# Patient Record
Sex: MALE | Race: WHITE | Employment: OTHER | URBAN - METROPOLITAN AREA
[De-identification: names, ages, dates, MRNs, and addresses within clinical notes are randomized per-mention and may not be internally consistent; named-entity substitution may affect disease eponyms.]

---

## 2017-09-08 ENCOUNTER — OFFICE VISIT (OUTPATIENT)
Dept: INTERNAL MEDICINE CLINIC | Age: 82
End: 2017-09-08

## 2017-09-08 VITALS
WEIGHT: 194 LBS | TEMPERATURE: 97.5 F | HEART RATE: 53 BPM | BODY MASS INDEX: 26.28 KG/M2 | DIASTOLIC BLOOD PRESSURE: 61 MMHG | OXYGEN SATURATION: 95 % | SYSTOLIC BLOOD PRESSURE: 135 MMHG | HEIGHT: 72 IN | RESPIRATION RATE: 22 BRPM

## 2017-09-08 DIAGNOSIS — N40.0 BENIGN PROSTATIC HYPERPLASIA, PRESENCE OF LOWER URINARY TRACT SYMPTOMS UNSPECIFIED, UNSPECIFIED MORPHOLOGY: ICD-10-CM

## 2017-09-08 DIAGNOSIS — E11.8 CONTROLLED TYPE 2 DIABETES MELLITUS WITH COMPLICATION, WITHOUT LONG-TERM CURRENT USE OF INSULIN (HCC): Primary | ICD-10-CM

## 2017-09-08 DIAGNOSIS — L89.311: ICD-10-CM

## 2017-09-08 DIAGNOSIS — R26.89 BALANCE PROBLEM: ICD-10-CM

## 2017-09-08 DIAGNOSIS — G47.00 INSOMNIA, UNSPECIFIED TYPE: ICD-10-CM

## 2017-09-08 DIAGNOSIS — Z95.1 S/P CABG X 1: ICD-10-CM

## 2017-09-08 DIAGNOSIS — I25.10 CORONARY ARTERY DISEASE, ANGINA PRESENCE UNSPECIFIED, UNSPECIFIED VESSEL OR LESION TYPE, UNSPECIFIED WHETHER NATIVE OR TRANSPLANTED HEART: ICD-10-CM

## 2017-09-08 DIAGNOSIS — H61.21 RIGHT EAR IMPACTED CERUMEN: ICD-10-CM

## 2017-09-08 DIAGNOSIS — E78.5 HYPERLIPIDEMIA, UNSPECIFIED HYPERLIPIDEMIA TYPE: ICD-10-CM

## 2017-09-08 DIAGNOSIS — E55.9 VITAMIN D DEFICIENCY: ICD-10-CM

## 2017-09-08 RX ORDER — FINASTERIDE 5 MG/1
5 TABLET, FILM COATED ORAL DAILY
COMMUNITY
End: 2017-12-14 | Stop reason: SDUPTHER

## 2017-09-08 RX ORDER — AMLODIPINE BESYLATE 10 MG/1
TABLET ORAL DAILY
COMMUNITY
End: 2017-10-19 | Stop reason: SDUPTHER

## 2017-09-08 RX ORDER — ATORVASTATIN CALCIUM 20 MG/1
TABLET, FILM COATED ORAL DAILY
COMMUNITY
End: 2017-09-21 | Stop reason: SDUPTHER

## 2017-09-08 RX ORDER — GLUCOSAMINE SULFATE 1500 MG
POWDER IN PACKET (EA) ORAL DAILY
COMMUNITY
End: 2018-03-29 | Stop reason: SDUPTHER

## 2017-09-08 RX ORDER — LABETALOL 100 MG/1
100 TABLET, FILM COATED ORAL DAILY
COMMUNITY
End: 2017-12-14 | Stop reason: SDUPTHER

## 2017-09-08 RX ORDER — TRAZODONE HYDROCHLORIDE 150 MG/1
150 TABLET ORAL
COMMUNITY
End: 2017-09-08

## 2017-09-08 RX ORDER — METFORMIN HYDROCHLORIDE 500 MG/1
500 TABLET ORAL 2 TIMES DAILY WITH MEALS
COMMUNITY
End: 2017-10-19 | Stop reason: SDUPTHER

## 2017-09-08 RX ORDER — TRAZODONE HYDROCHLORIDE 50 MG/1
TABLET ORAL
COMMUNITY
End: 2017-10-04 | Stop reason: SDUPTHER

## 2017-09-08 RX ORDER — TEMAZEPAM 15 MG/1
30 CAPSULE ORAL
COMMUNITY
End: 2017-10-04 | Stop reason: SDUPTHER

## 2017-09-08 RX ORDER — ERGOCALCIFEROL 1.25 MG/1
50000 CAPSULE ORAL
COMMUNITY
End: 2017-10-19 | Stop reason: SDUPTHER

## 2017-09-08 NOTE — LETTER
9/15/2017 2:33 PM 
 
Mr. Zaida Christina [address] [facility] 7 [address] Dear Zaida Christina: 
 
Please find your most recent results below. Resulted Orders CBC WITH AUTOMATED DIFF Result Value Ref Range WBC 7.0 3.4 - 10.8 x10E3/uL  
 RBC 4.02 (L) 4.14 - 5.80 x10E6/uL HGB 11.8 (L) 12.6 - 17.7 g/dL HCT 34.9 (L) 37.5 - 51.0 % MCV 87 79 - 97 fL  
 MCH 29.4 26.6 - 33.0 pg  
 MCHC 33.8 31.5 - 35.7 g/dL  
 RDW 13.3 12.3 - 15.4 % PLATELET 627 (L) 764 - 379 x10E3/uL NEUTROPHILS 70 % Lymphocytes 20 % MONOCYTES 8 % EOSINOPHILS 2 % BASOPHILS 0 %  
 ABS. NEUTROPHILS 4.8 1.4 - 7.0 x10E3/uL Abs Lymphocytes 1.4 0.7 - 3.1 x10E3/uL  
 ABS. MONOCYTES 0.6 0.1 - 0.9 x10E3/uL  
 ABS. EOSINOPHILS 0.2 0.0 - 0.4 x10E3/uL  
 ABS. BASOPHILS 0.0 0.0 - 0.2 x10E3/uL IMMATURE GRANULOCYTES 0 %  
 ABS. IMM. GRANS. 0.0 0.0 - 0.1 x10E3/uL Narrative Performed at:  [address]  [id number] : Millie Yeung MD, Phone:  [phone] METABOLIC PANEL, COMPREHENSIVE Result Value Ref Range Glucose 181 (H) 65 - 99 mg/dL BUN 22 8 - 27 mg/dL Creatinine 1.02 0.76 - 1.27 mg/dL GFR est non-AA 69 >59 mL/min/1.73 GFR est AA 80 >59 mL/min/1.73  
 BUN/Creatinine ratio 22 10 - 24 Sodium 140 134 - 144 mmol/L Potassium 4.1 3.5 - 5.2 mmol/L Chloride 97 96 - 106 mmol/L  
 CO2 27 18 - 29 mmol/L Calcium 9.6 8.6 - 10.2 mg/dL Protein, total 6.9 6.0 - 8.5 g/dL Albumin 4.3 3.5 - 4.7 g/dL GLOBULIN, TOTAL 2.6 1.5 - 4.5 g/dL A-G Ratio 1.7 1.2 - 2.2 Bilirubin, total 0.9 0.0 - 1.2 mg/dL Alk. phosphatase 76 39 - 117 IU/L  
 AST (SGOT) 25 0 - 40 IU/L  
 ALT (SGPT) 33 0 - 44 IU/L Narrative Performed at:  [address]  [id number] : Millie Yeung MD, Phone:  [phone] LIPID PANEL Result Value Ref Range Cholesterol, total 90 (L) 100 - 199 mg/dL Triglyceride 57 0 - 149 mg/dL HDL Cholesterol 46 >39 mg/dL VLDL, calculated 11 5 - 40 mg/dL LDL, calculated 33 0 - 99 mg/dL Narrative Performed at:  [address]  [id number] : Jose Lainez MD, Phone:  [phone] MICROALBUMIN, UR, RAND W/ MICROALBUMIN/CREA RATIO Result Value Ref Range Creatinine, urine 103.0 Not Estab. mg/dL Microalbumin, urine 35.9 Not Estab. ug/mL Microalb/Creat ratio (ug/mg creat.) 34.9 (H) 0.0 - 30.0 mg/g creat Narrative Performed at:  [address]  [id number] : Jose Lainez MD, Phone:  [phone] HEMOGLOBIN A1C WITH EAG Result Value Ref Range Hemoglobin A1c 8.0 (H) 4.8 - 5.6 % Comment:  
            Pre-diabetes: 5.7 - 6.4 Diabetes: >6.4 Glycemic control for adults with diabetes: <7.0 Estimated average glucose 183 mg/dL Narrative Performed at:  [address]  [id number] : Jose Lainez MD, Phone:  [phone] VITAMIN D, 25 HYDROXY Result Value Ref Range VITAMIN D, 25-HYDROXY 57.9 30.0 - 100.0 ng/mL Comment:  
   Vitamin D deficiency has been defined by the [address] practice guideline as a 
level of serum 25-OH vitamin D less than 20 ng/mL (1,2). The Endocrine Society went on to further define vitamin D 
insufficiency as a level between 21 and 29 ng/mL (2). 1. IOM ([place] of Medicine). 2010. Dietary reference 
   intakes for calcium and D. [facility]: The 
   [place]. 2. Stoney MEDEIROS, Marva BAY, Thao GÓMEZ, et al. 
   Evaluation, treatment, and prevention of vitamin D 
   deficiency: an Endocrine Society clinical practice 
   guideline. JCEM. 2011 Jul; 96(7):1911-30. Narrative Performed at:  [address]  [id number] : Natasha Deleon MD, Phone:  [phone] CVD REPORT Result Value Ref Range INTERPRETATION Note Comment:  
   Medical Director's Note: This is an amended report on 
9/14/2017. The following panels were previously not 
reported: Albumin: Creatinine Ratio. Please review this 
report in its entirety, since changes may affect result 
interpretation(s) and/or treatment/follow-up suggestions. Supplement report is available. Narrative Performed at:  [address] [place] [address] [address] [place] [address]  [id number] : Keturah Lopez PhD, Phone:  [phone] DIABETES PATIENT EDUCATION Result Value Ref Range PDF Image Not applicable Narrative Performed at:  [address] [place] [address] [address] [place] [address]  [id number] : Keturah Lopez PhD, Phone:  [phone] DIABETES PATIENT EDUCATION Result Value Ref Range PDF Image Not applicable Narrative Performed at:  [address] [place] [address] [address] [place] [address]  [id number] : Keturah Lopez PhD, Phone:  [phone] IRON PROFILE Result Value Ref Range TIBC 286 250 - 450 ug/dL UIBC 156 111 - 343 ug/dL Iron 130 38 - 169 ug/dL Iron % saturation 45 15 - 55 % Narrative Performed at:  [address]  [id number] : Natasha Deleon MD, Phone:  [phone] FERRITIN Result Value Ref Range Ferritin 74 30 - 400 ng/mL Narrative Performed at:  [address]  [id number] : Natasha Deleon MD, Phone:  [phone] SPECIMEN STATUS REPORT Result Value Ref Range SPECIMEN STATUS REPORT COMMENT Comment:  
   Written Authorization Written Authorization Written Authorization Received. Authorization received from [address] [place] [address] 51- Logged by Issa Barry Narrative Performed at:  [address]  [id number] : Yi Laguna MD, Phone:  [phone] RECOMMENDATIONS: 
Labs are stable. Please call me if you have any questions: [phone] Sincerely, Nubia Bowens, NP

## 2017-09-08 NOTE — PROGRESS NOTES
HISTORY OF PRESENT ILLNESS  Silvana Torres is a [de-identified] y.o. male. This patient presents today to establish care. He moved to [facility] this week from [facility]. The patient's past medical history includes diabetes, HTN, HLD, CAD s/p CABG, and BPH. He was followed by PCP, cardiology, urology, and endocrinology in [facility]. The patient states hat he is generally feeling well at the time of today's visit. He denies chest pain, shortness of breath, and GI/ problems. The patient does have complaints of a sore spot to his buttock. He states he has had the area for some time, but it seemed to worsen after 20+ hour car ride to [place]. The patient has been applying Desitin cream to the area, which has helped some. The patient also mentions he has felt off balance at times. He was participating in exercise prior to move. Patient denies falls. No syncope. No dizziness or headache. Past Medical History:   Diagnosis Date    BPH (benign prostatic hyperplasia)     Diabetes ([address]     Hearing loss     Hypercholesterolemia     Hypertension     Murmur      Past Surgical History:   Procedure Laterality Date    CARDIAC SURG PROCEDURE UNLIST       Visit Vitals    /61    Pulse (!) 53    Temp 97.5 °F (36.4 °C) (Oral)    Resp 22    Ht 6' 0.25\" (1.835 m)    Wt 194 lb (88 kg)    SpO2 95%    BMI 26.13 kg/m2     HPI    Review of Systems   Constitutional: Negative for chills and fever. HENT: Positive for hearing loss. Negative for congestion, ear pain and tinnitus. Eyes: Negative for blurred vision. Respiratory: Negative for cough, shortness of breath and wheezing. Cardiovascular: Negative for chest pain, palpitations and leg swelling. Gastrointestinal: Negative for abdominal pain, constipation, diarrhea, nausea and vomiting. Genitourinary: Negative. Musculoskeletal: Negative. Skin: Negative.          Skin sore   Neurological: Negative for dizziness, weakness and headaches. Endo/Heme/Allergies: Negative. Psychiatric/Behavioral: Negative. Physical Exam   Constitutional: He is oriented to person, place, and time. He appears well-developed and well-nourished. No distress. HENT:   Head: Normocephalic and atraumatic. Left Ear: External ear normal.   Mouth/Throat: Oropharynx is clear and moist. No oropharyngeal exudate. Right ear canal with impacted cerumen   Eyes: Conjunctivae are normal. Pupils are equal, round, and reactive to light. Neck: Neck supple. Cardiovascular: Normal rate and regular rhythm. Murmur heard. Pulmonary/Chest: Effort normal and breath sounds normal. No respiratory distress. He has no wheezes. He has no rales. Abdominal: Soft. Bowel sounds are normal. He exhibits no distension. There is no tenderness. Musculoskeletal: He exhibits no edema. Lymphadenopathy:     He has no cervical adenopathy. Neurological: He is alert and oriented to person, place, and time. He exhibits normal muscle tone. Skin: Skin is warm and dry. Right ischium with area of non-blanchable erythema  Small scabbed area noted to right ischium, as well- no drainage noted   Psychiatric: He has a normal mood and affect. His behavior is normal.   Nursing note and vitals reviewed. ASSESSMENT and PLAN    ICD-10-CM ICD-9-CM    1. Controlled type 2 diabetes mellitus with complication, without long-term current use of insulin ([facility]) E11.8 250.90 Will order  MICROALBUMIN, UR, RAND W/ MICROALBUMIN/CREA RATIO      HEMOGLOBIN A1C WITH EAG   2. Coronary artery disease, angina presence unspecified, unspecified vessel or lesion type, unspecified whether native or transplanted heart I25.10 414.00 Will order  CBC WITH AUTOMATED DIFF      METABOLIC PANEL, COMPREHENSIVE      Will refer, REFERRAL TO CARDIOLOGY   3. S/P CABG x 1 Z95.1 V45.81 As above, REFERRAL TO CARDIOLOGY   4.  Benign prostatic hyperplasia, presence of lower urinary tract symptoms unspecified, unspecified morphology N40.0 600.00 Consider Urology referral at next visit. 5. Vitamin D deficiency E55.9 268.9 Will order  VITAMIN D, 25 HYDROXY   6. Hyperlipidemia, unspecified hyperlipidemia type E78.5 272.4 Will order  LIPID PANEL   7. Insomnia, unspecified type G47.00 780.52 Taking Restoril and Trazodone. Patient unsure of dosage at time of visit, will provide this information. 8. Balance problem R26.89 781.99 Will order  [address], PT   9. Right ischial pressure sore, stage 1 L89.311 707.05 Will order  [address], SN     707.21    10. Right ear impacted cerumen H61.21 380.4 Debrox 5 gtts to right ear bid x 3 days. Lab results and schedule of future lab studies reviewed with patient  Reviewed diet, exercise and weight control  Reviewed medications and side effects in detail  Patient encouraged to call or return to office if symptoms do not improve or worsen. Reviewed plan of care with patient who acknowledges understanding and agrees. Follow-up in 3 months, or sooner as needed.

## 2017-09-08 NOTE — PROGRESS NOTES
Chief Complaint   Patient presents with   [facility] Establish Care    Buttocks pain     C/O soreness buttocks--long drive with move to [facility]       Patient moved here from [place], he is here today, accompanied by his son, Baylee Tate. Family is not sure of new pharmacy yet.

## 2017-09-08 NOTE — MR AVS SNAPSHOT
Visit Information Date & Time Provider Department Dept. Phone Encounter #  
 9/8/2017 10:00 AM Zari Marcus, [facility] Internal Medicine [facility] [phone] [id number] Upcoming Health Maintenance Date Due DTaP/Tdap/Td series (1 - Tdap) 6/18/1956 ZOSTER VACCINE AGE 60> 4/18/1997 GLAUCOMA SCREENING Q2Y 6/18/2002 Pneumococcal 65+ Low/Medium Risk (1 of 2 - PCV13) 6/18/2002 MEDICARE YEARLY EXAM 6/18/2002 INFLUENZA AGE 9 TO ADULT 8/1/2017 Allergies as of 9/8/2017  Review Complete On: 9/8/2017 By: Zari Marcus NP No Known Allergies Current Immunizations  Never Reviewed No immunizations on file. Not reviewed this visit You Were Diagnosed With   
  
 Codes Comments Coronary artery disease, angina presence unspecified, unspecified vessel or lesion type, unspecified whether native or transplanted heart    -  Primary ICD-10-CM: I25.10 ICD-9-CM: 414.00 S/P CABG x 1     ICD-10-CM: Z95.1 ICD-9-CM: V45.81 Controlled type 2 diabetes mellitus with complication, without long-term current use of insulin (HCC)     ICD-10-CM: E11.8 ICD-9-CM: 250.90 Benign prostatic hyperplasia, presence of lower urinary tract symptoms unspecified, unspecified morphology     ICD-10-CM: N40.0 ICD-9-CM: 600.00 Vitamin D deficiency     ICD-10-CM: E55.9 ICD-9-CM: 268.9 Hyperlipidemia, unspecified hyperlipidemia type     ICD-10-CM: E78.5 ICD-9-CM: 272.4 Insomnia, unspecified type     ICD-10-CM: G47.00 ICD-9-CM: 780.52 Balance problem     ICD-10-CM: R26.89 
ICD-9-CM: 781.99 Right ischial pressure sore, stage 1     ICD-10-CM: L89.311 ICD-9-CM: 707.05, 707.21 Right ear impacted cerumen     ICD-10-CM: H61.21 ICD-9-CM: 380.4 Vitals BP Pulse Temp Resp Height(growth percentile) Weight(growth percentile) 135/61 (!) 53 97.5 °F (36.4 °C) (Oral) 22 6' 0.25\" (1.835 m) 194 lb (88 kg) SpO2 BMI Smoking Status 95% 26.13 kg/m2 Former Smoker BMI and BSA Data Body Mass Index Body Surface Area  
 26.13 kg/m 2 2.12 m 2 Preferred Pharmacy Pharmacy Name Phone [facility] [phone] Your Updated Medication List  
  
   
This list is accurate as of: 9/8/17 11:01 AM.  Always use your most recent med list. amLODIPine 10 mg tablet Commonly known as:  Nelly Chimes Take  by mouth daily. atorvastatin 20 mg tablet Commonly known as:  LIPITOR Take  by mouth daily. carbamide peroxide 6.5 % otic solution Commonly known as:  Luan Holiday Administer 5 Drops in right ear two (2) times a day for 3 days. DOXAZOSIN PO Take  by mouth. Take one tablet daily. finasteride 5 mg tablet Commonly known as:  PROSCAR Take 5 mg by mouth daily. labetalol 100 mg tablet Commonly known as:  Lashay Chariton Take 100 mg by mouth daily. metFORMIN 500 mg tablet Commonly known as:  GLUCOPHAGE Take 500 mg by mouth two (2) times daily (with meals). ONGLYZA 5 mg Tab tablet Generic drug:  sAXagliptin Take  by mouth daily. temazepam 15 mg capsule Commonly known as:  RESTORIL Take  by mouth nightly as needed for Sleep.  
  
 traZODone 50 mg tablet Commonly known as:  Rhonda Checo Take  by mouth nightly. VITAMIN D2 50,000 unit capsule Generic drug:  ergocalciferol Take 50,000 Units by mouth. Take one capsule PO every 2 weeks. VITAMIN D3 1,000 unit Cap Generic drug:  cholecalciferol Take  by mouth daily. Prescriptions Sent to Pharmacy Refills  
 carbamide peroxide (DEBROX) 6.5 % otic solution 0 Sig: Administer 5 Drops in right ear two (2) times a day for 3 days. Class: Normal  
 Pharmacy: [facility] Gustavo Abbasi Ph #: [phone] Route: Right Ear We Performed the Following CBC WITH AUTOMATED DIFF [31789 CPT(R)] HEMOGLOBIN A1C WITH EAG [82744 CPT(R)] LIPID PANEL [72339 CPT(R)] METABOLIC PANEL, COMPREHENSIVE [39399 CPT(R)] MICROALBUMIN, UR, RAND W/ MICROALBUMIN/CREA RATIO [id number] CPT(R)] REFERRAL TO CARDIOLOGY [LGC19 Custom] Comments:  
 Please evaluate patient for CAD. [address] Comments:  
 Please evaluate patient for PT to assess and treat balance, SN for medication management, right ischial stage 1 pressure ulcer VITAMIN D, 25 HYDROXY [id number] CPT(R)] Referral Information Referral ID Referred By Referred To  
  
 [id number] Anju Rudolph MD   
   [address] [facility] [address] [place], [facility] [address] Phone: [phone] Fax: [phone] Visits Status Start Date End Date 1 New Request 9/8/17 9/8/18 If your referral has a status of pending review or denied, additional information will be sent to support the outcome of this decision. Referral ID Referred By Referred To  
 [id number] Hiral VALENCIA Not Available Visits Status Start Date End Date 1 New Request 9/8/17 9/8/18 If your referral has a status of pending review or denied, additional information will be sent to support the outcome of this decision. Patient Instructions Trazodone, temazepam, Doxazosin- please provide dosing information Please provide this summary of care documentation to your next provider. If you have any questions after today's visit, please call [phone].

## 2017-09-08 NOTE — LETTER
9/14/2017 2:25 PM 
 
Mr. Wendel Lesches [address] [facility] [address] Dear Wendel Lesches: 
 
Please find your most recent results below. Resulted Orders CBC WITH AUTOMATED DIFF Result Value Ref Range WBC 7.0 3.4 - 10.8 x10E3/uL  
 RBC 4.02 (L) 4.14 - 5.80 x10E6/uL HGB 11.8 (L) 12.6 - 17.7 g/dL HCT 34.9 (L) 37.5 - 51.0 % MCV 87 79 - 97 fL  
 MCH 29.4 26.6 - 33.0 pg  
 MCHC 33.8 31.5 - 35.7 g/dL  
 RDW 13.3 12.3 - 15.4 % PLATELET 756 (L) 278 - 379 x10E3/uL NEUTROPHILS 70 % Lymphocytes 20 % MONOCYTES 8 % EOSINOPHILS 2 % BASOPHILS 0 %  
 ABS. NEUTROPHILS 4.8 1.4 - 7.0 x10E3/uL Abs Lymphocytes 1.4 0.7 - 3.1 x10E3/uL  
 ABS. MONOCYTES 0.6 0.1 - 0.9 x10E3/uL  
 ABS. EOSINOPHILS 0.2 0.0 - 0.4 x10E3/uL  
 ABS. BASOPHILS 0.0 0.0 - 0.2 x10E3/uL IMMATURE GRANULOCYTES 0 %  
 ABS. IMM. GRANS. 0.0 0.0 - 0.1 x10E3/uL Narrative Performed at:  [address]  [id number] : Kassandra Martins MD, Phone:  [phone] METABOLIC PANEL, COMPREHENSIVE Result Value Ref Range Glucose 181 (H) 65 - 99 mg/dL BUN 22 8 - 27 mg/dL Creatinine 1.02 0.76 - 1.27 mg/dL GFR est non-AA 69 >59 mL/min/1.73 GFR est AA 80 >59 mL/min/1.73  
 BUN/Creatinine ratio 22 10 - 24 Sodium 140 134 - 144 mmol/L Potassium 4.1 3.5 - 5.2 mmol/L Chloride 97 96 - 106 mmol/L  
 CO2 27 18 - 29 mmol/L Calcium 9.6 8.6 - 10.2 mg/dL Protein, total 6.9 6.0 - 8.5 g/dL Albumin 4.3 3.5 - 4.7 g/dL GLOBULIN, TOTAL 2.6 1.5 - 4.5 g/dL A-G Ratio 1.7 1.2 - 2.2 Bilirubin, total 0.9 0.0 - 1.2 mg/dL Alk. phosphatase 76 39 - 117 IU/L  
 AST (SGOT) 25 0 - 40 IU/L  
 ALT (SGPT) 33 0 - 44 IU/L Narrative Performed at:  [address]  [id number] : Kassandra Martins MD, Phone:  [phone] LIPID PANEL Result Value Ref Range Cholesterol, total 90 (L) 100 - 199 mg/dL Triglyceride 57 0 - 149 mg/dL HDL Cholesterol 46 >39 mg/dL VLDL, calculated 11 5 - 40 mg/dL LDL, calculated 33 0 - 99 mg/dL Narrative Performed at:  [address]  [id number] : Zenon Peters MD, Phone:  [phone] HEMOGLOBIN A1C WITH EAG Result Value Ref Range Hemoglobin A1c 8.0 (H) 4.8 - 5.6 % Comment:  
            Pre-diabetes: 5.7 - 6.4 Diabetes: >6.4 Glycemic control for adults with diabetes: <7.0 Estimated average glucose 183 mg/dL Narrative Performed at:  [address]  [id number] : Zenon Peters MD, Phone:  [phone] VITAMIN D, 25 HYDROXY Result Value Ref Range VITAMIN D, 25-HYDROXY 57.9 30.0 - 100.0 ng/mL Comment:  
   Vitamin D deficiency has been defined by the [address] [place] [address] practice guideline as a 
level of serum 25-OH vitamin D less than 20 ng/mL (1,2). The Endocrine Society went on to further define vitamin D 
insufficiency as a level between 21 and 29 ng/mL (2). 1. IOM ([place] of Medicine). 2010. Dietary reference 
   intakes for calcium and D. [facility]: [facility] 
   [employer]. 2. Stoney MF, Marva NC, Thao GÓMEZ, et al. 
   Evaluation, treatment, and prevention of vitamin D 
   deficiency: an Endocrine Society clinical practice 
   guideline. JCEM. 2011 Jul; 96(7):1911-30. Narrative Performed at:  [address]  [id number] : Zenon Peters MD, Phone:  [phone] CVD REPORT Result Value Ref Range INTERPRETATION Note Comment:  
   Supplement report is available. Narrative Performed at:  [address] [place] [address]  [id number] : Tory Parker PhD, Phone:  [phone] DIABETES PATIENT EDUCATION Result Value Ref Range PDF Image Not applicable Narrative Performed at:  [address] [place] [address]  [id number] : Bhargavi Cross PhD, Phone:  [phone] RECOMMENDATIONS: 
As my nurse mentioned over the phone: 
Cholesterol levels low. May reduce Lipitor to 10 mg po qhs (from 20 mg). HgbA1c, average of sugar over three months is 8.0.  At goal. Continue to watch diet for sugars and carbohydrates. I have added a few more test on, I hould have those results, Tues. Please call me if you have any questions: [phone] Sincerely, Subhash Medina NP

## 2017-09-11 ENCOUNTER — TELEPHONE (OUTPATIENT)
Dept: INTERNAL MEDICINE CLINIC | Age: 82
End: 2017-09-11

## 2017-09-11 NOTE — TELEPHONE ENCOUNTER
Received message from Tiffany Kohli RN with [facility]. Skilled nursing home health established for education on pressure ulcer prevnetion, wound care, and diabetes education.

## 2017-09-12 ENCOUNTER — HOSPITAL ENCOUNTER (OUTPATIENT)
Dept: LAB | Age: 82
Discharge: HOME | End: 2017-09-12
Payer: MEDICARE

## 2017-09-12 PROCEDURE — 80061 LIPID PANEL: CPT

## 2017-09-12 PROCEDURE — 82043 UR ALBUMIN QUANTITATIVE: CPT

## 2017-09-12 PROCEDURE — 83550 IRON BINDING TEST: CPT

## 2017-09-12 PROCEDURE — 83036 HEMOGLOBIN GLYCOSYLATED A1C: CPT

## 2017-09-12 PROCEDURE — 82306 VITAMIN D 25 HYDROXY: CPT

## 2017-09-12 PROCEDURE — 80053 COMPREHEN METABOLIC PANEL: CPT

## 2017-09-12 PROCEDURE — 85025 COMPLETE CBC W/AUTO DIFF WBC: CPT

## 2017-09-12 PROCEDURE — 82728 ASSAY OF FERRITIN: CPT

## 2017-09-13 ENCOUNTER — TELEPHONE (OUTPATIENT)
Dept: INTERNAL MEDICINE CLINIC | Age: 82
End: 2017-09-13

## 2017-09-13 NOTE — TELEPHONE ENCOUNTER
Received call from Vickie Alberto with [facility].   Patient will be started on 5 week course of PT for balance

## 2017-09-14 LAB
25(OH)D3+25(OH)D2 SERPL-MCNC: 57.9 NG/ML (ref 30–100)
ALBUMIN SERPL-MCNC: 4.3 G/DL (ref 3.5–4.7)
ALBUMIN/CREAT UR: 34.9 MG/G CREAT (ref 0–30)
ALBUMIN/GLOB SERPL: 1.7 {RATIO} (ref 1.2–2.2)
ALP SERPL-CCNC: 76 IU/L (ref 39–117)
ALT SERPL-CCNC: 33 IU/L (ref 0–44)
AST SERPL-CCNC: 25 IU/L (ref 0–40)
BASOPHILS # BLD AUTO: 0 X10E3/UL (ref 0–0.2)
BASOPHILS NFR BLD AUTO: 0 %
BILIRUB SERPL-MCNC: 0.9 MG/DL (ref 0–1.2)
BUN SERPL-MCNC: 22 MG/DL (ref 8–27)
BUN/CREAT SERPL: 22 (ref 10–24)
CALCIUM SERPL-MCNC: 9.6 MG/DL (ref 8.6–10.2)
CHLORIDE SERPL-SCNC: 97 MMOL/L (ref 96–106)
CHOLEST SERPL-MCNC: 90 MG/DL (ref 100–199)
CO2 SERPL-SCNC: 27 MMOL/L (ref 18–29)
CREAT SERPL-MCNC: 1.02 MG/DL (ref 0.76–1.27)
CREAT UR-MCNC: 103 MG/DL
EOSINOPHIL # BLD AUTO: 0.2 X10E3/UL (ref 0–0.4)
EOSINOPHIL NFR BLD AUTO: 2 %
ERYTHROCYTE [DISTWIDTH] IN BLOOD BY AUTOMATED COUNT: 13.3 % (ref 12.3–15.4)
EST. AVERAGE GLUCOSE BLD GHB EST-MCNC: 183 MG/DL
GLOBULIN SER CALC-MCNC: 2.6 G/DL (ref 1.5–4.5)
GLUCOSE SERPL-MCNC: 181 MG/DL (ref 65–99)
HBA1C MFR BLD: 8 % (ref 4.8–5.6)
HCT VFR BLD AUTO: 34.9 % (ref 37.5–51)
HDLC SERPL-MCNC: 46 MG/DL
HGB BLD-MCNC: 11.8 G/DL (ref 12.6–17.7)
IMM GRANULOCYTES # BLD: 0 X10E3/UL (ref 0–0.1)
IMM GRANULOCYTES NFR BLD: 0 %
INTERPRETATION, 910389: NORMAL
LDLC SERPL CALC-MCNC: 33 MG/DL (ref 0–99)
LYMPHOCYTES # BLD AUTO: 1.4 X10E3/UL (ref 0.7–3.1)
LYMPHOCYTES NFR BLD AUTO: 20 %
Lab: NORMAL
Lab: NORMAL
MCH RBC QN AUTO: 29.4 PG (ref 26.6–33)
MCHC RBC AUTO-ENTMCNC: 33.8 G/DL (ref 31.5–35.7)
MCV RBC AUTO: 87 FL (ref 79–97)
MICROALBUMIN UR-MCNC: 35.9 UG/ML
MONOCYTES # BLD AUTO: 0.6 X10E3/UL (ref 0.1–0.9)
MONOCYTES NFR BLD AUTO: 8 %
NEUTROPHILS # BLD AUTO: 4.8 X10E3/UL (ref 1.4–7)
NEUTROPHILS NFR BLD AUTO: 70 %
PLATELET # BLD AUTO: 131 X10E3/UL (ref 150–379)
POTASSIUM SERPL-SCNC: 4.1 MMOL/L (ref 3.5–5.2)
PROT SERPL-MCNC: 6.9 G/DL (ref 6–8.5)
RBC # BLD AUTO: 4.02 X10E6/UL (ref 4.14–5.8)
SODIUM SERPL-SCNC: 140 MMOL/L (ref 134–144)
TRIGL SERPL-MCNC: 57 MG/DL (ref 0–149)
VLDLC SERPL CALC-MCNC: 11 MG/DL (ref 5–40)
WBC # BLD AUTO: 7 X10E3/UL (ref 3.4–10.8)

## 2017-09-14 NOTE — PROGRESS NOTES
Spoke with patient after verifying name and . Notified patient of lab results and recommendation from provider. Patient verbalized understanding and given a chance to ask questions. Letter mailed to patient with malik provider. b results and recommendations from provider.     Labs added on per Ladarius Pagan NP.

## 2017-09-14 NOTE — PROGRESS NOTES
Please add iron panel, ferritin. Cholesterol levels low. May reduce Lipitor to 10 mg po qhs (from 20 mg). HgbA1c, average of sugar over three months is 8.0. At goal. Continue to watch diet for sugars and carbohydrates.

## 2017-09-15 LAB
FERRITIN SERPL-MCNC: 74 NG/ML (ref 30–400)
IRON SATN MFR SERPL: 45 % (ref 15–55)
IRON SERPL-MCNC: 130 UG/DL (ref 38–169)
SPECIMEN STATUS REPORT, ROLRST: NORMAL
TIBC SERPL-MCNC: 286 UG/DL (ref 250–450)
UIBC SERPL-MCNC: 156 UG/DL (ref 111–343)

## 2017-09-21 ENCOUNTER — OFFICE VISIT (OUTPATIENT)
Dept: INTERNAL MEDICINE CLINIC | Age: 82
End: 2017-09-21

## 2017-09-21 VITALS
TEMPERATURE: 97.5 F | HEIGHT: 72 IN | WEIGHT: 188 LBS | RESPIRATION RATE: 19 BRPM | OXYGEN SATURATION: 94 % | BODY MASS INDEX: 25.47 KG/M2 | SYSTOLIC BLOOD PRESSURE: 104 MMHG | HEART RATE: 59 BPM | DIASTOLIC BLOOD PRESSURE: 58 MMHG

## 2017-09-21 DIAGNOSIS — Z71.89 ACP (ADVANCE CARE PLANNING): ICD-10-CM

## 2017-09-21 DIAGNOSIS — E78.00 HYPERCHOLESTEROLEMIA: ICD-10-CM

## 2017-09-21 DIAGNOSIS — I35.8 AORTIC SYSTOLIC MURMUR ON EXAMINATION: ICD-10-CM

## 2017-09-21 DIAGNOSIS — I25.10 CORONARY ARTERY DISEASE INVOLVING NATIVE CORONARY ARTERY OF NATIVE HEART WITHOUT ANGINA PECTORIS: ICD-10-CM

## 2017-09-21 DIAGNOSIS — E11.9 TYPE 2 DIABETES MELLITUS WITHOUT COMPLICATION, WITHOUT LONG-TERM CURRENT USE OF INSULIN (HCC): Primary | ICD-10-CM

## 2017-09-21 DIAGNOSIS — I10 ESSENTIAL HYPERTENSION: ICD-10-CM

## 2017-09-21 DIAGNOSIS — N40.1 BENIGN PROSTATIC HYPERPLASIA WITH LOWER URINARY TRACT SYMPTOMS, UNSPECIFIED MORPHOLOGY: ICD-10-CM

## 2017-09-21 DIAGNOSIS — G47.00 INSOMNIA, UNSPECIFIED TYPE: ICD-10-CM

## 2017-09-21 DIAGNOSIS — Z87.891 FORMER SMOKER: ICD-10-CM

## 2017-09-21 DIAGNOSIS — I35.0 AORTIC VALVE STENOSIS, UNSPECIFIED ETIOLOGY: ICD-10-CM

## 2017-09-21 DIAGNOSIS — Z95.1 S/P CABG (CORONARY ARTERY BYPASS GRAFT): ICD-10-CM

## 2017-09-21 RX ORDER — ATORVASTATIN CALCIUM 10 MG/1
10 TABLET, FILM COATED ORAL DAILY
Qty: 30 TAB | Refills: 11 | Status: SHIPPED | OUTPATIENT
Start: 2017-09-21 | End: 2017-10-20 | Stop reason: SDUPTHER

## 2017-09-21 NOTE — MR AVS SNAPSHOT
Visit Information Date & Time Provider Department Dept. Phone Encounter #  
 9/21/2017 12:00 PM Tristan Barillas, [address] [phone] [id number] Follow-up Instructions Return in about 4 months (around 1/21/2018). Your Appointments 12/4/2017  1:00 PM  
ROUTINE CARE with Levander Canavan, NP [address] ([address]) Appt Note: follow up [address] [id number] [phone]  
  
   
 [address] [place] [address]. [address] [place] [phone] Upcoming Health Maintenance Date Due DTaP/Tdap/Td series (1 - Tdap) 6/18/1958 ZOSTER VACCINE AGE 60> 4/18/1997 GLAUCOMA SCREENING Q2Y 6/18/2002 Pneumococcal 65+ Low/Medium Risk (1 of 2 - PCV13) 6/18/2002 MEDICARE YEARLY EXAM 6/18/2002 INFLUENZA AGE 9 TO ADULT 8/1/2017 Allergies as of 9/21/2017  Review Complete On: 9/21/2017 By: Tristan Barillas DO No Known Allergies Current Immunizations  Reviewed on 9/21/2017 No immunizations on file. Reviewed by Tristan Barillas DO on 9/21/2017 at 12:19 PM  
You Were Diagnosed With   
  
 Codes Comments Type 2 diabetes mellitus without complication, without long-term current use of insulin (HCC)    -  Primary ICD-10-CM: E11.9 ICD-9-CM: 250.00 Essential hypertension     ICD-10-CM: I10 
ICD-9-CM: 401.9 Hypercholesterolemia     ICD-10-CM: E78.00 ICD-9-CM: 272.0 Coronary artery disease involving native coronary artery of native heart without angina pectoris     ICD-10-CM: I25.10 ICD-9-CM: 414.01 S/P CABG (coronary artery bypass graft)     ICD-10-CM: Z95.1 ICD-9-CM: V45.81 Aortic valve stenosis, unspecified etiology     ICD-10-CM: I35.0 ICD-9-CM: 424.1 Aortic systolic murmur on examination     ICD-10-CM: I35.8 ICD-9-CM: 424.1  Benign prostatic hyperplasia with lower urinary tract symptoms, unspecified morphology     ICD-10-CM: N40.1 ICD-9-CM: 600.01 Insomnia, unspecified type     ICD-10-CM: G47.00 ICD-9-CM: 780.52 Former smoker     ICD-10-CM: K88.914 ICD-9-CM: V15.82   
 ACP (advance care planning)     ICD-10-CM: Z71.89 ICD-9-CM: V65.49 Vitals BP Pulse Temp Resp Height(growth percentile) Weight(growth percentile) 104/58 (BP 1 Location: Left arm, BP Patient Position: Sitting) (!) 59 97.5 °F (36.4 °C) (Oral) 19 6' (1.829 m) 188 lb (85.3 kg) SpO2 BMI Smoking Status 94% 25.5 kg/m2 Former Smoker Vitals History BMI and BSA Data Body Mass Index Body Surface Area 25.5 kg/m 2 2.08 m 2 Preferred Pharmacy Pharmacy Name Phone [facility] [phone] Your Updated Medication List  
  
   
This list is accurate as of: 9/21/17 12:35 PM.  Always use your most recent med list. amLODIPine 10 mg tablet Commonly known as:  Ritu Ohio Take  by mouth daily. atorvastatin 10 mg tablet Commonly known as:  LIPITOR Take 1 Tab by mouth daily. DOXAZOSIN PO Take  by mouth. Take one tablet daily. finasteride 5 mg tablet Commonly known as:  PROSCAR Take 5 mg by mouth daily. labetalol 100 mg tablet Commonly known as:  Temi Gip Take 100 mg by mouth daily. metFORMIN 500 mg tablet Commonly known as:  GLUCOPHAGE Take 500 mg by mouth two (2) times daily (with meals). ONGLYZA 5 mg Tab tablet Generic drug:  sAXagliptin Take  by mouth daily. temazepam 15 mg capsule Commonly known as:  RESTORIL Take 30 mg by mouth nightly as needed for Sleep.  
  
 traZODone 50 mg tablet Commonly known as:  Jackie Hail Take  by mouth nightly. VITAMIN D2 50,000 unit capsule Generic drug:  ergocalciferol Take 50,000 Units by mouth. Take one capsule PO every 2 weeks. VITAMIN D3 1,000 unit Cap Generic drug:  cholecalciferol Take  by mouth daily. Prescriptions Sent to Pharmacy Refills  
 atorvastatin (LIPITOR) 10 mg tablet 11 Sig: Take 1 Tab by mouth daily. Class: Normal  
 Pharmacy: [address]  #: [phone] Route: Oral  
  
Follow-up Instructions Return in about 4 months (around 1/21/2018). Please provide this summary of care documentation to your next provider. Your primary care clinician is listed as Ana Kumar. If you have any questions after today's visit, please call [phone].

## 2017-09-21 NOTE — PROGRESS NOTES
Health Maintenance Due   Topic Date Due    DTaP/Tdap/Td series (1 - Tdap) 06/18/1958    ZOSTER VACCINE AGE 60>  04/18/1997    GLAUCOMA SCREENING Q2Y  06/18/2002    Pneumococcal 65+ Low/Medium Risk (1 of 2 - PCV13) 06/18/2002    MEDICARE YEARLY EXAM  06/18/2002    INFLUENZA AGE 9 TO ADULT  08/01/2017       Chief Complaint   Patient presents with    Hearing Problem    Benign Prostatic Hypertrophy    Heart Murmur       1. Have you been to the ER, urgent care clinic since your last visit? Hospitalized since your last visit? No    2. Have you seen or consulted any other health care providers outside of the [address] [place] [address] since your last visit? Include any pap smears or colon screening. No    3) Do you have an Advance Directive on file? no    4) Are you interested in receiving information on Advance Directives? YES      Patient is accompanied by daughter I have received verbal consent from Kymberly Lombardi to discuss any/all medical information while they are present in the room.

## 2017-09-29 NOTE — PROGRESS NOTES
HISTORY OF PRESENT ILLNESS  Alba Wood is a [de-identified] y.o. male. Pt. comes in for f/u. Has multiple medical problems. This is his first visit with me. Saw Rigoberto Pham NP before. Reports being hard of hearing. No earache or tinnitus. Has extensive cardiac history including CAD/CABG and aortic stenosis. Gets tired easily. Denies any falls. Some chronic arthritic pains. Reports issues with insomnia. His DM has been stable. Reports compliance with medications and diet. Med list and most recent labs/studies reviewed with pt and all look stable. A1c is 8.0. Lipids are normal. Trying to be active physically as tolerated. Former smoker. Denies alcohol use. Reports no other new c/o. Hearing Problem    Associated symptoms include hearing loss. Pertinent negatives include no headaches, no abdominal pain and no rash. Heart Murmur   Pertinent negatives include no chest pain, no abdominal pain, no headaches and no shortness of breath. Review of Systems   Constitutional: Negative for weight loss. HENT: Positive for hearing loss. Eyes: Negative for blurred vision. Respiratory: Negative for shortness of breath. Cardiovascular: Negative for chest pain and leg swelling. Gastrointestinal: Negative for abdominal pain and heartburn. Genitourinary: Negative for dysuria and frequency. Musculoskeletal: Positive for joint pain. Negative for back pain and falls. Skin: Negative for rash. Neurological: Negative for dizziness, sensory change, focal weakness and headaches. Psychiatric/Behavioral: Negative for depression. The patient has insomnia. The patient is not nervous/anxious. All other systems reviewed and are negative. Physical Exam   Constitutional: He is oriented to person, place, and time. He appears well-developed and well-nourished. No distress. HENT:   Head: Normocephalic and atraumatic.    Mouth/Throat: Oropharynx is clear and moist.   Eyes: Conjunctivae are normal.   Neck: Normal range of motion. Neck supple. No JVD present. No thyromegaly present. Cardiovascular: Normal rate, regular rhythm and intact distal pulses. Murmur heard. Pulmonary/Chest: Effort normal and breath sounds normal. No respiratory distress. He has no wheezes. He has no rales. Abdominal: Soft. Bowel sounds are normal. He exhibits no distension. Musculoskeletal: He exhibits no edema or tenderness. Neurological: He is alert and oriented to person, place, and time. Coordination normal.   Skin: Skin is warm and dry. No rash noted. Psychiatric: He has a normal mood and affect. His behavior is normal.   Nursing note and vitals reviewed. ASSESSMENT and PLAN  Diagnoses and all orders for this visit:    1. Type 2 diabetes mellitus without complication, without long-term current use of insulin ([facility] [address]    2. Essential hypertension    3. Hypercholesterolemia    4. Coronary artery disease involving native coronary artery of native heart without angina pectoris    5. S/P CABG (coronary artery bypass graft)    6. Aortic valve stenosis, unspecified etiology    7. Aortic systolic murmur on examination    8. Benign prostatic hyperplasia with lower urinary tract symptoms, unspecified morphology    9. Insomnia, unspecified type    10. Former smoker    6. ACP (advance care planning)    ACP discussed with pt in detail , including choosing a healthcare agent to communicate patient's healthcare decisions if patient lost the ability to make decisions, such as after a sudden illness or accident. Understanding of the healthcare agent role was assessed and information provided. Discussed values, goals, and preferences if recovery is not expected, even with continued medical treatment in the event of: Imminent death/serious illness. Recommended completion of Advance Directive form after review of ACP materials and conversation with prospective healthcare agent.   Recommended communicating the plan and making copies for the healthcare agent, personal physician, and others as appropriate (e.g., health system). Recommended review of completed ACP document annually or upon change in health status. Information book and form given. Face to face time spent was16 minutes. Other orders  -     Decrease atorvastatin (LIPITOR) from 20 mg to 10 mg tablet; Take 1 Tab by mouth daily. Follow-up Disposition:  Return in about 4 months (around 1/21/2018).    lab results and schedule of future lab studies reviewed with patient  reviewed diet, exercise and weight control  reviewed medications and side effects in detail  low cholesterol diet, weight control and daily exercise discussed, home glucose monitoring emphasized, all medications, side effects and compliance discussed carefully, foot care discussed and Podiatry visits discussed, annual eye examinations at Ophthalmology discussed, glycohemoglobin and other lab monitoring discussed and long term diabetic complications discussed  F/u with other MD's as scheduled

## 2017-10-04 RX ORDER — TEMAZEPAM 15 MG/1
15 CAPSULE ORAL
Qty: 30 CAP | Refills: 0 | Status: SHIPPED | OUTPATIENT
Start: 2017-10-04 | End: 2017-10-30 | Stop reason: SDUPTHER

## 2017-10-04 RX ORDER — TRAZODONE HYDROCHLORIDE 50 MG/1
50 TABLET ORAL
Qty: 30 TAB | Refills: 5 | Status: SHIPPED | OUTPATIENT
Start: 2017-10-04 | End: 2017-10-13 | Stop reason: SDUPTHER

## 2017-10-05 RX ORDER — TEMAZEPAM 15 MG/1
15 CAPSULE ORAL
Qty: 30 CAP | Refills: 0
Start: 2017-10-05

## 2017-10-05 RX ORDER — TRAZODONE HYDROCHLORIDE 50 MG/1
50 TABLET ORAL
Qty: 30 TAB | Refills: 5
Start: 2017-10-05

## 2017-10-09 ENCOUNTER — TELEPHONE (OUTPATIENT)
Dept: INTERNAL MEDICINE CLINIC | Age: 82
End: 2017-10-09

## 2017-10-09 NOTE — TELEPHONE ENCOUNTER
Patient stopped in today at [facility], he reports didn't sleep x 2 nights because of the Temazepam 15mg. Patient is requesting temazepam 30 mg. Per MARY Stanford, patient was on Temazepam 30 mg in the past and she dropped it down to the 15 mg.

## 2017-10-12 NOTE — TELEPHONE ENCOUNTER
Left message on patient's cell, see Dr. Richar Mix response -- patient wants increased dose Restoril. Patient may call me back.

## 2017-10-13 ENCOUNTER — OFFICE VISIT (OUTPATIENT)
Dept: INTERNAL MEDICINE CLINIC | Age: 82
End: 2017-10-13

## 2017-10-13 VITALS
HEIGHT: 72 IN | HEART RATE: 56 BPM | SYSTOLIC BLOOD PRESSURE: 103 MMHG | WEIGHT: 189 LBS | TEMPERATURE: 97.5 F | RESPIRATION RATE: 24 BRPM | OXYGEN SATURATION: 95 % | DIASTOLIC BLOOD PRESSURE: 49 MMHG | BODY MASS INDEX: 25.6 KG/M2

## 2017-10-13 DIAGNOSIS — G47.00 INSOMNIA, UNSPECIFIED TYPE: Primary | ICD-10-CM

## 2017-10-13 RX ORDER — TRAZODONE HYDROCHLORIDE 50 MG/1
100 TABLET ORAL
Qty: 60 TAB | Refills: 0 | Status: SHIPPED | OUTPATIENT
Start: 2017-10-13 | End: 2017-11-16 | Stop reason: SDUPTHER

## 2017-10-13 NOTE — MR AVS SNAPSHOT
Visit Information Date & Time Provider Department Dept. Phone Encounter #  
 10/13/2017 10:00 AM Ammon Echavarria, [address] [facility] Internal Medicine [facility] [phone] [id number] Your Appointments 11/7/2017 10:40 AM  
New Patient with Angie Begum MD  
[facility] ([address]) Appt Note: ref by Ammon Echavarria NP/Dx S/P CABG x 1, Coronary artery disease, angina presence unspecified, unspecified vessel or lesion type, unspecified whether native or transplanted heart/[facility] [address] [place] [address]  
[address] [address]  
  
    
 1/18/2018 11:30 AM  
Any with DO Rajan Calvo ([address]) Appt Note: follow up 3m; follow up 4m [address] [place] [address]. [facility] 7 [phone]  
[phone]  
  
   
 [address] [place] [address]. [address] [place] [phone] Upcoming Health Maintenance Date Due  
 FOOT EXAM Q1 6/18/1945 EYE EXAM RETINAL OR DILATED Q1 6/18/1945 DTaP/Tdap/Td series (1 - Tdap) 6/18/1958 ZOSTER VACCINE AGE 60> 4/18/1997 GLAUCOMA SCREENING Q2Y 6/18/2002 Pneumococcal 65+ Low/Medium Risk (1 of 2 - PCV13) 6/18/2002 MEDICARE YEARLY EXAM 6/18/2002 INFLUENZA AGE 9 TO ADULT 8/1/2017 HEMOGLOBIN A1C Q6M 3/12/2018 MICROALBUMIN Q1 9/12/2018 LIPID PANEL Q1 9/12/2018 Allergies as of 10/13/2017  Review Complete On: 10/13/2017 By: Ammon Echavarria NP No Known Allergies Current Immunizations  Reviewed on 9/21/2017 No immunizations on file. Not reviewed this visit You Were Diagnosed With   
  
 Codes Comments Insomnia, unspecified type    -  Primary ICD-10-CM: G47.00 ICD-9-CM: 780.52 Vitals BP Pulse Temp Resp Height(growth percentile) Weight(growth percentile)  103/49 (BP 1 Location: Left arm, BP Patient Position: Sitting) (!) 56 97.5 °F (36.4 °C) (Oral) 24 6' (1.829 m) 189 lb (85.7 kg) SpO2 BMI Smoking Status 95% 25.63 kg/m2 Former Smoker BMI and BSA Data Body Mass Index Body Surface Area  
 25.63 kg/m 2 2.09 m 2 Preferred Pharmacy Pharmacy Name Phone [facility] [phone] Your Updated Medication List  
  
   
This list is accurate as of: 10/13/17 10:13 AM.  Always use your most recent med list. amLODIPine 10 mg tablet Commonly known as:  McEwen Conine Take  by mouth daily. atorvastatin 10 mg tablet Commonly known as:  LIPITOR Take 1 Tab by mouth daily. DOXAZOSIN PO Take  by mouth. Take one tablet daily. finasteride 5 mg tablet Commonly known as:  PROSCAR Take 5 mg by mouth daily. labetalol 100 mg tablet Commonly known as:  Ilda President Take 100 mg by mouth daily. metFORMIN 500 mg tablet Commonly known as:  GLUCOPHAGE Take 500 mg by mouth two (2) times daily (with meals). temazepam 15 mg capsule Commonly known as:  RESTORIL Take 1 Cap by mouth nightly as needed for Sleep. Max Daily Amount: 15 mg.  
  
 traZODone 50 mg tablet Commonly known as:  Robinson Greenway Take 2 Tabs by mouth nightly. VITAMIN D2 50,000 unit capsule Generic drug:  ergocalciferol Take 50,000 Units by mouth. Take one capsule PO every 2 weeks. VITAMIN D3 1,000 unit Cap Generic drug:  cholecalciferol Take  by mouth daily. Prescriptions Sent to Pharmacy Refills  
 traZODone (DESYREL) 50 mg tablet 0 Sig: Take 2 Tabs by mouth nightly. Class: Normal  
 Pharmacy: [address]  #: [phone] Route: Oral  
  
 Please provide this summary of care documentation to your next provider. Your primary care clinician is listed as Adrian Leblanc.  If you have any questions after today's visit, please call [phone].

## 2017-10-13 NOTE — PROGRESS NOTES
HISTORY OF PRESENT ILLNESS  Brain Brennan is a [de-identified] y.o. male. This is a patient of Dr. Shakira Okeefe who presents today with complaints of insomnia. Patient states he previously took Temazepam 30 mg at bedtime. He was also on trazodone 50 mg; he states he took this when he woke up in the middle of the night to go back to sleep. The patient's dose of Temazepam was decreased to 15 mg nightly related to risk of side effects. However, patient states he has had very little sleep over the last four night related to this. He denies daytime drowsiness. Visit Vitals    /49 (BP 1 Location: Left arm, BP Patient Position: Sitting)    Pulse (!) 56    Temp 97.5 °F (36.4 °C) (Oral)    Resp 24    Ht 6' (1.829 m)    Wt 189 lb (85.7 kg)    SpO2 95%    BMI 25.63 kg/m2     HPI    Review of Systems   Constitutional: Negative for chills and fever. HENT: Negative for congestion. Eyes: Negative for blurred vision. Respiratory: Negative for cough and shortness of breath. Cardiovascular: Negative for chest pain and leg swelling. Gastrointestinal: Negative for abdominal pain. Genitourinary: Negative. Musculoskeletal: Negative. Skin: Negative. Neurological: Negative for headaches. Psychiatric/Behavioral: The patient has insomnia. Physical Exam   Constitutional: He is oriented to person, place, and time. He appears well-developed and well-nourished. No distress. HENT:   Head: Normocephalic and atraumatic. Eyes: Conjunctivae are normal.   Cardiovascular: Normal rate and regular rhythm. Pulmonary/Chest: Effort normal and breath sounds normal. No respiratory distress. He has no wheezes. He has no rales. Musculoskeletal: He exhibits no edema. Neurological: He is alert and oriented to person, place, and time. Skin: Skin is warm and dry. Psychiatric: He has a normal mood and affect. Nursing note and vitals reviewed. ASSESSMENT and PLAN    ICD-10-CM ICD-9-CM    1.  Insomnia, unspecified type G47.00 780.52 Continue temazepam 15 mg po nightly. Will increase, traZODone (DESYREL) 50 mg tablet 2 tabs po qhs  Patient encouraged to call or return to office if symptoms do not improve or worsen. Reviewed medications and side effects in detail  Patient encouraged to call or return to office if symptoms do not improve or worsen. Reviewed plan of care with patient who acknowledges understanding and agrees.

## 2017-10-13 NOTE — PROGRESS NOTES
Chief Complaint   Patient presents with    Sleep Problem     1. Have you been to the ER, urgent care clinic since your last visit? Hospitalized since your last visit? No    2. Have you seen or consulted any other health care providers outside of the [address] [place] [address] since your last visit? Include any pap smears or colon screening.  No

## 2017-10-16 ENCOUNTER — TELEPHONE (OUTPATIENT)
Dept: INTERNAL MEDICINE CLINIC | Age: 82
End: 2017-10-16

## 2017-10-16 NOTE — TELEPHONE ENCOUNTER
Received message from Eugene Ventura RN with [facility]. Patient is unable to tolerate duoderm on wound, as it is not staying in place. Request order for foam dressing. Patient has also been using silver sulfadiazine cream to affected area. Recommend patient use only x 1 week.

## 2017-10-19 RX ORDER — ERGOCALCIFEROL 1.25 MG/1
50000 CAPSULE ORAL
Qty: 4 CAP | Refills: 3 | Status: SHIPPED | OUTPATIENT
Start: 2017-10-19 | End: 2018-06-16 | Stop reason: SDUPTHER

## 2017-10-19 RX ORDER — GLUCOSAMINE SULFATE 1500 MG
POWDER IN PACKET (EA) ORAL DAILY
Status: CANCELLED | OUTPATIENT
Start: 2017-10-19

## 2017-10-19 RX ORDER — METFORMIN HYDROCHLORIDE 500 MG/1
500 TABLET ORAL 2 TIMES DAILY WITH MEALS
Qty: 60 TAB | Refills: 3 | Status: SHIPPED | OUTPATIENT
Start: 2017-10-19 | End: 2017-10-20

## 2017-10-19 RX ORDER — SERTRALINE HYDROCHLORIDE 100 MG/1
100 TABLET, FILM COATED ORAL DAILY
Qty: 30 TAB | Refills: 3 | Status: SHIPPED | OUTPATIENT
Start: 2017-10-19 | End: 2018-02-21 | Stop reason: SDUPTHER

## 2017-10-19 RX ORDER — AMLODIPINE BESYLATE 10 MG/1
10 TABLET ORAL DAILY
Qty: 30 TAB | Refills: 3 | Status: SHIPPED | OUTPATIENT
Start: 2017-10-19 | End: 2018-03-23 | Stop reason: SDUPTHER

## 2017-10-20 ENCOUNTER — OFFICE VISIT (OUTPATIENT)
Dept: INTERNAL MEDICINE CLINIC | Age: 82
End: 2017-10-20

## 2017-10-20 VITALS
BODY MASS INDEX: 25.6 KG/M2 | WEIGHT: 189 LBS | HEART RATE: 55 BPM | TEMPERATURE: 97.3 F | SYSTOLIC BLOOD PRESSURE: 117 MMHG | RESPIRATION RATE: 20 BRPM | DIASTOLIC BLOOD PRESSURE: 50 MMHG | HEIGHT: 72 IN | OXYGEN SATURATION: 96 %

## 2017-10-20 DIAGNOSIS — I25.10 CORONARY ARTERY DISEASE INVOLVING NATIVE CORONARY ARTERY OF NATIVE HEART WITHOUT ANGINA PECTORIS: ICD-10-CM

## 2017-10-20 DIAGNOSIS — N40.1 BENIGN PROSTATIC HYPERPLASIA WITH LOWER URINARY TRACT SYMPTOMS, SYMPTOM DETAILS UNSPECIFIED: ICD-10-CM

## 2017-10-20 DIAGNOSIS — G47.00 INSOMNIA, UNSPECIFIED TYPE: ICD-10-CM

## 2017-10-20 DIAGNOSIS — E78.00 HYPERCHOLESTEROLEMIA: ICD-10-CM

## 2017-10-20 DIAGNOSIS — E11.9 TYPE 2 DIABETES MELLITUS WITHOUT COMPLICATION, WITHOUT LONG-TERM CURRENT USE OF INSULIN (HCC): Primary | ICD-10-CM

## 2017-10-20 DIAGNOSIS — I10 ESSENTIAL HYPERTENSION: ICD-10-CM

## 2017-10-20 RX ORDER — METFORMIN HYDROCHLORIDE 500 MG/1
500 TABLET, EXTENDED RELEASE ORAL 2 TIMES DAILY
Qty: 60 TAB | Refills: 5 | Status: SHIPPED | OUTPATIENT
Start: 2017-10-20 | End: 2018-03-29 | Stop reason: SDUPTHER

## 2017-10-20 RX ORDER — ASPIRIN 325 MG
325 TABLET ORAL DAILY
COMMUNITY
End: 2018-03-29 | Stop reason: SDUPTHER

## 2017-10-20 RX ORDER — HYDROCHLOROTHIAZIDE 12.5 MG/1
12.5 TABLET ORAL DAILY
COMMUNITY
End: 2017-11-13 | Stop reason: SDUPTHER

## 2017-10-20 RX ORDER — ATORVASTATIN CALCIUM 10 MG/1
10 TABLET, FILM COATED ORAL DAILY
Qty: 30 TAB | Refills: 5 | Status: SHIPPED | OUTPATIENT
Start: 2017-10-20 | End: 2018-03-29 | Stop reason: SDUPTHER

## 2017-10-20 RX ORDER — METFORMIN HYDROCHLORIDE 500 MG/1
TABLET, FILM COATED, EXTENDED RELEASE ORAL 2 TIMES DAILY
COMMUNITY
End: 2017-10-20 | Stop reason: SDUPTHER

## 2017-10-20 RX ORDER — ZINC GLUCONATE 10 MG
LOZENGE ORAL DAILY
COMMUNITY
End: 2018-03-29 | Stop reason: SDUPTHER

## 2017-10-20 NOTE — MR AVS SNAPSHOT
Visit Information Date & Time Provider Department Dept. Phone Encounter #  
 10/20/2017 11:00 AM Ammon Echavarria, 329 [facility] Internal Medicine [place] [facility] [phone] [id number] Your Appointments 10/20/2017 11:00 AM  
ACUTE CARE with Ammon Echavarria NP [address] ([facility]) Appt Note: medication 47 [facility]. [facility] 7 [phone]  
[phone]  
  
   
 [address] [place] [address]. [address] [place] [phone]  
  
    
 11/7/2017 10:40 AM  
New Patient with Angie Begum MD  
[facility] ([facility]) Appt Note: ref by Ammon Echavarria NP/Dx S/P CABG x 1, Coronary artery disease, angina presence unspecified, unspecified vessel or lesion type, unspecified whether native or transplanted heart/Ref in [address] [place], [place] [address] [facility] [address]  
  
    
 1/18/2018 11:30 AM  
Any with Mahesh Faustin DO [address] ([facility]) Appt Note: follow up 3m; follow up 4m [address] [place] [address]. [facility] 7 [phone]  
[phone]  
  
   
 [address] [place] [address]. [address] [place] [phone] Upcoming Health Maintenance Date Due  
 FOOT EXAM Q1 6/18/1945 EYE EXAM RETINAL OR DILATED Q1 6/18/1945 DTaP/Tdap/Td series (1 - Tdap) 6/18/1958 ZOSTER VACCINE AGE 60> 4/18/1997 GLAUCOMA SCREENING Q2Y 6/18/2002 Pneumococcal 65+ Low/Medium Risk (1 of 2 - PCV13) 6/18/2002 MEDICARE YEARLY EXAM 6/18/2002 INFLUENZA AGE 9 TO ADULT 8/1/2017 HEMOGLOBIN A1C Q6M 3/12/2018 MICROALBUMIN Q1 9/12/2018 LIPID PANEL Q1 9/12/2018 Allergies as of 10/20/2017  Review Complete On: 10/20/2017 By: Shlomo Jaime LPN No Known Allergies Current Immunizations  Reviewed on 9/21/2017 No immunizations on file. Not reviewed this visit Vitals BP Pulse Temp Resp Height(growth percentile) Weight(growth percentile) 117/50 (BP 1 Location: Left leg, BP Patient Position: Sitting) (!) 55 97.3 °F (36.3 °C) (Oral) 20 6' (1.829 m) 189 lb (85.7 kg) SpO2 BMI Smoking Status 96% 25.63 kg/m2 Former Smoker Vitals History BMI and BSA Data Body Mass Index Body Surface Area  
 25.63 kg/m 2 2.09 m 2 Preferred Pharmacy Pharmacy Name Phone [facility] [phone] Your Updated Medication List  
  
   
This list is accurate as of: 10/20/17  9:58 AM.  Always use your most recent med list. amLODIPine 10 mg tablet Commonly known as:  Virgen Gables Take 1 Tab by mouth daily. aspirin 325 mg tablet Commonly known as:  ASPIRIN Take 325 mg by mouth daily. atorvastatin 10 mg tablet Commonly known as:  LIPITOR Take 1 Tab by mouth daily. DOXAZOSIN PO Take 4 mg by mouth daily. Take one tablet daily. ergocalciferol 50,000 unit capsule Commonly known as:  VITAMIN D2 Take 1 Cap by mouth every fourteen (14) days. Take one capsule PO every 2 weeks. finasteride 5 mg tablet Commonly known as:  PROSCAR Take 5 mg by mouth daily. hydroCHLOROthiazide 12.5 mg tablet Commonly known as:  HYDRODIURIL Take 12.5 mg by mouth daily. labetalol 100 mg tablet Commonly known as:  Donnia Pale Take 100 mg by mouth daily. magnesium 250 mg Tab Take  by mouth daily. metFORMIN 500 mg Tg24 24 hour tablet Commonly known asBanitae Spruce ER Take  by mouth two (2) times a day. sAXagliptin 5 mg Tab tablet Commonly known as:  ONGLYZA Take 1 Tab by mouth daily. sertraline 100 mg tablet Commonly known as:  ZOLOFT Take 1 Tab by mouth daily. SUPER B COMPLEX PO Take  by mouth. temazepam 15 mg capsule Commonly known as:  RESTORIL  
 Take 1 Cap by mouth nightly as needed for Sleep. Max Daily Amount: 15 mg.  
  
 traZODone 50 mg tablet Commonly known as:  Marcell Toscano Take 2 Tabs by mouth nightly. VITAMIN D3 1,000 unit Cap Generic drug:  cholecalciferol Take  by mouth daily. Please provide this summary of care documentation to your next provider. Your primary care clinician is listed as Tab Fox. If you have any questions after today's visit, please call [phone].

## 2017-10-20 NOTE — PROGRESS NOTES
Chief Complaint   Patient presents with    Medication Evaluation     discuss Trazodone & Temazepam    New Order     Metformen     1. Have you been to the ER, urgent care clinic since your last visit? Hospitalized since your last visit? No    2. Have you seen or consulted any other health care providers outside of the [address] [place] [address] since your last visit? Include any pap smears or colon screening.  No

## 2017-10-20 NOTE — PROGRESS NOTES
HISTORY OF PRESENT ILLNESS  Evens Campbell is a [de-identified] y.o. male. This is a patient of Dr. Brendon June who presents today for medication review. Patient has recently moved to [facility]. Pharmacist, Linsey Lynn, has reviewed patient's medication bottles from previous pharmacy and medication list has been updated as to known medications. At time of last visit, patient had concerns about insomnia. Trazodone was increased to 100 mg po nightly. He continues on PRN Temazepam 15 mg po qhs PRN. He states he is sleeping better with this change. He states that he is generally feeling well at the time of today's visit. He denies chest pain, shortness of breath, dizziness, and GI/Gu problems at this time. Visit Vitals    /50 (BP 1 Location: Left leg, BP Patient Position: Sitting)    Pulse (!) 55    Temp 97.3 °F (36.3 °C) (Oral)    Resp 20    Ht 6' (1.829 m)    Wt 189 lb (85.7 kg)    SpO2 96%    BMI 25.63 kg/m2     HPI    Review of Systems   Constitutional: Negative for chills and fever. HENT: Negative for congestion. Eyes: Negative for blurred vision. Respiratory: Negative for cough and shortness of breath. Cardiovascular: Negative for chest pain and leg swelling. Gastrointestinal: Negative for abdominal pain. Genitourinary: Negative. Musculoskeletal: Negative. Skin: Negative. Neurological: Negative for headaches. Psychiatric/Behavioral: Negative. Physical Exam   Constitutional: He is oriented to person, place, and time. He appears well-developed and well-nourished. No distress. HENT:   Head: Normocephalic and atraumatic. Cardiovascular: Normal rate and regular rhythm. Pulmonary/Chest: Effort normal and breath sounds normal. No respiratory distress. He has no wheezes. He has no rales. Abdominal: Soft. Bowel sounds are normal. He exhibits no distension. There is no tenderness. Musculoskeletal: He exhibits no edema.    Neurological: He is alert and oriented to person, place, and time. Skin: Skin is warm and dry. Psychiatric: He has a normal mood and affect. Nursing note and vitals reviewed. ASSESSMENT and PLAN  Encounter Diagnoses   Name Primary?  Type 2 diabetes mellitus without complication, without long-term current use of insulin (HCC) Yes    Essential hypertension     Hypercholesterolemia     Coronary artery disease involving native coronary artery of native heart without angina pectoris     Benign prostatic hyperplasia with lower urinary tract symptoms, symptom details unspecified     Insomnia, unspecified type      Pharmacy staff at [facility] is filling medication box for patient. Reviewed medication list from pharmacy and updated current medication list.  Needed refills sent, as requested. Orders Placed This Encounter    atorvastatin (LIPITOR) 10 mg tablet     Sig: Take 1 Tab by mouth daily. Dispense:  30 Tab     Refill:  5     Reduced from 20 mg    metFORMIN ER (GLUCOPHAGE XR) 500 mg tablet     Sig: Take 1 Tab by mouth two (2) times a day. Dispense:  60 Tab     Refill:  5     XR is correct. Disregard order for immediate release metformin     Lab results and schedule of future lab studies reviewed   Reviewed medications and side effects in detail    Patient encouraged to call or return to office if symptoms do not improve or worsen. Reviewed plan of care with patient who acknowledges understanding and agrees. Follow-up with Dr. Evelio Farah in 3 months, as scheduled, or sooner as needed.

## 2017-11-02 ENCOUNTER — OFFICE VISIT (OUTPATIENT)
Dept: INTERNAL MEDICINE CLINIC | Age: 82
End: 2017-11-02

## 2017-11-02 VITALS
WEIGHT: 183 LBS | BODY MASS INDEX: 24.79 KG/M2 | OXYGEN SATURATION: 95 % | SYSTOLIC BLOOD PRESSURE: 96 MMHG | HEART RATE: 63 BPM | DIASTOLIC BLOOD PRESSURE: 59 MMHG | RESPIRATION RATE: 18 BRPM | HEIGHT: 72 IN

## 2017-11-02 DIAGNOSIS — I25.10 CORONARY ARTERY DISEASE INVOLVING NATIVE CORONARY ARTERY OF NATIVE HEART WITHOUT ANGINA PECTORIS: ICD-10-CM

## 2017-11-02 DIAGNOSIS — E11.9 TYPE 2 DIABETES MELLITUS WITHOUT COMPLICATION, WITHOUT LONG-TERM CURRENT USE OF INSULIN (HCC): Primary | ICD-10-CM

## 2017-11-02 DIAGNOSIS — E78.00 HYPERCHOLESTEROLEMIA: ICD-10-CM

## 2017-11-02 DIAGNOSIS — I10 ESSENTIAL HYPERTENSION: ICD-10-CM

## 2017-11-02 LAB — HBA1C MFR BLD HPLC: 8.1 %

## 2017-11-02 RX ORDER — GLIPIZIDE 5 MG/1
5 TABLET, FILM COATED, EXTENDED RELEASE ORAL DAILY
Qty: 30 TAB | Refills: 5 | Status: SHIPPED | OUTPATIENT
Start: 2017-11-02 | End: 2018-03-29 | Stop reason: SDUPTHER

## 2017-11-02 NOTE — MR AVS SNAPSHOT
Visit Information Date & Time Provider Department Dept. Phone Encounter #  
 11/2/2017  3:00 PM Sisto Blizzard, [facility] Internal [facility] [phone] [id number] Follow-up Instructions Return in about 2 months (around 1/2/2018). Your Appointments 11/7/2017 10:40 AM  
New Patient with Jose Guadarrama MD  
[facility] ([address]) Appt Note: ref by Taylor Rae, NP/Dx S/P CABG x 1, Coronary artery disease, angina presence unspecified, unspecified vessel or lesion type, unspecified whether native or transplanted heart/Ref in [place] [address] [address] [facility] [address]  
  
    
 1/18/2018 11:30 AM  
[facility]izzard[facility] Appt Note: follow up 3m; follow up 4m [address] [facility]. [place] [phone]  
[phone]  
  
   
 [address] [place] [address]. [address] [place] [phone] Upcoming Health Maintenance Date Due  
 EYE EXAM RETINAL OR DILATED Q1 6/18/1945 DTaP/Tdap/Td series (1 - Tdap) 6/18/1958 ZOSTER VACCINE AGE 60> 4/18/1997 GLAUCOMA SCREENING Q2Y 6/18/2002 Pneumococcal 65+ Low/Medium Risk (1 of 2 - PCV13) 6/18/2002 MEDICARE YEARLY EXAM 6/18/2002 INFLUENZA AGE 9 TO ADULT 8/1/2017 HEMOGLOBIN A1C Q6M 3/12/2018 MICROALBUMIN Q1 9/12/2018 LIPID PANEL Q1 9/12/2018 FOOT EXAM Q1 11/2/2018 Allergies as of 11/2/2017  Review Complete On: 11/2/2017 By: Sisto Blizzard, DO No Known Allergies Current Immunizations  Reviewed on 9/21/2017 No immunizations on file. Not reviewed this visit You Were Diagnosed With   
  
 Codes Comments Type 2 diabetes mellitus without complication, without long-term current use of insulin (HCC)    -  Primary ICD-10-CM: E11.9 ICD-9-CM: 250.00 Essential hypertension     ICD-10-CM: I10 
ICD-9-CM: 401.9 Hypercholesterolemia     ICD-10-CM: E78.00 ICD-9-CM: 272.0 Coronary artery disease involving native coronary artery of native heart without angina pectoris     ICD-10-CM: I25.10 ICD-9-CM: 414.01 Vitals BP Pulse Resp Height(growth percentile) Weight(growth percentile) SpO2  
 96/59 (BP 1 Location: Right arm, BP Patient Position: Sitting) 63 18 6' (1.829 m) 183 lb (83 kg) 95% BMI Smoking Status 24.82 kg/m2 Former Smoker Vitals History BMI and BSA Data Body Mass Index Body Surface Area  
 24.82 kg/m 2 2.05 m 2 Preferred Pharmacy Pharmacy Name [facility] [phone] Your Updated Medication List  
  
   
This list is accurate as of: 11/2/17  3:35 PM.  Always use your most recent med list. amLODIPine 10 mg tablet Commonly known as:  Izetta Harder Take 1 Tab by mouth daily. aspirin 325 mg tablet Commonly known as:  ASPIRIN Take 325 mg by mouth daily. atorvastatin 10 mg tablet Commonly known as:  LIPITOR Take 1 Tab by mouth daily. DOXAZOSIN PO Take 4 mg by mouth daily. Take one tablet daily. ergocalciferol 50,000 unit capsule Commonly known as:  VITAMIN D2 Take 1 Cap by mouth every fourteen (14) days. Take one capsule PO every 2 weeks. finasteride 5 mg tablet Commonly known as:  PROSCAR Take 5 mg by mouth daily. glipiZIDE SR 5 mg CR tablet Commonly known as:  GLUCOTROL XL Take 1 Tab by mouth daily. hydroCHLOROthiazide 12.5 mg tablet Commonly known as:  HYDRODIURIL Take 12.5 mg by mouth daily. labetalol 100 mg tablet Commonly known as:  Marisol Bump Take 100 mg by mouth daily. magnesium 250 mg Tab Take  by mouth daily. metFORMIN  mg tablet Commonly known as:  GLUCOPHAGE XR Take 1 Tab by mouth two (2) times a day. sertraline 100 mg tablet Commonly known as:  ZOLOFT Take 1 Tab by mouth daily. SUPER B COMPLEX PO Take  by mouth. temazepam 15 mg capsule Commonly known as:  RESTORIL  
TAKE 1 CAPSULE BY MOUTH AT BEDTIME AS NEEDED FOR SLEEP. traZODone 50 mg tablet Commonly known as:  David Lipps Take 2 Tabs by mouth nightly. VITAMIN D3 1,000 unit Cap Generic drug:  cholecalciferol Take  by mouth daily. Prescriptions Sent to Pharmacy Refills  
 glipiZIDE SR (GLUCOTROL XL) 5 mg CR tablet 5 Sig: Take 1 Tab by mouth daily. Class: Normal  
 Pharmacy: [address] [place] [address]  Ph #: [phone] Route: Oral  
  
We Performed the Following AMB POC HEMOGLOBIN A1C [81997 CPT(R)] Follow-up Instructions Return in about 2 months (around 1/2/2018). Please provide this summary of care documentation to your next provider. Your primary care clinician is listed as Cee Robb. If you have any questions after today's visit, please call [phone].

## 2017-11-02 NOTE — PROGRESS NOTES
Health Maintenance Due   Topic Date Due    FOOT EXAM Q1  06/18/1945    EYE EXAM RETINAL OR DILATED Q1  06[birth year]18/1945    DTaP/Tdap/Td series (1 - Tdap) 06/18/1958    ZOSTER VACCINE AGE 60>  04/18/1997    GLAUCOMA SCREENING Q2Y  06/18/2002    Pneumococcal 65+ Low/Medium Risk (1 of 2 - PCV13) 06/18/2002    MEDICARE YEARLY EXAM  06/18/2002    INFLUENZA AGE 9 TO ADULT  08/01/2017       Chief Complaint   Patient presents with    Hypertension    Diabetes    Coronary Artery Disease       1. Have you been to the ER, urgent care clinic since your last visit? Hospitalized since your last visit? No    2. Have you seen or consulted any other health care providers outside of the [address] [place] [address] since your last visit? Include any pap smears or colon screening. No    3) Do you have an Advance Directive on file? no    4) Are you interested in receiving information on Advance Directives? NO      Patient is accompanied by self I have received verbal consent from Nahun Trevino to discuss any/all medical information while they are present in the room.

## 2017-11-07 ENCOUNTER — OFFICE VISIT (OUTPATIENT)
Dept: CARDIOLOGY CLINIC | Age: 82
End: 2017-11-07

## 2017-11-07 VITALS
HEART RATE: 62 BPM | RESPIRATION RATE: 16 BRPM | HEIGHT: 72 IN | SYSTOLIC BLOOD PRESSURE: 120 MMHG | WEIGHT: 183.8 LBS | DIASTOLIC BLOOD PRESSURE: 58 MMHG | BODY MASS INDEX: 24.89 KG/M2

## 2017-11-07 DIAGNOSIS — I10 ESSENTIAL HYPERTENSION: Primary | ICD-10-CM

## 2017-11-07 DIAGNOSIS — I35.0 AORTIC VALVE STENOSIS, ETIOLOGY OF CARDIAC VALVE DISEASE UNSPECIFIED: ICD-10-CM

## 2017-11-07 DIAGNOSIS — Z95.1 S/P CABG (CORONARY ARTERY BYPASS GRAFT): ICD-10-CM

## 2017-11-07 DIAGNOSIS — I25.10 CORONARY ARTERY DISEASE INVOLVING NATIVE CORONARY ARTERY OF NATIVE HEART WITHOUT ANGINA PECTORIS: ICD-10-CM

## 2017-11-07 NOTE — Clinical Note
Please let him know that we reviewed his records and I would like him to start losartan 12.5mg daily for his diastolic CHF thx

## 2017-11-07 NOTE — PROGRESS NOTES
[place]: Jaky Franz  [phone]  Requesting/referring provider: Dr. Shakira Okeefe  Reason for Consult: CAD, CABG    HPI: Brain Brennan, a [de-identified]y.o. year-old who presents for evaluation of CAD s/p CABG, DM, HTN, Dyslipidemia. Feels ok today, accompanied by his wife and son. Recently relocated here from [place]. No chest pain or dyspnea now. Energy level is good. Walks at [employer] indoors without limitations. He has lost some weight, maybe 5 pounds in the last month. No bleeding. Glucose running 120 efe. BP is good. EKG NSR NST  He denies significant ramsay or cad. **Records received from [facility] on 11/8/17  Hx of moderate AS, hx of rheumatic fever 1944 5/20/2008 3 vessel CABG (LIMA to LAD, SVG to OM and PDA)  S/p right SFA PTA and left subclavian stent  Echo 6/1/17 - LVEF 55-60%, no WMA, grade 1 dd, severe cLVH, MAC extending into the posterior leaflet and mild MR, mild TR, trace PI  Lexiscan MPI 5/16/17 - medium sized region of mild lateral ischemia, LVEF 54%, no WMA  GAMAL 9/30/08 - placement of 7 x 37 mm EB3 stent in 75% stenotic right common iliac artery  left common iliac artery stent is patent, right common femoral artery with 80% heavily calcified eccentric disease involving ostium of the SFA and the profunda, right SFA has 25% luminal disease in the mid areas, right anterior tibia has 99% focal proximal lesion, right peroneal artery is widely patent, right posterior tibial artery as 90% concentric lesion distally, left common femoral artery with 85% eccentric, heavily calcified lesion involving the ostium of the profunda, as well as the SFA, left SFA is 100% occluded in the mid area, left anterior tibial artery is 100% occluded in the proximal to distal area  Venous duplex 3/24/16 - no DVT, bilateral greater saphenous veins stripped     Assessment/Plan:  1. DM- manage on oral agents, A1C 8 in 9/17  2. HTN- at goal on current meds  3. Dyslipidemia- on statin, at goal  4.  CAD- CABG c. 2008(LIMA-LAD, SVG-OM, SVG-PDA), s/p PCI(?)  -cont aspirin,statin  -recent mildly abnormal [place] 5/[place] in [place] of s[place]  Will manage medically for now  5. PAD with right leg stent- cont aspirin, statin, check RHONDA  -s/p SFA PTA and LSC stent  6. HFpEF- stable compensated today, severe cLVH  -start low dose arb  7. Mod AS with rheumatic heart disease- follow-up echo 5/18  8. Venous insufficiency- s/p vein stripping bilat GSV    Lexiscan 5/17 med lteral ischemia, EF 54%  Echo 6/17 EF 60% gr1dd, severe cLVH, MAC mild MR, mild TR mild PI  GAMAL 9/30/08 - placement of 7 x 37 mm EB3 stent in 75% stenotic right common iliac artery, left common iliac artery stent is patent, right common femoral artery with 80% heavily calcified eccentric disease involving ostium of the SFA and the profunda, right SFA has 25% luminal disease in the mid areas, right anterior tibia has 99% focal proximal lesion, right peroneal artery is widely patent, right posterior tibial artery as 90% concentric lesion distally, left common femoral artery with 85% eccentric, heavily calcified lesion involving the ostium of the profunda, as well as the SFA, left SFA is 100% occluded in the mid area, left anterior tibial artery is 100% occluded in the proximal to distal area  Soc no tob, quit 40 years ago, no etoh  Fhx no early cad    He  has a past medical history of BPH (benign prostatic hyperplasia); Diabetes ([address] Hearing loss; Hypercholesterolemia; Hypertension; and Murmur. Cardiovascular ROS: no chest pain or dyspnea on exertion  Respiratory ROS: no cough, shortness of breath, or wheezing  Neurological ROS: no TIA or stroke symptoms  All other systems negative except as above. PE  Vitals:    11/07/17 1001   BP: 120/58   Pulse: 62   Resp: 16   Weight: 183 lb 12.8 oz (83.4 kg)   Height: 6' (1.829 m)    Body mass index is 24.93 kg/(m^2).    General appearance - alert, well appearing, and in no distress  Mental status - affect appropriate to mood  Eyes - sclera anicteric, moist mucous membranes  Neck - supple, no significant adenopathy  Lymphatics - no  lymphadenopathy  Chest - clear to auscultation, no wheezes, rales or rhonchi  Heart - normal rate, regular rhythm, normal S1, S2, no murmurs, rubs, clicks or gallops  Abdomen - soft, nontender, nondistended, no masses or organomegaly  Back exam - full range of motion, no tenderness  Neurological - cranial nerves II through XII grossly intact, no focal deficit  Musculoskeletal - no muscular tenderness noted, normal strength  Extremities - peripheral pulses normal, no pedal edema  Skin - normal coloration  no rashes    Recent Labs:  Lab Results   Component Value Date/Time    Cholesterol, total 90 09/12/2017 11:38 AM    HDL Cholesterol 46 09/12/2017 11:38 AM    LDL, calculated 33 09/12/2017 11:38 AM    Triglyceride 57 09/12/2017 11:38 AM     Lab Results   Component Value Date/Time    Creatinine 1.02 09/12/2017 11:38 AM     Lab Results   Component Value Date/Time    BUN 22 09/12/2017 11:38 AM     Lab Results   Component Value Date/Time    Potassium 4.1 09/12/2017 11:38 AM     Lab Results   Component Value Date/Time    Hemoglobin A1c 8.0 09/12/2017 11:38 AM     Lab Results   Component Value Date/Time    HGB 11.8 09/12/2017 11:38 AM     Lab Results   Component Value Date/Time    PLATELET 200 21/67/2245 11:38 AM       Reviewed:  Past Medical History:   Diagnosis Date    BPH (benign prostatic hyperplasia)     Diabetes ([facility] [address]     Hearing loss     Hypercholesterolemia     Hypertension     Murmur      History   Smoking Status    Former Smoker    Types: Cigarettes    Quit date: 9/8/1990   Smokeless Tobacco    Never Used     History   Alcohol Use No     No Known Allergies    Current Outpatient Prescriptions   Medication Sig    glipiZIDE SR (GLUCOTROL XL) 5 mg CR tablet Take 1 Tab by mouth daily.  temazepam (RESTORIL) 15 mg capsule TAKE 1 CAPSULE BY MOUTH AT BEDTIME AS NEEDED FOR SLEEP.     hydroCHLOROthiazide (HYDRODIURIL) 12.5 mg tablet Take 12.5 mg by mouth daily.  magnesium 250 mg tab Take  by mouth daily.  aspirin (ASPIRIN) 325 mg tablet Take 325 mg by mouth daily.  metFORMIN ER (GLUCOPHAGE XR) 500 mg tablet Take 1 Tab by mouth two (2) times a day.  amLODIPine (NORVASC) 10 mg tablet Take 1 Tab by mouth daily.  ergocalciferol (VITAMIN D2) 50,000 unit capsule Take 1 Cap by mouth every fourteen (14) days. Take one capsule PO every 2 weeks.  sertraline (ZOLOFT) 100 mg tablet Take 1 Tab by mouth daily.  traZODone (DESYREL) 50 mg tablet Take 2 Tabs by mouth nightly.  labetalol (NORMODYNE) 100 mg tablet Take 100 mg by mouth daily.  finasteride (PROSCAR) 5 mg tablet Take 5 mg by mouth daily.  DOXAZOSIN MESYLATE (DOXAZOSIN PO) Take 4 mg by mouth daily. Take one tablet daily.  cholecalciferol (VITAMIN D3) 1,000 unit cap Take  by mouth daily.  FERROUS FUMARATE/VIT BCOMP,C (SUPER B COMPLEX PO) Take  by mouth.  atorvastatin (LIPITOR) 10 mg tablet Take 1 Tab by mouth daily. No current facility-administered medications for this visit.         Maria Esther Wiggins MD  [facility] and [facility] [place]  [address] [facility] [address]  [facility], [address]

## 2017-11-07 NOTE — MR AVS SNAPSHOT
Visit Information Date & Time Provider Department Dept. Phone Encounter #  
 11/7/2017 10:40 AM Farida Davis MD [facility] Chika Coffey [phone] [id number] Your Appointments 1/18/2018 11:30 AM  
Any with DO Rajan Herrera ([facility]) Appt Note: follow up 3m; follow up 4m [address] [place] [address]. [place] [phone]  
  
   
 [address] [place] [address]. [address] [place] [phone] Upcoming Health Maintenance Date Due  
 EYE EXAM RETINAL OR DILATED Q1 6/18/1945 DTaP/Tdap/Td series (1 - Tdap) 6/18/1958 ZOSTER VACCINE AGE 60> 4/18/1997 GLAUCOMA SCREENING Q2Y 6/18/2002 Pneumococcal 65+ Low/Medium Risk (1 of 2 - PCV13) 6/18/2002 MEDICARE YEARLY EXAM 6/18/2002 INFLUENZA AGE 9 TO ADULT 8/1/2017 HEMOGLOBIN A1C Q6M 5/2/2018 MICROALBUMIN Q1 9/12/2018 LIPID PANEL Q1 9/12/2018 FOOT EXAM Q1 11/2/2018 Allergies as of 11/7/2017  Review Complete On: 11/7/2017 By: Verónica Guillen No Known Allergies Current Immunizations  Reviewed on 9/21/2017 No immunizations on file. Not reviewed this visit You Were Diagnosed With   
  
 Codes Comments Essential hypertension    -  Primary ICD-10-CM: I10 
ICD-9-CM: 401.9 Coronary artery disease involving native coronary artery of native heart without angina pectoris     ICD-10-CM: I25.10 ICD-9-CM: 414.01 Aortic valve stenosis, etiology of cardiac valve disease unspecified     ICD-10-CM: I35.0 ICD-9-CM: 424.1 S/P CABG (coronary artery bypass graft)     ICD-10-CM: Z95.1 ICD-9-CM: V45.81 Vitals BP Pulse Resp Height(growth percentile) Weight(growth percentile) BMI  
 120/58 (BP 1 Location: Right arm, BP Patient Position: Sitting) 62 16 6' (1.829 m) 183 lb 12.8 oz (83.4 kg) 24.93 kg/m2 Smoking Status Former Smoker Vitals History BMI and BSA Data Body Mass Index Body Surface Area 24.93 kg/m 2 2.06 m 2 Preferred Pharmacy Pharmacy Name [facility] [phone] Your Updated Medication List  
  
   
This list is accurate as of: 11/7/17 10:58 AM.  Always use your most recent med list. amLODIPine 10 mg tablet Commonly known as:  Erik Ruths Take 1 Tab by mouth daily. aspirin 325 mg tablet Commonly known as:  ASPIRIN Take 325 mg by mouth daily. atorvastatin 10 mg tablet Commonly known as:  LIPITOR Take 1 Tab by mouth daily. DOXAZOSIN PO Take 4 mg by mouth daily. Take one tablet daily. ergocalciferol 50,000 unit capsule Commonly known as:  VITAMIN D2 Take 1 Cap by mouth every fourteen (14) days. Take one capsule PO every 2 weeks. finasteride 5 mg tablet Commonly known as:  PROSCAR Take 5 mg by mouth daily. glipiZIDE SR 5 mg CR tablet Commonly known as:  GLUCOTROL XL Take 1 Tab by mouth daily. hydroCHLOROthiazide 12.5 mg tablet Commonly known as:  HYDRODIURIL Take 12.5 mg by mouth daily. labetalol 100 mg tablet Commonly known as:  Jai Barges Take 100 mg by mouth daily. magnesium 250 mg Tab Take  by mouth daily. metFORMIN  mg tablet Commonly known as:  GLUCOPHAGE XR Take 1 Tab by mouth two (2) times a day. sertraline 100 mg tablet Commonly known as:  ZOLOFT Take 1 Tab by mouth daily. SUPER B COMPLEX PO Take  by mouth. temazepam 15 mg capsule Commonly known as:  RESTORIL  
TAKE 1 CAPSULE BY MOUTH AT BEDTIME AS NEEDED FOR SLEEP. traZODone 50 mg tablet Commonly known as:  Jovanny [place] Take 2 Tabs by mouth nightly. VITAMIN D3 1,000 unit Cap Generic drug:  cholecalciferol Take  by mouth daily. We Performed the Following AMB POC EKG ROUTINE W/ 12 LEADS, INTER & REP [35952 CPT(R)] Please provide this summary of care documentation to your next provider. Your primary care clinician is listed as Kiesha Barrett. If you have any questions after today's visit, please call [phone].

## 2017-11-13 RX ORDER — HYDROCHLOROTHIAZIDE 12.5 MG/1
12.5 TABLET ORAL DAILY
Qty: 90 TAB | Refills: 1 | Status: SHIPPED | OUTPATIENT
Start: 2017-11-13 | End: 2018-03-29 | Stop reason: SDUPTHER

## 2017-11-16 ENCOUNTER — CLINICAL SUPPORT (OUTPATIENT)
Dept: CARDIOLOGY CLINIC | Age: 82
End: 2017-11-16

## 2017-11-16 ENCOUNTER — OFFICE VISIT (OUTPATIENT)
Dept: INTERNAL MEDICINE CLINIC | Age: 82
End: 2017-11-16

## 2017-11-16 VITALS
RESPIRATION RATE: 19 BRPM | OXYGEN SATURATION: 93 % | HEIGHT: 72 IN | BODY MASS INDEX: 25.19 KG/M2 | HEART RATE: 65 BPM | SYSTOLIC BLOOD PRESSURE: 118 MMHG | DIASTOLIC BLOOD PRESSURE: 63 MMHG | WEIGHT: 186 LBS

## 2017-11-16 DIAGNOSIS — L98.419 CHRONIC ULCER OF BUTTOCK (HCC): Primary | ICD-10-CM

## 2017-11-16 DIAGNOSIS — G47.00 INSOMNIA, UNSPECIFIED TYPE: ICD-10-CM

## 2017-11-16 DIAGNOSIS — I73.9 PAD (PERIPHERAL ARTERY DISEASE) (HCC): Primary | ICD-10-CM

## 2017-11-16 DIAGNOSIS — I10 ESSENTIAL HYPERTENSION: ICD-10-CM

## 2017-11-16 DIAGNOSIS — I25.10 CORONARY ARTERY DISEASE INVOLVING NATIVE CORONARY ARTERY OF NATIVE HEART WITHOUT ANGINA PECTORIS: ICD-10-CM

## 2017-11-16 DIAGNOSIS — E11.9 TYPE 2 DIABETES MELLITUS WITHOUT COMPLICATION, WITHOUT LONG-TERM CURRENT USE OF INSULIN (HCC): ICD-10-CM

## 2017-11-16 DIAGNOSIS — I35.0 AORTIC VALVE STENOSIS, ETIOLOGY OF CARDIAC VALVE DISEASE UNSPECIFIED: Primary | ICD-10-CM

## 2017-11-16 RX ORDER — TRAZODONE HYDROCHLORIDE 50 MG/1
TABLET ORAL
Qty: 60 TAB | Refills: 3 | Status: SHIPPED | OUTPATIENT
Start: 2017-11-16 | End: 2018-03-21 | Stop reason: SDUPTHER

## 2017-11-16 RX ORDER — MENTHOL AND ZINC OXIDE .44; 20.625 G/100G; G/100G
OINTMENT TOPICAL
Qty: 1 TUBE | Refills: 1 | Status: SHIPPED | OUTPATIENT
Start: 2017-11-16 | End: 2018-02-22 | Stop reason: SDUPTHER

## 2017-11-16 RX ORDER — CEPHALEXIN 500 MG/1
500 CAPSULE ORAL 3 TIMES DAILY
Qty: 30 CAP | Refills: 0 | Status: SHIPPED | OUTPATIENT
Start: 2017-11-16 | End: 2017-11-26

## 2017-11-16 NOTE — MR AVS SNAPSHOT
Visit Information Date & Time Provider Department Dept. Phone Encounter #  
 11/16/2017 11:00 AM Stephen Kaminski [phone] [id number] Follow-up Instructions Return in about 2 weeks (around 11/30/2017). Your Appointments 11/16/2017  1:00 PM  
ECHO CARDIOGRAMS 2D with [facility] ([place] SCHEDULING) Appt Note: echo for CAD 1:00 RHONDA for PAD 2:00 per Dr. Joe Samuel [address] [place]  [address] [facility] [address]  
[address] [address]  
  
    
 11/16/2017  2:00 PM  
ECHO CARDIOGRAMS 2D with [facility] ([place] SCHEDULING) Appt Note: echo for CAD 10:00 RHONDA for PAD 11:00 per Dr. Joe Samuel; echo for CAD 1:00 RHONDA for PAD 2:00 per Dr. Joe Samuel [address] [place]  [address] [facility] [address]  
[address] [address]  
  
    
 1/18/2018 11:30 AM  
Any with DO Stephen Kaminski ([address]) Appt Note: follow up 3m; follow up 4m [address] [place] [phone]  
[phone]  
  
   
 [address] [place] [address]. [address] [place] [id number] Upcoming Health Maintenance Date Due  
 EYE EXAM RETINAL OR DILATED Q1 6/18/1945 DTaP/Tdap/Td series (1 - Tdap) 6/18/1958 ZOSTER VACCINE AGE 60> 4/18/1997 GLAUCOMA SCREENING Q2Y 6/18/2002 Pneumococcal 65+ Low/Medium Risk (1 of 2 - PCV13) 6/18/2002 MEDICARE YEARLY EXAM 6/18/2002 Influenza Age 5 to Adult 8/1/2017 HEMOGLOBIN A1C Q6M 5/2/2018 MICROALBUMIN Q1 9/12/2018 LIPID PANEL Q1 9/12/2018 FOOT EXAM Q1 11/2/2018 Allergies as of 11/16/2017  Review Complete On: 11/16/2017 By: Regna Callaway DO No Known Allergies Current Immunizations  Reviewed on 9/21/2017 No immunizations on file. Not reviewed this visit You Were Diagnosed With   
  
 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 
ICD-9-CM: 401.9 Coronary artery disease involving native coronary artery of native heart without angina pectoris     ICD-10-CM: I25.10 ICD-9-CM: 414.01 Vitals BP Pulse Resp Height(growth percentile) Weight(growth percentile) SpO2  
 118/63 (BP 1 Location: Right arm, BP Patient Position: Sitting) 65 19 6' (1.829 m) 186 lb (84.4 kg) 93% BMI Smoking Status 25.23 kg/m2 Former Smoker Vitals History BMI and BSA Data Body Mass Index Body Surface Area  
 25.23 kg/m 2 2.07 m 2 Preferred Pharmacy Pharmacy Name Phone [facility] [phone] Your Updated Medication List  
  
   
This list is accurate as of: 11/16/17 11:35 AM.  Always use your most recent med list. amLODIPine 10 mg tablet Commonly known as:  Tad Joao Take 1 Tab by mouth daily. aspirin 325 mg tablet Commonly known as:  ASPIRIN Take 325 mg by mouth daily. atorvastatin 10 mg tablet Commonly known as:  LIPITOR Take 1 Tab by mouth daily. cephALEXin 500 mg capsule Commonly known as:  Juanetta Muzzy Take 1 Cap by mouth three (3) times daily for 10 days. DOXAZOSIN PO Take 4 mg by mouth daily. Take one tablet daily. ergocalciferol 50,000 unit capsule Commonly known as:  VITAMIN D2 Take 1 Cap by mouth every fourteen (14) days. Take one capsule PO every 2 weeks. finasteride 5 mg tablet Commonly known as:  PROSCAR Take 5 mg by mouth daily. glipiZIDE SR 5 mg CR tablet Commonly known as:  GLUCOTROL XL  
 Take 1 Tab by mouth daily. hydroCHLOROthiazide 12.5 mg tablet Commonly known as:  HYDRODIURIL Take 1 Tab by mouth daily. labetalol 100 mg tablet Commonly known as:  Claressa Bose Take 100 mg by mouth daily. magnesium 250 mg Tab Take  by mouth daily. Menthol-Zinc Oxide 0.44-20.6 % Oint Commonly known as:  Calmoseptine Apply to effected areas on buttocks twice daily  
  
 metFORMIN  mg tablet Commonly known as:  GLUCOPHAGE XR Take 1 Tab by mouth two (2) times a day. sertraline 100 mg tablet Commonly known as:  ZOLOFT Take 1 Tab by mouth daily. SUPER B COMPLEX PO Take  by mouth. temazepam 15 mg capsule Commonly known as:  RESTORIL  
TAKE 1 CAPSULE BY MOUTH AT BEDTIME AS NEEDED FOR SLEEP. traZODone 50 mg tablet Commonly known as:  David Lipps Take 2 Tabs by mouth nightly. VITAMIN D3 1,000 unit Cap Generic drug:  cholecalciferol Take  by mouth daily. Prescriptions Sent to Pharmacy Refills  
 cephALEXin (KEFLEX) 500 mg capsule 0 Sig: Take 1 Cap by mouth three (3) times daily for 10 days. Class: Normal  
 Pharmacy: [facility] Ph #: [phone] Route: Oral  
 Menthol-Zinc Oxide (CALMOSEPTINE) 0.44-20.6 % oint 1 Sig: Apply to effected areas on buttocks twice daily Class: Normal  
 Pharmacy: [facility]avo [facility] Ph #: [phone] Follow-up Instructions Return in about 2 weeks (around 11/30/2017). Please provide this summary of care documentation to your next provider. Your primary care clinician is listed as Cee Robb. If you have any questions after today's visit, please call [phone].

## 2017-11-16 NOTE — PROCEDURES
[facility]  *** FINAL REPORT ***    Name: Cristina Dsouza  MRN: [id number]      Outpatient  : 1935  HIS Order #: [id number]  [address] [facility] Visit #: [id number]  Date: 2017    TYPE OF TEST: Peripheral Arterial Testing    REASON FOR TEST  Peripheral vascular disease - history of right leg stent    Right Leg  Segmentals: Noncompressible                     mmHg  Brachial         157  High thigh  Low thigh  Calf  Posterior tibial  Dorsalis pedis  Peroneal  Metatarsal  Toe pressure      96  Doppler:    Abnormal  PVR:        Abnormal  Ankle/Brachial:    Left Leg  Segmentals: Noncompressible                     mmHg  Brachial         144  High thigh  Low thigh  Calf  Posterior tibial  Dorsalis pedis  Peroneal  Metatarsal  Toe pressure      90  Doppler:    Normal  PVR:        Abnormal  Ankle/Brachial:    INTERPRETATION/FINDINGS  PROCEDURE:  Evaluation of lower extremity arteries with systolic blood   pressure measurement at the ankle and brachial level and calculation  of the ankle/brachial pressure index (RHONDA). The exam may also include   pulse volume recording (PVR) plethysmography at the ankle level. FINDINGS:  Right leg - Abnormal monophasic Doppler waveforms are exhibited within   the distal posterior tibial and dorsalis pedis arteries. The ankle  PVR is moderately damped. The RHONDA could not be obtained due to  non-compressibility of ankle vessels. The great toe index is mildly  reduced. Left leg - Multiphasic Doppler waveforms are exhibited within the  distal posterior tibial and dorsalis pedis arteries. The ankle PVR is   mildly damped. The RHONDA could not be obtained due to arterial  calcification. The great toe index is mildly reduced. IMPRESSION:  1. Abnormal results suggestive of peripheral arterial disease at rest  in both legs. 2. Ankle pressures in both legs are not measurable due to extensive  arterial calcification.   The ankle/brachial indexes are therefore  unreliable predictors of distal arterial perfusion. 3. Great toe indices are mildly reduced at 0.55 on the right and 0.57  on the left. 4. There is no previous study available for comparison. ADDITIONAL COMMENTS    I have personally reviewed the data relevant to the interpretation of  this  study. TECHNOLOGIST: Marissa Vences RVT, RDMS, RDCS  Signed: 11/16/2017 02:37 PM    PHYSICIAN: Tracey Bowling.  Ena Dias MD  Signed: 11/21/2017 01:05 PM

## 2017-11-20 ENCOUNTER — TELEPHONE (OUTPATIENT)
Dept: CARDIOLOGY CLINIC | Age: 82
End: 2017-11-20

## 2017-11-20 RX ORDER — LOSARTAN POTASSIUM 25 MG/1
12.5 TABLET ORAL DAILY
Qty: 45 TAB | Refills: 3 | Status: SHIPPED | OUTPATIENT
Start: 2017-11-20 | End: 2018-03-29 | Stop reason: SDUPTHER

## 2017-11-20 NOTE — TELEPHONE ENCOUNTER
Please let him know that we reviewed his records and I would like him to start losartan 12.5mg daily for his diastolic CHF thx                Requested Prescriptions     Signed Prescriptions Disp Refills    losartan (COZAAR) 25 mg tablet 45 Tab 3     Sig: Take 0.5 Tabs by mouth daily. Authorizing Provider: Cornelio Sharif     Ordering User: Francesco Stanton     Called patient, verified identity. Spoke with daughter in law who manages his care. Informed her of the above information per Dr. Jeevan Cobos.

## 2017-11-28 ENCOUNTER — TELEPHONE (OUTPATIENT)
Dept: CARDIOLOGY CLINIC | Age: 82
End: 2017-11-28

## 2017-11-28 NOTE — TELEPHONE ENCOUNTER
The following test results given to patient's son, Hasmukh Moy per Dr. Yoly Washington:    RHONDA: 11/17, CA++ vessels, mildly reduce TBI bilat  (if sx increase recommend further testing, angio, CT etc.)      Son states patient has not been having any sx like claudication, but will watch out for it.      Echo: 11/17, EF 60%, LAE, mild MR, mod AS 55/31--repeat in 1 year    2 pt identifiers used

## 2017-11-29 NOTE — PROGRESS NOTES
HISTORY OF PRESENT ILLNESS  Mario Osorio is a [de-identified] y.o. male. Pt. comes in for f/u. Has multiple medical problems. Concerned about his diabetes and wants to have his A1c rechecked. A1c was 8 two months ago. Sugars run in mid to upper 100s. Cardiac status has been stable. Followed by cardiology. Reports compliance with medications and diet. Med list and most recent labs/studies reviewed with pt. Trying to be active physically to control weight. Needs med refills. Denies tobacco or alcohol use. Lives with his wife at [facility] independent living. Reports no other new c/o. Diabetes   Pertinent negatives include no chest pain, no abdominal pain, no headaches and no shortness of breath. Hypertension    Pertinent negatives include no chest pain, no blurred vision, no headaches, no dizziness and no shortness of breath. Review of Systems   Constitutional: Negative. HENT: Positive for hearing loss. Eyes: Negative for blurred vision. Respiratory: Negative for shortness of breath. Cardiovascular: Negative for chest pain and leg swelling. Gastrointestinal: Negative for abdominal pain. Genitourinary: Negative for dysuria and frequency. Musculoskeletal: Positive for joint pain. Negative for falls. Skin: Negative. Neurological: Negative for dizziness, sensory change, focal weakness and headaches. Psychiatric/Behavioral: Negative for depression. The patient has insomnia. The patient is not nervous/anxious. All other systems reviewed and are negative. Physical Exam   Constitutional: He is oriented to person, place, and time. He appears well-developed and well-nourished. No distress. HENT:   Head: Normocephalic and atraumatic. Mouth/Throat: Oropharynx is clear and moist.   Eyes: Conjunctivae are normal.   Neck: Normal range of motion. Neck supple. No JVD present. No thyromegaly present. Cardiovascular: Normal rate, regular rhythm and intact distal pulses.     Murmur heard.  Pulmonary/Chest: Effort normal and breath sounds normal. No respiratory distress. He has no wheezes. He has no rales. Abdominal: Soft. Bowel sounds are normal. He exhibits no distension. Musculoskeletal: He exhibits no edema or tenderness. Neurological: He is alert and oriented to person, place, and time. Coordination normal.   Skin: Skin is warm and dry. Psychiatric: He has a normal mood and affect. His behavior is normal.   Nursing note and vitals reviewed. ASSESSMENT and PLAN  Diagnoses and all orders for this visit:    1. Type 2 diabetes mellitus without complication, without long-term current use of insulin (HCC)  -     AMB POC HEMOGLOBIN A1C    2. Essential hypertension    3. Hypercholesterolemia    4. Coronary artery disease involving native coronary artery of native heart without angina pectoris    Other orders  -     glipiZIDE SR (GLUCOTROL XL) 5 mg CR tablet; Take 1 Tab by mouth daily. Follow-up Disposition:  Return in about 2 months (around 1/2/2018).    lab results and schedule of future lab studies reviewed with patient  reviewed diet, exercise and weight control  reviewed medications and side effects in detail  low cholesterol diet, weight control and daily exercise discussed, home glucose monitoring emphasized, all medications, side effects and compliance discussed carefully, foot care discussed and Podiatry visits discussed, annual eye examinations at Ophthalmology discussed, glycohemoglobin and other lab monitoring discussed and long term diabetic complications discussed  Reassurance that everything seems stable  F/u with other MD's as scheduled

## 2017-11-30 ENCOUNTER — OFFICE VISIT (OUTPATIENT)
Dept: INTERNAL MEDICINE CLINIC | Age: 82
End: 2017-11-30

## 2017-11-30 VITALS
HEIGHT: 72 IN | OXYGEN SATURATION: 94 % | DIASTOLIC BLOOD PRESSURE: 55 MMHG | SYSTOLIC BLOOD PRESSURE: 124 MMHG | RESPIRATION RATE: 19 BRPM | BODY MASS INDEX: 25.87 KG/M2 | HEART RATE: 58 BPM | WEIGHT: 191 LBS

## 2017-11-30 DIAGNOSIS — L98.419 CHRONIC ULCER OF BUTTOCK (HCC): Primary | ICD-10-CM

## 2017-11-30 DIAGNOSIS — I35.0 AORTIC VALVE STENOSIS, ETIOLOGY OF CARDIAC VALVE DISEASE UNSPECIFIED: ICD-10-CM

## 2017-11-30 DIAGNOSIS — I25.10 CORONARY ARTERY DISEASE INVOLVING NATIVE CORONARY ARTERY OF NATIVE HEART WITHOUT ANGINA PECTORIS: ICD-10-CM

## 2017-11-30 DIAGNOSIS — E11.9 TYPE 2 DIABETES MELLITUS WITHOUT COMPLICATION, WITHOUT LONG-TERM CURRENT USE OF INSULIN (HCC): ICD-10-CM

## 2017-11-30 DIAGNOSIS — I10 ESSENTIAL HYPERTENSION: ICD-10-CM

## 2017-11-30 NOTE — PROGRESS NOTES
HISTORY OF PRESENT ILLNESS  Kaycee Troncoso is a [de-identified] y.o. male. Pt. comes in for f/u. Has multiple medical problems. Reports having pain on buttocks when sitting down. Had issues with ulcers. His DM and cardiac status has been stable. Denies any falls or trauma. No acute GI or  issues. Reports compliance with medications and diet. Med list and most recent labs/studies reviewed with pt. Trying to be active physically to control weight. Reports no other new c/o. Decubitus Ulcer   Pertinent negatives include no chest pain, no abdominal pain, no headaches and no shortness of breath. Diabetes   Pertinent negatives include no chest pain, no abdominal pain, no headaches and no shortness of breath. Hypertension    Pertinent negatives include no chest pain, no blurred vision, no headaches, no dizziness and no shortness of breath. Review of Systems   Constitutional: Negative. HENT: Positive for hearing loss. Eyes: Negative for blurred vision. Respiratory: Negative for shortness of breath. Cardiovascular: Negative for chest pain and leg swelling. Gastrointestinal: Negative for abdominal pain. Genitourinary: Negative for dysuria and frequency. Musculoskeletal: Positive for joint pain. Negative for falls. Skin: Positive for rash. Negative for itching. Neurological: Negative for dizziness, sensory change, focal weakness and headaches. Psychiatric/Behavioral: Negative for depression. The patient has insomnia. The patient is not nervous/anxious. All other systems reviewed and are negative. Physical Exam   Constitutional: He is oriented to person, place, and time. He appears well-developed and well-nourished. No distress. HENT:   Head: Normocephalic and atraumatic. Mouth/Throat: Oropharynx is clear and moist.   Eyes: Conjunctivae are normal.   Neck: Normal range of motion. Neck supple. No JVD present. No thyromegaly present.    Cardiovascular: Normal rate, regular rhythm and intact distal pulses. Murmur heard. Pulmonary/Chest: Effort normal and breath sounds normal. No respiratory distress. He has no wheezes. He has no rales. Abdominal: Soft. Bowel sounds are normal. He exhibits no distension. Musculoskeletal: He exhibits no edema or tenderness. Neurological: He is alert and oriented to person, place, and time. Coordination normal.   Skin: Skin is warm and dry. There is erythema (With small ulcers in medial bilateral buttocks, no drainage). Psychiatric: He has a normal mood and affect. His behavior is normal.   Nursing note and vitals reviewed. ASSESSMENT and PLAN  Diagnoses and all orders for this visit:    1. Chronic ulcer of buttock ([facility]    2. Type 2 diabetes mellitus without complication, without long-term current use of insulin ([address]    3. Essential hypertension    4. Coronary artery disease involving native coronary artery of native heart without angina pectoris    Other orders  -     cephALEXin (KEFLEX) 500 mg capsule; Take 1 Cap by mouth three (3) times daily for 10 days.  -     Menthol-Zinc Oxide (CALMOSEPTINE) 0.44-20.6 % oint; Apply to effected areas on buttocks twice daily      Follow-up Disposition:  Return in about 2 weeks (around 11/30/2017).    lab results and schedule of future lab studies reviewed with patient  reviewed diet, exercise and weight control  reviewed medications and side effects in detail  Avoid sitting one position for more than 2 hours  Discussed importance of proper skin care

## 2017-11-30 NOTE — PATIENT INSTRUCTIONS
Learning About Preventing Pressure Sores  What are pressure sores? A pressure sore is an injury to the skin caused by constant pressure over a period of time. The constant pressure blocks the blood supply to the skin. This causes skin cells to die and creates a sore. Pressure sores are also called bedsores. Pressure sores usually occur over bony areas, such as the hips, lower back, elbows, heels, and shoulders. Pressure sores can also occur in places where the skin folds over on itself, or where medical equipment presses on the skin, such as when oxygen tubes press on the ears or cheeks. Pressure sores can range from red areas on the surface of the skin to severe tissue damage that goes deep into muscle and bone. Severe sores are hard to treat and slow to heal. When pressure sores do not heal properly, problems such as bone, blood, and skin infections can develop. What causes pressure sores? Things that cause pressure sores include:  · Pressure on the skin and tissues. For example, the sores may happen when a person lies in bed or sits in a wheelchair for a long time. This is the most common cause of pressure sores. · Sliding down in a bed or chair, forcing the skin to fold over itself (shear force). · Being pulled across bed sheets or other surfaces (friction burns). As we get older, our skin gets more thin and dry and less elastic, so it is easier to damage. Poor nutrition and not getting enough fluids make these natural changes in the skin worse. Skin in this condition may easily develop a pressure sore. Skin can also be damaged by sweat, feces, or urine, making pressure sores more likely and harder to heal.  How can you help prevent pressure sores? If you are not able to do these things yourself, ask a family member or friend for help. Change position often  · In a bed, change position every 2 hours.   · In a wheelchair or other type of chair, shift your weight every 15 minutes, and give yourself a full relief of weight every hour. ¨ For a weight shift, lean forward and to the left and right. Push up out of the chair with your arms. If you have a chair that tilts, use the tilt control to help relieve pressure. ¨ For a full relief of weight, stand up or move to another chair or bed if you are able to. Personal care  · Check your skin every day, especially around bony areas. When a pressure sore is forming, skin temperature can be different than nearby skin. It might be warmer or cooler. The skin can feel either firmer or softer than the surrounding skin. · Keep your skin clean and free of sweat, wound drainage, urine, and feces. · Use skin lotions to keep your skin from drying out and cracking. Barrier lotions or creams have ingredients that can act as a shield to help protect the skin from moisture or irritation. · Try not to slide or slump across sheets in a chair or bed. And try not to sleep in a recliner chair. Lifestyle choices  · Eat healthy foods with plenty of protein to help heal damaged skin and to help new skin grow. · Get plenty of fluids. · Stay at a healthy weight. Being either overweight or underweight can make pressure sores more likely. · If you smoke, stop. Smoking dries the skin and reduces its blood supply. If you need help quitting, talk to your doctor about stop-smoking programs and medicines. These can increase your chances of quitting for good. Ask about using cushions or pads  · Overlays are special pads you put on top of a mattress. They provide a softer surface that will fit your body's shape better than a regular mattress. · Cushions or devices can be used to reduce pressure on certain areas of the body. For example, you can use a \"medical heel pillow\" to help prevent pressure sores on heels. You can also get cushions for seating surfaces, such as wheelchair seats. Talk with your doctor about cushions and pads.  Some products, such as doughnut-type devices, may actually cause pressure sores or make them worse. Where can you learn more? Go to http://esvin-tammy.info/. Enter [id number] [id number] in the search box to learn more about \"Learning About Preventing Pressure Sores. \"  Current as of: March 20, 2017  Content Version: 11.4  © 6259-5049 [employer]. Care instructions adapted under license by [employer] (which disclaims liability or warranty for this information). If you have questions about a medical condition or this instruction, always ask your healthcare professional. [address] any warranty or liability for your use of this information.

## 2017-11-30 NOTE — MR AVS SNAPSHOT
Visit Information Date & Time Provider Department Dept. Phone Encounter #  
 11/30/2017 10:45 AM Stephen Mansfield [phone] [id number] Follow-up Instructions Return in about 3 months (around 2/28/2018). Your Appointments 1/18/2018 11:30 AM  
Any with DO Stephen Mansfield ([facility]) Appt Note: follow up 3m; follow up 4m [place] [address] [place] [phone]  
  
   
 [facility] [address] [place] [address] Upcoming Health Maintenance Date Due  
 EYE EXAM RETINAL OR DILATED Q1 6/18/1945 DTaP/Tdap/Td series (1 - Tdap) 6/18/1958 ZOSTER VACCINE AGE 60> 4/18/1997 GLAUCOMA SCREENING Q2Y 6/18/2002 MEDICARE YEARLY EXAM 6/18/2002 Pneumococcal 65+ Low/Medium Risk (2 of 2 - PPSV23) 9/30/2016 HEMOGLOBIN A1C Q6M 5/2/2018 MICROALBUMIN Q1 9/12/2018 LIPID PANEL Q1 9/12/2018 FOOT EXAM Q1 11/2/2018 Allergies as of 11/30/2017  Review Complete On: 11/30/2017 By: Franklyn Epstein DO No Known Allergies Current Immunizations  Reviewed on 11/30/2017 No immunizations on file. Reviewed by Franklyn Epstein DO on 11/30/2017 at 11:14 AM  
You Were Diagnosed With   
  
 Codes Comments Chronic ulcer of buttock ([facility]    -  Primary ICD-10-CM: A68.431 ICD-9-CM: 707.8 Type 2 diabetes mellitus without complication, without long-term current use of insulin (HCC)     ICD-10-CM: E11.9 ICD-9-CM: 250.00 Essential hypertension     ICD-10-CM: I10 
ICD-9-CM: 401.9 Coronary artery disease involving native coronary artery of native heart without angina pectoris     ICD-10-CM: I25.10 ICD-9-CM: 414.01 Aortic valve stenosis, etiology of cardiac valve disease unspecified     ICD-10-CM: I35.0 ICD-9-CM: 424.1 Vitals BP Pulse Resp Height(growth percentile) Weight(growth percentile) SpO2 124/55 (BP 1 Location: Right arm, BP Patient Position: Sitting) (!) 58 19 6' (1.829 m) 191 lb (86.6 kg) 94% BMI Smoking Status 25.9 kg/m2 Former Smoker Vitals History BMI and BSA Data Body Mass Index Body Surface Area  
 25.9 kg/m 2 2.1 m 2 Preferred Pharmacy Pharmacy Name Phone [facility] [phone] Your Updated Medication List  
  
   
This list is accurate as of: 11/30/17 11:16 AM.  Always use your most recent med list. amLODIPine 10 mg tablet Commonly known as:  [place] Ta Take 1 Tab by mouth daily. aspirin 325 mg tablet Commonly known as:  ASPIRIN Take 325 mg by mouth daily. atorvastatin 10 mg tablet Commonly known as:  LIPITOR Take 1 Tab by mouth daily. DOXAZOSIN PO Take 4 mg by mouth daily. Take one tablet daily. ergocalciferol 50,000 unit capsule Commonly known as:  VITAMIN D2 Take 1 Cap by mouth every fourteen (14) days. Take one capsule PO every 2 weeks. finasteride 5 mg tablet Commonly known as:  PROSCAR Take 5 mg by mouth daily. glipiZIDE SR 5 mg CR tablet Commonly known as:  GLUCOTROL XL Take 1 Tab by mouth daily. hydroCHLOROthiazide 12.5 mg tablet Commonly known as:  HYDRODIURIL Take 1 Tab by mouth daily. labetalol 100 mg tablet Commonly known as:  [place] Gaurang Take 100 mg by mouth daily. losartan 25 mg tablet Commonly known as:  COZAAR Take 0.5 Tabs by mouth daily. magnesium 250 mg Tab Take  by mouth daily. Menthol-Zinc Oxide 0.44-20.6 % Oint Commonly known as:  Calmoseptine Apply to effected areas on buttocks twice daily  
  
 metFORMIN  mg tablet Commonly known as:  GLUCOPHAGE XR Take 1 Tab by mouth two (2) times a day. sertraline 100 mg tablet Commonly known as:  ZOLOFT Take 1 Tab by mouth daily. SUPER B COMPLEX PO Take  by mouth. temazepam 15 mg capsule Commonly known as:  RESTORIL  
TAKE 1 CAPSULE BY MOUTH AT BEDTIME AS NEEDED FOR SLEEP. traZODone 50 mg tablet Commonly known as:  DESYREL  
TAKE 2 TABLETS BY MOUTH NIGHTLY. VITAMIN D3 1,000 unit Cap Generic drug:  cholecalciferol Take  by mouth daily. Follow-up Instructions Return in about 3 months (around 2/28/2018). Patient Instructions Learning About Preventing Pressure Sores What are pressure sores? A pressure sore is an injury to the skin caused by constant pressure over a period of time. The constant pressure blocks the blood supply to the skin. This causes skin cells to die and creates a sore. Pressure sores are also called bedsores. Pressure sores usually occur over bony areas, such as the hips, lower back, elbows, heels, and shoulders. Pressure sores can also occur in places where the skin folds over on itself, or where medical equipment presses on the skin, such as when oxygen tubes press on the ears or cheeks. Pressure sores can range from red areas on the surface of the skin to severe tissue damage that goes deep into muscle and bone. Severe sores are hard to treat and slow to heal. When pressure sores do not heal properly, problems such as bone, blood, and skin infections can develop. What causes pressure sores? Things that cause pressure sores include: · Pressure on the skin and tissues. For example, the sores may happen when a person lies in bed or sits in a wheelchair for a long time. This is the most common cause of pressure sores. · Sliding down in a bed or chair, forcing the skin to fold over itself (shear force). · Being pulled across bed sheets or other surfaces (friction burns). As we get older, our skin gets more thin and dry and less elastic, so it is easier to damage. Poor nutrition and not getting enough fluids make these natural changes in the skin worse.  Skin in this condition may easily develop a pressure sore. Skin can also be damaged by sweat, feces, or urine, making pressure sores more likely and harder to heal. 
How can you help prevent pressure sores? If you are not able to do these things yourself, ask a family member or friend for help. Change position often · In a bed, change position every 2 hours. · In a wheelchair or other type of chair, shift your weight every 15 minutes, and give yourself a full relief of weight every hour. ¨ For a weight shift, lean forward and to the left and right. Push up out of the chair with your arms. If you have a chair that tilts, use the tilt control to help relieve pressure. ¨ For a full relief of weight, stand up or move to another chair or bed if you are able to. Personal care · Check your skin every day, especially around bony areas. When a pressure sore is forming, skin temperature can be different than nearby skin. It might be warmer or cooler. The skin can feel either firmer or softer than the surrounding skin. · Keep your skin clean and free of sweat, wound drainage, urine, and feces. · Use skin lotions to keep your skin from drying out and cracking. Barrier lotions or creams have ingredients that can act as a shield to help protect the skin from moisture or irritation. · Try not to slide or slump across sheets in a chair or bed. And try not to sleep in a recliner chair. Lifestyle choices · Eat healthy foods with plenty of protein to help heal damaged skin and to help new skin grow. · Get plenty of fluids. · Stay at a healthy weight. Being either overweight or underweight can make pressure sores more likely. · If you smoke, stop. Smoking dries the skin and reduces its blood supply. If you need help quitting, talk to your doctor about stop-smoking programs and medicines. These can increase your chances of quitting for good. Ask about using cushions or pads · Overlays are special pads you put on top of a mattress.  They provide a softer surface that will fit your body's shape better than a regular mattress. · Cushions or devices can be used to reduce pressure on certain areas of the body. For example, you can use a \"medical heel pillow\" to help prevent pressure sores on heels. You can also get cushions for seating surfaces, such as wheelchair seats. Talk with your doctor about cushions and pads. Some products, such as doughnut-type devices, may actually cause pressure sores or make them worse. Where can you learn more? Go to http://esvin-tammy.info/. Enter 839 [id number] in the search box to learn more about \"Learning About Preventing Pressure Sores. \" Current as of: March 20, 2017 Content Version: 11.4 © 7508-5179 [employer]. Care instructions adapted under license by [employer] (which disclaims liability or warranty for this information). If you have questions about a medical condition or this instruction, always ask your healthcare professional. [address] any warranty or liability for your use of this information. Please provide this summary of care documentation to your next provider. Your primary care clinician is listed as Murtaza Bauman. If you have any questions after today's visit, please call [phone].

## 2017-11-30 NOTE — PROGRESS NOTES
Health Maintenance Due   Topic Date Due    EYE EXAM RETINAL OR DILATED Q1  06/18/1945    DTaP/Tdap/Td series (1 - Tdap) 06/18/1958    ZOSTER VACCINE AGE 60>  04/18/1997    GLAUCOMA SCREENING Q2Y  06/18/2002    Pneumococcal 65+ Low/Medium Risk (1 of 2 - PCV13) 06/18/2002    MEDICARE YEARLY EXAM  06/18/2002    Influenza Age 9 to Adult  08/01/2017       Chief Complaint   Patient presents with    Decubitus Ulcer    Diabetes    Hypertension       1. Have you been to the ER, urgent care clinic since your last visit? Hospitalized since your last visit? No    2. Have you seen or consulted any other health care providers outside of the [address] [place] [address] since your last visit? Include any pap smears or colon screening. No    3) Do you have an Advance Directive on file? no    4) Are you interested in receiving information on Advance Directives? NO      Patient is accompanied by self I have received verbal consent from Natasha Kim to discuss any/all medical information while they are present in the room.

## 2017-12-12 ENCOUNTER — TELEPHONE (OUTPATIENT)
Dept: INTERNAL MEDICINE CLINIC | Age: 82
End: 2017-12-12

## 2017-12-12 NOTE — TELEPHONE ENCOUNTER
Received message from Jennifer Colvin, nurse with [facility], patient is discharged from home health nursing services, as wound of buttocks is healed.

## 2017-12-14 ENCOUNTER — OFFICE VISIT (OUTPATIENT)
Dept: INTERNAL MEDICINE CLINIC | Age: 82
End: 2017-12-14

## 2017-12-14 VITALS
BODY MASS INDEX: 26.28 KG/M2 | HEIGHT: 72 IN | DIASTOLIC BLOOD PRESSURE: 66 MMHG | WEIGHT: 194 LBS | OXYGEN SATURATION: 96 % | SYSTOLIC BLOOD PRESSURE: 139 MMHG | RESPIRATION RATE: 18 BRPM | HEART RATE: 78 BPM

## 2017-12-14 DIAGNOSIS — L98.419 CHRONIC ULCER OF BUTTOCK (HCC): Primary | ICD-10-CM

## 2017-12-14 DIAGNOSIS — I35.0 AORTIC VALVE STENOSIS, ETIOLOGY OF CARDIAC VALVE DISEASE UNSPECIFIED: ICD-10-CM

## 2017-12-14 DIAGNOSIS — I10 ESSENTIAL HYPERTENSION: ICD-10-CM

## 2017-12-14 DIAGNOSIS — Z00.00 MEDICARE ANNUAL WELLNESS VISIT, SUBSEQUENT: ICD-10-CM

## 2017-12-14 DIAGNOSIS — I25.10 CORONARY ARTERY DISEASE INVOLVING NATIVE CORONARY ARTERY OF NATIVE HEART WITHOUT ANGINA PECTORIS: ICD-10-CM

## 2017-12-14 DIAGNOSIS — E11.9 TYPE 2 DIABETES MELLITUS WITHOUT COMPLICATION, WITHOUT LONG-TERM CURRENT USE OF INSULIN (HCC): ICD-10-CM

## 2017-12-14 PROBLEM — E11.21 TYPE 2 DIABETES MELLITUS WITH NEPHROPATHY (HCC): Status: ACTIVE | Noted: 2017-12-14

## 2017-12-14 RX ORDER — CLOTRIMAZOLE AND BETAMETHASONE DIPROPIONATE 10; .64 MG/G; MG/G
CREAM TOPICAL 2 TIMES DAILY
Qty: 15 G | Refills: 0 | Status: SHIPPED | OUTPATIENT
Start: 2017-12-14 | End: 2018-02-22 | Stop reason: ALTCHOICE

## 2017-12-14 RX ORDER — DOXAZOSIN 4 MG/1
4 TABLET ORAL DAILY
Qty: 90 TAB | Refills: 1 | Status: SHIPPED | OUTPATIENT
Start: 2017-12-14 | End: 2018-03-29 | Stop reason: SDUPTHER

## 2017-12-14 RX ORDER — DRESSING,ODOR ABSORBENT,H-POLY 4" X 4"
BANDAGE MISCELLANEOUS
Qty: 10 EACH | Refills: 0 | Status: SHIPPED | OUTPATIENT
Start: 2017-12-14 | End: 2018-02-22 | Stop reason: SDUPTHER

## 2017-12-14 RX ORDER — FINASTERIDE 5 MG/1
5 TABLET, FILM COATED ORAL DAILY
Qty: 90 TAB | Refills: 1 | Status: SHIPPED | OUTPATIENT
Start: 2017-12-14 | End: 2018-03-29 | Stop reason: SDUPTHER

## 2017-12-14 RX ORDER — LABETALOL 100 MG/1
100 TABLET, FILM COATED ORAL DAILY
Qty: 90 TAB | Refills: 1 | Status: SHIPPED | OUTPATIENT
Start: 2017-12-14 | End: 2018-03-29 | Stop reason: SDUPTHER

## 2017-12-14 NOTE — MR AVS SNAPSHOT
Visit Information Date & Time Provider Department Dept. Phone Encounter #  
 12/14/2017 10:30 AM Gaurang Guy, [address] [id number] Follow-up Instructions Return in about 1 month (around 1/14/2018). Your Appointments 2/22/2018  9:30 AM  
Any with DO Rajan Isidro ([facility]) Appt Note: follow up 3m; follow up 4m; follow up [address] [phone]  
  
   
 [address] [place] [address]. [address] [place] [phone] Upcoming Health Maintenance Date Due  
 EYE EXAM RETINAL OR DILATED Q1 6/18/1945 DTaP/Tdap/Td series (1 - Tdap) 6/18/1958 ZOSTER VACCINE AGE 60> 4/18/1997 GLAUCOMA SCREENING Q2Y 6/18/2002 Pneumococcal 65+ Low/Medium Risk (2 of 2 - PPSV23) 9/30/2016 HEMOGLOBIN A1C Q6M 5/2/2018 MICROALBUMIN Q1 9/12/2018 LIPID PANEL Q1 9/12/2018 FOOT EXAM Q1 11/2/2018 MEDICARE YEARLY EXAM 12/15/2018 Allergies as of 12/14/2017  Review Complete On: 12/14/2017 By: Gaurang Guy DO No Known Allergies Current Immunizations  Reviewed on 11/30/2017 No immunizations on file. Not reviewed this visit You Were Diagnosed With   
  
 Codes Comments Chronic ulcer of buttock ([facility]    -  Primary ICD-10-CM: Q65.853 ICD-9-CM: 707.8 Type 2 diabetes mellitus without complication, without long-term current use of insulin (HCC)     ICD-10-CM: E11.9 ICD-9-CM: 250.00 Essential hypertension     ICD-10-CM: I10 
ICD-9-CM: 401.9 Coronary artery disease involving native coronary artery of native heart without angina pectoris     ICD-10-CM: I25.10 ICD-9-CM: 414.01 Aortic valve stenosis, etiology of cardiac valve disease unspecified     ICD-10-CM: I35.0 ICD-9-CM: 424.1 Medicare annual wellness visit, subsequent     ICD-10-CM: Z00.00 ICD-9-CM: V70.0 Vitals BP Pulse Resp Height(growth percentile) Weight(growth percentile) SpO2  
 139/66 (BP 1 Location: Right arm, BP Patient Position: Sitting) 78 18 6' (1.829 m) 194 lb (88 kg) 96% BMI Smoking Status 26.31 kg/m2 Former Smoker Vitals History BMI and BSA Data Body Mass Index Body Surface Area  
 26.31 kg/m 2 2.11 m 2 Preferred Pharmacy Pharmacy Name Phone [facility] [phone] Your Updated Medication List  
  
   
This list is accurate as of: 12/14/17 10:53 AM.  Always use your most recent med list. amLODIPine 10 mg tablet Commonly known as:  [place] Take 1 Tab by mouth daily. aspirin 325 mg tablet Commonly known as:  ASPIRIN Take 325 mg by mouth daily. atorvastatin 10 mg tablet Commonly known as:  LIPITOR Take 1 Tab by mouth daily. clotrimazole-betamethasone topical cream  
Commonly known as:  [place]lyce Quarto Apply  to affected area two (2) times a day. DOXAZOSIN PO Take 4 mg by mouth daily. Take one tablet daily. ergocalciferol 50,000 unit capsule Commonly known as:  VITAMIN D2 Take 1 Cap by mouth every fourteen (14) days. Take one capsule PO every 2 weeks. finasteride 5 mg tablet Commonly known as:  PROSCAR Take 5 mg by mouth daily. glipiZIDE SR 5 mg CR tablet Commonly known as:  GLUCOTROL XL Take 1 Tab by mouth daily. glucose blood VI test strips strip Commonly known as:  TRUE METRIX GLUCOSE TEST STRIP Check BS BID  Dx: DM  E11.21  
  
 hydroCHLOROthiazide 12.5 mg tablet Commonly known as:  HYDRODIURIL Take 1 Tab by mouth daily. Hydrocolloid Dressing 4 X 4 \" Bndg Commonly known as:  DUODERM CGF DRESSING Apply to affected area on buttock every 3 days  
  
 labetalol 100 mg tablet Commonly known as:  [place] Pale Take 100 mg by mouth daily. losartan 25 mg tablet Commonly known as:  COZAAR  
 Take 0.5 Tabs by mouth daily. magnesium 250 mg Tab Take  by mouth daily. Menthol-Zinc Oxide 0.44-20.6 % Oint Commonly known as:  Calmoseptine Apply to effected areas on buttocks twice daily  
  
 metFORMIN  mg tablet Commonly known as:  GLUCOPHAGE XR Take 1 Tab by mouth two (2) times a day. sertraline 100 mg tablet Commonly known as:  ZOLOFT Take 1 Tab by mouth daily. SUPER B COMPLEX PO Take  by mouth. temazepam 15 mg capsule Commonly known as:  RESTORIL  
TAKE 1 CAPSULE BY MOUTH AT BEDTIME AS NEEDED FOR SLEEP. traZODone 50 mg tablet Commonly known as:  DESYREL  
TAKE 2 TABLETS BY MOUTH NIGHTLY. VITAMIN D3 1,000 unit Cap Generic drug:  cholecalciferol Take  by mouth daily. Prescriptions Sent to Pharmacy Refills  
 clotrimazole-betamethasone (LOTRISONE) topical cream 0 Sig: Apply  to affected area two (2) times a day. Class: Normal  
 Pharmacy: [facility] Ph #: [phone] Route: Topical  
 Hydrocolloid Dressing (DUODERM CGF DRESSING) 4 X 4 \" bndg 0 Sig: Apply to affected area on buttock every 3 days Class: Normal  
 Pharmacy: [facility]avo Abbasi Ph #: [phone]  
 glucose blood VI test strips (TRUE METRIX GLUCOSE TEST STRIP) strip 11 Sig: Check BS BID  Dx: DM  E11.21 Class: Normal  
 Pharmacy: [facility] Abb[facility] Ph #: [phone] Follow-up Instructions Return in about 1 month (around 1/14/2018). Please provide this summary of care documentation to your next provider. Your primary care clinician is listed as Chan Pinto. If you have any questions after today's visit, please call [phone].

## 2017-12-14 NOTE — PROGRESS NOTES
Health Maintenance Due   Topic Date Due    EYE EXAM RETINAL OR DILATED Q1  06[birth year]/1947    DTaP/Tdap/Td series (1 - Tdap) 06/18/1958    ZOSTER VACCINE AGE 60>  04/18/1997    GLAUCOMA SCREENING Q2Y  06/18/2002    MEDICARE YEARLY EXAM  06/18/2002    Pneumococcal 65+ Low/Medium Risk (2 of 2 - PPSV23) 09/30/2016       Chief Complaint   Patient presents with    Decubitus Ulcer    Hypertension    Diabetes    Follow Up Chronic Condition    Annual Wellness Visit       1. Have you been to the ER, urgent care clinic since your last visit? Hospitalized since your last visit? No    2. Have you seen or consulted any other health care providers outside of the [address] [place] [address] since your last visit? Include any pap smears or colon screening. No    3) Do you have an Advance Directive on file? NO     4) Are you interested in receiving information on Advance Directives? NO      Patient is accompanied by self I have received verbal consent from Aniya Leahy to discuss any/all medical information while they are present in the room. Aniya Leahy is a [de-identified] y.o. male and presents for annual Medicare Wellness Visit. Problem List: Reviewed with patient and discussed risk factors.     Patient Active Problem List   Diagnosis Code    Type 2 diabetes mellitus without complication, without long-term current use of insulin ([facility] E11.9    Essential hypertension I10    Hypercholesterolemia E78.00    Coronary artery disease involving native coronary artery of native heart without angina pectoris I25.10    S/P CABG (coronary artery bypass graft) Z95.1    Aortic valve stenosis I35.0    Aortic systolic murmur on examination I35.8    Benign prostatic hyperplasia with lower urinary tract symptoms N40.1    Insomnia G47.00    Former smoker Z87.891    ACP (advance care planning) Z71.89    Chronic ulcer of buttock ([facility] L98.419       Current medical providers:  Patient Care Team:  Gaurang Guy DO as PCP - General (Internal Medicine)  Corinne Perez RN as Ambulatory Care Navigator    PSH: Reviewed with patient  Past Surgical History:   Procedure Laterality Date    CARDIAC SURG PROCEDURE UNLIST  2006    by-pass    HX CHOLECYSTECTOMY          SH: Reviewed with patient  Social History   Substance Use Topics    Smoking status: Former Smoker     Types: Cigarettes     Quit date: 9/8/1990    Smokeless tobacco: Never Used    Alcohol use No       FH: Reviewed with patient  Family History   Problem Relation Age of Onset    Cancer Mother     Cancer Father        Medications/Allergies: Reviewed with patient  Current Outpatient Prescriptions on File Prior to Visit   Medication Sig Dispense Refill    losartan (COZAAR) 25 mg tablet Take 0.5 Tabs by mouth daily. 45 Tab 3    traZODone (DESYREL) 50 mg tablet TAKE 2 TABLETS BY MOUTH NIGHTLY. 60 Tab 3    Menthol-Zinc Oxide (CALMOSEPTINE) 0.44-20.6 % oint Apply to effected areas on buttocks twice daily 1 Tube 1    hydroCHLOROthiazide (HYDRODIURIL) 12.5 mg tablet Take 1 Tab by mouth daily. 90 Tab 1    glipiZIDE SR (GLUCOTROL XL) 5 mg CR tablet Take 1 Tab by mouth daily. 30 Tab 5    temazepam (RESTORIL) 15 mg capsule TAKE 1 CAPSULE BY MOUTH AT BEDTIME AS NEEDED FOR SLEEP. 30 Cap 1    magnesium 250 mg tab Take  by mouth daily.  aspirin (ASPIRIN) 325 mg tablet Take 325 mg by mouth daily.  FERROUS FUMARATE/VIT BCOMP,C (SUPER B COMPLEX PO) Take  by mouth.  atorvastatin (LIPITOR) 10 mg tablet Take 1 Tab by mouth daily. 30 Tab 5    metFORMIN ER (GLUCOPHAGE XR) 500 mg tablet Take 1 Tab by mouth two (2) times a day. 60 Tab 5    amLODIPine (NORVASC) 10 mg tablet Take 1 Tab by mouth daily. 30 Tab 3    ergocalciferol (VITAMIN D2) 50,000 unit capsule Take 1 Cap by mouth every fourteen (14) days. Take one capsule PO every 2 weeks. 4 Cap 3    sertraline (ZOLOFT) 100 mg tablet Take 1 Tab by mouth daily. 30 Tab 3    labetalol (NORMODYNE) 100 mg tablet Take 100 mg by mouth daily.  finasteride (PROSCAR) 5 mg tablet Take 5 mg by mouth daily.  DOXAZOSIN MESYLATE (DOXAZOSIN PO) Take 4 mg by mouth daily. Take one tablet daily.  cholecalciferol (VITAMIN D3) 1,000 unit cap Take  by mouth daily. No current facility-administered medications on file prior to visit. No Known Allergies    Objective:  Visit Vitals    /66 (BP 1 Location: Right arm, BP Patient Position: Sitting)    Pulse 78    Resp 18    Ht 6' (1.829 m)    Wt 194 lb (88 kg)    SpO2 96%    BMI 26.31 kg/m2    Body mass index is 26.31 kg/(m^2). Assessment of cognitive impairment: Alert and oriented x 3      Depression Screen:   PHQ over the last two weeks 12/14/2017   Little interest or pleasure in doing things Not at all   Feeling down, depressed or hopeless Not at all   Total Score PHQ 2 0       Fall Risk Assessment:    Fall Risk Assessment, last 12 mths 12/14/2017   Able to walk? Yes   Fall in past 12 months? No       Functional Ability:   Does the patient exhibit a steady gait? yes   How long did it take the patient to get up and walk from a sitting position? 3 sec   Is the patient self reliant?  (ie can do own laundry, meals, household chores)  yes     Does the patient handle his/her own medications? yes     Does the patient handle his/her own money? yes     Is the patients home safe (ie good lighting, handrails on stairs and bath, etc.)? yes     Did you notice or did patient express any hearing difficulties? no     Did you notice or did patient express any vision difficulties? yes     Were distance and reading eye charts used? no       Advance Care Planning:   Patient was offered the opportunity to discuss advance care planning:  yes     Does patient have an Advance Directive:  no   If no, did you provide information on Caring Connections? yes       Plan:      No orders of the defined types were placed in this encounter.       Health Maintenance   Topic Date Due    EYE EXAM RETINAL OR DILATED Q1  06/18/1947    DTaP/Tdap/Td series (1 - Tdap) 06/18/1958    ZOSTER VACCINE AGE 60>  04/18/1997    GLAUCOMA SCREENING Q2Y  06/18/2002    MEDICARE YEARLY EXAM  06/18/2002    Pneumococcal 65+ Low/Medium Risk (2 of 2 - PPSV23) 09/30/2016    HEMOGLOBIN A1C Q6M  05/02/2018    MICROALBUMIN Q1  09/12/2018    LIPID PANEL Q1  09/12/2018    FOOT EXAM Q1  11/02/2018    Influenza Age 5 to Adult  Completed       *Patient verbalized understanding and agreement with the plan. A copy of the After Visit Summary with personalized health plan was given to the patient today.

## 2017-12-14 NOTE — PROGRESS NOTES
Health Maintenance Due   Topic Date Due    EYE EXAM RETINAL OR DILATED Q1  06/18/1945    DTaP/Tdap/Td series (1 - Tdap) 06/18/1958    ZOSTER VACCINE AGE 60>  04/18/1997    GLAUCOMA SCREENING Q2Y  06/18/2002    Pneumococcal 65+ Low/Medium Risk (2 of 2 - PPSV23) 09/30/2016       Chief Complaint   Patient presents with    Decubitus Ulcer     buttock    Hypertension    Diabetes    Follow Up Chronic Condition    Annual Wellness Visit     subsequent       1. Have you been to the ER, urgent care clinic since your last visit? Hospitalized since your last visit? No    2. Have you seen or consulted any other health care providers outside of the [address] [place] [address] since your last visit? Include any pap smears or colon screening. No    3) Do you have an Advance Directive on file? NO     4) Are you interested in receiving information on Advance Directives? NO      Patient is accompanied by self I have received verbal consent from Steven Ozuna to discuss any/all medical information while they are present in the room. Steven Ozuna is a [de-identified] y.o. male and presents for annual Medicare Wellness Visit. Problem List: Reviewed with patient and discussed risk factors.     Patient Active Problem List   Diagnosis Code    Type 2 diabetes mellitus without complication, without long-term current use of insulin ([facility] E11.9    Essential hypertension I10    Hypercholesterolemia E78.00    Coronary artery disease involving native coronary artery of native heart without angina pectoris I25.10    S/P CABG (coronary artery bypass graft) Z95.1    Aortic valve stenosis I35.0    Aortic systolic murmur on examination I35.8    Benign prostatic hyperplasia with lower urinary tract symptoms N40.1    Insomnia G47.00    Former smoker Z87.891    ACP (advance care planning) Z71.89    Chronic ulcer of buttock ([facility] [facility] L98.419       Current medical providers:  Patient Care Team:  Giovanni Anne DO as PCP - General (Internal Medicine)  Rebecca Jaime RN as Ambulatory Care Navigator    PSH: Reviewed with patient  Past Surgical History:   Procedure Laterality Date    CARDIAC SURG PROCEDURE UNLIST  2006    by-pass    HX CHOLECYSTECTOMY          SH: Reviewed with patient  Social History   Substance Use Topics    Smoking status: Former Smoker     Types: Cigarettes     Quit date: 9/8/1990    Smokeless tobacco: Never Used    Alcohol use No       FH: Reviewed with patient  Family History   Problem Relation Age of Onset    Cancer Mother     Cancer Father        Medications/Allergies: Reviewed with patient  Current Outpatient Prescriptions on File Prior to Visit   Medication Sig Dispense Refill    losartan (COZAAR) 25 mg tablet Take 0.5 Tabs by mouth daily. 45 Tab 3    traZODone (DESYREL) 50 mg tablet TAKE 2 TABLETS BY MOUTH NIGHTLY. 60 Tab 3    Menthol-Zinc Oxide (CALMOSEPTINE) 0.44-20.6 % oint Apply to effected areas on buttocks twice daily 1 Tube 1    hydroCHLOROthiazide (HYDRODIURIL) 12.5 mg tablet Take 1 Tab by mouth daily. 90 Tab 1    glipiZIDE SR (GLUCOTROL XL) 5 mg CR tablet Take 1 Tab by mouth daily. 30 Tab 5    temazepam (RESTORIL) 15 mg capsule TAKE 1 CAPSULE BY MOUTH AT BEDTIME AS NEEDED FOR SLEEP. 30 Cap 1    magnesium 250 mg tab Take  by mouth daily.  aspirin (ASPIRIN) 325 mg tablet Take 325 mg by mouth daily.  FERROUS FUMARATE/VIT BCOMP,C (SUPER B COMPLEX PO) Take  by mouth.  atorvastatin (LIPITOR) 10 mg tablet Take 1 Tab by mouth daily. 30 Tab 5    metFORMIN ER (GLUCOPHAGE XR) 500 mg tablet Take 1 Tab by mouth two (2) times a day. 60 Tab 5    amLODIPine (NORVASC) 10 mg tablet Take 1 Tab by mouth daily. 30 Tab 3    ergocalciferol (VITAMIN D2) 50,000 unit capsule Take 1 Cap by mouth every fourteen (14) days. Take one capsule PO every 2 weeks. 4 Cap 3    sertraline (ZOLOFT) 100 mg tablet Take 1 Tab by mouth daily. 30 Tab 3    labetalol (NORMODYNE) 100 mg tablet Take 100 mg by mouth daily.       finasteride (PROSCAR) 5 mg tablet Take 5 mg by mouth daily.  DOXAZOSIN MESYLATE (DOXAZOSIN PO) Take 4 mg by mouth daily. Take one tablet daily.  cholecalciferol (VITAMIN D3) 1,000 unit cap Take  by mouth daily. No current facility-administered medications on file prior to visit. No Known Allergies    Objective:  Visit Vitals    /66 (BP 1 Location: Right arm, BP Patient Position: Sitting)    Pulse 78    Resp 18    Ht 6' (1.829 m)    Wt 194 lb (88 kg)    SpO2 96%    BMI 26.31 kg/m2    Body mass index is 26.31 kg/(m^2). Assessment of cognitive impairment: Alert and oriented x 3      Depression Screen:   PHQ over the last two weeks 12/14/2017   Little interest or pleasure in doing things Not at all   Feeling down, depressed or hopeless Not at all   Total Score PHQ 2 0       Fall Risk Assessment:    Fall Risk Assessment, last 12 mths 12/14/2017   Able to walk? Yes   Fall in past 12 months? No       Functional Ability:   Does the patient exhibit a steady gait? yes   How long did it take the patient to get up and walk from a sitting position? 3 sec   Is the patient self reliant?  (ie can do own laundry, meals, household chores)  yes     Does the patient handle his/her own medications? yes     Does the patient handle his/her own money? yes     Is the patients home safe (ie good lighting, handrails on stairs and bath, etc.)? yes     Did you notice or did patient express any hearing difficulties? no     Did you notice or did patient express any vision difficulties? yes     Were distance and reading eye charts used? no       Advance Care Planning:   Patient was offered the opportunity to discuss advance care planning:  yes     Does patient have an Advance Directive:  no   If no, did you provide information on C[facility] Connections? yes       Plan:      No orders of the defined types were placed in this encounter.       Health Maintenance   Topic Date Due    EYE EXAM RETINAL OR DILATED Q1  06/18/1945    DTaP/Tdap/Td series (1 - Tdap) 06/18/1958    ZOSTER VACCINE AGE 60>  04/18/1997    GLAUCOMA SCREENING Q2Y  06/18/2002    Pneumococcal 65+ Low/Medium Risk (2 of 2 - PPSV23) 09/30/2016    HEMOGLOBIN A1C Q6M  05/02/2018    MICROALBUMIN Q1  09/12/2018    LIPID PANEL Q1  09/12/2018    FOOT EXAM Q1  11/02/2018    MEDICARE YEARLY EXAM  12/15/2018    Influenza Age 9 to Adult  Completed       *Patient verbalized understanding and agreement with the plan. A copy of the After Visit Summary with personalized health plan was given to the patient today.

## 2017-12-29 NOTE — PROGRESS NOTES
HISTORY OF PRESENT ILLNESS  Kaycee Troncoso is a [de-identified] y.o. male. Pt. comes in for f/u. Has multiple medical problems. He has chronic irritation of buttocks with pain. Worse when when sitting down. Calmoseptine barium cream has helped some. Also trying to avoid prolonged sitting and pressure. His DM and cardiac status has been stable. Reports compliance with medications and diet. Med list and most recent labs/studies reviewed with pt. Trying to be active physically to control weight. Reports no other new c/o. Decubitus Ulcer   Pertinent negatives include no chest pain, no abdominal pain, no headaches and no shortness of breath. Diabetes   Pertinent negatives include no chest pain, no abdominal pain, no headaches and no shortness of breath. Hypertension    Pertinent negatives include no chest pain, no blurred vision, no headaches, no dizziness and no shortness of breath. Review of Systems   Constitutional: Negative. HENT: Positive for hearing loss. Eyes: Negative for blurred vision. Respiratory: Negative for shortness of breath. Cardiovascular: Negative for chest pain and leg swelling. Gastrointestinal: Negative for abdominal pain. Genitourinary: Negative for dysuria and frequency. Musculoskeletal: Positive for joint pain. Negative for falls. Skin: Positive for rash. Negative for itching. Neurological: Negative for dizziness, sensory change, focal weakness and headaches. Psychiatric/Behavioral: Negative for depression. The patient has insomnia. The patient is not nervous/anxious. All other systems reviewed and are negative. Physical Exam   Constitutional: He is oriented to person, place, and time. He appears well-developed and well-nourished. No distress. HENT:   Head: Normocephalic and atraumatic. Mouth/Throat: Oropharynx is clear and moist.   Eyes: Conjunctivae are normal.   Neck: Normal range of motion. Neck supple. No JVD present. No thyromegaly present. Cardiovascular: Normal rate, regular rhythm and intact distal pulses. Murmur heard. Pulmonary/Chest: Effort normal and breath sounds normal. No respiratory distress. He has no wheezes. He has no rales. Abdominal: Soft. Bowel sounds are normal. He exhibits no distension. Musculoskeletal: He exhibits no edema or tenderness. Neurological: He is alert and oriented to person, place, and time. Coordination normal.   Skin: Skin is warm and dry. There is erythema (With small chronic ulcers in medial bilateral buttocks, no drainage/looks about the same as before). Psychiatric: He has a normal mood and affect. His behavior is normal.   Nursing note and vitals reviewed. ASSESSMENT and PLAN  Diagnoses and all orders for this visit:    1. Chronic ulcer of buttock ([address]  Advised patient to continue calmoseptine cream a regular basis  Advised patient to avoid prolonged sitting in same position    2. Type 2 diabetes mellitus without complication, without long-term current use of insulin ([address]    3. Essential hypertension    4. Coronary artery disease involving native coronary artery of native heart without angina pectoris    5. Aortic valve stenosis, etiology of cardiac valve disease unspecified      Follow-up Disposition:  Return in about 3 months (around 2/28/2018).    lab results and schedule of future lab studies reviewed with patient  reviewed diet, exercise and weight control  reviewed medications and side effects in detail  F/u with other MD's as scheduled  Overall seems to be stable

## 2017-12-31 NOTE — PROGRESS NOTES
HISTORY OF PRESENT ILLNESS  Steven Ozuna is a [de-identified] y.o. male. Pt. comes in for f/u. Has multiple medical problems. His BP and DM are stable. Has chronic issues with bilateral buttock irritation and ulcers. Trying to avoid prolonged sitting. Calmoseptine cream has helped some. His cardiac status stable. Followed by cardiologist.  Reports compliance with medications and diet. Med list and most recent labs/studies reviewed with pt. Trying to be active physically as tolerated. Reports no other new c/o. Decubitus Ulcer   Pertinent negatives include no chest pain, no abdominal pain, no headaches and no shortness of breath. Hypertension    Pertinent negatives include no chest pain, no blurred vision, no headaches, no dizziness and no shortness of breath. Diabetes   Pertinent negatives include no chest pain, no abdominal pain, no headaches and no shortness of breath. Follow Up Chronic Condition   Pertinent negatives include no chest pain, no abdominal pain, no headaches and no shortness of breath. Review of Systems   Constitutional: Negative. HENT: Positive for hearing loss. Eyes: Negative for blurred vision. Respiratory: Negative for shortness of breath. Cardiovascular: Negative for chest pain and leg swelling. Gastrointestinal: Negative for abdominal pain. Genitourinary: Negative for dysuria and frequency. Musculoskeletal: Positive for joint pain. Negative for falls. Skin: Positive for rash. Negative for itching. Neurological: Negative for dizziness, sensory change, focal weakness and headaches. Psychiatric/Behavioral: Negative for depression. The patient has insomnia. The patient is not nervous/anxious. All other systems reviewed and are negative. Physical Exam   Constitutional: He is oriented to person, place, and time. He appears well-developed and well-nourished. No distress. Pleasant elderly man   HENT:   Head: Normocephalic and atraumatic.    Mouth/Throat: Oropharynx is clear and moist.   Eyes: Conjunctivae are normal.   Neck: Normal range of motion. Neck supple. No JVD present. No thyromegaly present. Cardiovascular: Normal rate, regular rhythm and intact distal pulses. Murmur heard. Pulmonary/Chest: Effort normal and breath sounds normal. No respiratory distress. He has no wheezes. He has no rales. Abdominal: Soft. Bowel sounds are normal. He exhibits no distension. Musculoskeletal: He exhibits no edema or tenderness. Neurological: He is alert and oriented to person, place, and time. Coordination normal.   Skin: Skin is warm and dry. There is erythema (chronic dry skin with noninfected breakdown in medial bilateral buttocks, no drainage/looks about the same as before). Psychiatric: He has a normal mood and affect. His behavior is normal.   Nursing note and vitals reviewed. ASSESSMENT and PLAN  Diagnoses and all orders for this visit:    1. Chronic ulcer of buttock ([facility]    2. Type 2 diabetes mellitus without complication, without long-term current use of insulin ([facility] [address]    3. Essential hypertension    4. Coronary artery disease involving native coronary artery of native heart without angina pectoris    5. Aortic valve stenosis, etiology of cardiac valve disease unspecified    6. Medicare annual wellness visit, subsequent    Other orders  -     clotrimazole-betamethasone (LOTRISONE) topical cream; Apply  to affected area two (2) times a day. -     Hydrocolloid Dressing (DUODERM CGF DRESSING) 4 X 4 \" bndg; Apply to affected area on buttock every 3 days  -     glucose blood VI test strips (TRUE METRIX GLUCOSE TEST STRIP) strip; Check BS BID  Dx: DM  E11.21      Follow-up Disposition:  Return in about 1 month (around 1/14/2018).    lab results and schedule of future lab studies reviewed with patient  reviewed diet, exercise and weight control  reviewed medications and side effects in detail  low cholesterol diet, weight control and daily exercise discussed, home glucose monitoring emphasized, all medications, side effects and compliance discussed carefully, foot care discussed and Podiatry visits discussed, annual eye examinations at Ophthalmology discussed, glycohemoglobin and other lab monitoring discussed and long term diabetic complications discussed  Again advised patient to avoid prolonged sitting in one position  Discussed importance of good hygiene in the area

## 2018-01-24 DIAGNOSIS — G47.00 INSOMNIA, UNSPECIFIED TYPE: ICD-10-CM

## 2018-01-26 RX ORDER — TEMAZEPAM 15 MG/1
CAPSULE ORAL
Qty: 30 CAP | Refills: 0 | Status: SHIPPED | OUTPATIENT
Start: 2018-01-26 | End: 2018-02-28 | Stop reason: SDUPTHER

## 2018-02-21 RX ORDER — SERTRALINE HYDROCHLORIDE 100 MG/1
TABLET, FILM COATED ORAL
Qty: 30 TAB | Refills: 3 | Status: SHIPPED | OUTPATIENT
Start: 2018-02-21 | End: 2018-03-29 | Stop reason: SDUPTHER

## 2018-02-22 ENCOUNTER — OFFICE VISIT (OUTPATIENT)
Dept: INTERNAL MEDICINE CLINIC | Age: 83
End: 2018-02-22

## 2018-02-22 VITALS
HEIGHT: 72 IN | OXYGEN SATURATION: 94 % | DIASTOLIC BLOOD PRESSURE: 66 MMHG | SYSTOLIC BLOOD PRESSURE: 147 MMHG | BODY MASS INDEX: 26.01 KG/M2 | HEART RATE: 72 BPM | RESPIRATION RATE: 18 BRPM | WEIGHT: 192 LBS

## 2018-02-22 DIAGNOSIS — I35.0 AORTIC VALVE STENOSIS, ETIOLOGY OF CARDIAC VALVE DISEASE UNSPECIFIED: ICD-10-CM

## 2018-02-22 DIAGNOSIS — E11.9 TYPE 2 DIABETES MELLITUS WITHOUT COMPLICATION, WITHOUT LONG-TERM CURRENT USE OF INSULIN (HCC): ICD-10-CM

## 2018-02-22 DIAGNOSIS — Z95.1 S/P CABG (CORONARY ARTERY BYPASS GRAFT): ICD-10-CM

## 2018-02-22 DIAGNOSIS — L98.419 CHRONIC ULCER OF BUTTOCK (HCC): Primary | ICD-10-CM

## 2018-02-22 DIAGNOSIS — I10 ESSENTIAL HYPERTENSION: ICD-10-CM

## 2018-02-22 DIAGNOSIS — E78.00 HYPERCHOLESTEROLEMIA: ICD-10-CM

## 2018-02-22 RX ORDER — MENTHOL AND ZINC OXIDE .44; 20.625 G/100G; G/100G
OINTMENT TOPICAL
Qty: 1 TUBE | Refills: 5 | Status: SHIPPED | OUTPATIENT
Start: 2018-02-22 | End: 2018-03-29 | Stop reason: SDUPTHER

## 2018-02-22 RX ORDER — DRESSING,ODOR ABSORBENT,H-POLY 4" X 4"
BANDAGE MISCELLANEOUS
Qty: 10 EACH | Refills: 2 | Status: SHIPPED | OUTPATIENT
Start: 2018-02-22 | End: 2018-03-29 | Stop reason: SDUPTHER

## 2018-02-22 NOTE — PROGRESS NOTES
Health Maintenance Due   Topic Date Due    EYE EXAM RETINAL OR DILATED Q1  06[birth year]/1947    DTaP/Tdap/Td series (1 - Tdap) 06/18/1958    ZOSTER VACCINE AGE 60>  04/18/1997    GLAUCOMA SCREENING Q2Y  06/18/2002    Pneumococcal 65+ Low/Medium Risk (2 of 2 - PPSV23) 09/30/2016       Chief Complaint   Patient presents with    Wound Check     sacrum    Coronary Artery Disease    Diabetes    Follow Up Chronic Condition       1. Have you been to the ER, urgent care clinic since your last visit? Hospitalized since your last visit? No    2. Have you seen or consulted any other health care providers outside of the [address] [place] [address] since your last visit? Include any pap smears or colon screening. No    3) Do you have an Advance Directive on file? no    4) Are you interested in receiving information on Advance Directives? YES      Patient is accompanied by self I have received verbal consent from Shyanne Orozco to discuss any/all medical information while they are present in the room.

## 2018-02-22 NOTE — LETTER
3/8/2018 2:27 PM 
 
Mr. Chio Dowling [address] [place] [address] [id number] [place] [phone] Dear Chio Dowling: 
 
Please find your most recent results below. Resulted Orders LIPID PANEL Result Value Ref Range Cholesterol, total 105 100 - 199 mg/dL Triglyceride 76 0 - 149 mg/dL HDL Cholesterol 46 >39 mg/dL VLDL, calculated 15 5 - 40 mg/dL LDL, calculated 44 0 - 99 mg/dL Narrative Performed at:  [address]  [id number] : Mari Begum MD, Phone:  [phone] METABOLIC PANEL, BASIC Result Value Ref Range Glucose 125 (H) 65 - 99 mg/dL BUN 22 8 - 27 mg/dL Creatinine 1.00 0.76 - 1.27 mg/dL GFR est non-AA 71 >59 mL/min/1.73 GFR est AA 82 >59 mL/min/1.73  
 BUN/Creatinine ratio 22 10 - 24 Sodium 140 134 - 144 mmol/L Potassium 5.0 3.5 - 5.2 mmol/L Chloride 97 96 - 106 mmol/L  
 CO2 29 18 - 29 mmol/L Calcium 9.4 8.6 - 10.2 mg/dL Narrative Performed at:  [address]  [id number] : Mari Begum MD, Phone:  [phone] ALT Result Value Ref Range ALT (SGPT) 26 0 - 44 IU/L Narrative Performed at:  [address]  [id number] : Mari Begum MD, Phone:  [phone] AST Result Value Ref Range AST (SGOT) 23 0 - 40 IU/L Narrative Performed at:  [address]  [id number] : Mari Begum MD, Phone:  [phone] HEMOGLOBIN A1C WITH EAG Result Value Ref Range Hemoglobin A1c 6.4 (H) 4.8 - 5.6 % Comment:  
            Pre-diabetes: 5.7 - 6.4 Diabetes: >6.4 Glycemic control for adults with diabetes: <7.0 Estimated average glucose 137 mg/dL Narrative Performed at:  [facility] [address] [place] [address]  [id number] : Rashida oFwler MD, Phone:  [phone] CVD REPORT Result Value Ref Range INTERPRETATION Note Comment:  
   Supplemental report is available. Narrative Performed at:  [address] [place] [address]  [id number] : Nhi [place] PhD, Phone:  [phone] DIABETES PATIENT EDUCATION Result Value Ref Range PDF Image Not applicable Narrative Performed at:  [address] [place] [address]  [id number] : Nhi [place] PhD, Phone:  [phone] RECOMMENDATIONS: 
All labs are stable Please call me if you have any questions: [phone] Sincerely, 
 
 
Rupa Reza, DO

## 2018-02-22 NOTE — MR AVS SNAPSHOT
[address] [place] [address]. [place] [id number] [phone] Patient: Odette Frausto MRN: [id number] Visit Information Date & Time Provider Department Dept. Phone Encounter #  
 2/22/2018  9:30 AM Regina Andersen, [address] [id number] Follow-up Instructions Return in about 4 months (around 6/22/2018). Upcoming Health Maintenance Date Due  
 EYE EXAM RETINAL OR DILATED Q1 6/18/1945 DTaP/Tdap/Td series (1 - Tdap) 6/18/1958 ZOSTER VACCINE AGE 60> 4/18/1997 GLAUCOMA SCREENING Q2Y 6/18/2002 Pneumococcal 65+ Low/Medium Risk (2 of 2 - PPSV23) 9/30/2016 HEMOGLOBIN A1C Q6M 5/2/2018 MICROALBUMIN Q1 9/12/2018 LIPID PANEL Q1 9/12/2018 FOOT EXAM Q1 11/2/2018 MEDICARE YEARLY EXAM 12/15/2018 Allergies as of 2/22/2018  Review Complete On: 2/22/2018 By: Regina Andersen, DO No Known Allergies Current Immunizations  Reviewed on 11/30/2017 No immunizations on file. Not reviewed this visit You Were Diagnosed With   
  
 Codes Comments [facility] ulcer [facility] buttock ([facility]    -  Primary ICD-10-CM: Q81.084 ICD-9-CM: 707.8 Type 2 diabetes mellitus without complication, without long-term current use of insulin (HCC)     ICD-10-CM: E11.9 ICD-9-CM: 250.00 Essential hypertension     ICD-10-CM: I10 
ICD-9-CM: 401.9 Hypercholesterolemia     ICD-10-CM: E78.00 ICD-9-CM: 272.0 Aortic valve stenosis, etiology of cardiac valve disease unspecified     ICD-10-CM: I35.0 ICD-9-CM: 424.1 S/P CABG (coronary artery bypass graft)     ICD-10-CM: Z95.1 ICD-9-CM: V45.81 Vitals BP Pulse Resp Height(growth percentile) Weight(growth percentile) SpO2  
 147/66 (BP 1 Location: Right arm, BP Patient Position: Sitting) 72 18 6' (1.829 m) 192 lb (87.1 kg) 94% BMI Smoking Status 26.04 kg/m2 Former Smoker Vitals History BMI and BSA Data Body Mass Index Body Surface Area 26.04 kg/m 2 2.1 m 2 Preferred Pharmacy Pharmacy Name [facility] [phone] Your Updated Medication List  
  
   
This list is accurate as of 2/22/18 10:05 AM.  Always use your most recent med list. amLODIPine 10 mg tablet Commonly known as:  Bosie Shield Take 1 Tab by mouth daily. aspirin 325 mg tablet Commonly known as:  ASPIRIN Take 325 mg by mouth daily. atorvastatin 10 mg tablet Commonly known as:  LIPITOR Take 1 Tab by mouth daily. doxazosin 4 mg tablet Commonly known as:  CARDURA Take 1 Tab by mouth daily. Take one tablet daily. ergocalciferol 50,000 unit capsule Commonly known as:  VITAMIN D2 Take 1 Cap by mouth every fourteen (14) days. Take one capsule PO every 2 weeks. finasteride 5 mg tablet Commonly known as:  PROSCAR Take 1 Tab by mouth daily. glipiZIDE SR 5 mg CR tablet Commonly known as:  GLUCOTROL XL Take 1 Tab by mouth daily. glucose blood VI test strips strip Commonly known as:  TRUE METRIX GLUCOSE TEST STRIP Check BS BID  Dx: DM  E11.21  
  
 hydroCHLOROthiazide 12.5 mg tablet Commonly known as:  HYDRODIURIL Take 1 Tab by mouth daily. Hydrocolloid Dressing 4 X 4 \" Bndg Commonly known as:  DUODERM CGF DRESSING Apply to affected area on buttock every 3 days  
  
 labetalol 100 mg tablet Commonly known as:  Kassie Ghazi Take 1 Tab by mouth daily. losartan 25 mg tablet Commonly known as:  COZAAR Take 0.5 Tabs by mouth daily. magnesium 250 mg Tab Take  by mouth daily. Menthol-Zinc Oxide 0.44-20.6 % Oint Commonly known as:  Calmoseptine Apply to effected areas on buttocks twice daily  
  
 metFORMIN  mg tablet Commonly known as:  GLUCOPHAGE XR Take 1 Tab by mouth two (2) times a day. sertraline 100 mg tablet Commonly known as:  ZOLOFT  
TAKE 1 TAB BY MOUTH DAILY. SUPER B COMPLEX PO Take  by mouth. temazepam 15 mg capsule Commonly known as:  RESTORIL  
TAKE 1 CAPSULE BY MOUTH AT BEDTIME AS NEEDED FOR SLEEP. traZODone 50 mg tablet Commonly known as:  DESYREL  
TAKE 2 TABLETS BY MOUTH NIGHTLY. VITAMIN D3 1,000 unit Cap Generic drug:  cholecalciferol Take  by mouth daily. Prescriptions Sent to Pharmacy Refills Hydrocolloid Dressing (DUODERM CGF DRESSING) 4 X 4 \" bndg 2 Sig: Apply to affected area on buttock every 3 days Class: Normal  
 Pharmacy: [address]  Ph #: [phone] Menthol-Zinc Oxide (CALMOSEPTINE) 0.44-20.6 % oint 5 Sig: Apply to effected areas on buttocks twice daily Class: Normal  
 Pharmacy: [address] [place] [address]  Ph #: [phone] We Performed the Following ALT [id number] CPT(R)] AST [id number] CPT(R)] HEMOGLOBIN A1C WITH EAG [96898 CPT(R)] LIPID PANEL [50785 CPT(R)] METABOLIC PANEL, BASIC [21439 CPT(R)] Follow-up Instructions Return in about 4 months (around 6/22/2018). Patient Instructions Learning About Preventing Pressure Sores What are pressure sores? A pressure sore is an injury to the skin caused by constant pressure over a period of time. The constant pressure blocks the blood supply to the skin. This causes skin cells to die and creates a sore. Pressure sores are also called bedsores. Pressure sores usually occur over bony areas, such as the hips, lower back, elbows, heels, and shoulders. Pressure sores can also occur in places where the skin folds over on itself, or where medical equipment presses on the skin, such as when oxygen tubes press on the ears or cheeks.  
Pressure sores can range from red areas on the surface of the skin to severe tissue damage that goes deep into muscle and bone. Severe sores are hard to treat and slow to heal. When pressure sores do not heal properly, problems such as bone, blood, and skin infections can develop. What causes pressure sores? Things that cause pressure sores include: · Pressure on the skin and tissues. For example, the sores may happen when a person lies in bed or sits in a wheelchair for a long time. This is the most common cause of pressure sores. · Sliding down in a bed or chair, forcing the skin to fold over itself (shear force). · Being pulled across bed sheets or other surfaces (friction burns). As we get older, our skin gets more thin and dry and less elastic, so it is easier to damage. Poor nutrition and not getting enough fluids make these natural changes in the skin worse. Skin in this condition may easily develop a pressure sore. Skin can also be damaged by sweat, feces, or urine, making pressure sores more likely and harder to heal. 
How can you help prevent pressure sores? If you are not able to do these things yourself, ask a family member or friend for help. Change position often · In a bed, change position every 2 hours. · In a wheelchair or other type of chair, shift your weight every 15 minutes, and give yourself a full relief of weight every hour. ¨ For a weight shift, lean forward and to the left and right. Push up out of the chair with your arms. If you have a chair that tilts, use the tilt control to help relieve pressure. ¨ For a full relief of weight, stand up or move to another chair or bed if you are able to. Personal care · Check your skin every day, especially around bony areas. When a pressure sore is forming, skin temperature can be different than nearby skin. It might be warmer or cooler. The skin can feel either firmer or softer than the surrounding skin. · Keep your skin clean and free of sweat, wound drainage, urine, and feces. · Use skin lotions to keep your skin from drying out and cracking. Barrier lotions or creams have ingredients that can act as a shield to help protect the skin from moisture or irritation. · Try not to slide or slump across sheets in a chair or bed. And try not to sleep in a recliner chair. Lifestyle choices · Eat healthy foods with plenty of protein to help heal damaged skin and to help new skin grow. · Get plenty of fluids. · Stay at a healthy weight. Being either overweight or underweight can make pressure sores more likely. · If you smoke, stop. Smoking dries the skin and reduces its blood supply. If you need help quitting, talk to your doctor about stop-smoking programs and medicines. These can increase your chances of quitting for good. Ask about using cushions or pads · Overlays are special pads you put on top of a mattress. They provide a softer surface that will fit your body's shape better than a regular mattress. · Cushions or devices can be used to reduce pressure on certain areas of the body. For example, you can use a \"medical heel pillow\" to help prevent pressure sores on heels. You can also get cushions for seating surfaces, such as wheelchair seats. Talk with your doctor about cushions and pads. Some products, such as doughnut-type devices, may actually cause pressure sores or make them worse. Where can you learn more? Go to http://esvin-tammy.info/. Enter 500 6056 in the search box to learn more about \"Learning About Preventing Pressure Sores. \" Current as of: March 20, 2017 Content Version: 11.4 © 6260-3339 [employer]. Care instructions adapted under license by [employer] (which disclaims liability or warranty for this information). If you have questions about a medical condition or this instruction, always ask your healthcare professional. [address] any warranty or liability for your use of this information. Please provide this summary of care documentation to your next provider. Your primary care clinician is listed as Ashley Bynum. If you have any questions after today's visit, please call [phone].

## 2018-02-22 NOTE — PATIENT INSTRUCTIONS
Learning About Preventing Pressure Sores  What are pressure sores? A pressure sore is an injury to the skin caused by constant pressure over a period of time. The constant pressure blocks the blood supply to the skin. This causes skin cells to die and creates a sore. Pressure sores are also called bedsores. Pressure sores usually occur over bony areas, such as the hips, lower back, elbows, heels, and shoulders. Pressure sores can also occur in places where the skin folds over on itself, or where medical equipment presses on the skin, such as when oxygen tubes press on the ears or cheeks. Pressure sores can range from red areas on the surface of the skin to severe tissue damage that goes deep into muscle and bone. Severe sores are hard to treat and slow to heal. When pressure sores do not heal properly, problems such as bone, blood, and skin infections can develop. What causes pressure sores? Things that cause pressure sores include:  · Pressure on the skin and tissues. For example, the sores may happen when a person lies in bed or sits in a wheelchair for a long time. This is the most common cause of pressure sores. · Sliding down in a bed or chair, forcing the skin to fold over itself (shear force). · Being pulled across bed sheets or other surfaces (friction burns). As we get older, our skin gets more thin and dry and less elastic, so it is easier to damage. Poor nutrition and not getting enough fluids make these natural changes in the skin worse. Skin in this condition may easily develop a pressure sore. Skin can also be damaged by sweat, feces, or urine, making pressure sores more likely and harder to heal.  How can you help prevent pressure sores? If you are not able to do these things yourself, ask a family member or friend for help. Change position often  · In a bed, change position every 2 hours.   · In a wheelchair or other type of chair, shift your weight every 15 minutes, and give yourself a full relief of weight every hour. ¨ For a weight shift, lean forward and to the left and right. Push up out of the chair with your arms. If you have a chair that tilts, use the tilt control to help relieve pressure. ¨ For a full relief of weight, stand up or move to another chair or bed if you are able to. Personal care  · Check your skin every day, especially around bony areas. When a pressure sore is forming, skin temperature can be different than nearby skin. It might be warmer or cooler. The skin can feel either firmer or softer than the surrounding skin. · Keep your skin clean and free of sweat, wound drainage, urine, and feces. · Use skin lotions to keep your skin from drying out and cracking. Barrier lotions or creams have ingredients that can act as a shield to help protect the skin from moisture or irritation. · Try not to slide or slump across sheets in a chair or bed. And try not to sleep in a recliner chair. Lifestyle choices  · Eat healthy foods with plenty of protein to help heal damaged skin and to help new skin grow. · Get plenty of fluids. · Stay at a healthy weight. Being either overweight or underweight can make pressure sores more likely. · If you smoke, stop. Smoking dries the skin and reduces its blood supply. If you need help quitting, talk to your doctor about stop-smoking programs and medicines. These can increase your chances of quitting for good. Ask about using cushions or pads  · Overlays are special pads you put on top of a mattress. They provide a softer surface that will fit your body's shape better than a regular mattress. · Cushions or devices can be used to reduce pressure on certain areas of the body. For example, you can use a \"medical heel pillow\" to help prevent pressure sores on heels. You can also get cushions for seating surfaces, such as wheelchair seats. Talk with your doctor about cushions and pads.  Some products, such as doughnut-type devices, may actually cause pressure sores or make them worse. Where can you learn more? Go to http://esvin-tammy.info/. Enter 540 6105 in the search box to learn more about \"Learning About Preventing Pressure Sores. \"  Current as of: March 20, 2017  Content Version: 11.4  © 4303-6732 [employer]. Care instructions adapted under license by [employer] (which disclaims liability or warranty for this information). If you have questions about a medical condition or this instruction, always ask your healthcare professional. [address] any warranty or liability for your use of this information.

## 2018-02-27 ENCOUNTER — HOSPITAL ENCOUNTER (OUTPATIENT)
Dept: LAB | Age: 83
Discharge: HOME | End: 2018-02-27
Payer: MEDICARE

## 2018-02-27 PROCEDURE — 83036 HEMOGLOBIN GLYCOSYLATED A1C: CPT

## 2018-02-27 PROCEDURE — 84450 TRANSFERASE (AST) (SGOT): CPT

## 2018-02-27 PROCEDURE — 80061 LIPID PANEL: CPT

## 2018-02-27 PROCEDURE — 84460 ALANINE AMINO (ALT) (SGPT): CPT

## 2018-02-27 PROCEDURE — 80048 BASIC METABOLIC PNL TOTAL CA: CPT

## 2018-02-28 DIAGNOSIS — G47.00 INSOMNIA, UNSPECIFIED TYPE: ICD-10-CM

## 2018-02-28 NOTE — PROGRESS NOTES
HISTORY OF PRESENT ILLNESS  Leodan Jhaveri is a [de-identified] y.o. male. Pt. comes in with his wife for f/u. Has multiple medical problems. Continues to have problems with skin irritation of buttocks with ulceration. To Derm and calmoseptine cream has helped. BP and DM are stable. Slow gait but no falls. Cardiac status is stable. Reports compliance with medications and diet. Med list and most recent labs/studies reviewed with pt. Trying to be active physically as tolerated. Reports no other new c/o. Wound Check   Pertinent negatives include no chest pain, no abdominal pain, no headaches and no shortness of breath. Diabetes   Pertinent negatives include no chest pain, no abdominal pain, no headaches and no shortness of breath. Follow Up Chronic Condition   Pertinent negatives include no chest pain, no abdominal pain, no headaches and no shortness of breath. Nasal Discharge   Pertinent negatives include no chest pain, no abdominal pain, no headaches and no shortness of breath. Review of Systems   Constitutional: Negative. HENT: Positive for hearing loss. Respiratory: Negative for shortness of breath. Cardiovascular: Negative for chest pain and leg swelling. Gastrointestinal: Negative for abdominal pain. Genitourinary: Negative for dysuria and frequency. Musculoskeletal: Positive for joint pain. Negative for falls. Skin: Positive for rash. Negative for itching. Neurological: Negative for dizziness, sensory change, focal weakness and headaches. Psychiatric/Behavioral: Negative for depression. The patient has insomnia. The patient is not nervous/anxious. All other systems reviewed and are negative. Physical Exam   Constitutional: He is oriented to person, place, and time. He appears well-developed and well-nourished. No distress. Pleasant elderly man   HENT:   Head: Normocephalic and atraumatic. Nose: Nasal discharge present.    Mouth/Throat: Oropharynx is clear and moist.   Eyes: Conjunctivae are normal.   Neck: Normal range of motion. Neck supple. No JVD present. Cardiovascular: Normal rate, regular rhythm and intact distal pulses. Murmur heard. Pulmonary/Chest: Effort normal and breath sounds normal. No respiratory distress. He has no wheezes. He has no rales. Abdominal: Soft. Bowel sounds are normal. He exhibits no distension. Musculoskeletal: He exhibits no edema or tenderness. Neurological: He is alert and oriented to person, place, and time. Coordination normal.   Skin: Skin is warm and dry. There is erythema (chronic dry skin with noninfected breakdown in medial bilateral buttocks, no drainage/looks about the same as before). Psychiatric: He has a normal mood and affect. His behavior is normal.   Nursing note and vitals reviewed. ASSESSMENT and PLAN  Diagnoses and all orders for this visit:    1. Chronic ulcer of buttock ([address]    2. Type 2 diabetes mellitus without complication, without long-term current use of insulin ([facility])  -     LIPID PANEL  -     METABOLIC PANEL, BASIC  -     ALT  -     AST  -     HEMOGLOBIN A1C WITH EAG    3. Essential hypertension    4. Hypercholesterolemia    5. Aortic valve stenosis, etiology of cardiac valve disease unspecified    6. S/P CABG (coronary artery bypass graft)    Other orders  -     Hydrocolloid Dressing (DUODERM CGF DRESSING) 4 X 4 \" bndg; Apply to affected area on buttock every 3 days  -     Menthol-Zinc Oxide (CALMOSEPTINE) 0.44-20.6 % oint; Apply to effected areas on buttocks twice daily      Follow-up Disposition:  Return in about 4 months (around 6/22/2018).    lab results and schedule of future lab studies reviewed with patient  reviewed diet, exercise and weight control  reviewed medications and side effects in detail  Advised patient to avoid prolonged sitting in one position  Printed information about pressure ulcers given  Explained that unfortunately this is a chronic issue and he just needs to take care of it a regular basis

## 2018-03-01 LAB
ALT SERPL-CCNC: 26 IU/L (ref 0–44)
AST SERPL-CCNC: 23 IU/L (ref 0–40)
BUN SERPL-MCNC: 22 MG/DL (ref 8–27)
BUN/CREAT SERPL: 22 (ref 10–24)
CALCIUM SERPL-MCNC: 9.4 MG/DL (ref 8.6–10.2)
CHLORIDE SERPL-SCNC: 97 MMOL/L (ref 96–106)
CHOLEST SERPL-MCNC: 105 MG/DL (ref 100–199)
CO2 SERPL-SCNC: 29 MMOL/L (ref 18–29)
CREAT SERPL-MCNC: 1 MG/DL (ref 0.76–1.27)
EST. AVERAGE GLUCOSE BLD GHB EST-MCNC: 137 MG/DL
GFR SERPLBLD CREATININE-BSD FMLA CKD-EPI: 71 ML/MIN/1.73
GFR SERPLBLD CREATININE-BSD FMLA CKD-EPI: 82 ML/MIN/1.73
GLUCOSE SERPL-MCNC: 125 MG/DL (ref 65–99)
HBA1C MFR BLD: 6.4 % (ref 4.8–5.6)
HDLC SERPL-MCNC: 46 MG/DL
INTERPRETATION, 910389: NORMAL
LDLC SERPL CALC-MCNC: 44 MG/DL (ref 0–99)
Lab: NORMAL
POTASSIUM SERPL-SCNC: 5 MMOL/L (ref 3.5–5.2)
SODIUM SERPL-SCNC: 140 MMOL/L (ref 134–144)
TRIGL SERPL-MCNC: 76 MG/DL (ref 0–149)
VLDLC SERPL CALC-MCNC: 15 MG/DL (ref 5–40)

## 2018-03-02 RX ORDER — TEMAZEPAM 15 MG/1
CAPSULE ORAL
Qty: 30 CAP | Refills: 0 | Status: SHIPPED | OUTPATIENT
Start: 2018-03-02 | End: 2018-03-29 | Stop reason: SDUPTHER

## 2018-03-21 DIAGNOSIS — G47.00 INSOMNIA, UNSPECIFIED TYPE: ICD-10-CM

## 2018-03-21 RX ORDER — TRAZODONE HYDROCHLORIDE 50 MG/1
TABLET ORAL
Qty: 60 TAB | Refills: 3 | Status: SHIPPED | OUTPATIENT
Start: 2018-03-21 | End: 2018-03-29 | Stop reason: SDUPTHER

## 2018-03-26 RX ORDER — AMLODIPINE BESYLATE 10 MG/1
TABLET ORAL
Qty: 30 TAB | Refills: 3 | Status: SHIPPED | OUTPATIENT
Start: 2018-03-26 | End: 2018-03-29 | Stop reason: SDUPTHER

## 2018-04-05 ENCOUNTER — OFFICE VISIT (OUTPATIENT)
Dept: INTERNAL MEDICINE CLINIC | Age: 83
End: 2018-04-05

## 2018-04-05 VITALS
HEIGHT: 72 IN | RESPIRATION RATE: 18 BRPM | BODY MASS INDEX: 25.87 KG/M2 | DIASTOLIC BLOOD PRESSURE: 87 MMHG | TEMPERATURE: 98.3 F | SYSTOLIC BLOOD PRESSURE: 165 MMHG | OXYGEN SATURATION: 91 % | HEART RATE: 88 BPM | WEIGHT: 191 LBS

## 2018-04-05 DIAGNOSIS — J40 BRONCHITIS: Primary | ICD-10-CM

## 2018-04-05 DIAGNOSIS — I10 ESSENTIAL HYPERTENSION: ICD-10-CM

## 2018-04-05 DIAGNOSIS — E11.9 TYPE 2 DIABETES MELLITUS WITHOUT COMPLICATION, WITHOUT LONG-TERM CURRENT USE OF INSULIN (HCC): ICD-10-CM

## 2018-04-05 DIAGNOSIS — R09.89 RHONCHI: ICD-10-CM

## 2018-04-05 PROBLEM — E11.21 TYPE 2 DIABETES MELLITUS WITH NEPHROPATHY (HCC): Status: RESOLVED | Noted: 2017-12-14 | Resolved: 2018-04-05

## 2018-04-05 RX ORDER — PREDNISONE 10 MG/1
10 TABLET ORAL SEE ADMIN INSTRUCTIONS
Qty: 21 TAB | Refills: 0 | Status: SHIPPED | OUTPATIENT
Start: 2018-04-05 | End: 2018-04-19 | Stop reason: ALTCHOICE

## 2018-04-05 RX ORDER — DOXYCYCLINE 100 MG/1
100 TABLET ORAL 2 TIMES DAILY
Qty: 20 TAB | Refills: 0 | Status: SHIPPED | OUTPATIENT
Start: 2018-04-05 | End: 2018-04-15

## 2018-04-05 RX ORDER — GUAIFENESIN 600 MG/1
600 TABLET, EXTENDED RELEASE ORAL 2 TIMES DAILY
Qty: 20 TAB | Refills: 0 | Status: SHIPPED | OUTPATIENT
Start: 2018-04-05 | End: 2018-04-15

## 2018-04-05 NOTE — PROGRESS NOTES
Health Maintenance Due   Topic Date Due    EYE EXAM RETINAL OR DILATED Q1  06/18/1945    DTaP/Tdap/Td series (1 - Tdap) 06/18/1958    ZOSTER VACCINE AGE 60>  04/18/1997    GLAUCOMA SCREENING Q2Y  06/18/2002    Pneumococcal 65+ Low/Medium Risk (2 of 2 - PPSV23) 09/30/2016     Has had fever for the last few days with coughing and sneezing    1. Have you been to the ER, urgent care clinic since your last visit? Hospitalized since your last visit? No    2. Have you seen or consulted any other health care providers outside of the [address] [place] [address] since your last visit? Include any pap smears or colon screening. No    3) Do you have an Advance Directive on file? yes    4) Are you interested in receiving information on Advance Directives? NO      Patient is accompanied by self I have received verbal consent from [facility] to discuss any/all medical information while they are present in the room.

## 2018-04-05 NOTE — PROGRESS NOTES
HISTORY OF PRESENT ILLNESS  Flavia Shaw is a [de-identified] y.o. male. Pt. comes in for f/u. Has multiple medical problems. Reports a few days of a congested cough his sputum and wheezing. Had chills but no fevers. No other related symptoms. His DM and BP are stable. No tobacco or alcohol use. Reports compliance with medications and diet. Med list and most recent labs/studies reviewed with pt. Trying to be active physically as tolerated. Reports no other new c/o. Cold Symptoms   Associated symptoms include chills and wheezing. Pertinent negatives include no chest pain, no headaches and no shortness of breath. Chills    Associated symptoms include cough. Pertinent negatives include no chest pain, no headaches and no shortness of breath. Review of Systems   Constitutional: Positive for chills. HENT: Positive for hearing loss. Eyes: Negative. Respiratory: Positive for cough, sputum production and wheezing. Negative for hemoptysis and shortness of breath. Cardiovascular: Negative for chest pain and leg swelling. Gastrointestinal: Negative for abdominal pain. Genitourinary: Negative for dysuria. Musculoskeletal: Positive for joint pain. Negative for falls. Skin: Negative. Neurological: Negative for dizziness, sensory change, focal weakness and headaches. Psychiatric/Behavioral: Negative for depression. The patient has insomnia. The patient is not nervous/anxious. All other systems reviewed and are negative. Physical Exam   Constitutional: He is oriented to person, place, and time. He appears well-developed and well-nourished. No distress. HENT:   Head: Normocephalic and atraumatic. Mouth/Throat: Oropharynx is clear and moist.   Eyes: Conjunctivae are normal.   Neck: Normal range of motion. Neck supple. No JVD present. No thyromegaly present. Cardiovascular: Normal rate and regular rhythm. Pulmonary/Chest: Effort normal and breath sounds normal. No respiratory distress.  He has no wheezes. He has no rales. bilat rhonchi   Abdominal: Soft. Bowel sounds are normal. He exhibits no distension. Musculoskeletal: He exhibits no edema or tenderness. Neurological: He is alert and oriented to person, place, and time. Coordination normal.   Skin: Skin is warm and dry. Psychiatric: He has a normal mood and affect. His behavior is normal.   Nursing note and vitals reviewed. ASSESSMENT and PLAN  Diagnoses and all orders for this visit:    1. Bronchitis    2. Rhonchi    3. Essential hypertension    4. Type 2 diabetes mellitus without complication, without long-term current use of insulin ([facility])    Other orders  -     doxycycline (ADOXA) 100 mg tablet; Take 1 Tab by mouth two (2) times a day for 10 days.  -     guaiFENesin ER (MUCINEX) 600 mg ER tablet; Take 1 Tab by mouth two (2) times a day for 10 days. -     predniSONE (STERAPRED DS) 10 mg dose pack; Take 1 Tab by mouth See Admin Instructions.  See administration instruction per 10mg dose pack      Follow-up Disposition:  Return if symptoms worsen or fail to improve.   lab results and schedule of future lab studies reviewed with patient  reviewed diet, exercise and weight control  reviewed medications and side effects in detail  low cholesterol diet, weight control and daily exercise discussed, home glucose monitoring emphasized, all medications, side effects and compliance discussed carefully, foot care discussed and Podiatry visits discussed, annual eye examinations at Ophthalmology discussed, glycohemoglobin and other lab monitoring discussed and long term diabetic complications discussed

## 2018-04-05 NOTE — MR AVS SNAPSHOT
Luz Taylor 
 
 
 [address] [place] [address]. A Building [facility] 7 [address] 
[phone] Patient: Aleah Walker MRN: [id number] RGK:9/79/3812 Visit Information Date & Time Provider Department Dept. Phone Encounter #  
 4/5/2018  9:30 AM Tracey Luna, [address] [place] [address] [id number] Follow-up Instructions Return if symptoms worsen or fail to improve. Your Appointments 6/21/2018  9:30 AM  
Any with DO Rajan Haines ([facility]) Appt Note: follow up 4m [address] [place] [address]. A [facility] 7 [phone]  
[phone]  
  
   
 [address] [place] [address]. [address] [place] [phone] Upcoming Health Maintenance Date Due  
 EYE EXAM RETINAL OR DILATED Q1 6/18/1947 DTaP/Tdap/Td series (1 - Tdap) 6/18/1958 ZOSTER VACCINE AGE 60> 4/18/1997 GLAUCOMA SCREENING Q2Y 6/18/2002 Pneumococcal 65+ Low/Medium Risk (2 of 2 - PPSV23) 9/30/2016 HEMOGLOBIN A1C Q6M 8/27/2018 MICROALBUMIN Q1 9/12/2018 FOOT EXAM Q1 11/2/2018 MEDICARE YEARLY EXAM 12/15/2018 LIPID PANEL Q1 2/27/2019 Allergies as of 4/5/2018  Review Complete On: 4/5/2018 By: Tracey Luna DO No Known Allergies Current Immunizations  Reviewed on 11/30/2017 No immunizations on file. Not reviewed this visit You Were Diagnosed With   
  
 Codes Comments Bronchitis    -  Primary ICD-10-CM: L03 ICD-9-CM: 296 Rhonchi     ICD-10-CM: R09.89 ICD-9-CM: 786.7 Essential hypertension     ICD-10-CM: I10 
ICD-9-CM: 401.9 Type 2 diabetes mellitus without complication, without long-term current use of insulin (HCC)     ICD-10-CM: E11.9 ICD-9-CM: 250.00 Vitals BP Pulse Temp Resp Height(growth percentile) Weight(growth percentile)  165/87 (BP 1 Location: Right arm, BP Patient Position: Sitting) 88 98.3 °F (36.8 °C) (Oral) 18 6' (1.829 m) 191 lb (86.6 kg) SpO2 BMI Smoking Status 91% 25.9 kg/m2 Former Smoker Vitals History BMI and BSA Data Body Mass Index Body Surface Area  
 25.9 kg/m 2 2.1 m 2 Preferred Pharmacy Pharmacy Name Phone [facility] [phone] Your Updated Medication List  
  
   
This list is accurate as of 4/5/18 10:06 AM.  Always use your most recent med list. amLODIPine 10 mg tablet Commonly known as:  La Luz Cordial TAKE 1 TABLET BY MOUTH DAILY. aspirin 325 mg tablet Commonly known as:  ASPIRIN Take 1 Tab by mouth daily. atorvastatin 10 mg tablet Commonly known as:  LIPITOR Take 1 Tab by mouth daily. cholecalciferol 1,000 unit Cap Commonly known as:  VITAMIN D3 Take 1 Cap by mouth daily. doxazosin 4 mg tablet Commonly known as:  CARDURA Take 1 Tab by mouth daily. Take one tablet daily. doxycycline 100 mg tablet Commonly known as:  ADOXA Take 1 Tab by mouth two (2) times a day for 10 days. ergocalciferol 50,000 unit capsule Commonly known as:  VITAMIN D2 Take 1 Cap by mouth every fourteen (14) days. Take one capsule PO every 2 weeks. finasteride 5 mg tablet Commonly known as:  PROSCAR Take 1 Tab by mouth daily. glipiZIDE SR 5 mg CR tablet Commonly known as:  GLUCOTROL XL Take 1 Tab by mouth daily. glucose blood VI test strips strip Commonly known as:  TRUE METRIX GLUCOSE TEST STRIP Check BS BID  Dx: DM  E11.21  
  
 guaiFENesin  mg ER tablet Commonly known as:  Abdulkadir & Abdulkadir Take 1 Tab by mouth two (2) times a day for 10 days. hydroCHLOROthiazide 12.5 mg tablet Commonly known as:  HYDRODIURIL Take 1 Tab by mouth daily. Hydrocolloid Dressing 4 X 4 \" Bndg Commonly known as:  DUODERM CGF DRESSING Apply to affected area on buttock every 3 days labetalol 100 mg tablet Commonly known as:  Shila Day Take 1 Tab by mouth daily. losartan 25 mg tablet Commonly known as:  COZAAR Take 0.5 Tabs by mouth daily. magnesium 250 mg Tab Take 1 Tab by mouth daily. Menthol-Zinc Oxide 0.44-20.6 % Oint Commonly known as:  Calmoseptine Apply to effected areas on buttocks twice daily  
  
 metFORMIN  mg tablet Commonly known as:  GLUCOPHAGE XR Take 1 Tab by mouth two (2) times a day. predniSONE 10 mg dose pack Commonly known as:  STERAPRED DS Take 1 Tab by mouth See Admin Instructions. See administration instruction per 10mg dose pack  
  
 sertraline 100 mg tablet Commonly known as:  ZOLOFT  
TAKE 1 TAB BY MOUTH DAILY. sodium chloride 0.65 % nasal squeeze bottle Commonly known as:  OCEAN  
0.05 mL by Both Nostrils route as needed for Congestion. SUPER B COMPLEX PO Take  by mouth. temazepam 15 mg capsule Commonly known as:  RESTORIL  
TAKE 1 CAPSULE BY MOUTH AT BEDTIME AS NEEDED FOR SLEEP. traZODone 50 mg tablet Commonly known as:  DESYREL  
TAKE 2 TABLETS BY MOUTH NIGHTLY. Prescriptions Sent to Pharmacy Refills  
 doxycycline (ADOXA) 100 mg tablet 0 Sig: Take 1 Tab by mouth two (2) times a day for 10 days. Class: Normal  
 Pharmacy: [facility] Ph #: [phone] Route: Oral  
 guaiFENesin ER (MUCINEX) 600 mg ER tablet 0 Sig: Take 1 Tab by mouth two (2) times a day for 10 days. Class: Normal  
 Pharmacy: [address] Ph #: [phone] Route: Oral  
 predniSONE (STERAPRED DS) 10 mg dose pack 0 Sig: Take 1 Tab by mouth See Admin Instructions. See administration instruction per 10mg dose pack Class: Normal  
 Pharmacy: [address] [facility] Ph #: [phone] Route: Oral  
  
Follow-up Instructions Return if symptoms worsen or fail to improve. Please provide this summary of care documentation to your next provider. Your primary care clinician is listed as Padmini Strong. If you have any questions after today's visit, please call [phone].

## 2018-04-10 ENCOUNTER — OFFICE VISIT (OUTPATIENT)
Dept: INTERNAL MEDICINE CLINIC | Age: 83
End: 2018-04-10

## 2018-04-10 VITALS
BODY MASS INDEX: 25.87 KG/M2 | OXYGEN SATURATION: 93 % | RESPIRATION RATE: 20 BRPM | SYSTOLIC BLOOD PRESSURE: 148 MMHG | DIASTOLIC BLOOD PRESSURE: 63 MMHG | HEIGHT: 72 IN | HEART RATE: 63 BPM | TEMPERATURE: 98.6 F | WEIGHT: 191 LBS

## 2018-04-10 DIAGNOSIS — I10 ESSENTIAL HYPERTENSION: ICD-10-CM

## 2018-04-10 DIAGNOSIS — E11.9 TYPE 2 DIABETES MELLITUS WITHOUT COMPLICATION, WITHOUT LONG-TERM CURRENT USE OF INSULIN (HCC): ICD-10-CM

## 2018-04-10 DIAGNOSIS — J06.9 URTI (ACUTE UPPER RESPIRATORY INFECTION): Primary | ICD-10-CM

## 2018-04-10 PROBLEM — E11.21 TYPE 2 DIABETES WITH NEPHROPATHY (HCC): Status: ACTIVE | Noted: 2018-04-10

## 2018-04-10 NOTE — PROGRESS NOTES
HISTORY OF PRESENT ILLNESS  Silvana Torres is a [de-identified] y.o. male here with the complaining of cough with whitish sputum for last several days. He was seen by his PCP last week, was started on doxycycline twice a day for 10 days along with Mucinex and prednisone Dosepak. He has finished up on medications. Mention is still coughing but cough with whitish sputum. No shortness of breath or orthopnea. He quit smoking 40 years back. Has diabetes, blood sugar under control. On medications. Has hypertension, doing well. HPI    Review of Systems   Constitutional: Negative. Negative for chills and fever. HENT: Negative. Negative for congestion. Eyes: Negative. Respiratory: Positive for cough. Cardiovascular: Negative. Gastrointestinal: Negative. Genitourinary: Negative. Skin: Negative. All other systems reviewed and are negative. Physical Exam   Constitutional: He appears well-developed and well-nourished. No distress. HENT:   Head: Normocephalic and atraumatic. Right Ear: External ear normal.   Left Ear: External ear normal.   Nose: Nose normal.   Mouth/Throat: Oropharynx is clear and moist. No oropharyngeal exudate. Neck: Normal range of motion. Neck supple. No JVD present. No thyromegaly present. Cardiovascular: Normal rate, regular rhythm, normal heart sounds and intact distal pulses. Pulmonary/Chest: Effort normal and breath sounds normal. No respiratory distress. He has no wheezes. Musculoskeletal: He exhibits no edema or tenderness. Psychiatric: He has a normal mood and affect. His behavior is normal.       ASSESSMENT and PLAN  Diagnoses and all orders for this visit:    1. URTI (acute upper respiratory infection)    He has finished her doxycycline even though he is supposed to still have 5 more days. Also finished prednisone and Mucinex. Still coughing a lot. No sign of pneumonia. He is not willing to have a chest x-ray for now.   Advised him to take Robitussin-DM 1 teaspoon twice a day for 3 days. If cough persist advised him to call back for another antibiotics and a probable chest x-ray. Patient verbalized understanding. 2. Type 2 diabetes mellitus without complication, without long-term current use of insulin (HCC)  Stable. On medicine. 3. Essential hypertension  Stable on current regimen. Will continue same. Discussed expected course/resolution/complications of diagnosis in detail with patient. Medication risks/benefits/costs/interactions/alternatives discussed with patient. Pt was given an after visit summary which includes diagnoses, current medications & vitals. Pt expressed understanding with the diagnosis and plan.

## 2018-04-10 NOTE — MR AVS SNAPSHOT
[address]. A Building [facility] 7 [address] 
[phone] Patient: Sayra Fontanez MRN: [id number] KN Visit Information Date & Time Provider Department Dept. Phone Encounter #  
 4/10/2018  1:30 PM Alexa Cotto MD [facility] [phone] [id number] Your Appointments 2018  9:30 AM  
Any with Inis Cushing, DO [place] ([facility]) Appt Note: follow up 4m [address] [place] [address]. A Building [facility] 7 [phone]  
[phone]  
  
   
 [address] [place] [address]. [address] [place] [phone] Upcoming Health Maintenance Date Due  
 EYE EXAM RETINAL OR DILATED Q1 1947 DTaP/Tdap/Td series (1 - Tdap) 1958 ZOSTER VACCINE AGE 60> 1997 GLAUCOMA SCREENING Q2Y 2002 Pneumococcal 65+ Low/Medium Risk (2 of 2 - PPSV23) 2016 HEMOGLOBIN A1C Q6M 2018 MICROALBUMIN Q1 2018 FOOT EXAM Q1 2018 MEDICARE YEARLY EXAM 12/15/2018 LIPID PANEL Q1 2019 Allergies as of 4/10/2018  Review Complete On: 4/10/2018 By: Aleax Cotto MD  
 No Known Allergies Current Immunizations  Reviewed on 2017 No immunizations on file. Not reviewed this visit You Were Diagnosed With   
  
 Codes Comments URTI (acute upper respiratory infection)    -  Primary ICD-10-CM: J06.9 ICD-9-CM: 465.9 Type 2 diabetes mellitus without complication, without long-term current use of insulin (HCC)     ICD-10-CM: E11.9 ICD-9-CM: 250.00 Essential hypertension     ICD-10-CM: I10 
ICD-9-CM: 401.9 Vitals BP Pulse Temp Resp Height(growth percentile) Weight(growth percentile) 148/63 (BP 1 Location: Left arm, BP Patient Position: Sitting) 63 98.6 °F (37 °C) (Oral) 20 6' (1.829 m) 191 lb (86.6 kg) SpO2 BMI Smoking Status 93% 25.9 kg/m2 Former Smoker Vitals History BMI and BSA Data Body Mass Index Body Surface Area  
 25.9 kg/m 2 2.1 m 2 Preferred Pharmacy Pharmacy Name Phone [facility] [phone] Your Updated Medication List  
  
   
This list is accurate as of 4/10/18  2:07 PM.  Always use your most recent med list. amLODIPine 10 mg tablet Commonly known as:  Vincent Javad TAKE 1 TABLET BY MOUTH DAILY. aspirin 325 mg tablet Commonly known as:  ASPIRIN Take 1 Tab by mouth daily. atorvastatin 10 mg tablet Commonly known as:  LIPITOR Take 1 Tab by mouth daily. cholecalciferol 1,000 unit Cap Commonly known as:  VITAMIN D3 Take 1 Cap by mouth daily. doxazosin 4 mg tablet Commonly known as:  CARDURA Take 1 Tab by mouth daily. Take one tablet daily. doxycycline 100 mg tablet Commonly known as:  ADOXA Take 1 Tab by mouth two (2) times a day for 10 days. ergocalciferol 50,000 unit capsule Commonly known as:  VITAMIN D2 Take 1 Cap by mouth every fourteen (14) days. Take one capsule PO every 2 weeks. finasteride 5 mg tablet Commonly known as:  PROSCAR Take 1 Tab by mouth daily. glipiZIDE SR 5 mg CR tablet Commonly known as:  GLUCOTROL XL Take 1 Tab by mouth daily. glucose blood VI test strips strip Commonly known as:  TRUE METRIX GLUCOSE TEST STRIP Check BS BID  Dx: DM  E11.21  
  
 guaiFENesin  mg ER tablet Commonly known as:  Abdulkadir & Abdulkadir Take 1 Tab by mouth two (2) times a day for 10 days. hydroCHLOROthiazide 12.5 mg tablet Commonly known as:  HYDRODIURIL Take 1 Tab by mouth daily. Hydrocolloid Dressing 4 X 4 \" Bndg Commonly known as:  DUODERM CGF DRESSING Apply to affected area on buttock every 3 days  
  
 labetalol 100 mg tablet Commonly known as:  Janis Muhammad Take 1 Tab by mouth daily. losartan 25 mg tablet Commonly known as:  COZAAR  
 Take 0.5 Tabs by mouth daily. magnesium 250 mg Tab Take 1 Tab by mouth daily. Menthol-Zinc Oxide 0.44-20.6 % Oint Commonly known as:  Calmoseptine Apply to effected areas on buttocks twice daily  
  
 metFORMIN  mg tablet Commonly known as:  GLUCOPHAGE XR Take 1 Tab by mouth two (2) times a day. predniSONE 10 mg dose pack Commonly known as:  STERAPRED DS Take 1 Tab by mouth See Admin Instructions. See administration instruction per 10mg dose pack  
  
 sertraline 100 mg tablet Commonly known as:  ZOLOFT  
TAKE 1 TAB BY MOUTH DAILY. sodium chloride 0.65 % nasal squeeze bottle Commonly known as:  OCEAN  
0.05 mL by Both Nostrils route as needed for Congestion. SUPER B COMPLEX PO Take  by mouth. temazepam 15 mg capsule Commonly known as:  RESTORIL  
TAKE 1 CAPSULE BY MOUTH AT BEDTIME AS NEEDED FOR SLEEP. traZODone 50 mg tablet Commonly known as:  DESYREL  
TAKE 2 TABLETS BY MOUTH NIGHTLY. Please provide this summary of care documentation to your next provider. Your primary care clinician is listed as Unique Evans. If you have any questions after today's visit, please call [phone].

## 2018-04-19 ENCOUNTER — OFFICE VISIT (OUTPATIENT)
Dept: INTERNAL MEDICINE CLINIC | Age: 83
End: 2018-04-19

## 2018-04-19 VITALS
WEIGHT: 192 LBS | RESPIRATION RATE: 19 BRPM | DIASTOLIC BLOOD PRESSURE: 60 MMHG | HEART RATE: 66 BPM | HEIGHT: 72 IN | BODY MASS INDEX: 26.01 KG/M2 | OXYGEN SATURATION: 92 % | SYSTOLIC BLOOD PRESSURE: 113 MMHG

## 2018-04-19 DIAGNOSIS — E11.9 TYPE 2 DIABETES MELLITUS WITHOUT COMPLICATION, WITHOUT LONG-TERM CURRENT USE OF INSULIN (HCC): ICD-10-CM

## 2018-04-19 DIAGNOSIS — I10 ESSENTIAL HYPERTENSION: ICD-10-CM

## 2018-04-19 DIAGNOSIS — Z87.891 FORMER SMOKER: ICD-10-CM

## 2018-04-19 DIAGNOSIS — R09.89 CHEST CONGESTION: ICD-10-CM

## 2018-04-19 DIAGNOSIS — J40 BRONCHITIS: Primary | ICD-10-CM

## 2018-04-19 RX ORDER — PREDNISONE 20 MG/1
20 TABLET ORAL DAILY
Qty: 5 TAB | Refills: 0 | Status: SHIPPED | OUTPATIENT
Start: 2018-04-19 | End: 2018-04-24

## 2018-04-19 RX ORDER — AZITHROMYCIN 250 MG/1
250 TABLET, FILM COATED ORAL SEE ADMIN INSTRUCTIONS
Qty: 6 TAB | Refills: 0 | Status: SHIPPED | OUTPATIENT
Start: 2018-04-19 | End: 2018-04-24

## 2018-04-19 NOTE — PROGRESS NOTES
Health Maintenance Due   Topic Date Due    EYE EXAM RETINAL OR DILATED Q1  06/18/1945    DTaP/Tdap/Td series (1 - Tdap) 06/18/1958    ZOSTER VACCINE AGE 60>  04/18/1997    GLAUCOMA SCREENING Q2Y  06/18/2002    Pneumococcal 65+ Low/Medium Risk (2 of 2 - PPSV23) 09/30/2016       Chief Complaint   Patient presents with    Cough     productive    Hypertension    Diabetes       1. Have you been to the ER, urgent care clinic since your last visit? Hospitalized since your last visit? No    2. Have you seen or consulted any other health care providers outside of the [address] [place] [address] since your last visit? Include any pap smears or colon screening. No    3) Do you have an Advance Directive on file? YES    4) Are you interested in receiving information on Advance Directives? NO      Patient is accompanied by self I have received verbal consent from Magan Weathers to discuss any/all medical information while they are present in the room.

## 2018-04-19 NOTE — MR AVS SNAPSHOT
[address]. A Building [facility] 7 [address] 
[phone] Patient: Miryam Morales MRN: [id number] XAY:4[birth year]07 Visit Information Date & Time Provider Department Dept. Phone Encounter #  
 4/19/2018  9:00 AM Glen Hoffman, Stephen An[facility] Edwards [phone] [id number] Follow-up Instructions Return if symptoms worsen or fail to improve. Your Appointments 6/21/2018  9:30 AM  
Any with Glen Hoffman, DO [address] Ankit Edwards ([facility]) Appt Note: follow up 4m [address] [place] [address]. A [place] [id number] [phone]  
  
   
 [address] [place] [address]. [address] [place] [phone] Upcoming Health Maintenance Date Due  
 EYE EXAM RETINAL OR DILATED Q1 6[birth year]18/1945 DTaP/Tdap/Td series (1 - Tdap) 6/18/1958 ZOSTER VACCINE AGE 60> 4/18/1997 GLAUCOMA SCREENING Q2Y 6/18/2002 Pneumococcal 65+ Low/Medium Risk (2 of 2 - PPSV23) 9/30/2016 HEMOGLOBIN A1C Q6M 8/27/2018 MICROALBUMIN Q1 9/12/2018 FOOT EXAM Q1 11/2/2018 MEDICARE YEARLY EXAM 12/15/2018 LIPID PANEL Q1 2/27/2019 Allergies as of 4/19/2018  Review Complete On: 4/19/2018 By: Glen Hoffman,  No Known Allergies Current Immunizations  Reviewed on 11/30/2017 No immunizations on file. Not reviewed this visit You Were Diagnosed With   
  
 Codes Comments Bronchitis    -  Primary ICD-10-CM: D45 ICD-9-CM: 604 Chest congestion     ICD-10-CM: R09.89 ICD-9-CM: 786.9 Former smoker     ICD-10-CM: R98.412 ICD-9-CM: V15.82 Type 2 diabetes mellitus without complication, without long-term current use of insulin (HCC)     ICD-10-CM: E11.9 ICD-9-CM: 250.00 Essential hypertension     ICD-10-CM: I10 
ICD-9-CM: 401.9 Vitals BP Pulse Resp Height(growth percentile) Weight(growth percentile) SpO2 113/60 (BP 1 Location: Left arm, BP Patient Position: Sitting) 66 19 6' (1.829 m) 192 lb (87.1 kg) 92% BMI Smoking Status 26.04 kg/m2 Former Smoker Vitals History BMI and BSA Data Body Mass Index Body Surface Area 26.04 kg/m 2 2.1 m 2 Preferred Pharmacy Pharmacy Name Phone [id number]- [place], [address] [place] [facility] [phone] Your Updated Medication List  
  
   
This list is accurate as of 4/19/18  9:30 AM.  Always use your most recent med list. amLODIPine 10 mg tablet Commonly known as:  Nat Raddle TAKE 1 TABLET BY MOUTH DAILY. aspirin 325 mg tablet Commonly known as:  ASPIRIN Take 1 Tab by mouth daily. atorvastatin 10 mg tablet Commonly known as:  LIPITOR Take 1 Tab by mouth daily. azithromycin 250 mg tablet Commonly known as:  Quiana Minnie Take 1 Tab by mouth See Admin Instructions for 5 days. cholecalciferol 1,000 unit Cap Commonly known as:  VITAMIN D3 Take 1 Cap by mouth daily. doxazosin 4 mg tablet Commonly known as:  CARDURA Take 1 Tab by mouth daily. Take one tablet daily. ergocalciferol 50,000 unit capsule Commonly known as:  VITAMIN D2 Take 1 Cap by mouth every fourteen (14) days. Take one capsule PO every 2 weeks. finasteride 5 mg tablet Commonly known as:  PROSCAR Take 1 Tab by mouth daily. glipiZIDE SR 5 mg CR tablet Commonly known as:  GLUCOTROL XL Take 1 Tab by mouth daily. glucose blood VI test strips strip Commonly known as:  TRUE METRIX GLUCOSE TEST STRIP Check BS BID  Dx: DM  E11.21  
  
 guaiFENesin-dextromethorphan -30 mg per tablet Commonly known as:  Abdulkadir & Abdulkadir DM Take 1 Tab by mouth two (2) times a day. hydroCHLOROthiazide 12.5 mg tablet Commonly known as:  HYDRODIURIL Take 1 Tab by mouth daily. Hydrocolloid Dressing 4 X 4 \" Bndg Commonly known as:  DUODERM CGF DRESSING  
 Apply to affected area on buttock every 3 days  
  
 labetalol 100 mg tablet Commonly known as:  Jesse Dadds Take 1 Tab by mouth daily. losartan 25 mg tablet Commonly known as:  COZAAR Take 0.5 Tabs by mouth daily. magnesium 250 mg Tab Take 1 Tab by mouth daily. Menthol-Zinc Oxide 0.44-20.6 % Oint Commonly known as:  Calmoseptine Apply to effected areas on buttocks twice daily  
  
 metFORMIN  mg tablet Commonly known as:  GLUCOPHAGE XR Take 1 Tab by mouth two (2) times a day. predniSONE 20 mg tablet Commonly known as:  Chris Res Take 1 Tab by mouth daily for 5 days. sertraline 100 mg tablet Commonly known as:  ZOLOFT  
TAKE 1 TAB BY MOUTH DAILY. sodium chloride 0.65 % nasal squeeze bottle Commonly known as:  OCEAN  
0.05 mL by Both Nostrils route as needed for Congestion. SUPER B COMPLEX PO Take  by mouth. temazepam 15 mg capsule Commonly known as:  RESTORIL  
TAKE 1 CAPSULE BY MOUTH AT BEDTIME AS NEEDED FOR SLEEP. traZODone 50 mg tablet Commonly known as:  DESYREL  
TAKE 2 TABLETS BY MOUTH NIGHTLY. Prescriptions Sent to Pharmacy Refills  
 azithromycin (ZITHROMAX) 250 mg tablet 0 Sig: Take 1 Tab by mouth See Admin Instructions for 5 days. Class: Normal  
 Pharmacy: [facility] Ph #: [phone] Route: Oral  
 predniSONE (DELTASONE) 20 mg tablet 0 Sig: Take 1 Tab by mouth daily for 5 days. Class: Normal  
 Pharmacy: [facility] Ph #: [phone] Route: Oral  
 guaiFENesin-dextromethorphan SR (MUCINEX DM) 600-30 mg per tablet 0 Sig: Take 1 Tab by mouth two (2) times a day. Class: Normal  
 Pharmacy: [facility] Ph #: [phone] Route: Oral  
  
Follow-up Instructions Return if symptoms worsen or fail to improve.   
  
To-Do List   
 04/19/2018 Imaging:  XR CHEST PA LAT Please provide this summary of care documentation to your next provider. Your primary care clinician is listed as Chelsy Ferrer. If you have any questions after today's visit, please call [phone].

## 2018-04-19 NOTE — PROGRESS NOTES
HISTORY OF PRESENT ILLNESS  Terese Murry is a [de-identified] y.o. male. Pt. comes in for f/u. Has multiple medical problems. Reports continued congested cough with occasional sputum. He is a former smoker. Denies chest pain or dyspnea. No fever or other related symptoms. Finish course of antibiotics and steroids 2 weeks ago. Has been using OTC cough syrup. BP and DM are stable. Reports compliance with medications and diet. Med list and most recent labs/studies reviewed with pt. Trying to be active physically as tolerated. Reports no other new c/o. Cough   Pertinent negatives include no chest pain, no abdominal pain, no headaches and no shortness of breath. Hypertension    Pertinent negatives include no chest pain, no headaches, no dizziness and no shortness of breath. Diabetes   Pertinent negatives include no chest pain, no abdominal pain, no headaches and no shortness of breath. Review of Systems   Constitutional: Negative. HENT: Positive for hearing loss. Eyes: Negative. Respiratory: Positive for cough and sputum production. Negative for hemoptysis, shortness of breath and wheezing. Cardiovascular: Negative for chest pain and leg swelling. Gastrointestinal: Negative for abdominal pain. Genitourinary: Negative for dysuria. Musculoskeletal: Positive for joint pain. Negative for falls. Skin: Negative. Neurological: Negative for dizziness, sensory change, focal weakness and headaches. Psychiatric/Behavioral: Negative for depression. The patient has insomnia. The patient is not nervous/anxious. All other systems reviewed and are negative. Physical Exam   Constitutional: He is oriented to person, place, and time. He appears well-developed and well-nourished. No distress. HENT:   Head: Normocephalic and atraumatic. Mouth/Throat: Oropharynx is clear and moist.   Eyes: Conjunctivae are normal.   Neck: Normal range of motion. Neck supple. No JVD present. No thyromegaly present. Cardiovascular: Normal rate and regular rhythm. Pulmonary/Chest: Effort normal and breath sounds normal. No respiratory distress. He has no wheezes. He has no rales. bilat rhonchi   Abdominal: Soft. Bowel sounds are normal. He exhibits no distension. Musculoskeletal: He exhibits no edema or tenderness. Neurological: He is alert and oriented to person, place, and time. Coordination normal.   Skin: Skin is warm and dry. No rash noted. Psychiatric: He has a normal mood and affect. His behavior is normal.   Nursing note and vitals reviewed. ASSESSMENT and PLAN  Diagnoses and all orders for this visit:    1. Bronchitis  -     XR CHEST PA LAT; Future    2. Chest congestion  -     XR CHEST PA LAT; Future    3. Former smoker  -     XR CHEST PA LAT; Future    4. Type 2 diabetes mellitus without complication, without long-term current use of insulin ([facility] [address]    5. Essential hypertension    Other orders  -     azithromycin (ZITHROMAX) 250 mg tablet; Take 1 Tab by mouth See Admin Instructions for 5 days. -     predniSONE (DELTASONE) 20 mg tablet; Take 1 Tab by mouth daily for 5 days.  -     guaiFENesin-dextromethorphan SR (MUCINEX DM) 600-30 mg per tablet; Take 1 Tab by mouth two (2) times a day.       Follow-up Disposition:  Return if symptoms worsen or fail to improve.   lab results and schedule of future lab studies reviewed with patient  reviewed diet, exercise and weight control  reviewed medications and side effects in detail  low cholesterol diet, weight control and daily exercise discussed, home glucose monitoring emphasized, all medications, side effects and compliance discussed carefully, foot care discussed and Podiatry visits discussed, annual eye examinations at Ophthalmology discussed, glycohemoglobin and other lab monitoring discussed and long term diabetic complications discussed

## 2018-05-10 RX ORDER — DRESSING,ODOR ABSORBENT,H-POLY 4" X 4"
BANDAGE MISCELLANEOUS
Qty: 10 EACH | Refills: 2 | Status: SHIPPED | OUTPATIENT
Start: 2018-05-10 | End: 2019-04-25 | Stop reason: ALTCHOICE

## 2018-05-24 DIAGNOSIS — G47.00 INSOMNIA, UNSPECIFIED TYPE: ICD-10-CM

## 2018-05-25 RX ORDER — TRAZODONE HYDROCHLORIDE 50 MG/1
TABLET ORAL
Qty: 60 TAB | Refills: 2 | Status: SHIPPED | OUTPATIENT
Start: 2018-05-25 | End: 2018-08-08 | Stop reason: SDUPTHER

## 2018-05-25 RX ORDER — TEMAZEPAM 15 MG/1
CAPSULE ORAL
Qty: 30 CAP | Refills: 2 | Status: SHIPPED | OUTPATIENT
Start: 2018-05-25 | End: 2018-08-09 | Stop reason: SDUPTHER

## 2018-05-31 RX ORDER — FINASTERIDE 5 MG/1
TABLET, FILM COATED ORAL
Qty: 90 TAB | Refills: 1 | Status: SHIPPED | OUTPATIENT
Start: 2018-05-31 | End: 2018-08-08 | Stop reason: SDUPTHER

## 2018-05-31 RX ORDER — DOXAZOSIN 4 MG/1
TABLET ORAL
Qty: 90 TAB | Refills: 1 | Status: SHIPPED | OUTPATIENT
Start: 2018-05-31 | End: 2018-08-08 | Stop reason: SDUPTHER

## 2018-05-31 RX ORDER — LABETALOL 100 MG/1
TABLET, FILM COATED ORAL
Qty: 90 TAB | Refills: 1 | Status: SHIPPED | OUTPATIENT
Start: 2018-05-31 | End: 2020-02-10

## 2018-06-19 ENCOUNTER — OFFICE VISIT (OUTPATIENT)
Dept: INTERNAL MEDICINE CLINIC | Age: 83
End: 2018-06-19

## 2018-06-19 VITALS
OXYGEN SATURATION: 93 % | WEIGHT: 195 LBS | TEMPERATURE: 97.5 F | HEART RATE: 66 BPM | RESPIRATION RATE: 20 BRPM | SYSTOLIC BLOOD PRESSURE: 98 MMHG | BODY MASS INDEX: 26.41 KG/M2 | DIASTOLIC BLOOD PRESSURE: 55 MMHG | HEIGHT: 72 IN

## 2018-06-19 DIAGNOSIS — R07.81 RIB PAIN: ICD-10-CM
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
## 2018-06-19 DIAGNOSIS — E11.9 TYPE 2 DIABETES MELLITUS WITHOUT COMPLICATION, WITHOUT LONG-TERM CURRENT USE OF INSULIN (HCC): ICD-10-CM

## 2018-06-19 DIAGNOSIS — T14.8XXA SKIN ABRASION: ICD-10-CM

## 2018-06-19 DIAGNOSIS — L89.322 DECUBITUS ULCER OF LEFT BUTTOCK, STAGE 2 (HCC): ICD-10-CM

## 2018-06-19 DIAGNOSIS — L03.116 CELLULITIS OF LEFT LOWER EXTREMITY: Primary | ICD-10-CM

## 2018-06-19 RX ORDER — CEPHALEXIN 500 MG/1
500 CAPSULE ORAL 3 TIMES DAILY
Qty: 15 CAP | Refills: 0 | Status: SHIPPED | OUTPATIENT
Start: 2018-06-19 | End: 2018-06-24

## 2018-06-19 RX ORDER — NAPROXEN 500 MG/1
500 TABLET ORAL 2 TIMES DAILY WITH MEALS
Qty: 15 TAB | Refills: 0 | Status: SHIPPED | OUTPATIENT
Start: 2018-06-19 | End: 2019-04-25 | Stop reason: ALTCHOICE

## 2018-06-19 NOTE — PROGRESS NOTES
HISTORY OF PRESENT ILLNESS  Jamie Murry is a 82 y.o. male here with the complaining of fall. He slipped and fell on weight floor in [facility]. He hit the right side of his cheek bone and also scraped he is left eye and left forearm. His aberration is slightly getting better, it is scabbing. But noticed to have a lot of rib pain and pain giving taking deep breath. No blood vision no nausea vomiting or weakness in arms or legs. He is able to bear weight on his hips. Has diabetes, on medications. Blood sugar within normal limit. He has decubiti ulcer on both buttock. He was treated with a DuoDERM and buttock area. Want me to take a look. He is sitting on his buttock a lot. Labs reviewed. HPI    Review of Systems   Constitutional: Negative. HENT: Negative. Eyes: Negative. Respiratory: Negative. Cardiovascular: Positive for chest pain. Gastrointestinal: Negative. Genitourinary: Negative. Musculoskeletal: Positive for falls and joint pain. Skin:        Skin abrasions and decubitus ulcer. Neurological: Negative. Psychiatric/Behavioral: Negative. Physical Exam   Constitutional: He is oriented to person, place, and time. He appears well-developed and well-nourished. He appears distressed. HENT:   Few abrasion on right cheek area. No sign of infection. TMJ joint is normal.  No apparent deformity. Neck: Normal range of motion. Neck supple. No JVD present. No thyromegaly present. Cardiovascular: Normal rate, regular rhythm, normal heart sounds and intact distal pulses. Pulmonary/Chest: Effort normal and breath sounds normal. No respiratory distress. He has no wheezes. He exhibits tenderness. Tenderness on both side ribs. No pleural rub. Musculoskeletal: He exhibits tenderness. He exhibits no edema. Lymphadenopathy:     He has no cervical adenopathy. Neurological: He is alert and oriented to person, place, and time. He has normal reflexes.  He displays normal reflexes. No cranial nerve deficit. He exhibits normal muscle tone. Coordination normal.   Skin:   Left thigh approximately 5 x 10 cm area of large aberration on medial side of left thigh. Area is scabbing with some redness in surrounding tissues. Left forearm: Approximately 2 x 5 cm areas of aberrations which is healing. Mild redness in the skin. Psychiatric: He has a normal mood and affect. His behavior is normal.       ASSESSMENT and PLAN  Diagnoses and all orders for this visit:    1. Cellulitis of left lower extremity    Has moderate skin abrasion on left  Thigh and left forearm. Is almost has  started scabbing but redness  in surrounding skin. Will give,  -     cephALEXin (KEFLEX) 500 mg capsule; Take 1 Cap by mouth three (3) times daily for 5 days. Keep area clean. 2. Skin abrasion  Healing. 3. Rib pain    Moderate rib pain with pain in the chest wall. Will give,  -     naproxen (NAPROSYN) 500 mg tablet; Take 1 Tab by mouth two (2) times daily (with meals). 4. Fall, initial encounter  No neuro deficit. 5. Type 2 diabetes mellitus without complication, without long-term current use of insulin (HCC)  Stable on current regimen. Will continue same. He is using Geodon. 6. Decubitus ulcer of left buttock, stage 2  Right buttock seems healed up. Has 2 superficial stage II decubiti ulcer on the left buttock. Advised him to use take duoderm every day for next 10 days. He should use a doughnut to sit. Discussed expected course/resolution/complications of diagnosis in detail with patient. Medication risks/benefits/costs/interactions/alternatives discussed with patient. Pt was given an after visit summary which includes diagnoses, current medications & vitals. Pt expressed understanding with the diagnosis and plan.

## 2018-06-19 NOTE — PROGRESS NOTES
Health Maintenance Due   Topic Date Due    EYE EXAM RETINAL OR DILATED Q1  06/18/1945    DTaP/Tdap/Td series (1 - Tdap) 06/18/1958    ZOSTER VACCINE AGE 60>  04/18/1997    GLAUCOMA SCREENING Q2Y  06/18/2002    Pneumococcal 65+ Low/Medium Risk (2 of 2 - PPSV23) 09/30/2016       Chief Complaint   Patient presents with    Fall       1. Have you been to the ER, urgent care clinic since your last visit? Hospitalized since your last visit? No    2. Have you seen or consulted any other health care providers outside of the [address] [place] [address] since your last visit? Include any pap smears or colon screening. No    3) Do you have an Advance Directive on file? no    4) Are you interested in receiving information on Advance Directives? NO      Patient is accompanied by self I have received verbal consent from Kymberly Lombardi to discuss any/all medical information while they are present in the room.

## 2018-06-19 NOTE — MR AVS SNAPSHOT
[address] A Building [facility] 7 [phone] 
[phone] Patient: Ritu Smith MRN: [id number] BCT:[birth year] Visit Information Date & Time Provider Department Dept. Phone Encounter #  
 6/19/2018 11:15 AM Zachary Camejo MD [facility] Internal Medicine [facility] [phone] [id number] Your Appointments 6/21/2018  9:30 AM  
Any with DO Rajan Mtz ([facility]) Appt Note: follow up 4m [address] [place] [address]. A [facility] 7 [phone]  
[phone]  
  
   
 [address] [place] [address]. [address] [place] [phone] Upcoming Health Maintenance Date Due  
 EYE EXAM RETINAL OR DILATED Q1 6/18/1945 DTaP/Tdap/Td series (1 - Tdap) 6/18/1958 ZOSTER VACCINE AGE 60> 4/18/1997 GLAUCOMA SCREENING Q2Y 6/18/2002 Pneumococcal 65+ Low/Medium Risk (2 of 2 - PPSV23) 9/30/2016 Influenza Age 5 to Adult 8/1/2018 HEMOGLOBIN A1C Q6M 8/27/2018 MICROALBUMIN Q1 9/12/2018 FOOT EXAM Q1 11/2/2018 MEDICARE YEARLY EXAM 12/15/2018 LIPID PANEL Q1 2/27/2019 Allergies as of 6/19/2018  Review Complete On: 6/19/2018 By: Zachary Camejo MD  
 No Known Allergies Current Immunizations  Reviewed on 11/30/2017 No immunizations on file. Not reviewed this visit You Were Diagnosed With   
  
 Codes Comments Cellulitis of left lower extremity    -  Primary ICD-10-CM: L03.116 ICD-9-CM: 682.6 Skin abrasion     ICD-10-CM: T14. Lilibeth Marcio ICD-9-CM: 919.0 Rib pain     ICD-10-CM: R07.81 ICD-9-CM: 786.50 Fall, initial encounter     ICD-10-CM: W19. Lina Eladio ICD-9-CM: E888.9 Type 2 diabetes mellitus without complication, without long-term current use of insulin (HCC)     ICD-10-CM: E11.9 ICD-9-CM: 250.00 Decubitus ulcer of left buttock, stage 2     ICD-10-CM: B26.814 ICD-9-CM: 707.05, 707.22 Vitals BP Pulse Temp Resp Height(growth percentile) Weight(growth percentile) 98/55 (BP 1 Location: Left arm, BP Patient Position: Sitting) 66 97.5 °F (36.4 °C) (Oral) 20 6' (1.829 m) 195 lb (88.5 kg) SpO2 BMI Smoking Status 93% 26.45 kg/m2 Former Smoker Vitals History BMI and BSA Data Body Mass Index Body Surface Area  
 26.45 kg/m 2 2.12 m 2 Preferred Pharmacy Pharmacy Name Phone [id number]- [place], [address] [place] [phone] Your Updated Medication List  
  
   
This list is accurate as of 6/19/18 12:06 PM.  Always use your most recent med list. amLODIPine 10 mg tablet Commonly known as:  Fidencio Rings TAKE 1 TABLET BY MOUTH DAILY. aspirin 325 mg tablet Commonly known as:  ASPIRIN Take 1 Tab by mouth daily. atorvastatin 10 mg tablet Commonly known as:  LIPITOR Take 1 Tab by mouth daily. cephALEXin 500 mg capsule Commonly known as:  Neelam Crigler Take 1 Cap by mouth three (3) times daily for 5 days. cholecalciferol 1,000 unit Cap Commonly known as:  VITAMIN D3 Take 1 Cap by mouth daily. doxazosin 4 mg tablet Commonly known as:  CARDURA TAKE 1 TABLET BY MOUTH  DAILY  
  
 ergocalciferol 50,000 unit capsule Commonly known as:  VITAMIN D2 Take 1 Cap by mouth every fourteen (14) days. Take one capsule PO every 2 weeks. finasteride 5 mg tablet Commonly known as:  PROSCAR  
TAKE 1 TABLET BY MOUTH  DAILY  
  
 glipiZIDE SR 5 mg CR tablet Commonly known as:  GLUCOTROL XL Take 1 Tab by mouth daily. glucose blood VI test strips strip Commonly known as:  TRUE METRIX GLUCOSE TEST STRIP Check BS BID  Dx: DM  E11.21  
  
 guaiFENesin-dextromethorphan -30 mg per tablet Commonly known as:  Abdulkadir & Abdulkadir DM Take 1 Tab by mouth two (2) times a day. hydroCHLOROthiazide 12.5 mg tablet Commonly known as:  HYDRODIURIL Take 1 Tab by mouth daily. Hydrocolloid Dressing 4 X 4 \" Bndg Commonly known as:  DUODERM CGF DRESSING Apply to affected area on buttock every 3 days  
  
 labetalol 100 mg tablet Commonly known as:  NORMODYNE  
TAKE 1 TABLET BY MOUTH  DAILY losartan 25 mg tablet Commonly known as:  COZAAR Take 0.5 Tabs by mouth daily. magnesium 250 mg Tab Take 1 Tab by mouth daily. Menthol-Zinc Oxide 0.44-20.6 % Oint Commonly known as:  Calmoseptine Apply to effected areas on buttocks twice daily  
  
 metFORMIN  mg tablet Commonly known as:  GLUCOPHAGE XR Take 1 Tab by mouth two (2) times a day. naproxen 500 mg tablet Commonly known as:  NAPROSYN Take 1 Tab by mouth two (2) times daily (with meals). sertraline 100 mg tablet Commonly known as:  ZOLOFT  
TAKE 1 TAB BY MOUTH DAILY. sodium chloride 0.65 % nasal squeeze bottle Commonly known as:  OCEAN  
0.05 mL by Both Nostrils route as needed for Congestion. SUPER B COMPLEX PO Take  by mouth. temazepam 15 mg capsule Commonly known as:  RESTORIL  
TAKE 1 CAPSULE BY MOUTH AT BEDTIME AS NEEDED FOR SLEEP. traZODone 50 mg tablet Commonly known as:  DESYREL  
TAKE 2 TABLETS BY MOUTH NIGHTLY. Prescriptions Sent to Pharmacy Refills  
 cephALEXin (KEFLEX) 500 mg capsule 0 Sig: Take 1 Cap by mouth three (3) times daily for 5 days. Class: Normal  
 Pharm[facility] #: [phone] Route: Oral  
 naproxen (NAPROSYN) 500 mg tablet 0 Sig: Take 1 Tab by mouth two (2) times daily (with meals). Class: Normal  
 Pharmacy: [facility]/pharmacy [facility] Ph #: [phone] Route: Oral  
  
Patient Instructions Learning About Preventing Pressure Sores What are pressure sores?  
 
A pressure sore is an injury to the skin caused by constant pressure over a period of time. The constant pressure blocks the blood supply to the skin. This causes skin cells to die and creates a sore. Pressure sores are also called bedsores. Pressure sores usually occur over bony areas, such as the hips, lower back, elbows, heels, and shoulders. Pressure sores can also occur in places where the skin folds over on itself, or where medical equipment presses on the skin, such as when oxygen tubes press on the ears or cheeks. Pressure sores can range from red areas on the surface of the skin to severe tissue damage that goes deep into muscle and bone. Severe sores are hard to treat and slow to heal. When pressure sores do not heal properly, problems such as bone, blood, and skin infections can develop. What causes pressure sores? Things that cause pressure sores include: · Pressure on the skin and tissues. For example, the sores may happen when a person lies in bed or sits in a wheelchair for a long time. This is the most common cause of pressure sores. · Sliding down in a bed or chair, forcing the skin to fold over itself (shear force). · Being pulled across bed sheets or other surfaces (friction burns). As we get older, our skin gets more thin and dry and less elastic, so it is easier to damage. Poor nutrition and not getting enough fluids make these natural changes in the skin worse. Skin in this condition may easily develop a pressure sore. Skin can also be damaged by sweat, feces, or urine, making pressure sores more likely and harder to heal. 
How can you help prevent pressure sores? If you are not able to do these things yourself, ask a family member or friend for help. Change position often · In a bed, change position every 2 hours. · In a wheelchair or other type of chair, shift your weight every 15 minutes, and give yourself a full relief of weight every hour. ¨ For a weight shift, lean forward and to the left and right.  Push up out of the chair with your arms. If you have a chair that tilts, use the tilt control to help relieve pressure. ¨ For a full relief of weight, stand up or move to another chair or bed if you are able to. Personal care · Check your skin every day, especially around bony areas. When a pressure sore is forming, skin temperature can be different than nearby skin. It might be warmer or cooler. The skin can feel either firmer or softer than the surrounding skin. · Keep your skin clean and free of sweat, wound drainage, urine, and feces. · Use skin lotions to keep your skin from drying out and cracking. Barrier lotions or creams have ingredients that can act as a shield to help protect the skin from moisture or irritation. · Try not to slide or slump across sheets in a chair or bed. And try not to sleep in a recliner chair. Lifestyle choices · Eat healthy foods with plenty of protein to help heal damaged skin and to help new skin grow. · Get plenty of fluids. · Stay at a healthy weight. Being either overweight or underweight can make pressure sores more likely. · If you smoke, stop. Smoking dries the skin and reduces its blood supply. If you need help quitting, talk to your doctor about stop-smoking programs and medicines. These can increase your chances of quitting for good. Ask about using cushions or pads · Overlays are special pads you put on top of a mattress. They provide a softer surface that will fit your body's shape better than a regular mattress. · Cushions or devices can be used to reduce pressure on certain areas of the body. For example, you can use a \"medical heel pillow\" to help prevent pressure sores on heels. You can also get cushions for seating surfaces, such as wheelchair seats. Talk with your doctor about cushions and pads. Some products, such as doughnut-type devices, may actually cause pressure sores or make them worse. Where can you learn more? Go to http://esvin-tammy.info/. Enter 828 7555 in the search box to learn more about \"Learning About Preventing Pressure Sores. \" Current as of: March 20, 2017 Content Version: 11.4 © 0431-1432 [employer], [employer]. Care instructions adapted under license by [employer] (which disclaims liability or warranty for this information). If you have questions about a medical condition or this instruction, always ask your healthcare professional. [address] any warranty or liability for your use of this information. Please provide this summary of care documentation to your next provider. Your primary care clinician is listed as Joie Blanchard. If you have any questions after today's visit, please call [phone].

## 2018-06-19 NOTE — PATIENT INSTRUCTIONS
Learning About Preventing Pressure Sores  What are pressure sores? A pressure sore is an injury to the skin caused by constant pressure over a period of time. The constant pressure blocks the blood supply to the skin. This causes skin cells to die and creates a sore. Pressure sores are also called bedsores. Pressure sores usually occur over bony areas, such as the hips, lower back, elbows, heels, and shoulders. Pressure sores can also occur in places where the skin folds over on itself, or where medical equipment presses on the skin, such as when oxygen tubes press on the ears or cheeks. Pressure sores can range from red areas on the surface of the skin to severe tissue damage that goes deep into muscle and bone. Severe sores are hard to treat and slow to heal. When pressure sores do not heal properly, problems such as bone, blood, and skin infections can develop. What causes pressure sores? Things that cause pressure sores include:  · Pressure on the skin and tissues. For example, the sores may happen when a person lies in bed or sits in a wheelchair for a long time. This is the most common cause of pressure sores. · Sliding down in a bed or chair, forcing the skin to fold over itself (shear force). · Being pulled across bed sheets or other surfaces (friction burns). As we get older, our skin gets more thin and dry and less elastic, so it is easier to damage. Poor nutrition and not getting enough fluids make these natural changes in the skin worse. Skin in this condition may easily develop a pressure sore. Skin can also be damaged by sweat, feces, or urine, making pressure sores more likely and harder to heal.  How can you help prevent pressure sores? If you are not able to do these things yourself, ask a family member or friend for help. Change position often  · In a bed, change position every 2 hours.   · In a wheelchair or other type of chair, shift your weight every 15 minutes, and give yourself a full relief of weight every hour. ¨ For a weight shift, lean forward and to the left and right. Push up out of the chair with your arms. If you have a chair that tilts, use the tilt control to help relieve pressure. ¨ For a full relief of weight, stand up or move to another chair or bed if you are able to. Personal care  · Check your skin every day, especially around bony areas. When a pressure sore is forming, skin temperature can be different than nearby skin. It might be warmer or cooler. The skin can feel either firmer or softer than the surrounding skin. · Keep your skin clean and free of sweat, wound drainage, urine, and feces. · Use skin lotions to keep your skin from drying out and cracking. Barrier lotions or creams have ingredients that can act as a shield to help protect the skin from moisture or irritation. · Try not to slide or slump across sheets in a chair or bed. And try not to sleep in a recliner chair. Lifestyle choices  · Eat healthy foods with plenty of protein to help heal damaged skin and to help new skin grow. · Get plenty of fluids. · Stay at a healthy weight. Being either overweight or underweight can make pressure sores more likely. · If you smoke, stop. Smoking dries the skin and reduces its blood supply. If you need help quitting, talk to your doctor about stop-smoking programs and medicines. These can increase your chances of quitting for good. Ask about using cushions or pads  · Overlays are special pads you put on top of a mattress. They provide a softer surface that will fit your body's shape better than a regular mattress. · Cushions or devices can be used to reduce pressure on certain areas of the body. For example, you can use a \"medical heel pillow\" to help prevent pressure sores on heels. You can also get cushions for seating surfaces, such as wheelchair seats. Talk with your doctor about cushions and pads.  Some products, such as doughnut-type devices, may actually cause pressure sores or make them worse. Where can you learn more? Go to http://esvin-tammy.info/. Enter 545 4302 in the search box to learn more about \"Learning About Preventing Pressure Sores. \"  Current as of: March 20, 2017  Content Version: 11.4  © 2959-9106 [employer]. Care instructions adapted under license by [employer] (which disclaims liability or warranty for this information). If you have questions about a medical condition or this instruction, always ask your healthcare professional. [address] any warranty or liability for your use of this information.

## 2018-06-21 ENCOUNTER — OFFICE VISIT (OUTPATIENT)
Dept: INTERNAL MEDICINE CLINIC | Age: 83
End: 2018-06-21

## 2018-06-21 VITALS
TEMPERATURE: 97.8 F | HEART RATE: 63 BPM | RESPIRATION RATE: 18 BRPM | BODY MASS INDEX: 27.22 KG/M2 | HEIGHT: 72 IN | DIASTOLIC BLOOD PRESSURE: 61 MMHG | OXYGEN SATURATION: 93 % | WEIGHT: 201 LBS | SYSTOLIC BLOOD PRESSURE: 139 MMHG

## 2018-06-21 DIAGNOSIS — E11.9 TYPE 2 DIABETES MELLITUS WITHOUT COMPLICATION, WITHOUT LONG-TERM CURRENT USE OF INSULIN (HCC): ICD-10-CM

## 2018-06-21 DIAGNOSIS — I35.0 AORTIC VALVE STENOSIS, ETIOLOGY OF CARDIAC VALVE DISEASE UNSPECIFIED: ICD-10-CM

## 2018-06-21 DIAGNOSIS — R26.81 GAIT INSTABILITY: Primary | ICD-10-CM

## 2018-06-21 DIAGNOSIS — I10 ESSENTIAL HYPERTENSION: ICD-10-CM
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
## 2018-06-21 DIAGNOSIS — T14.8XXA SKIN ABRASION: ICD-10-CM

## 2018-06-21 NOTE — ACP (ADVANCE CARE PLANNING)
ACP discussed with pt in detail , including choosing a healthcare agent to communicate patient's healthcare decisions if patient lost the ability to make decisions, such as after a sudden illness or accident. Understanding of the healthcare agent role was assessed and information provided. Discussed values, goals, and preferences if recovery is not expected, even with continued medical treatment in the event of: Imminent death/serious illness. Recommended completion of Advance Directive form after review of ACP materials and conversation with prospective healthcare agent. Recommended communicating the plan and making copies for the healthcare agent, personal physician, and others as appropriate (e.g., health system). Recommended review of completed ACP document annually or upon change in health status. Information book and form given. Face to face time spent was16 minutes.

## 2018-06-21 NOTE — MR AVS SNAPSHOT
Nicolasa Ambrocio 
 
 
 [address] [place] [address]. [place] [address] [phone] Patient: King Obrien MRN: [id number] MPF:2/99/7804 Visit Information Date & Time Provider Department Dept. Phone Encounter #  
 6/21/2018  9:30 AM Angelica Pittman, [address] [place] [address] [id number] Follow-up Instructions Return in about 3 months (around 9/21/2018). Upcoming Health Maintenance Date Due  
 EYE EXAM RETINAL OR DILATED Q1 6/18/1947 DTaP/Tdap/Td series (1 - Tdap) 6/18/1958 ZOSTER VACCINE AGE 60> 4/18/1997 GLAUCOMA SCREENING Q2Y 6/18/2002 Pneumococcal 65+ Low/Medium Risk (2 of 2 - PPSV23) 9/30/2016 Influenza Age 5 to Adult 8/1/2018 HEMOGLOBIN A1C Q6M 8/27/2018 MICROALBUMIN Q1 9/12/2018 FOOT EXAM Q1 11/2/2018 MEDICARE YEARLY EXAM 12/15/2018 LIPID PANEL Q1 2/27/2019 Allergies as of 6/21/2018  Review Complete On: 6/21/2018 By: Angelica Pittman, DO No Known Allergies Current Immunizations  Reviewed on 11/30/2017 No immunizations on file. Not reviewed this visit You Were Diagnosed With   
  
 Codes Comments Gait instability    -  Primary ICD-10-CM: R26.81 
ICD-9-CM: 781.2 Skin abrasion     ICD-10-CM: T14. Alex Lien ICD-9-CM: 919.0 Fall, sequela     ICD-10-CM: W19. XXXS 
ICD-9-CM: 909.4, E929.3 Type 2 diabetes mellitus without complication, without long-term current use of insulin (HCC)     ICD-10-CM: E11.9 ICD-9-CM: 250.00 Essential hypertension     ICD-10-CM: I10 
ICD-9-CM: 401.9 Aortic valve stenosis, etiology of cardiac valve disease unspecified     ICD-10-CM: I35.0 ICD-9-CM: 424.1 Vitals BP Pulse Temp Resp Height(growth percentile) Weight(growth percentile) 139/61 (BP 1 Location: Right arm, BP Patient Position: Sitting) 63 97.8 °F (36.6 °C) (Oral) 18 6' (1.829 m) 201 lb (91.2 kg) SpO2 BMI Smoking Status 93% 27.26 kg/m2 Former Smoker Vitals History BMI and BSA Data Body Mass Index Body Surface Area  
 27.26 kg/m 2 2.15 m 2 Preferred Pharmacy Pharmacy Name Phone [facility] #[id number] Garsia, [address] Laura [place]ley [phone] Your Updated Medication List  
  
   
This list is accurate as of 6/21/18  9:47 AM.  Always use your most recent med list. amLODIPine 10 mg tablet Commonly known as:  Caralee Dupes TAKE 1 TABLET BY MOUTH DAILY. aspirin 325 mg tablet Commonly known as:  ASPIRIN Take 1 Tab by mouth daily. atorvastatin 10 mg tablet Commonly known as:  LIPITOR Take 1 Tab by mouth daily. cephALEXin 500 mg capsule Commonly known as:  Olesya Platts Take 1 Cap by mouth three (3) times daily for 5 days. cholecalciferol 1,000 unit Cap Commonly known as:  VITAMIN D3 Take 1 Cap by mouth daily. doxazosin 4 mg tablet Commonly known as:  CARDURA TAKE 1 TABLET BY MOUTH  DAILY  
  
 ergocalciferol 50,000 unit capsule Commonly known as:  VITAMIN D2 Take 1 Cap by mouth every fourteen (14) days. Take one capsule PO every 2 weeks. finasteride 5 mg tablet Commonly known as:  PROSCAR  
TAKE 1 TABLET BY MOUTH  DAILY  
  
 glipiZIDE SR 5 mg CR tablet Commonly known as:  GLUCOTROL XL Take 1 Tab by mouth daily. glucose blood VI test strips strip Commonly known as:  TRUE METRIX GLUCOSE TEST STRIP Check BS BID  Dx: DM  E11.21  
  
 guaiFENesin-dextromethorphan -30 mg per tablet Commonly known as:  [place] 598 DM Take 1 Tab by mouth two (2) times a day. hydroCHLOROthiazide 12.5 mg tablet Commonly known as:  HYDRODIURIL Take 1 Tab by mouth daily. Hydrocolloid Dressing 4 X 4 \" Bndg Commonly known as:  DUODERM CGF DRESSING Apply to affected area on buttock every 3 days  
  
 labetalol 100 mg tablet Commonly known as:  NORMODYNE  
TAKE 1 TABLET BY MOUTH  DAILY losartan 25 mg tablet Commonly known as:  COZAAR Take 0.5 Tabs by mouth daily. magnesium 250 mg Tab Take 1 Tab by mouth daily. Menthol-Zinc Oxide 0.44-20.6 % Oint Commonly known as:  Calmoseptine Apply to effected areas on buttocks twice daily  
  
 metFORMIN  mg tablet Commonly known as:  GLUCOPHAGE XR Take 1 Tab by mouth two (2) times a day. naproxen 500 mg tablet Commonly known as:  NAPROSYN Take 1 Tab by mouth two (2) times daily (with meals). sertraline 100 mg tablet Commonly known as:  ZOLOFT  
TAKE 1 TAB BY MOUTH DAILY. sodium chloride 0.65 % nasal squeeze bottle Commonly known as:  OCEAN  
0.05 mL by Both Nostrils route as needed for Congestion. SUPER B COMPLEX PO Take  by mouth. temazepam 15 mg capsule Commonly known as:  RESTORIL  
TAKE 1 CAPSULE BY MOUTH AT BEDTIME AS NEEDED FOR SLEEP. traZODone 50 mg tablet Commonly known as:  DESYREL  
TAKE 2 TABLETS BY MOUTH NIGHTLY. Follow-up Instructions Return in about 3 months (around 9/21/2018). Please provide this summary of care documentation to your next provider. Your primary care clinician is listed as Osiel Burows. If you have any questions after today's visit, please call [phone].

## 2018-06-21 NOTE — PROGRESS NOTES
HISTORY OF PRESENT ILLNESS  Terese Murry is a 82 y.o. male. Pt. comes in with his son for f/u. Has multiple medical problems. Fell 5 days ago on cement floor. Saw Dr. Lizz Dunbar 2 days ago. Taking Keflex. Denies any pain. Gait is unsteady. BP, CAD and DM are stable. Has AS. Reports compliance with medications and diet. Med list and most recent labs/studies reviewed with pt. Trying to be active physically as tolerated. Reports no other new c/o. Fall   Pertinent negatives include no abdominal pain and no headaches. Diabetes   Pertinent negatives include no chest pain, no abdominal pain, no headaches and no shortness of breath. Follow Up Chronic Condition   Pertinent negatives include no chest pain, no abdominal pain, no headaches and no shortness of breath. Review of Systems   Constitutional: Negative. HENT: Positive for hearing loss. Eyes: Negative for blurred vision. Respiratory: Negative for shortness of breath. Cardiovascular: Negative for chest pain and leg swelling. Gastrointestinal: Negative for abdominal pain. Genitourinary: Negative for dysuria and frequency. Musculoskeletal: Positive for falls and joint pain. Skin: Positive for rash. Negative for itching. Neurological: Negative for dizziness, sensory change, focal weakness and headaches. Psychiatric/Behavioral: Negative for depression. The patient has insomnia. The patient is not nervous/anxious. All other systems reviewed and are negative. Physical Exam   Constitutional: He is oriented to person, place, and time. He appears well-developed and well-nourished. No distress. HENT:   Head: Normocephalic and atraumatic. Mouth/Throat: Oropharynx is clear and moist.   Eyes: Conjunctivae are normal.   Neck: Normal range of motion. Neck supple. No JVD present. No thyromegaly present. Cardiovascular: Normal rate, regular rhythm and intact distal pulses. Murmur heard.   Pulmonary/Chest: Effort normal and breath sounds normal. No respiratory distress. He has no wheezes. He has no rales. Abdominal: Soft. Bowel sounds are normal. He exhibits no distension. Musculoskeletal: He exhibits no edema or tenderness. Neurological: He is alert and oriented to person, place, and time. Coordination normal.   Skin: Skin is warm and dry. Rash (R face/L arm and upper leg abrasions) noted. There is erythema. Psychiatric: He has a normal mood and affect. His behavior is normal.   Nursing note and vitals reviewed. ASSESSMENT and PLAN  Diagnoses and all orders for this visit:    1. Gait instability    2. Skin abrasion    3. Fall, sequela    4. Type 2 diabetes mellitus without complication, without long-term current use of insulin ([facility] [address]    5. Essential hypertension    6. Aortic valve stenosis, etiology of cardiac valve disease unspecified      Follow-up Disposition:  Return in about 3 months (around 9/21/2018).    lab results and schedule of future lab studies reviewed with patient  reviewed diet, exercise and weight control  reviewed medications and side effects in detail  low cholesterol diet, weight control and daily exercise discussed, home glucose monitoring emphasized, all medications, side effects and compliance discussed carefully, foot care discussed and Podiatry visits discussed, annual eye examinations at Ophthalmology discussed, glycohemoglobin and other lab monitoring discussed and long term diabetic complications discussed  Fall precautions discussed  Advised pt to bring back ACP form  Finish antibiotics

## 2018-06-22 RX ORDER — SERTRALINE HYDROCHLORIDE 100 MG/1
TABLET, FILM COATED ORAL
Qty: 30 TAB | Refills: 2 | Status: SHIPPED | OUTPATIENT
Start: 2018-06-22 | End: 2018-06-26 | Stop reason: SDUPTHER

## 2018-06-24 RX ORDER — ERGOCALCIFEROL 1.25 MG/1
CAPSULE ORAL
Qty: 4 CAP | Refills: 3 | Status: SHIPPED | OUTPATIENT
Start: 2018-06-24 | End: 2019-04-25 | Stop reason: ALTCHOICE

## 2018-06-26 RX ORDER — SERTRALINE HYDROCHLORIDE 100 MG/1
TABLET, FILM COATED ORAL
Qty: 30 TAB | Refills: 2 | Status: SHIPPED | OUTPATIENT
Start: 2018-06-26 | End: 2018-08-09 | Stop reason: SDUPTHER

## 2018-06-26 RX ORDER — METFORMIN HYDROCHLORIDE 500 MG/1
TABLET, EXTENDED RELEASE ORAL
Qty: 180 TAB | Refills: 1 | Status: SHIPPED | OUTPATIENT
Start: 2018-06-26 | End: 2018-10-18 | Stop reason: SDUPTHER

## 2018-06-26 RX ORDER — AMLODIPINE BESYLATE 10 MG/1
TABLET ORAL
Qty: 30 TAB | Refills: 1 | Status: SHIPPED | OUTPATIENT
Start: 2018-06-26 | End: 2018-07-30 | Stop reason: SDUPTHER

## 2018-06-26 RX ORDER — GLIPIZIDE 5 MG/1
TABLET, FILM COATED, EXTENDED RELEASE ORAL
Qty: 90 TAB | Refills: 1 | Status: SHIPPED | OUTPATIENT
Start: 2018-06-26 | End: 2018-12-26 | Stop reason: SDUPTHER

## 2018-06-26 RX ORDER — ATORVASTATIN CALCIUM 10 MG/1
TABLET, FILM COATED ORAL
Qty: 90 TAB | Refills: 1 | Status: SHIPPED | OUTPATIENT
Start: 2018-06-26 | End: 2018-12-26 | Stop reason: SDUPTHER

## 2018-08-08 DIAGNOSIS — G47.00 INSOMNIA, UNSPECIFIED TYPE: ICD-10-CM

## 2018-08-08 RX ORDER — FINASTERIDE 5 MG/1
TABLET, FILM COATED ORAL
Qty: 90 TAB | Refills: 1 | Status: SHIPPED | OUTPATIENT
Start: 2018-08-08 | End: 2019-03-17 | Stop reason: SDUPTHER

## 2018-08-08 RX ORDER — DOXAZOSIN 4 MG/1
TABLET ORAL
Qty: 90 TAB | Refills: 1 | Status: SHIPPED | OUTPATIENT
Start: 2018-08-08 | End: 2019-02-16 | Stop reason: SDUPTHER

## 2018-08-08 RX ORDER — LABETALOL 100 MG/1
TABLET, FILM COATED ORAL
Qty: 90 TAB | Refills: 1 | Status: SHIPPED | OUTPATIENT
Start: 2018-08-08 | End: 2018-09-17

## 2018-08-09 DIAGNOSIS — G47.00 INSOMNIA, UNSPECIFIED TYPE: ICD-10-CM

## 2018-08-09 RX ORDER — SERTRALINE HYDROCHLORIDE 100 MG/1
TABLET, FILM COATED ORAL
Qty: 30 TAB | Refills: 2 | Status: SHIPPED | OUTPATIENT
Start: 2018-08-09 | End: 2018-09-07 | Stop reason: SDUPTHER

## 2018-08-09 RX ORDER — TRAZODONE HYDROCHLORIDE 50 MG/1
TABLET ORAL
Qty: 60 TAB | Refills: 5 | Status: SHIPPED | OUTPATIENT
Start: 2018-08-09 | End: 2018-10-02 | Stop reason: SDUPTHER

## 2018-08-09 RX ORDER — TEMAZEPAM 15 MG/1
CAPSULE ORAL
Qty: 30 CAP | Refills: 2 | Status: SHIPPED | OUTPATIENT
Start: 2018-08-09 | End: 2018-10-23 | Stop reason: SDUPTHER

## 2018-08-23 ENCOUNTER — OFFICE VISIT (OUTPATIENT)
Dept: INTERNAL MEDICINE CLINIC | Age: 83
End: 2018-08-23

## 2018-08-23 ENCOUNTER — HOSPITAL ENCOUNTER (OUTPATIENT)
Dept: LAB | Age: 83
Discharge: HOME | End: 2018-08-23
Payer: MEDICARE

## 2018-08-23 VITALS
SYSTOLIC BLOOD PRESSURE: 150 MMHG | HEART RATE: 60 BPM | OXYGEN SATURATION: 93 % | BODY MASS INDEX: 26.28 KG/M2 | DIASTOLIC BLOOD PRESSURE: 60 MMHG | HEIGHT: 72 IN | TEMPERATURE: 97.6 F | WEIGHT: 194 LBS | RESPIRATION RATE: 18 BRPM

## 2018-08-23 DIAGNOSIS — R31.0 GROSS HEMATURIA: ICD-10-CM

## 2018-08-23 DIAGNOSIS — I10 ESSENTIAL HYPERTENSION: ICD-10-CM

## 2018-08-23 DIAGNOSIS — R31.9 URINARY TRACT INFECTION WITH HEMATURIA, SITE UNSPECIFIED: Primary | ICD-10-CM

## 2018-08-23 DIAGNOSIS — N40.1 BENIGN PROSTATIC HYPERPLASIA WITH LOWER URINARY TRACT SYMPTOMS, SYMPTOM DETAILS UNSPECIFIED: ICD-10-CM

## 2018-08-23 DIAGNOSIS — E11.9 TYPE 2 DIABETES MELLITUS WITHOUT COMPLICATION, WITHOUT LONG-TERM CURRENT USE OF INSULIN (HCC): ICD-10-CM

## 2018-08-23 DIAGNOSIS — N39.0 URINARY TRACT INFECTION WITH HEMATURIA, SITE UNSPECIFIED: Primary | ICD-10-CM

## 2018-08-23 LAB
BILIRUB UR QL STRIP: NEGATIVE
GLUCOSE UR-MCNC: NEGATIVE MG/DL
KETONES P FAST UR STRIP-MCNC: ABNORMAL MG/DL
PH UR STRIP: 8.5 [PH] (ref 4.6–8)
PROT UR QL STRIP: ABNORMAL
SP GR UR STRIP: 1 (ref 1–1.03)
UA UROBILINOGEN AMB POC: ABNORMAL (ref 0.2–1)
URINALYSIS CLARITY POC: ABNORMAL
URINALYSIS COLOR POC: ABNORMAL
URINE BLOOD POC: ABNORMAL
URINE LEUKOCYTES POC: ABNORMAL
URINE NITRITES POC: NEGATIVE

## 2018-08-23 PROCEDURE — 87086 URINE CULTURE/COLONY COUNT: CPT

## 2018-08-23 PROCEDURE — 87088 URINE BACTERIA CULTURE: CPT

## 2018-08-23 PROCEDURE — 87186 SC STD MICRODIL/AGAR DIL: CPT

## 2018-08-23 RX ORDER — CIPROFLOXACIN 250 MG/1
250 TABLET, FILM COATED ORAL 2 TIMES DAILY
Qty: 14 TAB | Refills: 0 | Status: SHIPPED | OUTPATIENT
Start: 2018-08-23 | End: 2018-08-30 | Stop reason: ALTCHOICE

## 2018-08-23 NOTE — PROGRESS NOTES
Health Maintenance Due   Topic Date Due    EYE EXAM RETINAL OR DILATED Q1  06/18/1947    DTaP/Tdap/Td series (1 - Tdap) 06/18/1958    ZOSTER VACCINE AGE 60>  04/18/1997    GLAUCOMA SCREENING Q2Y  06/18/2002    Pneumococcal 65+ Low/Medium Risk (2 of 2 - PPSV23) 09/30/2016    Influenza Age 9 to Adult  08/01/2018    HEMOGLOBIN A1C Q6M  08/27/2018    MICROALBUMIN Q1  09/12/2018       Chief Complaint   Patient presents with    Blood in Urine    Hypertension    Diabetes    Fall     July 2018       1. Have you been to the ER, urgent care clinic since your last visit? Hospitalized since your last visit? No    2. Have you seen or consulted any other health care providers outside of the [address] [place] [address] since your last visit? Include any pap smears or colon screening. No    3) Do you have an Advance Directive on file? no    4) Are you interested in receiving information on Advance Directives? NO      Patient is accompanied by self I have received verbal consent from Natasha Kim to discuss any/all medical information while they are present in the room.

## 2018-08-23 NOTE — MR AVS SNAPSHOT
Juan Paz 
 
 
 [address] [place] [address]. A [facility] 7 [phone] 
[phone] Patient: Steven Ozuna MRN: [id number] EVER:7/88/[birth year] Visit Information Date & Time Provider Department Dept. Phone Encounter #  
 8/23/2018  1:00 PM Carla Herrera5 [address] [phone] [id number] Follow-up Instructions Return in about 1 week (around 8/30/2018). Your Appointments 9/6/2018 11:15 AM  
Any with Giovanni Anne DO [address] ([facility]) Appt Note: followup 3m; followup 3m  
 [address] [place] [address]. [address] [place] [phone]  
  
   
 [address] [place] [address]. [address] [place] [phone]  
  
    
 12/6/2018 10:00 AM  
ECHO CARDIOGRAMS 2D with [facility] Appt Note: echo for CAD 10:00 Dr. Juan Mcneill 11:00 kmr [address] [place] [address]  
[address] [place] [address]  
  
    
 12/6/2018 11:00 AM  
ESTABLISHED PATIENT with Farida Davis MD  
[facility] ([facility]) Appt Note: echo for CAD 10:00 Dr. Juan Mcneill 11:00 kmr [address] [place] [address]  
[address] [place] [phone] Upcoming Health Maintenance Date Due  
 EYE EXAM RETINAL OR DILATED Q1 6/18/1945 DTaP/Tdap/Td series (1 - Tdap) 6/18/1958 ZOSTER VACCINE AGE 60> 4/18/1997 GLAUCOMA SCREENING Q2Y 6/18/2002 Pneumococcal 65+ Low/Medium Risk (2 of 2 - PPSV23) 9/30/2016 Influenza Age 5 to Adult 8/1/2018 HEMOGLOBIN A1C Q6M 8/27/2018 MICROALBUMIN Q1 9/12/2018 FOOT EXAM Q1 11/2/2018 MEDICARE YEARLY EXAM 12/15/2018 LIPID PANEL Q1 2/27/2019 Allergies as of 8/23/2018  Review Complete On: 8/23/2018 By: Giovanni Anne DO  
 No Known Allergies Current Immunizations  Reviewed on 11/30/2017 No immunizations on file. Not reviewed this visit You Were Diagnosed With   
  
 Codes Comments Urinary tract infection with hematuria, site unspecified    -  Primary ICD-10-CM: N39.0, R31.9 ICD-9-CM: 599.0 Gross hematuria     ICD-10-CM: R31.0 ICD-9-CM: 599.71 Type 2 diabetes mellitus without complication, without long-term current use of insulin (HCC)     ICD-10-CM: E11.9 ICD-9-CM: 250.00 Essential hypertension     ICD-10-CM: I10 
ICD-9-CM: 401.9 Benign prostatic hyperplasia with lower urinary tract symptoms, symptom details unspecified     ICD-10-CM: N40.1 ICD-9-CM: 600.01 Vitals BP Pulse Temp Resp Height(growth percentile) Weight(growth percentile) 150/60 (BP 1 Location: Right arm, BP Patient Position: Sitting) 60 97.6 °F (36.4 °C) (Oral) 18 6' (1.829 m) 194 lb (88 kg) SpO2 BMI Smoking Status 93% 26.31 kg/m2 Former Smoker Vitals History BMI and BSA Data Body Mass Index Body Surface Area  
 26.31 kg/m 2 2.11 m 2 Preferred Pharmacy Pharmacy Name Phone [facility] [phone] [address] [phone] Your Updated Medication List  
  
   
This list is accurate as of 8/23/18  2:03 PM.  Always use your most recent med list. amLODIPine 10 mg tablet Commonly known as:  Ramon Mend TAKE 1 TABLET BY MOUTH  DAILY  
  
 aspirin 325 mg tablet Commonly known as:  ASPIRIN Take 1 Tab by mouth daily. atorvastatin 10 mg tablet Commonly known as:  LIPITOR  
TAKE 1 TABLET BY MOUTH  DAILY  
  
 cholecalciferol 1,000 unit Cap Commonly known as:  VITAMIN D3 Take 1 Cap by mouth daily. ciprofloxacin HCl 250 mg tablet Commonly known as:  CIPRO Take 1 Tab by mouth two (2) times a day for 7 days. doxazosin 4 mg tablet Commonly known as:  CARDURA TAKE 1 TABLET BY MOUTH  DAILY  
  
 ergocalciferol 50,000 unit capsule Commonly known as:  ERGOCALCIFEROL  
TAKE 1 CAPSULE BY MOUTH EVERY 2 WEEKS. finasteride 5 mg tablet Commonly known as:  PROSCAR  
TAKE 1 TABLET BY MOUTH  DAILY  
  
 glipiZIDE SR 5 mg CR tablet Commonly known as:  GLUCOTROL XL  
TAKE 1 TABLET BY MOUTH  DAILY  
  
 glucose blood VI test strips strip Commonly known as:  TRUE METRIX GLUCOSE TEST STRIP Check BS BID  Dx: DM  E11.21  
  
 guaiFENesin-dextromethorphan -30 mg per tablet Commonly known as:  Abdulkadir & Abdulkadir DM Take 1 Tab by mouth two (2) times a day. hydroCHLOROthiazide 12.5 mg tablet Commonly known as:  HYDRODIURIL  
TAKE 1 TABLET BY MOUTH  DAILY Hydrocolloid Dressing 4 X 4 \" Bndg Commonly known as:  DUODERM CGF DRESSING Apply to affected area on buttock every 3 days * labetalol 100 mg tablet Commonly known as:  NORMODYNE  
TAKE 1 TABLET BY MOUTH  DAILY  
  
 * labetalol 100 mg tablet Commonly known as:  NORMODYNE  
TAKE 1 TABLET BY MOUTH  DAILY losartan 25 mg tablet Commonly known as:  COZAAR Take 0.5 Tabs by mouth daily. magnesium 250 mg Tab Take 1 Tab by mouth daily. Menthol-Zinc Oxide 0.44-20.6 % Oint Commonly known as:  Calmoseptine Apply to effected areas on buttocks twice daily  
  
 metFORMIN  mg tablet Commonly known as:  GLUCOPHAGE XR  
TAKE 1 TABLET BY MOUTH TWO  TIMES DAILY  
  
 naproxen 500 mg tablet Commonly known as:  NAPROSYN Take 1 Tab by mouth two (2) times daily (with meals). sertraline 100 mg tablet Commonly known as:  ZOLOFT  
TAKE 1 TAB BY MOUTH DAILY. sodium chloride 0.65 % nasal squeeze bottle Commonly known as:  OCEAN  
0.05 mL by Both Nostrils route as needed for Congestion. SUPER B COMPLEX PO Take  by mouth. temazepam 15 mg capsule Commonly known as:  RESTORIL  
TAKE 1 CAPSULE BY MOUTH AT BEDTIME AS NEEDED FOR SLEEP. traZODone 50 mg tablet Commonly known as:  DESYREL  
TAKE 2 TABLETS BY MOUTH  NIGHTLY * Notice: This list has 2 medication(s) that are the same as other medications prescribed for you. Read the directions carefully, and ask your doctor or other care provider to review them with you. Prescriptions Sent to Pharmacy Refills  
 ciprofloxacin HCl (CIPRO) 250 mg tablet 0 Sig: Take 1 Tab by mouth two (2) times a day for 7 days. Class: Normal  
 Pharmacy: [facility] [address] #: [phone] Route: Oral  
  
We Performed the Following CBC W/O DIFF [90225 CPT(R)] HEMOGLOBIN A1C WITH EAG [14769 CPT(R)] LIPID PANEL [17872 CPT(R)] METABOLIC PANEL, COMPREHENSIVE [91605 CPT(R)] PSA, DIAGNOSTIC (PROSTATE SPECIFIC AG) [id number] CPT(R)] Follow-up Instructions Return in about 1 week (around 8/30/2018). Please provide this summary of care documentation to your next provider. Your primary care clinician is listed as Evonne Dempsey. If you have any questions after today's visit, please call [phone].

## 2018-08-26 LAB — BACTERIA UR CULT: ABNORMAL

## 2018-08-28 ENCOUNTER — HOSPITAL ENCOUNTER (OUTPATIENT)
Dept: LAB | Age: 83
Discharge: HOME | End: 2018-08-28
Payer: MEDICARE

## 2018-08-28 PROCEDURE — 80053 COMPREHEN METABOLIC PANEL: CPT

## 2018-08-28 PROCEDURE — 84153 ASSAY OF PSA TOTAL: CPT

## 2018-08-28 PROCEDURE — 83036 HEMOGLOBIN GLYCOSYLATED A1C: CPT

## 2018-08-28 PROCEDURE — 85027 COMPLETE CBC AUTOMATED: CPT

## 2018-08-28 PROCEDURE — 80061 LIPID PANEL: CPT

## 2018-08-30 ENCOUNTER — OFFICE VISIT (OUTPATIENT)
Dept: INTERNAL MEDICINE CLINIC | Age: 83
End: 2018-08-30

## 2018-08-30 VITALS
OXYGEN SATURATION: 95 % | HEART RATE: 62 BPM | DIASTOLIC BLOOD PRESSURE: 58 MMHG | WEIGHT: 188 LBS | HEIGHT: 72 IN | RESPIRATION RATE: 18 BRPM | SYSTOLIC BLOOD PRESSURE: 128 MMHG | TEMPERATURE: 97.6 F | BODY MASS INDEX: 25.47 KG/M2

## 2018-08-30 DIAGNOSIS — I25.10 CORONARY ARTERY DISEASE INVOLVING NATIVE CORONARY ARTERY OF NATIVE HEART WITHOUT ANGINA PECTORIS: ICD-10-CM

## 2018-08-30 DIAGNOSIS — I35.0 AORTIC VALVE STENOSIS, ETIOLOGY OF CARDIAC VALVE DISEASE UNSPECIFIED: ICD-10-CM

## 2018-08-30 DIAGNOSIS — N40.1 BENIGN PROSTATIC HYPERPLASIA WITH LOWER URINARY TRACT SYMPTOMS, SYMPTOM DETAILS UNSPECIFIED: ICD-10-CM

## 2018-08-30 DIAGNOSIS — E11.9 TYPE 2 DIABETES MELLITUS WITHOUT COMPLICATION, WITHOUT LONG-TERM CURRENT USE OF INSULIN (HCC): Primary | ICD-10-CM

## 2018-08-30 DIAGNOSIS — I10 ESSENTIAL HYPERTENSION: ICD-10-CM

## 2018-08-30 LAB
ALBUMIN SERPL-MCNC: 4.1 G/DL (ref 3.5–4.7)
ALBUMIN/GLOB SERPL: 1.4 {RATIO} (ref 1.2–2.2)
ALP SERPL-CCNC: 88 IU/L (ref 39–117)
ALT SERPL-CCNC: 27 IU/L (ref 0–44)
AST SERPL-CCNC: 30 IU/L (ref 0–40)
BILIRUB SERPL-MCNC: 0.5 MG/DL (ref 0–1.2)
BUN SERPL-MCNC: 16 MG/DL (ref 8–27)
BUN/CREAT SERPL: 16 (ref 10–24)
CALCIUM SERPL-MCNC: 9.7 MG/DL (ref 8.6–10.2)
CHLORIDE SERPL-SCNC: 98 MMOL/L (ref 96–106)
CHOLEST SERPL-MCNC: 100 MG/DL (ref 100–199)
CO2 SERPL-SCNC: 27 MMOL/L (ref 20–29)
CREAT SERPL-MCNC: 0.99 MG/DL (ref 0.76–1.27)
ERYTHROCYTE [DISTWIDTH] IN BLOOD BY AUTOMATED COUNT: 13.7 % (ref 12.3–15.4)
EST. AVERAGE GLUCOSE BLD GHB EST-MCNC: 151 MG/DL
GLOBULIN SER CALC-MCNC: 3 G/DL (ref 1.5–4.5)
GLUCOSE SERPL-MCNC: 135 MG/DL (ref 65–99)
HBA1C MFR BLD: 6.9 % (ref 4.8–5.6)
HCT VFR BLD AUTO: 39.1 % (ref 37.5–51)
HDLC SERPL-MCNC: 42 MG/DL
HGB BLD-MCNC: 12.4 G/DL (ref 13–17.7)
INTERPRETATION, 910389: NORMAL
LDLC SERPL CALC-MCNC: 42 MG/DL (ref 0–99)
Lab: NORMAL
MCH RBC QN AUTO: 27.8 PG (ref 26.6–33)
MCHC RBC AUTO-ENTMCNC: 31.7 G/DL (ref 31.5–35.7)
MCV RBC AUTO: 88 FL (ref 79–97)
PLATELET # BLD AUTO: 137 X10E3/UL (ref 150–379)
POTASSIUM SERPL-SCNC: 4.8 MMOL/L (ref 3.5–5.2)
PROT SERPL-MCNC: 7.1 G/DL (ref 6–8.5)
PSA SERPL-MCNC: 1.2 NG/ML (ref 0–4)
RBC # BLD AUTO: 4.46 X10E6/UL (ref 4.14–5.8)
SODIUM SERPL-SCNC: 141 MMOL/L (ref 134–144)
TRIGL SERPL-MCNC: 79 MG/DL (ref 0–149)
VLDLC SERPL CALC-MCNC: 16 MG/DL (ref 5–40)
WBC # BLD AUTO: 6.7 X10E3/UL (ref 3.4–10.8)

## 2018-08-30 NOTE — MR AVS SNAPSHOT
[address]. A [facility] 7 [phone] 
[phone] Patient: Natasha Kim MRN: [id number] DTL:1/62/0561 Visit Information Date & Time Provider Department Dept. Phone Encounter #  
 8/30/2018  9:45 AM Alexandrea Fierro, [address] [phone] [id number] Follow-up Instructions Return in about 4 months (around 12/30/2018). Your Appointments 9/6/2018 11:15 AM  
Any with DO Rajan Barnard ([facility]) Appt Note: followup 3m; followup 3m  
 [address] [facility]. [address] [place] [phone]  
[phone]  
  
   
 [address] [place] [address]. [address] [place] [phone]  
  
    
 12/6/2018 10:00 AM  
ECHO CARDIOGRAMS 2D with [facility] ([facility]) Appt Note: echo for CAD 10:00 Dr. Abby Dunbar 11:00 kmr [address] [place] [address]  
[address] [place] [address]  
  
    
 12/6/2018 11:00 AM  
ESTABLISHED PATIENT with Flaquita Amezquita MD  
[facility] ([address] [place] [address]) Appt Note: echo for CAD 10:00 Dr. Abby Dunbar 11:00 kmr [address]  
[address] [facility] 7 [phone] Upcoming Health Maintenance Date Due  
 EYE EXAM RETINAL OR DILATED Q1 6[birth year]18/1945 DTaP/Tdap/Td series (1 - Tdap) 6/18/1958 ZOSTER VACCINE AGE 60> 4/18/1997 GLAUCOMA SCREENING Q2Y 6/18/2002 Pneumococcal 65+ Low/Medium Risk (2 of 2 - PPSV23) 9/30/2016 Influenza Age 5 to Adult 8/1/2018 HEMOGLOBIN A1C Q6M 8/27/2018 MICROALBUMIN Q1 9/12/2018 FOOT EXAM Q1 11/2/2018 MEDICARE YEARLY EXAM 12/15/2018 LIPID PANEL Q1 2/27/2019 Allergies as of 8/30/2018  Review Complete On: 8/30/2018 By: Alexandrea Fierro DO  
 No Known Allergies Current Immunizations  Reviewed on 8/30/2018 No immunizations on file. Reviewed by Kyrie Tee DO on 8/30/2018 at 11:13 AM  
You Were Diagnosed With   
  
 Codes Comments Type 2 diabetes mellitus without complication, without long-term current use of insulin (HCC)    -  Primary ICD-10-CM: E11.9 ICD-9-CM: 250.00 Essential hypertension     ICD-10-CM: I10 
ICD-9-CM: 401.9 Coronary artery disease involving native coronary artery of native heart without angina pectoris     ICD-10-CM: I25.10 ICD-9-CM: 414.01 Benign prostatic hyperplasia with lower urinary tract symptoms, symptom details unspecified     ICD-10-CM: N40.1 ICD-9-CM: 600.01 Aortic valve stenosis, etiology of cardiac valve disease unspecified     ICD-10-CM: I35.0 ICD-9-CM: 424.1 Vitals BP Pulse Temp Resp Height(growth percentile) Weight(growth percentile) 128/58 (BP 1 Location: Right arm, BP Patient Position: Sitting) 62 97.6 °F (36.4 °C) (Oral) 18 6' (1.829 m) 188 lb (85.3 kg) SpO2 BMI Smoking Status 95% 25.5 kg/m2 Former Smoker Vitals History BMI and BSA Data Body Mass Index Body Surface Area 25.5 kg/m 2 2.08 m 2 [facility] [address] [place] [address] [phone] Your Updated Medication List  
  
   
This list is accurate as of 8/30/18 11:17 AM.  Always use your most recent med list. amLODIPine 10 mg tablet Commonly known as:  Izetta Harder TAKE 1 TABLET BY MOUTH  DAILY  
  
 aspirin 325 mg tablet Commonly known as:  ASPIRIN Take 1 Tab by mouth daily. atorvastatin 10 mg tablet Commonly known as:  LIPITOR  
TAKE 1 TABLET BY MOUTH  DAILY  
  
 cholecalciferol 1,000 unit Cap Commonly known as:  VITAMIN D3 Take 1 Cap by mouth daily. doxazosin 4 mg tablet Commonly known as:  CARDURA TAKE 1 TABLET BY MOUTH  DAILY  
  
 ergocalciferol 50,000 unit capsule Commonly known as:  ERGOCALCIFEROL  
TAKE 1 CAPSULE BY MOUTH EVERY 2 WEEKS. finasteride 5 mg tablet Commonly known as:  PROSCAR  
TAKE 1 TABLET BY MOUTH  DAILY  
  
 glipiZIDE SR 5 mg CR tablet Commonly known as:  GLUCOTROL XL  
TAKE 1 TABLET BY MOUTH  DAILY  
  
 glucose blood VI test strips strip Commonly known as:  TRUE METRIX GLUCOSE TEST STRIP Check BS BID  Dx: DM  E11.21  
  
 guaiFENesin-dextromethorphan -30 mg per tablet Commonly known as:  Abdulkadir & Abdulkadir DM Take 1 Tab by mouth two (2) times a day. hydroCHLOROthiazide 12.5 mg tablet Commonly known as:  HYDRODIURIL  
TAKE 1 TABLET BY MOUTH  DAILY Hydrocolloid Dressing 4 X 4 \" Bndg Commonly known as:  DUODERM CGF DRESSING Apply to affected area on buttock every 3 days * labetalol 100 mg tablet Commonly known as:  NORMODYNE  
TAKE 1 TABLET BY MOUTH  DAILY  
  
 * labetalol 100 mg tablet Commonly known as:  NORMODYNE  
TAKE 1 TABLET BY MOUTH  DAILY losartan 25 mg tablet Commonly known as:  COZAAR Take 0.5 Tabs by mouth daily. magnesium 250 mg Tab Take 1 Tab by mouth daily. Menthol-Zinc Oxide 0.44-20.6 % Oint Commonly known as:  Calmoseptine Apply to effected areas on buttocks twice daily  
  
 metFORMIN  mg tablet Commonly known as:  GLUCOPHAGE XR  
TAKE 1 TABLET BY MOUTH TWO  TIMES DAILY  
  
 naproxen 500 mg tablet Commonly known as:  NAPROSYN Take 1 Tab by mouth two (2) times daily (with meals). sertraline 100 mg tablet Commonly known as:  ZOLOFT  
TAKE 1 TAB BY MOUTH DAILY. sodium chloride 0.65 % nasal squeeze bottle Commonly known as:  OCEAN  
0.05 mL by Both Nostrils route as needed for Congestion. SUPER B COMPLEX PO Take  by mouth. temazepam 15 mg capsule Commonly known as:  RESTORIL  
TAKE 1 CAPSULE BY MOUTH AT BEDTIME AS NEEDED FOR SLEEP. traZODone 50 mg tablet Commonly known as:  DESYREL  
TAKE 2 TABLETS BY MOUTH  NIGHTLY * Notice: This list has 2 medication(s) that are the same as other medications prescribed for you. Read the directions carefully, and ask your doctor or other care provider to review them with you. We Performed the Following REFERRAL TO OPHTHALMOLOGY [REF57 Custom] Follow-up Instructions Return in about 4 months (around 12/30/2018). Referral Information Referral ID Referred By Referred To  
  
 [id number] [place] [id number] [phone] [place] Visits Status Start Date End Date 1 New Request 8/30/18 8/30/19 If your referral has a status of pending review or denied, additional information will be sent to support the outcome of this decision. Please provide this summary of care documentation to your next provider. Your primary care clinician is listed as Nayeli Lyles. If you have any questions after today's visit, please call [phone].

## 2018-08-30 NOTE — PROGRESS NOTES
Health Maintenance Due Topic Date Due  
 EYE EXAM RETINAL OR DILATED Q1  06[birth year]18/1947  
 DTaP/Tdap/Td series (1 - Tdap) 06/18/1958  ZOSTER VACCINE AGE 60>  04/18/1997  GLAUCOMA SCREENING Q2Y  06/18/2002  Pneumococcal 65+ Low/Medium Risk (2 of 2 - PPSV23) 09/30/2016  Influenza Age 5 to Adult  08/01/2018  HEMOGLOBIN A1C Q6M  08/27/2018  MICROALBUMIN Q1  09/12/2018 Chief Complaint Patient presents with  Bladder Infection 1 week follow up  Hypertension  Diabetes 1. Have you been to the ER, urgent care clinic since your last visit? Hospitalized since your last visit? No 
 
2. Have you seen or consulted any other health care providers outside of the [address] [place] [address] since your last visit? Include any pap smears or colon screening. No 
 
3) Do you have an Advance Directive on file? no 
 
4) Are you interested in receiving information on Advance Directives? NO Patient is accompanied by self I have received verbal consent from Carl Lopez to discuss any/all medical information while they are present in the room.

## 2018-08-30 NOTE — PROGRESS NOTES
HISTORY OF PRESENT ILLNESS Olga Mac is a 82 y.o. male. Pt. comes in for f/u. Has multiple medical problems. Had a recent UTI with hematuria. Culture grew Proteus. Treated with Cipro. Reports complete resolution of issues. Has chronic BPH with symptoms. BP and DM are stable. Cardiac status is stable. Followed by cardiologist for AS as well. Reports compliance with medications and diet. Med list and most recent labs/studies reviewed with pt. PSA was 1.2. All other labs are stable with A1c 6.9. Trying to be active physically to control weight. Needs ophthalmology referral.  Reports no other new c/o. Bladder Infection Associated symptoms include urgency. Pertinent negatives include no frequency, no hematuria, no flank pain and no abdominal pain. Hypertension Pertinent negatives include no chest pain, no blurred vision, no headaches, no dizziness and no shortness of breath. Diabetes Pertinent negatives include no chest pain, no abdominal pain, no headaches and no shortness of breath. Review of Systems Constitutional: Negative. HENT: Positive for hearing loss. Eyes: Negative for blurred vision. Respiratory: Negative for shortness of breath. Cardiovascular: Negative for chest pain and leg swelling. Gastrointestinal: Negative for abdominal pain. Genitourinary: Positive for urgency. Negative for dysuria, flank pain, frequency and hematuria. Musculoskeletal: Positive for joint pain. Negative for falls. Skin: Negative. Neurological: Negative for dizziness, sensory change, focal weakness and headaches. Psychiatric/Behavioral: Negative for depression. The patient has insomnia. The patient is not nervous/anxious. All other systems reviewed and are negative. Physical Exam  
Constitutional: He is oriented to person, place, and time. He appears well-developed and well-nourished. No distress. HENT:  
Head: Normocephalic and atraumatic. Mouth/Throat: Oropharynx is clear and moist.  
Eyes: Conjunctivae are normal.  
Neck: Normal range of motion. Neck supple. No JVD present. No thyromegaly present. Cardiovascular: Normal rate, regular rhythm and intact distal pulses. Murmur (AS) heard. Pulmonary/Chest: Effort normal and breath sounds normal. No respiratory distress. He has no wheezes. He has no rales. Abdominal: Soft. Bowel sounds are normal. He exhibits no distension. Musculoskeletal: He exhibits no edema or tenderness. Neurological: He is alert and oriented to person, place, and time. Coordination normal.  
Skin: Skin is warm and dry. No rash noted. Psychiatric: He has a normal mood and affect. His behavior is normal.  
Nursing note and vitals reviewed. ASSESSMENT and PLAN Diagnoses and all orders for this visit: 
 
1. Type 2 diabetes mellitus without complication, without long-term current use of insulin (HCC) 
-     REFERRAL TO OPHTHALMOLOGY 2. Essential hypertension 3. Coronary artery disease involving native coronary artery of native heart without angina pectoris 4. Benign prostatic hyperplasia with lower urinary tract symptoms, symptom details unspecified 5. Aortic valve stenosis, etiology of cardiac valve disease unspecified Follow-up Disposition: 
Return in about 4 months (around 12/30/2018). lab results and schedule of future lab studies reviewed with patient 
reviewed diet, exercise and weight control 
reviewed medications and side effects in detail F/u with other MD's as scheduled Overall stable

## 2018-08-30 NOTE — PROGRESS NOTES
HISTORY OF PRESENT ILLNESS Jennifer Mejia is a 82 y.o. male. Pt. comes in for f/u. Has multiple medical problems. Reports noticing blood in his urine earlier today. Has BPH symptoms. Denies dysuria, flank pain, other GI or  issues. Has a fall a few weeks ago. No significant injury. DM and BP have been stable. Reports compliance with medications and diet. Med list and most recent labs/studies reviewed with pt. Trying to be active physically as tolerated. Due for repeat labs. Reports no other new c/o. Blood in Urine Associated symptoms include hematuria. Pertinent negatives include no frequency, no urgency, no flank pain and no abdominal pain. Hypertension Pertinent negatives include no chest pain, no blurred vision, no headaches, no dizziness and no shortness of breath. Diabetes Pertinent negatives include no chest pain, no abdominal pain, no headaches and no shortness of breath. Fall Associated symptoms include hematuria. Pertinent negatives include no abdominal pain and no headaches. Review of Systems Constitutional: Negative. HENT: Positive for hearing loss. Eyes: Negative for blurred vision. Respiratory: Negative for shortness of breath. Cardiovascular: Negative for chest pain and leg swelling. Gastrointestinal: Negative for abdominal pain. Genitourinary: Positive for hematuria. Negative for dysuria, flank pain, frequency and urgency. Musculoskeletal: Positive for falls and joint pain. Skin: Negative. Neurological: Negative for dizziness, sensory change, focal weakness and headaches. Endo/Heme/Allergies: Negative for polydipsia. Psychiatric/Behavioral: Negative for depression. The patient has insomnia. The patient is not nervous/anxious. All other systems reviewed and are negative. Physical Exam  
Constitutional: He is oriented to person, place, and time. He appears well-developed and well-nourished. No distress.   
HENT:  
 Head: Normocephalic and atraumatic. Mouth/Throat: Oropharynx is clear and moist.  
Eyes: Conjunctivae are normal.  
Neck: Normal range of motion. Neck supple. No JVD present. No thyromegaly present. Cardiovascular: Normal rate, regular rhythm and intact distal pulses. Murmur heard. Pulmonary/Chest: Effort normal and breath sounds normal. No respiratory distress. He has no wheezes. He has no rales. Abdominal: Soft. Bowel sounds are normal. He exhibits no distension. There is no tenderness. Musculoskeletal: He exhibits no edema or tenderness. Neurological: He is alert and oriented to person, place, and time. Coordination normal.  
Skin: Skin is warm and dry. Psychiatric: He has a normal mood and affect. His behavior is normal.  
Nursing note and vitals reviewed. ASSESSMENT and PLAN Diagnoses and all orders for this visit: 
 
1. Urinary tract infection with hematuria, site unspecified 
-     PROSTATE SPECIFIC AG 
-     AMB POC URINALYSIS DIP STICK MANUAL W/O MICRO 
-     CULTURE, URINE 2. Gross hematuria 
-     PROSTATE SPECIFIC AG 
-     AMB POC URINALYSIS DIP STICK MANUAL W/O MICRO 
-     CULTURE, URINE 3. Type 2 diabetes mellitus without complication, without long-term current use of insulin ([facility] [address] -     LIPID PANEL 
-     METABOLIC PANEL, COMPREHENSIVE 
-     CBC W/O DIFF 
-     HEMOGLOBIN A1C WITH EAG 4. Essential hypertension 5. Benign prostatic hyperplasia with lower urinary tract symptoms, symptom details unspecified 
-     PROSTATE SPECIFIC AG Other orders -     CULTURE, URINE 
-     CVD REPORT 
-     DIABETES PATIENT EDUCATION Follow-up Disposition: 
Return in about 1 week (around 8/30/2018). lab results and schedule of future lab studies reviewed with patient 
reviewed diet, exercise and weight control 
reviewed medications and side effects in detail Advised patient to increase fluid intake Advised patient to monitor urine for any signs of bleeding

## 2018-09-06 NOTE — PROGRESS NOTES
Spoke with patient after verifying name and . Notified patient of lab results and recommendation from provider. Patient verbalized understanding and given a chance to ask questions. Finished antibiotics, no s/s at this time.

## 2018-09-07 RX ORDER — SERTRALINE HYDROCHLORIDE 100 MG/1
TABLET, FILM COATED ORAL
Qty: 30 TAB | Refills: 2 | Status: SHIPPED | OUTPATIENT
Start: 2018-09-07 | End: 2018-10-18 | Stop reason: SDUPTHER

## 2018-09-11 ENCOUNTER — OFFICE VISIT (OUTPATIENT)
Dept: INTERNAL MEDICINE CLINIC | Age: 83
End: 2018-09-11

## 2018-09-11 VITALS
TEMPERATURE: 98 F | RESPIRATION RATE: 17 BRPM | DIASTOLIC BLOOD PRESSURE: 65 MMHG | OXYGEN SATURATION: 92 % | WEIGHT: 191 LBS | HEART RATE: 67 BPM | HEIGHT: 72 IN | SYSTOLIC BLOOD PRESSURE: 141 MMHG | BODY MASS INDEX: 25.87 KG/M2

## 2018-09-11 DIAGNOSIS — E11.21 TYPE 2 DIABETES WITH NEPHROPATHY (HCC): ICD-10-CM

## 2018-09-11 DIAGNOSIS — H61.20 WAX IN EAR: ICD-10-CM

## 2018-09-11 DIAGNOSIS — I10 ESSENTIAL HYPERTENSION: ICD-10-CM

## 2018-09-11 DIAGNOSIS — J30.1 ALLERGIC RHINITIS DUE TO POLLEN, UNSPECIFIED SEASONALITY: Primary | ICD-10-CM

## 2018-09-11 RX ORDER — LORATADINE 10 MG/1
10 TABLET ORAL
Qty: 30 TAB | Refills: 0 | Status: SHIPPED | OUTPATIENT
Start: 2018-09-11 | End: 2019-04-25 | Stop reason: ALTCHOICE

## 2018-09-11 RX ORDER — FLUTICASONE PROPIONATE 50 MCG
2 SPRAY, SUSPENSION (ML) NASAL DAILY
Qty: 1 BOTTLE | Refills: 0 | Status: SHIPPED | OUTPATIENT
Start: 2018-09-11 | End: 2019-04-25 | Stop reason: ALTCHOICE

## 2018-09-11 NOTE — PROGRESS NOTES
Health Maintenance Due Topic Date Due  
 EYE EXAM RETINAL OR DILATED Q1  06/18/1945  
 DTaP/Tdap/Td series (1 - Tdap) 06/18/1958  ZOSTER VACCINE AGE 60>  04/18/1997  GLAUCOMA SCREENING Q2Y  06/18/2002  Pneumococcal 65+ Low/Medium Risk (2 of 2 - PPSV23) 09/30/2016  MICROALBUMIN Q1  09/12/2018 Chief Complaint Patient presents with  Cough  
  and chest congestion. Productive cough. Had discolored mucus, but lately has been clear. Has been using Fluticasone nasal spray. 1. Have you been to the ER, urgent care clinic since your last visit? Hospitalized since your last visit? No 
 
2. Have you seen or consulted any other health care providers outside of the [address] [place] [address] since your last visit? Include any pap smears or colon screening. No 
 
3) Do you have an Advance Directive on file? yes 4) Are you interested in receiving information on Advance Directives? NO Patient is accompanied by wife I have received verbal consent from Idalmis Ayala to discuss any/all medical information while they are present in the room.

## 2018-09-11 NOTE — MR AVS SNAPSHOT
[address]. A [facility] 7 [id number] 
[phone] Patient: Brain Brennan MRN: [id number] GHS:[birth year] Visit Information Date & Time Provider Department Dept. Phone Encounter #  
 9/11/2018 11:15 AM Juan Francisco Melendrez MD [facility] Internal Medicine [address] [place] [address] [phone] [id number] Your Appointments 12/6/2018 10:00 AM  
ECHO CARDIOGRAMS 2D with Priya Pro [facility] ([facility] SCHEDULING) Appt Note: echo for CAD 10:00 Dr. Jaky Franz 11:00 kmr [address] [facility] [address]  
[address] [facility] [address]  
  
    
 12/6/2018 11:00 AM  
ESTABLISHED PATIENT with Marylen Irving, MD  
[facility] ([facility]) Appt Note: echo for CAD 10:00 Dr. Jaky Franz 11:00 kmr [address] [facility] [address]  
[address] [facility] [address]  
  
    
 12/27/2018 10:00 AM  
Any with DO Rajan Cannon ([facility]) Appt Note: 4 mos f/u  
 [address] [place] [address]. A [facility] 7 [phone]  
[phone]  
  
   
 [address] [place] [address]. [address] [place] [id number] Upcoming Health Maintenance Date Due  
 EYE EXAM RETINAL OR DILATED Q1 6/18/1945 DTaP/Tdap/Td series (1 - Tdap) 6/18/1958 ZOSTER VACCINE AGE 60> 4/18/1997 GLAUCOMA SCREENING Q2Y 6/18/2002 Pneumococcal 65+ Low/Medium Risk (2 of 2 - PPSV23) 9/30/2016 MICROALBUMIN Q1 9/12/2018 Influenza Age 5 to Adult 10/31/2018* FOOT EXAM Q1 11/2/2018 MEDICARE YEARLY EXAM 12/15/2018 HEMOGLOBIN A1C Q6M 2/28/2019 LIPID PANEL Q1 8/28/2019 *Topic was postponed. The date shown is not the original due date. Allergies as of 9/11/2018  Review Complete On: 9/11/2018 By: Juan Francisco Melendrez MD  
 No Known Allergies Current Immunizations  Reviewed on 8/30/2018 No immunizations on file. Not reviewed this visit You Were Diagnosed With   
  
 Codes Comments Allergic rhinitis due to pollen, unspecified seasonality    -  Primary ICD-10-CM: J30.1 ICD-9-CM: 477.0 Wax in ear     ICD-10-CM: H61.20 ICD-9-CM: 380.4 Essential hypertension     ICD-10-CM: I10 
ICD-9-CM: 401.9 Type 2 diabetes with nephropathy (HCC)     ICD-10-CM: E11.21 
ICD-9-CM: 250.40, 583.81 Vitals BP Pulse Temp Resp Height(growth percentile) Weight(growth percentile) 141/65 (BP 1 Location: Right arm, BP Patient Position: Sitting) 67 98 °F (36.7 °C) (Oral) 17 6' (1.829 m) 191 lb (86.6 kg) SpO2 BMI Smoking Status 92% 25.9 kg/m2 Former Smoker Vitals History BMI and BSA Data Body Mass Index Body Surface Area  
 25.9 kg/m 2 2.1 m 2 Preferred Pharmacy Pharmacy Name Phone [address] [facility], [address] [place] [address] [address] [place] [address] Your Updated Medication List  
  
   
This list is accurate as of 9/11/18 11:59 AM.  Always use your most recent med list. amLODIPine 10 mg tablet Commonly known as:  Morrison Chanelle TAKE 1 TABLET BY MOUTH  DAILY  
  
 aspirin 325 mg tablet Commonly known as:  ASPIRIN Take 1 Tab by mouth daily. atorvastatin 10 mg tablet Commonly known as:  LIPITOR  
TAKE 1 TABLET BY MOUTH  DAILY  
  
 cholecalciferol 1,000 unit Cap Commonly known as:  VITAMIN D3 Take 1 Cap by mouth daily. doxazosin 4 mg tablet Commonly known as:  CARDURA TAKE 1 TABLET BY MOUTH  DAILY  
  
 ergocalciferol 50,000 unit capsule Commonly known as:  ERGOCALCIFEROL  
TAKE 1 CAPSULE BY MOUTH EVERY 2 WEEKS. finasteride 5 mg tablet Commonly known as:  PROSCAR  
TAKE 1 TABLET BY MOUTH  DAILY  
  
 fluticasone 50 mcg/actuation nasal spray Commonly known as:  [place] 2 Sprays by Both Nostrils route daily. glipiZIDE SR 5 mg CR tablet Commonly known as:  GLUCOTROL XL  
TAKE 1 TABLET BY MOUTH  DAILY  
  
 glucose blood VI test strips strip Commonly known as:  TRUE METRIX GLUCOSE TEST STRIP Check BS BID  Dx: DM  E11.21  
  
 guaiFENesin-dextromethorphan -30 mg per tablet Commonly known as:  Abdulkadir & Abdulkadir DM Take 1 Tab by mouth two (2) times a day. hydroCHLOROthiazide 12.5 mg tablet Commonly known as:  HYDRODIURIL  
TAKE 1 TABLET BY MOUTH  DAILY Hydrocolloid Dressing 4 X 4 \" Bndg Commonly known as:  DUODERM CGF DRESSING Apply to affected area on buttock every 3 days * labetalol 100 mg tablet Commonly known as:  NORMODYNE  
TAKE 1 TABLET BY MOUTH  DAILY  
  
 * labetalol 100 mg tablet Commonly known as:  NORMODYNE  
TAKE 1 TABLET BY MOUTH  DAILY  
  
 loratadine 10 mg tablet Commonly known as:  Jearline [place] Take 1 Tab by mouth daily as needed for Allergies. losartan 25 mg tablet Commonly known as:  COZAAR Take 0.5 Tabs by mouth daily. magnesium 250 mg Tab Take 1 Tab by mouth daily. Menthol-Zinc Oxide 0.44-20.6 % Oint Commonly known as:  Calmoseptine Apply to effected areas on buttocks twice daily  
  
 metFORMIN  mg tablet Commonly known as:  GLUCOPHAGE XR  
TAKE 1 TABLET BY MOUTH TWO  TIMES DAILY  
  
 naproxen 500 mg tablet Commonly known as:  NAPROSYN Take 1 Tab by mouth two (2) times daily (with meals). sertraline 100 mg tablet Commonly known as:  ZOLOFT  
TAKE 1 TAB BY MOUTH DAILY. sodium chloride 0.65 % nasal squeeze bottle Commonly known as:  OCEAN  
0.05 mL by Both Nostrils route as needed for Congestion. SUPER B COMPLEX PO Take  by mouth. temazepam 15 mg capsule Commonly known as:  RESTORIL  
TAKE 1 CAPSULE BY MOUTH AT BEDTIME AS NEEDED FOR SLEEP. traZODone 50 mg tablet Commonly known as:  DESYREL  
TAKE 2 TABLETS BY MOUTH  NIGHTLY * Notice: This list has 2 medication(s) that are the same as other medications prescribed for you. Read the directions carefully, and ask your doctor or other care provider to review them with you. Prescriptions Sent to Pharmacy Refills  
 loratadine (CLARITIN) 10 mg tablet 0 Sig: Take 1 Tab by mouth daily as needed for Allergies. Class: Normal  
 Pharmacy: [facility], . [address] Ph #: [phone] Route: Oral  
 fluticasone (FLONASE) 50 mcg/actuation nasal spray 0 Si Sprays by Both Nostrils route daily. Class: Normal  
 Pharmacy: [facility], . [address] Ph #: [phone] Route: Both Nostrils Introducing [facility]! Dear Kitty Dyson: 
Thank you for requesting a [employer] account. Our records indicate that you have previously registered for a [employer] account but its currently inactive. Please call our [employer] support line at [phone]. Additional Information If you have questions, please visit the Frequently Asked Questions section of the [employer] website at [email or web address] Remember, [employer] is NOT to be used for urgent needs. For medical emergencies, dial 911. Now available from your iPhone and Android! Please provide this summary of care documentation to your next provider. Your primary care clinician is listed as Sofia Marcos. If you have any questions after today's visit, please call [phone].

## 2018-09-11 NOTE — PROGRESS NOTES
HISTORY OF PRESENT ILLNESS Georgie Jamison is a 82 y.o. male here with the complaining of nasal congestion postnasal drip and cough for last several days. He has been using Flonase which is helping him. Report hearing loss. On hearing aid. Has diabetes, on medications. Blood sugar within normal limit. Labs reviewed. Cough Review of Systems Constitutional: Negative. HENT: Positive for congestion and hearing loss. Eyes: Negative. Respiratory: Positive for cough. Cardiovascular: Negative. Gastrointestinal: Negative. Genitourinary: Negative. Musculoskeletal: Positive for falls and joint pain. Skin:  
     Skin abrasions and decubitus ulcer. Neurological: Negative. Psychiatric/Behavioral: Negative. Physical Exam  
Constitutional: He appears well-developed and well-nourished. No distress. HENT:  
Head: Normocephalic and atraumatic. Right Ear: External ear normal.  
Left Ear: External ear normal.  
Mouth/Throat: Oropharynx is clear and moist. No oropharyngeal exudate. Nasal turbinates:red inflamed,NT Cobble stoning present. Both ear canals: Impacted wax present. Irrigated and removed. Neck: Normal range of motion. Neck supple. No JVD present. No thyromegaly present. Cardiovascular: Normal rate, regular rhythm, normal heart sounds and intact distal pulses. Pulmonary/Chest: Effort normal and breath sounds normal. No respiratory distress. He has no wheezes. Musculoskeletal: Normal range of motion. He exhibits no edema. Lymphadenopathy:  
  He has no cervical adenopathy. Psychiatric: He has a normal mood and affect. His behavior is normal.  
 
 
ASSESSMENT and PLAN Diagnoses and all orders for this visit: 1. Allergic rhinitis due to pollen, unspecified seasonality Rest and fluid. Will call in, 
-     loratadine (CLARITIN) 10 mg tablet; Take 1 Tab by mouth daily as needed for Allergies. -     fluticasone (FLONASE) 50 mcg/actuation nasal spray; 2 Sprays by Both Nostrils route daily. 2. Wax in ear Irrigated and removed. 3. Essential hypertension Stable with current regimen. 4. Type 2 diabetes with nephropathy ([facility] [address] Stable. . 
Discussed expected course/resolution/complications of diagnosis in detail with patient. Medication risks/benefits/costs/interactions/alternatives discussed with patient. Pt was given an after visit summary which includes diagnoses, current medications & vitals. Pt expressed understanding with the diagnosis and plan.

## 2018-09-17 ENCOUNTER — OFFICE VISIT (OUTPATIENT)
Dept: INTERNAL MEDICINE CLINIC | Age: 83
End: 2018-09-17

## 2018-09-17 VITALS
BODY MASS INDEX: 26.19 KG/M2 | DIASTOLIC BLOOD PRESSURE: 51 MMHG | WEIGHT: 193.4 LBS | HEART RATE: 65 BPM | HEIGHT: 72 IN | TEMPERATURE: 96.8 F | OXYGEN SATURATION: 94 % | SYSTOLIC BLOOD PRESSURE: 114 MMHG | RESPIRATION RATE: 18 BRPM

## 2018-09-17 DIAGNOSIS — J06.9 URTI (ACUTE UPPER RESPIRATORY INFECTION): Primary | ICD-10-CM

## 2018-09-17 RX ORDER — AZITHROMYCIN 250 MG/1
TABLET, FILM COATED ORAL
Qty: 6 TAB | Refills: 0 | Status: SHIPPED | OUTPATIENT
Start: 2018-09-17 | End: 2018-12-27 | Stop reason: ALTCHOICE

## 2018-09-17 NOTE — MR AVS SNAPSHOT
[address] [facility] 11. A [facility] 7 [address] 
[phone] Patient: Ebony Lerma MRN: [id number] VOC:[id number] Visit Information Date & Time Provider Department Dept. Phone Encounter #  
 9/17/2018  1:00 PM Kristie Buitrago, [address] [facility] [phone] [id number] Your Appointments 12/6/2018 10:00 AM  
ECHO CARDIOGRAMS 2D with Roxie Ferraro [facility] ([facility] SCHEDULING) Appt Note: echo for CAD 10:00 Dr. Gilbert Salmon 11:00 kmr [address] [facility] [address]  
[address] [place] [address] Street [address]  
  
    
 12/6/2018 11:00 AM  
ESTABLISHED PATIENT with Kenny Davis MD  
[facility] ([address] [facility]) Appt Note: echo for CAD 10:00 Dr. Gilbert Salmon 11:00 kmr [address] [facility] [address]  
[address] [place] [address] Street [address]  
  
    
 12/27/2018 10:00 AM  
Any with DO Rajan Bui ([address]) Appt Note: 4 mos f/u  
 [facility] 11. A [facility] 7 [id number]  
[phone]  
  
   
 [place] [address] [place] [id number] Upcoming Health Maintenance Date Due  
 EYE EXAM RETINAL OR DILATED Q1 6/18/1945 DTaP/Tdap/Td series (1 - Tdap) 6/18/1958 ZOSTER VACCINE AGE 60> 4/18/1997 GLAUCOMA SCREENING Q2Y 6/18/2002 Pneumococcal 65+ Low/Medium Risk (2 of 2 - PPSV23) 9/30/2016 MICROALBUMIN Q1 9/12/2018 Influenza Age 5 to Adult 10/31/2018* FOOT EXAM Q1 11/2/2018 MEDICARE YEARLY EXAM 12/15/2018 HEMOGLOBIN A1C Q6M 2/28/2019 LIPID PANEL Q1 8/28/2019 *Topic was postponed. The date shown is not the original due date. Allergies as of 9/17/2018  Review Complete On: 9/17/2018 By: Kristie Buitrago NP No Known Allergies Current Immunizations  Reviewed on 8/30/2018 No immunizations on file. Not reviewed this visit Vitals BP Pulse Temp Resp Height(growth percentile) Weight(growth percentile) 114/51 (BP 1 Location: Right arm, BP Patient Position: Sitting) 65 96.8 °F (36 °C) (Oral) 18 6' (1.829 m) 193 lb 6.4 oz (87.7 kg) SpO2 BMI Smoking Status 94% 26.23 kg/m2 Former Smoker Vitals History BMI and BSA Data Body Mass Index Body Surface Area  
 26.23 kg/m 2 2.11 m 2 Preferred Pharmacy Pharmacy Name Phone [phone], [address] [place] [phone] Your Updated Medication List  
  
   
This list is accurate as of 9/17/18  1:30 PM.  Always use your most recent med list. amLODIPine 10 mg tablet Commonly known as:  Love Breach TAKE 1 TABLET BY MOUTH  DAILY  
  
 aspirin 325 mg tablet Commonly known as:  ASPIRIN Take 1 Tab by mouth daily. atorvastatin 10 mg tablet Commonly known as:  LIPITOR  
TAKE 1 TABLET BY MOUTH  DAILY  
  
 azithromycin 250 mg tablet Commonly known as:  Idelia Fines 2 tabs po today, then one daily x 4 days  
  
 cholecalciferol 1,000 unit Cap Commonly known as:  VITAMIN D3 Take 1 Cap by mouth daily. doxazosin 4 mg tablet Commonly known as:  CARDURA TAKE 1 TABLET BY MOUTH  DAILY  
  
 ergocalciferol 50,000 unit capsule Commonly known as:  ERGOCALCIFEROL  
TAKE 1 CAPSULE BY MOUTH EVERY 2 WEEKS. finasteride 5 mg tablet Commonly known as:  PROSCAR  
TAKE 1 TABLET BY MOUTH  DAILY  
  
 fluticasone 50 mcg/actuation nasal spray Commonly known as:  Beba [place] 2 Sprays by Both Nostrils route daily. glipiZIDE SR 5 mg CR tablet Commonly known as:  GLUCOTROL XL  
TAKE 1 TABLET BY MOUTH  DAILY  
  
 glucose blood VI test strips strip Commonly known as:  TRUE METRIX GLUCOSE TEST STRIP Check BS BID  Dx: DM  E11.21  
  
 hydroCHLOROthiazide 12.5 mg tablet Commonly known as:  HYDRODIURIL  
TAKE 1 TABLET BY MOUTH  DAILY Hydrocolloid Dressing 4 X 4 \" Bndg Commonly known as:  DUODERM CGF DRESSING Apply to affected area on buttock every 3 days  
  
 labetalol 100 mg tablet Commonly known as:  NORMODYNE  
TAKE 1 TABLET BY MOUTH  DAILY  
  
 loratadine 10 mg tablet Commonly known as:  Callie Hunting Take 1 Tab by mouth daily as needed for Allergies. losartan 25 mg tablet Commonly known as:  COZAAR Take 0.5 Tabs by mouth daily. magnesium 250 mg Tab Take 1 Tab by mouth daily. Menthol-Zinc Oxide 0.44-20.6 % Oint Commonly known as:  Calmoseptine Apply to effected areas on buttocks twice daily  
  
 metFORMIN  mg tablet Commonly known as:  GLUCOPHAGE XR  
TAKE 1 TABLET BY MOUTH TWO  TIMES DAILY  
  
 naproxen 500 mg tablet Commonly known as:  NAPROSYN Take 1 Tab by mouth two (2) times daily (with meals). sertraline 100 mg tablet Commonly known as:  ZOLOFT  
TAKE 1 TAB BY MOUTH DAILY. sodium chloride 0.65 % nasal squeeze bottle Commonly known as:  OCEAN  
0.05 mL by Both Nostrils route as needed for Congestion. SUPER B COMPLEX PO Take  by mouth. temazepam 15 mg capsule Commonly known as:  RESTORIL  
TAKE 1 CAPSULE BY MOUTH AT BEDTIME AS NEEDED FOR SLEEP. traZODone 50 mg tablet Commonly known as:  DESYREL  
TAKE 2 TABLETS BY MOUTH  NIGHTLY Prescriptions Sent to Pharmacy Refills  
 azithromycin (ZITHROMAX) 250 mg tablet 0 Si tabs po today, then one daily x 4 days Class: Normal  
 Pharmacy: [facility] [address] [place] [address] Ph #: [phone] [facility] SERVICES! Dear Sid Mosley: 
Thank you for requesting a GetIntent account. Our records indicate that you have previously registered for a GetIntent account but its currently inactive. Please call our GetIntent support line at [phone]. Additional Information If you have questions, please visit the Frequently Asked Questions section of the [employer] website at [email or web address] Remember, [employer] is NOT to be used for urgent needs. For medical emergencies, dial 911. Now available from your iPhone and Android! Please provide this summary of care documentation to your next provider. Your primary care clinician is listed as Agata Hermosillo. If you have any questions after today's visit, please call [phone].

## 2018-09-17 NOTE — PROGRESS NOTES
Health Maintenance Due   Topic Date Due    EYE EXAM RETINAL OR DILATED Q1  06[birth year]/1947    DTaP/Tdap/Td series (1 - Tdap) 06/18/1958    ZOSTER VACCINE AGE 60>  04/18/1997    GLAUCOMA SCREENING Q2Y  06/18/2002    Pneumococcal 65+ Low/Medium Risk (2 of 2 - PPSV23) 09/30/2016    MICROALBUMIN Q1  09/12/2018       Chief Complaint   Patient presents with    Cough     Mucus - colored, LOV 9/11/18       1. Have you been to the ER, urgent care clinic since your last visit? Hospitalized since your last visit? No    2. Have you seen or consulted any other health care providers outside of the [address] [place] [address] since your last visit? Include any pap smears or colon screening. No    3) Do you have an Advance Directive on file? no    4) Are you interested in receiving information on Advance Directives? NO      Patient is accompanied by self I have received verbal consent from Ansley Shaver to discuss any/all medical information while they are present in the room.

## 2018-09-17 NOTE — PROGRESS NOTES
HISTORY OF PRESENT ILLNESS  Jayme Ochoa is a 82 y.o. male. This patient presents today with complaints of cough and congestion x 10 days. He was in to see Dr. Mariana Patino last week with similar complaints and was started on Claritin. Patient continues to use Flonase as well. Patient states he now is experiencing cough productive of yellow and brown sputum. He denies chest pain and shortness of breath. He denies fever and chills. He has used OTC cough syrup PRN. Visit Vitals    /51 (BP 1 Location: Right arm, BP Patient Position: Sitting)    Pulse 65    Temp 96.8 °F (36 °C) (Oral)    Resp 18    Ht 6' (1.829 m)    Wt 193 lb 6.4 oz (87.7 kg)    SpO2 94%    BMI 26.23 kg/m2       HPI    Review of Systems   Constitutional: Negative for chills, fever and malaise/fatigue. HENT: Positive for congestion. Respiratory: Positive for cough and sputum production. Negative for shortness of breath and wheezing. Cardiovascular: Negative for chest pain and leg swelling. Gastrointestinal: Negative for abdominal pain. Genitourinary: Negative. Musculoskeletal: Negative. Skin: Negative. Neurological: Negative for dizziness and headaches. Physical Exam   Constitutional: He is oriented to person, place, and time. He appears well-developed and well-nourished. No distress. HENT:   Head: Normocephalic and atraumatic. Mouth/Throat: No oropharyngeal exudate. Cardiovascular: Normal rate and regular rhythm. Murmur heard. Pulmonary/Chest: Effort normal and breath sounds normal. No respiratory distress. He has no wheezes. He has no rales. productive cough   Abdominal: Soft. He exhibits no distension. Musculoskeletal: He exhibits no edema. Neurological: He is alert and oriented to person, place, and time. Skin: Skin is warm and dry. Psychiatric: He has a normal mood and affect. Vitals reviewed. ASSESSMENT and PLAN  Encounter Diagnoses   Name Primary?     URTI (acute upper respiratory infection) Yes     Will order  Orders Placed This Encounter    azithromycin (ZITHROMAX) 250 mg tablet     Si tabs po today, then one daily x 4 days     Dispense:  6 Tab     Refill:  0  Per chart review, patient has tolerated medication in past.   May continue Claritin 10 mg po daily PRN congestion and Flonase 2 sprays to each nostril daily. Lab results and schedule of future lab studies reviewed   Reviewed medications and side effects in detail    Patient encouraged to call or return to office if symptoms do not improve or worsen. Reviewed plan of care with patient who acknowledges understanding and agrees. If experiencing severe shortness of breath or chest pain, present to the ER.

## 2018-10-02 DIAGNOSIS — G47.00 INSOMNIA, UNSPECIFIED TYPE: ICD-10-CM

## 2018-10-02 RX ORDER — TRAZODONE HYDROCHLORIDE 50 MG/1
TABLET ORAL
Qty: 60 TAB | Refills: 5 | Status: SHIPPED | OUTPATIENT
Start: 2018-10-02 | End: 2018-12-12 | Stop reason: SDUPTHER

## 2018-10-15 ENCOUNTER — CLINICAL SUPPORT (OUTPATIENT)
Dept: INTERNAL MEDICINE CLINIC | Age: 83
End: 2018-10-15

## 2018-10-15 VITALS
DIASTOLIC BLOOD PRESSURE: 52 MMHG | HEART RATE: 58 BPM | SYSTOLIC BLOOD PRESSURE: 122 MMHG | TEMPERATURE: 97.8 F | WEIGHT: 197.8 LBS | HEIGHT: 72 IN | OXYGEN SATURATION: 94 % | RESPIRATION RATE: 18 BRPM | BODY MASS INDEX: 26.79 KG/M2

## 2018-10-15 DIAGNOSIS — Z23 ENCOUNTER FOR IMMUNIZATION: Primary | ICD-10-CM

## 2018-10-15 NOTE — PATIENT INSTRUCTIONS
Vaccine Information Statement    Influenza (Flu) Vaccine (Inactivated or Recombinant): What you need to know    Many Vaccine Information Statements are available in Swedish and other languages. See www.immunize.org/vis  Hojas de Información Sobre Vacunas están disponibles en Español y en muchos otros idiomas. Visite www.immunize.org/vis    1. Why get vaccinated? Influenza (flu) is a contagious disease that spreads around the [place] every year, usually between October and May. Flu is caused by influenza viruses, and is spread mainly by coughing, sneezing, and close contact. Anyone can get flu. Flu strikes suddenly and can last several days. Symptoms vary by age, but can include:   fever/chills   sore throat   muscle aches   fatigue   cough   headache    runny or stuffy nose    Flu can also lead to pneumonia and blood infections, and cause diarrhea and seizures in children. If you have a medical condition, such as heart or lung disease, flu can make it worse. Flu is more dangerous for some people. Infants and young children, people 72years of age and older, pregnant women, and people with certain health conditions or a weakened immune system are at greatest risk. Each year thousands of people in the [facility] die from flu, and many more are hospitalized. Flu vaccine can:   keep you from getting flu,   make flu less severe if you do get it, and   keep you from spreading flu to your family and other people. 2. Inactivated and recombinant flu vaccines    A dose of flu vaccine is recommended every flu season. Children 6 months through 6years of age may need two doses during the same flu season. Everyone else needs only one dose each flu season.        Some inactivated flu vaccines contain a very small amount of a mercury-based preservative called thimerosal. Studies have not shown thimerosal in vaccines to be harmful, but flu vaccines that do not contain thimerosal are available. There is no live flu virus in flu shots. They cannot cause the flu. There are many flu viruses, and they are always changing. Each year a new flu vaccine is made to protect against three or four viruses that are likely to cause disease in the upcoming flu season. But even when the vaccine doesnt exactly match these viruses, it may still provide some protection    Flu vaccine cannot prevent:   flu that is caused by a virus not covered by the vaccine, or   illnesses that look like flu but are not. It takes about 2 weeks for protection to develop after vaccination, and protection lasts through the flu season. 3. Some people should not get this vaccine    Tell the person who is giving you the vaccine:     If you have any severe, life-threatening allergies. If you ever had a life-threatening allergic reaction after a dose of flu vaccine, or have a severe allergy to any part of this vaccine, you may be advised not to get vaccinated. Most, but not all, types of flu vaccine contain a small amount of egg protein.  If you ever had Guillain-Barré Syndrome (also called GBS). Some people with a history of GBS should not get this vaccine. This should be discussed with your doctor.  If you are not feeling well. It is usually okay to get flu vaccine when you have a mild illness, but you might be asked to come back when you feel better. 4. Risks of a vaccine reaction    With any medicine, including vaccines, there is a chance of reactions. These are usually mild and go away on their own, but serious reactions are also possible. Most people who get a flu shot do not have any problems with it.      Minor problems following a flu shot include:    soreness, redness, or swelling where the shot was given     hoarseness   sore, red or itchy eyes   cough   fever   aches   headache   itching   fatigue  If these problems occur, they usually begin soon after the shot and last 1 or 2 days. More serious problems following a flu shot can include the following:     There may be a small increased risk of Guillain-Barré Syndrome (GBS) after inactivated flu vaccine. This risk has been estimated at 1 or 2 additional cases per million people vaccinated. This is much lower than the risk of severe complications from flu, which can be prevented by flu vaccine.  Young children who get the flu shot along with pneumococcal vaccine (PCV13) and/or DTaP vaccine at the same time might be slightly more likely to have a seizure caused by fever. Ask your doctor for more information. Tell your doctor if a child who is getting flu vaccine has ever had a seizure. Problems that could happen after any injected vaccine:      People sometimes faint after a medical procedure, including vaccination. Sitting or lying down for about 15 minutes can help prevent fainting, and injuries caused by a fall. Tell your doctor if you feel dizzy, or have vision changes or ringing in the ears.  Some people get severe pain in the shoulder and have difficulty moving the arm where a shot was given. This happens very rarely.  Any medication can cause a severe allergic reaction. Such reactions from a vaccine are very rare, estimated at about 1 in a million doses, and would happen within a few minutes to a few hours after the vaccination. As with any medicine, there is a very remote chance of a vaccine causing a serious injury or death. The safety of vaccines is always being monitored. For more information, visit: www.cdc.gov/vaccinesafety/    5. What if there is a serious reaction? What should I look for?  Look for anything that concerns you, such as signs of a severe allergic reaction, very high fever, or unusual behavior.     Signs of a severe allergic reaction can include hives, swelling of the face and throat, difficulty breathing, a fast heartbeat, dizziness, and weakness - usually within a few minutes to a few hours after the vaccination. What should I do?  If you think it is a severe allergic reaction or other emergency that cant wait, call 9-1-1 and get the person to the nearest hospital. Otherwise, call your doctor.  Reactions should be reported to the Vaccine Adverse Event Reporting System (VAERS). Your doctor should file this report, or you can do it yourself through  the VAERS web site at www.vaers. [facility].gov, or by calling 7-645.366.9345. VAERS does not give medical advice. 6. The National Vaccine Injury Compensation Program    The [facility] Vaccine Injury Compensation Program (VICP) is a federal program that was created to compensate people who may have been injured by certain vaccines. Persons who believe they may have been injured by a vaccine can learn about the program and about filing a claim by calling 1-415.486.8630 or visiting the [employer] website at www.[facility].gov/vaccinecompensation. There is a time limit to file a claim for compensation. 7. How can I learn more?  Ask your healthcare provider. He or she can give you the vaccine package insert or suggest other sources of information.  Call your local or state health department.  Contact the Centers for Disease Control and Prevention (CDC):  - Call 6-295.353.6706 (1-800-CDC-INFO) or  - Visit CDCs website at www.cdc.gov/flu    Vaccine Information Statement   Inactivated Influenza Vaccine   8/7/2015  42 RODERICK Roche [id number]    Department of Health and Human Services  Centers for Disease Control and Prevention    Office Use Only

## 2018-10-15 NOTE — PROGRESS NOTES
Health Maintenance Due   Topic Date Due    EYE EXAM RETINAL OR DILATED Q1  06[birth year]/1947    DTaP/Tdap/Td series (1 - Tdap) 06/18/1958    Shingrix Vaccine Age 50> (1 of 2) 06/18/1987    GLAUCOMA SCREENING Q2Y  06/18/2002    Pneumococcal 65+ Low/Medium Risk (2 of 2 - PPSV23) 09/30/2016    MICROALBUMIN Q1  09/12/2018    FOOT EXAM Q1  11/02/2018       Chief Complaint   Patient presents with    Immunization/Injection     Flu       1. Have you been to the ER, urgent care clinic since your last visit? Hospitalized since your last visit? No    2. Have you seen or consulted any other health care providers outside of the [address] [place] [address] since your last visit? Include any pap smears or colon screening. No    3) Do you have an Advance Directive on file? no    4) Are you interested in receiving information on Advance Directives? NO      Patient is accompanied by patient and wife I have received verbal consent from Brodie Miller to discuss any/all medical information while they are present in the room. Brodie Miller is a 82 y.o. male  who presents for routine immunizations. He denies any symptoms , reactions or allergies that would exclude them from being immunized today. Risks and adverse reactions were discussed and the VIS was given to them. All questions were addressed. He was observed for 10 min post injection. There were no reactions observed.     Nick King LPN
unk per pt

## 2018-10-18 RX ORDER — METFORMIN HYDROCHLORIDE 500 MG/1
500 TABLET, EXTENDED RELEASE ORAL 2 TIMES DAILY WITH MEALS
Qty: 180 TAB | Refills: 1 | Status: SHIPPED | OUTPATIENT
Start: 2018-10-18 | End: 2019-03-19 | Stop reason: SDUPTHER

## 2018-10-18 RX ORDER — SERTRALINE HYDROCHLORIDE 100 MG/1
TABLET, FILM COATED ORAL
Qty: 30 TAB | Refills: 2 | Status: SHIPPED | OUTPATIENT
Start: 2018-10-18 | End: 2019-01-17 | Stop reason: SDUPTHER

## 2018-10-23 DIAGNOSIS — G47.00 INSOMNIA, UNSPECIFIED TYPE: ICD-10-CM

## 2018-10-25 RX ORDER — TEMAZEPAM 15 MG/1
CAPSULE ORAL
Qty: 30 CAP | Refills: 2 | Status: SHIPPED | OUTPATIENT
Start: 2018-10-25 | End: 2018-12-12 | Stop reason: SDUPTHER

## 2018-12-07 RX ORDER — HYDROCHLOROTHIAZIDE 12.5 MG/1
TABLET ORAL
Qty: 90 TAB | Refills: 1 | Status: SHIPPED | OUTPATIENT
Start: 2018-12-07 | End: 2019-08-10 | Stop reason: SDUPTHER

## 2018-12-12 DIAGNOSIS — G47.00 INSOMNIA, UNSPECIFIED TYPE: ICD-10-CM

## 2018-12-13 RX ORDER — TEMAZEPAM 15 MG/1
CAPSULE ORAL
Qty: 30 CAP | Refills: 2 | Status: SHIPPED | OUTPATIENT
Start: 2018-12-13 | End: 2018-12-18 | Stop reason: SDUPTHER

## 2018-12-13 RX ORDER — TRAZODONE HYDROCHLORIDE 50 MG/1
TABLET ORAL
Qty: 60 TAB | Refills: 5 | Status: SHIPPED | OUTPATIENT
Start: 2018-12-13 | End: 2019-05-03 | Stop reason: SDUPTHER

## 2018-12-18 DIAGNOSIS — G47.00 INSOMNIA, UNSPECIFIED TYPE: ICD-10-CM

## 2018-12-18 RX ORDER — TEMAZEPAM 15 MG/1
CAPSULE ORAL
Qty: 30 CAP | Refills: 2 | Status: SHIPPED | OUTPATIENT
Start: 2018-12-18 | End: 2018-12-27 | Stop reason: SDUPTHER

## 2018-12-26 RX ORDER — ATORVASTATIN CALCIUM 10 MG/1
TABLET, FILM COATED ORAL
Qty: 90 TAB | Refills: 1 | Status: SHIPPED | OUTPATIENT
Start: 2018-12-26 | End: 2019-08-10 | Stop reason: SDUPTHER

## 2018-12-26 RX ORDER — GLIPIZIDE 5 MG/1
TABLET, FILM COATED, EXTENDED RELEASE ORAL
Qty: 90 TAB | Refills: 1 | Status: SHIPPED | OUTPATIENT
Start: 2018-12-26 | End: 2019-08-10 | Stop reason: SDUPTHER

## 2018-12-26 RX ORDER — LOSARTAN POTASSIUM 25 MG/1
TABLET ORAL
Qty: 45 TAB | Refills: 3 | Status: SHIPPED | OUTPATIENT
Start: 2018-12-26 | End: 2019-10-24 | Stop reason: SDUPTHER

## 2018-12-27 ENCOUNTER — OFFICE VISIT (OUTPATIENT)
Dept: INTERNAL MEDICINE CLINIC | Age: 83
End: 2018-12-27

## 2018-12-27 ENCOUNTER — HOSPITAL ENCOUNTER (OUTPATIENT)
Dept: LAB | Age: 83
Discharge: HOME | End: 2018-12-27
Payer: MEDICARE

## 2018-12-27 VITALS
DIASTOLIC BLOOD PRESSURE: 57 MMHG | HEART RATE: 63 BPM | SYSTOLIC BLOOD PRESSURE: 129 MMHG | RESPIRATION RATE: 16 BRPM | OXYGEN SATURATION: 97 % | HEIGHT: 72 IN | WEIGHT: 196 LBS | BODY MASS INDEX: 26.55 KG/M2 | TEMPERATURE: 97 F

## 2018-12-27 DIAGNOSIS — E11.9 TYPE 2 DIABETES MELLITUS WITHOUT COMPLICATION, WITHOUT LONG-TERM CURRENT USE OF INSULIN (HCC): Primary | ICD-10-CM

## 2018-12-27 DIAGNOSIS — Z00.00 MEDICARE ANNUAL WELLNESS VISIT, SUBSEQUENT: ICD-10-CM

## 2018-12-27 DIAGNOSIS — I73.9 PAD (PERIPHERAL ARTERY DISEASE) (HCC): ICD-10-CM

## 2018-12-27 DIAGNOSIS — I10 ESSENTIAL HYPERTENSION: ICD-10-CM

## 2018-12-27 DIAGNOSIS — G47.00 INSOMNIA, UNSPECIFIED TYPE: ICD-10-CM

## 2018-12-27 DIAGNOSIS — R26.81 GAIT INSTABILITY: ICD-10-CM

## 2018-12-27 PROCEDURE — 82043 UR ALBUMIN QUANTITATIVE: CPT

## 2018-12-27 RX ORDER — TEMAZEPAM 15 MG/1
CAPSULE ORAL
Qty: 90 CAP | Refills: 1 | Status: SHIPPED | OUTPATIENT
Start: 2018-12-27 | End: 2019-04-25 | Stop reason: SDUPTHER

## 2018-12-27 NOTE — PROGRESS NOTES
Health Maintenance Due   Topic Date Due    EYE EXAM RETINAL OR DILATED  06[birth year]18/1947    Shingrix Vaccine Age 50> (1 of 2) 06/18/1987    GLAUCOMA SCREENING Q2Y  06/18/2002    MICROALBUMIN Q1  09/12/2018    FOOT EXAM Q1  11/02/2018    MEDICARE YEARLY EXAM  12/15/2018       Chief Complaint   Patient presents with    Hypertension     4 mo f/u    Cholesterol Problem    Diabetes     Type 2       1. Have you been to the ER, urgent care clinic since your last visit? Hospitalized since your last visit? No    2. Have you seen or consulted any other health care providers outside of the [address] [place] [address] since your last visit? Include any pap smears or colon screening. No    3) Do you have an Advance Directive on file? no    4) Are you interested in receiving information on Advance Directives? NO      Patient is accompanied by wife I have received verbal consent from Shikha Pickens to discuss any/all medical information while they are present in the room.

## 2018-12-27 NOTE — PROGRESS NOTES
Ella Powers is a 82 y.o. male and presents for annual Medicare Wellness Visit. Problem List: Reviewed with patient and discussed risk factors.     Patient Active Problem List   Diagnosis Code    Type 2 diabetes mellitus without complication, without long-term current use of insulin ([facility]) E11.9    Essential hypertension I10    Hypercholesterolemia E78.00    Coronary artery disease involving native coronary artery of native heart without angina pectoris I25.10    S/P CABG (coronary artery bypass graft) Z95.1    Aortic valve stenosis I35.0    Aortic systolic murmur on examination I35.8    Benign prostatic hyperplasia with lower urinary tract symptoms N40.1    Insomnia G47.00    Former smoker Z87.891    ACP (advance care planning) Z71.89    Chronic ulcer of buttock ([facility] [address] L98.419    Type 2 diabetes with nephropathy ([facility] [address] E11.21    Gait instability R26.81       Current medical providers:  Patient Care Team:  Jannie Loera DO as PCP - General (Internal Medicine)  Rebecca Jolly RN as Ambulatory Care Navigator    PSH: Reviewed with patient  Past Surgical History:   Procedure Laterality Date    CARDIAC SURG PROCEDURE UNLIST      by-pass    HX CHOLECYSTECTOMY          SH: Reviewed with patient  Social History     Tobacco Use    Smoking status: Former Smoker     Types: Cigarettes     Last attempt to quit: 1990     Years since quittin.3    Smokeless tobacco: Never Used   Substance Use Topics    Alcohol use: No    Drug use: No       FH: Reviewed with patient  Family History   Problem Relation Age of Onset    Cancer Mother     Cancer Father        Medications/Allergies: Reviewed with patient  Current Outpatient Medications on File Prior to Visit   Medication Sig Dispense Refill    glipiZIDE SR (GLUCOTROL XL) 5 mg CR tablet TAKE 1 TABLET BY MOUTH  DAILY 90 Tab 1    atorvastatin (LIPITOR) 10 mg tablet TAKE 1 TABLET BY MOUTH  DAILY 90 Tab 1    losartan (COZAAR) 25 mg tablet TAKE ONE-HALF TABLET BY  MOUTH DAILY 45 Tab 3    traZODone (DESYREL) 50 mg tablet TAKE 2 TABLETS BY MOUTH  NIGHTLY 60 Tab 5    metFORMIN ER (GLUCOPHAGE XR) 500 mg tablet Take 1 Tab by mouth two (2) times daily (with meals). 180 Tab 1    sertraline (ZOLOFT) 100 mg tablet TAKE 1 TAB BY MOUTH DAILY. 30 Tab 2    doxazosin (CARDURA) 4 mg tablet TAKE 1 TABLET BY MOUTH  DAILY 90 Tab 1    glucose blood VI test strips (TRUE METRIX GLUCOSE TEST STRIP) strip Check BS BID  Dx: DM  E11.21 100 Strip 11    ergocalciferol (ERGOCALCIFEROL) 50,000 unit capsule TAKE 1 CAPSULE BY MOUTH EVERY 2 WEEKS. 4 Cap 3    labetalol (NORMODYNE) 100 mg tablet TAKE 1 TABLET BY MOUTH  DAILY 90 Tab 1    aspirin (ASPIRIN) 325 mg tablet Take 1 Tab by mouth daily. 30 Tab 2    cholecalciferol (VITAMIN D3) 1,000 unit cap Take 1 Cap by mouth daily. 30 Cap 2    sodium chloride (OCEAN) 0.65 % nasal squeeze bottle 0.05 mL by Both Nostrils route as needed for Congestion. 30 mL 2    hydroCHLOROthiazide (HYDRODIURIL) 12.5 mg tablet TAKE 1 TABLET BY MOUTH  DAILY 90 Tab 1    loratadine (CLARITIN) 10 mg tablet Take 1 Tab by mouth daily as needed for Allergies. 30 Tab 0    fluticasone (FLONASE) 50 mcg/actuation nasal spray 2 Sprays by Both Nostrils route daily. 1 Bottle 0    finasteride (PROSCAR) 5 mg tablet TAKE 1 TABLET BY MOUTH  DAILY 90 Tab 1    amLODIPine (NORVASC) 10 mg tablet TAKE 1 TABLET BY MOUTH  DAILY 30 Tab 5    naproxen (NAPROSYN) 500 mg tablet Take 1 Tab by mouth two (2) times daily (with meals). 15 Tab 0    Hydrocolloid Dressing (DUODERM CGF DRESSING) 4 X 4 \" bndg Apply to affected area on buttock every 3 days 10 Each 2    magnesium 250 mg tab Take 1 Tab by mouth daily. 30 Tab 2    Menthol-Zinc Oxide (CALMOSEPTINE) 0.44-20.6 % oint Apply to effected areas on buttocks twice daily 1 Tube 5    FERROUS FUMARATE/VIT BCOMP,C (SUPER B COMPLEX PO) Take  by mouth. No current facility-administered medications on file prior to visit. Allergies   Allergen Reactions    Procaine Other (comments)     Pt stated he passed out from procaine and was told not to let anyone use it on him again       Objective:  Visit Vitals  /57 (BP 1 Location: Right arm, BP Patient Position: Sitting)   Pulse 63   Temp 97 °F (36.1 °C) (Oral)   Resp 16   Ht 6' (1.829 m)   Wt 196 lb (88.9 kg)   SpO2 97%   BMI 26.58 kg/m²    Body mass index is 26.58 kg/m². Assessment of cognitive impairment: Alert and oriented x 3    Depression Screen:   PHQ over the last two weeks 12/27/2018   Little interest or pleasure in doing things Not at all   Feeling down, depressed, irritable, or hopeless Not at all   Total Score PHQ 2 0       Fall Risk Assessment:    Fall Risk Assessment, last 12 mths 12/27/2018   Able to walk? Yes   Fall in past 12 months? Yes   Fall with injury? Yes   Number of falls in past 12 months 1   Fall Risk Score 2       Functional Ability:   Does the patient exhibit a steady gait? yes   How long did it take the patient to get up and walk from a sitting position? 8 sec   Is the patient self reliant?  (ie can do own laundry, meals, household chores)  yes     Does the patient handle his/her own medications? yes     Does the patient handle his/her own money? yes     Is the patients home safe (ie good lighting, handrails on stairs and bath, etc.)? yes     Did you notice or did patient express any hearing difficulties? no     Did you notice or did patient express any vision difficulties? yes     Were distance and reading eye charts used?   no       Advance Care Planning:   Patient was offered the opportunity to discuss advance care planning:  yes     Does patient have an Advance Directive:  yes   If no, did you provide information on Caring Connections?  no       Plan:      Orders Placed This Encounter    MICROALBUMIN, UR, RAND W/ MICROALB/CREAT RATIO    temazepam (RESTORIL) 15 mg capsule       Health Maintenance   Topic Date Due    Shingrix Vaccine Age 50> (1 of 2) 06/18/1987    GLAUCOMA SCREENING Q2Y  06/18/2002    MICROALBUMIN Q1  09/12/2018    EYE EXAM RETINAL OR DILATED  10/27/2019 (Originally 6/18/1945)    HEMOGLOBIN A1C Q6M  02/28/2019    LIPID PANEL Q1  08/28/2019    FOOT EXAM Q1  12/27/2019    MEDICARE YEARLY EXAM  12/28/2019    DTaP/Tdap/Td series (2 - Td) 11/10/2024    Pneumococcal 65+ Low/Medium Risk  Completed    Influenza Age 5 to Adult  Completed       *Patient verbalized understanding and agreement with the plan. A copy of the After Visit Summary with personalized health plan was given to the patient today.

## 2018-12-27 NOTE — PROGRESS NOTES
HISTORY OF PRESENT ILLNESS  Shikha Pickens is a 82 y.o. male. Pt. comes in for f/u. Has multiple medical problems. BP and DM are stable. History of PAD with previous surgery. Has chronic pedal edema. Unsteady gait but no recent falls. Lives with his wife in independent living. On temazepam for insomnia. Denies depression or anxiety. No tobacco or alcohol use. Reports compliance with medications and diet. Med list and most recent labs/studies reviewed with pt. Trying to be active physically to control weight. Needs med refills. Reports no other new c/o. HPI    Review of Systems   Constitutional: Negative. HENT: Positive for hearing loss. Eyes: Negative for blurred vision. Respiratory: Negative for shortness of breath. Cardiovascular: Negative for chest pain and leg swelling. Gastrointestinal: Negative for abdominal pain. Genitourinary: Positive for urgency. Negative for dysuria, flank pain, frequency and hematuria. Musculoskeletal: Positive for joint pain. Negative for falls. Skin: Negative. Neurological: Negative for dizziness, sensory change, focal weakness and headaches. Psychiatric/Behavioral: Negative for depression. The patient has insomnia. The patient is not nervous/anxious. All other systems reviewed and are negative. Physical Exam   Constitutional: He is oriented to person, place, and time. He appears well-developed and well-nourished. No distress. HENT:   Head: Normocephalic and atraumatic. Mouth/Throat: Oropharynx is clear and moist.   Eyes: Conjunctivae are normal.   Neck: Normal range of motion. Neck supple. No JVD present. No thyromegaly present. Cardiovascular: Normal rate, regular rhythm and intact distal pulses. Murmur (AS, chronic) heard. Pulmonary/Chest: Effort normal and breath sounds normal. No respiratory distress. He has no wheezes. He has no rales. Abdominal: Soft. Bowel sounds are normal. He exhibits no distension.    Musculoskeletal: He exhibits no edema or tenderness. Neurological: He is alert and oriented to person, place, and time. Coordination normal.   Skin: Skin is warm and dry. No rash noted. Psychiatric: He has a normal mood and affect. His behavior is normal.   Nursing note and vitals reviewed. ASSESSMENT and PLAN  Diagnoses and all orders for this visit:    1. Type 2 diabetes mellitus without complication, without long-term current use of insulin ([facility])  -     MICROALBUMIN, UR, RAND W/ MICROALB/CREAT RATIO    2. PAD (peripheral artery disease) ([facility] [address]    3. Insomnia, unspecified type  -     temazepam (RESTORIL) 15 mg capsule; TAKE 1 CAPSULE BY MOUTH AT BEDTIME AS NEEDED FOR SLEEP. 4. Essential hypertension    5. Gait instability    6. Medicare annual wellness visit, subsequent      Follow-up Disposition:  Return in about 4 months (around 4/27/2019).    lab results and schedule of future lab studies reviewed with patient  reviewed diet, exercise and weight control  reviewed medications and side effects in detail  low cholesterol diet, weight control and daily exercise discussed, home glucose monitoring emphasized, all medications, side effects and compliance discussed carefully, foot care discussed and Podiatry visits discussed, annual eye examinations at Ophthalmology discussed, glycohemoglobin and other lab monitoring discussed and long term diabetic complications discussed  F/u with other MD's as scheduled  Overall stable

## 2018-12-27 NOTE — PROGRESS NOTES
Schedule of Personalized Health Plan  (Provide Copy to Patient)  The best way to stay healthy is to live a healthy lifestyle. A healthy lifestyle includes regular exercise, eating a well-balanced diet, keeping a healthy weight and not smoking. Regular physical exams and screening tests are another important way to take care of yourself. Preventive exams provided by health care providers can find health problems early when treatment works best and can keep you from getting certain diseases or illnesses. Preventive services include exams, lab tests, screenings, shots, monitoring and information to help you take care of your own health. All people over 65 should have a pneumonia shot. Pneumonia shots are usually only needed once in a lifetime unless your doctor decides differently. All people over 65 should have a yearly flu shot. People over 65 are at medium to high risk for Hepatitis B. Three shots are needed for complete protection. In addition to your physical exam, some screening tests are recommended:    Bone mass measurement (dexa scan) is recommended every two years if you have certain risk factors, such as personal history of vertebral fracture or chronic steroid medication use    Diabetes Mellitus screening is recommended every year. Glaucoma is an eye disease caused by high pressure in the eye. An eye exam is recommended every year. Cardiovascular screening tests that check your cholesterol and other blood fat (lipid) levels are recommended every five years. Colorectal Cancer screening tests help to find pre-cancerous polyps (growths in the colon) so they can be removed before they turn into cancer. Tests ordered for screening depend on your personal and family history risk factors.     Screening for Prostate Cancer is recommended yearly with a digital rectal exam and/or a PSA test    Here is a list of your current Health Maintenance items with a due date:  Health Maintenance Topic Date Due    Shingrix Vaccine Age 50> (1 of 2) 06/18/1987    GLAUCOMA SCREENING Q2Y  06/18/2002    MICROALBUMIN Q1  09/12/2018    EYE EXAM RETINAL OR DILATED  10/27/2019 (Originally 6/18/1945)    HEMOGLOBIN A1C Q6M  02/28/2019    LIPID PANEL Q1  08/28/2019    FOOT EXAM Q1  12/27/2019    MEDICARE YEARLY EXAM  12/28/2019    DTaP/Tdap/Td series (2 - Td) 11/10/2024    Pneumococcal 65+ Low/Medium Risk  Completed    Influenza Age 5 to Adult  Completed

## 2018-12-28 LAB
ALBUMIN/CREAT UR: 16.8 MG/G CREAT (ref 0–30)
CREAT UR-MCNC: 114.9 MG/DL
MICROALBUMIN UR-MCNC: 19.3 UG/ML

## 2019-01-17 ENCOUNTER — CLINICAL SUPPORT (OUTPATIENT)
Dept: CARDIOLOGY CLINIC | Age: 84
End: 2019-01-17

## 2019-01-17 ENCOUNTER — OFFICE VISIT (OUTPATIENT)
Dept: CARDIOLOGY CLINIC | Age: 84
End: 2019-01-17

## 2019-01-17 VITALS
RESPIRATION RATE: 16 BRPM | BODY MASS INDEX: 26.55 KG/M2 | WEIGHT: 196 LBS | DIASTOLIC BLOOD PRESSURE: 60 MMHG | HEIGHT: 72 IN | HEART RATE: 54 BPM | SYSTOLIC BLOOD PRESSURE: 100 MMHG

## 2019-01-17 DIAGNOSIS — I25.10 CORONARY ARTERY DISEASE INVOLVING NATIVE CORONARY ARTERY OF NATIVE HEART WITHOUT ANGINA PECTORIS: Primary | ICD-10-CM

## 2019-01-17 DIAGNOSIS — I25.110 CORONARY ARTERY DISEASE INVOLVING NATIVE CORONARY ARTERY OF NATIVE HEART WITH UNSTABLE ANGINA PECTORIS (HCC): Primary | ICD-10-CM

## 2019-01-17 DIAGNOSIS — I35.8 AORTIC SYSTOLIC MURMUR ON EXAMINATION: ICD-10-CM

## 2019-01-17 DIAGNOSIS — E78.00 HYPERCHOLESTEROLEMIA: ICD-10-CM

## 2019-01-17 DIAGNOSIS — I10 ESSENTIAL HYPERTENSION: ICD-10-CM

## 2019-01-17 DIAGNOSIS — Z95.1 S/P CABG (CORONARY ARTERY BYPASS GRAFT): ICD-10-CM

## 2019-01-17 DIAGNOSIS — I35.0 AORTIC VALVE STENOSIS, ETIOLOGY OF CARDIAC VALVE DISEASE UNSPECIFIED: ICD-10-CM

## 2019-01-17 NOTE — PROGRESS NOTES
[place]: Yehuda Mariamitz  [phone]  Requesting/referring provider: Dr. Joo Esquivel  Reason for Consult: CAD, CABG    HPI: Rekha Lofton, a 82y.o. year-old who presents for evaluation of CAD s/p CABG, DM, HTN, Dyslipidemia. Bp is a bit low today but better at home, 120-130 So no changes today. Eating fine, no problems. Walking with a cane, balance has bene off a bit. Amanda [place] in the basement in the wet so now using the cane   Feels ok today, accompanied by his wife and daughter today. Recently relocated here from [place]. No chest pain or dyspnea now. Energy level is good. Walks at [employer] indoors without limitations. He has lost some weight, maybe 5 pounds in the last month. No bleeding. Glucose running 120 efe. BP is good. EKG NSR NST  He denies significant ramsay or cad.      **Records received from [facility] on 11/8/17  Hx of moderate AS, hx of rheumatic fever 1944 5/20/2008 3 vessel CABG (LIMA to LAD, SVG to OM and PDA)  S/p right SFA PTA and left subclavian stent  Echo 6/1/17 - LVEF 55-60%, no WMA, grade 1 dd, severe cLVH, MAC extending into the posterior leaflet and mild MR, mild TR, trace PI  Lexiscan MPI 5/16/17 - medium sized region of mild lateral ischemia, LVEF 54%, no WMA  GAMAL 9/30/08 - placement of 7 x 37 mm EB3 stent in 75% stenotic right common iliac artery  left common iliac artery stent is patent, right common femoral artery with 80% heavily calcified eccentric disease involving ostium of the SFA and the profunda, right SFA has 25% luminal disease in the mid areas, right anterior tibia has 99% focal proximal lesion, right peroneal artery is widely patent, right posterior tibial artery as 90% concentric lesion distally, left common femoral artery with 85% eccentric, heavily calcified lesion involving the ostium of the profunda, as well as the SFA, left SFA is 100% occluded in the mid area, left anterior tibial artery is 100% occluded in the proximal to distal area  Venous duplex 3/24/16 - no DVT, bilateral greater saphenous veins stripped     Assessment/Plan:  1. DM- managed on oral agents   2. HTN- at goal on current meds  3. Dyslipidemia- on statin, at goal  4. CAD- CABG c. 2008(LIMA-LAD, SVG-OM, SVG-PDA), s/p PCI(?)  -cont aspirin,statin  -recent mildly abnormal stress test 5/17 [facility] in absence of sx  Will manage medically for now  5. PAD with right leg stent- cont aspirin, statin, check RHONDA  -s/p SFA PTA and LSC stent  6. HFpEF- stable compensated today, severe cLVH  -start low dose arb  7. Mod AS with rheumatic heart disease- follow-up echo 5/18  8. Venous insufficiency- s/p vein stripping bilat GSV    Lexiscan 5/17 med lteral ischemia, EF 54%  Echo 6/17 EF 60% gr1dd, severe cLVH, MAC mild MR, mild TR mild PI  GAMAL 9/30/08 - placement of 7 x 37 mm EB3 stent in 75% stenotic right common iliac artery, left common iliac artery stent is patent, right common femoral artery with 80% heavily calcified eccentric disease involving ostium of the SFA and the profunda, right SFA has 25% luminal disease in the mid areas, right anterior tibia has 99% focal proximal lesion, right peroneal artery is widely patent, right posterior tibial artery as 90% concentric lesion distally, left common femoral artery with 85% eccentric, heavily calcified lesion involving the ostium of the profunda, as well as the SFA, left SFA is 100% occluded in the mid area, left anterior tibial artery is 100% occluded in the proximal to distal area  Soc no tob, quit 40 years ago, no etoh  Fhx no early cad    He  has a past medical history of BPH (benign prostatic hyperplasia), CAD (coronary artery disease), Diabetes ([address] Hearing loss, Hypercholesterolemia, Hypertension, and Murmur.  He also has no past medical history of Anemia, Arrhythmia, Arthritis, Asthma, Autoimmune disease ([address] Calculus of kidney, Cancer ([address] Chronic kidney disease, Chronic obstructive pulmonary disease ([address] Chronic pain, Congestive heart failure ([address] Depression, GERD (gastroesophageal reflux disease), Headache, Liver disease, Psychotic disorder ([facility] [address] PUD (peptic ulcer disease), Seizures ([facility] [address] Stroke ([facility] [address] Thromboembolus ([facility] [address] Thyroid disease, or Trauma. Cardiovascular ROS: no chest pain or dyspnea on exertion  Respiratory ROS: no cough, shortness of breath, or wheezing  Neurological ROS: no TIA or stroke symptoms  All other systems negative except as above. PE  Vitals:    01/17/19 1149   BP: 100/60   Pulse: (!) 54   Resp: 16   Weight: 196 lb (88.9 kg)   Height: 6' (1.829 m)    Body mass index is 26.58 kg/m².    General appearance - alert, well appearing, and in no distress  Mental status - affect appropriate to mood  Eyes - sclera anicteric, moist mucous membranes  Neck - supple, no significant adenopathy  Lymphatics - no  lymphadenopathy  Chest - clear to auscultation, no wheezes, rales or rhonchi  Heart - normal rate, regular rhythm, normal S1, S2, no murmurs, rubs, clicks or gallops  Abdomen - soft, nontender, nondistended, no masses or organomegaly  Back exam - full range of motion, no tenderness  Neurological - cranial nerves II through XII grossly intact, no focal deficit  Musculoskeletal - no muscular tenderness noted, normal strength  Extremities - peripheral pulses normal, no pedal edema  Skin - normal coloration  no rashes    Recent Labs:  Lab Results   Component Value Date/Time    Cholesterol, total 100 08/28/2018 03:07 PM    HDL Cholesterol 42 08/28/2018 03:07 PM    LDL, calculated 42 08/28/2018 03:07 PM    Triglyceride 79 08/28/2018 03:07 PM     Lab Results   Component Value Date/Time    Creatinine 0.99 08/28/2018 03:07 PM     Lab Results   Component Value Date/Time    BUN 16 08/28/2018 03:07 PM     Lab Results   Component Value Date/Time    Potassium 4.8 08/28/2018 03:07 PM     Lab Results   Component Value Date/Time    Hemoglobin A1c 6.9 (H) 08/28/2018 03:07 PM     Lab Results   Component Value Date/Time    HGB 12.4 (L) 08/28/2018 03:07 PM     Lab Results   Component Value Date/Time    PLATELET 322 (L)  03:07 PM       Reviewed:  Past Medical History:   Diagnosis Date    BPH (benign prostatic hyperplasia)     CAD (coronary artery disease)     Diabetes ([address]     Hearing loss     Hypercholesterolemia     Hypertension     Murmur      Social History     Tobacco Use   Smoking Status Former Smoker    Types: Cigarettes    Last attempt to quit: 1990    Years since quittin.3   Smokeless Tobacco Never Used     Social History     Substance and Sexual Activity   Alcohol Use No     Allergies   Allergen Reactions    Procaine Other (comments)     Pt stated he passed out from procaine and was told not to let anyone use it on him again       Current Outpatient Medications   Medication Sig    temazepam (RESTORIL) 15 mg capsule TAKE 1 CAPSULE BY MOUTH AT BEDTIME AS NEEDED FOR SLEEP.  glipiZIDE SR (GLUCOTROL XL) 5 mg CR tablet TAKE 1 TABLET BY MOUTH  DAILY    atorvastatin (LIPITOR) 10 mg tablet TAKE 1 TABLET BY MOUTH  DAILY    losartan (COZAAR) 25 mg tablet TAKE ONE-HALF TABLET BY  MOUTH DAILY    traZODone (DESYREL) 50 mg tablet TAKE 2 TABLETS BY MOUTH  NIGHTLY    hydroCHLOROthiazide (HYDRODIURIL) 12.5 mg tablet TAKE 1 TABLET BY MOUTH  DAILY    metFORMIN ER (GLUCOPHAGE XR) 500 mg tablet Take 1 Tab by mouth two (2) times daily (with meals).  sertraline (ZOLOFT) 100 mg tablet TAKE 1 TAB BY MOUTH DAILY.  loratadine (CLARITIN) 10 mg tablet Take 1 Tab by mouth daily as needed for Allergies.  fluticasone (FLONASE) 50 mcg/actuation nasal spray 2 Sprays by Both Nostrils route daily.     doxazosin (CARDURA) 4 mg tablet TAKE 1 TABLET BY MOUTH  DAILY    finasteride (PROSCAR) 5 mg tablet TAKE 1 TABLET BY MOUTH  DAILY    amLODIPine (NORVASC) 10 mg tablet TAKE 1 TABLET BY MOUTH  DAILY    glucose blood VI test strips (TRUE METRIX GLUCOSE TEST STRIP) strip Check BS BID  Dx: DM  E11.21    ergocalciferol (ERGOCALCIFEROL) 50,000 unit capsule TAKE 1 CAPSULE BY MOUTH EVERY 2 WEEKS.  naproxen (NAPROSYN) 500 mg tablet Take 1 Tab by mouth two (2) times daily (with meals).  labetalol (NORMODYNE) 100 mg tablet TAKE 1 TABLET BY MOUTH  DAILY    Hydrocolloid Dressing (DUODERM CGF DRESSING) 4 X 4 \" bndg Apply to affected area on buttock every 3 days    aspirin (ASPIRIN) 325 mg tablet Take 1 Tab by mouth daily.  cholecalciferol (VITAMIN D3) 1,000 unit cap Take 1 Cap by mouth daily.  magnesium 250 mg tab Take 1 Tab by mouth daily.  Menthol-Zinc Oxide (CALMOSEPTINE) 0.44-20.6 % oint Apply to effected areas on buttocks twice daily    sodium chloride (OCEAN) 0.65 % nasal squeeze bottle 0.05 mL by Both Nostrils route as needed for Congestion.  FERROUS FUMARATE/VIT BCOMP,C (SUPER B COMPLEX PO) Take  by mouth. No current facility-administered medications for this visit.         Jf Graves MD  [facility] heart and V[facility] [place]  [address] [facility] [address]  [address]

## 2019-01-21 RX ORDER — SERTRALINE HYDROCHLORIDE 100 MG/1
TABLET, FILM COATED ORAL
Qty: 30 TAB | Refills: 2 | Status: SHIPPED | OUTPATIENT
Start: 2019-01-21 | End: 2019-05-03 | Stop reason: SDUPTHER

## 2019-02-18 RX ORDER — DOXAZOSIN 4 MG/1
TABLET ORAL
Qty: 90 TAB | Refills: 1 | Status: SHIPPED | OUTPATIENT
Start: 2019-02-18 | End: 2019-08-10 | Stop reason: SDUPTHER

## 2019-03-18 RX ORDER — FINASTERIDE 5 MG/1
TABLET, FILM COATED ORAL
Qty: 90 TAB | Refills: 1 | Status: SHIPPED | OUTPATIENT
Start: 2019-03-18 | End: 2019-08-10 | Stop reason: SDUPTHER

## 2019-03-18 RX ORDER — LABETALOL 100 MG/1
TABLET, FILM COATED ORAL
Qty: 90 TAB | Refills: 1 | Status: SHIPPED | OUTPATIENT
Start: 2019-03-18 | End: 2019-03-21 | Stop reason: SDUPTHER

## 2019-03-19 RX ORDER — METFORMIN HYDROCHLORIDE 500 MG/1
TABLET, EXTENDED RELEASE ORAL
Qty: 180 TAB | Refills: 1 | Status: SHIPPED | OUTPATIENT
Start: 2019-03-19 | End: 2019-08-10 | Stop reason: SDUPTHER

## 2019-03-21 ENCOUNTER — OFFICE VISIT (OUTPATIENT)
Dept: INTERNAL MEDICINE CLINIC | Age: 84
End: 2019-03-21

## 2019-03-21 VITALS
HEIGHT: 72 IN | SYSTOLIC BLOOD PRESSURE: 155 MMHG | WEIGHT: 195.2 LBS | HEART RATE: 67 BPM | BODY MASS INDEX: 26.44 KG/M2 | TEMPERATURE: 97.1 F | OXYGEN SATURATION: 94 % | DIASTOLIC BLOOD PRESSURE: 65 MMHG | RESPIRATION RATE: 20 BRPM

## 2019-03-21 DIAGNOSIS — Z01.818 PREOP EXAMINATION: ICD-10-CM

## 2019-03-21 DIAGNOSIS — H25.9 SENILE CATARACT OF RIGHT EYE, UNSPECIFIED AGE-RELATED CATARACT TYPE: Primary | ICD-10-CM

## 2019-03-21 DIAGNOSIS — Z23 ENCOUNTER FOR IMMUNIZATION: ICD-10-CM

## 2019-03-21 DIAGNOSIS — R26.81 GAIT INSTABILITY: ICD-10-CM

## 2019-03-21 DIAGNOSIS — E11.9 TYPE 2 DIABETES MELLITUS WITHOUT COMPLICATION, WITHOUT LONG-TERM CURRENT USE OF INSULIN (HCC): ICD-10-CM

## 2019-03-21 DIAGNOSIS — I10 ESSENTIAL HYPERTENSION: ICD-10-CM

## 2019-03-21 NOTE — PATIENT INSTRUCTIONS
Vaccine Information Statement Pneumococcal Conjugate Vaccine (PCV13): What You Need to Know Many Vaccine Information Statements are available in [place] and other languages. See www.immunize.org/vis. Hojas de información Sobre Vacunas están disponibles en español y en muchos otros idiomas. Visite www.immunize.org/vis. 1. Why get vaccinated? Vaccination can protect both children and adults from pneumococcal disease. Pneumococcal disease is caused by bacteria that can spread from person to person through close contact. It can cause ear infections, and it can also lead to more serious infections of the: 
 Lungs (pneumonia),  Blood (bacteremia), and 
 Covering of the brain and spinal cord (meningitis). Pneumococcal pneumonia is most common among adults. Pneumococcal meningitis can cause deafness and brain damage, and it kills about 1 child in 10 who get it. Anyone can get pneumococcal disease, but children under 3years of age and adults 72 years and older, people with certain medical conditions, and cigarette smokers are at the highest risk. Before there was a vaccine, the [facility] saw: 
 more than 700 cases of meningitis, 
 about 13,000 blood infections, 
 about 5 million ear infections, and 
 about 200 deaths 
in children under 5 each year from pneumococcal disease. Since vaccine became available, severe pneumococcal disease in these children has fallen by 88%. About 18,000 older adults die of pneumococcal disease each year in the [place]. Treatment of pneumococcal infections with penicillin and other drugs is not as effective as it used to be, because some strains of the disease have become resistant to these drugs. This makes prevention of the disease, through vaccination, even more important. 2. PCV13 vaccine Pneumococcal conjugate vaccine (called PCV13) protects against 13 types of pneumococcal bacteria. PCV13 is routinely given to children at 2, 4, 6, and 1515 months of age. It is also recommended for children and adults 3to 59years of age with certain health conditions, and for all adults 72years of age and older. Your doctor can give you details. 3. Some people should not get this vaccine Anyone who has ever had a life-threatening allergic reaction to a dose of this vaccine, to an earlier pneumococcal vaccine called PCV7, or to any vaccine containing diphtheria toxoid (for example, DTaP), should not get PCV13. Anyone with a severe allergy to any component of PCV13 should not get the vaccine. Tell your doctor if the person being vaccinated has any severe allergies. If the person scheduled for vaccination is not feeling well, your healthcare provider might decide to reschedule the shot on another day. 4. Risks of a vaccine reaction With any medicine, including vaccines, there is a chance of reactions. These are usually mild and go away on their own, but serious reactions are also possible. Problems reported following PCV13 varied by age and dose in the series. The most common problems reported among children were:  About half became drowsy after the shot, had a temporary loss of appetite, or had redness or tenderness where the shot was given.  About 1 out of 3 had swelling where the shot was given.  About 1 out of 3 had a mild fever, and about 1 in 20 had a fever over 102.2°F. 
 Up to about 8 out of 10 became fussy or irritable. Adults have reported pain, redness, and swelling where the shot was given; also mild fever, fatigue, headache, chills, or muscle pain. Elaine Geronimo children who get PCV13 along with inactivated flu vaccine at the same time may be at increased risk for seizures caused by fever. Ask your doctor for more information. Problems that could happen after any vaccine:  People sometimes faint after a medical procedure, including vaccination. Sitting or lying down for about 15 minutes can help prevent fainting, and injuries caused by a fall. Tell your doctor if you feel dizzy, or have vision changes or ringing in the ears.  Some older children and adults get severe pain in the shoulder and have difficulty moving the arm where a shot was given. This happens very rarely.  Any medication can cause a severe allergic reaction. Such reactions from a vaccine are very rare, estimated at about 1 in a million doses, and would happen within a few minutes to a few hours after the vaccination. As with any medicine, there is a very small chance of a vaccine causing a serious injury or death. The safety of vaccines is always being monitored. For more information, visit: www.cdc.gov/vaccinesafety/  
 
5. What if there is a serious reaction? What should I look for?  Look for anything that concerns you, such as signs of a severe allergic reaction, very high fever, or unusual behavior. Signs of a severe allergic reaction can include hives, swelling of the face and throat, difficulty breathing, a fast heartbeat, dizziness, and weakness  usually within a few minutes to a few hours after the vaccination. What should I do?  If you think it is a severe allergic reaction or other emergency that cant wait, call 9-1-1 or get the person to the nearest hospital. Otherwise, call your doctor. Reactions should be reported to the Vaccine Adverse Event Reporting System (VAERS). Your doctor should file this report, or you can do it yourself through the VAERS web site at www.vaers. hhs.gov, or by calling 2-328.178.6784. VAERS does not give medical advice. 6. The National Vaccine Injury Compensation Program 
 
The [facility] Vaccine Injury Compensation Program (VICP) is a federal program that was created to compensate people who may have been injured by certain vaccines.  
 
Persons who believe they may have been injured by a vaccine can learn about the program and about filing a claim by calling 2-104.243.5513 or visiting the [address] website at [email or web address].gov/vaccinecompensation. There is a time limit to file a claim for compensation. 7. How can I learn more?  Ask your healthcare provider. He or she can give you the vaccine package insert or suggest other sources of information.  Call your local or state health department.  Contact the Centers for Disease Control and Prevention (CDC): 
- Call 6-236.697.8944 (1-800-CDC-INFO) or 
- Visit CDCs website at www.cdc.gov/vaccines Vaccine Information Statement PCV13 Vaccine 11/5/2015  
[id number] Department of Health and [employer] Centers for Disease Control and Prevention Office Use Only

## 2019-03-21 NOTE — PROGRESS NOTES
Health Maintenance Due Topic Date Due  Shingrix Vaccine Age 50> (1 of 2) 06/18/1987  GLAUCOMA SCREENING Q2Y  06/18/2002  HEMOGLOBIN A1C Q6M  02/28/2019 Chief Complaint Patient presents with  Pre-op Exam  
 Cataract 1. Have you been to the ER, urgent care clinic since your last visit? Hospitalized since your last visit? No 
 
2. Have you seen or consulted any other health care providers outside of the [address] [place] [address] since your last visit? Include any pap smears or colon screening. No 
 
3) Do you have an Advance Directive on file? yes 4) Are you interested in receiving information on Advance Directives? NO Patient is accompanied by wife I have received verbal consent from Eben Moritz to discuss any/all medical information while they are present in the room. Eben Moritz is a 82 y.o. male  who presents for routine immunizations. She denies any symptoms , reactions or allergies that would exclude them from being immunized today. Risks and adverse reactions were discussed and the VIS was given to them. All questions were addressed. She was observed for 10 min post injection. There were no reactions observed. Elver Meza LPN

## 2019-03-22 ENCOUNTER — TELEPHONE (OUTPATIENT)
Dept: INTERNAL MEDICINE CLINIC | Age: 84
End: 2019-03-22

## 2019-03-22 NOTE — TELEPHONE ENCOUNTER
Called and spoke with pharmacist in reference to pts Losartan recall letter stating that [facility] has recalled his Losartan. Per pharmacist pts last refill was on 1/28/2019 and is due for his next refill on 3/31/2019. Pharmacist stated the  used for the 1/28/2019 refill was [facility] not [facility] and suggested that the pt may be using the previous refill bottle from 2018 bc at that time they were using [facility] for pts prescribed medication. Pharmacist stated she would try to get an override for pts next refill but not sure it will be approved with the information in their system conflicting with the pts information and if not it will be refilled on 3/31/2019. Informed Dr. Coretta Diaz of the above and he stated to call the pt and inform him of the situation as his Losartan should be fine to take. Called pt #[phone] and spoke with pts wife. Informed her of the above. She stated she will call [facility] today to make sure the Losartan is refilled on time if the override is denied. She had no further questions and verbalized understanding of the conversation. Encouraged to call the office with any further questions or concerns.

## 2019-04-25 ENCOUNTER — OFFICE VISIT (OUTPATIENT)
Dept: INTERNAL MEDICINE CLINIC | Age: 84
End: 2019-04-25

## 2019-04-25 VITALS
WEIGHT: 196 LBS | BODY MASS INDEX: 26.55 KG/M2 | OXYGEN SATURATION: 95 % | SYSTOLIC BLOOD PRESSURE: 121 MMHG | DIASTOLIC BLOOD PRESSURE: 54 MMHG | HEIGHT: 72 IN | RESPIRATION RATE: 20 BRPM | HEART RATE: 59 BPM | TEMPERATURE: 96.1 F

## 2019-04-25 DIAGNOSIS — I25.10 CORONARY ARTERY DISEASE INVOLVING NATIVE CORONARY ARTERY OF NATIVE HEART WITHOUT ANGINA PECTORIS: ICD-10-CM

## 2019-04-25 DIAGNOSIS — E78.00 HYPERCHOLESTEROLEMIA: ICD-10-CM

## 2019-04-25 DIAGNOSIS — I35.0 AORTIC VALVE STENOSIS, ETIOLOGY OF CARDIAC VALVE DISEASE UNSPECIFIED: ICD-10-CM

## 2019-04-25 DIAGNOSIS — H25.9 AGE-RELATED CATARACT OF BOTH EYES, UNSPECIFIED AGE-RELATED CATARACT TYPE: ICD-10-CM

## 2019-04-25 DIAGNOSIS — I73.9 PAD (PERIPHERAL ARTERY DISEASE) (HCC): ICD-10-CM

## 2019-04-25 DIAGNOSIS — G47.00 INSOMNIA, UNSPECIFIED TYPE: ICD-10-CM

## 2019-04-25 DIAGNOSIS — I10 ESSENTIAL HYPERTENSION: ICD-10-CM

## 2019-04-25 DIAGNOSIS — E11.9 TYPE 2 DIABETES MELLITUS WITHOUT COMPLICATION, WITHOUT LONG-TERM CURRENT USE OF INSULIN (HCC): Primary | ICD-10-CM

## 2019-04-25 PROBLEM — L98.419 CHRONIC ULCER OF BUTTOCK (HCC): Status: RESOLVED | Noted: 2017-11-16 | Resolved: 2019-04-25

## 2019-04-25 PROBLEM — E11.21 TYPE 2 DIABETES WITH NEPHROPATHY (HCC): Status: RESOLVED | Noted: 2018-04-10 | Resolved: 2019-04-25

## 2019-04-25 NOTE — PROGRESS NOTES
Health Maintenance Due   Topic Date Due    Shingrix Vaccine Age 49> (1 of 2) 06/18/1987    HEMOGLOBIN A1C Q6M  02/28/2019       Chief Complaint   Patient presents with    Hypertension     4 mo f/u    Diabetes    Cholesterol Problem       1. Have you been to the ER, urgent care clinic since your last visit? Hospitalized since your last visit? No    2. Have you seen or consulted any other health care providers outside of the [address] [place] [address] since your last visit? Include any pap smears or colon screening. No    3) Do you have an Advance Directive on file? yes    4) Are you interested in receiving information on Advance Directives? NO      Patient is accompanied by wife I have received verbal consent from Larry Peralta to discuss any/all medical information while they are present in the room.

## 2019-04-26 RX ORDER — TEMAZEPAM 15 MG/1
CAPSULE ORAL
Qty: 90 CAP | Refills: 1 | Status: SHIPPED | OUTPATIENT
Start: 2019-04-26 | End: 2019-10-25 | Stop reason: SDUPTHER

## 2019-04-29 NOTE — PROGRESS NOTES
HISTORY OF PRESENT ILLNESS Ja Gurrola is a 82 y.o. male. Pt. comes in for f/u. Has multiple medical problems. He  needs a preop clearance for right eye cataract surgery. Needs to have a left cataract surgery as well. His vision has been getting worse. His DM and BP have been stable. Gait is slow but no falls. Denies tobacco or alcohol use. Reports compliance with medications and diet. Med list and most recent labs/studies reviewed with pt. Trying to be active physically to control weight. Reports no other new c/o. HPI Review of Systems Constitutional: Negative. HENT: Positive for hearing loss. Eyes: Positive for blurred vision. Negative for double vision and pain. Respiratory: Negative for shortness of breath. Cardiovascular: Negative for chest pain and leg swelling. Gastrointestinal: Negative for abdominal pain. Genitourinary: Positive for urgency. Negative for dysuria. Musculoskeletal: Positive for joint pain. Negative for falls. Skin: Negative. Neurological: Negative for dizziness, sensory change, focal weakness and headaches. Psychiatric/Behavioral: Negative for depression. The patient has insomnia. The patient is not nervous/anxious. All other systems reviewed and are negative. Physical Exam  
Constitutional: He is oriented to person, place, and time. He appears well-developed and well-nourished. No distress. HENT:  
Head: Normocephalic and atraumatic. Mouth/Throat: Oropharynx is clear and moist.  
Eyes: Pupils are equal, round, and reactive to light. Conjunctivae and EOM are normal. No scleral icterus. Bilateral cataracts Neck: Normal range of motion. Neck supple. No JVD present. No thyromegaly present. Cardiovascular: Normal rate, regular rhythm and intact distal pulses. Murmur (AS, chronic) heard. Pulmonary/Chest: Effort normal and breath sounds normal. No respiratory distress. He has no wheezes. He has no rales. Abdominal: Soft. Bowel sounds are normal. He exhibits no distension. Musculoskeletal: He exhibits no edema or tenderness. Neurological: He is alert and oriented to person, place, and time. Coordination normal.  
Skin: Skin is warm and dry. No rash noted. Psychiatric: He has a normal mood and affect. His behavior is normal.  
Nursing note and vitals reviewed. ASSESSMENT and PLAN Diagnoses and all orders for this visit: 1. Senile cataract of right eye, unspecified age-related cataract type 2. Preop examination 3. Type 2 diabetes mellitus without complication, without long-term current use of insulin ([facility] [address] 4. Essential hypertension 5. Gait instability 6. Encounter for immunization 
-     PNEUMOCOCCAL CONJ VACCINE 13 VALENT IM 
-     ADMIN INFLUENZA VIRUS VAC 
-     ADMIN PNEUMOCOCCAL VACCINE Follow-up and Dispositions · Return if symptoms worsen or fail to improve. 
  
lab results and schedule of future lab studies reviewed with patient 
reviewed diet, exercise and weight control 
reviewed medications and side effects in detail Medically stable for cataract surgery Preop form filled and faxed to his ophthalmologist

## 2019-04-30 ENCOUNTER — HOSPITAL ENCOUNTER (OUTPATIENT)
Dept: LAB | Age: 84
Discharge: HOME | End: 2019-04-30
Payer: MEDICARE

## 2019-04-30 PROCEDURE — 80061 LIPID PANEL: CPT

## 2019-04-30 PROCEDURE — 80048 BASIC METABOLIC PNL TOTAL CA: CPT

## 2019-04-30 PROCEDURE — 36415 COLL VENOUS BLD VENIPUNCTURE: CPT

## 2019-04-30 PROCEDURE — 84460 ALANINE AMINO (ALT) (SGPT): CPT

## 2019-04-30 PROCEDURE — 84450 TRANSFERASE (AST) (SGOT): CPT

## 2019-04-30 PROCEDURE — 83036 HEMOGLOBIN GLYCOSYLATED A1C: CPT

## 2019-05-02 LAB
ALT SERPL-CCNC: 21 IU/L (ref 0–44)
AST SERPL-CCNC: 22 IU/L (ref 0–40)
BUN SERPL-MCNC: 21 MG/DL (ref 8–27)
BUN/CREAT SERPL: 23 (ref 10–24)
CALCIUM SERPL-MCNC: 9.6 MG/DL (ref 8.6–10.2)
CHLORIDE SERPL-SCNC: 100 MMOL/L (ref 96–106)
CHOLEST SERPL-MCNC: 102 MG/DL (ref 100–199)
CO2 SERPL-SCNC: 26 MMOL/L (ref 20–29)
CREAT SERPL-MCNC: 0.93 MG/DL (ref 0.76–1.27)
EST. AVERAGE GLUCOSE BLD GHB EST-MCNC: 143 MG/DL
GLUCOSE SERPL-MCNC: 110 MG/DL (ref 65–99)
HBA1C MFR BLD: 6.6 % (ref 4.8–5.6)
HDLC SERPL-MCNC: 45 MG/DL
INTERPRETATION, 910389: NORMAL
LDLC SERPL CALC-MCNC: 41 MG/DL (ref 0–99)
Lab: NORMAL
POTASSIUM SERPL-SCNC: 4.2 MMOL/L (ref 3.5–5.2)
SODIUM SERPL-SCNC: 141 MMOL/L (ref 134–144)
TRIGL SERPL-MCNC: 80 MG/DL (ref 0–149)
VLDLC SERPL CALC-MCNC: 16 MG/DL (ref 5–40)

## 2019-05-03 DIAGNOSIS — G47.00 INSOMNIA, UNSPECIFIED TYPE: ICD-10-CM

## 2019-05-03 RX ORDER — SERTRALINE HYDROCHLORIDE 100 MG/1
TABLET, FILM COATED ORAL
Qty: 30 TAB | Refills: 2 | Status: SHIPPED | OUTPATIENT
Start: 2019-05-03 | End: 2019-06-05 | Stop reason: SDUPTHER

## 2019-05-06 RX ORDER — TRAZODONE HYDROCHLORIDE 50 MG/1
TABLET ORAL
Qty: 60 TAB | Refills: 5 | Status: SHIPPED | OUTPATIENT
Start: 2019-05-06 | End: 2019-06-05 | Stop reason: SDUPTHER

## 2019-05-06 NOTE — PROGRESS NOTES
HISTORY OF PRESENT ILLNESS  Marely Fung is a 82 y.o. male. Pt. comes in for f/u. Has multiple medical problems. BP, DM, cardiac status have been stable. He has diabetic neuropathy as well as PAD/circulation problems and his feet with edema. Followed by podiatrist.  Needs diabetic shoes. Vision is poor. Followed by ophthalmologist.  Is hard of hearing. Lives with his wife and independent living. Denies tobacco or alcohol use. Has chronic insomnia. Medication helps. Reports compliance with medications and diet. Med list and most recent labs/studies reviewed with pt. Trying to be active physically to control weight. Due for repeat labs. Reports no other new c/o. HPI    Review of Systems   Constitutional: Negative. HENT: Positive for hearing loss. Eyes: Positive for blurred vision. Negative for double vision and pain. Respiratory: Negative for shortness of breath. Cardiovascular: Negative for chest pain and leg swelling. Gastrointestinal: Negative for abdominal pain. Genitourinary: Positive for urgency. Negative for dysuria. Musculoskeletal: Positive for joint pain. Negative for falls. Skin: Negative. Neurological: Positive for sensory change. Negative for dizziness, focal weakness and headaches. Endo/Heme/Allergies: Negative. Psychiatric/Behavioral: Negative for depression. The patient has insomnia. The patient is not nervous/anxious. All other systems reviewed and are negative. Physical Exam   Constitutional: He is oriented to person, place, and time. He appears well-developed and well-nourished. No distress. HENT:   Head: Normocephalic and atraumatic. Mouth/Throat: Oropharynx is clear and moist.   Eyes: Conjunctivae are normal.   Bilateral cataracts   Neck: Normal range of motion. Neck supple. No JVD present. No thyromegaly present. Cardiovascular: Normal rate and regular rhythm. Murmur (AS, chronic) heard.   Diminished pedal pulses   Pulmonary/Chest: Effort normal and breath sounds normal. No respiratory distress. He has no wheezes. He has no rales. Abdominal: Soft. Bowel sounds are normal. He exhibits no distension. Musculoskeletal: He exhibits no edema or tenderness. Neurological: He is alert and oriented to person, place, and time. Coordination normal.   Decreased sensation in distal legs/feet   Skin: Skin is warm and dry. No rash noted. Psychiatric: He has a normal mood and affect. His behavior is normal.   Nursing note and vitals reviewed. ASSESSMENT and PLAN  Diagnoses and all orders for this visit:    1. Type 2 diabetes mellitus without complication, without long-term current use of insulin ([facility])  -     LIPID PANEL  -     METABOLIC PANEL, BASIC  -     ALT  -     AST  -     HEMOGLOBIN A1C WITH EAG    2. Essential hypertension    3. PAD (peripheral artery disease) ([facility] [address]    4. Coronary artery disease involving native coronary artery of native heart without angina pectoris    5. Aortic valve stenosis, etiology of cardiac valve disease unspecified    6. Hypercholesterolemia    7. Insomnia, unspecified type    8. Age-related cataract of both eyes, unspecified age-related cataract type    Other orders  -     CVD REPORT  -     DIABETES PATIENT EDUCATION      Follow-up and Dispositions    · Return in about 4 months (around 8/25/2019).      lab results and schedule of future lab studies reviewed with patient  reviewed diet, exercise and weight control  reviewed medications and side effects in detail  low cholesterol diet, weight control and daily exercise discussed, home glucose monitoring emphasized, all medications, side effects and compliance discussed carefully, foot care discussed and Podiatry visits discussed, annual eye examinations at Ophthalmology discussed, glycohemoglobin and other lab monitoring discussed and long term diabetic complications discussed  F/u with other MD's as scheduled  Overall stable for cataract surgery

## 2019-05-31 ENCOUNTER — OFFICE VISIT (OUTPATIENT)
Dept: CARDIOLOGY CLINIC | Age: 84
End: 2019-05-31

## 2019-05-31 VITALS
WEIGHT: 194.6 LBS | OXYGEN SATURATION: 96 % | SYSTOLIC BLOOD PRESSURE: 120 MMHG | HEART RATE: 70 BPM | RESPIRATION RATE: 16 BRPM | DIASTOLIC BLOOD PRESSURE: 76 MMHG | BODY MASS INDEX: 26.36 KG/M2 | HEIGHT: 72 IN

## 2019-05-31 DIAGNOSIS — I73.9 PAD (PERIPHERAL ARTERY DISEASE) (HCC): ICD-10-CM

## 2019-05-31 DIAGNOSIS — E11.9 TYPE 2 DIABETES MELLITUS WITHOUT COMPLICATION, WITHOUT LONG-TERM CURRENT USE OF INSULIN (HCC): ICD-10-CM

## 2019-05-31 DIAGNOSIS — I35.8 AORTIC SYSTOLIC MURMUR ON EXAMINATION: ICD-10-CM

## 2019-05-31 DIAGNOSIS — I25.10 CORONARY ARTERY DISEASE INVOLVING NATIVE CORONARY ARTERY OF NATIVE HEART WITHOUT ANGINA PECTORIS: ICD-10-CM

## 2019-05-31 DIAGNOSIS — I10 ESSENTIAL HYPERTENSION: Primary | ICD-10-CM

## 2019-05-31 DIAGNOSIS — Z95.1 S/P CABG (CORONARY ARTERY BYPASS GRAFT): ICD-10-CM

## 2019-05-31 NOTE — PROGRESS NOTES
[place]: Radha Gonzalez  [phone]  Requesting/referring provider: Dr. Arnett Decent  Reason for Consult: CAD, CABG    HPI: Sindy Mckinney, a 82y.o. year-old who presents for evaluation of CAD s/p CABG, DM, HTN, Dyslipidemia. Had cataract surgery and doing well, no chest pain, dyspnea, edema. BP looks good. No more falls. Feeling well. A bit of swelling on the right ankle but not bad. Feels ok today, accompanied by his wife and daughter today. Mliss Foley No chest pain or dyspnea now. Energy level is good. Walks at [employer] indoors without limitations. Glucose running 120 efe. BP is good. EKG NSR NST  He denies significant ramsay or chest pain. **Records received from [facility] on 11/8/17  Hx of moderate AS, hx of rheumatic fever 1944 5/20/2008 3 vessel CABG (LIMA to LAD, SVG to OM and PDA)  S/p right SFA PTA and left subclavian stent  Echo 6/1/17 - LVEF 55-60%, no WMA, grade 1 dd, severe cLVH, MAC extending into the posterior leaflet and mild MR, mild TR, trace PI  Lexiscan MPI 5/16/17 - medium sized region of mild lateral ischemia, LVEF 54%, no WMA  GAMAL 9/30/08 - placement of 7 x 37 mm EB3 stent in 75% stenotic right common iliac artery  left common iliac artery stent is patent, right common femoral artery with 80% heavily calcified eccentric disease involving ostium of the SFA and the profunda, right SFA has 25% luminal disease in the mid areas, right anterior tibia has 99% focal proximal lesion, right peroneal artery is widely patent, right posterior tibial artery as 90% concentric lesion distally, left common femoral artery with 85% eccentric, heavily calcified lesion involving the ostium of the profunda, as well as the SFA, left SFA is 100% occluded in the mid area, left anterior tibial artery is 100% occluded in the proximal to distal area  Venous duplex 3/24/16 - no DVT, bilateral greater saphenous veins stripped     Assessment/Plan:  1. DM- managed on oral agents   2.  HTN- at goal on current meds  3. Dyslipidemia- on statin, at goal  4. CAD- CABG c. 2008(LIMA-LAD, SVG-OM, SVG-PDA), s/p PCI(?)  -cont aspirin,statin  -recent mildly abnormal stress test 5/17 [facility] in absence of sx  Will manage medically for now  5. PAD with right leg stent- cont aspirin, statin, check RHONDA  -s/p SFA PTA and LSC stent  6. HFpEF- stable compensated today, severe cLVH  -cont arb  7. Mod AS with rheumatic heart disease- follow-up echo next appt  8. Venous insufficiency- s/p vein stripping bilat GSV    1/19 Echo 60-65% gri DD, RVE, LAE, mild MR, mod AS, mild TR, mild PI  Lexiscan 5/17 med lteral ischemia, EF 54%  Echo 6/17 EF 60% gr1dd, severe cLVH, MAC mild MR, mild TR mild PI  GAMAL 9/30/08 - placement of 7 x 37 mm EB3 stent in 75% stenotic right common iliac artery, left common iliac artery stent is patent, right common femoral artery with 80% heavily calcified eccentric disease involving ostium of the SFA and the profunda, right SFA has 25% luminal disease in the mid areas, right anterior tibia has 99% focal proximal lesion, right peroneal artery is widely patent, right posterior tibial artery as 90% concentric lesion distally, left common femoral artery with 85% eccentric, heavily calcified lesion involving the ostium of the profunda, as well as the SFA, left SFA is 100% occluded in the mid area, left anterior tibial artery is 100% occluded in the proximal to distal area  Soc no tob, quit 40 years ago, no etoh  Fhx no early cad    He  has a past medical history of BPH (benign prostatic hyperplasia), CAD (coronary artery disease), Diabetes ([address] Hearing loss, Hypercholesterolemia, Hypertension, and Murmur.  He also has no past medical history of Anemia, Arrhythmia, Arthritis, Asthma, Autoimmune disease ([address] Calculus of kidney, Cancer ([address] Chronic kidney disease, Chronic obstructive pulmonary disease ([address] Chronic pain, Congestive heart failure ([address] Depression, GERD (gastroesophageal reflux disease), Headache, Liver disease, Psychotic disorder ([facility] PUD (peptic ulcer disease), Seizures ([facility] Stroke ([facility] Thromboembolus ([facility] Thyroid disease, or Trauma. Cardiovascular ROS: no chest pain or dyspnea on exertion  Respiratory ROS: no cough, shortness of breath, or wheezing  Neurological ROS: no TIA or stroke symptoms  All other systems negative except as above. PE  Vitals:    05/31/19 1015 05/31/19 1022   BP: 106/70 120/76   Pulse: 70    Resp: 16    SpO2: 96%    Weight: 194 lb 9.6 oz (88.3 kg)    Height: 6' (1.829 m)     Body mass index is 26.39 kg/m².    General appearance - alert, well appearing, and in no distress  Mental status - affect appropriate to mood  Eyes - sclera anicteric, moist mucous membranes  Neck - supple, no significant adenopathy  Lymphatics - no  lymphadenopathy  Chest - clear to auscultation, no wheezes, rales or rhonchi  Heart - normal rate, regular rhythm, normal S1, S2, no murmurs, rubs, clicks or gallops  Abdomen - soft, nontender, nondistended, no masses or organomegaly  Back exam - full range of motion, no tenderness  Neurological - cranial nerves II through XII grossly intact, no focal deficit  Musculoskeletal - no muscular tenderness noted, normal strength  Extremities - peripheral pulses normal, no pedal edema  Skin - normal coloration  no rashes    Recent Labs:  Lab Results   Component Value Date/Time    Cholesterol, total 102 04/30/2019 01:46 PM    HDL Cholesterol 45 04/30/2019 01:46 PM    LDL, calculated 41 04/30/2019 01:46 PM    Triglyceride 80 04/30/2019 01:46 PM     Lab Results   Component Value Date/Time    Creatinine 0.93 04/30/2019 01:46 PM     Lab Results   Component Value Date/Time    BUN 21 04/30/2019 01:46 PM     Lab Results   Component Value Date/Time    Potassium 4.2 04/30/2019 01:46 PM     Lab Results   Component Value Date/Time    Hemoglobin A1c 6.6 (H) 04/30/2019 01:46 PM     Lab Results   Component Value Date/Time    HGB 12.4 (L) 08/28/2018 03:07 PM     Lab Results Component Value Date/Time    PLATELET 914 (L)  03:07 PM       Reviewed:  Past Medical History:   Diagnosis Date    BPH (benign prostatic hyperplasia)     CAD (coronary artery disease)     Diabetes ([address]     Hearing loss     Hypercholesterolemia     Hypertension     Murmur      Social History     Tobacco Use   Smoking Status Former Smoker    Types: Cigarettes    Last attempt to quit: 1990    Years since quittin.7   Smokeless Tobacco Never Used     Social History     Substance and Sexual Activity   Alcohol Use No     Allergies   Allergen Reactions    Procaine Other (comments)     Pt stated he passed out from procaine and was told not to let anyone use it on him again       Current Outpatient Medications   Medication Sig    traZODone (DESYREL) 50 mg tablet TAKE 2 TABLETS BY MOUTH  NIGHTLY    sertraline (ZOLOFT) 100 mg tablet TAKE 1 TABLET BY MOUTH  DAILY    temazepam (RESTORIL) 15 mg capsule TAKE 1 CAPSULE BY MOUTH AT BEDTIME AS NEEDED FOR SLEEP.  amLODIPine (NORVASC) 10 mg tablet TAKE 1 TABLET BY MOUTH  DAILY    metFORMIN ER (GLUCOPHAGE XR) 500 mg tablet TAKE 1 TABLET BY MOUTH TWO  TIMES DAILY WITH MEALS    finasteride (PROSCAR) 5 mg tablet TAKE 1 TABLET BY MOUTH  DAILY    doxazosin (CARDURA) 4 mg tablet TAKE 1 TABLET BY MOUTH  DAILY    glipiZIDE SR (GLUCOTROL XL) 5 mg CR tablet TAKE 1 TABLET BY MOUTH  DAILY    atorvastatin (LIPITOR) 10 mg tablet TAKE 1 TABLET BY MOUTH  DAILY    losartan (COZAAR) 25 mg tablet TAKE ONE-HALF TABLET BY  MOUTH DAILY    hydroCHLOROthiazide (HYDRODIURIL) 12.5 mg tablet TAKE 1 TABLET BY MOUTH  DAILY    glucose blood VI test strips (TRUE METRIX GLUCOSE TEST STRIP) strip Check BS BID  Dx: DM  E11.21    labetalol (NORMODYNE) 100 mg tablet TAKE 1 TABLET BY MOUTH  DAILY    aspirin (ASPIRIN) 325 mg tablet Take 1 Tab by mouth daily.  cholecalciferol (VITAMIN D3) 1,000 unit cap Take 1 Cap by mouth daily.     magnesium 250 mg tab Take 1 Tab by mouth daily.    sodium chloride (OCEAN) 0.65 % nasal squeeze bottle 0.05 mL by Both Nostrils route as needed for Congestion.  FERROUS FUMARATE/VIT BCOMP,C (SUPER B COMPLEX PO) Take  by mouth. No current facility-administered medications for this visit.         Manuel Felix MD  [facility] [place]  [address] [facility] [address]  [facility], [address]

## 2019-06-05 DIAGNOSIS — G47.00 INSOMNIA, UNSPECIFIED TYPE: ICD-10-CM

## 2019-06-06 RX ORDER — SERTRALINE HYDROCHLORIDE 100 MG/1
TABLET, FILM COATED ORAL
Qty: 30 TAB | Refills: 5 | Status: SHIPPED | OUTPATIENT
Start: 2019-06-06 | End: 2019-12-12 | Stop reason: SDUPTHER

## 2019-06-06 RX ORDER — TRAZODONE HYDROCHLORIDE 50 MG/1
TABLET ORAL
Qty: 60 TAB | Refills: 5 | Status: SHIPPED | OUTPATIENT
Start: 2019-06-06 | End: 2020-01-09

## 2019-07-11 ENCOUNTER — OFFICE VISIT (OUTPATIENT)
Dept: INTERNAL MEDICINE CLINIC | Age: 84
End: 2019-07-11

## 2019-07-11 VITALS
TEMPERATURE: 98 F | HEART RATE: 57 BPM | SYSTOLIC BLOOD PRESSURE: 122 MMHG | BODY MASS INDEX: 26.68 KG/M2 | OXYGEN SATURATION: 93 % | WEIGHT: 197 LBS | HEIGHT: 72 IN | RESPIRATION RATE: 22 BRPM | DIASTOLIC BLOOD PRESSURE: 72 MMHG

## 2019-07-11 DIAGNOSIS — S33.5XXA LUMBAR SPRAIN, INITIAL ENCOUNTER: Primary | ICD-10-CM

## 2019-07-11 DIAGNOSIS — I25.10 CORONARY ARTERY DISEASE INVOLVING NATIVE CORONARY ARTERY OF NATIVE HEART WITHOUT ANGINA PECTORIS: ICD-10-CM

## 2019-07-11 DIAGNOSIS — E11.9 TYPE 2 DIABETES MELLITUS WITHOUT COMPLICATION, WITHOUT LONG-TERM CURRENT USE OF INSULIN (HCC): ICD-10-CM

## 2019-07-11 DIAGNOSIS — I10 ESSENTIAL HYPERTENSION: ICD-10-CM

## 2019-07-11 RX ORDER — TIZANIDINE 2 MG/1
2 TABLET ORAL
Qty: 20 TAB | Refills: 0 | Status: ON HOLD | OUTPATIENT
Start: 2019-07-11 | End: 2021-01-01

## 2019-07-11 NOTE — PROGRESS NOTES
Health Maintenance Due Topic Date Due  Shingrix Vaccine Age 50> (1 of 2) 06/18/1987 Chief Complaint Patient presents with  Cholesterol Problem f/u care  Hypertension  Diabetes 1. Have you been to the ER, urgent care clinic since your last visit? Hospitalized since your last visit? No 
 
2. Have you seen or consulted any other health care providers outside of the [address] [place] [address] since your last visit? Include any pap smears or colon screening. No 
 
3) Do you have an Advance Directive on file? yes 4) Are you interested in receiving information on Advance Directives? NO Patient is accompanied by self and wife I have received verbal consent from Keysha Graham to discuss any/all medical information while they are present in the room.

## 2019-07-11 NOTE — ACP (ADVANCE CARE PLANNING)
Advance Care Planning (ACP) Provider [facility] Date of ACP Conversation: 07/11/19 Persons included in Conversation:  patient and family Length of ACP Conversation in minutes:  <16 minutes (Non-Billable) Authorized Decision Maker (if patient is incapable of making informed decisions): This person is:  
Healthcare Agent/Medical Power of  under Advance Directive For Patients with Decision Making Capacity:  
Values/Goals: Exploration of values, goals, and preferences if recovery is not expected, even with continued medical treatment in the event of:  Imminent death Conversation Outcomes / Follow-Up Plan:  
Recommended completion of Advance Directive form after review of ACP materials and conversation with prospective healthcare agent

## 2019-07-15 ENCOUNTER — OFFICE VISIT (OUTPATIENT)
Dept: INTERNAL MEDICINE CLINIC | Age: 84
End: 2019-07-15

## 2019-07-15 VITALS
RESPIRATION RATE: 20 BRPM | SYSTOLIC BLOOD PRESSURE: 110 MMHG | BODY MASS INDEX: 27.06 KG/M2 | WEIGHT: 199.8 LBS | DIASTOLIC BLOOD PRESSURE: 60 MMHG | TEMPERATURE: 98.1 F | OXYGEN SATURATION: 94 % | HEART RATE: 60 BPM | HEIGHT: 72 IN

## 2019-07-15 DIAGNOSIS — H61.23 BILATERAL IMPACTED CERUMEN: Primary | ICD-10-CM

## 2019-07-15 NOTE — PROGRESS NOTES
HISTORY OF PRESENT ILLNESS  Todd Lindsay is a 82 y.o. male. This is a patient of Dr. Mica Hernandez who presents today with complaints of decreased hearing and ear fullness. The patient states he has recently seen audiologist who recommended he present to PCP office for earwax removal.  Patient denies ear pain. Visit Vitals  /60 (BP 1 Location: Right arm, BP Patient Position: Sitting)   Pulse 60   Temp 98.1 °F (36.7 °C) (Oral)   Resp 20   Ht 6' (1.829 m)   Wt 199 lb 12.8 oz (90.6 kg)   SpO2 94%   BMI 27.10 kg/m²       HPI    Review of Systems   Constitutional: Negative for chills and fever. HENT: Positive for hearing loss. Respiratory: Negative. Cardiovascular: Negative. Gastrointestinal: Negative. Genitourinary: Negative. Musculoskeletal: Negative. Skin: Negative. Neurological: Negative. Physical Exam   Constitutional: He is oriented to person, place, and time. He appears well-developed and well-nourished. No distress. HENT:   Head: Normocephalic and atraumatic. [place]  Bilateral ear canals with impacted cerumen   Cardiovascular: Normal rate and regular rhythm. Murmur heard. Pulmonary/Chest: Effort normal and breath sounds normal. No respiratory distress. He has no wheezes. He has no rales. Musculoskeletal: He exhibits no edema. Neurological: He is alert and oriented to person, place, and time. Skin: Skin is warm and dry. Psychiatric: He has a normal mood and affect. His behavior is normal.   Vitals reviewed. ASSESSMENT and PLAN  Encounter Diagnoses   Name Primary?  Bilateral impacted cerumen Yes     Ear lavage in office today. Patient tolerated well. Reviewed medications and side effects in detail    Patient encouraged to call or return to office if symptoms do not improve or worsen. Reviewed plan of care with patient who acknowledges understanding and agrees. Follow-up in one month, or sooner as needed.

## 2019-07-15 NOTE — PROGRESS NOTES
Health Maintenance Due   Topic Date Due    Shingrix Vaccine Age 49> (1 of 2) 06/18/1987       Chief Complaint   Patient presents with    Wax in Ear     Remove ear wax    Ear Fullness       1. Have you been to the ER, urgent care clinic since your last visit? Hospitalized since your last visit? No    2. Have you seen or consulted any other health care providers outside of the [address] [place] [address] since your last visit? Include any pap smears or colon screening. No    3) Do you have an Advance Directive on file? yes    4) Are you interested in receiving information on Advance Directives? NO      Patient is accompanied by self I have received verbal consent from Valeriy Parks to discuss any/all medical information while they are present in the room.

## 2019-07-30 NOTE — PROGRESS NOTES
HISTORY OF PRESENT ILLNESS  Jaylene Gunter is a 82 y.o. male. Pt. comes in for f/u. Has multiple medical problems. Reports a few days of lower back pain. Denies any obvious trauma. No radiation to legs or other symptoms. BP and DM are stable. Cardiac status is stable. Denies tobacco or alcohol use. Lives with his wife. Reports compliance with medications and diet. Med list and most recent labs/studies reviewed with pt. Trying to be active physically to control weight. Reports no other new c/o. HPI    Review of Systems   Constitutional: Negative. HENT: Positive for hearing loss. Eyes: Positive for blurred vision. Negative for double vision and pain. Respiratory: Negative for shortness of breath. Cardiovascular: Negative for chest pain and leg swelling. Gastrointestinal: Negative for abdominal pain. Genitourinary: Positive for urgency. Negative for dysuria. Musculoskeletal: Positive for back pain and joint pain. Negative for falls. Skin: Negative. Neurological: Positive for sensory change. Negative for dizziness, focal weakness and headaches. Endo/Heme/Allergies: Negative. Psychiatric/Behavioral: Negative for depression. The patient has insomnia. The patient is not nervous/anxious. All other systems reviewed and are negative. Physical Exam   Constitutional: He is oriented to person, place, and time. He appears well-developed and well-nourished. No distress. HENT:   Head: Normocephalic and atraumatic. Mouth/Throat: Oropharynx is clear and moist.   Eyes: Conjunctivae are normal.   Bilateral cataracts   Neck: Normal range of motion. Neck supple. No JVD present. No thyromegaly present. Cardiovascular: Normal rate and regular rhythm. Murmur (AS, chronic) heard. Diminished pedal pulses   Pulmonary/Chest: Effort normal and breath sounds normal. No respiratory distress. He has no wheezes. He has no rales. Abdominal: Soft.  Bowel sounds are normal. He exhibits no distension. Musculoskeletal: He exhibits tenderness (Lumbars). He exhibits no edema. Neurological: He is alert and oriented to person, place, and time. Coordination normal.   Decreased sensation in distal legs/feet   Skin: Skin is warm and dry. No rash noted. Psychiatric: He has a normal mood and affect. His behavior is normal.   Nursing note and vitals reviewed. ASSESSMENT and PLAN  Diagnoses and all orders for this visit:    1. Lumbar sprain, initial encounter    2. Essential hypertension    3. Type 2 diabetes mellitus without complication, without long-term current use of insulin ([facility]    4. Coronary artery disease involving native coronary artery of native heart without angina pectoris    Other orders  -     tiZANidine (ZANAFLEX) 2 mg tablet; Take 1 Tab by mouth three (3) times daily as needed for Pain.   Tylenol as needed pain      Follow-up and Dispositions    · Return if symptoms worsen or fail to improve.     lab results and schedule of future lab studies reviewed with patient  reviewed diet, exercise and weight control  reviewed medications and side effects in detail  low cholesterol diet, weight control and daily exercise discussed, home glucose monitoring emphasized, all medications, side effects and compliance discussed carefully, foot care discussed and Podiatry visits discussed, annual eye examinations at Ophthalmology discussed, glycohemoglobin and other lab monitoring discussed and long term diabetic complications discussed  Fall precautions discussed  Back exercises shown to patient  Reassurance  Overall stable

## 2019-08-10 RX ORDER — LABETALOL 100 MG/1
TABLET, FILM COATED ORAL
Qty: 90 TAB | Refills: 1 | Status: SHIPPED | OUTPATIENT
Start: 2019-08-10 | End: 2019-10-18 | Stop reason: SDUPTHER

## 2019-08-10 RX ORDER — FINASTERIDE 5 MG/1
TABLET, FILM COATED ORAL
Qty: 90 TAB | Refills: 1 | Status: SHIPPED | OUTPATIENT
Start: 2019-08-10 | End: 2019-10-24 | Stop reason: SDUPTHER

## 2019-08-10 RX ORDER — DOXAZOSIN 4 MG/1
TABLET ORAL
Qty: 90 TAB | Refills: 1 | Status: SHIPPED | OUTPATIENT
Start: 2019-08-10 | End: 2019-10-24 | Stop reason: SDUPTHER

## 2019-08-10 RX ORDER — ATORVASTATIN CALCIUM 10 MG/1
TABLET, FILM COATED ORAL
Qty: 90 TAB | Refills: 1 | Status: SHIPPED | OUTPATIENT
Start: 2019-08-10 | End: 2019-10-24 | Stop reason: SDUPTHER

## 2019-08-10 RX ORDER — GLIPIZIDE 5 MG/1
TABLET, FILM COATED, EXTENDED RELEASE ORAL
Qty: 90 TAB | Refills: 1 | Status: SHIPPED | OUTPATIENT
Start: 2019-08-10 | End: 2019-10-24 | Stop reason: SDUPTHER

## 2019-08-12 RX ORDER — HYDROCHLOROTHIAZIDE 12.5 MG/1
TABLET ORAL
Qty: 90 TAB | Refills: 1 | Status: SHIPPED | OUTPATIENT
Start: 2019-08-12 | End: 2019-10-24 | Stop reason: SDUPTHER

## 2019-08-12 RX ORDER — METFORMIN HYDROCHLORIDE 500 MG/1
TABLET, EXTENDED RELEASE ORAL
Qty: 180 TAB | Refills: 1 | Status: SHIPPED | OUTPATIENT
Start: 2019-08-12 | End: 2019-10-24 | Stop reason: SDUPTHER

## 2019-08-12 RX ORDER — AMLODIPINE BESYLATE 10 MG/1
TABLET ORAL
Qty: 90 TAB | Refills: 1 | Status: SHIPPED | OUTPATIENT
Start: 2019-08-12 | End: 2019-10-24 | Stop reason: SDUPTHER

## 2019-08-22 ENCOUNTER — OFFICE VISIT (OUTPATIENT)
Dept: INTERNAL MEDICINE CLINIC | Age: 84
End: 2019-08-22

## 2019-08-22 VITALS
OXYGEN SATURATION: 94 % | DIASTOLIC BLOOD PRESSURE: 42 MMHG | RESPIRATION RATE: 22 BRPM | SYSTOLIC BLOOD PRESSURE: 90 MMHG | HEART RATE: 58 BPM | WEIGHT: 197.8 LBS | TEMPERATURE: 98.2 F | BODY MASS INDEX: 26.79 KG/M2 | HEIGHT: 72 IN

## 2019-08-22 DIAGNOSIS — E11.9 TYPE 2 DIABETES MELLITUS WITHOUT COMPLICATION, WITHOUT LONG-TERM CURRENT USE OF INSULIN (HCC): Primary | ICD-10-CM

## 2019-08-22 DIAGNOSIS — I25.10 CORONARY ARTERY DISEASE INVOLVING NATIVE CORONARY ARTERY OF NATIVE HEART WITHOUT ANGINA PECTORIS: ICD-10-CM

## 2019-08-22 DIAGNOSIS — F33.9 RECURRENT DEPRESSION (HCC): ICD-10-CM

## 2019-08-22 DIAGNOSIS — E78.00 HYPERCHOLESTEROLEMIA: ICD-10-CM

## 2019-08-22 NOTE — PROGRESS NOTES
Health Maintenance Due   Topic Date Due    Shingrix Vaccine Age 49> (1 of 2) 06/18/1987    Influenza Age 5 to Adult  08/01/2019       Chief Complaint   Patient presents with    Cholesterol Problem     4 mo f/u    Hypertension    Diabetes       1. Have you been to the ER, urgent care clinic since your last visit? Hospitalized since your last visit? No    2. Have you seen or consulted any other health care providers outside of the [address] [place] [address] since your last visit? Include any pap smears or colon screening. No    3) Do you have an Advance Directive on file? yes    4) Are you interested in receiving information on Advance Directives? NO      Patient is accompanied by self and wife I have received verbal consent from Keysha Graham to discuss any/all medical information while they are present in the room.

## 2019-08-22 NOTE — PROGRESS NOTES
HISTORY OF PRESENT ILLNESS  Stacy Garcia is a 82 y.o. male. Pt. comes in for f/u. Has multiple medical problems. BP is on the low side. Denies any related symptoms. DM has been stable. Vision is poor and blurry. Has some chronic arthritic pains. No falls. Cardiac status/CAD has been stable. Has been on Zoloft for depression for a long time. Wants to get off of it. Lives [facility] his [facility] at [facility]. Reports compliance with medications and diet. Med list and most recent labs/studies reviewed with pt. Trying to be active physically to control weight. Reports no other new c/o. HPI    Review of Systems   Constitutional: Negative. HENT: Positive for hearing loss. Eyes: Positive for blurred vision. Negative for double vision and pain. Respiratory: Negative for shortness of breath. Cardiovascular: Negative for chest pain and leg swelling. Gastrointestinal: Negative for abdominal pain. Genitourinary: Positive for urgency. Negative for dysuria. Musculoskeletal: Positive for back pain and joint pain. Negative for falls. Skin: Negative. Neurological: Positive for sensory change. Negative for dizziness, focal weakness and headaches. Endo/Heme/Allergies: Negative. Negative for polydipsia. Psychiatric/Behavioral: Negative for depression. The patient has insomnia. The patient is not nervous/anxious. All other systems reviewed and are negative. Physical Exam   Constitutional: He is oriented to person, place, and time. He appears well-developed and well-nourished. No distress. HENT:   Head: Normocephalic and atraumatic. Mouth/Throat: Oropharynx is clear and moist.   Eyes: Conjunctivae are normal. No scleral icterus. Bilateral cataracts   Neck: Normal range of motion. Neck supple. No JVD present. No thyromegaly present. Cardiovascular: Normal rate and regular rhythm. Murmur (AS, chronic) heard.   Diminished pedal pulses   Pulmonary/Chest: Effort normal and breath sounds normal. No respiratory distress. He has no wheezes. He has no rales. Abdominal: Soft. Bowel sounds are normal. He exhibits no distension. Musculoskeletal: He exhibits tenderness (Lumbars). He exhibits no edema. Neurological: He is alert and oriented to person, place, and time. Coordination normal.   Decreased sensation in distal legs/feet   Skin: Skin is warm and dry. No rash noted. Psychiatric: He has a normal mood and affect. His behavior is normal.   Nursing note and vitals reviewed. ASSESSMENT and PLAN  Diagnoses and all orders for this visit:    1. Type 2 diabetes mellitus without complication, without long-term current use of insulin ([facility]    2. Coronary artery disease involving native coronary artery of native heart without angina pectoris    3. Depression  Advised pt to taper zoloft down to 50 mg daily over next 2 weeks    4. Hypercholesterolemia    5. HTN  Decrease Norvasc from 10 to 5 mg daily  Monitor BP at home with goal of 140/90 or less            Follow-up and Dispositions    · Return in about 4 weeks (around 9/19/2019).      lab results and schedule of future lab studies reviewed with patient  reviewed diet, exercise and weight control  reviewed medications and side effects in detail  low cholesterol diet, weight control and daily exercise discussed, home glucose monitoring emphasized, all medications, side effects and compliance discussed carefully, foot care discussed and Podiatry visits discussed, annual eye examinations at Ophthalmology discussed, glycohemoglobin and other lab monitoring discussed and long term diabetic complications discussed  F/u with other MD's as scheduled  Monitor BS at home with goal of 100-150  Overall stable

## 2019-09-05 ENCOUNTER — OFFICE VISIT (OUTPATIENT)
Dept: INTERNAL MEDICINE CLINIC | Age: 84
End: 2019-09-05

## 2019-09-05 VITALS
OXYGEN SATURATION: 95 % | RESPIRATION RATE: 18 BRPM | DIASTOLIC BLOOD PRESSURE: 62 MMHG | HEIGHT: 72 IN | SYSTOLIC BLOOD PRESSURE: 124 MMHG | WEIGHT: 195 LBS | BODY MASS INDEX: 26.41 KG/M2 | TEMPERATURE: 98.1 F | HEART RATE: 62 BPM

## 2019-09-05 DIAGNOSIS — E78.00 HYPERCHOLESTEROLEMIA: ICD-10-CM

## 2019-09-05 DIAGNOSIS — L82.1 SEBORRHEIC KERATOSIS: Primary | ICD-10-CM

## 2019-09-05 DIAGNOSIS — E11.9 TYPE 2 DIABETES MELLITUS WITHOUT COMPLICATION, WITHOUT LONG-TERM CURRENT USE OF INSULIN (HCC): ICD-10-CM

## 2019-09-05 DIAGNOSIS — Z23 ENCOUNTER FOR IMMUNIZATION: ICD-10-CM

## 2019-09-05 NOTE — PATIENT INSTRUCTIONS
Seborrheic Keratosis: Care Instructions  Your Care Instructions  Seborrheic keratoses are raised skin growths that look scaly or warty. They usually look like they were stuck onto the skin. They most often grow in groups on the back or chest and are more common in older people. A seborrheic keratosis can be tan or dark brown. A seborrheic keratosis is not a mole and is almost always harmless. But it is still a good idea to check your skin regularly. Sometimes a seborrheic keratosis can itch. Scratching it can cause it to bleed and sometimes even scar. A seborrheic keratosis is removed only if it bothers you. The doctor will freeze it or scrape it off with a tool. The doctor can also use a laser to remove a seborrheic keratosis. Treatment usually results in normal-looking skin, but it can leave a light or dark sylvester or even a scar on the skin. Follow-up care is a key part of your treatment and safety. Be sure to make and go to all appointments, and call your doctor if you are having problems. It's also a good idea to know your test results and keep a list of the medicines you take. How can you care for yourself at home? · If clothing irritates your seborrheic keratosis, cover it with a bandage to prevent rubbing and bleeding. · If you have a seborrheic keratosis removed, clean the area with soap and water two times a day unless your doctor gives you different instructions. Don't use hydrogen peroxide or alcohol, which can slow healing. ? You may cover the wound with a thin layer of petroleum jelly, such as Vaseline, and a nonstick bandage. · Check all the skin on your body once a month for skin growths or other changes, such as color and feel of the skin. ?  front of a full-length mirror. Look carefully at the front and back of your body. Then look at your right and left sides with your arms raised. ?  Bend your elbows and look carefully at your forearms, the back of your upper arms, and your palms.  ? Look at your feet, the soles of your feet, and the spaces between your toes. ? Use a hand mirror to look at the back of your legs, the back of your neck, and your back, rear end (buttocks), and genital area. Part the hair on your head to look at your scalp. · If you see a change in a skin growth, contact your doctor. Look for:  ? A mole that bleeds. ? A fast-growing mole. ? A scaly or crusted growth on the skin. ? A sore that will not heal.  When should you call for help? Call your doctor now or seek immediate medical care if:    · You have an area of normal skin that suddenly changes in shape, size, or how it looks.     · Your skin is badly broken from scratching.     · You have signs of infection such as:  ? Pain, warmth, or swelling in your skin. ? Red streaks near a wound in your skin. ? Pus coming from a wound in your skin. ? A fever not due to the flu or other illness.    Watch closely for changes in your health, and be sure to contact your doctor if:    · You do not get better as expected. Where can you learn more? Go to http://esvin-tammy.info/. Enter G458 in the search box to learn more about \"Seborrheic Keratosis: Care Instructions. \"  Current as of: April 1, 2019  Content Version: 12.1  © 6060-1831 [employer]. Care instructions adapted under license by [employer] (which disclaims liability or warranty for this information). If you have questions about a medical condition or this instruction, always ask your healthcare professional. [address] any warranty or liability for your use of this information.

## 2019-09-05 NOTE — PROGRESS NOTES
Health Maintenance Due   Topic Date Due    Shingrix Vaccine Age 49> (1 of 2) 06/18/1987    Influenza Age 5 to Adult  08/01/2019       Chief Complaint   Patient presents with    Hypertension     4 wk f/u    Diabetes    Cholesterol Problem       1. Have you been to the ER, urgent care clinic since your last visit? Hospitalized since your last visit? No    2. Have you seen or consulted any other health care providers outside of the [address] [place] [address] since your last visit? Include any pap smears or colon screening. No    3) Do you have an Advance Directive on file? yes    4) Are you interested in receiving information on Advance Directives? NO      Patient is accompanied by self and wife I have received verbal consent from Nacho Valle to discuss any/all medical information while they are present in the room. Nacho Valle is a 82 y.o. male  who presents for routine immunizations. He/She denies any symptoms , reactions or allergies that would exclude them from being immunized today. Risks and adverse reactions were discussed and the VIS was given to them. All questions were addressed. He/She was observed for 10 min post injection. There were no reactions observed. Emperatriz Yan LPN

## 2019-09-30 NOTE — PROGRESS NOTES
HISTORY OF PRESENT ILLNESS Bryant Tapia is a 82 y.o. male. Pt. comes in for f/u. Has multiple medical problems. Patient and wife are concerned about skin lesions on his back. Reports some itching from time to time. Otherwise has been feeling okay. BP and DM are stable. Reports compliance with medications and diet. Med list and most recent labs/studies reviewed with pt. Trying to be active physically to control weight. Reports no other new c/o. HPI Review of Systems Constitutional: Negative. HENT: Positive for hearing loss. Eyes: Positive for blurred vision. Negative for double vision and pain. Respiratory: Negative for shortness of breath. Cardiovascular: Negative for chest pain and leg swelling. Gastrointestinal: Negative for abdominal pain. Genitourinary: Positive for urgency. Negative for dysuria. Musculoskeletal: Positive for back pain and joint pain. Negative for falls. Skin: Negative for itching and rash. Neurological: Positive for sensory change. Negative for dizziness, focal weakness and headaches. Endo/Heme/Allergies: Negative. Negative for polydipsia. Psychiatric/Behavioral: Negative for depression. The patient has insomnia. The patient is not nervous/anxious. All other systems reviewed and are negative. Physical Exam  
Constitutional: He is oriented to person, place, and time. He appears well-developed and well-nourished. No distress. HENT:  
Head: Normocephalic and atraumatic. Mouth/Throat: Oropharynx is clear and moist.  
Eyes: Conjunctivae are normal.  
Bilateral cataracts Neck: Normal range of motion. Neck supple. No JVD present. Cardiovascular: Normal rate and regular rhythm. Murmur (AS, chronic) heard. Diminished pedal pulses Pulmonary/Chest: Effort normal and breath sounds normal. No respiratory distress. He has no wheezes. He has no rales. Abdominal: Soft. Bowel sounds are normal. He exhibits no distension. Musculoskeletal: He exhibits tenderness (Lumbars). He exhibits no edema. Neurological: He is alert and oriented to person, place, and time. Coordination normal.  
Decreased sensation in distal legs/feet Skin: Skin is warm and dry. No rash noted. No erythema. Multiple seborrheic keratosis lesions on his back, benign Psychiatric: He has a normal mood and affect. His behavior is normal.  
Nursing note and vitals reviewed. ASSESSMENT and PLAN Diagnoses and all orders for this visit: 1. Seborrheic keratosis 2. Encounter for immunization 
-     INFLUENZA VACCINE INACTIVATED (IIV), SUBUNIT, ADJUVANTED, IM 
-     LA IMMUNIZ ADMIN,1 SINGLE/COMB VAC/TOXOID 3. Type 2 diabetes mellitus without complication, without long-term current use of insulin ([facility] [address] 4. Hypercholesterolemia Follow-up and Dispositions · Return if symptoms worsen or fail to improve. 
  
lab results and schedule of future lab studies reviewed with patient 
reviewed diet, exercise and weight control 
reviewed medications and side effects in detail 
low cholesterol diet, weight control and daily exercise discussed, home glucose monitoring emphasized, all medications, side effects and compliance discussed carefully, foot care discussed and Podiatry visits discussed, annual eye examinations at Ophthalmology discussed, glycohemoglobin and other lab monitoring discussed and long term diabetic complications discussed Reassurance Overall stable

## 2019-10-18 ENCOUNTER — OFFICE VISIT (OUTPATIENT)
Dept: INTERNAL MEDICINE CLINIC | Age: 84
End: 2019-10-18

## 2019-10-18 VITALS
SYSTOLIC BLOOD PRESSURE: 138 MMHG | WEIGHT: 197 LBS | BODY MASS INDEX: 26.68 KG/M2 | HEIGHT: 72 IN | DIASTOLIC BLOOD PRESSURE: 80 MMHG | HEART RATE: 86 BPM | RESPIRATION RATE: 18 BRPM | TEMPERATURE: 97.1 F | OXYGEN SATURATION: 96 %

## 2019-10-18 DIAGNOSIS — H91.93 BILATERAL HEARING LOSS, UNSPECIFIED HEARING LOSS TYPE: ICD-10-CM

## 2019-10-18 DIAGNOSIS — H61.23 EXCESSIVE CERUMEN IN BOTH EAR CANALS: Primary | ICD-10-CM

## 2019-10-18 NOTE — PROGRESS NOTES
Health Maintenance Due   Topic Date Due    Shingrix Vaccine Age 49> (1 of 2) 06/18/1987    HEMOGLOBIN A1C Q6M  10/30/2019       Chief Complaint   Patient presents with    Wax in Ear     per pt both ears       1. Have you been to the ER, urgent care clinic since your last visit? Hospitalized since your last visit? No    2. Have you seen or consulted any other health care providers outside of the [address] [place] [address] since your last visit? Include any pap smears or colon screening. No    3) Do you have an Advance Directive on file? no    4) Are you interested in receiving information on Advance Directives? NO      Patient is accompanied by self I have received verbal consent from Lien Bruno to discuss any/all medical information while they are present in the room.

## 2019-10-18 NOTE — PROGRESS NOTES
HISTORY OF PRESENT ILLNESS  Adama Akers is a 82 y.o. male. This is a patient of Dr. Vandana Garcia who presents today with complaints of ear fullness and decreased hearing. Visit Vitals  /80 (BP 1 Location: Left arm, BP Patient Position: Sitting)   Pulse 86   Temp 97.1 °F (36.2 °C) (Oral)   Resp 18   Ht 6' (1.829 m)   Wt 197 lb (89.4 kg)   SpO2 96%   BMI 26.72 kg/m²       HPI    Review of Systems   Constitutional: Negative for chills and fever. HENT: Positive for hearing loss. Respiratory: Negative. Cardiovascular: Negative. Gastrointestinal: Negative. Genitourinary: Negative. Musculoskeletal: Negative. Skin: Negative. Neurological: Negative. Physical Exam   Constitutional: He is oriented to person, place, and time. He appears well-developed and well-nourished. No distress. HENT:   Head: Normocephalic and atraumatic. [place]  Bilateral ear canals with excessive cerumen   Cardiovascular: Normal rate and regular rhythm. Murmur heard. Pulmonary/Chest: Effort normal and breath sounds normal. No respiratory distress. He has no wheezes. He has no rales. Musculoskeletal: He exhibits no edema. Neurological: He is alert and oriented to person, place, and time. Skin: Skin is warm and dry. Psychiatric: He has a normal mood and affect. His behavior is normal.   Vitals reviewed. ASSESSMENT and PLAN  Encounter Diagnoses   Name Primary?  Excessive cerumen in both ear canals Yes    Bilateral hearing loss, unspecified hearing loss type      Cerumen removed from bilateral ear canals via ear lavage; patient tolerated well. Reviewed medications and side effects in detail. Continue current medications at this time. Patient encouraged to call or return to office if symptoms do not improve or worsen. Reviewed plan of care with patient who acknowledges understanding and agrees. Follow-up with Dr. Ross Has in 3 months, as scheduled, or sooner as needed.

## 2019-10-25 DIAGNOSIS — G47.00 INSOMNIA, UNSPECIFIED TYPE: ICD-10-CM

## 2019-10-25 RX ORDER — TEMAZEPAM 15 MG/1
CAPSULE ORAL
Qty: 90 CAP | Refills: 1 | Status: SHIPPED | OUTPATIENT
Start: 2019-10-25 | End: 2020-01-01 | Stop reason: SDUPTHER

## 2019-10-25 RX ORDER — DOXAZOSIN 4 MG/1
TABLET ORAL
Qty: 90 TAB | Refills: 1 | Status: SHIPPED | OUTPATIENT
Start: 2019-10-25 | End: 2020-01-01

## 2019-10-25 RX ORDER — METFORMIN HYDROCHLORIDE 500 MG/1
TABLET, EXTENDED RELEASE ORAL
Qty: 180 TAB | Refills: 1 | Status: SHIPPED | OUTPATIENT
Start: 2019-10-25 | End: 2020-01-01

## 2019-10-25 RX ORDER — FINASTERIDE 5 MG/1
TABLET, FILM COATED ORAL
Qty: 90 TAB | Refills: 1 | Status: SHIPPED | OUTPATIENT
Start: 2019-10-25 | End: 2020-01-01

## 2019-10-25 RX ORDER — GLIPIZIDE 5 MG/1
TABLET, FILM COATED, EXTENDED RELEASE ORAL
Qty: 90 TAB | Refills: 1 | Status: SHIPPED | OUTPATIENT
Start: 2019-10-25 | End: 2020-01-01

## 2019-10-25 RX ORDER — ATORVASTATIN CALCIUM 10 MG/1
TABLET, FILM COATED ORAL
Qty: 90 TAB | Refills: 1 | Status: SHIPPED | OUTPATIENT
Start: 2019-10-25 | End: 2020-01-01

## 2019-10-25 RX ORDER — HYDROCHLOROTHIAZIDE 12.5 MG/1
TABLET ORAL
Qty: 90 TAB | Refills: 1 | Status: SHIPPED | OUTPATIENT
Start: 2019-10-25 | End: 2020-01-01

## 2019-10-25 RX ORDER — AMLODIPINE BESYLATE 10 MG/1
TABLET ORAL
Qty: 90 TAB | Refills: 1 | Status: SHIPPED | OUTPATIENT
Start: 2019-10-25 | End: 2020-01-01

## 2019-10-25 RX ORDER — LOSARTAN POTASSIUM 25 MG/1
TABLET ORAL
Qty: 45 TAB | Refills: 3 | Status: SHIPPED | OUTPATIENT
Start: 2019-10-25 | End: 2020-01-01

## 2019-12-04 ENCOUNTER — OFFICE VISIT (OUTPATIENT)
Dept: CARDIOLOGY CLINIC | Age: 84
End: 2019-12-04

## 2019-12-04 VITALS
BODY MASS INDEX: 26.55 KG/M2 | SYSTOLIC BLOOD PRESSURE: 136 MMHG | OXYGEN SATURATION: 96 % | RESPIRATION RATE: 16 BRPM | HEIGHT: 72 IN | WEIGHT: 196 LBS | HEART RATE: 70 BPM | DIASTOLIC BLOOD PRESSURE: 64 MMHG

## 2019-12-04 DIAGNOSIS — I25.10 CORONARY ARTERY DISEASE INVOLVING NATIVE CORONARY ARTERY OF NATIVE HEART WITHOUT ANGINA PECTORIS: ICD-10-CM

## 2019-12-04 DIAGNOSIS — I73.9 PAD (PERIPHERAL ARTERY DISEASE) (HCC): ICD-10-CM

## 2019-12-04 DIAGNOSIS — I35.0 AORTIC VALVE STENOSIS, ETIOLOGY OF CARDIAC VALVE DISEASE UNSPECIFIED: Primary | ICD-10-CM

## 2019-12-04 DIAGNOSIS — Z95.1 S/P CABG (CORONARY ARTERY BYPASS GRAFT): ICD-10-CM

## 2019-12-04 DIAGNOSIS — I35.8 AORTIC SYSTOLIC MURMUR ON EXAMINATION: ICD-10-CM

## 2019-12-04 DIAGNOSIS — I10 ESSENTIAL HYPERTENSION: ICD-10-CM

## 2019-12-04 DIAGNOSIS — I25.10 CAD IN NATIVE ARTERY: ICD-10-CM

## 2019-12-04 DIAGNOSIS — E11.9 TYPE 2 DIABETES MELLITUS WITHOUT COMPLICATION, WITHOUT LONG-TERM CURRENT USE OF INSULIN (HCC): ICD-10-CM

## 2019-12-04 NOTE — PROGRESS NOTES
[place]: Sky Moreno 
[phone] Requesting/referring provider: Dr. Kavon Marshall Reason for Consult: CAD, CABG 
 
HPI: Karri Thomson, a 82y.o. year-old who presents for evaluation of CAD s/p CABG, DM, HTN, Dyslipidemia. Had cataract surgery and doing well, no chest pain, dyspnea, edema. BP looks good. No more falls. Feeling well. A bit of swelling on the right ankle but not bad. Feels ok today, accompanied by his wife and daughter today. Lourdes Delaney No chest pain or dyspnea now. Energy level is good. Walks at [employer] indoors without limitations. Glucose running 120 efe. BP is good. EKG NSR NST He denies significant ramsay or chest pain. **Records received from [facility] on 11/8/17 Hx of moderate AS, hx of rheumatic fever 1944 5/20/2008 3 vessel CABG (LIMA to LAD, SVG to OM and PDA) S/p right SFA PTA and left subclavian stent Echo 6/1/17 - LVEF 55-60%, no WMA, grade 1 dd, severe cLVH, MAC extending into the posterior leaflet and mild MR, mild TR, trace PI Lexiscan MPI 5/16/17 - medium sized region of mild lateral ischemia, LVEF 54%, no WMA GAMAL 9/30/08 - placement of 7 x 37 mm EB3 stent in 75% stenotic right common iliac artery 
left common iliac artery stent is patent, right common femoral artery with 80% heavily calcified eccentric disease involving ostium of the SFA and the profunda, right SFA has 25% luminal disease in the mid areas, right anterior tibia has 99% focal proximal lesion, right peroneal artery is widely patent, right posterior tibial artery as 90% concentric lesion distally, left common femoral artery with 85% eccentric, heavily calcified lesion involving the ostium of the profunda, as well as the SFA, left SFA is 100% occluded in the mid area, left anterior tibial artery is 100% occluded in the proximal to distal area Venous duplex 3/24/16 - no DVT, bilateral greater saphenous veins stripped Assessment/Plan: 1. DM- managed on oral agents 2. HTN- at goal on current meds 3. Dyslipidemia- on statin, at goal 
4. CAD- CABG c. 2008(LIMA-LAD, SVG-OM, SVG-PDA), s/p PCI(?) 
-cont aspirin,statin 
-recent mildly abnormal stress test 5/17 [facility] in absence of sx  Will manage medically for now 5. PAD with right leg stent- cont aspirin, statin, check RHONDA 
-s/p SFA PTA and LSC stent 6. HFpEF- stable compensated today, severe cLVH 
-cont arb 7. Mod AS with rheumatic heart disease- follow-up echo next appt, moderat today with mean gradient of 32 and no real sx so just watch for now. 8. Venous insufficiency- s/p vein stripping bilat GSV Echo 12/19 with Ef 60%, mod AS MAC mild cLVH, lv dd 1/19 Echo 60-65% gri DD, RVE, LAE, mild MR, mod AS, mild TR, mild PI Lexiscan 5/17 med lteral ischemia, EF 54% Echo 6/17 EF 60% gr1dd, severe cLVH, MAC mild MR, mild TR mild PI 
GAMAL 9/30/08 - placement of 7 x 37 mm EB3 stent in 75% stenotic right common iliac artery, left common iliac artery stent is patent, right common femoral artery with 80% heavily calcified eccentric disease involving ostium of the SFA and the profunda, right SFA has 25% luminal disease in the mid areas, right anterior tibia has 99% focal proximal lesion, right peroneal artery is widely patent, right posterior tibial artery as 90% concentric lesion distally, left common femoral artery with 85% eccentric, heavily calcified lesion involving the ostium of the profunda, as well as the SFA, left SFA is 100% occluded in the mid area, left anterior tibial artery is 100% occluded in the proximal to distal area Soc no tob, quit 40 years ago, no etoh Fhx no early cad He  has a past medical history of BPH (benign prostatic hyperplasia), CAD (coronary artery disease), Diabetes ([facility] [address] Hearing loss, Hypercholesterolemia, Hypertension, Murmur, and Recurrent depression ([facility] [address] (8/22/2019).  He also has no past medical history of Anemia, Arrhythmia, Arthritis, Asthma, Autoimmune disease ([facility] [address] Calculus of kidney, Cancer ([facility] [address] Chronic kidney disease, Chronic obstructive pulmonary disease ([facility] [address] Chronic pain, Congestive heart failure ([facility] [address] GERD (gastroesophageal reflux disease), Headache, Liver disease, Psychotic disorder ([facility] [address] PUD (peptic ulcer disease), Seizures ([facility] [address] Stroke [facility] Thromboembolus ([facility] Thyroid disease, or Trauma. Cardiovascular ROS: no chest pain or dyspnea on exertion Respiratory ROS: no cough, shortness of breath, or wheezing Neurological ROS: no TIA or stroke symptoms All other systems negative except as above. PE 
Vitals:  
 12/04/19 1034 BP: 136/64 Pulse: 70 Resp: 16 SpO2: 96% Weight: 196 lb (88.9 kg) Height: 6' (1.829 m) Body mass index is 26.58 kg/m². General appearance - alert, well appearing, and in no distress Mental status - affect appropriate to mood Eyes - sclera anicteric, moist mucous membranes Neck - supple, no significant adenopathy Lymphatics - no  lymphadenopathy Chest - clear to auscultation, no wheezes, rales or rhonchi Heart - normal rate, regular rhythm, normal S1, S2, no murmurs, rubs, clicks or gallops Abdomen - soft, nontender, nondistended, no masses or organomegaly Back exam - full range of motion, no tenderness Neurological - cranial nerves II through XII grossly intact, no focal deficit Musculoskeletal - no muscular tenderness noted, normal strength Extremities - peripheral pulses normal, no pedal edema Skin - normal coloration  no rashes Recent Labs: 
Lab Results Component Value Date/Time Cholesterol, total 102 04/30/2019 01:46 PM  
 HDL Cholesterol 45 04/30/2019 01:46 PM  
 LDL, calculated 41 04/30/2019 01:46 PM  
 Triglyceride 80 04/30/2019 01:46 PM  
 
Lab Results Component Value Date/Time Creatinine 0.93 04/30/2019 01:46 PM  
 
Lab Results Component Value Date/Time BUN 21 04/30/2019 01:46 PM  
 
Lab Results Component Value Date/Time Potassium 4.2 2019 01:46 PM  
 
Lab Results Component Value Date/Time Hemoglobin A1c 6.6 (H) 2019 01:46 PM  
 
Lab Results Component Value Date/Time HGB 12.4 (L) 2018 03:07 PM  
 
Lab Results Component Value Date/Time PLATELET 280 (L)  03:07 PM  
 
 
Reviewed: 
Past Medical History:  
Diagnosis Date  BPH (benign prostatic hyperplasia)  CAD (coronary artery disease)  Diabetes ([facility] [address]  Hearing loss  Hypercholesterolemia  Hypertension  Murmur  Recurrent depression ([facility] [address] 2019 Social History Tobacco Use Smoking Status Former Smoker  Types: Cigarettes  Last attempt to quit: 1990  Years since quittin.2 Smokeless Tobacco Never Used Social History Substance and Sexual Activity Alcohol Use No  
 
Allergies Allergen Reactions  Procaine Other (comments) Pt stated he passed out from procaine and was told not to let anyone use it on him again Current Outpatient Medications Medication Sig  
 losartan (COZAAR) 25 mg tablet TAKE ONE-HALF TABLET BY  MOUTH DAILY  doxazosin (CARDURA) 4 mg tablet TAKE 1 TABLET BY MOUTH  DAILY  atorvastatin (LIPITOR) 10 mg tablet TAKE 1 TABLET BY MOUTH  DAILY  hydroCHLOROthiazide (HYDRODIURIL) 12.5 mg tablet TAKE 1 TABLET BY MOUTH  DAILY  amLODIPine (NORVASC) 10 mg tablet TAKE 1 TABLET BY MOUTH  DAILY  metFORMIN ER (GLUCOPHAGE XR) 500 mg tablet TAKE 1 TABLET BY MOUTH TWO  TIMES DAILY WITH MEALS  finasteride (PROSCAR) 5 mg tablet TAKE 1 TABLET BY MOUTH  DAILY  glipiZIDE SR (GLUCOTROL XL) 5 mg CR tablet TAKE 1 TABLET BY MOUTH  DAILY  temazepam (RESTORIL) 15 mg capsule TAKE 1 CAPSULE BY MOUTH AT BEDTIME AS NEEDED FOR SLEEP.  tiZANidine (ZANAFLEX) 2 mg tablet Take 1 Tab by mouth three (3) times daily as needed for Pain.  sertraline (ZOLOFT) 100 mg tablet TAKE 1 TABLET BY MOUTH  DAILY  traZODone (DESYREL) 50 mg tablet TAKE 2 TABLETS BY MOUTH  NIGHTLY  glucose blood VI test strips (TRUE METRIX GLUCOSE TEST STRIP) strip Check BS BID  Dx: DM  E11.21  
 labetalol (NORMODYNE) 100 mg tablet TAKE 1 TABLET BY MOUTH  DAILY  aspirin (ASPIRIN) 325 mg tablet Take 1 Tab by mouth daily.  cholecalciferol (VITAMIN D3) 1,000 unit cap Take 1 Cap by mouth daily.  magnesium 250 mg tab Take 1 Tab by mouth daily.  sodium chloride (OCEAN) 0.65 % nasal squeeze bottle 0.05 mL by Both Nostrils route as needed for Congestion.  FERROUS FUMARATE/VIT BCOMP,C (SUPER B COMPLEX PO) Take  by mouth. No current facility-administered medications for this visit. Dick Kumar MD 
[facility] heart and Vascular [address], [address] [address] [address]

## 2019-12-13 RX ORDER — SERTRALINE HYDROCHLORIDE 100 MG/1
TABLET, FILM COATED ORAL
Qty: 30 TAB | Refills: 5 | Status: SHIPPED | OUTPATIENT
Start: 2019-12-13 | End: 2020-01-08 | Stop reason: SDUPTHER

## 2019-12-23 ENCOUNTER — OFFICE VISIT (OUTPATIENT)
Dept: INTERNAL MEDICINE CLINIC | Age: 84
End: 2019-12-23

## 2019-12-23 VITALS
TEMPERATURE: 97.7 F | DIASTOLIC BLOOD PRESSURE: 80 MMHG | WEIGHT: 197.8 LBS | RESPIRATION RATE: 20 BRPM | HEART RATE: 67 BPM | SYSTOLIC BLOOD PRESSURE: 140 MMHG | OXYGEN SATURATION: 94 % | BODY MASS INDEX: 26.79 KG/M2 | HEIGHT: 72 IN

## 2019-12-23 DIAGNOSIS — R09.81 NASAL CONGESTION: ICD-10-CM

## 2019-12-23 DIAGNOSIS — J06.9 UPPER RESPIRATORY TRACT INFECTION, UNSPECIFIED TYPE: Primary | ICD-10-CM

## 2019-12-23 RX ORDER — FLUTICASONE PROPIONATE 50 MCG
1 SPRAY, SUSPENSION (ML) NASAL 2 TIMES DAILY
Qty: 1 BOTTLE | Refills: 0 | Status: SHIPPED | OUTPATIENT
Start: 2019-12-23 | End: 2020-01-01

## 2019-12-23 RX ORDER — GUAIFENESIN 600 MG/1
600 TABLET, EXTENDED RELEASE ORAL 2 TIMES DAILY
Qty: 14 TAB | Refills: 0 | Status: SHIPPED | OUTPATIENT
Start: 2019-12-23 | End: 2019-12-30

## 2019-12-23 NOTE — PROGRESS NOTES
HISTORY OF PRESENT ILLNESS Bethel Rhoades is a 82 y.o. male. This is a patient of Dr. Johnna Saha who presents today with complaints of cough. The patient states he has had 3-4 days of cough and congestion. Productive cough and nasal congestion are described. He had low grade fever, up to 99.8. He believes symptoms are improving and states that he is feeling better today. He denies chest pain and shortness of breath. He has been using OTC lozenges. Visit Vitals /80 (BP 1 Location: Right arm, BP Patient Position: Sitting) Pulse 67 Temp 97.7 °F (36.5 °C) (Oral) Resp 20 Ht 6' (1.829 m) Wt 197 lb 12.8 oz (89.7 kg) SpO2 94% BMI 26.83 kg/m² HPI Review of Systems Constitutional: Positive for fever. HENT: Positive for congestion. Respiratory: Positive for cough and sputum production. Cardiovascular: Negative for chest pain and leg swelling. Gastrointestinal: Negative for abdominal pain. Genitourinary: Negative. Musculoskeletal: Negative. Skin: Negative. Neurological: Negative. Endo/Heme/Allergies: Negative. Psychiatric/Behavioral: Negative. Physical Exam 
Vitals signs reviewed. Constitutional:   
   General: He is not in acute distress. Appearance: He is well-developed. HENT:  
   Head: Normocephalic and atraumatic. Nose: Congestion present. Cardiovascular:  
   Rate and Rhythm: Normal rate and regular rhythm. Heart sounds: Murmur present. Pulmonary:  
   Effort: Pulmonary effort is normal. No respiratory distress. Breath sounds: Normal breath sounds. No wheezing or rales. Skin: 
   General: Skin is warm and dry. Neurological:  
   Mental Status: He is alert and oriented to person, place, and time. Psychiatric:     
   Behavior: Behavior normal.  
 
 
 
ASSESSMENT and PLAN Encounter Diagnoses Name Primary?  Upper respiratory tract infection, unspecified type Yes  Nasal congestion Will order Orders Placed This Encounter  guaiFENesin ER (MUCINEX) 600 mg ER tablet Sig: Take 1 Tab by mouth two (2) times a day for 7 days. Dispense:  14 Tab Refill:  0  
 fluticasone propionate (FLONASE) 50 mcg/actuation nasal spray [place] by Both Nostrils route two (2) times a day. Dispense:  1 Bottle Refill:  0 Reviewed medications and side effects in detail If experiencing severe shortness of breath or chest pain, present to the ER. Patient encouraged to call or return to office if symptoms do not improve or worsen. Follow-up with Dr. Zelda Moyer in 2 weeks, as scheduled, or sooner as needed. Reviewed plan of care with patient who acknowledges understanding and agrees.

## 2019-12-23 NOTE — PROGRESS NOTES
Health Maintenance Due Topic Date Due  
 EYE EXAM RETINAL OR DILATED  06/18/1945  Shingrix Vaccine Age 50> (1 of 2) 06/18/1987  
 HEMOGLOBIN A1C Q6M  10/30/2019  MICROALBUMIN Q1  12/27/2019  MEDICARE YEARLY EXAM  12/28/2019 Chief Complaint Patient presents with [facility] Annual Wellness Visit  Cough  Nasal Congestion 1. Have you been to the ER, urgent care clinic since your last visit? Hospitalized since your last visit? No 
 
2. Have you seen or consulted any other health care providers outside of the [address] [place] [address] since your last visit? Include any pap smears or colon screening. No 
 
3) Do you have an Advance Directive on file? yes 4) Are you interested in receiving information on Advance Directives? NO Patient is accompanied by self and wife I have received verbal consent from Liat Desai to discuss any/all medical information while they are present in the room.

## 2019-12-30 ENCOUNTER — OFFICE VISIT (OUTPATIENT)
Dept: INTERNAL MEDICINE CLINIC | Age: 84
End: 2019-12-30

## 2019-12-30 VITALS
DIASTOLIC BLOOD PRESSURE: 72 MMHG | OXYGEN SATURATION: 93 % | HEIGHT: 72 IN | TEMPERATURE: 97.9 F | SYSTOLIC BLOOD PRESSURE: 122 MMHG | RESPIRATION RATE: 20 BRPM | HEART RATE: 63 BPM | WEIGHT: 200.8 LBS | BODY MASS INDEX: 27.2 KG/M2

## 2019-12-30 DIAGNOSIS — J06.9 URTI (ACUTE UPPER RESPIRATORY INFECTION): Primary | ICD-10-CM

## 2019-12-30 RX ORDER — AZITHROMYCIN 250 MG/1
TABLET, FILM COATED ORAL
Qty: 6 TAB | Refills: 0 | Status: SHIPPED | OUTPATIENT
Start: 2019-12-30 | End: 2020-01-04

## 2019-12-30 NOTE — PROGRESS NOTES
HISTORY OF PRESENT ILLNESS  Adama Akers is a 82 y.o. male. This is a patient of Dr. Vandana Garcia who presents today with complaints of cough and congestion. The patient was last seen one week ago with similar complaints. He was prescribed Mucinex and Flonase. He states he has ongoing productive cough. No chest pain or shortness of breath. He states he initially had low-grade fever, none currently. Visit Vitals  /72 (BP 1 Location: Right arm, BP Patient Position: Sitting)   Pulse 63   Temp 97.9 °F (36.6 °C) (Oral)   Resp 20   Ht 6' (1.829 m)   Wt 200 lb 12.8 oz (91.1 kg)   SpO2 93%   BMI 27.23 kg/m²     HPI    Review of Systems   HENT: Positive for congestion. Respiratory: Positive for cough and sputum production. Cardiovascular: Negative for chest pain and leg swelling. Gastrointestinal: Negative for abdominal pain. Genitourinary: Negative. Musculoskeletal: Negative. Skin: Negative. Neurological: Negative. Endo/Heme/Allergies: Negative. Psychiatric/Behavioral: Negative. Physical Exam  Vitals signs reviewed. Constitutional:       General: He is not in acute distress. Appearance: He is well-developed. HENT:      Head: Normocephalic and atraumatic. Nose: Congestion present. Cardiovascular:      Rate and Rhythm: Normal rate and regular rhythm. Heart sounds: Murmur present. Pulmonary:      Effort: Pulmonary effort is normal. No respiratory distress. Breath sounds: No rales. Skin:     General: Skin is warm and dry. Neurological:      Mental Status: He is alert and oriented to person, place, and time. Psychiatric:         Behavior: Behavior normal.         ASSESSMENT and PLAN  Encounter Diagnoses   Name Primary?     URTI (acute upper respiratory infection) Yes     Will order   azithromycin (ZITHROMAX) 250 mg tablet     Si tabs po today then 1 daily x 4 days     Dispense:  6 Tab     Refill:  0     If experiencing severe shortness of breath or chest pain, present to the ER. May continue Flonase and Mucinex PRN. Patient encouraged to call or return to office if symptoms do not improve or worsen. Reviewed plan of care with patient who acknowledges understanding and agrees.

## 2019-12-30 NOTE — PROGRESS NOTES
Health Maintenance Due   Topic Date Due    EYE EXAM RETINAL OR DILATED  06/18/1945    Shingrix Vaccine Age 50> (1 of 2) 06/18/1987    HEMOGLOBIN A1C Q6M  10/30/2019    MICROALBUMIN Q1  12/27/2019    MEDICARE YEARLY EXAM  12/28/2019       Chief Complaint   Patient presents with    Cough     Acute care visit    Chest Congestion    Annual Wellness Visit       1. Have you been to the ER, urgent care clinic since your last visit? Hospitalized since your last visit? No    2. Have you seen or consulted any other health care providers outside of the [address] [place] [address] since your last visit? Include any pap smears or colon screening. No    3) Do you have an Advance Directive on file? yes    4) Are you interested in receiving information on Advance Directives? NO      Patient is accompanied by self and wife I have received verbal consent from Marc Breen to discuss any/all medical information while they are present in the room.

## 2020-01-01 ENCOUNTER — TELEPHONE (OUTPATIENT)
Dept: CARDIOLOGY CLINIC | Age: 85
End: 2020-01-01

## 2020-01-01 ENCOUNTER — OFFICE VISIT (OUTPATIENT)
Dept: CARDIOLOGY CLINIC | Age: 85
End: 2020-01-01
Payer: MEDICARE

## 2020-01-01 ENCOUNTER — PATIENT OUTREACH (OUTPATIENT)
Dept: CASE MANAGEMENT | Age: 85
End: 2020-01-01

## 2020-01-01 ENCOUNTER — APPOINTMENT (OUTPATIENT)
Dept: CT IMAGING | Age: 85
DRG: 948 | End: 2020-01-01
Attending: HOSPITALIST
Payer: MEDICARE

## 2020-01-01 ENCOUNTER — HOSPITAL ENCOUNTER (OUTPATIENT)
Dept: NON INVASIVE DIAGNOSTICS | Age: 85
Discharge: HOME | End: 2020-10-28
Attending: THORACIC SURGERY (CARDIOTHORACIC VASCULAR SURGERY)

## 2020-01-01 ENCOUNTER — APPOINTMENT (OUTPATIENT)
Dept: GENERAL RADIOLOGY | Age: 85
DRG: 266 | End: 2020-01-01
Attending: THORACIC SURGERY (CARDIOTHORACIC VASCULAR SURGERY)
Payer: MEDICARE

## 2020-01-01 ENCOUNTER — ANESTHESIA (OUTPATIENT)
Dept: CARDIOTHORACIC SURGERY | Age: 85
DRG: 266 | End: 2020-01-01
Payer: MEDICARE

## 2020-01-01 ENCOUNTER — HOSPITAL ENCOUNTER (INPATIENT)
Age: 85
LOS: 3 days | Discharge: NURSING, REHAB OR LONG-TERM CARE | DRG: 948 | End: 2020-11-13
Attending: EMERGENCY MEDICINE | Admitting: INTERNAL MEDICINE
Payer: MEDICARE

## 2020-01-01 ENCOUNTER — APPOINTMENT (OUTPATIENT)
Dept: GENERAL RADIOLOGY | Age: 85
DRG: 177 | End: 2020-01-01
Attending: PODIATRIST
Payer: MEDICARE

## 2020-01-01 ENCOUNTER — APPOINTMENT (OUTPATIENT)
Dept: CT IMAGING | Age: 85
DRG: 266 | End: 2020-01-01
Attending: NURSE PRACTITIONER
Payer: MEDICARE

## 2020-01-01 ENCOUNTER — APPOINTMENT (OUTPATIENT)
Dept: GENERAL RADIOLOGY | Age: 85
DRG: 266 | End: 2020-01-01
Attending: NURSE PRACTITIONER
Payer: MEDICARE

## 2020-01-01 ENCOUNTER — HOSPITAL ENCOUNTER (OUTPATIENT)
Dept: NON INVASIVE DIAGNOSTICS | Age: 85
Discharge: HOME | End: 2020-12-02
Attending: NURSE PRACTITIONER
Payer: COMMERCIAL

## 2020-01-01 ENCOUNTER — TELEPHONE (OUTPATIENT)
Dept: INTERNAL MEDICINE CLINIC | Age: 85
End: 2020-01-01

## 2020-01-01 ENCOUNTER — APPOINTMENT (OUTPATIENT)
Dept: NON INVASIVE DIAGNOSTICS | Age: 85
DRG: 948 | End: 2020-01-01
Attending: INTERNAL MEDICINE
Payer: MEDICARE

## 2020-01-01 ENCOUNTER — HOME CARE VISIT (OUTPATIENT)
Dept: SCHEDULING | Facility: HOME HEALTH | Age: 85
End: 2020-01-01
Payer: MEDICARE

## 2020-01-01 ENCOUNTER — APPOINTMENT (OUTPATIENT)
Dept: VASCULAR SURGERY | Age: 85
DRG: 177 | End: 2020-01-01
Attending: INTERNAL MEDICINE
Payer: MEDICARE

## 2020-01-01 ENCOUNTER — APPOINTMENT (OUTPATIENT)
Dept: NON INVASIVE DIAGNOSTICS | Age: 85
DRG: 266 | End: 2020-01-01
Attending: NURSE PRACTITIONER
Payer: MEDICARE

## 2020-01-01 ENCOUNTER — HOME CARE VISIT (OUTPATIENT)
Dept: HOME HEALTH SERVICES | Facility: HOME HEALTH | Age: 85
End: 2020-01-01
Payer: MEDICARE

## 2020-01-01 ENCOUNTER — APPOINTMENT (OUTPATIENT)
Dept: CT IMAGING | Age: 85
DRG: 266 | End: 2020-01-01
Attending: EMERGENCY MEDICINE
Payer: MEDICARE

## 2020-01-01 ENCOUNTER — APPOINTMENT (OUTPATIENT)
Dept: CT IMAGING | Age: 85
DRG: 266 | End: 2020-01-01
Attending: FAMILY MEDICINE
Payer: MEDICARE

## 2020-01-01 ENCOUNTER — APPOINTMENT (OUTPATIENT)
Dept: GENERAL RADIOLOGY | Age: 85
DRG: 177 | End: 2020-01-01
Attending: HOSPITALIST
Payer: MEDICARE

## 2020-01-01 ENCOUNTER — APPOINTMENT (OUTPATIENT)
Dept: GENERAL RADIOLOGY | Age: 85
DRG: 177 | End: 2020-01-01
Attending: EMERGENCY MEDICINE
Payer: MEDICARE

## 2020-01-01 ENCOUNTER — APPOINTMENT (OUTPATIENT)
Dept: VASCULAR SURGERY | Age: 85
DRG: 266 | End: 2020-01-01
Attending: NURSE PRACTITIONER
Payer: MEDICARE

## 2020-01-01 ENCOUNTER — APPOINTMENT (OUTPATIENT)
Dept: ULTRASOUND IMAGING | Age: 85
DRG: 266 | End: 2020-01-01
Attending: INTERNAL MEDICINE
Payer: MEDICARE

## 2020-01-01 ENCOUNTER — TELEPHONE (OUTPATIENT)
Dept: CASE MANAGEMENT | Age: 85
End: 2020-01-01

## 2020-01-01 ENCOUNTER — HOSPITAL ENCOUNTER (OUTPATIENT)
Dept: LAB | Age: 85
Discharge: HOME | End: 2020-02-26
Payer: MEDICARE

## 2020-01-01 ENCOUNTER — HOME HEALTH ADMISSION (OUTPATIENT)
Dept: HOME HEALTH SERVICES | Facility: HOME HEALTH | Age: 85
End: 2020-01-01
Payer: MEDICARE

## 2020-01-01 ENCOUNTER — APPOINTMENT (OUTPATIENT)
Dept: CT IMAGING | Age: 85
DRG: 177 | End: 2020-01-01
Attending: PODIATRIST
Payer: MEDICARE

## 2020-01-01 ENCOUNTER — TRANSCRIBE ORDER (OUTPATIENT)
Dept: CARDIAC REHAB | Age: 85
End: 2020-01-01

## 2020-01-01 ENCOUNTER — HOSPITAL ENCOUNTER (INPATIENT)
Age: 85
LOS: 15 days | Discharge: HOME HEALTH | DRG: 266 | End: 2020-11-04
Attending: EMERGENCY MEDICINE | Admitting: FAMILY MEDICINE
Payer: MEDICARE

## 2020-01-01 ENCOUNTER — VIRTUAL VISIT (OUTPATIENT)
Dept: CARDIOLOGY CLINIC | Age: 85
End: 2020-01-01

## 2020-01-01 ENCOUNTER — OFFICE VISIT (OUTPATIENT)
Dept: INTERNAL MEDICINE CLINIC | Facility: CLINIC | Age: 85
End: 2020-01-01
Payer: MEDICARE

## 2020-01-01 ENCOUNTER — APPOINTMENT (OUTPATIENT)
Dept: NON INVASIVE DIAGNOSTICS | Age: 85
DRG: 266 | End: 2020-01-01
Attending: INTERNAL MEDICINE
Payer: MEDICARE

## 2020-01-01 ENCOUNTER — APPOINTMENT (OUTPATIENT)
Dept: CT IMAGING | Age: 85
DRG: 177 | End: 2020-01-01
Attending: INTERNAL MEDICINE
Payer: MEDICARE

## 2020-01-01 ENCOUNTER — TELEPHONE (OUTPATIENT)
Dept: CARDIAC REHAB | Age: 85
End: 2020-01-01

## 2020-01-01 ENCOUNTER — HOSPITAL ENCOUNTER (INPATIENT)
Age: 85
LOS: 12 days | Discharge: NURSING, REHAB OR LONG-TERM CARE | DRG: 177 | End: 2021-01-08
Attending: EMERGENCY MEDICINE | Admitting: INTERNAL MEDICINE
Payer: MEDICARE

## 2020-01-01 ENCOUNTER — APPOINTMENT (OUTPATIENT)
Dept: VASCULAR SURGERY | Age: 85
DRG: 948 | End: 2020-01-01
Attending: EMERGENCY MEDICINE
Payer: MEDICARE

## 2020-01-01 ENCOUNTER — APPOINTMENT (OUTPATIENT)
Dept: GENERAL RADIOLOGY | Age: 85
DRG: 948 | End: 2020-01-01
Attending: EMERGENCY MEDICINE
Payer: MEDICARE

## 2020-01-01 ENCOUNTER — HOSPITAL ENCOUNTER (EMERGENCY)
Age: 85
Discharge: HOME | End: 2020-04-17
Attending: EMERGENCY MEDICINE
Payer: MEDICARE

## 2020-01-01 ENCOUNTER — APPOINTMENT (OUTPATIENT)
Dept: NON INVASIVE DIAGNOSTICS | Age: 85
DRG: 266 | End: 2020-01-01
Attending: THORACIC SURGERY (CARDIOTHORACIC VASCULAR SURGERY)
Payer: MEDICARE

## 2020-01-01 ENCOUNTER — APPOINTMENT (OUTPATIENT)
Dept: NON INVASIVE DIAGNOSTICS | Age: 85
DRG: 266 | End: 2020-01-01
Attending: SPECIALIST
Payer: MEDICARE

## 2020-01-01 ENCOUNTER — APPOINTMENT (OUTPATIENT)
Dept: GENERAL RADIOLOGY | Age: 85
DRG: 266 | End: 2020-01-01
Attending: PHYSICIAN ASSISTANT
Payer: MEDICARE

## 2020-01-01 ENCOUNTER — ANESTHESIA EVENT (OUTPATIENT)
Dept: CARDIOTHORACIC SURGERY | Age: 85
DRG: 266 | End: 2020-01-01
Payer: MEDICARE

## 2020-01-01 ENCOUNTER — HOSPITAL ENCOUNTER (EMERGENCY)
Age: 85
Discharge: LEFT BEFORE DISCHARGE | DRG: 948 | End: 2020-11-07
Admitting: EMERGENCY MEDICINE
Payer: MEDICARE

## 2020-01-01 ENCOUNTER — OFFICE VISIT (OUTPATIENT)
Dept: INTERNAL MEDICINE CLINIC | Age: 85
End: 2020-01-01

## 2020-01-01 VITALS
WEIGHT: 200 LBS | TEMPERATURE: 98.3 F | RESPIRATION RATE: 20 BRPM | HEART RATE: 69 BPM | SYSTOLIC BLOOD PRESSURE: 145 MMHG | HEIGHT: 75 IN | OXYGEN SATURATION: 97 % | DIASTOLIC BLOOD PRESSURE: 48 MMHG | BODY MASS INDEX: 24.87 KG/M2

## 2020-01-01 VITALS
OXYGEN SATURATION: 97 % | BODY MASS INDEX: 24.37 KG/M2 | SYSTOLIC BLOOD PRESSURE: 140 MMHG | HEART RATE: 100 BPM | DIASTOLIC BLOOD PRESSURE: 70 MMHG | HEIGHT: 75 IN | RESPIRATION RATE: 18 BRPM | TEMPERATURE: 98.3 F | WEIGHT: 196 LBS

## 2020-01-01 VITALS
HEIGHT: 75 IN | WEIGHT: 190 LBS | RESPIRATION RATE: 18 BRPM | HEART RATE: 67 BPM | BODY MASS INDEX: 23.62 KG/M2 | OXYGEN SATURATION: 95 % | SYSTOLIC BLOOD PRESSURE: 120 MMHG | DIASTOLIC BLOOD PRESSURE: 60 MMHG

## 2020-01-01 VITALS
DIASTOLIC BLOOD PRESSURE: 43 MMHG | TEMPERATURE: 98.1 F | OXYGEN SATURATION: 98 % | BODY MASS INDEX: 23.22 KG/M2 | HEIGHT: 75 IN | HEART RATE: 72 BPM | SYSTOLIC BLOOD PRESSURE: 126 MMHG | WEIGHT: 186.73 LBS | RESPIRATION RATE: 18 BRPM

## 2020-01-01 VITALS
BODY MASS INDEX: 22.38 KG/M2 | HEIGHT: 75 IN | HEART RATE: 88 BPM | SYSTOLIC BLOOD PRESSURE: 120 MMHG | DIASTOLIC BLOOD PRESSURE: 60 MMHG | WEIGHT: 180 LBS | OXYGEN SATURATION: 97 % | RESPIRATION RATE: 13 BRPM

## 2020-01-01 VITALS
HEIGHT: 75 IN | HEART RATE: 64 BPM | WEIGHT: 194 LBS | DIASTOLIC BLOOD PRESSURE: 62 MMHG | SYSTOLIC BLOOD PRESSURE: 104 MMHG | BODY MASS INDEX: 24.12 KG/M2

## 2020-01-01 VITALS
OXYGEN SATURATION: 97 % | TEMPERATURE: 98.3 F | WEIGHT: 194.3 LBS | DIASTOLIC BLOOD PRESSURE: 62 MMHG | HEART RATE: 64 BPM | HEIGHT: 72 IN | SYSTOLIC BLOOD PRESSURE: 104 MMHG | RESPIRATION RATE: 18 BRPM | BODY MASS INDEX: 26.32 KG/M2

## 2020-01-01 VITALS
OXYGEN SATURATION: 96 % | HEIGHT: 75 IN | SYSTOLIC BLOOD PRESSURE: 120 MMHG | DIASTOLIC BLOOD PRESSURE: 64 MMHG | TEMPERATURE: 97.8 F | WEIGHT: 194 LBS | BODY MASS INDEX: 24.12 KG/M2 | RESPIRATION RATE: 18 BRPM | HEART RATE: 74 BPM

## 2020-01-01 VITALS
SYSTOLIC BLOOD PRESSURE: 116 MMHG | TEMPERATURE: 97.7 F | HEART RATE: 64 BPM | OXYGEN SATURATION: 96 % | RESPIRATION RATE: 20 BRPM | DIASTOLIC BLOOD PRESSURE: 54 MMHG

## 2020-01-01 VITALS
RESPIRATION RATE: 18 BRPM | SYSTOLIC BLOOD PRESSURE: 140 MMHG | HEART RATE: 74 BPM | BODY MASS INDEX: 23.87 KG/M2 | TEMPERATURE: 99.3 F | DIASTOLIC BLOOD PRESSURE: 58 MMHG | OXYGEN SATURATION: 96 % | WEIGHT: 191 LBS

## 2020-01-01 VITALS
SYSTOLIC BLOOD PRESSURE: 142 MMHG | TEMPERATURE: 98.7 F | OXYGEN SATURATION: 95 % | HEART RATE: 63 BPM | RESPIRATION RATE: 18 BRPM | DIASTOLIC BLOOD PRESSURE: 76 MMHG

## 2020-01-01 VITALS
RESPIRATION RATE: 18 BRPM | OXYGEN SATURATION: 95 % | DIASTOLIC BLOOD PRESSURE: 60 MMHG | SYSTOLIC BLOOD PRESSURE: 156 MMHG | TEMPERATURE: 97.8 F | HEART RATE: 64 BPM

## 2020-01-01 VITALS
SYSTOLIC BLOOD PRESSURE: 100 MMHG | WEIGHT: 189.6 LBS | DIASTOLIC BLOOD PRESSURE: 56 MMHG | HEIGHT: 75 IN | BODY MASS INDEX: 23.57 KG/M2

## 2020-01-01 VITALS
OXYGEN SATURATION: 97 % | DIASTOLIC BLOOD PRESSURE: 54 MMHG | RESPIRATION RATE: 16 BRPM | TEMPERATURE: 98.8 F | TEMPERATURE: 99.4 F | OXYGEN SATURATION: 97 % | DIASTOLIC BLOOD PRESSURE: 52 MMHG | HEART RATE: 60 BPM | SYSTOLIC BLOOD PRESSURE: 100 MMHG | SYSTOLIC BLOOD PRESSURE: 122 MMHG | HEART RATE: 71 BPM

## 2020-01-01 VITALS
OXYGEN SATURATION: 97 % | HEART RATE: 66 BPM | RESPIRATION RATE: 16 BRPM | SYSTOLIC BLOOD PRESSURE: 100 MMHG | DIASTOLIC BLOOD PRESSURE: 56 MMHG

## 2020-01-01 VITALS
DIASTOLIC BLOOD PRESSURE: 72 MMHG | BODY MASS INDEX: 24.49 KG/M2 | WEIGHT: 197 LBS | HEART RATE: 73 BPM | HEIGHT: 75 IN | SYSTOLIC BLOOD PRESSURE: 138 MMHG | RESPIRATION RATE: 18 BRPM | TEMPERATURE: 97.8 F | OXYGEN SATURATION: 96 %

## 2020-01-01 DIAGNOSIS — I50.32 CHRONIC HEART FAILURE WITH PRESERVED EJECTION FRACTION (HCC): ICD-10-CM

## 2020-01-01 DIAGNOSIS — E11.628 DIABETIC FOOT INFECTION (HCC): ICD-10-CM

## 2020-01-01 DIAGNOSIS — R26.81 GAIT INSTABILITY: ICD-10-CM

## 2020-01-01 DIAGNOSIS — I35.0 AORTIC VALVE STENOSIS, ETIOLOGY OF CARDIAC VALVE DISEASE UNSPECIFIED: ICD-10-CM

## 2020-01-01 DIAGNOSIS — K21.9 GASTROESOPHAGEAL REFLUX DISEASE WITHOUT ESOPHAGITIS: ICD-10-CM

## 2020-01-01 DIAGNOSIS — I35.0 NONRHEUMATIC AORTIC VALVE STENOSIS: ICD-10-CM

## 2020-01-01 DIAGNOSIS — G47.00 INSOMNIA, UNSPECIFIED TYPE: ICD-10-CM

## 2020-01-01 DIAGNOSIS — R79.89 ELEVATED LACTIC ACID LEVEL: ICD-10-CM

## 2020-01-01 DIAGNOSIS — E11.21 TYPE 2 DIABETES WITH NEPHROPATHY (HCC): Primary | ICD-10-CM

## 2020-01-01 DIAGNOSIS — I35.0 SEVERE AORTIC STENOSIS: ICD-10-CM

## 2020-01-01 DIAGNOSIS — Z71.89 ACP (ADVANCE CARE PLANNING): ICD-10-CM

## 2020-01-01 DIAGNOSIS — R53.83 FATIGUE, UNSPECIFIED TYPE: ICD-10-CM

## 2020-01-01 DIAGNOSIS — Z95.0 CARDIAC PACEMAKER IN SITU: Primary | ICD-10-CM

## 2020-01-01 DIAGNOSIS — Z95.2 S/P TAVR (TRANSCATHETER AORTIC VALVE REPLACEMENT): Primary | ICD-10-CM

## 2020-01-01 DIAGNOSIS — I35.0 NONRHEUMATIC AORTIC VALVE STENOSIS: Primary | ICD-10-CM

## 2020-01-01 DIAGNOSIS — I25.10 CORONARY ARTERY DISEASE INVOLVING NATIVE CORONARY ARTERY OF NATIVE HEART WITHOUT ANGINA PECTORIS: ICD-10-CM

## 2020-01-01 DIAGNOSIS — E11.21 TYPE 2 DIABETES WITH NEPHROPATHY (HCC): ICD-10-CM

## 2020-01-01 DIAGNOSIS — I10 ESSENTIAL HYPERTENSION: ICD-10-CM

## 2020-01-01 DIAGNOSIS — I35.0 AORTIC STENOSIS: ICD-10-CM

## 2020-01-01 DIAGNOSIS — E11.9 TYPE 2 DIABETES MELLITUS WITHOUT COMPLICATION, WITHOUT LONG-TERM CURRENT USE OF INSULIN (HCC): ICD-10-CM

## 2020-01-01 DIAGNOSIS — L89.322 DECUBITUS ULCER OF LEFT BUTTOCK, STAGE 2 (HCC): ICD-10-CM

## 2020-01-01 DIAGNOSIS — Z95.2 S/P TAVR (TRANSCATHETER AORTIC VALVE REPLACEMENT): ICD-10-CM

## 2020-01-01 DIAGNOSIS — D69.6 THROMBOCYTOPENIA (HCC): ICD-10-CM

## 2020-01-01 DIAGNOSIS — I35.8 AORTIC SYSTOLIC MURMUR ON EXAMINATION: ICD-10-CM

## 2020-01-01 DIAGNOSIS — F33.9 RECURRENT DEPRESSION (HCC): ICD-10-CM

## 2020-01-01 DIAGNOSIS — Z95.1 S/P CABG (CORONARY ARTERY BYPASS GRAFT): ICD-10-CM

## 2020-01-01 DIAGNOSIS — I44.2 THIRD DEGREE AV BLOCK (HCC): ICD-10-CM

## 2020-01-01 DIAGNOSIS — R91.8 PULMONARY INFILTRATE: ICD-10-CM

## 2020-01-01 DIAGNOSIS — Z95.0 PACEMAKER: ICD-10-CM

## 2020-01-01 DIAGNOSIS — R55 SYNCOPE AND COLLAPSE: Primary | ICD-10-CM
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
## 2020-01-01 DIAGNOSIS — I45.9 HEART BLOCK: ICD-10-CM

## 2020-01-01 DIAGNOSIS — R15.9 INCONTINENCE OF FECES, UNSPECIFIED FECAL INCONTINENCE TYPE: ICD-10-CM

## 2020-01-01 DIAGNOSIS — R55 SYNCOPE AND COLLAPSE: ICD-10-CM

## 2020-01-01 DIAGNOSIS — R26.2 AMBULATORY DYSFUNCTION: ICD-10-CM

## 2020-01-01 DIAGNOSIS — R53.1 GENERALIZED WEAKNESS: ICD-10-CM

## 2020-01-01 DIAGNOSIS — Z23 ENCOUNTER FOR IMMUNIZATION: ICD-10-CM

## 2020-01-01 DIAGNOSIS — R30.0 DYSURIA: Primary | ICD-10-CM

## 2020-01-01 DIAGNOSIS — Z87.891 FORMER SMOKER: ICD-10-CM

## 2020-01-01 DIAGNOSIS — R04.0 EPISTAXIS: Primary | ICD-10-CM

## 2020-01-01 DIAGNOSIS — D64.9 ANEMIA, UNSPECIFIED TYPE: Primary | ICD-10-CM

## 2020-01-01 DIAGNOSIS — E11.628 TYPE 2 DIABETES MELLITUS WITH RIGHT DIABETIC FOOT INFECTION (HCC): ICD-10-CM

## 2020-01-01 DIAGNOSIS — R06.82 TACHYPNEA: ICD-10-CM

## 2020-01-01 DIAGNOSIS — R05.9 COUGH: ICD-10-CM

## 2020-01-01 DIAGNOSIS — U07.1 COVID-19: ICD-10-CM

## 2020-01-01 DIAGNOSIS — L08.9 DIABETIC FOOT INFECTION (HCC): ICD-10-CM

## 2020-01-01 DIAGNOSIS — I25.10 CAD IN NATIVE ARTERY: Primary | ICD-10-CM

## 2020-01-01 DIAGNOSIS — I73.9 PAD (PERIPHERAL ARTERY DISEASE) (HCC): ICD-10-CM

## 2020-01-01 DIAGNOSIS — R26.81 GAIT INSTABILITY: Primary | ICD-10-CM

## 2020-01-01 DIAGNOSIS — E78.00 HYPERCHOLESTEROLEMIA: ICD-10-CM

## 2020-01-01 DIAGNOSIS — R01.1 MURMUR: ICD-10-CM

## 2020-01-01 DIAGNOSIS — R06.02 SOB (SHORTNESS OF BREATH): Primary | ICD-10-CM

## 2020-01-01 DIAGNOSIS — Z95.4 HEART VALVE REPLACED BY OTHER MEANS: Primary | ICD-10-CM

## 2020-01-01 DIAGNOSIS — L08.9 TYPE 2 DIABETES MELLITUS WITH RIGHT DIABETIC FOOT INFECTION (HCC): ICD-10-CM

## 2020-01-01 DIAGNOSIS — R07.9 CHEST PAIN, UNSPECIFIED TYPE: ICD-10-CM

## 2020-01-01 DIAGNOSIS — N40.1 BENIGN PROSTATIC HYPERPLASIA WITH LOWER URINARY TRACT SYMPTOMS, SYMPTOM DETAILS UNSPECIFIED: ICD-10-CM

## 2020-01-01 DIAGNOSIS — L89.301 PRESSURE INJURY OF BUTTOCK, STAGE 1, UNSPECIFIED LATERALITY: Primary | ICD-10-CM

## 2020-01-01 LAB
25(OH)D3 SERPL-MCNC: 43.8 NG/ML (ref 30–100)
ABO + RH BLD: NORMAL
ACT BLD: 208 SECS (ref 79–138)
ADMINISTERED INITIALS, ADMINIT: NORMAL
ALBUMIN SERPL-MCNC: 2 G/DL (ref 3.5–5)
ALBUMIN SERPL-MCNC: 2.2 G/DL (ref 3.5–5)
ALBUMIN SERPL-MCNC: 2.4 G/DL (ref 3.5–5)
ALBUMIN SERPL-MCNC: 2.4 G/DL (ref 3.5–5)
ALBUMIN SERPL-MCNC: 2.6 G/DL (ref 3.5–5)
ALBUMIN SERPL-MCNC: 2.7 G/DL (ref 3.5–5)
ALBUMIN SERPL-MCNC: 2.7 G/DL (ref 3.5–5)
ALBUMIN SERPL-MCNC: 2.9 G/DL (ref 3.5–5)
ALBUMIN SERPL-MCNC: 2.9 G/DL (ref 3.5–5)
ALBUMIN SERPL-MCNC: 3 G/DL (ref 3.5–5)
ALBUMIN SERPL-MCNC: 3.6 G/DL (ref 3.5–5)
ALBUMIN SERPL-MCNC: 4.1 G/DL (ref 3.6–4.6)
ALBUMIN/CREAT UR: 442 MG/G CREAT (ref 0–29)
ALBUMIN/GLOB SERPL: 0.6 {RATIO} (ref 1.1–2.2)
ALBUMIN/GLOB SERPL: 0.6 {RATIO} (ref 1.1–2.2)
ALBUMIN/GLOB SERPL: 0.7 {RATIO} (ref 1.1–2.2)
ALBUMIN/GLOB SERPL: 0.8 {RATIO} (ref 1.1–2.2)
ALBUMIN/GLOB SERPL: 0.8 {RATIO} (ref 1.1–2.2)
ALBUMIN/GLOB SERPL: 0.9 {RATIO} (ref 1.1–2.2)
ALBUMIN/GLOB SERPL: 0.9 {RATIO} (ref 1.1–2.2)
ALBUMIN/GLOB SERPL: 1 {RATIO} (ref 1.1–2.2)
ALBUMIN/GLOB SERPL: 1.1 {RATIO} (ref 1.1–2.2)
ALBUMIN/GLOB SERPL: 1.6 {RATIO} (ref 1.2–2.2)
ALP SERPL-CCNC: 127 U/L (ref 45–117)
ALP SERPL-CCNC: 130 U/L (ref 45–117)
ALP SERPL-CCNC: 133 U/L (ref 45–117)
ALP SERPL-CCNC: 64 U/L (ref 45–117)
ALP SERPL-CCNC: 64 U/L (ref 45–117)
ALP SERPL-CCNC: 72 U/L (ref 45–117)
ALP SERPL-CCNC: 77 U/L (ref 45–117)
ALP SERPL-CCNC: 80 U/L (ref 45–117)
ALP SERPL-CCNC: 81 IU/L (ref 39–117)
ALP SERPL-CCNC: 82 U/L (ref 45–117)
ALP SERPL-CCNC: 88 U/L (ref 45–117)
ALP SERPL-CCNC: 94 U/L (ref 45–117)
ALT SERPL-CCNC: 16 U/L (ref 12–78)
ALT SERPL-CCNC: 19 IU/L (ref 0–44)
ALT SERPL-CCNC: 20 U/L (ref 12–78)
ALT SERPL-CCNC: 20 U/L (ref 12–78)
ALT SERPL-CCNC: 21 U/L (ref 12–78)
ALT SERPL-CCNC: 24 U/L (ref 12–78)
ALT SERPL-CCNC: 24 U/L (ref 12–78)
ALT SERPL-CCNC: 25 U/L (ref 12–78)
ALT SERPL-CCNC: 26 U/L (ref 12–78)
ALT SERPL-CCNC: 27 U/L (ref 12–78)
ANION GAP SERPL CALC-SCNC: 10 MMOL/L (ref 5–15)
ANION GAP SERPL CALC-SCNC: 2 MMOL/L (ref 5–15)
ANION GAP SERPL CALC-SCNC: 3 MMOL/L (ref 5–15)
ANION GAP SERPL CALC-SCNC: 3 MMOL/L (ref 5–15)
ANION GAP SERPL CALC-SCNC: 4 MMOL/L (ref 5–15)
ANION GAP SERPL CALC-SCNC: 5 MMOL/L (ref 5–15)
ANION GAP SERPL CALC-SCNC: 6 MMOL/L (ref 5–15)
ANION GAP SERPL CALC-SCNC: 7 MMOL/L (ref 5–15)
ANION GAP SERPL CALC-SCNC: 8 MMOL/L (ref 5–15)
ANION GAP SERPL CALC-SCNC: 8 MMOL/L (ref 5–15)
APPEARANCE UR: ABNORMAL
APPEARANCE UR: ABNORMAL
APPEARANCE UR: CLEAR
APTT PPP: 127 SEC (ref 22.1–31)
APTT PPP: 36.5 SEC (ref 22.1–32)
APTT PPP: 41.5 SEC (ref 22.1–31)
APTT PPP: 45.2 SEC (ref 22.1–31)
APTT PPP: 48.3 SEC (ref 22.1–31)
APTT PPP: 52.1 SEC (ref 22.1–31)
ARTERIAL PATENCY WRIST A: ABNORMAL
ARTERIAL PATENCY WRIST A: ABNORMAL
ARTERIAL PATENCY WRIST A: YES
AST SERPL-CCNC: 17 U/L (ref 15–37)
AST SERPL-CCNC: 17 U/L (ref 15–37)
AST SERPL-CCNC: 18 U/L (ref 15–37)
AST SERPL-CCNC: 20 U/L (ref 15–37)
AST SERPL-CCNC: 21 IU/L (ref 0–40)
AST SERPL-CCNC: 24 U/L (ref 15–37)
AST SERPL-CCNC: 26 U/L (ref 15–37)
AST SERPL-CCNC: 28 U/L (ref 15–37)
AST SERPL-CCNC: 28 U/L (ref 15–37)
AST SERPL-CCNC: 31 U/L (ref 15–37)
AST SERPL-CCNC: 36 U/L (ref 15–37)
AST SERPL-CCNC: 38 U/L (ref 15–37)
ATRIAL RATE: 101 BPM
ATRIAL RATE: 105 BPM
ATRIAL RATE: 107 BPM
ATRIAL RATE: 45 BPM
ATRIAL RATE: 68 BPM
ATRIAL RATE: 72 BPM
ATRIAL RATE: 76 BPM
ATRIAL RATE: 80 BPM
ATRIAL RATE: 81 BPM
ATRIAL RATE: 81 BPM
ATRIAL RATE: 86 BPM
ATRIAL RATE: 87 BPM
ATRIAL RATE: 94 BPM
ATRIAL RATE: 99 BPM
AV R PG: 34.49 MMHG
BACTERIA SPEC CULT: ABNORMAL
BACTERIA SPEC CULT: NORMAL
BACTERIA SPEC CULT: NORMAL
BACTERIA UR CULT: ABNORMAL
BACTERIA URNS QL MICRO: ABNORMAL /HPF
BACTERIA URNS QL MICRO: ABNORMAL /HPF
BACTERIA URNS QL MICRO: NEGATIVE /HPF
BASE DEFICIT BLD-SCNC: 4 MMOL/L
BASE DEFICIT BLDA-SCNC: 3 MMOL/L
BASE EXCESS BLD CALC-SCNC: 4 MMOL/L
BASOPHILS # BLD: 0 K/UL (ref 0–0.1)
BASOPHILS NFR BLD: 0 % (ref 0–1)
BASOPHILS NFR BLD: 1 % (ref 0–1)
BDY SITE: ABNORMAL
BILIRUB SERPL-MCNC: 0.3 MG/DL (ref 0.2–1)
BILIRUB SERPL-MCNC: 0.4 MG/DL (ref 0.2–1)
BILIRUB SERPL-MCNC: 0.4 MG/DL (ref 0.2–1)
BILIRUB SERPL-MCNC: 0.4 MG/DL (ref 0–1.2)
BILIRUB SERPL-MCNC: 0.5 MG/DL (ref 0.2–1)
BILIRUB SERPL-MCNC: 0.5 MG/DL (ref 0.2–1)
BILIRUB SERPL-MCNC: 0.6 MG/DL (ref 0.2–1)
BILIRUB SERPL-MCNC: 0.6 MG/DL (ref 0.2–1)
BILIRUB UR QL STRIP: NEGATIVE
BILIRUB UR QL: NEGATIVE
BLD PROD TYP BPU: NORMAL
BLD PROD TYP BPU: NORMAL
BLOOD GROUP ANTIBODIES SERPL: NORMAL
BNP SERPL-MCNC: 3154 PG/ML
BNP SERPL-MCNC: ABNORMAL PG/ML
BPU ID: NORMAL
BPU ID: NORMAL
BUN SERPL-MCNC: 14 MG/DL (ref 6–20)
BUN SERPL-MCNC: 15 MG/DL (ref 6–20)
BUN SERPL-MCNC: 15 MG/DL (ref 6–20)
BUN SERPL-MCNC: 16 MG/DL (ref 6–20)
BUN SERPL-MCNC: 16 MG/DL (ref 8–27)
BUN SERPL-MCNC: 17 MG/DL (ref 6–20)
BUN SERPL-MCNC: 19 MG/DL (ref 6–20)
BUN SERPL-MCNC: 19 MG/DL (ref 6–20)
BUN SERPL-MCNC: 20 MG/DL (ref 6–20)
BUN SERPL-MCNC: 20 MG/DL (ref 6–20)
BUN SERPL-MCNC: 21 MG/DL (ref 6–20)
BUN SERPL-MCNC: 22 MG/DL (ref 6–20)
BUN SERPL-MCNC: 23 MG/DL (ref 6–20)
BUN SERPL-MCNC: 23 MG/DL (ref 6–20)
BUN SERPL-MCNC: 24 MG/DL (ref 6–20)
BUN SERPL-MCNC: 26 MG/DL (ref 6–20)
BUN SERPL-MCNC: 28 MG/DL (ref 6–20)
BUN SERPL-MCNC: 29 MG/DL (ref 6–20)
BUN SERPL-MCNC: 31 MG/DL (ref 6–20)
BUN SERPL-MCNC: 31 MG/DL (ref 6–20)
BUN SERPL-MCNC: 32 MG/DL (ref 6–20)
BUN SERPL-MCNC: 32 MG/DL (ref 6–20)
BUN SERPL-MCNC: 40 MG/DL (ref 6–20)
BUN SERPL-MCNC: 40 MG/DL (ref 6–20)
BUN/CREAT SERPL: 16 (ref 10–24)
BUN/CREAT SERPL: 17 (ref 12–20)
BUN/CREAT SERPL: 18 (ref 12–20)
BUN/CREAT SERPL: 20 (ref 12–20)
BUN/CREAT SERPL: 21 (ref 12–20)
BUN/CREAT SERPL: 23 (ref 12–20)
BUN/CREAT SERPL: 24 (ref 12–20)
BUN/CREAT SERPL: 25 (ref 12–20)
BUN/CREAT SERPL: 25 (ref 12–20)
BUN/CREAT SERPL: 27 (ref 12–20)
BUN/CREAT SERPL: 27 (ref 12–20)
BUN/CREAT SERPL: 31 (ref 12–20)
BUN/CREAT SERPL: 32 (ref 12–20)
BUN/CREAT SERPL: 34 (ref 12–20)
BUN/CREAT SERPL: 34 (ref 12–20)
BUN/CREAT SERPL: 35 (ref 12–20)
BUN/CREAT SERPL: 37 (ref 12–20)
BUN/CREAT SERPL: 38 (ref 12–20)
BUN/CREAT SERPL: 38 (ref 12–20)
C DIFF GDH STL QL: NEGATIVE
C DIFF TOX A+B STL QL IA: NEGATIVE
CA-I BLD-SCNC: 1.2 MMOL/L (ref 1.12–1.32)
CA-I BLD-SCNC: 1.31 MMOL/L (ref 1.12–1.32)
CALCIUM SERPL-MCNC: 7.8 MG/DL (ref 8.5–10.1)
CALCIUM SERPL-MCNC: 7.8 MG/DL (ref 8.5–10.1)
CALCIUM SERPL-MCNC: 8 MG/DL (ref 8.5–10.1)
CALCIUM SERPL-MCNC: 8.1 MG/DL (ref 8.5–10.1)
CALCIUM SERPL-MCNC: 8.1 MG/DL (ref 8.5–10.1)
CALCIUM SERPL-MCNC: 8.2 MG/DL (ref 8.5–10.1)
CALCIUM SERPL-MCNC: 8.4 MG/DL (ref 8.5–10.1)
CALCIUM SERPL-MCNC: 8.5 MG/DL (ref 8.5–10.1)
CALCIUM SERPL-MCNC: 8.6 MG/DL (ref 8.5–10.1)
CALCIUM SERPL-MCNC: 8.8 MG/DL (ref 8.5–10.1)
CALCIUM SERPL-MCNC: 8.9 MG/DL (ref 8.5–10.1)
CALCIUM SERPL-MCNC: 9 MG/DL (ref 8.5–10.1)
CALCIUM SERPL-MCNC: 9 MG/DL (ref 8.5–10.1)
CALCIUM SERPL-MCNC: 9.1 MG/DL (ref 8.5–10.1)
CALCIUM SERPL-MCNC: 9.1 MG/DL (ref 8.5–10.1)
CALCIUM SERPL-MCNC: 9.2 MG/DL (ref 8.5–10.1)
CALCIUM SERPL-MCNC: 9.5 MG/DL (ref 8.5–10.1)
CALCIUM SERPL-MCNC: 9.7 MG/DL (ref 8.6–10.2)
CALCULATED P AXIS, ECG09: -17 DEGREES
CALCULATED P AXIS, ECG09: 11 DEGREES
CALCULATED P AXIS, ECG09: 3 DEGREES
CALCULATED P AXIS, ECG09: 3 DEGREES
CALCULATED P AXIS, ECG09: 56 DEGREES
CALCULATED P AXIS, ECG09: 73 DEGREES
CALCULATED P AXIS, ECG09: 84 DEGREES
CALCULATED R AXIS, ECG10: -15 DEGREES
CALCULATED R AXIS, ECG10: -18 DEGREES
CALCULATED R AXIS, ECG10: -22 DEGREES
CALCULATED R AXIS, ECG10: -23 DEGREES
CALCULATED R AXIS, ECG10: -23 DEGREES
CALCULATED R AXIS, ECG10: -25 DEGREES
CALCULATED R AXIS, ECG10: -27 DEGREES
CALCULATED R AXIS, ECG10: -28 DEGREES
CALCULATED R AXIS, ECG10: -28 DEGREES
CALCULATED R AXIS, ECG10: -30 DEGREES
CALCULATED R AXIS, ECG10: -32 DEGREES
CALCULATED R AXIS, ECG10: -42 DEGREES
CALCULATED R AXIS, ECG10: -5 DEGREES
CALCULATED R AXIS, ECG10: 1 DEGREES
CALCULATED T AXIS, ECG11: -110 DEGREES
CALCULATED T AXIS, ECG11: -113 DEGREES
CALCULATED T AXIS, ECG11: -139 DEGREES
CALCULATED T AXIS, ECG11: -36 DEGREES
CALCULATED T AXIS, ECG11: -58 DEGREES
CALCULATED T AXIS, ECG11: 107 DEGREES
CALCULATED T AXIS, ECG11: 119 DEGREES
CALCULATED T AXIS, ECG11: 138 DEGREES
CALCULATED T AXIS, ECG11: 143 DEGREES
CALCULATED T AXIS, ECG11: 158 DEGREES
CALCULATED T AXIS, ECG11: 24 DEGREES
CALCULATED T AXIS, ECG11: 45 DEGREES
CALCULATED T AXIS, ECG11: 5 DEGREES
CALCULATED T AXIS, ECG11: 77 DEGREES
CC UR VC: ABNORMAL
CHLORIDE SERPL-SCNC: 101 MMOL/L (ref 96–106)
CHLORIDE SERPL-SCNC: 101 MMOL/L (ref 97–108)
CHLORIDE SERPL-SCNC: 102 MMOL/L (ref 97–108)
CHLORIDE SERPL-SCNC: 103 MMOL/L (ref 97–108)
CHLORIDE SERPL-SCNC: 103 MMOL/L (ref 97–108)
CHLORIDE SERPL-SCNC: 104 MMOL/L (ref 97–108)
CHLORIDE SERPL-SCNC: 104 MMOL/L (ref 97–108)
CHLORIDE SERPL-SCNC: 105 MMOL/L (ref 97–108)
CHLORIDE SERPL-SCNC: 106 MMOL/L (ref 97–108)
CHLORIDE SERPL-SCNC: 107 MMOL/L (ref 97–108)
CHLORIDE SERPL-SCNC: 108 MMOL/L (ref 97–108)
CHLORIDE SERPL-SCNC: 111 MMOL/L (ref 97–108)
CHOLEST SERPL-MCNC: 114 MG/DL (ref 100–199)
CK SERPL-CCNC: 216 U/L (ref 39–308)
CK SERPL-CCNC: 249 U/L (ref 39–308)
CO2 SERPL-SCNC: 24 MMOL/L (ref 21–32)
CO2 SERPL-SCNC: 25 MMOL/L (ref 21–32)
CO2 SERPL-SCNC: 25 MMOL/L (ref 21–32)
CO2 SERPL-SCNC: 26 MMOL/L (ref 21–32)
CO2 SERPL-SCNC: 27 MMOL/L (ref 20–29)
CO2 SERPL-SCNC: 27 MMOL/L (ref 21–32)
CO2 SERPL-SCNC: 28 MMOL/L (ref 21–32)
CO2 SERPL-SCNC: 29 MMOL/L (ref 21–32)
CO2 SERPL-SCNC: 30 MMOL/L (ref 21–32)
CO2 SERPL-SCNC: 31 MMOL/L (ref 21–32)
CO2 SERPL-SCNC: 31 MMOL/L (ref 21–32)
CO2 SERPL-SCNC: 32 MMOL/L (ref 21–32)
CO2 SERPL-SCNC: 33 MMOL/L (ref 21–32)
COLOR UR: ABNORMAL
COMMENT, HOLDF: NORMAL
COVID-19 RAPID TEST, COVR: DETECTED
COVID-19 RAPID TEST, COVR: NOT DETECTED
COVID-19 RAPID TEST, COVR: NOT DETECTED
CREAT SERPL-MCNC: 0.75 MG/DL (ref 0.7–1.3)
CREAT SERPL-MCNC: 0.76 MG/DL (ref 0.7–1.3)
CREAT SERPL-MCNC: 0.77 MG/DL (ref 0.7–1.3)
CREAT SERPL-MCNC: 0.78 MG/DL (ref 0.7–1.3)
CREAT SERPL-MCNC: 0.78 MG/DL (ref 0.7–1.3)
CREAT SERPL-MCNC: 0.8 MG/DL (ref 0.7–1.3)
CREAT SERPL-MCNC: 0.84 MG/DL (ref 0.7–1.3)
CREAT SERPL-MCNC: 0.85 MG/DL (ref 0.7–1.3)
CREAT SERPL-MCNC: 0.87 MG/DL (ref 0.7–1.3)
CREAT SERPL-MCNC: 0.89 MG/DL (ref 0.7–1.3)
CREAT SERPL-MCNC: 0.9 MG/DL (ref 0.7–1.3)
CREAT SERPL-MCNC: 0.91 MG/DL (ref 0.7–1.3)
CREAT SERPL-MCNC: 0.92 MG/DL (ref 0.7–1.3)
CREAT SERPL-MCNC: 0.95 MG/DL (ref 0.7–1.3)
CREAT SERPL-MCNC: 0.95 MG/DL (ref 0.7–1.3)
CREAT SERPL-MCNC: 0.96 MG/DL (ref 0.7–1.3)
CREAT SERPL-MCNC: 0.97 MG/DL (ref 0.7–1.3)
CREAT SERPL-MCNC: 0.98 MG/DL (ref 0.7–1.3)
CREAT SERPL-MCNC: 0.99 MG/DL (ref 0.7–1.3)
CREAT SERPL-MCNC: 1 MG/DL (ref 0.7–1.3)
CREAT SERPL-MCNC: 1.03 MG/DL (ref 0.76–1.27)
CREAT SERPL-MCNC: 1.07 MG/DL (ref 0.7–1.3)
CREAT SERPL-MCNC: 1.09 MG/DL (ref 0.7–1.3)
CREAT SERPL-MCNC: 1.13 MG/DL (ref 0.7–1.3)
CREAT SERPL-MCNC: 1.36 MG/DL (ref 0.7–1.3)
CREAT UR-MCNC: 121.7 MG/DL
CROSSMATCH RESULT,%XM: NORMAL
CROSSMATCH RESULT,%XM: NORMAL
CRP SERPL-MCNC: 3.06 MG/DL (ref 0–0.6)
CRP SERPL-MCNC: 3.11 MG/DL (ref 0–0.6)
D DIMER PPP FEU-MCNC: 1.82 MG/L FEU (ref 0–0.65)
D DIMER PPP FEU-MCNC: 17.46 MG/L FEU (ref 0–0.65)
D DIMER PPP FEU-MCNC: 2.04 MG/L FEU (ref 0–0.65)
D50 ADMINISTERED, D50ADM: 0 ML
D50 ORDER, D50ORD: 0 ML
DATE LAST DOSE: ABNORMAL
DATE LAST DOSE: ABNORMAL
DIAGNOSIS, 93000: NORMAL
DIFFERENTIAL METHOD BLD: ABNORMAL
ECHO AO ROOT DIAM: 3.53 CM
ECHO AO ROOT DIAM: 3.57 CM
ECHO AR MAX VEL PISA: 258.25 CM/S
ECHO AR MAX VEL PISA: 285.78 CM/S
ECHO AV AREA PEAK VELOCITY: 0.85 CM2
ECHO AV AREA PEAK VELOCITY: 0.9 CM2
ECHO AV AREA PEAK VELOCITY: 0.93 CM2
ECHO AV AREA PEAK VELOCITY: 1.05 CM2
ECHO AV AREA PEAK VELOCITY: 1.13 CM2
ECHO AV AREA PEAK VELOCITY: 1.15 CM2
ECHO AV AREA PEAK VELOCITY: 1.72 CM2
ECHO AV AREA PEAK VELOCITY: 2.77 CM2
ECHO AV AREA PEAK VELOCITY: 3.38 CM2
ECHO AV AREA VTI: 0.79 CM2
ECHO AV AREA VTI: 0.81 CM2
ECHO AV AREA VTI: 0.81 CM2
ECHO AV AREA VTI: 2.03 CM2
ECHO AV AREA VTI: 3.31 CM2
ECHO AV AREA VTI: 3.49 CM2
ECHO AV AREA/BSA PEAK VELOCITY: 0.8 CM2/M2
ECHO AV AREA/BSA PEAK VELOCITY: 1.3 CM2/M2
ECHO AV AREA/BSA PEAK VELOCITY: 1.5 CM2/M2
ECHO AV AREA/BSA VTI: 0.9 CM2/M2
ECHO AV AREA/BSA VTI: 1.5 CM2/M2
ECHO AV AREA/BSA VTI: 1.6 CM2/M2
ECHO AV CUSP MM: 0.68 CM
ECHO AV MEAN GRADIENT: 2.05 MMHG
ECHO AV MEAN GRADIENT: 38.31 MMHG
ECHO AV MEAN GRADIENT: 6.13 MMHG
ECHO AV MEAN GRADIENT: 7.5 MMHG
ECHO AV PEAK GRADIENT: 16.1 MMHG
ECHO AV PEAK GRADIENT: 16.37 MMHG
ECHO AV PEAK GRADIENT: 3.05 MMHG
ECHO AV PEAK GRADIENT: 35.72 MMHG
ECHO AV PEAK GRADIENT: 62.97 MMHG
ECHO AV PEAK VELOCITY: 200.63 CM/S
ECHO AV PEAK VELOCITY: 202.29 CM/S
ECHO AV PEAK VELOCITY: 298.85 CM/S
ECHO AV PEAK VELOCITY: 396.77 CM/S
ECHO AV PEAK VELOCITY: 87.35 CM/S
ECHO AV REGURGITANT PHT: 353.29 MS
ECHO AV REGURGITANT PHT: 368.87 MS
ECHO AV VTI: 19.77 CM
ECHO AV VTI: 43.17 CM
ECHO AV VTI: 44.03 CM
ECHO AV VTI: 91.96 CM
ECHO LA AREA 4C: 43.38 CM2
ECHO LA MAJOR AXIS: 4.97 CM
ECHO LA MINOR AXIS: 2.26 CM
ECHO LA TO AORTIC ROOT RATIO: 1.23
ECHO LA VOL 2C: 191.98 ML (ref 18–58)
ECHO LA VOL 4C: 177.45 ML (ref 18–58)
ECHO LA VOL BP: 211.63 ML (ref 18–58)
ECHO LA VOL/BSA BIPLANE: 96.44 ML/M2 (ref 16–28)
ECHO LA VOLUME INDEX A2C: 87.49 ML/M2 (ref 16–28)
ECHO LA VOLUME INDEX A4C: 80.87 ML/M2 (ref 16–28)
ECHO LV INTERNAL DIMENSION DIASTOLIC: 3.2 CM (ref 4.2–5.9)
ECHO LV INTERNAL DIMENSION DIASTOLIC: 4.1 CM (ref 4.2–5.9)
ECHO LV INTERNAL DIMENSION DIASTOLIC: 4.48 CM (ref 4.2–5.9)
ECHO LV INTERNAL DIMENSION DIASTOLIC: 4.7 CM (ref 4.2–5.9)
ECHO LV INTERNAL DIMENSION SYSTOLIC: 2.12 CM
ECHO LV INTERNAL DIMENSION SYSTOLIC: 2.55 CM
ECHO LV INTERNAL DIMENSION SYSTOLIC: 2.95 CM
ECHO LV INTERNAL DIMENSION SYSTOLIC: 3.47 CM
ECHO LV IVSD: 1.15 CM (ref 0.6–1)
ECHO LV IVSD: 1.16 CM (ref 0.6–1)
ECHO LV IVSD: 1.42 CM (ref 0.6–1)
ECHO LV IVSD: 1.51 CM (ref 0.6–1)
ECHO LV MASS 2D: 141.5 G (ref 88–224)
ECHO LV MASS 2D: 167.8 G (ref 88–224)
ECHO LV MASS 2D: 189.9 G (ref 88–224)
ECHO LV MASS 2D: 223.6 G (ref 88–224)
ECHO LV MASS INDEX 2D: 104.2 G/M2 (ref 49–115)
ECHO LV MASS INDEX 2D: 63.3 G/M2 (ref 49–115)
ECHO LV MASS INDEX 2D: 86.5 G/M2 (ref 49–115)
ECHO LV POSTERIOR WALL DIASTOLIC: 0.95 CM (ref 0.6–1)
ECHO LV POSTERIOR WALL DIASTOLIC: 1.06 CM (ref 0.6–1)
ECHO LV POSTERIOR WALL DIASTOLIC: 1.2 CM (ref 0.6–1)
ECHO LV POSTERIOR WALL DIASTOLIC: 1.45 CM (ref 0.6–1)
ECHO LVOT DIAM: 1.96 CM
ECHO LVOT DIAM: 2.02 CM
ECHO LVOT DIAM: 2.02 CM
ECHO LVOT DIAM: 2.65 CM
ECHO LVOT PEAK GRADIENT: 3.82 MMHG
ECHO LVOT PEAK GRADIENT: 4.11 MMHG
ECHO LVOT PEAK GRADIENT: 4.42 MMHG
ECHO LVOT PEAK GRADIENT: 4.6 MMHG
ECHO LVOT PEAK VELOCITY: 101.42 CM/S
ECHO LVOT PEAK VELOCITY: 105.09 CM/S
ECHO LVOT PEAK VELOCITY: 107.28 CM/S
ECHO LVOT PEAK VELOCITY: 97.69 CM/S
ECHO LVOT SV: 153.8 ML
ECHO LVOT SV: 65.5 ML
ECHO LVOT SV: 72.6 ML
ECHO LVOT SV: 87.5 ML
ECHO LVOT VTI: 21.7 CM
ECHO LVOT VTI: 22.59 CM
ECHO LVOT VTI: 27.23 CM
ECHO LVOT VTI: 27.82 CM
ECHO MV A VELOCITY: 149.91 CM/S
ECHO MV A VELOCITY: 172.52 CM/S
ECHO MV AREA PHT: 1.64 CM2
ECHO MV AREA PHT: 1.92 CM2
ECHO MV AREA PHT: 2.08 CM2
ECHO MV AREA PHT: 3.56 CM2
ECHO MV AREA VTI: 1.05 CM2
ECHO MV AREA VTI: 1.23 CM2
ECHO MV AREA VTI: 1.35 CM2
ECHO MV AREA VTI: 2.58 CM2
ECHO MV E DECELERATION TIME (DT): 0.39 S
ECHO MV E DECELERATION TIME (DT): 364.16 MS
ECHO MV E VELOCITY: 112.43 CM/S
ECHO MV E VELOCITY: 97.8 CM/S
ECHO MV E/A RATIO: 0.57
ECHO MV E/A RATIO: 0.75
ECHO MV MAX VELOCITY: 163.04 CM/S
ECHO MV MAX VELOCITY: 188.09 CM/S
ECHO MV MAX VELOCITY: 192.88 CM/S
ECHO MV MAX VELOCITY: 217.45 CM/S
ECHO MV MEAN GRADIENT: 4.23 MMHG
ECHO MV MEAN GRADIENT: 4.32 MMHG
ECHO MV MEAN GRADIENT: 4.43 MMHG
ECHO MV MEAN GRADIENT: 5.88 MMHG
ECHO MV PEAK GRADIENT: 10.63 MMHG
ECHO MV PEAK GRADIENT: 14.15 MMHG
ECHO MV PEAK GRADIENT: 14.89 MMHG
ECHO MV PEAK GRADIENT: 18.91 MMHG
ECHO MV PRESSURE HALF TIME (PHT): 0.11 S
ECHO MV PRESSURE HALF TIME (PHT): 105.61 MS
ECHO MV PRESSURE HALF TIME (PHT): 133.76 MS
ECHO MV PRESSURE HALF TIME (PHT): 61.8 MS
ECHO MV VTI: 53.46 CM
ECHO MV VTI: 59.58 CM
ECHO MV VTI: 64.95 CM
ECHO MV VTI: 68.98 CM
ECHO PV PEAK INSTANTANEOUS GRADIENT SYSTOLIC: 2.95 MMHG
ECHO PV PEAK INSTANTANEOUS GRADIENT SYSTOLIC: 4.79 MMHG
ECHO PV PEAK INSTANTANEOUS GRADIENT SYSTOLIC: 6.95 MMHG
ECHO PV REGURGITANT MAX VELOCITY: 109.42 CM/S
ECHO RV INTERNAL DIMENSION: 2.87 CM
ECHO RV TAPSE: 1.31 CM (ref 1.5–2)
ECHO RV TAPSE: 1.41 CM (ref 1.5–2)
ECHO RV TAPSE: 1.54 CM (ref 1.5–2)
ECHO RV TAPSE: 1.75 CM (ref 1.5–2)
ECHO TV REGURGITANT MAX VELOCITY: 222.63 CM/S
ECHO TV REGURGITANT MAX VELOCITY: 225.12 CM/S
ECHO TV REGURGITANT MAX VELOCITY: 242.53 CM/S
ECHO TV REGURGITANT MAX VELOCITY: 243.78 CM/S
ECHO TV REGURGITANT MAX VELOCITY: 247.51 CM/S
ECHO TV REGURGITANT MAX VELOCITY: 249.99 CM/S
ECHO TV REGURGITANT MAX VELOCITY: 257.46 CM/S
ECHO TV REGURGITANT MAX VELOCITY: 257.46 CM/S
ECHO TV REGURGITANT MAX VELOCITY: 273.56 CM/S
ECHO TV REGURGITANT MAX VELOCITY: 292.59 CM/S
ECHO TV REGURGITANT MAX VELOCITY: 293.57 CM/S
ECHO TV REGURGITANT PEAK GRADIENT: 19.83 MMHG
ECHO TV REGURGITANT PEAK GRADIENT: 20.27 MMHG
ECHO TV REGURGITANT PEAK GRADIENT: 23.53 MMHG
ECHO TV REGURGITANT PEAK GRADIENT: 23.77 MMHG
ECHO TV REGURGITANT PEAK GRADIENT: 24.5 MMHG
ECHO TV REGURGITANT PEAK GRADIENT: 25 MMHG
ECHO TV REGURGITANT PEAK GRADIENT: 26.51 MMHG
ECHO TV REGURGITANT PEAK GRADIENT: 26.51 MMHG
ECHO TV REGURGITANT PEAK GRADIENT: 29.93 MMHG
ECHO TV REGURGITANT PEAK GRADIENT: 34.24 MMHG
ECHO TV REGURGITANT PEAK GRADIENT: 34.47 MMHG
EOSINOPHIL # BLD: 0 K/UL (ref 0–0.4)
EOSINOPHIL # BLD: 0 K/UL (ref 0–0.4)
EOSINOPHIL # BLD: 0.1 K/UL (ref 0–0.4)
EOSINOPHIL # BLD: 0.2 K/UL (ref 0–0.4)
EOSINOPHIL # BLD: 0.3 K/UL (ref 0–0.4)
EOSINOPHIL # BLD: 0.4 K/UL (ref 0–0.4)
EOSINOPHIL NFR BLD: 0 % (ref 0–7)
EOSINOPHIL NFR BLD: 0 % (ref 0–7)
EOSINOPHIL NFR BLD: 1 % (ref 0–7)
EOSINOPHIL NFR BLD: 2 % (ref 0–7)
EOSINOPHIL NFR BLD: 3 % (ref 0–7)
EOSINOPHIL NFR BLD: 3 % (ref 0–7)
EOSINOPHIL NFR BLD: 4 % (ref 0–7)
EOSINOPHIL NFR BLD: 5 % (ref 0–7)
EPAP/CPAP/PEEP, PAPEEP: 5
EPITH CASTS URNS QL MICRO: ABNORMAL /LPF
ERYTHROCYTE [DISTWIDTH] IN BLOOD BY AUTOMATED COUNT: 12.9 % (ref 11.6–15.4)
ERYTHROCYTE [DISTWIDTH] IN BLOOD BY AUTOMATED COUNT: 13.6 % (ref 11.5–14.5)
ERYTHROCYTE [DISTWIDTH] IN BLOOD BY AUTOMATED COUNT: 13.7 % (ref 11.5–14.5)
ERYTHROCYTE [DISTWIDTH] IN BLOOD BY AUTOMATED COUNT: 13.8 % (ref 11.5–14.5)
ERYTHROCYTE [DISTWIDTH] IN BLOOD BY AUTOMATED COUNT: 13.8 % (ref 11.5–14.5)
ERYTHROCYTE [DISTWIDTH] IN BLOOD BY AUTOMATED COUNT: 14 % (ref 11.5–14.5)
ERYTHROCYTE [DISTWIDTH] IN BLOOD BY AUTOMATED COUNT: 14.1 % (ref 11.5–14.5)
ERYTHROCYTE [DISTWIDTH] IN BLOOD BY AUTOMATED COUNT: 14.2 % (ref 11.5–14.5)
ERYTHROCYTE [DISTWIDTH] IN BLOOD BY AUTOMATED COUNT: 14.3 % (ref 11.5–14.5)
ERYTHROCYTE [DISTWIDTH] IN BLOOD BY AUTOMATED COUNT: 14.3 % (ref 11.5–14.5)
ERYTHROCYTE [DISTWIDTH] IN BLOOD BY AUTOMATED COUNT: 14.6 % (ref 11.5–14.5)
ERYTHROCYTE [DISTWIDTH] IN BLOOD BY AUTOMATED COUNT: 14.7 % (ref 11.5–14.5)
ERYTHROCYTE [DISTWIDTH] IN BLOOD BY AUTOMATED COUNT: 14.8 % (ref 11.5–14.5)
ERYTHROCYTE [DISTWIDTH] IN BLOOD BY AUTOMATED COUNT: 14.8 % (ref 11.5–14.5)
ERYTHROCYTE [DISTWIDTH] IN BLOOD BY AUTOMATED COUNT: 14.9 % (ref 11.5–14.5)
ERYTHROCYTE [DISTWIDTH] IN BLOOD BY AUTOMATED COUNT: 15 % (ref 11.5–14.5)
ERYTHROCYTE [DISTWIDTH] IN BLOOD BY AUTOMATED COUNT: 15.1 % (ref 11.5–14.5)
ERYTHROCYTE [DISTWIDTH] IN BLOOD BY AUTOMATED COUNT: 15.2 % (ref 11.5–14.5)
ERYTHROCYTE [DISTWIDTH] IN BLOOD BY AUTOMATED COUNT: 15.3 % (ref 11.5–14.5)
ERYTHROCYTE [DISTWIDTH] IN BLOOD BY AUTOMATED COUNT: 15.3 % (ref 11.5–14.5)
ERYTHROCYTE [DISTWIDTH] IN BLOOD BY AUTOMATED COUNT: 15.6 % (ref 11.5–14.5)
EST. AVERAGE GLUCOSE BLD GHB EST-MCNC: 154 MG/DL
EST. AVERAGE GLUCOSE BLD GHB EST-MCNC: 157 MG/DL
FERRITIN SERPL-MCNC: 128 NG/ML (ref 26–388)
FERRITIN SERPL-MCNC: 153 NG/ML (ref 26–388)
FIBRINOGEN PPP-MCNC: 276 MG/DL (ref 200–475)
FIBRINOGEN PPP-MCNC: 303 MG/DL (ref 200–475)
FIBRINOGEN PPP-MCNC: 398 MG/DL (ref 200–475)
FIO2 ON VENT: 80 %
FOLATE SERPL-MCNC: 20.1 NG/ML (ref 5–21)
FOLATE SERPL-MCNC: 20.5 NG/ML (ref 5–21)
GAS FLOW.O2 O2 DELIVERY SYS: ABNORMAL L/MIN
GAS FLOW.O2 O2 DELIVERY SYS: ABNORMAL L/MIN
GAS FLOW.O2 SETTING OXYMISER: 14 L/MIN
GLOBULIN SER CALC-MCNC: 2.6 G/DL (ref 1.5–4.5)
GLOBULIN SER CALC-MCNC: 2.7 G/DL (ref 2–4)
GLOBULIN SER CALC-MCNC: 2.7 G/DL (ref 2–4)
GLOBULIN SER CALC-MCNC: 3.2 G/DL (ref 2–4)
GLOBULIN SER CALC-MCNC: 3.3 G/DL (ref 2–4)
GLOBULIN SER CALC-MCNC: 3.4 G/DL (ref 2–4)
GLOBULIN SER CALC-MCNC: 3.5 G/DL (ref 2–4)
GLOBULIN SER CALC-MCNC: 3.6 G/DL (ref 2–4)
GLOBULIN SER CALC-MCNC: 3.7 G/DL (ref 2–4)
GLOBULIN SER CALC-MCNC: 3.8 G/DL (ref 2–4)
GLSCOM COMMENTS: NORMAL
GLUCOSE BLD STRIP.AUTO-MCNC: 101 MG/DL (ref 65–100)
GLUCOSE BLD STRIP.AUTO-MCNC: 111 MG/DL (ref 65–100)
GLUCOSE BLD STRIP.AUTO-MCNC: 113 MG/DL (ref 65–100)
GLUCOSE BLD STRIP.AUTO-MCNC: 118 MG/DL (ref 65–100)
GLUCOSE BLD STRIP.AUTO-MCNC: 119 MG/DL (ref 65–100)
GLUCOSE BLD STRIP.AUTO-MCNC: 119 MG/DL (ref 65–100)
GLUCOSE BLD STRIP.AUTO-MCNC: 120 MG/DL (ref 65–100)
GLUCOSE BLD STRIP.AUTO-MCNC: 121 MG/DL (ref 65–100)
GLUCOSE BLD STRIP.AUTO-MCNC: 122 MG/DL (ref 65–100)
GLUCOSE BLD STRIP.AUTO-MCNC: 122 MG/DL (ref 65–100)
GLUCOSE BLD STRIP.AUTO-MCNC: 123 MG/DL (ref 65–100)
GLUCOSE BLD STRIP.AUTO-MCNC: 126 MG/DL (ref 65–100)
GLUCOSE BLD STRIP.AUTO-MCNC: 127 MG/DL (ref 65–100)
GLUCOSE BLD STRIP.AUTO-MCNC: 128 MG/DL (ref 65–100)
GLUCOSE BLD STRIP.AUTO-MCNC: 129 MG/DL (ref 65–100)
GLUCOSE BLD STRIP.AUTO-MCNC: 129 MG/DL (ref 65–100)
GLUCOSE BLD STRIP.AUTO-MCNC: 132 MG/DL (ref 65–100)
GLUCOSE BLD STRIP.AUTO-MCNC: 134 MG/DL (ref 65–100)
GLUCOSE BLD STRIP.AUTO-MCNC: 135 MG/DL (ref 65–100)
GLUCOSE BLD STRIP.AUTO-MCNC: 136 MG/DL (ref 65–100)
GLUCOSE BLD STRIP.AUTO-MCNC: 137 MG/DL (ref 65–100)
GLUCOSE BLD STRIP.AUTO-MCNC: 139 MG/DL (ref 65–100)
GLUCOSE BLD STRIP.AUTO-MCNC: 140 MG/DL (ref 65–100)
GLUCOSE BLD STRIP.AUTO-MCNC: 140 MG/DL (ref 65–100)
GLUCOSE BLD STRIP.AUTO-MCNC: 141 MG/DL (ref 65–100)
GLUCOSE BLD STRIP.AUTO-MCNC: 143 MG/DL (ref 65–100)
GLUCOSE BLD STRIP.AUTO-MCNC: 145 MG/DL (ref 65–100)
GLUCOSE BLD STRIP.AUTO-MCNC: 148 MG/DL (ref 65–100)
GLUCOSE BLD STRIP.AUTO-MCNC: 149 MG/DL (ref 65–100)
GLUCOSE BLD STRIP.AUTO-MCNC: 151 MG/DL (ref 65–100)
GLUCOSE BLD STRIP.AUTO-MCNC: 152 MG/DL (ref 65–100)
GLUCOSE BLD STRIP.AUTO-MCNC: 152 MG/DL (ref 65–100)
GLUCOSE BLD STRIP.AUTO-MCNC: 153 MG/DL (ref 65–100)
GLUCOSE BLD STRIP.AUTO-MCNC: 154 MG/DL (ref 65–100)
GLUCOSE BLD STRIP.AUTO-MCNC: 155 MG/DL (ref 65–100)
GLUCOSE BLD STRIP.AUTO-MCNC: 156 MG/DL (ref 65–100)
GLUCOSE BLD STRIP.AUTO-MCNC: 159 MG/DL (ref 65–100)
GLUCOSE BLD STRIP.AUTO-MCNC: 161 MG/DL (ref 65–100)
GLUCOSE BLD STRIP.AUTO-MCNC: 161 MG/DL (ref 65–100)
GLUCOSE BLD STRIP.AUTO-MCNC: 164 MG/DL (ref 65–100)
GLUCOSE BLD STRIP.AUTO-MCNC: 168 MG/DL (ref 65–100)
GLUCOSE BLD STRIP.AUTO-MCNC: 170 MG/DL (ref 65–100)
GLUCOSE BLD STRIP.AUTO-MCNC: 170 MG/DL (ref 65–100)
GLUCOSE BLD STRIP.AUTO-MCNC: 171 MG/DL (ref 65–100)
GLUCOSE BLD STRIP.AUTO-MCNC: 176 MG/DL (ref 65–100)
GLUCOSE BLD STRIP.AUTO-MCNC: 177 MG/DL (ref 65–100)
GLUCOSE BLD STRIP.AUTO-MCNC: 178 MG/DL (ref 65–100)
GLUCOSE BLD STRIP.AUTO-MCNC: 178 MG/DL (ref 65–100)
GLUCOSE BLD STRIP.AUTO-MCNC: 179 MG/DL (ref 65–100)
GLUCOSE BLD STRIP.AUTO-MCNC: 179 MG/DL (ref 65–100)
GLUCOSE BLD STRIP.AUTO-MCNC: 182 MG/DL (ref 65–100)
GLUCOSE BLD STRIP.AUTO-MCNC: 185 MG/DL (ref 65–100)
GLUCOSE BLD STRIP.AUTO-MCNC: 186 MG/DL (ref 65–100)
GLUCOSE BLD STRIP.AUTO-MCNC: 186 MG/DL (ref 65–100)
GLUCOSE BLD STRIP.AUTO-MCNC: 188 MG/DL (ref 65–100)
GLUCOSE BLD STRIP.AUTO-MCNC: 189 MG/DL (ref 65–100)
GLUCOSE BLD STRIP.AUTO-MCNC: 190 MG/DL (ref 65–100)
GLUCOSE BLD STRIP.AUTO-MCNC: 190 MG/DL (ref 65–100)
GLUCOSE BLD STRIP.AUTO-MCNC: 192 MG/DL (ref 65–100)
GLUCOSE BLD STRIP.AUTO-MCNC: 194 MG/DL (ref 65–100)
GLUCOSE BLD STRIP.AUTO-MCNC: 202 MG/DL (ref 65–100)
GLUCOSE BLD STRIP.AUTO-MCNC: 204 MG/DL (ref 65–100)
GLUCOSE BLD STRIP.AUTO-MCNC: 204 MG/DL (ref 65–100)
GLUCOSE BLD STRIP.AUTO-MCNC: 205 MG/DL (ref 65–100)
GLUCOSE BLD STRIP.AUTO-MCNC: 211 MG/DL (ref 65–100)
GLUCOSE BLD STRIP.AUTO-MCNC: 212 MG/DL (ref 65–100)
GLUCOSE BLD STRIP.AUTO-MCNC: 214 MG/DL (ref 65–100)
GLUCOSE BLD STRIP.AUTO-MCNC: 218 MG/DL (ref 65–100)
GLUCOSE BLD STRIP.AUTO-MCNC: 218 MG/DL (ref 65–100)
GLUCOSE BLD STRIP.AUTO-MCNC: 219 MG/DL (ref 65–100)
GLUCOSE BLD STRIP.AUTO-MCNC: 224 MG/DL (ref 65–100)
GLUCOSE BLD STRIP.AUTO-MCNC: 224 MG/DL (ref 65–100)
GLUCOSE BLD STRIP.AUTO-MCNC: 226 MG/DL (ref 65–100)
GLUCOSE BLD STRIP.AUTO-MCNC: 228 MG/DL (ref 65–100)
GLUCOSE BLD STRIP.AUTO-MCNC: 230 MG/DL (ref 65–100)
GLUCOSE BLD STRIP.AUTO-MCNC: 232 MG/DL (ref 65–100)
GLUCOSE BLD STRIP.AUTO-MCNC: 235 MG/DL (ref 65–100)
GLUCOSE BLD STRIP.AUTO-MCNC: 240 MG/DL (ref 65–100)
GLUCOSE BLD STRIP.AUTO-MCNC: 245 MG/DL (ref 65–100)
GLUCOSE BLD STRIP.AUTO-MCNC: 255 MG/DL (ref 65–100)
GLUCOSE BLD STRIP.AUTO-MCNC: 266 MG/DL (ref 65–100)
GLUCOSE BLD STRIP.AUTO-MCNC: 269 MG/DL (ref 65–100)
GLUCOSE BLD STRIP.AUTO-MCNC: 277 MG/DL (ref 65–100)
GLUCOSE BLD STRIP.AUTO-MCNC: 278 MG/DL (ref 65–100)
GLUCOSE BLD STRIP.AUTO-MCNC: 290 MG/DL (ref 65–100)
GLUCOSE BLD STRIP.AUTO-MCNC: 314 MG/DL (ref 65–100)
GLUCOSE BLD STRIP.AUTO-MCNC: 314 MG/DL (ref 65–100)
GLUCOSE BLD STRIP.AUTO-MCNC: 324 MG/DL (ref 65–100)
GLUCOSE BLD STRIP.AUTO-MCNC: 327 MG/DL (ref 65–100)
GLUCOSE BLD STRIP.AUTO-MCNC: 352 MG/DL (ref 65–100)
GLUCOSE BLD STRIP.AUTO-MCNC: 88 MG/DL (ref 65–100)
GLUCOSE SERPL-MCNC: 100 MG/DL (ref 65–100)
GLUCOSE SERPL-MCNC: 100 MG/DL (ref 65–100)
GLUCOSE SERPL-MCNC: 103 MG/DL (ref 65–100)
GLUCOSE SERPL-MCNC: 107 MG/DL (ref 65–100)
GLUCOSE SERPL-MCNC: 109 MG/DL (ref 65–100)
GLUCOSE SERPL-MCNC: 117 MG/DL (ref 65–100)
GLUCOSE SERPL-MCNC: 121 MG/DL (ref 65–100)
GLUCOSE SERPL-MCNC: 123 MG/DL (ref 65–100)
GLUCOSE SERPL-MCNC: 124 MG/DL (ref 65–100)
GLUCOSE SERPL-MCNC: 126 MG/DL (ref 65–100)
GLUCOSE SERPL-MCNC: 131 MG/DL (ref 65–100)
GLUCOSE SERPL-MCNC: 131 MG/DL (ref 65–99)
GLUCOSE SERPL-MCNC: 132 MG/DL (ref 65–100)
GLUCOSE SERPL-MCNC: 142 MG/DL (ref 65–100)
GLUCOSE SERPL-MCNC: 149 MG/DL (ref 65–100)
GLUCOSE SERPL-MCNC: 160 MG/DL (ref 65–100)
GLUCOSE SERPL-MCNC: 166 MG/DL (ref 65–100)
GLUCOSE SERPL-MCNC: 197 MG/DL (ref 65–100)
GLUCOSE SERPL-MCNC: 204 MG/DL (ref 65–100)
GLUCOSE SERPL-MCNC: 217 MG/DL (ref 65–100)
GLUCOSE SERPL-MCNC: 217 MG/DL (ref 65–100)
GLUCOSE SERPL-MCNC: 221 MG/DL (ref 65–100)
GLUCOSE SERPL-MCNC: 243 MG/DL (ref 65–100)
GLUCOSE SERPL-MCNC: 251 MG/DL (ref 65–100)
GLUCOSE SERPL-MCNC: 287 MG/DL (ref 65–100)
GLUCOSE SERPL-MCNC: 291 MG/DL (ref 65–100)
GLUCOSE SERPL-MCNC: 91 MG/DL (ref 65–100)
GLUCOSE UR STRIP.AUTO-MCNC: NEGATIVE MG/DL
GLUCOSE UR-MCNC: NEGATIVE MG/DL
GLUCOSE, GLC: 107 MG/DL
GLUCOSE, GLC: 125 MG/DL
GLUCOSE, GLC: 126 MG/DL
GLUCOSE, GLC: 141 MG/DL
GRAM STN SPEC: ABNORMAL
GRAM STN SPEC: ABNORMAL
HBA1C MFR BLD: 7 % (ref 4.8–5.6)
HBA1C MFR BLD: 7.1 % (ref 4–5.6)
HCO3 BLD-SCNC: 22.5 MMOL/L (ref 22–26)
HCO3 BLD-SCNC: 28.2 MMOL/L (ref 22–26)
HCO3 BLDA-SCNC: 23 MMOL/L (ref 22–26)
HCT VFR BLD AUTO: 26.1 % (ref 36.6–50.3)
HCT VFR BLD AUTO: 26.8 % (ref 36.6–50.3)
HCT VFR BLD AUTO: 27 % (ref 36.6–50.3)
HCT VFR BLD AUTO: 27.2 % (ref 36.6–50.3)
HCT VFR BLD AUTO: 27.7 % (ref 36.6–50.3)
HCT VFR BLD AUTO: 27.8 % (ref 36.6–50.3)
HCT VFR BLD AUTO: 28 % (ref 36.6–50.3)
HCT VFR BLD AUTO: 28.1 % (ref 36.6–50.3)
HCT VFR BLD AUTO: 28.2 % (ref 36.6–50.3)
HCT VFR BLD AUTO: 28.7 % (ref 36.6–50.3)
HCT VFR BLD AUTO: 31 % (ref 36.6–50.3)
HCT VFR BLD AUTO: 31 % (ref 36.6–50.3)
HCT VFR BLD AUTO: 31.6 % (ref 36.6–50.3)
HCT VFR BLD AUTO: 31.7 % (ref 36.6–50.3)
HCT VFR BLD AUTO: 31.8 % (ref 36.6–50.3)
HCT VFR BLD AUTO: 32.2 % (ref 36.6–50.3)
HCT VFR BLD AUTO: 32.5 % (ref 36.6–50.3)
HCT VFR BLD AUTO: 33.2 % (ref 36.6–50.3)
HCT VFR BLD AUTO: 33.6 % (ref 36.6–50.3)
HCT VFR BLD AUTO: 34.1 % (ref 36.6–50.3)
HCT VFR BLD AUTO: 34.2 % (ref 36.6–50.3)
HCT VFR BLD AUTO: 35.5 % (ref 36.6–50.3)
HCT VFR BLD AUTO: 35.6 % (ref 36.6–50.3)
HCT VFR BLD AUTO: 37.3 % (ref 36.6–50.3)
HCT VFR BLD AUTO: 39.3 % (ref 37.5–51)
HDLC SERPL-MCNC: 40 MG/DL
HEALTH STATUS, XMCV2T: ABNORMAL
HEALTH STATUS, XMCV2T: NORMAL
HGB BLD-MCNC: 10.1 G/DL (ref 12.1–17)
HGB BLD-MCNC: 10.2 G/DL (ref 12.1–17)
HGB BLD-MCNC: 10.3 G/DL (ref 12.1–17)
HGB BLD-MCNC: 10.3 G/DL (ref 12.1–17)
HGB BLD-MCNC: 10.5 G/DL (ref 12.1–17)
HGB BLD-MCNC: 10.7 G/DL (ref 12.1–17)
HGB BLD-MCNC: 10.9 G/DL (ref 12.1–17)
HGB BLD-MCNC: 11 G/DL (ref 12.1–17)
HGB BLD-MCNC: 11.1 G/DL (ref 12.1–17)
HGB BLD-MCNC: 11.4 G/DL (ref 12.1–17)
HGB BLD-MCNC: 12.1 G/DL (ref 12.1–17)
HGB BLD-MCNC: 12.4 G/DL (ref 13–17.7)
HGB BLD-MCNC: 8.5 G/DL (ref 12.1–17)
HGB BLD-MCNC: 8.8 G/DL (ref 12.1–17)
HGB BLD-MCNC: 8.9 G/DL (ref 12.1–17)
HGB BLD-MCNC: 9 G/DL (ref 12.1–17)
HGB BLD-MCNC: 9 G/DL (ref 12.1–17)
HGB BLD-MCNC: 9.1 G/DL (ref 12.1–17)
HGB BLD-MCNC: 9.2 G/DL (ref 12.1–17)
HGB BLD-MCNC: 9.9 G/DL (ref 12.1–17)
HGB UR QL STRIP: ABNORMAL
HGB UR QL STRIP: ABNORMAL
HGB UR QL STRIP: NEGATIVE
HIGH TARGET, HITG: 140 MG/DL
HISTORY CHECKED?,CKHIST: NORMAL
HYALINE CASTS URNS QL MICRO: ABNORMAL /LPF (ref 0–5)
IMM GRANULOCYTES # BLD AUTO: 0 K/UL (ref 0–0.04)
IMM GRANULOCYTES # BLD AUTO: 0.1 K/UL (ref 0–0.04)
IMM GRANULOCYTES NFR BLD AUTO: 0 % (ref 0–0.5)
IMM GRANULOCYTES NFR BLD AUTO: 1 % (ref 0–0.5)
INR PPP: 1.2 (ref 0.9–1.1)
INR PPP: 1.3 (ref 0.9–1.1)
INSULIN ADMINSTERED, INSADM: 1.4 UNITS/HOUR
INSULIN ADMINSTERED, INSADM: 2 UNITS/HOUR
INSULIN ADMINSTERED, INSADM: 2 UNITS/HOUR
INSULIN ADMINSTERED, INSADM: 2.4 UNITS/HOUR
INSULIN ORDER, INSORD: 1.4 UNITS/HOUR
INSULIN ORDER, INSORD: 2 UNITS/HOUR
INSULIN ORDER, INSORD: 2 UNITS/HOUR
INSULIN ORDER, INSORD: 2.4 UNITS/HOUR
INTERPRETATION, 910389: NORMAL
INTERPRETATION: NORMAL
IRON SERPL-MCNC: 29 UG/DL (ref 35–150)
KETONES P FAST UR STRIP-MCNC: NEGATIVE MG/DL
KETONES UR QL STRIP.AUTO: NEGATIVE MG/DL
LACTATE SERPL-SCNC: 1.2 MMOL/L (ref 0.4–2)
LACTATE SERPL-SCNC: 2.2 MMOL/L (ref 0.4–2)
LDH SERPL L TO P-CCNC: 335 U/L (ref 85–241)
LDLC SERPL CALC-MCNC: 54 MG/DL (ref 0–99)
LEFT CCA DIST DIAS: 11.4 CM/S
LEFT CCA DIST SYS: 73.6 CM/S
LEFT CCA PROX DIAS: 16.1 CM/S
LEFT CCA PROX SYS: 65.6 CM/S
LEFT ECA DIAS: 0 CM/S
LEFT ECA SYS: 193.9 CM/S
LEFT ICA DIST DIAS: 16 CM/S
LEFT ICA DIST SYS: 97.4 CM/S
LEFT ICA MID DIAS: 11.7 CM/S
LEFT ICA MID SYS: 74.3 CM/S
LEFT ICA PROX DIAS: 5.1 CM/S
LEFT ICA PROX SYS: 50.1 CM/S
LEFT ICA/CCA SYS: 1.32
LEFT VERTEBRAL DIAS: 0 CM/S
LEFT VERTEBRAL SYS: 25.2 CM/S
LEUKOCYTE ESTERASE UR QL STRIP.AUTO: ABNORMAL
LEUKOCYTE ESTERASE UR QL STRIP.AUTO: ABNORMAL
LEUKOCYTE ESTERASE UR QL STRIP.AUTO: NEGATIVE
LOW TARGET, LOT: 100 MG/DL
LYMPHOCYTES # BLD: 0.7 K/UL (ref 0.8–3.5)
LYMPHOCYTES # BLD: 1 K/UL (ref 0.8–3.5)
LYMPHOCYTES # BLD: 1 K/UL (ref 0.8–3.5)
LYMPHOCYTES # BLD: 1.1 K/UL (ref 0.8–3.5)
LYMPHOCYTES # BLD: 1.1 K/UL (ref 0.8–3.5)
LYMPHOCYTES # BLD: 1.2 K/UL (ref 0.8–3.5)
LYMPHOCYTES # BLD: 1.3 K/UL (ref 0.8–3.5)
LYMPHOCYTES # BLD: 1.4 K/UL (ref 0.8–3.5)
LYMPHOCYTES # BLD: 1.5 K/UL (ref 0.8–3.5)
LYMPHOCYTES NFR BLD: 12 % (ref 12–49)
LYMPHOCYTES NFR BLD: 13 % (ref 12–49)
LYMPHOCYTES NFR BLD: 13 % (ref 12–49)
LYMPHOCYTES NFR BLD: 15 % (ref 12–49)
LYMPHOCYTES NFR BLD: 17 % (ref 12–49)
LYMPHOCYTES NFR BLD: 18 % (ref 12–49)
LYMPHOCYTES NFR BLD: 19 % (ref 12–49)
LYMPHOCYTES NFR BLD: 19 % (ref 12–49)
LYMPHOCYTES NFR BLD: 20 % (ref 12–49)
LYMPHOCYTES NFR BLD: 21 % (ref 12–49)
LYMPHOCYTES NFR BLD: 7 % (ref 12–49)
LYMPHOCYTES NFR BLD: 9 % (ref 12–49)
Lab: NORMAL
MAGNESIUM SERPL-MCNC: 1.7 MG/DL (ref 1.6–2.4)
MAGNESIUM SERPL-MCNC: 1.8 MG/DL (ref 1.6–2.4)
MAGNESIUM SERPL-MCNC: 1.9 MG/DL (ref 1.6–2.4)
MAGNESIUM SERPL-MCNC: 2 MG/DL (ref 1.6–2.4)
MAGNESIUM SERPL-MCNC: 2.1 MG/DL (ref 1.6–2.4)
MAGNESIUM SERPL-MCNC: 2.2 MG/DL (ref 1.6–2.4)
MAGNESIUM SERPL-MCNC: 2.3 MG/DL (ref 1.6–2.4)
MAGNESIUM SERPL-MCNC: 2.3 MG/DL (ref 1.6–2.4)
MAGNESIUM SERPL-MCNC: 2.4 MG/DL (ref 1.6–2.4)
MCH RBC QN AUTO: 27.3 PG (ref 26–34)
MCH RBC QN AUTO: 27.3 PG (ref 26–34)
MCH RBC QN AUTO: 27.4 PG (ref 26.6–33)
MCH RBC QN AUTO: 27.5 PG (ref 26–34)
MCH RBC QN AUTO: 27.5 PG (ref 26–34)
MCH RBC QN AUTO: 27.6 PG (ref 26–34)
MCH RBC QN AUTO: 27.6 PG (ref 26–34)
MCH RBC QN AUTO: 27.7 PG (ref 26–34)
MCH RBC QN AUTO: 27.8 PG (ref 26–34)
MCH RBC QN AUTO: 27.8 PG (ref 26–34)
MCH RBC QN AUTO: 27.9 PG (ref 26–34)
MCH RBC QN AUTO: 28 PG (ref 26–34)
MCH RBC QN AUTO: 28.1 PG (ref 26–34)
MCH RBC QN AUTO: 28.2 PG (ref 26–34)
MCH RBC QN AUTO: 28.2 PG (ref 26–34)
MCH RBC QN AUTO: 28.4 PG (ref 26–34)
MCHC RBC AUTO-ENTMCNC: 30.6 G/DL (ref 30–36.5)
MCHC RBC AUTO-ENTMCNC: 31.2 G/DL (ref 30–36.5)
MCHC RBC AUTO-ENTMCNC: 31.6 G/DL (ref 31.5–35.7)
MCHC RBC AUTO-ENTMCNC: 31.7 G/DL (ref 30–36.5)
MCHC RBC AUTO-ENTMCNC: 31.7 G/DL (ref 30–36.5)
MCHC RBC AUTO-ENTMCNC: 32 G/DL (ref 30–36.5)
MCHC RBC AUTO-ENTMCNC: 32.1 G/DL (ref 30–36.5)
MCHC RBC AUTO-ENTMCNC: 32.2 G/DL (ref 30–36.5)
MCHC RBC AUTO-ENTMCNC: 32.3 G/DL (ref 30–36.5)
MCHC RBC AUTO-ENTMCNC: 32.4 G/DL (ref 30–36.5)
MCHC RBC AUTO-ENTMCNC: 32.6 G/DL (ref 30–36.5)
MCHC RBC AUTO-ENTMCNC: 32.8 G/DL (ref 30–36.5)
MCHC RBC AUTO-ENTMCNC: 32.8 G/DL (ref 30–36.5)
MCHC RBC AUTO-ENTMCNC: 32.9 G/DL (ref 30–36.5)
MCHC RBC AUTO-ENTMCNC: 33 G/DL (ref 30–36.5)
MCHC RBC AUTO-ENTMCNC: 33.1 G/DL (ref 30–36.5)
MCHC RBC AUTO-ENTMCNC: 33.2 G/DL (ref 30–36.5)
MCHC RBC AUTO-ENTMCNC: 33.2 G/DL (ref 30–36.5)
MCV RBC AUTO: 84 FL (ref 80–99)
MCV RBC AUTO: 84.2 FL (ref 80–99)
MCV RBC AUTO: 84.5 FL (ref 80–99)
MCV RBC AUTO: 84.7 FL (ref 80–99)
MCV RBC AUTO: 84.9 FL (ref 80–99)
MCV RBC AUTO: 85 FL (ref 80–99)
MCV RBC AUTO: 85 FL (ref 80–99)
MCV RBC AUTO: 85.2 FL (ref 80–99)
MCV RBC AUTO: 85.3 FL (ref 80–99)
MCV RBC AUTO: 85.5 FL (ref 80–99)
MCV RBC AUTO: 85.5 FL (ref 80–99)
MCV RBC AUTO: 85.9 FL (ref 80–99)
MCV RBC AUTO: 86.2 FL (ref 80–99)
MCV RBC AUTO: 86.3 FL (ref 80–99)
MCV RBC AUTO: 86.4 FL (ref 80–99)
MCV RBC AUTO: 86.4 FL (ref 80–99)
MCV RBC AUTO: 86.6 FL (ref 80–99)
MCV RBC AUTO: 87 FL (ref 79–97)
MCV RBC AUTO: 87 FL (ref 80–99)
MCV RBC AUTO: 87.4 FL (ref 80–99)
MCV RBC AUTO: 87.6 FL (ref 80–99)
MCV RBC AUTO: 88.4 FL (ref 80–99)
MCV RBC AUTO: 89.2 FL (ref 80–99)
MICROALBUMIN UR-MCNC: 537.8 UG/ML
MINUTES UNTIL NEXT BG, NBG: 60 MIN
MONOCYTES # BLD: 0.6 K/UL (ref 0–1)
MONOCYTES # BLD: 0.7 K/UL (ref 0–1)
MONOCYTES # BLD: 0.8 K/UL (ref 0–1)
MONOCYTES # BLD: 0.9 K/UL (ref 0–1)
MONOCYTES NFR BLD: 10 % (ref 5–13)
MONOCYTES NFR BLD: 11 % (ref 5–13)
MONOCYTES NFR BLD: 7 % (ref 5–13)
MONOCYTES NFR BLD: 8 % (ref 5–13)
MONOCYTES NFR BLD: 8 % (ref 5–13)
MONOCYTES NFR BLD: 9 % (ref 5–13)
MULTIPLIER, MUL: 0.03
NEUTS SEG # BLD: 3.9 K/UL (ref 1.8–8)
NEUTS SEG # BLD: 4.2 K/UL (ref 1.8–8)
NEUTS SEG # BLD: 4.3 K/UL (ref 1.8–8)
NEUTS SEG # BLD: 4.4 K/UL (ref 1.8–8)
NEUTS SEG # BLD: 4.5 K/UL (ref 1.8–8)
NEUTS SEG # BLD: 4.6 K/UL (ref 1.8–8)
NEUTS SEG # BLD: 4.6 K/UL (ref 1.8–8)
NEUTS SEG # BLD: 4.9 K/UL (ref 1.8–8)
NEUTS SEG # BLD: 5.3 K/UL (ref 1.8–8)
NEUTS SEG # BLD: 5.3 K/UL (ref 1.8–8)
NEUTS SEG # BLD: 6.5 K/UL (ref 1.8–8)
NEUTS SEG # BLD: 6.6 K/UL (ref 1.8–8)
NEUTS SEG # BLD: 7.1 K/UL (ref 1.8–8)
NEUTS SEG # BLD: 7.3 K/UL (ref 1.8–8)
NEUTS SEG # BLD: 8.1 K/UL (ref 1.8–8)
NEUTS SEG # BLD: 8.7 K/UL (ref 1.8–8)
NEUTS SEG NFR BLD: 63 % (ref 32–75)
NEUTS SEG NFR BLD: 64 % (ref 32–75)
NEUTS SEG NFR BLD: 64 % (ref 32–75)
NEUTS SEG NFR BLD: 66 % (ref 32–75)
NEUTS SEG NFR BLD: 66 % (ref 32–75)
NEUTS SEG NFR BLD: 67 % (ref 32–75)
NEUTS SEG NFR BLD: 67 % (ref 32–75)
NEUTS SEG NFR BLD: 68 % (ref 32–75)
NEUTS SEG NFR BLD: 69 % (ref 32–75)
NEUTS SEG NFR BLD: 70 % (ref 32–75)
NEUTS SEG NFR BLD: 72 % (ref 32–75)
NEUTS SEG NFR BLD: 75 % (ref 32–75)
NEUTS SEG NFR BLD: 77 % (ref 32–75)
NEUTS SEG NFR BLD: 77 % (ref 32–75)
NEUTS SEG NFR BLD: 81 % (ref 32–75)
NEUTS SEG NFR BLD: 81 % (ref 32–75)
NITRITE UR QL STRIP.AUTO: NEGATIVE
NITRITE UR QL STRIP.AUTO: NEGATIVE
NITRITE UR QL STRIP.AUTO: POSITIVE
NRBC # BLD: 0 K/UL (ref 0–0.01)
NRBC BLD-RTO: 0 PER 100 WBC
O2/TOTAL GAS SETTING VFR VENT: 0.5 %
O2/TOTAL GAS SETTING VFR VENT: 21 %
ORDER INITIALS, ORDINIT: NORMAL
P-R INTERVAL, ECG05: 154 MS
P-R INTERVAL, ECG05: 230 MS
P-R INTERVAL, ECG05: 264 MS
P-R INTERVAL, ECG05: 292 MS
P-R INTERVAL, ECG05: 304 MS
P-R INTERVAL, ECG05: 304 MS
P-R INTERVAL, ECG05: 320 MS
P-R INTERVAL, ECG05: 330 MS
P-R INTERVAL, ECG05: 344 MS
PCO2 BLD: 43.1 MMHG (ref 35–45)
PCO2 BLD: 45.1 MMHG (ref 35–45)
PCO2 BLDA: 43 MMHG (ref 35–45)
PEEP RESPIRATORY: 5 CMH2O
PH BLD: 7.31 [PH] (ref 7.35–7.45)
PH BLD: 7.42 [PH] (ref 7.35–7.45)
PH BLDA: 7.34 [PH] (ref 7.35–7.45)
PH UR STRIP: 5 [PH] (ref 5–8)
PH UR STRIP: 6.5 [PH] (ref 4.6–8)
PHOSPHATE SERPL-MCNC: 2.5 MG/DL (ref 2.6–4.7)
PHOSPHATE SERPL-MCNC: 3.3 MG/DL (ref 2.6–4.7)
PLATELET # BLD AUTO: 100 K/UL (ref 150–400)
PLATELET # BLD AUTO: 103 K/UL (ref 150–400)
PLATELET # BLD AUTO: 108 K/UL (ref 150–400)
PLATELET # BLD AUTO: 110 K/UL (ref 150–400)
PLATELET # BLD AUTO: 112 K/UL (ref 150–400)
PLATELET # BLD AUTO: 112 K/UL (ref 150–400)
PLATELET # BLD AUTO: 113 K/UL (ref 150–400)
PLATELET # BLD AUTO: 113 K/UL (ref 150–400)
PLATELET # BLD AUTO: 119 K/UL (ref 150–400)
PLATELET # BLD AUTO: 121 K/UL (ref 150–400)
PLATELET # BLD AUTO: 123 K/UL (ref 150–400)
PLATELET # BLD AUTO: 125 K/UL (ref 150–400)
PLATELET # BLD AUTO: 127 K/UL (ref 150–400)
PLATELET # BLD AUTO: 128 K/UL (ref 150–400)
PLATELET # BLD AUTO: 128 K/UL (ref 150–400)
PLATELET # BLD AUTO: 130 K/UL (ref 150–400)
PLATELET # BLD AUTO: 131 K/UL (ref 150–400)
PLATELET # BLD AUTO: 140 K/UL (ref 150–400)
PLATELET # BLD AUTO: 142 K/UL (ref 150–400)
PLATELET # BLD AUTO: 184 X10E3/UL (ref 150–450)
PLATELET # BLD AUTO: 86 K/UL (ref 150–400)
PLATELET # BLD AUTO: 87 K/UL (ref 150–400)
PLATELET # BLD AUTO: 98 K/UL (ref 150–400)
PMV BLD AUTO: 11.8 FL (ref 8.9–12.9)
PMV BLD AUTO: 12 FL (ref 8.9–12.9)
PMV BLD AUTO: 12.1 FL (ref 8.9–12.9)
PMV BLD AUTO: 12.1 FL (ref 8.9–12.9)
PMV BLD AUTO: 12.2 FL (ref 8.9–12.9)
PMV BLD AUTO: 12.4 FL (ref 8.9–12.9)
PMV BLD AUTO: 12.4 FL (ref 8.9–12.9)
PMV BLD AUTO: 12.5 FL (ref 8.9–12.9)
PMV BLD AUTO: 12.5 FL (ref 8.9–12.9)
PMV BLD AUTO: 12.6 FL (ref 8.9–12.9)
PMV BLD AUTO: 12.7 FL (ref 8.9–12.9)
PMV BLD AUTO: 12.7 FL (ref 8.9–12.9)
PMV BLD AUTO: 12.8 FL (ref 8.9–12.9)
PMV BLD AUTO: 13 FL (ref 8.9–12.9)
PO2 BLD: 167 MMHG (ref 80–100)
PO2 BLD: 71 MMHG (ref 80–100)
PO2 BLDA: 281 MMHG (ref 80–100)
POTASSIUM SERPL-SCNC: 3.1 MMOL/L (ref 3.5–5.1)
POTASSIUM SERPL-SCNC: 3.3 MMOL/L (ref 3.5–5.1)
POTASSIUM SERPL-SCNC: 3.4 MMOL/L (ref 3.5–5.1)
POTASSIUM SERPL-SCNC: 3.4 MMOL/L (ref 3.5–5.1)
POTASSIUM SERPL-SCNC: 3.5 MMOL/L (ref 3.5–5.1)
POTASSIUM SERPL-SCNC: 3.6 MMOL/L (ref 3.5–5.1)
POTASSIUM SERPL-SCNC: 3.7 MMOL/L (ref 3.5–5.1)
POTASSIUM SERPL-SCNC: 3.7 MMOL/L (ref 3.5–5.1)
POTASSIUM SERPL-SCNC: 3.8 MMOL/L (ref 3.5–5.1)
POTASSIUM SERPL-SCNC: 3.8 MMOL/L (ref 3.5–5.1)
POTASSIUM SERPL-SCNC: 3.9 MMOL/L (ref 3.5–5.1)
POTASSIUM SERPL-SCNC: 4 MMOL/L (ref 3.5–5.1)
POTASSIUM SERPL-SCNC: 4.1 MMOL/L (ref 3.5–5.1)
POTASSIUM SERPL-SCNC: 4.2 MMOL/L (ref 3.5–5.1)
POTASSIUM SERPL-SCNC: 4.3 MMOL/L (ref 3.5–5.1)
POTASSIUM SERPL-SCNC: 5.1 MMOL/L (ref 3.5–5.2)
PRESSURE SUPPORT SETTING VENT: 5 CMH2O
PRESSURE SUPPORT SETTING VENT: 5 CM[H2O]
PROCALCITONIN SERPL-MCNC: <0.05 NG/ML
PROT SERPL-MCNC: 5.3 G/DL (ref 6.4–8.2)
PROT SERPL-MCNC: 5.4 G/DL (ref 6.4–8.2)
PROT SERPL-MCNC: 5.6 G/DL (ref 6.4–8.2)
PROT SERPL-MCNC: 5.7 G/DL (ref 6.4–8.2)
PROT SERPL-MCNC: 6 G/DL (ref 6.4–8.2)
PROT SERPL-MCNC: 6 G/DL (ref 6.4–8.2)
PROT SERPL-MCNC: 6.1 G/DL (ref 6.4–8.2)
PROT SERPL-MCNC: 6.3 G/DL (ref 6.4–8.2)
PROT SERPL-MCNC: 6.4 G/DL (ref 6.4–8.2)
PROT SERPL-MCNC: 6.7 G/DL (ref 6.4–8.2)
PROT SERPL-MCNC: 6.7 G/DL (ref 6–8.5)
PROT SERPL-MCNC: 7.4 G/DL (ref 6.4–8.2)
PROT UR QL STRIP: NORMAL
PROT UR STRIP-MCNC: 300 MG/DL
PROT UR STRIP-MCNC: ABNORMAL MG/DL
PROT UR STRIP-MCNC: NEGATIVE MG/DL
PROTHROMBIN TIME: 12.1 SEC (ref 9–11.1)
PROTHROMBIN TIME: 13.2 SEC (ref 9–11.1)
Q-T INTERVAL, ECG07: 320 MS
Q-T INTERVAL, ECG07: 370 MS
Q-T INTERVAL, ECG07: 388 MS
Q-T INTERVAL, ECG07: 390 MS
Q-T INTERVAL, ECG07: 394 MS
Q-T INTERVAL, ECG07: 394 MS
Q-T INTERVAL, ECG07: 396 MS
Q-T INTERVAL, ECG07: 398 MS
Q-T INTERVAL, ECG07: 404 MS
Q-T INTERVAL, ECG07: 404 MS
Q-T INTERVAL, ECG07: 410 MS
Q-T INTERVAL, ECG07: 436 MS
Q-T INTERVAL, ECG07: 480 MS
Q-T INTERVAL, ECG07: 502 MS
QRS DURATION, ECG06: 102 MS
QRS DURATION, ECG06: 106 MS
QRS DURATION, ECG06: 108 MS
QRS DURATION, ECG06: 112 MS
QRS DURATION, ECG06: 114 MS
QRS DURATION, ECG06: 116 MS
QRS DURATION, ECG06: 118 MS
QRS DURATION, ECG06: 122 MS
QRS DURATION, ECG06: 126 MS
QRS DURATION, ECG06: 136 MS
QRS DURATION, ECG06: 176 MS
QRS DURATION, ECG06: 180 MS
QTC CALCULATION (BEZET), ECG08: 412 MS
QTC CALCULATION (BEZET), ECG08: 460 MS
QTC CALCULATION (BEZET), ECG08: 461 MS
QTC CALCULATION (BEZET), ECG08: 463 MS
QTC CALCULATION (BEZET), ECG08: 469 MS
QTC CALCULATION (BEZET), ECG08: 472 MS
QTC CALCULATION (BEZET), ECG08: 476 MS
QTC CALCULATION (BEZET), ECG08: 482 MS
QTC CALCULATION (BEZET), ECG08: 484 MS
QTC CALCULATION (BEZET), ECG08: 486 MS
QTC CALCULATION (BEZET), ECG08: 492 MS
QTC CALCULATION (BEZET), ECG08: 505 MS
QTC CALCULATION (BEZET), ECG08: 525 MS
QTC CALCULATION (BEZET), ECG08: 578 MS
RBC # BLD AUTO: 3.02 M/UL (ref 4.1–5.7)
RBC # BLD AUTO: 3.12 M/UL (ref 4.1–5.7)
RBC # BLD AUTO: 3.13 M/UL (ref 4.1–5.7)
RBC # BLD AUTO: 3.18 M/UL (ref 4.1–5.7)
RBC # BLD AUTO: 3.18 M/UL (ref 4.1–5.7)
RBC # BLD AUTO: 3.19 M/UL (ref 4.1–5.7)
RBC # BLD AUTO: 3.2 M/UL (ref 4.1–5.7)
RBC # BLD AUTO: 3.24 M/UL (ref 4.1–5.7)
RBC # BLD AUTO: 3.25 M/UL (ref 4.1–5.7)
RBC # BLD AUTO: 3.32 M/UL (ref 4.1–5.7)
RBC # BLD AUTO: 3.62 M/UL (ref 4.1–5.7)
RBC # BLD AUTO: 3.65 M/UL (ref 4.1–5.7)
RBC # BLD AUTO: 3.69 M/UL (ref 4.1–5.7)
RBC # BLD AUTO: 3.7 M/UL (ref 4.1–5.7)
RBC # BLD AUTO: 3.74 M/UL (ref 4.1–5.7)
RBC # BLD AUTO: 3.74 M/UL (ref 4.1–5.7)
RBC # BLD AUTO: 3.86 M/UL (ref 4.1–5.7)
RBC # BLD AUTO: 3.92 M/UL (ref 4.1–5.7)
RBC # BLD AUTO: 3.93 M/UL (ref 4.1–5.7)
RBC # BLD AUTO: 3.93 M/UL (ref 4.1–5.7)
RBC # BLD AUTO: 3.99 M/UL (ref 4.1–5.7)
RBC # BLD AUTO: 4.01 M/UL (ref 4.1–5.7)
RBC # BLD AUTO: 4.15 M/UL (ref 4.1–5.7)
RBC # BLD AUTO: 4.38 M/UL (ref 4.1–5.7)
RBC # BLD AUTO: 4.53 X10E6/UL (ref 4.14–5.8)
RBC #/AREA URNS HPF: >100 /HPF (ref 0–5)
RBC #/AREA URNS HPF: ABNORMAL /HPF (ref 0–5)
RBC #/AREA URNS HPF: ABNORMAL /HPF (ref 0–5)
RBC MORPH BLD: ABNORMAL
RBC MORPH BLD: ABNORMAL
REPORTED DOSE,DOSE: ABNORMAL UNITS
REPORTED DOSE,DOSE: ABNORMAL UNITS
REPORTED DOSE/TIME,TMG: ABNORMAL
REPORTED DOSE/TIME,TMG: ABNORMAL
RIGHT CCA DIST DIAS: 10.6 CM/S
RIGHT CCA DIST SYS: 53.4 CM/S
RIGHT CCA PROX DIAS: 14 CM/S
RIGHT CCA PROX SYS: 59.1 CM/S
RIGHT ECA DIAS: 0 CM/S
RIGHT ECA SYS: 309.2 CM/S
RIGHT ICA DIST DIAS: 9.4 CM/S
RIGHT ICA DIST SYS: 42.7 CM/S
RIGHT ICA MID DIAS: 20.2 CM/S
RIGHT ICA MID SYS: 69.4 CM/S
RIGHT ICA PROX DIAS: 13.7 CM/S
RIGHT ICA PROX SYS: 51.3 CM/S
RIGHT ICA/CCA SYS: 1.3
RIGHT VERTEBRAL DIAS: 13.85 CM/S
RIGHT VERTEBRAL SYS: 61.1 CM/S
SAMPLES BEING HELD,HOLD: NORMAL
SAO2 % BLD: 100 % (ref 92–97)
SAO2 % BLD: 94 % (ref 92–97)
SAO2 % BLD: 99 % (ref 92–97)
SAO2% DEVICE SAO2% SENSOR NAME: ABNORMAL
SARS-COV-2, COV2: NOT DETECTED
SARS-COV-2, COV2: NOT DETECTED
SERVICE CMNT-IMP: ABNORMAL
SERVICE CMNT-IMP: NORMAL
SODIUM SERPL-SCNC: 137 MMOL/L (ref 136–145)
SODIUM SERPL-SCNC: 138 MMOL/L (ref 136–145)
SODIUM SERPL-SCNC: 139 MMOL/L (ref 136–145)
SODIUM SERPL-SCNC: 140 MMOL/L (ref 136–145)
SODIUM SERPL-SCNC: 141 MMOL/L (ref 136–145)
SODIUM SERPL-SCNC: 142 MMOL/L (ref 136–145)
SODIUM SERPL-SCNC: 142 MMOL/L (ref 136–145)
SODIUM SERPL-SCNC: 143 MMOL/L (ref 136–145)
SODIUM SERPL-SCNC: 144 MMOL/L (ref 134–144)
SOURCE, COVRS: ABNORMAL
SOURCE, COVRS: NORMAL
SP GR UR REFRACTOMETRY: 1.01 (ref 1–1.03)
SP GR UR REFRACTOMETRY: 1.02 (ref 1–1.03)
SP GR UR REFRACTOMETRY: 1.02 (ref 1–1.03)
SP GR UR STRIP: 1.02 (ref 1–1.03)
SPECIMEN EXP DATE BLD: NORMAL
SPECIMEN SITE: ABNORMAL
SPECIMEN SOURCE, FCOV2M: ABNORMAL
SPECIMEN SOURCE, FCOV2M: NORMAL
SPECIMEN STATUS REPORT, ROLRST: NORMAL
SPECIMEN TYPE, XMCV1T: ABNORMAL
SPECIMEN TYPE, XMCV1T: NORMAL
SPECIMEN TYPE: ABNORMAL
SPECIMEN TYPE: ABNORMAL
STATUS OF UNIT,%ST: NORMAL
STATUS OF UNIT,%ST: NORMAL
THERAPEUTIC RANGE,PTTT: ABNORMAL SECS (ref 58–77)
TOTAL RESP. RATE, ITRR: 12
TRIGL SERPL-MCNC: 101 MG/DL (ref 0–149)
TROPONIN I SERPL-MCNC: 0.43 NG/ML
TROPONIN I SERPL-MCNC: 0.43 NG/ML
TROPONIN I SERPL-MCNC: 0.57 NG/ML
TROPONIN I SERPL-MCNC: 0.6 NG/ML
TROPONIN I SERPL-MCNC: 0.92 NG/ML
TROPONIN I SERPL-MCNC: 0.93 NG/ML
TROPONIN I SERPL-MCNC: <0.05 NG/ML
TROPONIN I SERPL-MCNC: <0.05 NG/ML
TSH SERPL DL<=0.05 MIU/L-ACNC: 1.29 UIU/ML (ref 0.36–3.74)
TSH SERPL DL<=0.05 MIU/L-ACNC: 2.01 UIU/ML (ref 0.36–3.74)
UA UROBILINOGEN AMB POC: NORMAL (ref 0.2–1)
UA: UC IF INDICATED,UAUC: ABNORMAL
UNIT DIVISION, %UDIV: 0
UNIT DIVISION, %UDIV: 0
UR CULT HOLD, URHOLD: NORMAL
UR CULT HOLD, URHOLD: NORMAL
URINALYSIS CLARITY POC: NORMAL
URINALYSIS COLOR POC: NORMAL
URINE BLOOD POC: NORMAL
URINE LEUKOCYTES POC: NORMAL
URINE NITRITES POC: NEGATIVE
UROBILINOGEN UR QL STRIP.AUTO: 0.2 EU/DL (ref 0.2–1)
VANCOMYCIN TROUGH SERPL-MCNC: 16.8 UG/ML (ref 5–10)
VANCOMYCIN TROUGH SERPL-MCNC: 23.8 UG/ML (ref 5–10)
VENTILATION MODE VENT: ABNORMAL
VENTILATION MODE VENT: ABNORMAL
VENTRICULAR RATE, ECG03: 100 BPM
VENTRICULAR RATE, ECG03: 68 BPM
VENTRICULAR RATE, ECG03: 72 BPM
VENTRICULAR RATE, ECG03: 76 BPM
VENTRICULAR RATE, ECG03: 80 BPM
VENTRICULAR RATE, ECG03: 81 BPM
VENTRICULAR RATE, ECG03: 81 BPM
VENTRICULAR RATE, ECG03: 86 BPM
VENTRICULAR RATE, ECG03: 87 BPM
VENTRICULAR RATE, ECG03: 93 BPM
VENTRICULAR RATE, ECG03: 93 BPM
VENTRICULAR RATE, ECG03: 94 BPM
VENTRICULAR RATE, ECG03: 98 BPM
VENTRICULAR RATE, ECG03: 99 BPM
VIT B12 SERPL-MCNC: 390 PG/ML (ref 193–986)
VIT B12 SERPL-MCNC: 443 PG/ML (ref 193–986)
VLDLC SERPL CALC-MCNC: 20 MG/DL (ref 5–40)
VT SETTING VENT: 500 ML
WBC # BLD AUTO: 10 K/UL (ref 4.1–11.1)
WBC # BLD AUTO: 10.6 K/UL (ref 4.1–11.1)
WBC # BLD AUTO: 10.8 K/UL (ref 4.1–11.1)
WBC # BLD AUTO: 12.1 K/UL (ref 4.1–11.1)
WBC # BLD AUTO: 13.5 K/UL (ref 4.1–11.1)
WBC # BLD AUTO: 13.6 K/UL (ref 4.1–11.1)
WBC # BLD AUTO: 14.9 K/UL (ref 4.1–11.1)
WBC # BLD AUTO: 4.8 K/UL (ref 4.1–11.1)
WBC # BLD AUTO: 5.6 K/UL (ref 4.1–11.1)
WBC # BLD AUTO: 6.2 K/UL (ref 4.1–11.1)
WBC # BLD AUTO: 6.7 K/UL (ref 4.1–11.1)
WBC # BLD AUTO: 6.7 K/UL (ref 4.1–11.1)
WBC # BLD AUTO: 7.1 K/UL (ref 4.1–11.1)
WBC # BLD AUTO: 7.3 K/UL (ref 4.1–11.1)
WBC # BLD AUTO: 7.7 K/UL (ref 4.1–11.1)
WBC # BLD AUTO: 7.8 K/UL (ref 4.1–11.1)
WBC # BLD AUTO: 7.9 K/UL (ref 4.1–11.1)
WBC # BLD AUTO: 8.2 X10E3/UL (ref 3.4–10.8)
WBC # BLD AUTO: 8.5 K/UL (ref 4.1–11.1)
WBC # BLD AUTO: 8.5 K/UL (ref 4.1–11.1)
WBC # BLD AUTO: 9.1 K/UL (ref 4.1–11.1)
WBC # BLD AUTO: 9.2 K/UL (ref 4.1–11.1)
WBC # BLD AUTO: 9.7 K/UL (ref 4.1–11.1)
WBC URNS QL MICRO: ABNORMAL /HPF (ref 0–4)
YEAST URNS QL MICRO: PRESENT

## 2020-01-01 PROCEDURE — 85027 COMPLETE CBC AUTOMATED: CPT

## 2020-01-01 PROCEDURE — 3288F FALL RISK ASSESSMENT DOCD: CPT | Performed by: INTERNAL MEDICINE

## 2020-01-01 PROCEDURE — 99233 SBSQ HOSP IP/OBS HIGH 50: CPT | Performed by: INTERNAL MEDICINE

## 2020-01-01 PROCEDURE — 71045 X-RAY EXAM CHEST 1 VIEW: CPT

## 2020-01-01 PROCEDURE — 93005 ELECTROCARDIOGRAM TRACING: CPT

## 2020-01-01 PROCEDURE — 85025 COMPLETE CBC W/AUTO DIFF WBC: CPT

## 2020-01-01 PROCEDURE — 82803 BLOOD GASES ANY COMBINATION: CPT

## 2020-01-01 PROCEDURE — 83605 ASSAY OF LACTIC ACID: CPT

## 2020-01-01 PROCEDURE — 97116 GAIT TRAINING THERAPY: CPT

## 2020-01-01 PROCEDURE — 74011636637 HC RX REV CODE- 636/637: Performed by: INTERNAL MEDICINE

## 2020-01-01 PROCEDURE — 37236 OPEN/PERQ PLACE STENT 1ST: CPT | Performed by: INTERNAL MEDICINE

## 2020-01-01 PROCEDURE — 94010 BREATHING CAPACITY TEST: CPT

## 2020-01-01 PROCEDURE — 74011000258 HC RX REV CODE- 258: Performed by: NURSE PRACTITIONER

## 2020-01-01 PROCEDURE — 74011636637 HC RX REV CODE- 636/637: Performed by: PHYSICIAN ASSISTANT

## 2020-01-01 PROCEDURE — 85384 FIBRINOGEN ACTIVITY: CPT

## 2020-01-01 PROCEDURE — 74011250636 HC RX REV CODE- 250/636: Performed by: HOSPITALIST

## 2020-01-01 PROCEDURE — 84100 ASSAY OF PHOSPHORUS: CPT

## 2020-01-01 PROCEDURE — 84484 ASSAY OF TROPONIN QUANT: CPT

## 2020-01-01 PROCEDURE — 82962 GLUCOSE BLOOD TEST: CPT

## 2020-01-01 PROCEDURE — 99233 SBSQ HOSP IP/OBS HIGH 50: CPT | Performed by: THORACIC SURGERY (CARDIOTHORACIC VASCULAR SURGERY)

## 2020-01-01 PROCEDURE — G8752 SYS BP LESS 140: HCPCS | Performed by: INTERNAL MEDICINE

## 2020-01-01 PROCEDURE — 99218 HC RM OBSERVATION: CPT

## 2020-01-01 PROCEDURE — 93970 EXTREMITY STUDY: CPT

## 2020-01-01 PROCEDURE — 65660000000 HC RM CCU STEPDOWN

## 2020-01-01 PROCEDURE — 77030012468 HC VLV BLEEDBK CNTRL ABBT -B: Performed by: INTERNAL MEDICINE

## 2020-01-01 PROCEDURE — 83036 HEMOGLOBIN GLYCOSYLATED A1C: CPT

## 2020-01-01 PROCEDURE — 82607 VITAMIN B-12: CPT

## 2020-01-01 PROCEDURE — 74011250637 HC RX REV CODE- 250/637: Performed by: PHYSICIAN ASSISTANT

## 2020-01-01 PROCEDURE — 97535 SELF CARE MNGMENT TRAINING: CPT

## 2020-01-01 PROCEDURE — 3331090002 HH PPS REVENUE DEBIT

## 2020-01-01 PROCEDURE — 93567 NJX CAR CTH SPRVLV AORTGRPHY: CPT | Performed by: INTERNAL MEDICINE

## 2020-01-01 PROCEDURE — 99215 OFFICE O/P EST HI 40 MIN: CPT | Performed by: INTERNAL MEDICINE

## 2020-01-01 PROCEDURE — G9717 DOC PT DX DEP/BP F/U NT REQ: HCPCS | Performed by: INTERNAL MEDICINE

## 2020-01-01 PROCEDURE — 80048 BASIC METABOLIC PNL TOTAL CA: CPT

## 2020-01-01 PROCEDURE — 33361 REPLACE AORTIC VALVE PERQ: CPT | Performed by: THORACIC SURGERY (CARDIOTHORACIC VASCULAR SURGERY)

## 2020-01-01 PROCEDURE — 74011250637 HC RX REV CODE- 250/637: Performed by: INTERNAL MEDICINE

## 2020-01-01 PROCEDURE — 99223 1ST HOSP IP/OBS HIGH 75: CPT | Performed by: INTERNAL MEDICINE

## 2020-01-01 PROCEDURE — 36415 COLL VENOUS BLD VENIPUNCTURE: CPT

## 2020-01-01 PROCEDURE — 97161 PT EVAL LOW COMPLEX 20 MIN: CPT

## 2020-01-01 PROCEDURE — 77030013797 HC KT TRNSDUC PRSSR EDWD -A: Performed by: INTERNAL MEDICINE

## 2020-01-01 PROCEDURE — 1111F DSCHRG MED/CURRENT MED MERGE: CPT | Performed by: THORACIC SURGERY (CARDIOTHORACIC VASCULAR SURGERY)

## 2020-01-01 PROCEDURE — 74011250636 HC RX REV CODE- 250/636: Performed by: NURSE PRACTITIONER

## 2020-01-01 PROCEDURE — 80053 COMPREHEN METABOLIC PANEL: CPT

## 2020-01-01 PROCEDURE — 83735 ASSAY OF MAGNESIUM: CPT

## 2020-01-01 PROCEDURE — 99223 1ST HOSP IP/OBS HIGH 75: CPT | Performed by: THORACIC SURGERY (CARDIOTHORACIC VASCULAR SURGERY)

## 2020-01-01 PROCEDURE — G0008 ADMIN INFLUENZA VIRUS VAC: HCPCS

## 2020-01-01 PROCEDURE — 3051F HG A1C>EQUAL 7.0%<8.0%: CPT | Performed by: INTERNAL MEDICINE

## 2020-01-01 PROCEDURE — 94760 N-INVAS EAR/PLS OXIMETRY 1: CPT

## 2020-01-01 PROCEDURE — 02HK3JZ INSERTION OF PACEMAKER LEAD INTO RIGHT VENTRICLE, PERCUTANEOUS APPROACH: ICD-10-PCS | Performed by: INTERNAL MEDICINE

## 2020-01-01 PROCEDURE — 400013 HH SOC

## 2020-01-01 PROCEDURE — 74011250636 HC RX REV CODE- 250/636: Performed by: INTERNAL MEDICINE

## 2020-01-01 PROCEDURE — G8427 DOCREV CUR MEDS BY ELIG CLIN: HCPCS | Performed by: INTERNAL MEDICINE

## 2020-01-01 PROCEDURE — C1769 GUIDE WIRE: HCPCS | Performed by: INTERNAL MEDICINE

## 2020-01-01 PROCEDURE — C1894 INTRO/SHEATH, NON-LASER: HCPCS | Performed by: INTERNAL MEDICINE

## 2020-01-01 PROCEDURE — 77030016699 HC CATH ANGI DX INFN1 CARD -A: Performed by: INTERNAL MEDICINE

## 2020-01-01 PROCEDURE — 3288F FALL RISK ASSESSMENT DOCD: CPT | Performed by: THORACIC SURGERY (CARDIOTHORACIC VASCULAR SURGERY)

## 2020-01-01 PROCEDURE — 1101F PT FALLS ASSESS-DOCD LE1/YR: CPT | Performed by: INTERNAL MEDICINE

## 2020-01-01 PROCEDURE — 99152 MOD SED SAME PHYS/QHP 5/>YRS: CPT | Performed by: INTERNAL MEDICINE

## 2020-01-01 PROCEDURE — 74011000250 HC RX REV CODE- 250: Performed by: INTERNAL MEDICINE

## 2020-01-01 PROCEDURE — 96365 THER/PROPH/DIAG IV INF INIT: CPT

## 2020-01-01 PROCEDURE — 74011000258 HC RX REV CODE- 258: Performed by: INTERNAL MEDICINE

## 2020-01-01 PROCEDURE — 85379 FIBRIN DEGRADATION QUANT: CPT

## 2020-01-01 PROCEDURE — G8536 NO DOC ELDER MAL SCRN: HCPCS | Performed by: INTERNAL MEDICINE

## 2020-01-01 PROCEDURE — 85347 COAGULATION TIME ACTIVATED: CPT

## 2020-01-01 PROCEDURE — C1730 CATH, EP, 19 OR FEW ELECT: HCPCS | Performed by: INTERNAL MEDICINE

## 2020-01-01 PROCEDURE — 74011250636 HC RX REV CODE- 250/636: Performed by: FAMILY MEDICINE

## 2020-01-01 PROCEDURE — 74011636637 HC RX REV CODE- 636/637: Performed by: FAMILY MEDICINE

## 2020-01-01 PROCEDURE — 73630 X-RAY EXAM OF FOOT: CPT

## 2020-01-01 PROCEDURE — 1100F PTFALLS ASSESS-DOCD GE2>/YR: CPT | Performed by: INTERNAL MEDICINE

## 2020-01-01 PROCEDURE — 82746 ASSAY OF FOLIC ACID SERUM: CPT

## 2020-01-01 PROCEDURE — 74011000258 HC RX REV CODE- 258: Performed by: EMERGENCY MEDICINE

## 2020-01-01 PROCEDURE — 94002 VENT MGMT INPAT INIT DAY: CPT

## 2020-01-01 PROCEDURE — 74011250637 HC RX REV CODE- 250/637: Performed by: NURSE PRACTITIONER

## 2020-01-01 PROCEDURE — G9717 DOC PT DX DEP/BP F/U NT REQ: HCPCS | Performed by: THORACIC SURGERY (CARDIOTHORACIC VASCULAR SURGERY)

## 2020-01-01 PROCEDURE — 93880 EXTRACRANIAL BILAT STUDY: CPT

## 2020-01-01 PROCEDURE — 85730 THROMBOPLASTIN TIME PARTIAL: CPT

## 2020-01-01 PROCEDURE — 04FJ3ZZ FRAGMENTATION OF LEFT EXTERNAL ILIAC ARTERY, PERCUTANEOUS APPROACH: ICD-10-PCS | Performed by: THORACIC SURGERY (CARDIOTHORACIC VASCULAR SURGERY)

## 2020-01-01 PROCEDURE — 77030005401 HC CATH RAD ARRO -A

## 2020-01-01 PROCEDURE — 99231 SBSQ HOSP IP/OBS SF/LOW 25: CPT | Performed by: THORACIC SURGERY (CARDIOTHORACIC VASCULAR SURGERY)

## 2020-01-01 PROCEDURE — 86140 C-REACTIVE PROTEIN: CPT

## 2020-01-01 PROCEDURE — G0299 HHS/HOSPICE OF RN EA 15 MIN: HCPCS

## 2020-01-01 PROCEDURE — 3331090001 HH PPS REVENUE CREDIT

## 2020-01-01 PROCEDURE — C1785 PMKR, DUAL, RATE-RESP: HCPCS | Performed by: INTERNAL MEDICINE

## 2020-01-01 PROCEDURE — 87040 BLOOD CULTURE FOR BACTERIA: CPT

## 2020-01-01 PROCEDURE — 37220 PR REVASCULARIZATION ILIAC ARTERY ANGIOP 1ST VSL: CPT | Performed by: INTERNAL MEDICINE

## 2020-01-01 PROCEDURE — 74011636637 HC RX REV CODE- 636/637: Performed by: NURSE PRACTITIONER

## 2020-01-01 PROCEDURE — 99291 CRITICAL CARE FIRST HOUR: CPT | Performed by: INTERNAL MEDICINE

## 2020-01-01 PROCEDURE — 74011250637 HC RX REV CODE- 250/637: Performed by: THORACIC SURGERY (CARDIOTHORACIC VASCULAR SURGERY)

## 2020-01-01 PROCEDURE — G0463 HOSPITAL OUTPT CLINIC VISIT: HCPCS | Performed by: INTERNAL MEDICINE

## 2020-01-01 PROCEDURE — 83880 ASSAY OF NATRIURETIC PEPTIDE: CPT

## 2020-01-01 PROCEDURE — 86901 BLOOD TYPING SEROLOGIC RH(D): CPT

## 2020-01-01 PROCEDURE — 94640 AIRWAY INHALATION TREATMENT: CPT

## 2020-01-01 PROCEDURE — 65660000001 HC RM ICU INTERMED STEPDOWN

## 2020-01-01 PROCEDURE — 74011000636 HC RX REV CODE- 636: Performed by: INTERNAL MEDICINE

## 2020-01-01 PROCEDURE — G0151 HHCP-SERV OF PT,EA 15 MIN: HCPCS

## 2020-01-01 PROCEDURE — 74011000250 HC RX REV CODE- 250: Performed by: NURSE ANESTHETIST, CERTIFIED REGISTERED

## 2020-01-01 PROCEDURE — 65210000002 HC RM PRIVATE GYN

## 2020-01-01 PROCEDURE — 93306 TTE W/DOPPLER COMPLETE: CPT

## 2020-01-01 PROCEDURE — 87086 URINE CULTURE/COLONY COUNT: CPT

## 2020-01-01 PROCEDURE — 03HY32Z INSERTION OF MONITORING DEVICE INTO UPPER ARTERY, PERCUTANEOUS APPROACH: ICD-10-PCS | Performed by: INTERNAL MEDICINE

## 2020-01-01 PROCEDURE — 74011636637 HC RX REV CODE- 636/637: Performed by: HOSPITALIST

## 2020-01-01 PROCEDURE — 99153 MOD SED SAME PHYS/QHP EA: CPT | Performed by: INTERNAL MEDICINE

## 2020-01-01 PROCEDURE — G8420 CALC BMI NORM PARAMETERS: HCPCS | Performed by: INTERNAL MEDICINE

## 2020-01-01 PROCEDURE — G8754 DIAS BP LESS 90: HCPCS | Performed by: THORACIC SURGERY (CARDIOTHORACIC VASCULAR SURGERY)

## 2020-01-01 PROCEDURE — 96372 THER/PROPH/DIAG INJ SC/IM: CPT

## 2020-01-01 PROCEDURE — 86900 BLOOD TYPING SEROLOGIC ABO: CPT

## 2020-01-01 PROCEDURE — 87635 SARS-COV-2 COVID-19 AMP PRB: CPT

## 2020-01-01 PROCEDURE — 047C3ZZ DILATION OF RIGHT COMMON ILIAC ARTERY, PERCUTANEOUS APPROACH: ICD-10-PCS | Performed by: THORACIC SURGERY (CARDIOTHORACIC VASCULAR SURGERY)

## 2020-01-01 PROCEDURE — 93296 REM INTERROG EVL PM/IDS: CPT | Performed by: INTERNAL MEDICINE

## 2020-01-01 PROCEDURE — 93306 TTE W/DOPPLER COMPLETE: CPT | Performed by: INTERNAL MEDICINE

## 2020-01-01 PROCEDURE — G8754 DIAS BP LESS 90: HCPCS | Performed by: INTERNAL MEDICINE

## 2020-01-01 PROCEDURE — 74011250636 HC RX REV CODE- 250/636: Performed by: THORACIC SURGERY (CARDIOTHORACIC VASCULAR SURGERY)

## 2020-01-01 PROCEDURE — 97530 THERAPEUTIC ACTIVITIES: CPT

## 2020-01-01 PROCEDURE — 77030013715 HC INFL SYS MRTM -B: Performed by: INTERNAL MEDICINE

## 2020-01-01 PROCEDURE — 80202 ASSAY OF VANCOMYCIN: CPT

## 2020-01-01 PROCEDURE — 74011250636 HC RX REV CODE- 250/636: Performed by: PHYSICIAN ASSISTANT

## 2020-01-01 PROCEDURE — 74011000250 HC RX REV CODE- 250

## 2020-01-01 PROCEDURE — 82728 ASSAY OF FERRITIN: CPT

## 2020-01-01 PROCEDURE — 74011250637 HC RX REV CODE- 250/637: Performed by: FAMILY MEDICINE

## 2020-01-01 PROCEDURE — B3121ZZ FLUOROSCOPY OF LEFT SUBCLAVIAN ARTERY USING LOW OSMOLAR CONTRAST: ICD-10-PCS | Performed by: THORACIC SURGERY (CARDIOTHORACIC VASCULAR SURGERY)

## 2020-01-01 PROCEDURE — 96375 TX/PRO/DX INJ NEW DRUG ADDON: CPT

## 2020-01-01 PROCEDURE — 0107U C DIFF TOX AG DETCJ IA STOOL: CPT

## 2020-01-01 PROCEDURE — 85610 PROTHROMBIN TIME: CPT

## 2020-01-01 PROCEDURE — 76770 US EXAM ABDO BACK WALL COMP: CPT

## 2020-01-01 PROCEDURE — 77030019702 HC WRP THER MENM -C: Performed by: INTERNAL MEDICINE

## 2020-01-01 PROCEDURE — 93308 TTE F-UP OR LMTD: CPT

## 2020-01-01 PROCEDURE — 77030013744: Performed by: INTERNAL MEDICINE

## 2020-01-01 PROCEDURE — 77030022704 HC SUT VLOC COVD -B: Performed by: INTERNAL MEDICINE

## 2020-01-01 PROCEDURE — 82306 VITAMIN D 25 HYDROXY: CPT

## 2020-01-01 PROCEDURE — 94664 DEMO&/EVAL PT USE INHALER: CPT

## 2020-01-01 PROCEDURE — 77030005401 HC CATH RAD ARRO -A: Performed by: ANESTHESIOLOGY

## 2020-01-01 PROCEDURE — 70450 CT HEAD/BRAIN W/O DYE: CPT

## 2020-01-01 PROCEDURE — 99214 OFFICE O/P EST MOD 30 MIN: CPT | Performed by: INTERNAL MEDICINE

## 2020-01-01 PROCEDURE — 74011000250 HC RX REV CODE- 250: Performed by: NURSE PRACTITIONER

## 2020-01-01 PROCEDURE — 99222 1ST HOSP IP/OBS MODERATE 55: CPT | Performed by: INTERNAL MEDICINE

## 2020-01-01 PROCEDURE — 82550 ASSAY OF CK (CPK): CPT

## 2020-01-01 PROCEDURE — 71275 CT ANGIOGRAPHY CHEST: CPT

## 2020-01-01 PROCEDURE — 99232 SBSQ HOSP IP/OBS MODERATE 35: CPT | Performed by: INTERNAL MEDICINE

## 2020-01-01 PROCEDURE — 97165 OT EVAL LOW COMPLEX 30 MIN: CPT

## 2020-01-01 PROCEDURE — 33208 INSRT HEART PM ATRIAL & VENT: CPT | Performed by: INTERNAL MEDICINE

## 2020-01-01 PROCEDURE — 74011250636 HC RX REV CODE- 250/636: Performed by: NURSE ANESTHETIST, CERTIFIED REGISTERED

## 2020-01-01 PROCEDURE — 65620000000 HC RM CCU GENERAL

## 2020-01-01 PROCEDURE — 74011000258 HC RX REV CODE- 258: Performed by: RADIOLOGY

## 2020-01-01 PROCEDURE — 02H63JZ INSERTION OF PACEMAKER LEAD INTO RIGHT ATRIUM, PERCUTANEOUS APPROACH: ICD-10-PCS | Performed by: INTERNAL MEDICINE

## 2020-01-01 PROCEDURE — XW033E5 INTRODUCTION OF REMDESIVIR ANTI-INFECTIVE INTO PERIPHERAL VEIN, PERCUTANEOUS APPROACH, NEW TECHNOLOGY GROUP 5: ICD-10-PCS | Performed by: INTERNAL MEDICINE

## 2020-01-01 PROCEDURE — 77030002996 HC SUT SLK J&J -A: Performed by: INTERNAL MEDICINE

## 2020-01-01 PROCEDURE — C1725 CATH, TRANSLUMIN NON-LASER: HCPCS | Performed by: INTERNAL MEDICINE

## 2020-01-01 PROCEDURE — 99222 1ST HOSP IP/OBS MODERATE 55: CPT | Performed by: SPECIALIST

## 2020-01-01 PROCEDURE — 74011250637 HC RX REV CODE- 250/637: Performed by: HOSPITALIST

## 2020-01-01 PROCEDURE — 75710 ARTERY X-RAYS ARM/LEG: CPT | Performed by: INTERNAL MEDICINE

## 2020-01-01 PROCEDURE — 77030040375: Performed by: INTERNAL MEDICINE

## 2020-01-01 PROCEDURE — G8427 DOCREV CUR MEDS BY ELIG CLIN: HCPCS | Performed by: THORACIC SURGERY (CARDIOTHORACIC VASCULAR SURGERY)

## 2020-01-01 PROCEDURE — C1892 INTRO/SHEATH,FIXED,PEEL-AWAY: HCPCS | Performed by: INTERNAL MEDICINE

## 2020-01-01 PROCEDURE — 02RF38Z REPLACEMENT OF AORTIC VALVE WITH ZOOPLASTIC TISSUE, PERCUTANEOUS APPROACH: ICD-10-PCS | Performed by: THORACIC SURGERY (CARDIOTHORACIC VASCULAR SURGERY)

## 2020-01-01 PROCEDURE — 1111F DSCHRG MED/CURRENT MED MERGE: CPT | Performed by: INTERNAL MEDICINE

## 2020-01-01 PROCEDURE — 77030039046 HC PAD DEFIB RADIOTRNSPNT CNMD -B: Performed by: INTERNAL MEDICINE

## 2020-01-01 PROCEDURE — 83540 ASSAY OF IRON: CPT

## 2020-01-01 PROCEDURE — 74174 CTA ABD&PLVS W/CONTRAST: CPT

## 2020-01-01 PROCEDURE — B31Q1ZZ FLUOROSCOPY OF CERVICO-CEREBRAL ARCH USING LOW OSMOLAR CONTRAST: ICD-10-PCS | Performed by: INTERNAL MEDICINE

## 2020-01-01 PROCEDURE — 84443 ASSAY THYROID STIM HORMONE: CPT

## 2020-01-01 PROCEDURE — 77030008543 HC TBNG MON PRSS MRTM -A: Performed by: INTERNAL MEDICINE

## 2020-01-01 PROCEDURE — C1887 CATHETER, GUIDING: HCPCS | Performed by: INTERNAL MEDICINE

## 2020-01-01 PROCEDURE — 03743ZZ DILATION OF LEFT SUBCLAVIAN ARTERY, PERCUTANEOUS APPROACH: ICD-10-PCS | Performed by: INTERNAL MEDICINE

## 2020-01-01 PROCEDURE — 77030027138 HC INCENT SPIROMETER -A

## 2020-01-01 PROCEDURE — 90653 IIV ADJUVANT VACCINE IM: CPT

## 2020-01-01 PROCEDURE — 97530 THERAPEUTIC ACTIVITIES: CPT | Performed by: PHYSICAL THERAPIST

## 2020-01-01 PROCEDURE — 80061 LIPID PANEL: CPT

## 2020-01-01 PROCEDURE — 047D3ZZ DILATION OF LEFT COMMON ILIAC ARTERY, PERCUTANEOUS APPROACH: ICD-10-PCS | Performed by: THORACIC SURGERY (CARDIOTHORACIC VASCULAR SURGERY)

## 2020-01-01 PROCEDURE — 93461 R&L HRT ART/VENTRICLE ANGIO: CPT | Performed by: INTERNAL MEDICINE

## 2020-01-01 PROCEDURE — C1893 INTRO/SHEATH, FIXED,NON-PEEL: HCPCS | Performed by: INTERNAL MEDICINE

## 2020-01-01 PROCEDURE — 77030019569 HC BND COMPR RAD TERU -B: Performed by: INTERNAL MEDICINE

## 2020-01-01 PROCEDURE — 65270000029 HC RM PRIVATE

## 2020-01-01 PROCEDURE — 81002 URINALYSIS NONAUTO W/O SCOPE: CPT | Performed by: INTERNAL MEDICINE

## 2020-01-01 PROCEDURE — 77030037661 HC COREVLV EVOLUT-R 34MM MEDT -L: Performed by: INTERNAL MEDICINE

## 2020-01-01 PROCEDURE — 99024 POSTOP FOLLOW-UP VISIT: CPT | Performed by: INTERNAL MEDICINE

## 2020-01-01 PROCEDURE — 99285 EMERGENCY DEPT VISIT HI MDM: CPT

## 2020-01-01 PROCEDURE — C1898 LEAD, PMKR, OTHER THAN TRANS: HCPCS | Performed by: INTERNAL MEDICINE

## 2020-01-01 PROCEDURE — C1751 CATH, INF, PER/CENT/MIDLINE: HCPCS | Performed by: INTERNAL MEDICINE

## 2020-01-01 PROCEDURE — 77030012341 HC CHMB SPCR OPTC MDI VYRM -A

## 2020-01-01 PROCEDURE — C1760 CLOSURE DEV, VASC: HCPCS | Performed by: INTERNAL MEDICINE

## 2020-01-01 PROCEDURE — 97168 OT RE-EVAL EST PLAN CARE: CPT

## 2020-01-01 PROCEDURE — 33210 INSERT ELECTRD/PM CATH SNGL: CPT | Performed by: INTERNAL MEDICINE

## 2020-01-01 PROCEDURE — 74011000258 HC RX REV CODE- 258: Performed by: THORACIC SURGERY (CARDIOTHORACIC VASCULAR SURGERY)

## 2020-01-01 PROCEDURE — 71046 X-RAY EXAM CHEST 2 VIEWS: CPT

## 2020-01-01 PROCEDURE — 81001 URINALYSIS AUTO W/SCOPE: CPT

## 2020-01-01 PROCEDURE — 73700 CT LOWER EXTREMITY W/O DYE: CPT

## 2020-01-01 PROCEDURE — 87077 CULTURE AEROBIC IDENTIFY: CPT

## 2020-01-01 PROCEDURE — 74011636637 HC RX REV CODE- 636/637: Performed by: THORACIC SURGERY (CARDIOTHORACIC VASCULAR SURGERY)

## 2020-01-01 PROCEDURE — 76060000040 HC ANESTHESIA 4.5 TO 5 HR: Performed by: INTERNAL MEDICINE

## 2020-01-01 PROCEDURE — 87186 SC STD MICRODIL/AGAR DIL: CPT

## 2020-01-01 PROCEDURE — 77030041326 HC CATH LITHO SHOCKWAVE SWMD -I: Performed by: INTERNAL MEDICINE

## 2020-01-01 PROCEDURE — B2131ZZ FLUOROSCOPY OF MULTIPLE CORONARY ARTERY BYPASS GRAFTS USING LOW OSMOLAR CONTRAST: ICD-10-PCS | Performed by: THORACIC SURGERY (CARDIOTHORACIC VASCULAR SURGERY)

## 2020-01-01 PROCEDURE — 77030018729 HC ELECTRD DEFIB PAD CARD -B

## 2020-01-01 PROCEDURE — 76010000115 HC CV SURG 4.5 TO 5 HR: Performed by: INTERNAL MEDICINE

## 2020-01-01 PROCEDURE — 3331090003 HH PPS REVENUE ADJ

## 2020-01-01 PROCEDURE — G8420 CALC BMI NORM PARAMETERS: HCPCS | Performed by: THORACIC SURGERY (CARDIOTHORACIC VASCULAR SURGERY)

## 2020-01-01 PROCEDURE — 74011000636 HC RX REV CODE- 636: Performed by: RADIOLOGY

## 2020-01-01 PROCEDURE — 5A1213Z PERFORMANCE OF CARDIAC PACING, INTERMITTENT: ICD-10-PCS | Performed by: THORACIC SURGERY (CARDIOTHORACIC VASCULAR SURGERY)

## 2020-01-01 PROCEDURE — 74011000272 HC RX REV CODE- 272: Performed by: INTERNAL MEDICINE

## 2020-01-01 PROCEDURE — 74011000258 HC RX REV CODE- 258: Performed by: NURSE ANESTHETIST, CERTIFIED REGISTERED

## 2020-01-01 PROCEDURE — 77030039825 HC MSK NSL PAP SUPERNO2VA VYRM -B: Performed by: ANESTHESIOLOGY

## 2020-01-01 PROCEDURE — 77030040361 HC SLV COMPR DVT MDII -B

## 2020-01-01 PROCEDURE — 74011250636 HC RX REV CODE- 250/636: Performed by: EMERGENCY MEDICINE

## 2020-01-01 PROCEDURE — 4A023N8 MEASUREMENT OF CARDIAC SAMPLING AND PRESSURE, BILATERAL, PERCUTANEOUS APPROACH: ICD-10-PCS | Performed by: THORACIC SURGERY (CARDIOTHORACIC VASCULAR SURGERY)

## 2020-01-01 PROCEDURE — 77030020268 HC MISC GENERAL SUPPLY: Performed by: INTERNAL MEDICINE

## 2020-01-01 PROCEDURE — 74011250637 HC RX REV CODE- 250/637: Performed by: EMERGENCY MEDICINE

## 2020-01-01 PROCEDURE — 2709999900 HC NON-CHARGEABLE SUPPLY: Performed by: INTERNAL MEDICINE

## 2020-01-01 PROCEDURE — 84145 PROCALCITONIN (PCT): CPT

## 2020-01-01 PROCEDURE — 1100F PTFALLS ASSESS-DOCD GE2>/YR: CPT | Performed by: THORACIC SURGERY (CARDIOTHORACIC VASCULAR SURGERY)

## 2020-01-01 PROCEDURE — 77030004532 HC CATH ANGI DX IMP BSC -A: Performed by: INTERNAL MEDICINE

## 2020-01-01 PROCEDURE — 0JH605Z INSERTION OF PACEMAKER, SINGLE CHAMBER RATE RESPONSIVE INTO CHEST SUBCUTANEOUS TISSUE AND FASCIA, OPEN APPROACH: ICD-10-PCS | Performed by: INTERNAL MEDICINE

## 2020-01-01 PROCEDURE — 99239 HOSP IP/OBS DSCHRG MGMT >30: CPT | Performed by: THORACIC SURGERY (CARDIOTHORACIC VASCULAR SURGERY)

## 2020-01-01 PROCEDURE — B246ZZ4 ULTRASONOGRAPHY OF RIGHT AND LEFT HEART, TRANSESOPHAGEAL: ICD-10-PCS | Performed by: ANESTHESIOLOGY

## 2020-01-01 PROCEDURE — 04FD3ZZ FRAGMENTATION OF LEFT COMMON ILIAC ARTERY, PERCUTANEOUS APPROACH: ICD-10-PCS | Performed by: THORACIC SURGERY (CARDIOTHORACIC VASCULAR SURGERY)

## 2020-01-01 PROCEDURE — P9047 ALBUMIN (HUMAN), 25%, 50ML: HCPCS | Performed by: NURSE PRACTITIONER

## 2020-01-01 PROCEDURE — 97164 PT RE-EVAL EST PLAN CARE: CPT | Performed by: PHYSICAL THERAPIST

## 2020-01-01 PROCEDURE — 99284 EMERGENCY DEPT VISIT MOD MDM: CPT

## 2020-01-01 PROCEDURE — 77030013406 HC CATH CTRL EDWD -B: Performed by: INTERNAL MEDICINE

## 2020-01-01 PROCEDURE — 87205 SMEAR GRAM STAIN: CPT

## 2020-01-01 PROCEDURE — 77010033678 HC OXYGEN DAILY

## 2020-01-01 PROCEDURE — G8753 SYS BP > OR = 140: HCPCS | Performed by: INTERNAL MEDICINE

## 2020-01-01 PROCEDURE — G8536 NO DOC ELDER MAL SCRN: HCPCS | Performed by: THORACIC SURGERY (CARDIOTHORACIC VASCULAR SURGERY)

## 2020-01-01 PROCEDURE — B2111ZZ FLUOROSCOPY OF MULTIPLE CORONARY ARTERIES USING LOW OSMOLAR CONTRAST: ICD-10-PCS | Performed by: THORACIC SURGERY (CARDIOTHORACIC VASCULAR SURGERY)

## 2020-01-01 PROCEDURE — P9045 ALBUMIN (HUMAN), 5%, 250 ML: HCPCS | Performed by: NURSE ANESTHETIST, CERTIFIED REGISTERED

## 2020-01-01 PROCEDURE — 77010033711 HC HIGH FLOW OXYGEN

## 2020-01-01 PROCEDURE — C1751 CATH, INF, PER/CENT/MIDLINE: HCPCS | Performed by: ANESTHESIOLOGY

## 2020-01-01 PROCEDURE — 77030005402 HC CATH RAD ART LN KT TELE -B

## 2020-01-01 PROCEDURE — 83615 LACTATE (LD) (LDH) ENZYME: CPT

## 2020-01-01 PROCEDURE — 77030005319 HC CATH PACE FEM BL STJU -C: Performed by: INTERNAL MEDICINE

## 2020-01-01 PROCEDURE — 87147 CULTURE TYPE IMMUNOLOGIC: CPT

## 2020-01-01 PROCEDURE — 77030032060 HC PWDR HEMSTAT ARISTA ASRB 3GM BARD -C: Performed by: INTERNAL MEDICINE

## 2020-01-01 PROCEDURE — 93308 TTE F-UP OR LMTD: CPT | Performed by: INTERNAL MEDICINE

## 2020-01-01 PROCEDURE — 93294 REM INTERROG EVL PM/LDLS PM: CPT | Performed by: INTERNAL MEDICINE

## 2020-01-01 PROCEDURE — 77030041244 HC CBL PACE EXT TEMP REMG -B: Performed by: INTERNAL MEDICINE

## 2020-01-01 PROCEDURE — 36600 WITHDRAWAL OF ARTERIAL BLOOD: CPT

## 2020-01-01 PROCEDURE — 75810000275 HC EMERGENCY DEPT VISIT NO LEVEL OF CARE

## 2020-01-01 PROCEDURE — 77030018547 HC SUT ETHBND1 J&J -B: Performed by: INTERNAL MEDICINE

## 2020-01-01 PROCEDURE — 77030010221 HC SPLNT WR POS TELE -B: Performed by: INTERNAL MEDICINE

## 2020-01-01 PROCEDURE — 96376 TX/PRO/DX INJ SAME DRUG ADON: CPT

## 2020-01-01 PROCEDURE — G8752 SYS BP LESS 140: HCPCS | Performed by: THORACIC SURGERY (CARDIOTHORACIC VASCULAR SURGERY)

## 2020-01-01 PROCEDURE — 33361 REPLACE AORTIC VALVE PERQ: CPT | Performed by: INTERNAL MEDICINE

## 2020-01-01 PROCEDURE — C1781 MESH (IMPLANTABLE): HCPCS | Performed by: INTERNAL MEDICINE

## 2020-01-01 PROCEDURE — 99214 OFFICE O/P EST MOD 30 MIN: CPT | Performed by: THORACIC SURGERY (CARDIOTHORACIC VASCULAR SURGERY)

## 2020-01-01 PROCEDURE — 77030009379: Performed by: INTERNAL MEDICINE

## 2020-01-01 PROCEDURE — 86923 COMPATIBILITY TEST ELECTRIC: CPT

## 2020-01-01 DEVICE — LEAD 5076-58 MRI US RCMCRD
Type: IMPLANTABLE DEVICE | Status: FUNCTIONAL
Brand: CAPSUREFIX NOVUS MRI™ SURESCAN®

## 2020-01-01 DEVICE — LEAD 5076-52 MRI US RCMCRD
Type: IMPLANTABLE DEVICE | Status: FUNCTIONAL
Brand: CAPSUREFIX NOVUS MRI™ SURESCAN®

## 2020-01-01 DEVICE — ENVELOPE CMRM6133 ABSORB LRG MR
Type: IMPLANTABLE DEVICE | Status: FUNCTIONAL
Brand: TYRX™

## 2020-01-01 DEVICE — VLV EVOLUTR-34-US TAV 34 US COMM MX
Type: IMPLANTABLE DEVICE | Site: AORTIC VALVE | Status: FUNCTIONAL
Brand: COREVALVE™ EVOLUT™ R

## 2020-01-01 DEVICE — IPG W1DR01 AZURE XT DR MRI WL USA
Type: IMPLANTABLE DEVICE | Status: FUNCTIONAL
Brand: AZURE™ XT DR MRI SURESCAN™

## 2020-01-01 DEVICE — FLOW DIRECTED PACING CATHETER
Type: IMPLANTABLE DEVICE | Status: FUNCTIONAL
Brand: PACEL™

## 2020-01-01 DEVICE — 6 FOOT DISPOSABLE EXTENSION CABLE WITH SAFE CONNECT / SCREW-DOWN: Type: IMPLANTABLE DEVICE | Status: FUNCTIONAL

## 2020-01-01 RX ORDER — AMLODIPINE BESYLATE 5 MG/1
5 TABLET ORAL DAILY
Status: DISCONTINUED | OUTPATIENT
Start: 2020-01-01 | End: 2020-01-01

## 2020-01-01 RX ORDER — TIZANIDINE 2 MG/1
2 TABLET ORAL
Status: DISCONTINUED | OUTPATIENT
Start: 2020-01-01 | End: 2020-01-01 | Stop reason: HOSPADM

## 2020-01-01 RX ORDER — ACETAMINOPHEN 325 MG/1
650 TABLET ORAL
Status: DISCONTINUED | OUTPATIENT
Start: 2020-01-01 | End: 2021-01-01 | Stop reason: HOSPADM

## 2020-01-01 RX ORDER — ENOXAPARIN SODIUM 100 MG/ML
30 INJECTION SUBCUTANEOUS EVERY 12 HOURS
Status: CANCELLED | OUTPATIENT
Start: 2020-01-01

## 2020-01-01 RX ORDER — LOSARTAN POTASSIUM 25 MG/1
12.5 TABLET ORAL DAILY
Status: DISCONTINUED | OUTPATIENT
Start: 2020-01-01 | End: 2020-01-01

## 2020-01-01 RX ORDER — MAGNESIUM SULFATE 1 G/100ML
1 INJECTION INTRAVENOUS ONCE
Status: COMPLETED | OUTPATIENT
Start: 2020-01-01 | End: 2020-01-01

## 2020-01-01 RX ORDER — SODIUM CHLORIDE 0.9 % (FLUSH) 0.9 %
5-40 SYRINGE (ML) INJECTION AS NEEDED
Status: DISCONTINUED | OUTPATIENT
Start: 2020-01-01 | End: 2020-01-01 | Stop reason: HOSPADM

## 2020-01-01 RX ORDER — CEFAZOLIN SODIUM/WATER 2 G/20 ML
2 SYRINGE (ML) INTRAVENOUS ONCE
Status: COMPLETED | OUTPATIENT
Start: 2020-01-01 | End: 2020-01-01

## 2020-01-01 RX ORDER — LIDOCAINE HYDROCHLORIDE 10 MG/ML
INJECTION INFILTRATION; PERINEURAL AS NEEDED
Status: DISCONTINUED | OUTPATIENT
Start: 2020-01-01 | End: 2020-01-01 | Stop reason: HOSPADM

## 2020-01-01 RX ORDER — SODIUM CHLORIDE 0.9 % (FLUSH) 0.9 %
5-40 SYRINGE (ML) INJECTION EVERY 8 HOURS
Status: DISCONTINUED | OUTPATIENT
Start: 2020-01-01 | End: 2020-01-01

## 2020-01-01 RX ORDER — FINASTERIDE 5 MG/1
TABLET, FILM COATED ORAL
Qty: 90 TAB | Refills: 3 | Status: SHIPPED | OUTPATIENT
Start: 2020-01-01

## 2020-01-01 RX ORDER — TEMAZEPAM 15 MG/1
CAPSULE ORAL
Qty: 30 CAP | Refills: 0 | Status: SHIPPED | OUTPATIENT
Start: 2020-01-01

## 2020-01-01 RX ORDER — MIDAZOLAM HYDROCHLORIDE 1 MG/ML
INJECTION, SOLUTION INTRAMUSCULAR; INTRAVENOUS AS NEEDED
Status: DISCONTINUED | OUTPATIENT
Start: 2020-01-01 | End: 2020-01-01 | Stop reason: HOSPADM

## 2020-01-01 RX ORDER — PROMETHAZINE HYDROCHLORIDE 25 MG/1
12.5 TABLET ORAL
Status: DISCONTINUED | OUTPATIENT
Start: 2020-01-01 | End: 2020-01-01 | Stop reason: HOSPADM

## 2020-01-01 RX ORDER — FACIAL-BODY WIPES
10 EACH TOPICAL DAILY PRN
Status: DISCONTINUED | OUTPATIENT
Start: 2020-01-01 | End: 2020-01-01 | Stop reason: HOSPADM

## 2020-01-01 RX ORDER — ZINC SULFATE 50(220)MG
1 CAPSULE ORAL DAILY
Status: COMPLETED | OUTPATIENT
Start: 2020-01-01 | End: 2021-01-01

## 2020-01-01 RX ORDER — TIZANIDINE 2 MG/1
2 TABLET ORAL
Status: DISCONTINUED | OUTPATIENT
Start: 2020-01-01 | End: 2020-01-01

## 2020-01-01 RX ORDER — INSULIN LISPRO 100 [IU]/ML
INJECTION, SOLUTION INTRAVENOUS; SUBCUTANEOUS
Status: DISCONTINUED | OUTPATIENT
Start: 2020-01-01 | End: 2020-01-01 | Stop reason: HOSPADM

## 2020-01-01 RX ORDER — INSULIN GLARGINE 100 [IU]/ML
20 INJECTION, SOLUTION SUBCUTANEOUS
Status: DISCONTINUED | OUTPATIENT
Start: 2020-01-01 | End: 2020-01-01

## 2020-01-01 RX ORDER — ALBUMIN HUMAN 250 G/1000ML
25 SOLUTION INTRAVENOUS ONCE
Status: COMPLETED | OUTPATIENT
Start: 2020-01-01 | End: 2020-01-01

## 2020-01-01 RX ORDER — SODIUM CHLORIDE 0.9 % (FLUSH) 0.9 %
5-40 SYRINGE (ML) INJECTION EVERY 8 HOURS
Status: CANCELLED | OUTPATIENT
Start: 2020-01-01

## 2020-01-01 RX ORDER — CEFAZOLIN SODIUM/WATER 2 G/20 ML
2 SYRINGE (ML) INTRAVENOUS EVERY 8 HOURS
Status: COMPLETED | OUTPATIENT
Start: 2020-01-01 | End: 2020-01-01

## 2020-01-01 RX ORDER — HEPARIN SODIUM 200 [USP'U]/100ML
INJECTION, SOLUTION INTRAVENOUS
Status: COMPLETED | OUTPATIENT
Start: 2020-01-01 | End: 2020-01-01

## 2020-01-01 RX ORDER — ENOXAPARIN SODIUM 100 MG/ML
30 INJECTION SUBCUTANEOUS EVERY 12 HOURS
Status: DISCONTINUED | OUTPATIENT
Start: 2020-01-01 | End: 2020-01-01

## 2020-01-01 RX ORDER — ATORVASTATIN CALCIUM 40 MG/1
40 TABLET, FILM COATED ORAL
Qty: 30 TAB | Refills: 1 | Status: ON HOLD | OUTPATIENT
Start: 2020-01-01 | End: 2021-01-01

## 2020-01-01 RX ORDER — GLIPIZIDE 5 MG/1
TABLET, FILM COATED, EXTENDED RELEASE ORAL
Qty: 90 TAB | Refills: 1 | Status: ON HOLD | OUTPATIENT
Start: 2020-01-01 | End: 2020-01-01

## 2020-01-01 RX ORDER — GLIPIZIDE 5 MG/1
TABLET, FILM COATED, EXTENDED RELEASE ORAL
Qty: 90 TAB | Refills: 3 | Status: SHIPPED | OUTPATIENT
Start: 2020-01-01 | End: 2021-01-01

## 2020-01-01 RX ORDER — ASPIRIN 325 MG
325 TABLET ORAL DAILY
Status: DISCONTINUED | OUTPATIENT
Start: 2020-01-01 | End: 2020-01-01

## 2020-01-01 RX ORDER — FENTANYL CITRATE 50 UG/ML
INJECTION, SOLUTION INTRAMUSCULAR; INTRAVENOUS AS NEEDED
Status: DISCONTINUED | OUTPATIENT
Start: 2020-01-01 | End: 2020-01-01 | Stop reason: HOSPADM

## 2020-01-01 RX ORDER — ASCORBIC ACID 500 MG
1000 TABLET ORAL 3 TIMES DAILY
Status: DISCONTINUED | OUTPATIENT
Start: 2020-01-01 | End: 2020-01-01 | Stop reason: HOSPADM

## 2020-01-01 RX ORDER — INSULIN GLARGINE 100 [IU]/ML
15 INJECTION, SOLUTION SUBCUTANEOUS
Status: DISCONTINUED | OUTPATIENT
Start: 2020-01-01 | End: 2020-01-01

## 2020-01-01 RX ORDER — HYDROMORPHONE HYDROCHLORIDE 1 MG/ML
0.5 INJECTION, SOLUTION INTRAMUSCULAR; INTRAVENOUS; SUBCUTANEOUS
Status: CANCELLED | OUTPATIENT
Start: 2020-01-01

## 2020-01-01 RX ORDER — POTASSIUM CHLORIDE 750 MG/1
40 TABLET, FILM COATED, EXTENDED RELEASE ORAL
Status: COMPLETED | OUTPATIENT
Start: 2020-01-01 | End: 2020-01-01

## 2020-01-01 RX ORDER — DEXMEDETOMIDINE HYDROCHLORIDE 100 UG/ML
INJECTION, SOLUTION INTRAVENOUS AS NEEDED
Status: DISCONTINUED | OUTPATIENT
Start: 2020-01-01 | End: 2020-01-01 | Stop reason: HOSPADM

## 2020-01-01 RX ORDER — MIDAZOLAM HYDROCHLORIDE 1 MG/ML
1 INJECTION, SOLUTION INTRAMUSCULAR; INTRAVENOUS AS NEEDED
Status: DISCONTINUED | OUTPATIENT
Start: 2020-01-01 | End: 2020-01-01 | Stop reason: HOSPADM

## 2020-01-01 RX ORDER — LANOLIN ALCOHOL/MO/W.PET/CERES
400 CREAM (GRAM) TOPICAL 2 TIMES DAILY
Status: DISCONTINUED | OUTPATIENT
Start: 2020-01-01 | End: 2020-01-01 | Stop reason: HOSPADM

## 2020-01-01 RX ORDER — FUROSEMIDE 20 MG/1
20 TABLET ORAL DAILY
COMMUNITY
End: 2021-01-01

## 2020-01-01 RX ORDER — LABETALOL 100 MG/1
100 TABLET, FILM COATED ORAL DAILY
Status: DISCONTINUED | OUTPATIENT
Start: 2020-01-01 | End: 2020-01-01

## 2020-01-01 RX ORDER — ALBUMIN HUMAN 50 G/1000ML
SOLUTION INTRAVENOUS AS NEEDED
Status: DISCONTINUED | OUTPATIENT
Start: 2020-01-01 | End: 2020-01-01 | Stop reason: HOSPADM

## 2020-01-01 RX ORDER — POTASSIUM CHLORIDE 750 MG/1
40 TABLET, FILM COATED, EXTENDED RELEASE ORAL 2 TIMES DAILY
Status: DISCONTINUED | OUTPATIENT
Start: 2020-01-01 | End: 2020-01-01 | Stop reason: HOSPADM

## 2020-01-01 RX ORDER — AMLODIPINE BESYLATE 10 MG/1
TABLET ORAL
Qty: 90 TAB | Refills: 3 | OUTPATIENT
Start: 2020-01-01 | End: 2020-01-01

## 2020-01-01 RX ORDER — SERTRALINE HYDROCHLORIDE 50 MG/1
100 TABLET, FILM COATED ORAL EVERY EVENING
Status: DISCONTINUED | OUTPATIENT
Start: 2020-01-01 | End: 2020-01-01 | Stop reason: HOSPADM

## 2020-01-01 RX ORDER — TEMAZEPAM 15 MG/1
15 CAPSULE ORAL
Status: DISCONTINUED | OUTPATIENT
Start: 2020-01-01 | End: 2020-01-01

## 2020-01-01 RX ORDER — LIDOCAINE HYDROCHLORIDE 10 MG/ML
0.1 INJECTION, SOLUTION EPIDURAL; INFILTRATION; INTRACAUDAL; PERINEURAL AS NEEDED
Status: DISCONTINUED | OUTPATIENT
Start: 2020-01-01 | End: 2020-01-01 | Stop reason: HOSPADM

## 2020-01-01 RX ORDER — METFORMIN HYDROCHLORIDE 500 MG/1
TABLET, EXTENDED RELEASE ORAL
Qty: 180 TAB | Refills: 1 | Status: ON HOLD | OUTPATIENT
Start: 2020-01-01 | End: 2020-01-01

## 2020-01-01 RX ORDER — LABETALOL 100 MG/1
TABLET, FILM COATED ORAL
Qty: 90 TAB | Refills: 1 | Status: SHIPPED | OUTPATIENT
Start: 2020-01-01 | End: 2020-01-01

## 2020-01-01 RX ORDER — POTASSIUM CHLORIDE 29.8 MG/ML
20 INJECTION INTRAVENOUS
Status: COMPLETED | OUTPATIENT
Start: 2020-01-01 | End: 2020-01-01

## 2020-01-01 RX ORDER — AMLODIPINE BESYLATE 5 MG/1
10 TABLET ORAL DAILY
Status: DISCONTINUED | OUTPATIENT
Start: 2020-01-01 | End: 2021-01-01 | Stop reason: HOSPADM

## 2020-01-01 RX ORDER — ACETAMINOPHEN 650 MG/1
650 SUPPOSITORY RECTAL
Status: DISCONTINUED | OUTPATIENT
Start: 2020-01-01 | End: 2020-01-01 | Stop reason: HOSPADM

## 2020-01-01 RX ORDER — SODIUM CHLORIDE 0.9 % (FLUSH) 0.9 %
5-40 SYRINGE (ML) INJECTION EVERY 8 HOURS
Status: DISCONTINUED | OUTPATIENT
Start: 2020-01-01 | End: 2020-01-01 | Stop reason: HOSPADM

## 2020-01-01 RX ORDER — ACETAMINOPHEN 325 MG/1
650 TABLET ORAL
Status: DISCONTINUED | OUTPATIENT
Start: 2020-01-01 | End: 2020-01-01 | Stop reason: HOSPADM

## 2020-01-01 RX ORDER — SERTRALINE HYDROCHLORIDE 50 MG/1
100 TABLET, FILM COATED ORAL EVERY EVENING
Status: DISCONTINUED | OUTPATIENT
Start: 2020-01-01 | End: 2021-01-01 | Stop reason: HOSPADM

## 2020-01-01 RX ORDER — LOSARTAN POTASSIUM 50 MG/1
50 TABLET ORAL DAILY
Qty: 30 TAB | Refills: 1 | Status: SHIPPED | OUTPATIENT
Start: 2020-01-01 | End: 2020-01-01

## 2020-01-01 RX ORDER — HYDROCHLOROTHIAZIDE 12.5 MG/1
TABLET ORAL
Qty: 90 TAB | Refills: 3 | Status: SHIPPED | OUTPATIENT
Start: 2020-01-01 | End: 2020-01-01

## 2020-01-01 RX ORDER — SERTRALINE HYDROCHLORIDE 100 MG/1
TABLET, FILM COATED ORAL
Qty: 90 TAB | Refills: 1 | Status: SHIPPED | OUTPATIENT
Start: 2020-01-01

## 2020-01-01 RX ORDER — ACETAMINOPHEN 325 MG/1
650 TABLET ORAL
Status: CANCELLED | OUTPATIENT
Start: 2020-01-01

## 2020-01-01 RX ORDER — SODIUM CHLORIDE 0.9 % (FLUSH) 0.9 %
5-40 SYRINGE (ML) INJECTION AS NEEDED
Status: DISCONTINUED | OUTPATIENT
Start: 2020-01-01 | End: 2020-01-01

## 2020-01-01 RX ORDER — MAGNESIUM SULFATE 100 %
4 CRYSTALS MISCELLANEOUS AS NEEDED
Status: DISCONTINUED | OUTPATIENT
Start: 2020-01-01 | End: 2020-01-01

## 2020-01-01 RX ORDER — OXYMETAZOLINE HCL 0.05 %
2 SPRAY, NON-AEROSOL (ML) NASAL
Status: COMPLETED | OUTPATIENT
Start: 2020-01-01 | End: 2020-01-01

## 2020-01-01 RX ORDER — SODIUM CHLORIDE 9 MG/ML
50 INJECTION, SOLUTION INTRAVENOUS CONTINUOUS
Status: DISCONTINUED | OUTPATIENT
Start: 2020-01-01 | End: 2020-01-01

## 2020-01-01 RX ORDER — ACETAMINOPHEN 325 MG/1
650 TABLET ORAL
Status: SHIPPED | COMMUNITY
Start: 2020-01-01

## 2020-01-01 RX ORDER — ASCORBIC ACID 500 MG
500 TABLET ORAL 2 TIMES DAILY
Status: DISCONTINUED | OUTPATIENT
Start: 2020-01-01 | End: 2021-01-01 | Stop reason: HOSPADM

## 2020-01-01 RX ORDER — ACETAMINOPHEN 325 MG/1
650 TABLET ORAL
Status: DISCONTINUED | OUTPATIENT
Start: 2020-01-01 | End: 2020-01-01 | Stop reason: SDUPTHER

## 2020-01-01 RX ORDER — ATORVASTATIN CALCIUM 40 MG/1
40 TABLET, FILM COATED ORAL
Status: DISCONTINUED | OUTPATIENT
Start: 2020-01-01 | End: 2021-01-01 | Stop reason: HOSPADM

## 2020-01-01 RX ORDER — METOPROLOL TARTRATE 50 MG/1
50 TABLET ORAL 2 TIMES DAILY
Status: DISCONTINUED | OUTPATIENT
Start: 2020-01-01 | End: 2020-01-01 | Stop reason: HOSPADM

## 2020-01-01 RX ORDER — TRAZODONE HYDROCHLORIDE 50 MG/1
TABLET ORAL
Qty: 180 TAB | Refills: 0 | Status: SHIPPED | OUTPATIENT
Start: 2020-01-01 | End: 2020-01-01

## 2020-01-01 RX ORDER — TRAMADOL HYDROCHLORIDE 50 MG/1
50-100 TABLET ORAL
Status: DISCONTINUED | OUTPATIENT
Start: 2020-01-01 | End: 2020-01-01 | Stop reason: HOSPADM

## 2020-01-01 RX ORDER — POTASSIUM CHLORIDE 750 MG/1
20 TABLET, FILM COATED, EXTENDED RELEASE ORAL 2 TIMES DAILY
Status: DISCONTINUED | OUTPATIENT
Start: 2020-01-01 | End: 2020-01-01

## 2020-01-01 RX ORDER — FUROSEMIDE 40 MG/1
40 TABLET ORAL DAILY
Status: DISCONTINUED | OUTPATIENT
Start: 2020-01-01 | End: 2020-01-01

## 2020-01-01 RX ORDER — ATORVASTATIN CALCIUM 20 MG/1
40 TABLET, FILM COATED ORAL
Status: DISCONTINUED | OUTPATIENT
Start: 2020-01-01 | End: 2020-01-01 | Stop reason: HOSPADM

## 2020-01-01 RX ORDER — AMOXICILLIN 250 MG
1 CAPSULE ORAL 2 TIMES DAILY
Status: DISCONTINUED | OUTPATIENT
Start: 2020-01-01 | End: 2020-01-01 | Stop reason: HOSPADM

## 2020-01-01 RX ORDER — HEPARIN SODIUM 5000 [USP'U]/ML
5000 INJECTION, SOLUTION INTRAVENOUS; SUBCUTANEOUS EVERY 8 HOURS
Status: DISCONTINUED | OUTPATIENT
Start: 2020-01-01 | End: 2020-01-01

## 2020-01-01 RX ORDER — HEPARIN SODIUM 10000 [USP'U]/100ML
12-25 INJECTION, SOLUTION INTRAVENOUS
Status: DISCONTINUED | OUTPATIENT
Start: 2020-01-01 | End: 2020-01-01

## 2020-01-01 RX ORDER — FENTANYL CITRATE 50 UG/ML
25 INJECTION, SOLUTION INTRAMUSCULAR; INTRAVENOUS
Status: CANCELLED | OUTPATIENT
Start: 2020-01-01

## 2020-01-01 RX ORDER — DOXAZOSIN 4 MG/1
TABLET ORAL
Qty: 90 TAB | Refills: 3 | Status: SHIPPED | OUTPATIENT
Start: 2020-01-01 | End: 2021-01-01

## 2020-01-01 RX ORDER — LOSARTAN POTASSIUM 100 MG/1
100 TABLET ORAL DAILY
Qty: 30 TAB | Refills: 0 | Status: ON HOLD | OUTPATIENT
Start: 2020-01-01 | End: 2020-01-01

## 2020-01-01 RX ORDER — INSULIN GLARGINE 100 [IU]/ML
10 INJECTION, SOLUTION SUBCUTANEOUS DAILY
Status: DISCONTINUED | OUTPATIENT
Start: 2020-01-01 | End: 2021-01-01 | Stop reason: ALTCHOICE

## 2020-01-01 RX ORDER — ATORVASTATIN CALCIUM 10 MG/1
10 TABLET, FILM COATED ORAL
Status: DISCONTINUED | OUTPATIENT
Start: 2020-01-01 | End: 2020-01-01

## 2020-01-01 RX ORDER — GUAIFENESIN 100 MG/5ML
81 LIQUID (ML) ORAL DAILY
COMMUNITY

## 2020-01-01 RX ORDER — LOSARTAN POTASSIUM 50 MG/1
50 TABLET ORAL DAILY
Status: DISCONTINUED | OUTPATIENT
Start: 2020-01-01 | End: 2020-01-01

## 2020-01-01 RX ORDER — MAGNESIUM SULFATE 100 %
4 CRYSTALS MISCELLANEOUS AS NEEDED
Status: DISCONTINUED | OUTPATIENT
Start: 2020-01-01 | End: 2021-01-01 | Stop reason: HOSPADM

## 2020-01-01 RX ORDER — DEXTROSE MONOHYDRATE 100 MG/ML
0-250 INJECTION, SOLUTION INTRAVENOUS AS NEEDED
Status: DISCONTINUED | OUTPATIENT
Start: 2020-01-01 | End: 2020-01-01 | Stop reason: HOSPADM

## 2020-01-01 RX ORDER — POLYETHYLENE GLYCOL 3350 17 G/17G
17 POWDER, FOR SOLUTION ORAL DAILY PRN
Status: DISCONTINUED | OUTPATIENT
Start: 2020-01-01 | End: 2021-01-01 | Stop reason: HOSPADM

## 2020-01-01 RX ORDER — ACETAMINOPHEN 650 MG/1
650 SUPPOSITORY RECTAL
Status: CANCELLED | OUTPATIENT
Start: 2020-01-01

## 2020-01-01 RX ORDER — ROPIVACAINE HYDROCHLORIDE 5 MG/ML
30 INJECTION, SOLUTION EPIDURAL; INFILTRATION; PERINEURAL AS NEEDED
Status: DISCONTINUED | OUTPATIENT
Start: 2020-01-01 | End: 2020-01-01 | Stop reason: HOSPADM

## 2020-01-01 RX ORDER — MAGNESIUM SULFATE 100 %
4 CRYSTALS MISCELLANEOUS AS NEEDED
Status: DISCONTINUED | OUTPATIENT
Start: 2020-01-01 | End: 2020-01-01 | Stop reason: HOSPADM

## 2020-01-01 RX ORDER — ATORVASTATIN CALCIUM 10 MG/1
TABLET, FILM COATED ORAL
Qty: 90 TAB | Refills: 3 | OUTPATIENT
Start: 2020-01-01 | End: 2020-01-01

## 2020-01-01 RX ORDER — FLUTICASONE PROPIONATE 50 MCG
1 SPRAY, SUSPENSION (ML) NASAL DAILY
Status: DISCONTINUED | OUTPATIENT
Start: 2020-01-01 | End: 2021-01-01 | Stop reason: HOSPADM

## 2020-01-01 RX ORDER — AMLODIPINE BESYLATE 10 MG/1
10 TABLET ORAL DAILY
COMMUNITY
End: 2021-01-01

## 2020-01-01 RX ORDER — FLUTICASONE PROPIONATE 50 MCG
SPRAY, SUSPENSION (ML) NASAL
Qty: 16 G | Refills: 0 | Status: SHIPPED | OUTPATIENT
Start: 2020-01-01

## 2020-01-01 RX ORDER — ARFORMOTEROL TARTRATE 15 UG/2ML
15 SOLUTION RESPIRATORY (INHALATION)
Status: DISCONTINUED | OUTPATIENT
Start: 2020-01-01 | End: 2020-01-01 | Stop reason: HOSPADM

## 2020-01-01 RX ORDER — NALOXONE HYDROCHLORIDE 0.4 MG/ML
0.4 INJECTION, SOLUTION INTRAMUSCULAR; INTRAVENOUS; SUBCUTANEOUS AS NEEDED
Status: DISCONTINUED | OUTPATIENT
Start: 2020-01-01 | End: 2020-01-01 | Stop reason: HOSPADM

## 2020-01-01 RX ORDER — LOSARTAN POTASSIUM 50 MG/1
100 TABLET ORAL DAILY
Status: DISCONTINUED | OUTPATIENT
Start: 2020-01-01 | End: 2021-01-01 | Stop reason: HOSPADM

## 2020-01-01 RX ORDER — AMLODIPINE BESYLATE 5 MG/1
10 TABLET ORAL DAILY
Status: DISCONTINUED | OUTPATIENT
Start: 2020-01-01 | End: 2020-01-01 | Stop reason: HOSPADM

## 2020-01-01 RX ORDER — SODIUM CHLORIDE 0.9 % (FLUSH) 0.9 %
5-40 SYRINGE (ML) INJECTION AS NEEDED
Status: CANCELLED | OUTPATIENT
Start: 2020-01-01

## 2020-01-01 RX ORDER — BALSAM PERU/CASTOR OIL
OINTMENT (GRAM) TOPICAL DAILY
Status: DISCONTINUED | OUTPATIENT
Start: 2020-01-01 | End: 2020-01-01 | Stop reason: HOSPADM

## 2020-01-01 RX ORDER — AMLODIPINE BESYLATE 5 MG/1
5 TABLET ORAL
Status: COMPLETED | OUTPATIENT
Start: 2020-01-01 | End: 2020-01-01

## 2020-01-01 RX ORDER — SODIUM CHLORIDE 0.9 % (FLUSH) 0.9 %
10 SYRINGE (ML) INJECTION
Status: COMPLETED | OUTPATIENT
Start: 2020-01-01 | End: 2020-01-01

## 2020-01-01 RX ORDER — SODIUM CHLORIDE, SODIUM LACTATE, POTASSIUM CHLORIDE, CALCIUM CHLORIDE 600; 310; 30; 20 MG/100ML; MG/100ML; MG/100ML; MG/100ML
100 INJECTION, SOLUTION INTRAVENOUS CONTINUOUS
Status: DISCONTINUED | OUTPATIENT
Start: 2020-01-01 | End: 2020-01-01 | Stop reason: HOSPADM

## 2020-01-01 RX ORDER — OXYCODONE AND ACETAMINOPHEN 5; 325 MG/1; MG/1
1-2 TABLET ORAL
Status: DISCONTINUED | OUTPATIENT
Start: 2020-01-01 | End: 2020-01-01 | Stop reason: HOSPADM

## 2020-01-01 RX ORDER — ONDANSETRON 2 MG/ML
4 INJECTION INTRAMUSCULAR; INTRAVENOUS
Status: DISCONTINUED | OUTPATIENT
Start: 2020-01-01 | End: 2020-01-01 | Stop reason: HOSPADM

## 2020-01-01 RX ORDER — MICONAZOLE NITRATE 2 %
POWDER (GRAM) TOPICAL 2 TIMES DAILY
Status: DISCONTINUED | OUTPATIENT
Start: 2020-01-01 | End: 2020-01-01 | Stop reason: HOSPADM

## 2020-01-01 RX ORDER — DOXAZOSIN 4 MG/1
TABLET ORAL
Qty: 90 TAB | Refills: 1 | Status: ON HOLD | OUTPATIENT
Start: 2020-01-01 | End: 2020-01-01

## 2020-01-01 RX ORDER — AMOXICILLIN 250 MG
1 CAPSULE ORAL
Status: SHIPPED | COMMUNITY
Start: 2020-01-01 | End: 2021-01-01

## 2020-01-01 RX ORDER — METOPROLOL TARTRATE 5 MG/5ML
INJECTION INTRAVENOUS AS NEEDED
Status: DISCONTINUED | OUTPATIENT
Start: 2020-01-01 | End: 2020-01-01 | Stop reason: HOSPADM

## 2020-01-01 RX ORDER — DEXTROSE 50 % IN WATER (D50W) INTRAVENOUS SYRINGE
12.5-25 AS NEEDED
Status: DISCONTINUED | OUTPATIENT
Start: 2020-01-01 | End: 2021-01-01 | Stop reason: HOSPADM

## 2020-01-01 RX ORDER — ACETAMINOPHEN 650 MG/1
650 SUPPOSITORY RECTAL
Status: DISCONTINUED | OUTPATIENT
Start: 2020-01-01 | End: 2021-01-01 | Stop reason: HOSPADM

## 2020-01-01 RX ORDER — BUDESONIDE 0.5 MG/2ML
500 INHALANT ORAL
Status: DISCONTINUED | OUTPATIENT
Start: 2020-01-01 | End: 2020-01-01 | Stop reason: HOSPADM

## 2020-01-01 RX ORDER — LANOLIN ALCOHOL/MO/W.PET/CERES
3 CREAM (GRAM) TOPICAL
Status: DISCONTINUED | OUTPATIENT
Start: 2020-01-01 | End: 2020-01-01

## 2020-01-01 RX ORDER — CEPHALEXIN 250 MG/1
250 CAPSULE ORAL 3 TIMES DAILY
Qty: 3 CAP | Refills: 0 | Status: SHIPPED | OUTPATIENT
Start: 2020-01-01 | End: 2020-01-01

## 2020-01-01 RX ORDER — ONDANSETRON 2 MG/ML
4 INJECTION INTRAMUSCULAR; INTRAVENOUS AS NEEDED
Status: CANCELLED | OUTPATIENT
Start: 2020-01-01

## 2020-01-01 RX ORDER — ENOXAPARIN SODIUM 100 MG/ML
40 INJECTION SUBCUTANEOUS EVERY 12 HOURS
Status: DISCONTINUED | OUTPATIENT
Start: 2020-01-01 | End: 2021-01-01

## 2020-01-01 RX ORDER — TRAZODONE HYDROCHLORIDE 50 MG/1
50 TABLET ORAL
Status: DISCONTINUED | OUTPATIENT
Start: 2020-01-01 | End: 2020-01-01

## 2020-01-01 RX ORDER — MORPHINE SULFATE 10 MG/ML
2 INJECTION, SOLUTION INTRAMUSCULAR; INTRAVENOUS
Status: CANCELLED | OUTPATIENT
Start: 2020-01-01

## 2020-01-01 RX ORDER — ATORVASTATIN CALCIUM 10 MG/1
TABLET, FILM COATED ORAL
Qty: 90 TAB | Refills: 1 | Status: ON HOLD | OUTPATIENT
Start: 2020-01-01 | End: 2020-01-01

## 2020-01-01 RX ORDER — POTASSIUM CHLORIDE 750 MG/1
40 TABLET, FILM COATED, EXTENDED RELEASE ORAL DAILY
Status: COMPLETED | OUTPATIENT
Start: 2020-01-01 | End: 2021-01-01

## 2020-01-01 RX ORDER — FUROSEMIDE 10 MG/ML
20 INJECTION INTRAMUSCULAR; INTRAVENOUS ONCE
Status: COMPLETED | OUTPATIENT
Start: 2020-01-01 | End: 2020-01-01

## 2020-01-01 RX ORDER — SODIUM CHLORIDE 9 MG/ML
INJECTION, SOLUTION INTRAVENOUS
Status: COMPLETED | OUTPATIENT
Start: 2020-01-01 | End: 2020-01-01

## 2020-01-01 RX ORDER — LOSARTAN POTASSIUM 50 MG/1
75 TABLET ORAL DAILY
Status: DISCONTINUED | OUTPATIENT
Start: 2020-01-01 | End: 2020-01-01

## 2020-01-01 RX ORDER — FINASTERIDE 5 MG/1
TABLET, FILM COATED ORAL
Qty: 90 TAB | Refills: 1 | Status: ON HOLD | OUTPATIENT
Start: 2020-01-01 | End: 2020-01-01

## 2020-01-01 RX ORDER — ACETAMINOPHEN 325 MG/1
650 TABLET ORAL
Status: DISCONTINUED | OUTPATIENT
Start: 2020-01-01 | End: 2020-01-01

## 2020-01-01 RX ORDER — HEPARIN SODIUM 1000 [USP'U]/ML
INJECTION, SOLUTION INTRAVENOUS; SUBCUTANEOUS AS NEEDED
Status: DISCONTINUED | OUTPATIENT
Start: 2020-01-01 | End: 2020-01-01 | Stop reason: HOSPADM

## 2020-01-01 RX ORDER — INSULIN GLARGINE 100 [IU]/ML
10 INJECTION, SOLUTION SUBCUTANEOUS
Status: DISCONTINUED | OUTPATIENT
Start: 2020-01-01 | End: 2020-01-01

## 2020-01-01 RX ORDER — AMLODIPINE BESYLATE 5 MG/1
5 TABLET ORAL DAILY
Qty: 30 TAB | Refills: 0 | Status: ON HOLD | OUTPATIENT
Start: 2020-01-01 | End: 2020-01-01

## 2020-01-01 RX ORDER — FUROSEMIDE 10 MG/ML
40 INJECTION INTRAMUSCULAR; INTRAVENOUS 2 TIMES DAILY
Status: DISCONTINUED | OUTPATIENT
Start: 2020-01-01 | End: 2020-01-01

## 2020-01-01 RX ORDER — SODIUM CHLORIDE 9 MG/ML
9 INJECTION, SOLUTION INTRAVENOUS CONTINUOUS
Status: DISCONTINUED | OUTPATIENT
Start: 2020-01-01 | End: 2020-01-01

## 2020-01-01 RX ORDER — MORPHINE SULFATE 2 MG/ML
2 INJECTION, SOLUTION INTRAMUSCULAR; INTRAVENOUS
Status: DISCONTINUED | OUTPATIENT
Start: 2020-01-01 | End: 2020-01-01

## 2020-01-01 RX ORDER — SERTRALINE HYDROCHLORIDE 100 MG/1
TABLET, FILM COATED ORAL
Qty: 30 TAB | Refills: 5 | Status: SHIPPED | OUTPATIENT
Start: 2020-01-01 | End: 2020-01-01 | Stop reason: SDUPTHER

## 2020-01-01 RX ORDER — FUROSEMIDE 10 MG/ML
40 INJECTION INTRAMUSCULAR; INTRAVENOUS EVERY 12 HOURS
Status: DISCONTINUED | OUTPATIENT
Start: 2020-01-01 | End: 2021-01-01

## 2020-01-01 RX ORDER — LOSARTAN POTASSIUM 50 MG/1
100 TABLET ORAL DAILY
Status: DISCONTINUED | OUTPATIENT
Start: 2020-01-01 | End: 2020-01-01 | Stop reason: HOSPADM

## 2020-01-01 RX ORDER — ACETAMINOPHEN 650 MG/1
650 SUPPOSITORY RECTAL
Status: DISCONTINUED | OUTPATIENT
Start: 2020-01-01 | End: 2020-01-01 | Stop reason: SDUPTHER

## 2020-01-01 RX ORDER — VANCOMYCIN 2 GRAM/500 ML IN 0.9 % SODIUM CHLORIDE INTRAVENOUS
2000 ONCE
Status: COMPLETED | OUTPATIENT
Start: 2020-01-01 | End: 2020-01-01

## 2020-01-01 RX ORDER — FINASTERIDE 5 MG/1
5 TABLET, FILM COATED ORAL DAILY
Status: DISCONTINUED | OUTPATIENT
Start: 2020-01-01 | End: 2021-01-01 | Stop reason: HOSPADM

## 2020-01-01 RX ORDER — SODIUM CHLORIDE 9 MG/ML
75 INJECTION, SOLUTION INTRAVENOUS CONTINUOUS
Status: DISPENSED | OUTPATIENT
Start: 2020-01-01 | End: 2020-01-01

## 2020-01-01 RX ORDER — MELATONIN
2000 DAILY
Status: DISCONTINUED | OUTPATIENT
Start: 2020-01-01 | End: 2021-01-01 | Stop reason: HOSPADM

## 2020-01-01 RX ORDER — TEMAZEPAM 15 MG/1
15 CAPSULE ORAL
Status: DISCONTINUED | OUTPATIENT
Start: 2020-01-01 | End: 2021-01-01 | Stop reason: HOSPADM

## 2020-01-01 RX ORDER — PROPOFOL 10 MG/ML
INJECTION, EMULSION INTRAVENOUS
Status: DISCONTINUED | OUTPATIENT
Start: 2020-01-01 | End: 2020-01-01 | Stop reason: HOSPADM

## 2020-01-01 RX ORDER — INSULIN LISPRO 100 [IU]/ML
INJECTION, SOLUTION INTRAVENOUS; SUBCUTANEOUS
Status: DISCONTINUED | OUTPATIENT
Start: 2020-01-01 | End: 2021-01-01 | Stop reason: HOSPADM

## 2020-01-01 RX ORDER — LOSARTAN POTASSIUM 25 MG/1
25 TABLET ORAL DAILY
Status: DISCONTINUED | OUTPATIENT
Start: 2020-01-01 | End: 2020-01-01

## 2020-01-01 RX ORDER — MELATONIN
1000 DAILY
Status: DISCONTINUED | OUTPATIENT
Start: 2020-01-01 | End: 2020-01-01 | Stop reason: HOSPADM

## 2020-01-01 RX ORDER — SERTRALINE HYDROCHLORIDE 50 MG/1
100 TABLET, FILM COATED ORAL DAILY
Status: DISCONTINUED | OUTPATIENT
Start: 2020-01-01 | End: 2020-01-01 | Stop reason: HOSPADM

## 2020-01-01 RX ORDER — METOPROLOL TARTRATE 25 MG/1
25 TABLET, FILM COATED ORAL 2 TIMES DAILY
Status: DISCONTINUED | OUTPATIENT
Start: 2020-01-01 | End: 2020-01-01 | Stop reason: HOSPADM

## 2020-01-01 RX ORDER — DIPHENHYDRAMINE HYDROCHLORIDE 50 MG/ML
12.5 INJECTION, SOLUTION INTRAMUSCULAR; INTRAVENOUS AS NEEDED
Status: CANCELLED | OUTPATIENT
Start: 2020-01-01 | End: 2020-01-01

## 2020-01-01 RX ORDER — FUROSEMIDE 40 MG/1
40 TABLET ORAL
Status: DISCONTINUED | OUTPATIENT
Start: 2020-01-01 | End: 2020-01-01 | Stop reason: HOSPADM

## 2020-01-01 RX ORDER — METOPROLOL TARTRATE 50 MG/1
50 TABLET ORAL 2 TIMES DAILY
Qty: 60 TAB | Refills: 1 | Status: ON HOLD | OUTPATIENT
Start: 2020-01-01 | End: 2021-01-01

## 2020-01-01 RX ORDER — HEPARIN SODIUM 5000 [USP'U]/ML
4000 INJECTION, SOLUTION INTRAVENOUS; SUBCUTANEOUS ONCE
Status: ACTIVE | OUTPATIENT
Start: 2020-01-01 | End: 2020-01-01

## 2020-01-01 RX ORDER — CIPROFLOXACIN 250 MG/1
250 TABLET, FILM COATED ORAL 2 TIMES DAILY
Qty: 14 TAB | Refills: 0 | Status: SHIPPED | OUTPATIENT
Start: 2020-01-01 | End: 2020-01-01

## 2020-01-01 RX ORDER — METOPROLOL TARTRATE 50 MG/1
50 TABLET ORAL 2 TIMES DAILY
Status: DISCONTINUED | OUTPATIENT
Start: 2020-01-01 | End: 2020-01-01

## 2020-01-01 RX ORDER — ACETAMINOPHEN 325 MG/1
650 TABLET ORAL ONCE
Status: DISCONTINUED | OUTPATIENT
Start: 2020-01-01 | End: 2020-01-01 | Stop reason: HOSPADM

## 2020-01-01 RX ORDER — FENTANYL CITRATE 50 UG/ML
50 INJECTION, SOLUTION INTRAMUSCULAR; INTRAVENOUS AS NEEDED
Status: DISCONTINUED | OUTPATIENT
Start: 2020-01-01 | End: 2020-01-01 | Stop reason: HOSPADM

## 2020-01-01 RX ORDER — VANCOMYCIN/0.9 % SOD CHLORIDE 1.5G/250ML
1500 PLASTIC BAG, INJECTION (ML) INTRAVENOUS
Status: COMPLETED | OUTPATIENT
Start: 2020-01-01 | End: 2020-01-01

## 2020-01-01 RX ORDER — MENTHOL AND ZINC OXIDE .44; 20.625 G/100G; G/100G
OINTMENT TOPICAL
Qty: 113 G | Refills: 5 | Status: SHIPPED | OUTPATIENT
Start: 2020-01-01 | End: 2021-01-01

## 2020-01-01 RX ORDER — TRAZODONE HYDROCHLORIDE 50 MG/1
TABLET ORAL
Qty: 180 TAB | Refills: 0 | Status: SHIPPED | OUTPATIENT
Start: 2020-01-01 | End: 2020-01-01 | Stop reason: SDUPTHER

## 2020-01-01 RX ORDER — DOXAZOSIN 2 MG/1
1 TABLET ORAL DAILY
Status: DISCONTINUED | OUTPATIENT
Start: 2020-01-01 | End: 2020-01-01 | Stop reason: HOSPADM

## 2020-01-01 RX ORDER — GUAIFENESIN/DEXTROMETHORPHAN 100-10MG/5
5 SYRUP ORAL
Status: DISCONTINUED | OUTPATIENT
Start: 2020-01-01 | End: 2021-01-01 | Stop reason: HOSPADM

## 2020-01-01 RX ORDER — GUAIFENESIN 100 MG/5ML
81 LIQUID (ML) ORAL DAILY
Status: DISCONTINUED | OUTPATIENT
Start: 2020-01-01 | End: 2020-01-01 | Stop reason: HOSPADM

## 2020-01-01 RX ORDER — GUAIFENESIN 100 MG/5ML
81 LIQUID (ML) ORAL DAILY
Qty: 30 TAB | Refills: 11 | Status: ON HOLD | OUTPATIENT
Start: 2020-01-01 | End: 2020-01-01

## 2020-01-01 RX ORDER — SODIUM CHLORIDE 450 MG/100ML
10 INJECTION, SOLUTION INTRAVENOUS CONTINUOUS
Status: DISCONTINUED | OUTPATIENT
Start: 2020-01-01 | End: 2020-01-01

## 2020-01-01 RX ORDER — GUAIFENESIN 600 MG/1
1200 TABLET, EXTENDED RELEASE ORAL EVERY 12 HOURS
Status: DISCONTINUED | OUTPATIENT
Start: 2020-01-01 | End: 2020-01-01 | Stop reason: HOSPADM

## 2020-01-01 RX ORDER — GUAIFENESIN 100 MG/5ML
81 LIQUID (ML) ORAL DAILY
Status: DISCONTINUED | OUTPATIENT
Start: 2020-01-01 | End: 2020-01-01

## 2020-01-01 RX ORDER — FAMOTIDINE 20 MG/1
20 TABLET, FILM COATED ORAL EVERY 12 HOURS
Status: DISCONTINUED | OUTPATIENT
Start: 2020-01-01 | End: 2020-01-01 | Stop reason: HOSPADM

## 2020-01-01 RX ORDER — LOSARTAN POTASSIUM 50 MG/1
25 TABLET ORAL
Status: COMPLETED | OUTPATIENT
Start: 2020-01-01 | End: 2020-01-01

## 2020-01-01 RX ORDER — SODIUM CHLORIDE 0.9 % (FLUSH) 0.9 %
5-40 SYRINGE (ML) INJECTION EVERY 8 HOURS
Status: DISCONTINUED | OUTPATIENT
Start: 2020-01-01 | End: 2021-01-01 | Stop reason: HOSPADM

## 2020-01-01 RX ORDER — FINASTERIDE 5 MG/1
5 TABLET, FILM COATED ORAL DAILY
Status: DISCONTINUED | OUTPATIENT
Start: 2020-01-01 | End: 2020-01-01 | Stop reason: HOSPADM

## 2020-01-01 RX ORDER — CEPHALEXIN 500 MG/1
500 CAPSULE ORAL 2 TIMES DAILY
Qty: 14 CAP | Refills: 0 | Status: SHIPPED | OUTPATIENT
Start: 2020-01-01 | End: 2020-01-01

## 2020-01-01 RX ORDER — HYDROCHLOROTHIAZIDE 25 MG/1
12.5 TABLET ORAL DAILY
Status: DISCONTINUED | OUTPATIENT
Start: 2020-01-01 | End: 2020-01-01

## 2020-01-01 RX ORDER — SODIUM CHLORIDE, SODIUM LACTATE, POTASSIUM CHLORIDE, CALCIUM CHLORIDE 600; 310; 30; 20 MG/100ML; MG/100ML; MG/100ML; MG/100ML
INJECTION, SOLUTION INTRAVENOUS
Status: DISCONTINUED | OUTPATIENT
Start: 2020-01-01 | End: 2020-01-01 | Stop reason: HOSPADM

## 2020-01-01 RX ORDER — METOPROLOL TARTRATE 50 MG/1
50 TABLET ORAL 2 TIMES DAILY
Status: DISCONTINUED | OUTPATIENT
Start: 2020-01-01 | End: 2021-01-01 | Stop reason: HOSPADM

## 2020-01-01 RX ORDER — LOSARTAN POTASSIUM 50 MG/1
50 TABLET ORAL DAILY
Status: DISCONTINUED | OUTPATIENT
Start: 2020-01-01 | End: 2020-01-01 | Stop reason: HOSPADM

## 2020-01-01 RX ORDER — ALBUTEROL SULFATE 0.83 MG/ML
2.5 SOLUTION RESPIRATORY (INHALATION)
Status: DISCONTINUED | OUTPATIENT
Start: 2020-01-01 | End: 2020-01-01 | Stop reason: HOSPADM

## 2020-01-01 RX ORDER — AMLODIPINE BESYLATE 10 MG/1
TABLET ORAL
Qty: 90 TAB | Refills: 1 | Status: ON HOLD | OUTPATIENT
Start: 2020-01-01 | End: 2020-01-01

## 2020-01-01 RX ORDER — SODIUM CHLORIDE 9 MG/ML
1.5-3 INJECTION, SOLUTION INTRAVENOUS
Status: DISCONTINUED | OUTPATIENT
Start: 2020-01-01 | End: 2020-01-01

## 2020-01-01 RX ORDER — MIDAZOLAM HYDROCHLORIDE 1 MG/ML
0.5 INJECTION, SOLUTION INTRAMUSCULAR; INTRAVENOUS
Status: CANCELLED | OUTPATIENT
Start: 2020-01-01

## 2020-01-01 RX ORDER — DEXTROSE MONOHYDRATE 100 MG/ML
0-250 INJECTION, SOLUTION INTRAVENOUS AS NEEDED
Status: DISCONTINUED | OUTPATIENT
Start: 2020-01-01 | End: 2020-01-01

## 2020-01-01 RX ORDER — SODIUM CHLORIDE 0.9 % (FLUSH) 0.9 %
5-40 SYRINGE (ML) INJECTION AS NEEDED
Status: DISCONTINUED | OUTPATIENT
Start: 2020-01-01 | End: 2021-01-01 | Stop reason: HOSPADM

## 2020-01-01 RX ORDER — BACITRACIN 500 UNIT/G
1 PACKET (EA) TOPICAL
Status: COMPLETED | OUTPATIENT
Start: 2020-01-01 | End: 2020-01-01

## 2020-01-01 RX ORDER — FUROSEMIDE 10 MG/ML
40 INJECTION INTRAMUSCULAR; INTRAVENOUS DAILY
Status: DISCONTINUED | OUTPATIENT
Start: 2020-01-01 | End: 2020-01-01

## 2020-01-01 RX ORDER — TEMAZEPAM 15 MG/1
15 CAPSULE ORAL
Status: DISCONTINUED | OUTPATIENT
Start: 2020-01-01 | End: 2020-01-01 | Stop reason: HOSPADM

## 2020-01-01 RX ORDER — SODIUM CHLORIDE 9 MG/ML
25 INJECTION, SOLUTION INTRAVENOUS CONTINUOUS
Status: DISCONTINUED | OUTPATIENT
Start: 2020-01-01 | End: 2020-01-01 | Stop reason: HOSPADM

## 2020-01-01 RX ORDER — DOXAZOSIN 2 MG/1
4 TABLET ORAL
Status: DISCONTINUED | OUTPATIENT
Start: 2020-01-01 | End: 2021-01-01 | Stop reason: HOSPADM

## 2020-01-01 RX ORDER — ALBUTEROL SULFATE 90 UG/1
1 AEROSOL, METERED RESPIRATORY (INHALATION)
Status: DISCONTINUED | OUTPATIENT
Start: 2020-01-01 | End: 2021-01-01

## 2020-01-01 RX ORDER — KETAMINE HYDROCHLORIDE 10 MG/ML
INJECTION, SOLUTION INTRAMUSCULAR; INTRAVENOUS AS NEEDED
Status: DISCONTINUED | OUTPATIENT
Start: 2020-01-01 | End: 2020-01-01 | Stop reason: HOSPADM

## 2020-01-01 RX ORDER — DOXAZOSIN 2 MG/1
4 TABLET ORAL
Status: DISCONTINUED | OUTPATIENT
Start: 2020-01-01 | End: 2020-01-01 | Stop reason: HOSPADM

## 2020-01-01 RX ORDER — SODIUM CHLORIDE, SODIUM LACTATE, POTASSIUM CHLORIDE, CALCIUM CHLORIDE 600; 310; 30; 20 MG/100ML; MG/100ML; MG/100ML; MG/100ML
100 INJECTION, SOLUTION INTRAVENOUS CONTINUOUS
Status: CANCELLED | OUTPATIENT
Start: 2020-01-01

## 2020-01-01 RX ORDER — HYDROCHLOROTHIAZIDE 12.5 MG/1
TABLET ORAL
Qty: 90 TAB | Refills: 1 | Status: ON HOLD | OUTPATIENT
Start: 2020-01-01 | End: 2020-01-01

## 2020-01-01 RX ORDER — VANCOMYCIN HYDROCHLORIDE
1250
Status: DISCONTINUED | OUTPATIENT
Start: 2020-01-01 | End: 2020-01-01 | Stop reason: DRUGHIGH

## 2020-01-01 RX ORDER — TRAZODONE HYDROCHLORIDE 50 MG/1
TABLET ORAL
Qty: 60 TAB | Refills: 5 | Status: SHIPPED | OUTPATIENT
Start: 2020-01-01 | End: 2020-01-01 | Stop reason: SDUPTHER

## 2020-01-01 RX ORDER — GUAIFENESIN 100 MG/5ML
81 LIQUID (ML) ORAL DAILY
Status: DISCONTINUED | OUTPATIENT
Start: 2020-01-01 | End: 2021-01-01 | Stop reason: HOSPADM

## 2020-01-01 RX ORDER — LOSARTAN POTASSIUM 25 MG/1
TABLET ORAL
Qty: 45 TAB | Refills: 3 | Status: SHIPPED | OUTPATIENT
Start: 2020-01-01 | End: 2020-01-01

## 2020-01-01 RX ORDER — GLIPIZIDE 5 MG/1
5 TABLET, FILM COATED, EXTENDED RELEASE ORAL
Status: DISCONTINUED | OUTPATIENT
Start: 2020-01-01 | End: 2020-01-01 | Stop reason: HOSPADM

## 2020-01-01 RX ORDER — METFORMIN HYDROCHLORIDE 500 MG/1
TABLET, EXTENDED RELEASE ORAL
Qty: 180 TAB | Refills: 3 | Status: SHIPPED | OUTPATIENT
Start: 2020-01-01 | End: 2021-01-01

## 2020-01-01 RX ORDER — FUROSEMIDE 10 MG/ML
20 INJECTION INTRAMUSCULAR; INTRAVENOUS DAILY
Status: DISCONTINUED | OUTPATIENT
Start: 2020-01-01 | End: 2020-01-01

## 2020-01-01 RX ORDER — LOSARTAN POTASSIUM 50 MG/1
100 TABLET ORAL DAILY
COMMUNITY
End: 2021-01-01

## 2020-01-01 RX ORDER — CEPHALEXIN 250 MG/1
250 CAPSULE ORAL 3 TIMES DAILY
Status: DISCONTINUED | OUTPATIENT
Start: 2020-01-01 | End: 2020-01-01 | Stop reason: HOSPADM

## 2020-01-01 RX ORDER — ATORVASTATIN CALCIUM 40 MG/1
40 TABLET, FILM COATED ORAL
Status: DISCONTINUED | OUTPATIENT
Start: 2020-01-01 | End: 2020-01-01 | Stop reason: HOSPADM

## 2020-01-01 RX ORDER — LANOLIN ALCOHOL/MO/W.PET/CERES
400 CREAM (GRAM) TOPICAL DAILY
Status: DISCONTINUED | OUTPATIENT
Start: 2020-01-01 | End: 2020-01-01 | Stop reason: HOSPADM

## 2020-01-01 RX ORDER — INSULIN GLARGINE 100 [IU]/ML
25 INJECTION, SOLUTION SUBCUTANEOUS
Status: DISCONTINUED | OUTPATIENT
Start: 2020-01-01 | End: 2020-01-01 | Stop reason: HOSPADM

## 2020-01-01 RX ORDER — BALSAM PERU/CASTOR OIL
OINTMENT (GRAM) TOPICAL 2 TIMES DAILY
Status: DISCONTINUED | OUTPATIENT
Start: 2020-01-01 | End: 2020-01-01 | Stop reason: HOSPADM

## 2020-01-01 RX ORDER — BACITRACIN 500 UNIT/G
PACKET (EA) TOPICAL
Status: COMPLETED
Start: 2020-01-01 | End: 2020-01-01

## 2020-01-01 RX ORDER — TEMAZEPAM 15 MG/1
CAPSULE ORAL
Qty: 90 CAP | Refills: 1 | Status: SHIPPED | OUTPATIENT
Start: 2020-01-01 | End: 2020-01-01 | Stop reason: SDUPTHER

## 2020-01-01 RX ADMIN — ATORVASTATIN CALCIUM 10 MG: 10 TABLET, FILM COATED ORAL at 21:45

## 2020-01-01 RX ADMIN — KETAMINE HYDROCHLORIDE 10 MG: 10 INJECTION, SOLUTION INTRAMUSCULAR; INTRAVENOUS at 07:44

## 2020-01-01 RX ADMIN — BUDESONIDE 500 MCG: 0.5 INHALANT RESPIRATORY (INHALATION) at 10:05

## 2020-01-01 RX ADMIN — VANCOMYCIN HYDROCHLORIDE 1250 MG: 10 INJECTION, POWDER, LYOPHILIZED, FOR SOLUTION INTRAVENOUS at 02:10

## 2020-01-01 RX ADMIN — POTASSIUM BICARBONATE 20 MEQ: 782 TABLET, EFFERVESCENT ORAL at 08:31

## 2020-01-01 RX ADMIN — HYDROCHLOROTHIAZIDE 12.5 MG: 25 TABLET ORAL at 09:55

## 2020-01-01 RX ADMIN — FAMOTIDINE 20 MG: 20 TABLET ORAL at 08:47

## 2020-01-01 RX ADMIN — ASPIRIN 325 MG ORAL TABLET 325 MG: 325 PILL ORAL at 08:13

## 2020-01-01 RX ADMIN — REMDESIVIR 200 MG: 100 INJECTION, POWDER, LYOPHILIZED, FOR SOLUTION INTRAVENOUS at 18:38

## 2020-01-01 RX ADMIN — INSULIN LISPRO 3 UNITS: 100 INJECTION, SOLUTION INTRAVENOUS; SUBCUTANEOUS at 17:26

## 2020-01-01 RX ADMIN — ASPIRIN 81 MG: 81 TABLET, CHEWABLE ORAL at 09:38

## 2020-01-01 RX ADMIN — Medication 400 MG: at 08:30

## 2020-01-01 RX ADMIN — LOSARTAN POTASSIUM 100 MG: 50 TABLET, FILM COATED ORAL at 08:49

## 2020-01-01 RX ADMIN — AMIODARONE HYDROCHLORIDE 1 MG/MIN: 50 INJECTION, SOLUTION INTRAVENOUS at 17:28

## 2020-01-01 RX ADMIN — INSULIN GLARGINE 10 UNITS: 100 INJECTION, SOLUTION SUBCUTANEOUS at 22:00

## 2020-01-01 RX ADMIN — DOCUSATE SODIUM 50MG AND SENNOSIDES 8.6MG 1 TABLET: 8.6; 5 TABLET, FILM COATED ORAL at 08:32

## 2020-01-01 RX ADMIN — VANCOMYCIN HYDROCHLORIDE 1500 MG: 100 INJECTION, POWDER, LYOPHILIZED, FOR SOLUTION INTRAVENOUS at 00:24

## 2020-01-01 RX ADMIN — FUROSEMIDE 20 MG: 10 INJECTION, SOLUTION INTRAMUSCULAR; INTRAVENOUS at 09:13

## 2020-01-01 RX ADMIN — METOPROLOL TARTRATE 50 MG: 50 TABLET, FILM COATED ORAL at 17:29

## 2020-01-01 RX ADMIN — GUAIFENESIN 1200 MG: 600 TABLET, EXTENDED RELEASE ORAL at 21:10

## 2020-01-01 RX ADMIN — OXYCODONE HYDROCHLORIDE AND ACETAMINOPHEN 1000 MG: 500 TABLET ORAL at 21:56

## 2020-01-01 RX ADMIN — VANCOMYCIN HYDROCHLORIDE 1250 MG: 10 INJECTION, POWDER, LYOPHILIZED, FOR SOLUTION INTRAVENOUS at 01:38

## 2020-01-01 RX ADMIN — PHENYLEPHRINE HYDROCHLORIDE 50 MCG/MIN: 10 INJECTION INTRAVENOUS at 19:04

## 2020-01-01 RX ADMIN — AMLODIPINE BESYLATE 5 MG: 5 TABLET ORAL at 09:31

## 2020-01-01 RX ADMIN — INSULIN LISPRO 3 UNITS: 100 INJECTION, SOLUTION INTRAVENOUS; SUBCUTANEOUS at 12:18

## 2020-01-01 RX ADMIN — HEPARIN SODIUM 5000 UNITS: 5000 INJECTION INTRAVENOUS; SUBCUTANEOUS at 07:01

## 2020-01-01 RX ADMIN — SERTRALINE HYDROCHLORIDE 100 MG: 50 TABLET ORAL at 18:28

## 2020-01-01 RX ADMIN — DOCUSATE SODIUM 50MG AND SENNOSIDES 8.6MG 1 TABLET: 8.6; 5 TABLET, FILM COATED ORAL at 17:11

## 2020-01-01 RX ADMIN — METOPROLOL TARTRATE 50 MG: 50 TABLET, FILM COATED ORAL at 09:38

## 2020-01-01 RX ADMIN — FINASTERIDE 5 MG: 5 TABLET, FILM COATED ORAL at 09:37

## 2020-01-01 RX ADMIN — GUAIFENESIN 1200 MG: 600 TABLET, EXTENDED RELEASE ORAL at 09:05

## 2020-01-01 RX ADMIN — DEXMEDETOMIDINE HYDROCHLORIDE 10 MCG: 100 INJECTION, SOLUTION, CONCENTRATE INTRAVENOUS at 07:39

## 2020-01-01 RX ADMIN — FUROSEMIDE 40 MG: 40 TABLET ORAL at 07:14

## 2020-01-01 RX ADMIN — OXYCODONE HYDROCHLORIDE AND ACETAMINOPHEN 1000 MG: 500 TABLET ORAL at 08:32

## 2020-01-01 RX ADMIN — FINASTERIDE 5 MG: 5 TABLET, FILM COATED ORAL at 08:59

## 2020-01-01 RX ADMIN — FUROSEMIDE 40 MG: 10 INJECTION, SOLUTION INTRAMUSCULAR; INTRAVENOUS at 21:42

## 2020-01-01 RX ADMIN — HEPARIN SODIUM 2000 UNITS: 1000 INJECTION, SOLUTION INTRAVENOUS; SUBCUTANEOUS at 09:57

## 2020-01-01 RX ADMIN — Medication 400 MG: at 17:11

## 2020-01-01 RX ADMIN — ATORVASTATIN CALCIUM 40 MG: 40 TABLET, FILM COATED ORAL at 21:10

## 2020-01-01 RX ADMIN — POTASSIUM CHLORIDE 40 MEQ: 750 TABLET, FILM COATED, EXTENDED RELEASE ORAL at 21:42

## 2020-01-01 RX ADMIN — Medication: at 09:15

## 2020-01-01 RX ADMIN — ATORVASTATIN CALCIUM 40 MG: 40 TABLET, FILM COATED ORAL at 21:39

## 2020-01-01 RX ADMIN — AMLODIPINE BESYLATE 5 MG: 5 TABLET ORAL at 09:40

## 2020-01-01 RX ADMIN — LOSARTAN POTASSIUM 12.5 MG: 25 TABLET, FILM COATED ORAL at 08:41

## 2020-01-01 RX ADMIN — SERTRALINE HYDROCHLORIDE 100 MG: 50 TABLET ORAL at 17:05

## 2020-01-01 RX ADMIN — Medication 10 ML: at 13:01

## 2020-01-01 RX ADMIN — Medication 10 ML: at 21:15

## 2020-01-01 RX ADMIN — FINASTERIDE 5 MG: 5 TABLET, FILM COATED ORAL at 09:57

## 2020-01-01 RX ADMIN — INSULIN LISPRO 3 UNITS: 100 INJECTION, SOLUTION INTRAVENOUS; SUBCUTANEOUS at 11:54

## 2020-01-01 RX ADMIN — TRAZODONE HYDROCHLORIDE 50 MG: 50 TABLET ORAL at 00:04

## 2020-01-01 RX ADMIN — HEPARIN SODIUM 5000 UNITS: 5000 INJECTION INTRAVENOUS; SUBCUTANEOUS at 22:21

## 2020-01-01 RX ADMIN — OXYCODONE HYDROCHLORIDE AND ACETAMINOPHEN 1000 MG: 500 TABLET ORAL at 17:11

## 2020-01-01 RX ADMIN — INSULIN GLARGINE 15 UNITS: 100 INJECTION, SOLUTION SUBCUTANEOUS at 22:45

## 2020-01-01 RX ADMIN — OXYCODONE HYDROCHLORIDE AND ACETAMINOPHEN 1000 MG: 500 TABLET ORAL at 08:41

## 2020-01-01 RX ADMIN — LOSARTAN POTASSIUM 100 MG: 50 TABLET, FILM COATED ORAL at 09:13

## 2020-01-01 RX ADMIN — Medication 10 ML: at 21:37

## 2020-01-01 RX ADMIN — HEPARIN SODIUM AND DEXTROSE 12 UNITS/KG/HR: 10000; 5 INJECTION INTRAVENOUS at 09:36

## 2020-01-01 RX ADMIN — VANCOMYCIN HYDROCHLORIDE 1500 MG: 100 INJECTION, POWDER, LYOPHILIZED, FOR SOLUTION INTRAVENOUS at 23:25

## 2020-01-01 RX ADMIN — SERTRALINE HYDROCHLORIDE 100 MG: 50 TABLET ORAL at 09:30

## 2020-01-01 RX ADMIN — Medication 400 MG: at 08:48

## 2020-01-01 RX ADMIN — OXYCODONE HYDROCHLORIDE AND ACETAMINOPHEN 1000 MG: 500 TABLET ORAL at 21:42

## 2020-01-01 RX ADMIN — ASPIRIN 81 MG: 81 TABLET, CHEWABLE ORAL at 08:10

## 2020-01-01 RX ADMIN — INSULIN LISPRO 2 UNITS: 100 INJECTION, SOLUTION INTRAVENOUS; SUBCUTANEOUS at 09:33

## 2020-01-01 RX ADMIN — SERTRALINE HYDROCHLORIDE 100 MG: 50 TABLET ORAL at 17:01

## 2020-01-01 RX ADMIN — INSULIN LISPRO 2 UNITS: 100 INJECTION, SOLUTION INTRAVENOUS; SUBCUTANEOUS at 06:58

## 2020-01-01 RX ADMIN — ACETAMINOPHEN 650 MG: 325 TABLET ORAL at 05:45

## 2020-01-01 RX ADMIN — ASPIRIN 325 MG ORAL TABLET 325 MG: 325 PILL ORAL at 08:32

## 2020-01-01 RX ADMIN — ATORVASTATIN CALCIUM 10 MG: 10 TABLET, FILM COATED ORAL at 22:23

## 2020-01-01 RX ADMIN — METOPROLOL TARTRATE 25 MG: 25 TABLET, FILM COATED ORAL at 17:37

## 2020-01-01 RX ADMIN — SERTRALINE HYDROCHLORIDE 100 MG: 50 TABLET ORAL at 09:12

## 2020-01-01 RX ADMIN — CEFEPIME HYDROCHLORIDE 2 G: 2 INJECTION, POWDER, FOR SOLUTION INTRAVENOUS at 05:03

## 2020-01-01 RX ADMIN — Medication 400 MG: at 09:37

## 2020-01-01 RX ADMIN — Medication: at 20:21

## 2020-01-01 RX ADMIN — IOPAMIDOL 80 ML: 755 INJECTION, SOLUTION INTRAVENOUS at 07:16

## 2020-01-01 RX ADMIN — Medication 10 ML: at 05:47

## 2020-01-01 RX ADMIN — FINASTERIDE 5 MG: 5 TABLET, FILM COATED ORAL at 08:49

## 2020-01-01 RX ADMIN — AMLODIPINE BESYLATE 5 MG: 5 TABLET ORAL at 09:13

## 2020-01-01 RX ADMIN — CEPHALEXIN 250 MG: 250 CAPSULE ORAL at 16:34

## 2020-01-01 RX ADMIN — LOSARTAN POTASSIUM 100 MG: 50 TABLET, FILM COATED ORAL at 09:06

## 2020-01-01 RX ADMIN — LABETALOL HYDROCHLORIDE 100 MG: 100 TABLET, FILM COATED ORAL at 09:30

## 2020-01-01 RX ADMIN — SODIUM CHLORIDE 3 ML/KG/HR: 900 INJECTION, SOLUTION INTRAVENOUS at 06:24

## 2020-01-01 RX ADMIN — FINASTERIDE 5 MG: 5 TABLET, FILM COATED ORAL at 08:07

## 2020-01-01 RX ADMIN — ARFORMOTEROL TARTRATE 15 MCG: 15 SOLUTION RESPIRATORY (INHALATION) at 07:32

## 2020-01-01 RX ADMIN — DOXAZOSIN 4 MG: 2 TABLET ORAL at 22:25

## 2020-01-01 RX ADMIN — SODIUM CHLORIDE 75 ML/HR: 900 INJECTION, SOLUTION INTRAVENOUS at 16:09

## 2020-01-01 RX ADMIN — DOXAZOSIN 4 MG: 2 TABLET ORAL at 21:57

## 2020-01-01 RX ADMIN — INSULIN LISPRO 2 UNITS: 100 INJECTION, SOLUTION INTRAVENOUS; SUBCUTANEOUS at 16:48

## 2020-01-01 RX ADMIN — Medication 10 ML: at 06:24

## 2020-01-01 RX ADMIN — Medication 400 MG: at 18:00

## 2020-01-01 RX ADMIN — INSULIN LISPRO 7 UNITS: 100 INJECTION, SOLUTION INTRAVENOUS; SUBCUTANEOUS at 21:39

## 2020-01-01 RX ADMIN — PHENYLEPHRINE HYDROCHLORIDE: 10 INJECTION INTRAVENOUS at 10:29

## 2020-01-01 RX ADMIN — INSULIN LISPRO 3 UNITS: 100 INJECTION, SOLUTION INTRAVENOUS; SUBCUTANEOUS at 17:32

## 2020-01-01 RX ADMIN — ASPIRIN 325 MG ORAL TABLET 325 MG: 325 PILL ORAL at 09:57

## 2020-01-01 RX ADMIN — DOXAZOSIN 4 MG: 2 TABLET ORAL at 21:50

## 2020-01-01 RX ADMIN — FUROSEMIDE 40 MG: 10 INJECTION, SOLUTION INTRAMUSCULAR; INTRAVENOUS at 08:42

## 2020-01-01 RX ADMIN — Medication 10 ML: at 15:30

## 2020-01-01 RX ADMIN — INSULIN LISPRO 3 UNITS: 100 INJECTION, SOLUTION INTRAVENOUS; SUBCUTANEOUS at 12:20

## 2020-01-01 RX ADMIN — HEPARIN SODIUM 5000 UNITS: 5000 INJECTION INTRAVENOUS; SUBCUTANEOUS at 22:23

## 2020-01-01 RX ADMIN — POTASSIUM CHLORIDE 40 MEQ: 750 TABLET, FILM COATED, EXTENDED RELEASE ORAL at 17:39

## 2020-01-01 RX ADMIN — Medication 400 MG: at 17:26

## 2020-01-01 RX ADMIN — OXYCODONE HYDROCHLORIDE AND ACETAMINOPHEN 1000 MG: 500 TABLET ORAL at 22:22

## 2020-01-01 RX ADMIN — MICONAZOLE NITRATE 2 % TOPICAL POWDER: at 18:13

## 2020-01-01 RX ADMIN — CEFEPIME HYDROCHLORIDE 2 G: 2 INJECTION, POWDER, FOR SOLUTION INTRAVENOUS at 04:12

## 2020-01-01 RX ADMIN — Medication 10 ML: at 21:40

## 2020-01-01 RX ADMIN — SERTRALINE HYDROCHLORIDE 100 MG: 50 TABLET ORAL at 18:04

## 2020-01-01 RX ADMIN — DEXMEDETOMIDINE HYDROCHLORIDE 10 MCG: 100 INJECTION, SOLUTION, CONCENTRATE INTRAVENOUS at 08:26

## 2020-01-01 RX ADMIN — METOPROLOL TARTRATE 50 MG: 50 TABLET, FILM COATED ORAL at 09:40

## 2020-01-01 RX ADMIN — VANCOMYCIN HYDROCHLORIDE 1500 MG: 100 INJECTION, POWDER, LYOPHILIZED, FOR SOLUTION INTRAVENOUS at 12:45

## 2020-01-01 RX ADMIN — INSULIN LISPRO 3 UNITS: 100 INJECTION, SOLUTION INTRAVENOUS; SUBCUTANEOUS at 16:59

## 2020-01-01 RX ADMIN — Medication: at 08:10

## 2020-01-01 RX ADMIN — INSULIN LISPRO 5 UNITS: 100 INJECTION, SOLUTION INTRAVENOUS; SUBCUTANEOUS at 17:05

## 2020-01-01 RX ADMIN — OXYCODONE HYDROCHLORIDE AND ACETAMINOPHEN 1000 MG: 500 TABLET ORAL at 22:21

## 2020-01-01 RX ADMIN — ASPIRIN 81 MG: 81 TABLET, CHEWABLE ORAL at 08:26

## 2020-01-01 RX ADMIN — IOPAMIDOL 100 ML: 755 INJECTION, SOLUTION INTRAVENOUS at 10:00

## 2020-01-01 RX ADMIN — DOXAZOSIN 1 MG: 2 TABLET ORAL at 09:13

## 2020-01-01 RX ADMIN — INSULIN LISPRO 3 UNITS: 100 INJECTION, SOLUTION INTRAVENOUS; SUBCUTANEOUS at 13:01

## 2020-01-01 RX ADMIN — REMDESIVIR 100 MG: 5 INJECTION INTRAVENOUS at 15:47

## 2020-01-01 RX ADMIN — Medication 10 ML: at 21:33

## 2020-01-01 RX ADMIN — GUAIFENESIN 1200 MG: 600 TABLET, EXTENDED RELEASE ORAL at 21:36

## 2020-01-01 RX ADMIN — POTASSIUM CHLORIDE 40 MEQ: 750 TABLET, FILM COATED, EXTENDED RELEASE ORAL at 12:42

## 2020-01-01 RX ADMIN — Medication 10 ML: at 13:08

## 2020-01-01 RX ADMIN — SODIUM CHLORIDE 3 ML/HR: 900 INJECTION, SOLUTION INTRAVENOUS at 14:41

## 2020-01-01 RX ADMIN — LABETALOL HYDROCHLORIDE 100 MG: 100 TABLET, FILM COATED ORAL at 17:39

## 2020-01-01 RX ADMIN — SERTRALINE HYDROCHLORIDE 100 MG: 50 TABLET ORAL at 18:00

## 2020-01-01 RX ADMIN — Medication 400 MG: at 17:01

## 2020-01-01 RX ADMIN — POTASSIUM CHLORIDE 20 MEQ: 29.8 INJECTION, SOLUTION INTRAVENOUS at 15:40

## 2020-01-01 RX ADMIN — CEFTRIAXONE SODIUM 1 G: 1 INJECTION, POWDER, FOR SOLUTION INTRAMUSCULAR; INTRAVENOUS at 00:02

## 2020-01-01 RX ADMIN — TRAZODONE HYDROCHLORIDE 50 MG: 50 TABLET ORAL at 21:57

## 2020-01-01 RX ADMIN — Medication 10 ML: at 06:00

## 2020-01-01 RX ADMIN — Medication 10 ML: at 22:24

## 2020-01-01 RX ADMIN — DOXAZOSIN 1 MG: 2 TABLET ORAL at 08:34

## 2020-01-01 RX ADMIN — ATORVASTATIN CALCIUM 10 MG: 10 TABLET, FILM COATED ORAL at 22:29

## 2020-01-01 RX ADMIN — DOXAZOSIN 4 MG: 2 TABLET ORAL at 21:45

## 2020-01-01 RX ADMIN — GLIPIZIDE 5 MG: 5 TABLET, FILM COATED, EXTENDED RELEASE ORAL at 08:30

## 2020-01-01 RX ADMIN — REMDESIVIR 100 MG: 5 INJECTION INTRAVENOUS at 17:56

## 2020-01-01 RX ADMIN — INSULIN GLARGINE 15 UNITS: 100 INJECTION, SOLUTION SUBCUTANEOUS at 22:28

## 2020-01-01 RX ADMIN — CEFAZOLIN SODIUM 2 G: 300 INJECTION, POWDER, LYOPHILIZED, FOR SOLUTION INTRAVENOUS at 23:10

## 2020-01-01 RX ADMIN — TEMAZEPAM 15 MG: 15 CAPSULE ORAL at 22:25

## 2020-01-01 RX ADMIN — Medication 10 ML: at 15:01

## 2020-01-01 RX ADMIN — VANCOMYCIN HYDROCHLORIDE 1500 MG: 100 INJECTION, POWDER, LYOPHILIZED, FOR SOLUTION INTRAVENOUS at 18:29

## 2020-01-01 RX ADMIN — TRAZODONE HYDROCHLORIDE 50 MG: 50 TABLET ORAL at 22:25

## 2020-01-01 RX ADMIN — Medication 10 ML: at 17:35

## 2020-01-01 RX ADMIN — POTASSIUM CHLORIDE 40 MEQ: 750 TABLET, FILM COATED, EXTENDED RELEASE ORAL at 11:56

## 2020-01-01 RX ADMIN — POTASSIUM CHLORIDE 40 MEQ: 750 TABLET, FILM COATED, EXTENDED RELEASE ORAL at 20:29

## 2020-01-01 RX ADMIN — AMLODIPINE BESYLATE 5 MG: 5 TABLET ORAL at 09:57

## 2020-01-01 RX ADMIN — Medication: at 08:45

## 2020-01-01 RX ADMIN — Medication 10 ML: at 06:50

## 2020-01-01 RX ADMIN — FAMOTIDINE 20 MG: 20 TABLET ORAL at 22:21

## 2020-01-01 RX ADMIN — REMDESIVIR 100 MG: 5 INJECTION INTRAVENOUS at 17:33

## 2020-01-01 RX ADMIN — ACETAMINOPHEN 650 MG: 325 TABLET ORAL at 16:55

## 2020-01-01 RX ADMIN — Medication 10 ML: at 14:52

## 2020-01-01 RX ADMIN — TRAMADOL HYDROCHLORIDE 50 MG: 50 TABLET, FILM COATED ORAL at 22:24

## 2020-01-01 RX ADMIN — METOPROLOL TARTRATE 50 MG: 50 TABLET, FILM COATED ORAL at 17:57

## 2020-01-01 RX ADMIN — FINASTERIDE 5 MG: 5 TABLET, FILM COATED ORAL at 08:42

## 2020-01-01 RX ADMIN — GUAIFENESIN 1200 MG: 600 TABLET, EXTENDED RELEASE ORAL at 21:51

## 2020-01-01 RX ADMIN — FUROSEMIDE 40 MG: 40 TABLET ORAL at 18:00

## 2020-01-01 RX ADMIN — GLIPIZIDE 5 MG: 5 TABLET, FILM COATED, EXTENDED RELEASE ORAL at 06:57

## 2020-01-01 RX ADMIN — METOPROLOL TARTRATE 25 MG: 25 TABLET, FILM COATED ORAL at 09:04

## 2020-01-01 RX ADMIN — LOSARTAN POTASSIUM 100 MG: 50 TABLET, FILM COATED ORAL at 09:39

## 2020-01-01 RX ADMIN — Medication 10 ML: at 21:31

## 2020-01-01 RX ADMIN — Medication 10 ML: at 14:37

## 2020-01-01 RX ADMIN — INSULIN GLARGINE 15 UNITS: 100 INJECTION, SOLUTION SUBCUTANEOUS at 21:39

## 2020-01-01 RX ADMIN — Medication: at 17:26

## 2020-01-01 RX ADMIN — CEPHALEXIN 250 MG: 250 CAPSULE ORAL at 21:43

## 2020-01-01 RX ADMIN — METOPROLOL TARTRATE 50 MG: 50 TABLET, FILM COATED ORAL at 18:21

## 2020-01-01 RX ADMIN — ASPIRIN 81 MG: 81 TABLET, CHEWABLE ORAL at 08:49

## 2020-01-01 RX ADMIN — CEFEPIME HYDROCHLORIDE 2 G: 2 INJECTION, POWDER, FOR SOLUTION INTRAVENOUS at 16:00

## 2020-01-01 RX ADMIN — Medication: at 09:38

## 2020-01-01 RX ADMIN — DOCUSATE SODIUM 50MG AND SENNOSIDES 8.6MG 1 TABLET: 8.6; 5 TABLET, FILM COATED ORAL at 17:26

## 2020-01-01 RX ADMIN — TEMAZEPAM 15 MG: 15 CAPSULE ORAL at 23:01

## 2020-01-01 RX ADMIN — Medication 10 ML: at 21:36

## 2020-01-01 RX ADMIN — METOPROLOL TARTRATE 50 MG: 50 TABLET, FILM COATED ORAL at 08:10

## 2020-01-01 RX ADMIN — CEPHALEXIN 250 MG: 250 CAPSULE ORAL at 17:11

## 2020-01-01 RX ADMIN — ARFORMOTEROL TARTRATE 15 MCG: 15 SOLUTION RESPIRATORY (INHALATION) at 19:28

## 2020-01-01 RX ADMIN — Medication 400 MG: at 17:12

## 2020-01-01 RX ADMIN — ASPIRIN 325 MG ORAL TABLET 325 MG: 325 PILL ORAL at 08:07

## 2020-01-01 RX ADMIN — INSULIN GLARGINE 8 UNITS: 100 INJECTION, SOLUTION SUBCUTANEOUS at 21:13

## 2020-01-01 RX ADMIN — Medication 10 ML: at 07:00

## 2020-01-01 RX ADMIN — ATORVASTATIN CALCIUM 40 MG: 40 TABLET, FILM COATED ORAL at 21:43

## 2020-01-01 RX ADMIN — LABETALOL HYDROCHLORIDE 100 MG: 100 TABLET, FILM COATED ORAL at 09:40

## 2020-01-01 RX ADMIN — CEFTRIAXONE SODIUM 1 G: 1 INJECTION, POWDER, FOR SOLUTION INTRAMUSCULAR; INTRAVENOUS at 22:24

## 2020-01-01 RX ADMIN — Medication 10 ML: at 16:17

## 2020-01-01 RX ADMIN — HEPARIN SODIUM 4000 UNITS: 1000 INJECTION, SOLUTION INTRAVENOUS; SUBCUTANEOUS at 09:08

## 2020-01-01 RX ADMIN — Medication 10 ML: at 20:21

## 2020-01-01 RX ADMIN — OXYCODONE HYDROCHLORIDE AND ACETAMINOPHEN 1000 MG: 500 TABLET ORAL at 08:22

## 2020-01-01 RX ADMIN — Medication 10 ML: at 17:09

## 2020-01-01 RX ADMIN — PHENYLEPHRINE HYDROCHLORIDE 175 MCG/MIN: 10 INJECTION INTRAVENOUS at 05:47

## 2020-01-01 RX ADMIN — TRAZODONE HYDROCHLORIDE 50 MG: 50 TABLET ORAL at 00:06

## 2020-01-01 RX ADMIN — GUAIFENESIN 1200 MG: 600 TABLET, EXTENDED RELEASE ORAL at 08:41

## 2020-01-01 RX ADMIN — INSULIN LISPRO 2 UNITS: 100 INJECTION, SOLUTION INTRAVENOUS; SUBCUTANEOUS at 21:39

## 2020-01-01 RX ADMIN — CEPHALEXIN 250 MG: 250 CAPSULE ORAL at 08:26

## 2020-01-01 RX ADMIN — INSULIN LISPRO 2 UNITS: 100 INJECTION, SOLUTION INTRAVENOUS; SUBCUTANEOUS at 08:48

## 2020-01-01 RX ADMIN — TEMAZEPAM 15 MG: 15 CAPSULE ORAL at 22:56

## 2020-01-01 RX ADMIN — HEPARIN SODIUM 5000 UNITS: 5000 INJECTION INTRAVENOUS; SUBCUTANEOUS at 07:12

## 2020-01-01 RX ADMIN — ATORVASTATIN CALCIUM 10 MG: 10 TABLET, FILM COATED ORAL at 21:08

## 2020-01-01 RX ADMIN — MICONAZOLE NITRATE 2 % TOPICAL POWDER: at 17:36

## 2020-01-01 RX ADMIN — POTASSIUM CHLORIDE 40 MEQ: 750 TABLET, FILM COATED, EXTENDED RELEASE ORAL at 16:43

## 2020-01-01 RX ADMIN — OXYCODONE HYDROCHLORIDE AND ACETAMINOPHEN 1000 MG: 500 TABLET ORAL at 09:30

## 2020-01-01 RX ADMIN — Medication 400 MG: at 08:59

## 2020-01-01 RX ADMIN — ATORVASTATIN CALCIUM 40 MG: 40 TABLET, FILM COATED ORAL at 21:51

## 2020-01-01 RX ADMIN — SERTRALINE HYDROCHLORIDE 100 MG: 50 TABLET ORAL at 17:21

## 2020-01-01 RX ADMIN — Medication 1 TABLET: at 09:06

## 2020-01-01 RX ADMIN — INSULIN LISPRO 2 UNITS: 100 INJECTION, SOLUTION INTRAVENOUS; SUBCUTANEOUS at 17:00

## 2020-01-01 RX ADMIN — METOPROLOL TARTRATE 50 MG: 50 TABLET, FILM COATED ORAL at 08:47

## 2020-01-01 RX ADMIN — AMLODIPINE BESYLATE 5 MG: 5 TABLET ORAL at 08:41

## 2020-01-01 RX ADMIN — AMLODIPINE BESYLATE 5 MG: 5 TABLET ORAL at 08:49

## 2020-01-01 RX ADMIN — FINASTERIDE 5 MG: 5 TABLET, FILM COATED ORAL at 08:26

## 2020-01-01 RX ADMIN — VANCOMYCIN HYDROCHLORIDE 1250 MG: 10 INJECTION, POWDER, LYOPHILIZED, FOR SOLUTION INTRAVENOUS at 17:05

## 2020-01-01 RX ADMIN — PROTAMINE SULFATE 250 MG: 10 INJECTION, SOLUTION INTRAVENOUS at 11:31

## 2020-01-01 RX ADMIN — ATORVASTATIN CALCIUM 40 MG: 40 TABLET, FILM COATED ORAL at 20:21

## 2020-01-01 RX ADMIN — INSULIN LISPRO 3 UNITS: 100 INJECTION, SOLUTION INTRAVENOUS; SUBCUTANEOUS at 12:54

## 2020-01-01 RX ADMIN — ALBUMIN (HUMAN) 25 G: 0.25 INJECTION, SOLUTION INTRAVENOUS at 17:46

## 2020-01-01 RX ADMIN — FINASTERIDE 5 MG: 5 TABLET, FILM COATED ORAL at 08:13

## 2020-01-01 RX ADMIN — ENOXAPARIN SODIUM 30 MG: 30 INJECTION SUBCUTANEOUS at 09:13

## 2020-01-01 RX ADMIN — GUAIFENESIN 1200 MG: 600 TABLET, EXTENDED RELEASE ORAL at 21:30

## 2020-01-01 RX ADMIN — OXYCODONE HYDROCHLORIDE AND ACETAMINOPHEN 1000 MG: 500 TABLET ORAL at 21:34

## 2020-01-01 RX ADMIN — KETAMINE HYDROCHLORIDE 30 MG: 10 INJECTION, SOLUTION INTRAMUSCULAR; INTRAVENOUS at 08:25

## 2020-01-01 RX ADMIN — Medication 10 ML: at 22:46

## 2020-01-01 RX ADMIN — ASPIRIN 325 MG ORAL TABLET 325 MG: 325 PILL ORAL at 08:22

## 2020-01-01 RX ADMIN — ATORVASTATIN CALCIUM 10 MG: 10 TABLET, FILM COATED ORAL at 21:50

## 2020-01-01 RX ADMIN — POTASSIUM CHLORIDE 20 MEQ: 750 TABLET, FILM COATED, EXTENDED RELEASE ORAL at 11:54

## 2020-01-01 RX ADMIN — SODIUM CHLORIDE, SODIUM LACTATE, POTASSIUM CHLORIDE, AND CALCIUM CHLORIDE 500 ML: 600; 310; 30; 20 INJECTION, SOLUTION INTRAVENOUS at 14:37

## 2020-01-01 RX ADMIN — FINASTERIDE 5 MG: 5 TABLET, FILM COATED ORAL at 09:42

## 2020-01-01 RX ADMIN — AMIODARONE HYDROCHLORIDE 1 MG/MIN: 50 INJECTION, SOLUTION INTRAVENOUS at 23:29

## 2020-01-01 RX ADMIN — SERTRALINE HYDROCHLORIDE 100 MG: 50 TABLET ORAL at 17:57

## 2020-01-01 RX ADMIN — SERTRALINE HYDROCHLORIDE 100 MG: 50 TABLET ORAL at 17:34

## 2020-01-01 RX ADMIN — FINASTERIDE 5 MG: 5 TABLET, FILM COATED ORAL at 08:45

## 2020-01-01 RX ADMIN — Medication 1 TABLET: at 08:59

## 2020-01-01 RX ADMIN — OXYCODONE HYDROCHLORIDE AND ACETAMINOPHEN 500 MG: 500 TABLET ORAL at 08:49

## 2020-01-01 RX ADMIN — INSULIN LISPRO 3 UNITS: 100 INJECTION, SOLUTION INTRAVENOUS; SUBCUTANEOUS at 12:16

## 2020-01-01 RX ADMIN — GUAIFENESIN 1200 MG: 600 TABLET, EXTENDED RELEASE ORAL at 21:44

## 2020-01-01 RX ADMIN — AMLODIPINE BESYLATE 10 MG: 5 TABLET ORAL at 09:39

## 2020-01-01 RX ADMIN — FUROSEMIDE 20 MG: 10 INJECTION, SOLUTION INTRAMUSCULAR; INTRAVENOUS at 10:51

## 2020-01-01 RX ADMIN — TEMAZEPAM 15 MG: 15 CAPSULE ORAL at 21:57

## 2020-01-01 RX ADMIN — ENOXAPARIN SODIUM 30 MG: 30 INJECTION SUBCUTANEOUS at 21:35

## 2020-01-01 RX ADMIN — INSULIN GLARGINE 20 UNITS: 100 INJECTION, SOLUTION SUBCUTANEOUS at 21:48

## 2020-01-01 RX ADMIN — INSULIN LISPRO 2 UNITS: 100 INJECTION, SOLUTION INTRAVENOUS; SUBCUTANEOUS at 12:25

## 2020-01-01 RX ADMIN — SERTRALINE HYDROCHLORIDE 100 MG: 50 TABLET ORAL at 18:09

## 2020-01-01 RX ADMIN — INSULIN LISPRO 2 UNITS: 100 INJECTION, SOLUTION INTRAVENOUS; SUBCUTANEOUS at 17:59

## 2020-01-01 RX ADMIN — ASPIRIN 81 MG: 81 TABLET, CHEWABLE ORAL at 09:14

## 2020-01-01 RX ADMIN — AMIODARONE HYDROCHLORIDE 0.5 MG/MIN: 50 INJECTION, SOLUTION INTRAVENOUS at 01:32

## 2020-01-01 RX ADMIN — LOSARTAN POTASSIUM 50 MG: 50 TABLET, FILM COATED ORAL at 08:26

## 2020-01-01 RX ADMIN — Medication 1 TABLET: at 08:45

## 2020-01-01 RX ADMIN — FAMOTIDINE 20 MG: 20 TABLET ORAL at 21:34

## 2020-01-01 RX ADMIN — Medication 10 ML: at 05:43

## 2020-01-01 RX ADMIN — VANCOMYCIN HYDROCHLORIDE 1500 MG: 100 INJECTION, POWDER, LYOPHILIZED, FOR SOLUTION INTRAVENOUS at 06:24

## 2020-01-01 RX ADMIN — Medication: at 17:30

## 2020-01-01 RX ADMIN — HEPARIN SODIUM 5000 UNITS: 5000 INJECTION INTRAVENOUS; SUBCUTANEOUS at 22:26

## 2020-01-01 RX ADMIN — PHENYLEPHRINE HYDROCHLORIDE: 10 INJECTION INTRAVENOUS at 09:28

## 2020-01-01 RX ADMIN — ATORVASTATIN CALCIUM 40 MG: 40 TABLET, FILM COATED ORAL at 21:49

## 2020-01-01 RX ADMIN — AMLODIPINE BESYLATE 5 MG: 5 TABLET ORAL at 10:58

## 2020-01-01 RX ADMIN — OXYCODONE HYDROCHLORIDE AND ACETAMINOPHEN 1000 MG: 500 TABLET ORAL at 22:20

## 2020-01-01 RX ADMIN — SERTRALINE HYDROCHLORIDE 100 MG: 50 TABLET ORAL at 17:18

## 2020-01-01 RX ADMIN — METOPROLOL TARTRATE 50 MG: 50 TABLET, FILM COATED ORAL at 08:26

## 2020-01-01 RX ADMIN — FINASTERIDE 5 MG: 5 TABLET, FILM COATED ORAL at 09:33

## 2020-01-01 RX ADMIN — Medication 10 ML: at 09:40

## 2020-01-01 RX ADMIN — CEPHALEXIN 250 MG: 250 CAPSULE ORAL at 22:21

## 2020-01-01 RX ADMIN — FINASTERIDE 5 MG: 5 TABLET, FILM COATED ORAL at 08:41

## 2020-01-01 RX ADMIN — INSULIN LISPRO 2 UNITS: 100 INJECTION, SOLUTION INTRAVENOUS; SUBCUTANEOUS at 13:08

## 2020-01-01 RX ADMIN — ATORVASTATIN CALCIUM 10 MG: 10 TABLET, FILM COATED ORAL at 22:56

## 2020-01-01 RX ADMIN — INSULIN LISPRO 2 UNITS: 100 INJECTION, SOLUTION INTRAVENOUS; SUBCUTANEOUS at 12:15

## 2020-01-01 RX ADMIN — DOCUSATE SODIUM 50MG AND SENNOSIDES 8.6MG 1 TABLET: 8.6; 5 TABLET, FILM COATED ORAL at 08:08

## 2020-01-01 RX ADMIN — DOXAZOSIN 4 MG: 2 TABLET ORAL at 21:37

## 2020-01-01 RX ADMIN — Medication 400 MG: at 08:10

## 2020-01-01 RX ADMIN — FINASTERIDE 5 MG: 5 TABLET, FILM COATED ORAL at 09:12

## 2020-01-01 RX ADMIN — Medication 10 ML: at 14:58

## 2020-01-01 RX ADMIN — CEPHALEXIN 250 MG: 250 CAPSULE ORAL at 09:35

## 2020-01-01 RX ADMIN — INSULIN LISPRO 3 UNITS: 100 INJECTION, SOLUTION INTRAVENOUS; SUBCUTANEOUS at 22:24

## 2020-01-01 RX ADMIN — SODIUM CHLORIDE 75 ML/HR: 900 INJECTION, SOLUTION INTRAVENOUS at 12:19

## 2020-01-01 RX ADMIN — INSULIN LISPRO 5 UNITS: 100 INJECTION, SOLUTION INTRAVENOUS; SUBCUTANEOUS at 17:58

## 2020-01-01 RX ADMIN — MICONAZOLE NITRATE 2 % TOPICAL POWDER: at 08:34

## 2020-01-01 RX ADMIN — GUAIFENESIN 1200 MG: 600 TABLET, EXTENDED RELEASE ORAL at 22:20

## 2020-01-01 RX ADMIN — FINASTERIDE 5 MG: 5 TABLET, FILM COATED ORAL at 10:21

## 2020-01-01 RX ADMIN — DOXAZOSIN 4 MG: 2 TABLET ORAL at 23:54

## 2020-01-01 RX ADMIN — MICONAZOLE NITRATE 2 % TOPICAL POWDER: at 18:00

## 2020-01-01 RX ADMIN — Medication 10 ML: at 16:34

## 2020-01-01 RX ADMIN — HEPARIN SODIUM 5000 UNITS: 5000 INJECTION INTRAVENOUS; SUBCUTANEOUS at 06:49

## 2020-01-01 RX ADMIN — DEXTROSE MONOHYDRATE 150 MG: 5 INJECTION, SOLUTION INTRAVENOUS at 17:26

## 2020-01-01 RX ADMIN — METOPROLOL TARTRATE 50 MG: 50 TABLET, FILM COATED ORAL at 17:11

## 2020-01-01 RX ADMIN — Medication: at 08:35

## 2020-01-01 RX ADMIN — OXYCODONE HYDROCHLORIDE AND ACETAMINOPHEN 1000 MG: 500 TABLET ORAL at 16:41

## 2020-01-01 RX ADMIN — ASPIRIN 325 MG ORAL TABLET 325 MG: 325 PILL ORAL at 08:41

## 2020-01-01 RX ADMIN — CEPHALEXIN 250 MG: 250 CAPSULE ORAL at 08:10

## 2020-01-01 RX ADMIN — INSULIN GLARGINE 10 UNITS: 100 INJECTION, SOLUTION SUBCUTANEOUS at 22:25

## 2020-01-01 RX ADMIN — DOXAZOSIN 4 MG: 2 TABLET ORAL at 21:49

## 2020-01-01 RX ADMIN — OXYCODONE HYDROCHLORIDE AND ACETAMINOPHEN 1000 MG: 500 TABLET ORAL at 18:00

## 2020-01-01 RX ADMIN — OXYCODONE HYDROCHLORIDE AND ACETAMINOPHEN 1000 MG: 500 TABLET ORAL at 17:34

## 2020-01-01 RX ADMIN — DEXMEDETOMIDINE HYDROCHLORIDE 10 MCG: 100 INJECTION, SOLUTION, CONCENTRATE INTRAVENOUS at 07:51

## 2020-01-01 RX ADMIN — INSULIN LISPRO 4 UNITS: 100 INJECTION, SOLUTION INTRAVENOUS; SUBCUTANEOUS at 22:00

## 2020-01-01 RX ADMIN — Medication 400 MG: at 08:07

## 2020-01-01 RX ADMIN — METOPROLOL TARTRATE 50 MG: 50 TABLET, FILM COATED ORAL at 08:49

## 2020-01-01 RX ADMIN — Medication: at 18:01

## 2020-01-01 RX ADMIN — DOXAZOSIN 4 MG: 2 TABLET ORAL at 21:03

## 2020-01-01 RX ADMIN — INSULIN LISPRO 2 UNITS: 100 INJECTION, SOLUTION INTRAVENOUS; SUBCUTANEOUS at 18:09

## 2020-01-01 RX ADMIN — TEMAZEPAM 15 MG: 15 CAPSULE ORAL at 00:16

## 2020-01-01 RX ADMIN — ASPIRIN 81 MG: 81 TABLET, CHEWABLE ORAL at 09:29

## 2020-01-01 RX ADMIN — LOSARTAN POTASSIUM 50 MG: 50 TABLET, FILM COATED ORAL at 09:15

## 2020-01-01 RX ADMIN — OXYCODONE HYDROCHLORIDE AND ACETAMINOPHEN 500 MG: 500 TABLET ORAL at 17:33

## 2020-01-01 RX ADMIN — DOXAZOSIN 4 MG: 2 TABLET ORAL at 22:21

## 2020-01-01 RX ADMIN — HEPARIN SODIUM 5000 UNITS: 5000 INJECTION INTRAVENOUS; SUBCUTANEOUS at 06:18

## 2020-01-01 RX ADMIN — ATORVASTATIN CALCIUM 10 MG: 10 TABLET, FILM COATED ORAL at 22:25

## 2020-01-01 RX ADMIN — Medication 2 TABLET: at 09:39

## 2020-01-01 RX ADMIN — FINASTERIDE 5 MG: 5 TABLET, FILM COATED ORAL at 09:13

## 2020-01-01 RX ADMIN — DOXAZOSIN 4 MG: 2 TABLET ORAL at 22:23

## 2020-01-01 RX ADMIN — VANCOMYCIN HYDROCHLORIDE 1250 MG: 10 INJECTION, POWDER, LYOPHILIZED, FOR SOLUTION INTRAVENOUS at 17:58

## 2020-01-01 RX ADMIN — DEXAMETHASONE 6 MG: 4 TABLET ORAL at 09:39

## 2020-01-01 RX ADMIN — Medication 10 ML: at 21:04

## 2020-01-01 RX ADMIN — HEPARIN SODIUM 5000 UNITS: 5000 INJECTION INTRAVENOUS; SUBCUTANEOUS at 07:14

## 2020-01-01 RX ADMIN — Medication 10 ML: at 23:09

## 2020-01-01 RX ADMIN — CEFAZOLIN SODIUM 2 G: 300 INJECTION, POWDER, LYOPHILIZED, FOR SOLUTION INTRAVENOUS at 08:33

## 2020-01-01 RX ADMIN — Medication 10 ML: at 10:51

## 2020-01-01 RX ADMIN — INSULIN LISPRO 2 UNITS: 100 INJECTION, SOLUTION INTRAVENOUS; SUBCUTANEOUS at 22:00

## 2020-01-01 RX ADMIN — INSULIN LISPRO 2 UNITS: 100 INJECTION, SOLUTION INTRAVENOUS; SUBCUTANEOUS at 08:09

## 2020-01-01 RX ADMIN — Medication: at 23:54

## 2020-01-01 RX ADMIN — TRAZODONE HYDROCHLORIDE 50 MG: 50 TABLET ORAL at 23:25

## 2020-01-01 RX ADMIN — DOXAZOSIN 4 MG: 2 TABLET ORAL at 22:45

## 2020-01-01 RX ADMIN — OXYCODONE HYDROCHLORIDE AND ACETAMINOPHEN 1000 MG: 500 TABLET ORAL at 09:40

## 2020-01-01 RX ADMIN — DOXAZOSIN 4 MG: 2 TABLET ORAL at 22:20

## 2020-01-01 RX ADMIN — FUROSEMIDE 40 MG: 40 TABLET ORAL at 09:39

## 2020-01-01 RX ADMIN — GUAIFENESIN 1200 MG: 600 TABLET, EXTENDED RELEASE ORAL at 11:54

## 2020-01-01 RX ADMIN — Medication 400 MG: at 08:26

## 2020-01-01 RX ADMIN — DOXAZOSIN 4 MG: 2 TABLET ORAL at 21:43

## 2020-01-01 RX ADMIN — FAMOTIDINE 20 MG: 20 TABLET ORAL at 21:08

## 2020-01-01 RX ADMIN — ATORVASTATIN CALCIUM 10 MG: 10 TABLET, FILM COATED ORAL at 21:57

## 2020-01-01 RX ADMIN — VANCOMYCIN HYDROCHLORIDE 1500 MG: 100 INJECTION, POWDER, LYOPHILIZED, FOR SOLUTION INTRAVENOUS at 19:59

## 2020-01-01 RX ADMIN — GUAIFENESIN 1200 MG: 600 TABLET, EXTENDED RELEASE ORAL at 08:10

## 2020-01-01 RX ADMIN — HEPARIN SODIUM 5000 UNITS: 5000 INJECTION INTRAVENOUS; SUBCUTANEOUS at 06:41

## 2020-01-01 RX ADMIN — AMLODIPINE BESYLATE 5 MG: 5 TABLET ORAL at 09:33

## 2020-01-01 RX ADMIN — HEPARIN SODIUM 5000 UNITS: 5000 INJECTION INTRAVENOUS; SUBCUTANEOUS at 14:54

## 2020-01-01 RX ADMIN — SERTRALINE HYDROCHLORIDE 100 MG: 50 TABLET ORAL at 17:11

## 2020-01-01 RX ADMIN — HEPARIN SODIUM 5000 UNITS: 5000 INJECTION INTRAVENOUS; SUBCUTANEOUS at 21:15

## 2020-01-01 RX ADMIN — INSULIN LISPRO 4 UNITS: 100 INJECTION, SOLUTION INTRAVENOUS; SUBCUTANEOUS at 21:42

## 2020-01-01 RX ADMIN — Medication: at 17:12

## 2020-01-01 RX ADMIN — DEXAMETHASONE 6 MG: 4 TABLET ORAL at 09:13

## 2020-01-01 RX ADMIN — Medication 10 ML: at 08:42

## 2020-01-01 RX ADMIN — Medication 10 ML: at 13:07

## 2020-01-01 RX ADMIN — Medication: at 21:54

## 2020-01-01 RX ADMIN — OXYMETAZOLINE HYDROCHLORIDE 2 SPRAY: 0.05 SPRAY NASAL at 23:34

## 2020-01-01 RX ADMIN — INSULIN LISPRO 7 UNITS: 100 INJECTION, SOLUTION INTRAVENOUS; SUBCUTANEOUS at 12:05

## 2020-01-01 RX ADMIN — INSULIN LISPRO 3 UNITS: 100 INJECTION, SOLUTION INTRAVENOUS; SUBCUTANEOUS at 11:30

## 2020-01-01 RX ADMIN — MICONAZOLE NITRATE 2 % TOPICAL POWDER: at 09:09

## 2020-01-01 RX ADMIN — Medication 400 MG: at 09:14

## 2020-01-01 RX ADMIN — AMLODIPINE BESYLATE 5 MG: 5 TABLET ORAL at 13:15

## 2020-01-01 RX ADMIN — DEXMEDETOMIDINE HYDROCHLORIDE 20 MCG: 100 INJECTION, SOLUTION, CONCENTRATE INTRAVENOUS at 09:50

## 2020-01-01 RX ADMIN — DOXAZOSIN 4 MG: 2 TABLET ORAL at 22:28

## 2020-01-01 RX ADMIN — AZITHROMYCIN MONOHYDRATE 500 MG: 500 INJECTION, POWDER, LYOPHILIZED, FOR SOLUTION INTRAVENOUS at 02:00

## 2020-01-01 RX ADMIN — Medication 10 ML: at 22:01

## 2020-01-01 RX ADMIN — FINASTERIDE 5 MG: 5 TABLET, FILM COATED ORAL at 08:10

## 2020-01-01 RX ADMIN — Medication: at 18:00

## 2020-01-01 RX ADMIN — ASPIRIN 325 MG ORAL TABLET 325 MG: 325 PILL ORAL at 09:40

## 2020-01-01 RX ADMIN — ENOXAPARIN SODIUM 30 MG: 30 INJECTION SUBCUTANEOUS at 21:37

## 2020-01-01 RX ADMIN — INSULIN GLARGINE 10 UNITS: 100 INJECTION, SOLUTION SUBCUTANEOUS at 22:35

## 2020-01-01 RX ADMIN — PHENYLEPHRINE HYDROCHLORIDE 150 MCG/MIN: 10 INJECTION INTRAVENOUS at 00:38

## 2020-01-01 RX ADMIN — OXYCODONE HYDROCHLORIDE AND ACETAMINOPHEN 1000 MG: 500 TABLET ORAL at 08:26

## 2020-01-01 RX ADMIN — OXYCODONE HYDROCHLORIDE AND ACETAMINOPHEN 1000 MG: 500 TABLET ORAL at 09:36

## 2020-01-01 RX ADMIN — FAMOTIDINE 20 MG: 20 TABLET ORAL at 08:10

## 2020-01-01 RX ADMIN — Medication: at 08:08

## 2020-01-01 RX ADMIN — ATORVASTATIN CALCIUM 40 MG: 40 TABLET, FILM COATED ORAL at 21:36

## 2020-01-01 RX ADMIN — LOSARTAN POTASSIUM 12.5 MG: 25 TABLET, FILM COATED ORAL at 10:20

## 2020-01-01 RX ADMIN — INSULIN LISPRO 2 UNITS: 100 INJECTION, SOLUTION INTRAVENOUS; SUBCUTANEOUS at 12:21

## 2020-01-01 RX ADMIN — Medication 10 ML: at 21:13

## 2020-01-01 RX ADMIN — Medication 10 ML: at 21:07

## 2020-01-01 RX ADMIN — Medication 10 ML: at 21:49

## 2020-01-01 RX ADMIN — ASPIRIN 325 MG ORAL TABLET 325 MG: 325 PILL ORAL at 09:33

## 2020-01-01 RX ADMIN — TEMAZEPAM 15 MG: 15 CAPSULE ORAL at 22:59

## 2020-01-01 RX ADMIN — Medication: at 08:50

## 2020-01-01 RX ADMIN — FAMOTIDINE 20 MG: 20 TABLET ORAL at 08:42

## 2020-01-01 RX ADMIN — INSULIN LISPRO 3 UNITS: 100 INJECTION, SOLUTION INTRAVENOUS; SUBCUTANEOUS at 12:38

## 2020-01-01 RX ADMIN — ALBUMIN (HUMAN) 250 ML: 12.5 INJECTION, SOLUTION INTRAVENOUS at 11:15

## 2020-01-01 RX ADMIN — INSULIN LISPRO 3 UNITS: 100 INJECTION, SOLUTION INTRAVENOUS; SUBCUTANEOUS at 16:40

## 2020-01-01 RX ADMIN — SERTRALINE HYDROCHLORIDE 100 MG: 50 TABLET ORAL at 17:39

## 2020-01-01 RX ADMIN — DOXAZOSIN 1 MG: 2 TABLET ORAL at 08:59

## 2020-01-01 RX ADMIN — PHENYLEPHRINE HYDROCHLORIDE 25 MCG/MIN: 10 INJECTION INTRAVENOUS at 08:37

## 2020-01-01 RX ADMIN — OXYCODONE HYDROCHLORIDE AND ACETAMINOPHEN 1000 MG: 500 TABLET ORAL at 17:12

## 2020-01-01 RX ADMIN — POTASSIUM CHLORIDE 20 MEQ: 750 TABLET, FILM COATED, EXTENDED RELEASE ORAL at 17:26

## 2020-01-01 RX ADMIN — LABETALOL HYDROCHLORIDE 100 MG: 100 TABLET, FILM COATED ORAL at 10:21

## 2020-01-01 RX ADMIN — INSULIN LISPRO 3 UNITS: 100 INJECTION, SOLUTION INTRAVENOUS; SUBCUTANEOUS at 17:41

## 2020-01-01 RX ADMIN — VANCOMYCIN HYDROCHLORIDE 1500 MG: 100 INJECTION, POWDER, LYOPHILIZED, FOR SOLUTION INTRAVENOUS at 06:20

## 2020-01-01 RX ADMIN — OXYCODONE HYDROCHLORIDE AND ACETAMINOPHEN 1000 MG: 500 TABLET ORAL at 17:39

## 2020-01-01 RX ADMIN — DOXAZOSIN 4 MG: 2 TABLET ORAL at 21:05

## 2020-01-01 RX ADMIN — HEPARIN SODIUM 5000 UNITS: 5000 INJECTION INTRAVENOUS; SUBCUTANEOUS at 22:25

## 2020-01-01 RX ADMIN — GUAIFENESIN 1200 MG: 600 TABLET, EXTENDED RELEASE ORAL at 09:14

## 2020-01-01 RX ADMIN — METOPROLOL TARTRATE 50 MG: 50 TABLET, FILM COATED ORAL at 20:41

## 2020-01-01 RX ADMIN — Medication 10 ML: at 21:47

## 2020-01-01 RX ADMIN — INSULIN LISPRO 2 UNITS: 100 INJECTION, SOLUTION INTRAVENOUS; SUBCUTANEOUS at 12:42

## 2020-01-01 RX ADMIN — FLUTICASONE PROPIONATE 1 SPRAY: 50 SPRAY, METERED NASAL at 11:51

## 2020-01-01 RX ADMIN — ATORVASTATIN CALCIUM 40 MG: 40 TABLET, FILM COATED ORAL at 22:00

## 2020-01-01 RX ADMIN — INSULIN GLARGINE 15 UNITS: 100 INJECTION, SOLUTION SUBCUTANEOUS at 21:08

## 2020-01-01 RX ADMIN — POTASSIUM CHLORIDE 20 MEQ: 750 TABLET, FILM COATED, EXTENDED RELEASE ORAL at 08:10

## 2020-01-01 RX ADMIN — GUAIFENESIN 1200 MG: 600 TABLET, EXTENDED RELEASE ORAL at 23:06

## 2020-01-01 RX ADMIN — BUDESONIDE 500 MCG: 0.5 INHALANT RESPIRATORY (INHALATION) at 19:28

## 2020-01-01 RX ADMIN — Medication 10 ML: at 15:46

## 2020-01-01 RX ADMIN — GUAIFENESIN 1200 MG: 600 TABLET, EXTENDED RELEASE ORAL at 08:59

## 2020-01-01 RX ADMIN — KETAMINE HYDROCHLORIDE 10 MG: 10 INJECTION, SOLUTION INTRAMUSCULAR; INTRAVENOUS at 07:58

## 2020-01-01 RX ADMIN — POTASSIUM CHLORIDE 20 MEQ: 29.8 INJECTION, SOLUTION INTRAVENOUS at 17:08

## 2020-01-01 RX ADMIN — Medication 2 TABLET: at 09:13

## 2020-01-01 RX ADMIN — Medication 10 ML: at 23:55

## 2020-01-01 RX ADMIN — LOSARTAN POTASSIUM 50 MG: 50 TABLET, FILM COATED ORAL at 08:42

## 2020-01-01 RX ADMIN — Medication 10 ML: at 15:13

## 2020-01-01 RX ADMIN — Medication 2 G: at 08:27

## 2020-01-01 RX ADMIN — ASPIRIN 81 MG: 81 TABLET, CHEWABLE ORAL at 08:41

## 2020-01-01 RX ADMIN — DOXAZOSIN 4 MG: 2 TABLET ORAL at 21:31

## 2020-01-01 RX ADMIN — CEFTRIAXONE SODIUM 1 G: 1 INJECTION, POWDER, FOR SOLUTION INTRAMUSCULAR; INTRAVENOUS at 21:12

## 2020-01-01 RX ADMIN — ATORVASTATIN CALCIUM 40 MG: 40 TABLET, FILM COATED ORAL at 23:54

## 2020-01-01 RX ADMIN — CEPHALEXIN 250 MG: 250 CAPSULE ORAL at 08:42

## 2020-01-01 RX ADMIN — Medication 3 MG: at 22:48

## 2020-01-01 RX ADMIN — ALBUTEROL SULFATE 1 PUFF: 90 AEROSOL, METERED RESPIRATORY (INHALATION) at 20:46

## 2020-01-01 RX ADMIN — INSULIN LISPRO 3 UNITS: 100 INJECTION, SOLUTION INTRAVENOUS; SUBCUTANEOUS at 18:14

## 2020-01-01 RX ADMIN — AMLODIPINE BESYLATE 10 MG: 5 TABLET ORAL at 08:30

## 2020-01-01 RX ADMIN — FAMOTIDINE 20 MG: 20 TABLET ORAL at 08:32

## 2020-01-01 RX ADMIN — DOXAZOSIN 4 MG: 2 TABLET ORAL at 21:39

## 2020-01-01 RX ADMIN — TEMAZEPAM 15 MG: 15 CAPSULE ORAL at 23:10

## 2020-01-01 RX ADMIN — GLIPIZIDE 5 MG: 5 TABLET, FILM COATED, EXTENDED RELEASE ORAL at 09:07

## 2020-01-01 RX ADMIN — LOSARTAN POTASSIUM 12.5 MG: 25 TABLET, FILM COATED ORAL at 09:58

## 2020-01-01 RX ADMIN — Medication 10 ML: at 16:00

## 2020-01-01 RX ADMIN — Medication 10 ML: at 15:47

## 2020-01-01 RX ADMIN — INSULIN LISPRO 2 UNITS: 100 INJECTION, SOLUTION INTRAVENOUS; SUBCUTANEOUS at 13:07

## 2020-01-01 RX ADMIN — DOCUSATE SODIUM 50MG AND SENNOSIDES 8.6MG 1 TABLET: 8.6; 5 TABLET, FILM COATED ORAL at 09:36

## 2020-01-01 RX ADMIN — INSULIN GLARGINE 15 UNITS: 100 INJECTION, SOLUTION SUBCUTANEOUS at 22:02

## 2020-01-01 RX ADMIN — LOSARTAN POTASSIUM 50 MG: 50 TABLET, FILM COATED ORAL at 08:59

## 2020-01-01 RX ADMIN — BUDESONIDE 500 MCG: 0.5 INHALANT RESPIRATORY (INHALATION) at 07:33

## 2020-01-01 RX ADMIN — ASPIRIN 325 MG ORAL TABLET 325 MG: 325 PILL ORAL at 10:20

## 2020-01-01 RX ADMIN — POTASSIUM CHLORIDE 20 MEQ: 750 TABLET, FILM COATED, EXTENDED RELEASE ORAL at 08:41

## 2020-01-01 RX ADMIN — OXYCODONE HYDROCHLORIDE AND ACETAMINOPHEN 1000 MG: 500 TABLET ORAL at 21:25

## 2020-01-01 RX ADMIN — FUROSEMIDE 40 MG: 40 TABLET ORAL at 09:38

## 2020-01-01 RX ADMIN — PROPOFOL 50 MCG/KG/MIN: 10 INJECTION, EMULSION INTRAVENOUS at 08:11

## 2020-01-01 RX ADMIN — ATORVASTATIN CALCIUM 40 MG: 40 TABLET, FILM COATED ORAL at 21:37

## 2020-01-01 RX ADMIN — SERTRALINE HYDROCHLORIDE 100 MG: 50 TABLET ORAL at 18:34

## 2020-01-01 RX ADMIN — INSULIN LISPRO 3 UNITS: 100 INJECTION, SOLUTION INTRAVENOUS; SUBCUTANEOUS at 09:12

## 2020-01-01 RX ADMIN — LOSARTAN POTASSIUM 12.5 MG: 25 TABLET, FILM COATED ORAL at 09:40

## 2020-01-01 RX ADMIN — TEMAZEPAM 15 MG: 15 CAPSULE ORAL at 22:57

## 2020-01-01 RX ADMIN — ATORVASTATIN CALCIUM 10 MG: 10 TABLET, FILM COATED ORAL at 21:11

## 2020-01-01 RX ADMIN — OXYCODONE HYDROCHLORIDE AND ACETAMINOPHEN 1000 MG: 500 TABLET ORAL at 17:27

## 2020-01-01 RX ADMIN — OXYCODONE HYDROCHLORIDE AND ACETAMINOPHEN 1000 MG: 500 TABLET ORAL at 21:03

## 2020-01-01 RX ADMIN — SERTRALINE HYDROCHLORIDE 100 MG: 50 TABLET ORAL at 17:26

## 2020-01-01 RX ADMIN — Medication 400 MG: at 17:29

## 2020-01-01 RX ADMIN — HEPARIN SODIUM 5000 UNITS: 5000 INJECTION INTRAVENOUS; SUBCUTANEOUS at 13:55

## 2020-01-01 RX ADMIN — AMLODIPINE BESYLATE 5 MG: 5 TABLET ORAL at 09:30

## 2020-01-01 RX ADMIN — Medication 10 ML: at 05:14

## 2020-01-01 RX ADMIN — OXYCODONE HYDROCHLORIDE AND ACETAMINOPHEN 1000 MG: 500 TABLET ORAL at 16:44

## 2020-01-01 RX ADMIN — OXYCODONE HYDROCHLORIDE AND ACETAMINOPHEN 1000 MG: 500 TABLET ORAL at 16:49

## 2020-01-01 RX ADMIN — LABETALOL HYDROCHLORIDE 100 MG: 100 TABLET, FILM COATED ORAL at 08:49

## 2020-01-01 RX ADMIN — Medication: at 17:29

## 2020-01-01 RX ADMIN — INSULIN LISPRO 2 UNITS: 100 INJECTION, SOLUTION INTRAVENOUS; SUBCUTANEOUS at 06:37

## 2020-01-01 RX ADMIN — TEMAZEPAM 15 MG: 15 CAPSULE ORAL at 00:01

## 2020-01-01 RX ADMIN — HEPARIN SODIUM 5000 UNITS: 5000 INJECTION INTRAVENOUS; SUBCUTANEOUS at 07:17

## 2020-01-01 RX ADMIN — METOPROLOL TARTRATE 50 MG: 50 TABLET, FILM COATED ORAL at 17:26

## 2020-01-01 RX ADMIN — FUROSEMIDE 40 MG: 10 INJECTION, SOLUTION INTRAMUSCULAR; INTRAVENOUS at 17:28

## 2020-01-01 RX ADMIN — AMLODIPINE BESYLATE 5 MG: 5 TABLET ORAL at 10:22

## 2020-01-01 RX ADMIN — INSULIN LISPRO 4 UNITS: 100 INJECTION, SOLUTION INTRAVENOUS; SUBCUTANEOUS at 22:51

## 2020-01-01 RX ADMIN — Medication: at 09:09

## 2020-01-01 RX ADMIN — HEPARIN SODIUM 5000 UNITS: 5000 INJECTION INTRAVENOUS; SUBCUTANEOUS at 22:27

## 2020-01-01 RX ADMIN — ASPIRIN 325 MG ORAL TABLET 325 MG: 325 PILL ORAL at 09:30

## 2020-01-01 RX ADMIN — Medication 10 ML: at 16:21

## 2020-01-01 RX ADMIN — FUROSEMIDE 40 MG: 10 INJECTION, SOLUTION INTRAMUSCULAR; INTRAVENOUS at 08:09

## 2020-01-01 RX ADMIN — INSULIN LISPRO 3 UNITS: 100 INJECTION, SOLUTION INTRAVENOUS; SUBCUTANEOUS at 07:30

## 2020-01-01 RX ADMIN — ATORVASTATIN CALCIUM 40 MG: 40 TABLET, FILM COATED ORAL at 21:33

## 2020-01-01 RX ADMIN — HYDROCHLOROTHIAZIDE 12.5 MG: 25 TABLET ORAL at 08:48

## 2020-01-01 RX ADMIN — ALBUMIN (HUMAN) 250 ML: 12.5 INJECTION, SOLUTION INTRAVENOUS at 10:56

## 2020-01-01 RX ADMIN — INSULIN LISPRO 3 UNITS: 100 INJECTION, SOLUTION INTRAVENOUS; SUBCUTANEOUS at 08:48

## 2020-01-01 RX ADMIN — TRAZODONE HYDROCHLORIDE 50 MG: 50 TABLET ORAL at 00:01

## 2020-01-01 RX ADMIN — SERTRALINE HYDROCHLORIDE 100 MG: 50 TABLET ORAL at 17:46

## 2020-01-01 RX ADMIN — Medication 10 ML: at 08:46

## 2020-01-01 RX ADMIN — OXYCODONE HYDROCHLORIDE AND ACETAMINOPHEN 1000 MG: 500 TABLET ORAL at 17:01

## 2020-01-01 RX ADMIN — MAGNESIUM SULFATE HEPTAHYDRATE 1 G: 1 INJECTION, SOLUTION INTRAVENOUS at 14:35

## 2020-01-01 RX ADMIN — METOPROLOL TARTRATE 50 MG: 50 TABLET, FILM COATED ORAL at 08:41

## 2020-01-01 RX ADMIN — OXYCODONE HYDROCHLORIDE AND ACETAMINOPHEN 1000 MG: 500 TABLET ORAL at 09:57

## 2020-01-01 RX ADMIN — ASPIRIN 325 MG ORAL TABLET 325 MG: 325 PILL ORAL at 08:48

## 2020-01-01 RX ADMIN — SODIUM CHLORIDE 2.4 UNITS/HR: 9 INJECTION, SOLUTION INTRAVENOUS at 09:36

## 2020-01-01 RX ADMIN — INSULIN LISPRO 2 UNITS: 100 INJECTION, SOLUTION INTRAVENOUS; SUBCUTANEOUS at 17:39

## 2020-01-01 RX ADMIN — Medication 400 MG: at 08:32

## 2020-01-01 RX ADMIN — INSULIN LISPRO 2 UNITS: 100 INJECTION, SOLUTION INTRAVENOUS; SUBCUTANEOUS at 17:17

## 2020-01-01 RX ADMIN — LOSARTAN POTASSIUM 50 MG: 50 TABLET, FILM COATED ORAL at 09:31

## 2020-01-01 RX ADMIN — ENOXAPARIN SODIUM 40 MG: 40 INJECTION SUBCUTANEOUS at 21:49

## 2020-01-01 RX ADMIN — INSULIN LISPRO 2 UNITS: 100 INJECTION, SOLUTION INTRAVENOUS; SUBCUTANEOUS at 18:15

## 2020-01-01 RX ADMIN — CEFTRIAXONE SODIUM 1 G: 1 INJECTION, POWDER, FOR SOLUTION INTRAMUSCULAR; INTRAVENOUS at 21:29

## 2020-01-01 RX ADMIN — HEPARIN SODIUM 5000 UNITS: 5000 INJECTION INTRAVENOUS; SUBCUTANEOUS at 14:47

## 2020-01-01 RX ADMIN — Medication: at 08:49

## 2020-01-01 RX ADMIN — Medication 10 ML: at 21:54

## 2020-01-01 RX ADMIN — Medication 10 ML: at 07:13

## 2020-01-01 RX ADMIN — DEXMEDETOMIDINE HYDROCHLORIDE 10 MCG: 100 INJECTION, SOLUTION, CONCENTRATE INTRAVENOUS at 08:47

## 2020-01-01 RX ADMIN — GUAIFENESIN 1200 MG: 600 TABLET, EXTENDED RELEASE ORAL at 09:31

## 2020-01-01 RX ADMIN — VANCOMYCIN HYDROCHLORIDE 1500 MG: 100 INJECTION, POWDER, LYOPHILIZED, FOR SOLUTION INTRAVENOUS at 13:56

## 2020-01-01 RX ADMIN — OXYCODONE HYDROCHLORIDE AND ACETAMINOPHEN 500 MG: 500 TABLET ORAL at 17:58

## 2020-01-01 RX ADMIN — Medication 10 ML: at 07:15

## 2020-01-01 RX ADMIN — HEPARIN SODIUM 5000 UNITS: 5000 INJECTION INTRAVENOUS; SUBCUTANEOUS at 21:49

## 2020-01-01 RX ADMIN — EPINEPHRINE 10 MCG/MIN: 1 INJECTION INTRAMUSCULAR; INTRAVENOUS; SUBCUTANEOUS at 10:35

## 2020-01-01 RX ADMIN — Medication 1 TABLET: at 09:31

## 2020-01-01 RX ADMIN — OXYCODONE HYDROCHLORIDE AND ACETAMINOPHEN 1000 MG: 500 TABLET ORAL at 15:56

## 2020-01-01 RX ADMIN — AMLODIPINE BESYLATE 5 MG: 5 TABLET ORAL at 08:13

## 2020-01-01 RX ADMIN — DOCUSATE SODIUM 50MG AND SENNOSIDES 8.6MG 1 TABLET: 8.6; 5 TABLET, FILM COATED ORAL at 08:48

## 2020-01-01 RX ADMIN — Medication 10 ML: at 07:18

## 2020-01-01 RX ADMIN — ZINC SULFATE 220 MG (50 MG) CAPSULE 1 CAPSULE: CAPSULE at 08:49

## 2020-01-01 RX ADMIN — HEPARIN SODIUM 5000 UNITS: 5000 INJECTION INTRAVENOUS; SUBCUTANEOUS at 21:45

## 2020-01-01 RX ADMIN — ATORVASTATIN CALCIUM 40 MG: 40 TABLET, FILM COATED ORAL at 22:11

## 2020-01-01 RX ADMIN — HEPARIN SODIUM 5000 UNITS: 5000 INJECTION INTRAVENOUS; SUBCUTANEOUS at 13:07

## 2020-01-01 RX ADMIN — FAMOTIDINE 20 MG: 10 INJECTION INTRAVENOUS at 20:17

## 2020-01-01 RX ADMIN — TEMAZEPAM 15 MG: 15 CAPSULE ORAL at 04:51

## 2020-01-01 RX ADMIN — INSULIN LISPRO 5 UNITS: 100 INJECTION, SOLUTION INTRAVENOUS; SUBCUTANEOUS at 11:51

## 2020-01-01 RX ADMIN — CEFAZOLIN SODIUM 2 G: 300 INJECTION, POWDER, LYOPHILIZED, FOR SOLUTION INTRAVENOUS at 05:46

## 2020-01-01 RX ADMIN — FLUTICASONE PROPIONATE 1 SPRAY: 50 SPRAY, METERED NASAL at 09:41

## 2020-01-01 RX ADMIN — FAMOTIDINE 20 MG: 20 TABLET ORAL at 09:36

## 2020-01-01 RX ADMIN — HEPARIN SODIUM 5000 UNITS: 5000 INJECTION INTRAVENOUS; SUBCUTANEOUS at 21:56

## 2020-01-01 RX ADMIN — LOSARTAN POTASSIUM 25 MG: 50 TABLET, FILM COATED ORAL at 10:59

## 2020-01-01 RX ADMIN — LOSARTAN POTASSIUM 12.5 MG: 25 TABLET, FILM COATED ORAL at 09:26

## 2020-01-01 RX ADMIN — INSULIN GLARGINE 10 UNITS: 100 INJECTION, SOLUTION SUBCUTANEOUS at 12:05

## 2020-01-01 RX ADMIN — HEPARIN SODIUM 5000 UNITS: 5000 INJECTION INTRAVENOUS; SUBCUTANEOUS at 17:22

## 2020-01-01 RX ADMIN — LOSARTAN POTASSIUM 50 MG: 50 TABLET, FILM COATED ORAL at 09:36

## 2020-01-01 RX ADMIN — SODIUM CHLORIDE 1000 ML: 900 INJECTION, SOLUTION INTRAVENOUS at 14:11

## 2020-01-01 RX ADMIN — DOXAZOSIN 4 MG: 2 TABLET ORAL at 20:21

## 2020-01-01 RX ADMIN — VANCOMYCIN HYDROCHLORIDE 2000 MG: 10 INJECTION, POWDER, LYOPHILIZED, FOR SOLUTION INTRAVENOUS at 12:07

## 2020-01-01 RX ADMIN — FINASTERIDE 5 MG: 5 TABLET, FILM COATED ORAL at 09:30

## 2020-01-01 RX ADMIN — Medication: at 22:52

## 2020-01-01 RX ADMIN — OXYCODONE HYDROCHLORIDE AND ACETAMINOPHEN 1000 MG: 500 TABLET ORAL at 08:47

## 2020-01-01 RX ADMIN — OXYCODONE HYDROCHLORIDE AND ACETAMINOPHEN 1000 MG: 500 TABLET ORAL at 08:10

## 2020-01-01 RX ADMIN — ATORVASTATIN CALCIUM 40 MG: 40 TABLET, FILM COATED ORAL at 23:06

## 2020-01-01 RX ADMIN — CEFAZOLIN SODIUM 2 G: 300 INJECTION, POWDER, LYOPHILIZED, FOR SOLUTION INTRAVENOUS at 18:14

## 2020-01-01 RX ADMIN — SERTRALINE HYDROCHLORIDE 100 MG: 50 TABLET ORAL at 17:33

## 2020-01-01 RX ADMIN — OXYCODONE HYDROCHLORIDE AND ACETAMINOPHEN 1000 MG: 500 TABLET ORAL at 21:39

## 2020-01-01 RX ADMIN — INSULIN GLARGINE 20 UNITS: 100 INJECTION, SOLUTION SUBCUTANEOUS at 21:39

## 2020-01-01 RX ADMIN — ASPIRIN 81 MG: 81 TABLET, CHEWABLE ORAL at 08:59

## 2020-01-01 RX ADMIN — LOSARTAN POTASSIUM 12.5 MG: 25 TABLET, FILM COATED ORAL at 08:49

## 2020-01-01 RX ADMIN — Medication 1 TABLET: at 09:15

## 2020-01-01 RX ADMIN — OXYCODONE HYDROCHLORIDE AND ACETAMINOPHEN 1000 MG: 500 TABLET ORAL at 16:54

## 2020-01-01 RX ADMIN — FINASTERIDE 5 MG: 5 TABLET, FILM COATED ORAL at 08:32

## 2020-01-01 RX ADMIN — TEMAZEPAM 15 MG: 15 CAPSULE ORAL at 21:07

## 2020-01-01 RX ADMIN — INSULIN LISPRO 2 UNITS: 100 INJECTION, SOLUTION INTRAVENOUS; SUBCUTANEOUS at 16:49

## 2020-01-01 RX ADMIN — HEPARIN SODIUM 5000 UNITS: 5000 INJECTION INTRAVENOUS; SUBCUTANEOUS at 06:24

## 2020-01-01 RX ADMIN — OXYCODONE HYDROCHLORIDE AND ACETAMINOPHEN 1000 MG: 500 TABLET ORAL at 21:30

## 2020-01-01 RX ADMIN — INSULIN LISPRO 2 UNITS: 100 INJECTION, SOLUTION INTRAVENOUS; SUBCUTANEOUS at 07:14

## 2020-01-01 RX ADMIN — HYDROCHLOROTHIAZIDE 12.5 MG: 25 TABLET ORAL at 01:00

## 2020-01-01 RX ADMIN — FUROSEMIDE 40 MG: 10 INJECTION, SOLUTION INTRAMUSCULAR; INTRAVENOUS at 17:11

## 2020-01-01 RX ADMIN — FINASTERIDE 5 MG: 5 TABLET, FILM COATED ORAL at 08:48

## 2020-01-01 RX ADMIN — INSULIN LISPRO 2 UNITS: 100 INJECTION, SOLUTION INTRAVENOUS; SUBCUTANEOUS at 17:20

## 2020-01-01 RX ADMIN — FAMOTIDINE 20 MG: 20 TABLET ORAL at 21:39

## 2020-01-01 RX ADMIN — INSULIN LISPRO 2 UNITS: 100 INJECTION, SOLUTION INTRAVENOUS; SUBCUTANEOUS at 09:37

## 2020-01-01 RX ADMIN — DOXAZOSIN 4 MG: 2 TABLET ORAL at 21:15

## 2020-01-01 RX ADMIN — Medication 10 ML: at 23:10

## 2020-01-01 RX ADMIN — INSULIN GLARGINE 15 UNITS: 100 INJECTION, SOLUTION SUBCUTANEOUS at 21:31

## 2020-01-01 RX ADMIN — Medication 10 ML: at 16:45

## 2020-01-01 RX ADMIN — FUROSEMIDE 40 MG: 10 INJECTION, SOLUTION INTRAMUSCULAR; INTRAVENOUS at 05:03

## 2020-01-01 RX ADMIN — MICONAZOLE NITRATE 2 % TOPICAL POWDER: at 09:40

## 2020-01-01 RX ADMIN — ASPIRIN 81 MG: 81 TABLET, CHEWABLE ORAL at 09:05

## 2020-01-01 RX ADMIN — POTASSIUM CHLORIDE 20 MEQ: 750 TABLET, FILM COATED, EXTENDED RELEASE ORAL at 17:29

## 2020-01-01 RX ADMIN — METOPROLOL TARTRATE 50 MG: 50 TABLET, FILM COATED ORAL at 09:31

## 2020-01-01 RX ADMIN — FUROSEMIDE 40 MG: 10 INJECTION, SOLUTION INTRAMUSCULAR; INTRAVENOUS at 17:26

## 2020-01-01 RX ADMIN — CEPHALEXIN 250 MG: 250 CAPSULE ORAL at 21:39

## 2020-01-01 RX ADMIN — Medication 400 MG: at 08:42

## 2020-01-01 RX ADMIN — ZINC SULFATE 220 MG (50 MG) CAPSULE 1 CAPSULE: CAPSULE at 11:51

## 2020-01-01 RX ADMIN — FINASTERIDE 5 MG: 5 TABLET, FILM COATED ORAL at 09:08

## 2020-01-01 RX ADMIN — Medication 2 TABLET: at 08:49

## 2020-01-01 RX ADMIN — SERTRALINE HYDROCHLORIDE 100 MG: 50 TABLET ORAL at 08:30

## 2020-01-01 RX ADMIN — IOPAMIDOL 120 ML: 755 INJECTION, SOLUTION INTRAVENOUS at 17:08

## 2020-01-01 RX ADMIN — METOPROLOL TARTRATE 25 MG: 25 TABLET, FILM COATED ORAL at 17:18

## 2020-01-01 RX ADMIN — Medication 1 PACKET: at 11:45

## 2020-01-01 RX ADMIN — GUAIFENESIN 1200 MG: 600 TABLET, EXTENDED RELEASE ORAL at 21:39

## 2020-01-01 RX ADMIN — BACITRACIN 1 PACKET: 500 OINTMENT TOPICAL at 11:45

## 2020-01-01 RX ADMIN — ASPIRIN 81 MG: 81 TABLET, CHEWABLE ORAL at 08:30

## 2020-01-01 RX ADMIN — DOCUSATE SODIUM 50MG AND SENNOSIDES 8.6MG 1 TABLET: 8.6; 5 TABLET, FILM COATED ORAL at 17:28

## 2020-01-01 RX ADMIN — Medication 10 ML: at 22:29

## 2020-01-01 RX ADMIN — HYDROCHLOROTHIAZIDE 12.5 MG: 25 TABLET ORAL at 09:40

## 2020-01-01 RX ADMIN — SERTRALINE HYDROCHLORIDE 100 MG: 50 TABLET ORAL at 09:05

## 2020-01-01 RX ADMIN — METOPROLOL TARTRATE 50 MG: 50 TABLET, FILM COATED ORAL at 18:33

## 2020-01-01 RX ADMIN — ENOXAPARIN SODIUM 40 MG: 40 INJECTION SUBCUTANEOUS at 09:39

## 2020-01-01 RX ADMIN — ENOXAPARIN SODIUM 30 MG: 30 INJECTION SUBCUTANEOUS at 08:49

## 2020-01-01 RX ADMIN — INSULIN GLARGINE 15 UNITS: 100 INJECTION, SOLUTION SUBCUTANEOUS at 21:56

## 2020-01-01 RX ADMIN — AMLODIPINE BESYLATE 5 MG: 5 TABLET ORAL at 08:22

## 2020-01-01 RX ADMIN — DOXAZOSIN 1 MG: 2 TABLET ORAL at 09:06

## 2020-01-01 RX ADMIN — GUAIFENESIN 1200 MG: 600 TABLET, EXTENDED RELEASE ORAL at 08:26

## 2020-01-01 RX ADMIN — FUROSEMIDE 40 MG: 10 INJECTION, SOLUTION INTRAMUSCULAR; INTRAVENOUS at 11:54

## 2020-01-01 RX ADMIN — ACETAMINOPHEN 650 MG: 325 TABLET ORAL at 23:10

## 2020-01-01 RX ADMIN — OXYCODONE HYDROCHLORIDE AND ACETAMINOPHEN 1000 MG: 500 TABLET ORAL at 08:07

## 2020-01-01 RX ADMIN — VANCOMYCIN HYDROCHLORIDE 1250 MG: 10 INJECTION, POWDER, LYOPHILIZED, FOR SOLUTION INTRAVENOUS at 09:06

## 2020-01-01 RX ADMIN — OXYCODONE HYDROCHLORIDE AND ACETAMINOPHEN 1000 MG: 500 TABLET ORAL at 17:08

## 2020-01-01 RX ADMIN — OXYCODONE HYDROCHLORIDE AND ACETAMINOPHEN 1000 MG: 500 TABLET ORAL at 10:22

## 2020-01-01 RX ADMIN — HEPARIN SODIUM 5000 UNITS: 5000 INJECTION INTRAVENOUS; SUBCUTANEOUS at 13:08

## 2020-01-01 RX ADMIN — Medication 10 ML: at 21:57

## 2020-01-01 RX ADMIN — FINASTERIDE 5 MG: 5 TABLET, FILM COATED ORAL at 09:40

## 2020-01-01 RX ADMIN — Medication 10 ML: at 05:25

## 2020-01-01 RX ADMIN — OXYCODONE HYDROCHLORIDE AND ACETAMINOPHEN 1000 MG: 500 TABLET ORAL at 22:44

## 2020-01-01 RX ADMIN — OXYCODONE HYDROCHLORIDE AND ACETAMINOPHEN 1000 MG: 500 TABLET ORAL at 08:42

## 2020-01-01 RX ADMIN — SODIUM CHLORIDE 75 ML/HR: 900 INJECTION, SOLUTION INTRAVENOUS at 21:46

## 2020-01-01 RX ADMIN — Medication 10 ML: at 05:07

## 2020-01-01 RX ADMIN — OXYCODONE HYDROCHLORIDE AND ACETAMINOPHEN 1000 MG: 500 TABLET ORAL at 16:34

## 2020-01-01 RX ADMIN — DEXAMETHASONE 6 MG: 4 TABLET ORAL at 18:34

## 2020-01-01 RX ADMIN — METOPROLOL TARTRATE 50 MG: 50 TABLET, FILM COATED ORAL at 18:10

## 2020-01-01 RX ADMIN — CEFAZOLIN SODIUM 2 G: 300 INJECTION, POWDER, LYOPHILIZED, FOR SOLUTION INTRAVENOUS at 17:08

## 2020-01-01 RX ADMIN — CEFTRIAXONE SODIUM 1 G: 1 INJECTION, POWDER, FOR SOLUTION INTRAMUSCULAR; INTRAVENOUS at 22:27

## 2020-01-01 RX ADMIN — DOCUSATE SODIUM 50MG AND SENNOSIDES 8.6MG 1 TABLET: 8.6; 5 TABLET, FILM COATED ORAL at 17:01

## 2020-01-01 RX ADMIN — VANCOMYCIN HYDROCHLORIDE 2000 MG: 10 INJECTION, POWDER, LYOPHILIZED, FOR SOLUTION INTRAVENOUS at 08:42

## 2020-01-01 RX ADMIN — OXYCODONE HYDROCHLORIDE AND ACETAMINOPHEN 1000 MG: 500 TABLET ORAL at 21:15

## 2020-01-01 RX ADMIN — SERTRALINE HYDROCHLORIDE 100 MG: 50 TABLET ORAL at 17:12

## 2020-01-01 RX ADMIN — SERTRALINE HYDROCHLORIDE 100 MG: 50 TABLET ORAL at 17:29

## 2020-01-01 RX ADMIN — INSULIN LISPRO 2 UNITS: 100 INJECTION, SOLUTION INTRAVENOUS; SUBCUTANEOUS at 16:30

## 2020-01-01 RX ADMIN — HEPARIN SODIUM 5000 UNITS: 5000 INJECTION INTRAVENOUS; SUBCUTANEOUS at 22:45

## 2020-01-01 RX ADMIN — SERTRALINE HYDROCHLORIDE 100 MG: 50 TABLET ORAL at 08:59

## 2020-01-01 RX ADMIN — INSULIN GLARGINE 25 UNITS: 100 INJECTION, SOLUTION SUBCUTANEOUS at 22:24

## 2020-01-01 RX ADMIN — CEPHALEXIN 250 MG: 250 CAPSULE ORAL at 21:31

## 2020-01-01 RX ADMIN — OXYCODONE HYDROCHLORIDE AND ACETAMINOPHEN 1000 MG: 500 TABLET ORAL at 21:05

## 2020-01-01 RX ADMIN — COLLAGENASE SANTYL: 250 OINTMENT TOPICAL at 09:44

## 2020-01-01 RX ADMIN — HYDROCHLOROTHIAZIDE 12.5 MG: 25 TABLET ORAL at 10:21

## 2020-01-01 RX ADMIN — FAMOTIDINE 20 MG: 20 TABLET ORAL at 21:43

## 2020-01-01 RX ADMIN — GUAIFENESIN 1200 MG: 600 TABLET, EXTENDED RELEASE ORAL at 09:36

## 2020-01-01 RX ADMIN — METOPROLOL TARTRATE 25 MG: 25 TABLET, FILM COATED ORAL at 18:10

## 2020-01-01 RX ADMIN — OXYCODONE HYDROCHLORIDE AND ACETAMINOPHEN 500 MG: 500 TABLET ORAL at 09:39

## 2020-01-01 RX ADMIN — CEFEPIME HYDROCHLORIDE 2 G: 2 INJECTION, POWDER, FOR SOLUTION INTRAVENOUS at 05:00

## 2020-01-01 RX ADMIN — ATORVASTATIN CALCIUM 40 MG: 40 TABLET, FILM COATED ORAL at 22:20

## 2020-01-01 RX ADMIN — GUAIFENESIN 1200 MG: 600 TABLET, EXTENDED RELEASE ORAL at 08:30

## 2020-01-01 RX ADMIN — INSULIN GLARGINE 15 UNITS: 100 INJECTION, SOLUTION SUBCUTANEOUS at 22:23

## 2020-01-01 RX ADMIN — POTASSIUM CHLORIDE 40 MEQ: 750 TABLET, FILM COATED, EXTENDED RELEASE ORAL at 20:41

## 2020-01-01 RX ADMIN — Medication 400 MG: at 09:07

## 2020-01-01 RX ADMIN — DOCUSATE SODIUM 50MG AND SENNOSIDES 8.6MG 1 TABLET: 8.6; 5 TABLET, FILM COATED ORAL at 08:10

## 2020-01-01 RX ADMIN — POTASSIUM CHLORIDE 20 MEQ: 750 TABLET, FILM COATED, EXTENDED RELEASE ORAL at 17:11

## 2020-01-01 RX ADMIN — INSULIN LISPRO 5 UNITS: 100 INJECTION, SOLUTION INTRAVENOUS; SUBCUTANEOUS at 12:04

## 2020-01-01 RX ADMIN — Medication 10 ML: at 22:26

## 2020-01-01 RX ADMIN — SODIUM CHLORIDE, POTASSIUM CHLORIDE, SODIUM LACTATE AND CALCIUM CHLORIDE: 600; 310; 30; 20 INJECTION, SOLUTION INTRAVENOUS at 07:33

## 2020-01-01 RX ADMIN — SODIUM CHLORIDE 100 ML: 900 INJECTION, SOLUTION INTRAVENOUS at 17:09

## 2020-01-01 RX ADMIN — GUAIFENESIN 1200 MG: 600 TABLET, EXTENDED RELEASE ORAL at 22:11

## 2020-01-01 RX ADMIN — FUROSEMIDE 20 MG: 10 INJECTION, SOLUTION INTRAMUSCULAR; INTRAVENOUS at 08:49

## 2020-01-01 RX ADMIN — METOPROLOL TARTRATE 50 MG: 50 TABLET, FILM COATED ORAL at 17:01

## 2020-01-01 RX ADMIN — TRAZODONE HYDROCHLORIDE 50 MG: 50 TABLET ORAL at 22:24

## 2020-01-01 RX ADMIN — Medication 400 MG: at 09:41

## 2020-01-01 RX ADMIN — POTASSIUM CHLORIDE 40 MEQ: 750 TABLET, FILM COATED, EXTENDED RELEASE ORAL at 18:00

## 2020-01-01 RX ADMIN — METOPROLOL TARTRATE 50 MG: 50 TABLET, FILM COATED ORAL at 09:13

## 2020-01-01 RX ADMIN — ATORVASTATIN CALCIUM 10 MG: 10 TABLET, FILM COATED ORAL at 21:03

## 2020-01-01 RX ADMIN — SERTRALINE HYDROCHLORIDE 100 MG: 50 TABLET ORAL at 18:27

## 2020-01-01 RX ADMIN — Medication: at 15:46

## 2020-01-01 RX ADMIN — Medication: at 09:00

## 2020-01-01 RX ADMIN — INSULIN LISPRO 3 UNITS: 100 INJECTION, SOLUTION INTRAVENOUS; SUBCUTANEOUS at 12:15

## 2020-01-01 RX ADMIN — ASPIRIN 81 MG: 81 TABLET, CHEWABLE ORAL at 08:42

## 2020-01-01 RX ADMIN — Medication 10 ML: at 06:38

## 2020-01-01 RX ADMIN — TEMAZEPAM 15 MG: 15 CAPSULE ORAL at 00:04

## 2020-01-01 RX ADMIN — AMLODIPINE BESYLATE 10 MG: 5 TABLET ORAL at 09:06

## 2020-01-01 RX ADMIN — ENOXAPARIN SODIUM 40 MG: 40 INJECTION SUBCUTANEOUS at 20:21

## 2020-01-01 RX ADMIN — FAMOTIDINE 20 MG: 10 INJECTION INTRAVENOUS at 14:35

## 2020-01-01 RX ADMIN — Medication 10 ML: at 21:48

## 2020-01-01 RX ADMIN — ARFORMOTEROL TARTRATE 15 MCG: 15 SOLUTION RESPIRATORY (INHALATION) at 10:05

## 2020-01-01 RX ADMIN — POTASSIUM CHLORIDE 40 MEQ: 750 TABLET, FILM COATED, EXTENDED RELEASE ORAL at 09:56

## 2020-01-01 RX ADMIN — FINASTERIDE 5 MG: 5 TABLET, FILM COATED ORAL at 08:22

## 2020-01-01 RX ADMIN — INSULIN LISPRO 3 UNITS: 100 INJECTION, SOLUTION INTRAVENOUS; SUBCUTANEOUS at 00:46

## 2020-01-01 RX ADMIN — FAMOTIDINE 20 MG: 20 TABLET ORAL at 08:08

## 2020-01-01 RX ADMIN — CEFEPIME HYDROCHLORIDE 2 G: 2 INJECTION, POWDER, FOR SOLUTION INTRAVENOUS at 18:33

## 2020-01-01 RX ADMIN — LOSARTAN POTASSIUM 50 MG: 50 TABLET, FILM COATED ORAL at 09:29

## 2020-01-01 RX ADMIN — PHENYLEPHRINE HYDROCHLORIDE 175 MCG/MIN: 10 INJECTION INTRAVENOUS at 02:45

## 2020-01-01 RX ADMIN — ATORVASTATIN CALCIUM 10 MG: 10 TABLET, FILM COATED ORAL at 22:20

## 2020-01-01 RX ADMIN — LOSARTAN POTASSIUM 100 MG: 50 TABLET, FILM COATED ORAL at 08:34

## 2020-01-01 RX ADMIN — SERTRALINE HYDROCHLORIDE 100 MG: 50 TABLET ORAL at 17:07

## 2020-01-01 RX ADMIN — LABETALOL HYDROCHLORIDE 100 MG: 100 TABLET, FILM COATED ORAL at 08:41

## 2020-01-01 RX ADMIN — CEPHALEXIN 250 MG: 250 CAPSULE ORAL at 18:00

## 2020-01-01 RX ADMIN — TEMAZEPAM 15 MG: 15 CAPSULE ORAL at 22:34

## 2020-01-01 RX ADMIN — TEMAZEPAM 15 MG: 15 CAPSULE ORAL at 23:09

## 2020-01-01 RX ADMIN — POTASSIUM CHLORIDE 40 MEQ: 750 TABLET, FILM COATED, EXTENDED RELEASE ORAL at 09:36

## 2020-01-01 RX ADMIN — PHENYLEPHRINE HYDROCHLORIDE 100 MCG/MIN: 10 INJECTION INTRAVENOUS at 13:45

## 2020-01-01 RX ADMIN — FAMOTIDINE 20 MG: 20 TABLET ORAL at 08:26

## 2020-01-01 RX ADMIN — DOCUSATE SODIUM 50MG AND SENNOSIDES 8.6MG 1 TABLET: 8.6; 5 TABLET, FILM COATED ORAL at 08:41

## 2020-01-01 RX ADMIN — OXYCODONE HYDROCHLORIDE AND ACETAMINOPHEN 500 MG: 500 TABLET ORAL at 17:05

## 2020-01-01 RX ADMIN — ASPIRIN 81 MG: 81 TABLET, CHEWABLE ORAL at 08:48

## 2020-01-01 RX ADMIN — OXYCODONE HYDROCHLORIDE AND ACETAMINOPHEN 1000 MG: 500 TABLET ORAL at 22:25

## 2020-01-01 RX ADMIN — METOPROLOL TARTRATE 25 MG: 25 TABLET, FILM COATED ORAL at 08:34

## 2020-01-01 RX ADMIN — DOCUSATE SODIUM 50MG AND SENNOSIDES 8.6MG 1 TABLET: 8.6; 5 TABLET, FILM COATED ORAL at 17:12

## 2020-01-01 RX ADMIN — HEPARIN SODIUM 5000 UNITS: 5000 INJECTION INTRAVENOUS; SUBCUTANEOUS at 15:54

## 2020-01-01 RX ADMIN — TEMAZEPAM 15 MG: 15 CAPSULE ORAL at 23:25

## 2020-01-01 RX ADMIN — DEXAMETHASONE 6 MG: 4 TABLET ORAL at 08:49

## 2020-01-01 RX ADMIN — ZINC SULFATE 220 MG (50 MG) CAPSULE 1 CAPSULE: CAPSULE at 09:40

## 2020-01-01 RX ADMIN — POTASSIUM CHLORIDE 40 MEQ: 750 TABLET, FILM COATED, EXTENDED RELEASE ORAL at 08:26

## 2020-01-01 RX ADMIN — HEPARIN SODIUM 5000 UNITS: 5000 INJECTION INTRAVENOUS; SUBCUTANEOUS at 14:33

## 2020-01-01 RX ADMIN — DOCUSATE SODIUM 50MG AND SENNOSIDES 8.6MG 1 TABLET: 8.6; 5 TABLET, FILM COATED ORAL at 18:00

## 2020-01-01 RX ADMIN — AMIODARONE HYDROCHLORIDE 0.5 MG/MIN: 50 INJECTION, SOLUTION INTRAVENOUS at 13:00

## 2020-01-01 RX ADMIN — DOXAZOSIN 1 MG: 2 TABLET ORAL at 09:30

## 2020-01-01 RX ADMIN — METOPROLOL TARTRATE 50 MG: 50 TABLET, FILM COATED ORAL at 17:05

## 2020-01-01 RX ADMIN — METOPROLOL TARTRATE 50 MG: 50 TABLET, FILM COATED ORAL at 08:59

## 2020-01-01 RX ADMIN — ATORVASTATIN CALCIUM 10 MG: 10 TABLET, FILM COATED ORAL at 21:15

## 2020-01-01 RX ADMIN — METOPROLOL TARTRATE 50 MG: 50 TABLET, FILM COATED ORAL at 08:42

## 2020-01-01 RX ADMIN — INSULIN GLARGINE 10 UNITS: 100 INJECTION, SOLUTION SUBCUTANEOUS at 22:42

## 2020-01-01 RX ADMIN — METOPROLOL TARTRATE 50 MG: 50 TABLET, FILM COATED ORAL at 18:00

## 2020-01-01 RX ADMIN — DOCUSATE SODIUM 50MG AND SENNOSIDES 8.6MG 1 TABLET: 8.6; 5 TABLET, FILM COATED ORAL at 08:26

## 2020-01-01 RX ADMIN — CEFTRIAXONE SODIUM 1 G: 1 INJECTION, POWDER, FOR SOLUTION INTRAMUSCULAR; INTRAVENOUS at 21:05

## 2020-01-01 RX ADMIN — Medication: at 09:41

## 2020-01-01 RX ADMIN — ASPIRIN 81 MG: 81 TABLET, CHEWABLE ORAL at 09:40

## 2020-01-01 RX ADMIN — ASPIRIN 81 MG: 81 TABLET, CHEWABLE ORAL at 09:13

## 2020-01-01 RX ADMIN — Medication 10 ML: at 23:34

## 2020-01-01 RX ADMIN — OXYCODONE HYDROCHLORIDE AND ACETAMINOPHEN 1000 MG: 500 TABLET ORAL at 22:28

## 2020-01-01 RX ADMIN — Medication 10 ML: at 22:36

## 2020-01-01 RX ADMIN — Medication 10 ML: at 21:06

## 2020-01-01 RX ADMIN — INSULIN GLARGINE 10 UNITS: 100 INJECTION, SOLUTION SUBCUTANEOUS at 09:40

## 2020-01-01 RX ADMIN — PHENYLEPHRINE HYDROCHLORIDE 175 MCG/MIN: 10 INJECTION INTRAVENOUS at 09:35

## 2020-01-01 RX ADMIN — CEPHALEXIN 250 MG: 250 CAPSULE ORAL at 16:41

## 2020-01-01 RX ADMIN — METOPROLOL TARTRATE 50 MG: 50 TABLET, FILM COATED ORAL at 17:33

## 2020-01-01 RX ADMIN — INSULIN LISPRO 2 UNITS: 100 INJECTION, SOLUTION INTRAVENOUS; SUBCUTANEOUS at 16:54

## 2020-01-01 RX ADMIN — INSULIN LISPRO 2 UNITS: 100 INJECTION, SOLUTION INTRAVENOUS; SUBCUTANEOUS at 22:24

## 2020-01-01 RX ADMIN — FLUTICASONE PROPIONATE 1 SPRAY: 50 SPRAY, METERED NASAL at 08:50

## 2020-01-01 RX ADMIN — Medication 10 ML: at 22:00

## 2020-01-01 RX ADMIN — FAMOTIDINE 20 MG: 20 TABLET ORAL at 21:30

## 2020-01-01 RX ADMIN — ACETAMINOPHEN 650 MG: 325 TABLET ORAL at 05:50

## 2020-01-01 RX ADMIN — GLIPIZIDE 5 MG: 5 TABLET, FILM COATED, EXTENDED RELEASE ORAL at 07:00

## 2020-01-01 RX ADMIN — TEMAZEPAM 15 MG: 15 CAPSULE ORAL at 00:06

## 2020-01-01 RX ADMIN — DOXAZOSIN 4 MG: 2 TABLET ORAL at 21:34

## 2020-01-01 RX ADMIN — INSULIN LISPRO 5 UNITS: 100 INJECTION, SOLUTION INTRAVENOUS; SUBCUTANEOUS at 11:56

## 2020-01-01 RX ADMIN — DOXAZOSIN 4 MG: 2 TABLET ORAL at 21:08

## 2020-01-01 RX ADMIN — METOPROLOL TARTRATE 50 MG: 50 TABLET, FILM COATED ORAL at 09:14

## 2020-01-01 RX ADMIN — OXYCODONE HYDROCHLORIDE AND ACETAMINOPHEN 1000 MG: 500 TABLET ORAL at 08:48

## 2020-01-02 ENCOUNTER — OFFICE VISIT (OUTPATIENT)
Dept: INTERNAL MEDICINE CLINIC | Age: 85
End: 2020-01-02

## 2020-01-02 VITALS
HEIGHT: 72 IN | OXYGEN SATURATION: 95 % | DIASTOLIC BLOOD PRESSURE: 56 MMHG | WEIGHT: 198.8 LBS | SYSTOLIC BLOOD PRESSURE: 120 MMHG | HEART RATE: 60 BPM | RESPIRATION RATE: 22 BRPM | BODY MASS INDEX: 26.93 KG/M2 | TEMPERATURE: 98.1 F

## 2020-01-02 DIAGNOSIS — E78.00 HYPERCHOLESTEROLEMIA: ICD-10-CM

## 2020-01-02 DIAGNOSIS — I25.10 CORONARY ARTERY DISEASE INVOLVING NATIVE CORONARY ARTERY OF NATIVE HEART WITHOUT ANGINA PECTORIS: ICD-10-CM

## 2020-01-02 DIAGNOSIS — R05.8 RESPIRATORY TRACT CONGESTION WITH COUGH: ICD-10-CM

## 2020-01-02 DIAGNOSIS — J40 BRONCHITIS: Primary | ICD-10-CM

## 2020-01-02 DIAGNOSIS — E11.9 TYPE 2 DIABETES MELLITUS WITHOUT COMPLICATION, WITHOUT LONG-TERM CURRENT USE OF INSULIN (HCC): ICD-10-CM

## 2020-01-02 DIAGNOSIS — I10 ESSENTIAL HYPERTENSION: ICD-10-CM

## 2020-01-02 RX ORDER — GUAIFENESIN 600 MG/1
600 TABLET, EXTENDED RELEASE ORAL 2 TIMES DAILY
Qty: 14 TAB | Refills: 0 | Status: SHIPPED | OUTPATIENT
Start: 2020-01-02 | End: 2020-01-09

## 2020-01-02 NOTE — PROGRESS NOTES
Health Maintenance Due   Topic Date Due    EYE EXAM RETINAL OR DILATED  06/18/1945    Shingrix Vaccine Age 50> (1 of 2) 06/18/1987    HEMOGLOBIN A1C Q6M  10/30/2019    MICROALBUMIN Q1  12/27/2019    MEDICARE YEARLY EXAM  12/28/2019       Chief Complaint   Patient presents with    Cough     Acute care visit    Chest Congestion    Annual Wellness Visit       1. Have you been to the ER, urgent care clinic since your last visit? Hospitalized since your last visit? No    2. Have you seen or consulted any other health care providers outside of the [address] [place] [address] since your last visit? Include any pap smears or colon screening. No    3) Do you have an Advance Directive on file? yes    4) Are you interested in receiving information on Advance Directives? NO      Patient is accompanied by self and wife I have received verbal consent from Puja Marlow to discuss any/all medical information while they are present in the room.

## 2020-01-08 DIAGNOSIS — G47.00 INSOMNIA, UNSPECIFIED TYPE: ICD-10-CM

## 2020-01-08 DIAGNOSIS — F33.9 RECURRENT DEPRESSION (HCC): Primary | ICD-10-CM

## 2020-01-09 ENCOUNTER — OFFICE VISIT (OUTPATIENT)
Dept: INTERNAL MEDICINE CLINIC | Age: 85
End: 2020-01-09

## 2020-01-09 VITALS
HEART RATE: 57 BPM | WEIGHT: 197.2 LBS | TEMPERATURE: 97.9 F | SYSTOLIC BLOOD PRESSURE: 138 MMHG | BODY MASS INDEX: 26.71 KG/M2 | RESPIRATION RATE: 20 BRPM | DIASTOLIC BLOOD PRESSURE: 78 MMHG | OXYGEN SATURATION: 95 % | HEIGHT: 72 IN

## 2020-01-09 DIAGNOSIS — R05.9 COUGH: ICD-10-CM

## 2020-01-09 DIAGNOSIS — J40 BRONCHITIS: Primary | ICD-10-CM

## 2020-01-09 DIAGNOSIS — T14.8XXA BLISTERING OF SKIN: ICD-10-CM

## 2020-01-09 DIAGNOSIS — I35.0 SEVERE AORTIC STENOSIS: ICD-10-CM

## 2020-01-09 DIAGNOSIS — I10 ESSENTIAL HYPERTENSION: ICD-10-CM

## 2020-01-09 DIAGNOSIS — E11.9 TYPE 2 DIABETES MELLITUS WITHOUT COMPLICATION, WITHOUT LONG-TERM CURRENT USE OF INSULIN (HCC): ICD-10-CM

## 2020-01-09 RX ORDER — DOXYCYCLINE 100 MG/1
100 TABLET ORAL 2 TIMES DAILY
Qty: 14 TAB | Refills: 0 | Status: SHIPPED | OUTPATIENT
Start: 2020-01-09 | End: 2020-01-16

## 2020-01-09 RX ORDER — TRAZODONE HYDROCHLORIDE 50 MG/1
TABLET ORAL
Qty: 60 TAB | Refills: 5 | Status: SHIPPED | OUTPATIENT
Start: 2020-01-09 | End: 2020-01-01 | Stop reason: SDUPTHER

## 2020-01-09 RX ORDER — SERTRALINE HYDROCHLORIDE 100 MG/1
TABLET, FILM COATED ORAL
Qty: 30 TAB | Refills: 5 | Status: SHIPPED | OUTPATIENT
Start: 2020-01-09 | End: 2020-01-01 | Stop reason: SDUPTHER

## 2020-01-09 NOTE — PROGRESS NOTES
Health Maintenance Due   Topic Date Due    EYE EXAM RETINAL OR DILATED  06/18/1945    Shingrix Vaccine Age 50> (1 of 2) 06/18/1987    HEMOGLOBIN A1C Q6M  10/30/2019    MICROALBUMIN Q1  12/27/2019    MEDICARE YEARLY EXAM  12/28/2019       Chief Complaint   Patient presents with    Coronary Artery Disease     4 mo f/u    Hypertension    Diabetes       1. Have you been to the ER, urgent care clinic since your last visit? Hospitalized since your last visit? No    2. Have you seen or consulted any other health care providers outside of the [address] [place] [address] since your last visit? Include any pap smears or colon screening. No    3) Do you have an Advance Directive on file? yes    4) Are you interested in receiving information on Advance Directives? NO      Patient is accompanied by self and wife I have received verbal consent from Arleen Ashley to discuss any/all medical information while they are present in the room.

## 2020-01-09 NOTE — PROGRESS NOTES
HISTORY OF PRESENT ILLNESS  Jamie Tay is a 82 y.o. male. Pt. comes in with his wife for f/u. Has a few chronic medical issues as documented. Reports continued congested cough with occasional sputum. No chest pain or dyspnea. Was treated with a Z-Sammy a few weeks ago. Recent CXR was reported as negative. Has severe aortic stenosis. Followed by cardiologist.  BP and DM have been stable. Reports having blisters on his buttocks. Reports sleeping on a recliner for the past 2 years because their bed is small. Has had issues with pressure ulcers in the past.    PMH/PSH/Allergies/Social History/medication list and most recent studies reviewed with patient. Tobacco use: No  Alcohol use: Social    Reports compliance with medications and diet. Trying to be active physically as tolerated. Reports no other new c/o. HPI    Review of Systems   Constitutional: Negative. HENT: Positive for congestion and hearing loss. Negative for sore throat. Eyes: Positive for blurred vision. Negative for double vision and pain. Respiratory: Positive for cough and sputum production. Negative for hemoptysis, shortness of breath and wheezing. Cardiovascular: Negative for chest pain and leg swelling. Gastrointestinal: Negative for abdominal pain. Genitourinary: Positive for urgency. Negative for dysuria. Musculoskeletal: Positive for back pain and joint pain. Negative for falls. Skin: Negative. Buttock blisters   Neurological: Positive for sensory change. Negative for dizziness, focal weakness and headaches. Endo/Heme/Allergies: Negative. Negative for polydipsia. Psychiatric/Behavioral: Negative for depression. The patient has insomnia. The patient is not nervous/anxious. All other systems reviewed and are negative. Physical Exam  Vitals signs and nursing note reviewed. Constitutional:       General: He is not in acute distress. Appearance: He is well-developed.    HENT:      Head: Normocephalic and atraumatic. Right Ear: Tympanic membrane normal.      Left Ear: Tympanic membrane normal.      Nose: Congestion present. Mouth/Throat:      Mouth: Mucous membranes are moist.      Pharynx: Oropharynx is clear. Posterior oropharyngeal erythema present. No oropharyngeal exudate. Eyes:      General: No scleral icterus. Conjunctiva/sclera: Conjunctivae normal.      Comments: Bilateral cataracts   Neck:      Musculoskeletal: Normal range of motion and neck supple. Thyroid: No thyromegaly. Vascular: No JVD. Cardiovascular:      Rate and Rhythm: Normal rate and regular rhythm. Heart sounds: Murmur (AS, chronic) present. Comments: Diminished pedal pulses  Pulmonary:      Effort: Pulmonary effort is normal. No respiratory distress. Breath sounds: Normal breath sounds. No wheezing or rales. Abdominal:      General: Bowel sounds are normal. There is no distension. Palpations: Abdomen is soft. Musculoskeletal:         General: Tenderness (Lumbars) present. Skin:     General: Skin is warm and dry. Findings: No erythema or rash. Comments: Has multiple purplish fluid-filled blisters on bilateral buttocks, no drainage or evidence of infection   Seems to be pressure related  Also has generalized erythema of upper and inner buttocks   Neurological:      Mental Status: He is alert and oriented to person, place, and time. Coordination: Coordination normal.      Comments: Decreased sensation in distal legs/feet   Psychiatric:         Behavior: Behavior normal.         ASSESSMENT and PLAN  Diagnoses and all orders for this visit:    1. Bronchitis    2. Cough    3. Type 2 diabetes mellitus without complication, without long-term current use of insulin ([address]    4. Essential hypertension    5. Severe aortic stenosis    6. Blistering of skin    Other orders  -     doxycycline (ADOXA) 100 mg tablet;  Take 1 Tab by mouth two (2) times a day for 7 days.      Follow-up and Dispositions    · Return in about 4 weeks (around 2/6/2020).      lab results and schedule of future lab studies reviewed with patient  reviewed diet, exercise and weight control  reviewed medications and side effects in detail  Reassured patient that his chest x-ray was unremarkable  Advised patient to keep up with his fluids  Advised patient to avoid prolonged sitting one position  Advised patient to stop sleeping in the recliner and get a comfortable bed/mattress

## 2020-01-10 NOTE — PROGRESS NOTES
HISTORY OF PRESENT ILLNESS  Jonnathan Lou is a 82 y.o. male. Pt. comes in with his wife for f/u. Has a few chronic medical issues as documented. He has had cold symptoms with congested cough for the past week or so. Treated with Z-Sammy/has 1 pill left and cough medicine but continues to have symptoms. No fever, chest pain, dyspnea, GI or  problems. BP, CAD and DM have been stable. PMH/PSH/Allergies/Social History/medication list and most recent studies reviewed with patient. Tobacco use: No  Alcohol use: No   reports compliance with medications and diet. Trying to be active physically to control weight. Reports no other new c/o. HPI    Review of Systems   Constitutional: Negative. HENT: Positive for congestion and hearing loss. Negative for sore throat. Eyes: Negative. Respiratory: Positive for cough. Negative for hemoptysis, sputum production, shortness of breath and wheezing. Cardiovascular: Negative for chest pain and leg swelling. Gastrointestinal: Negative for abdominal pain. Genitourinary: Negative for dysuria. Musculoskeletal: Positive for joint pain. Negative for falls. Skin: Negative. Neurological: Negative for dizziness, sensory change, focal weakness and headaches. Endo/Heme/Allergies: Negative. Psychiatric/Behavioral: Negative for depression. The patient has insomnia. The patient is not nervous/anxious. All other systems reviewed and are negative. Physical Exam  Vitals signs and nursing note reviewed. Constitutional:       General: He is not in acute distress. Appearance: He is well-developed. HENT:      Head: Normocephalic and atraumatic. Nose: Nose normal.      Mouth/Throat:      Mouth: Mucous membranes are moist.      Pharynx: Oropharynx is clear. No oropharyngeal exudate. Eyes:      Conjunctiva/sclera: Conjunctivae normal.   Neck:      Musculoskeletal: Normal range of motion and neck supple. Thyroid: No thyromegaly.       Vascular: No JVD. Cardiovascular:      Rate and Rhythm: Normal rate and regular rhythm. Pulmonary:      Effort: Pulmonary effort is normal. No respiratory distress. Breath sounds: Normal breath sounds. No stridor. No wheezing, rhonchi or rales. Abdominal:      General: Bowel sounds are normal. There is no distension. Palpations: Abdomen is soft. Musculoskeletal:         General: No tenderness. Skin:     General: Skin is warm and dry. Neurological:      Mental Status: He is alert and oriented to person, place, and time. Coordination: Coordination normal.   Psychiatric:         Behavior: Behavior normal.         ASSESSMENT and PLAN  Diagnoses and all orders for this visit:    1. Bronchitis  -     XR CHEST PA LAT; Future    2. Type 2 diabetes mellitus without complication, without long-term current use of insulin ([address]    3. Coronary artery disease involving native coronary artery of native heart without angina pectoris    4. Hypercholesterolemia    5. Essential hypertension    6. Respiratory tract congestion with cough  -     XR CHEST PA LAT; Future    Other orders  -     guaiFENesin ER (MUCINEX) 600 mg ER tablet; Take 1 Tab by mouth two (2) times a day for 7 days. Follow-up and Dispositions    · Return in about 2 weeks (around 1/16/2020).      lab results and schedule of future lab studies reviewed with patient  reviewed diet, exercise and weight control  reviewed medications and side effects in detail  low cholesterol diet, weight control and daily exercise discussed, home glucose monitoring emphasized, all medications, side effects and compliance discussed carefully, foot care discussed and Podiatry visits discussed, annual eye examinations at Ophthalmology discussed, glycohemoglobin and other lab monitoring discussed and long term diabetic complications discussed  Finish antibiotics  Keep up with fluids  We will do a CXR to make sure there is no pneumonia

## 2020-01-16 ENCOUNTER — OFFICE VISIT (OUTPATIENT)
Dept: INTERNAL MEDICINE CLINIC | Age: 85
End: 2020-01-16

## 2020-01-16 VITALS
HEART RATE: 71 BPM | TEMPERATURE: 97.9 F | HEIGHT: 72 IN | BODY MASS INDEX: 26.14 KG/M2 | WEIGHT: 193 LBS | RESPIRATION RATE: 20 BRPM | DIASTOLIC BLOOD PRESSURE: 68 MMHG | SYSTOLIC BLOOD PRESSURE: 120 MMHG | OXYGEN SATURATION: 93 %

## 2020-01-16 DIAGNOSIS — I10 ESSENTIAL HYPERTENSION: ICD-10-CM

## 2020-01-16 DIAGNOSIS — R05.8 RESPIRATORY TRACT CONGESTION WITH COUGH: ICD-10-CM

## 2020-01-16 DIAGNOSIS — J40 BRONCHITIS: Primary | ICD-10-CM

## 2020-01-16 DIAGNOSIS — E11.9 TYPE 2 DIABETES MELLITUS WITHOUT COMPLICATION, WITHOUT LONG-TERM CURRENT USE OF INSULIN (HCC): ICD-10-CM

## 2020-01-16 RX ORDER — PREDNISONE 20 MG/1
20 TABLET ORAL
Qty: 5 TAB | Refills: 0 | Status: SHIPPED | OUTPATIENT
Start: 2020-01-16 | End: 2020-01-23 | Stop reason: ALTCHOICE

## 2020-01-16 NOTE — PROGRESS NOTES
Health Maintenance Due Topic Date Due  
 EYE EXAM RETINAL OR DILATED  06/18/1945  Shingrix Vaccine Age 50> (1 of 2) 06/18/1987  
 HEMOGLOBIN A1C Q6M  10/30/2019  MICROALBUMIN Q1  12/27/2019  MEDICARE YEARLY EXAM  12/28/2019 Chief Complaint Patient presents with  Cough  
  for a few weeks  Cold Symptoms [facility] Annual Wellness Visit 1. Have you been to the ER, urgent care clinic since your last visit? Hospitalized since your last visit? No 
 
2. Have you seen or consulted any other health care providers outside of the [address] [place] [address] since your last visit? Include any pap smears or colon screening. No 
 
3) Do you have an Advance Directive on file? yes 4) Are you interested in receiving information on Advance Directives? NO Patient is accompanied by self and wife I have received verbal consent from Mookie Landon to discuss any/all medical information while they are present in the room.

## 2020-01-20 ENCOUNTER — TELEPHONE (OUTPATIENT)
Dept: INTERNAL MEDICINE CLINIC | Age: 85
End: 2020-01-20

## 2020-01-20 DIAGNOSIS — H91.90 HEARING LOSS, UNSPECIFIED HEARING LOSS TYPE, UNSPECIFIED LATERALITY: Primary | ICD-10-CM

## 2020-01-20 NOTE — TELEPHONE ENCOUNTER
----- Message from Joanne Lyons sent at 1/20/2020  7:49 AM EST -----  Regarding: /Telephone  General Message/Vendor Calls    Caller's first and last name:Sveta([employer])      Reason for call:order for hearing test      Callback required yes/no and why:no      Best contact number(s):[phone]      Details to clarify the request:Sveta stated an order for a hearing test in order to bill the insurance faxed to [phone] or  [phone], attention: Elzbieta Abdullahi. Emy Perajan sated she has been calling since last wk and would like the order faxed urgent, because pt had the hearing test on 01/15/20.       Joanne Lyons

## 2020-01-20 NOTE — TELEPHONE ENCOUNTER
Spoke with Art Barefoot and order placed per verbal from provider and faxed to number listed in this message

## 2020-01-23 ENCOUNTER — OFFICE VISIT (OUTPATIENT)
Dept: INTERNAL MEDICINE CLINIC | Age: 85
End: 2020-01-23

## 2020-01-23 VITALS
HEIGHT: 72 IN | BODY MASS INDEX: 26.36 KG/M2 | HEART RATE: 60 BPM | SYSTOLIC BLOOD PRESSURE: 140 MMHG | DIASTOLIC BLOOD PRESSURE: 84 MMHG | RESPIRATION RATE: 20 BRPM | OXYGEN SATURATION: 94 % | TEMPERATURE: 97.6 F | WEIGHT: 194.6 LBS

## 2020-01-23 DIAGNOSIS — E11.9 TYPE 2 DIABETES MELLITUS WITHOUT COMPLICATION, WITHOUT LONG-TERM CURRENT USE OF INSULIN (HCC): ICD-10-CM

## 2020-01-23 DIAGNOSIS — I10 ESSENTIAL HYPERTENSION: ICD-10-CM

## 2020-01-23 DIAGNOSIS — Z79.899 ON ANGIOTENSIN RECEPTOR BLOCKERS (ARB): ICD-10-CM

## 2020-01-23 DIAGNOSIS — R05.9 COUGH: Primary | ICD-10-CM

## 2020-01-23 DIAGNOSIS — K21.9 GASTROESOPHAGEAL REFLUX DISEASE WITHOUT ESOPHAGITIS: ICD-10-CM

## 2020-01-23 RX ORDER — LORATADINE 10 MG/1
10 TABLET ORAL DAILY
Qty: 30 TAB | Refills: 0 | Status: SHIPPED | OUTPATIENT
Start: 2020-01-23 | End: 2020-01-01

## 2020-01-23 RX ORDER — OMEPRAZOLE 20 MG/1
20 CAPSULE, DELAYED RELEASE ORAL DAILY
Qty: 30 CAP | Refills: 0 | Status: SHIPPED | OUTPATIENT
Start: 2020-01-23 | End: 2020-01-01

## 2020-01-23 NOTE — PROGRESS NOTES
Health Maintenance Due Topic Date Due  
 EYE EXAM RETINAL OR DILATED  06/18/1945  Shingrix Vaccine Age 50> (1 of 2) 06/18/1987  
 HEMOGLOBIN A1C Q6M  10/30/2019  MICROALBUMIN Q1  12/27/2019  MEDICARE YEARLY EXAM  12/28/2019 Chief Complaint Patient presents with  Cough Cough has not gotten any better 1. Have you been to the ER, urgent care clinic since your last visit? Hospitalized since your last visit? No 
 
2. Have you seen or consulted any other health care providers outside of the [address] [place] [address] since your last visit? Include any pap smears or colon screening. No 
 
3) Do you have an Advance Directive on file? yes 4) Are you interested in receiving information on Advance Directives? NO Patient is accompanied by self and wife I have received verbal consent from Jamie [place] to discuss any/all medical information while they are present in the room.

## 2020-01-23 NOTE — PROGRESS NOTES
HISTORY OF PRESENT ILLNESS Sandi Newell is a 82 y.o. male. Pt. comes in for f/u. Has a few chronic medical issues as documented. Reports having continued issues with a dry cough. It has been going on for a few weeks. 2 courses of antibiotics, steroids, Mucinex has helped little. Chest x-ray was negative. BP and diabetes have been stable. He is on losartan. Reports some GERD issues from time to time. Used to take medications for it. He is a former smoker. Lives with his wife at [facility]. They deny having any environmental issues in their apartment including mold. PMH/PSH/Allergies/Social History/medication list and most recent studies reviewed with patient. Tobacco use: Former Alcohol use: Social 
 
Reports compliance with medications and diet. Trying to be active physically to control weight. Reports no other new c/o. HPI Review of Systems Constitutional: Negative. HENT: Positive for congestion and hearing loss. Negative for sore throat. Eyes: Positive for blurred vision. Negative for double vision and pain. Respiratory: Positive for cough. Negative for hemoptysis, sputum production, shortness of breath and wheezing. Cardiovascular: Negative for chest pain and leg swelling. Gastrointestinal: Positive for heartburn. Negative for abdominal pain, nausea and vomiting. Genitourinary: Positive for urgency. Negative for dysuria. Musculoskeletal: Positive for back pain and joint pain. Negative for falls. Skin: Negative. Buttock blisters Neurological: Positive for sensory change. Negative for dizziness, focal weakness and headaches. Endo/Heme/Allergies: Negative. Negative for polydipsia. Psychiatric/Behavioral: Negative for depression. The patient has insomnia. The patient is not nervous/anxious. All other systems reviewed and are negative. Physical Exam 
Vitals signs and nursing note reviewed. Constitutional: General: He is not in acute distress. Appearance: He is well-developed. HENT:  
   Head: Normocephalic and atraumatic. Right Ear: Tympanic membrane normal.  
   Left Ear: Tympanic membrane normal.  
   Nose: Congestion present. Mouth/Throat:  
   Mouth: Mucous membranes are moist.  
   Pharynx: Oropharynx is clear. Posterior oropharyngeal erythema present. No oropharyngeal exudate. Eyes:  
   General: No scleral icterus. Conjunctiva/sclera: Conjunctivae normal.  
Neck: Musculoskeletal: Normal range of motion and neck supple. Thyroid: No thyromegaly. Vascular: No JVD. Cardiovascular:  
   Rate and Rhythm: Normal rate and regular rhythm. Heart sounds: Murmur (AS, chronic) present. Comments: Diminished pedal pulses Pulmonary:  
   Effort: Pulmonary effort is normal. No respiratory distress. Breath sounds: Normal breath sounds. No wheezing or rales. Abdominal:  
   General: Bowel sounds are normal. There is no distension. Palpations: Abdomen is soft. Tenderness: There is no tenderness. Musculoskeletal:     
   General: Tenderness (Lumbars) present. Skin: 
   General: Skin is warm and dry. Findings: No erythema. Neurological:  
   Mental Status: He is alert and oriented to person, place, and time. Coordination: Coordination normal.  
   Comments: Decreased sensation in distal legs/feet Psychiatric:     
   Behavior: Behavior normal.  
 
 
 
ASSESSMENT and PLAN Diagnoses and all orders for this visit: 1. Cough 2. Gastroesophageal reflux disease without esophagitis 3. On angiotensin receptor blockers (ARB) 4. Essential hypertension 5. Type 2 diabetes mellitus without complication, without long-term current use of insulin ([address] Other orders 
-     omeprazole (PRILOSEC) 20 mg capsule; Take 1 Cap by mouth daily. -     loratadine (CLARITIN) 10 mg tablet; Take 1 Tab by mouth daily. Hold losartan for 7 to 10 days and see if the cough improves Follow-up and Dispositions · Return in about 2 weeks (around 2/6/2020). lab results and schedule of future lab studies reviewed with patient 
reviewed diet, exercise and weight control 
reviewed medications and side effects in detail Discussed possible etiologies of her chronic cough including allergies, PND, GERD, medications Advised patient to elevate the head of the bed when lying down

## 2020-01-27 NOTE — PROGRESS NOTES
HISTORY OF PRESENT ILLNESS Anahi Bhatt is a 82 y.o. male. Pt. comes in for f/u. Has a few chronic medical issues as documented. Reports continued congested cough but no sputum. Has finished course of antibiotic with some improvement. No fever, chest pain, dyspnea. Has some allergy symptoms. DM and HTN are stable. CXR was negative for pneumonia. Reports seeing a dermatologist recently for his chronic other pressure ulcers. Has been told to get the pressure off which I have told him previously. Tells me he is doing better. PMH/PSH/Allergies/Social History/medication list and most recent studies reviewed with patient. Tobacco use: Former/no smoking in about 40 years Alcohol use: Social 
 
Reports compliance with medications and diet. Trying to be active physically as tolerated. Reports no other new c/o. HPI Review of Systems Constitutional: Negative. HENT: Positive for congestion and hearing loss. Negative for sore throat. Eyes: Positive for blurred vision. Negative for double vision and pain. Respiratory: Positive for cough. Negative for hemoptysis, sputum production, shortness of breath and wheezing. Cardiovascular: Negative for chest pain and leg swelling. Gastrointestinal: Negative for abdominal pain. Genitourinary: Positive for urgency. Negative for dysuria. Musculoskeletal: Positive for back pain and joint pain. Negative for falls. Skin: Negative. Buttock blisters Neurological: Positive for sensory change. Negative for dizziness, focal weakness and headaches. Endo/Heme/Allergies: Negative. Negative for polydipsia. Psychiatric/Behavioral: Negative for depression. The patient has insomnia. The patient is not nervous/anxious. All other systems reviewed and are negative. Physical Exam 
Vitals signs and nursing note reviewed. Constitutional:   
   General: He is not in acute distress. Appearance: He is well-developed.   
HENT:  
 Head: Normocephalic and atraumatic. Right Ear: Tympanic membrane normal.  
   Left Ear: Tympanic membrane normal.  
   Nose: Congestion present. Mouth/Throat:  
   Mouth: Mucous membranes are moist.  
   Pharynx: Oropharynx is clear. Posterior oropharyngeal erythema present. No oropharyngeal exudate. Eyes:  
   General: No scleral icterus. Conjunctiva/sclera: Conjunctivae normal.  
   Comments: Bilateral cataracts Neck: Musculoskeletal: Normal range of motion and neck supple. Thyroid: No thyromegaly. Vascular: No JVD. Cardiovascular:  
   Rate and Rhythm: Normal rate and regular rhythm. Heart sounds: Murmur (AS, chronic) present. Comments: Diminished pedal pulses Pulmonary:  
   Effort: Pulmonary effort is normal. No respiratory distress. Breath sounds: Normal breath sounds. No wheezing, rhonchi or rales. Chest:  
   Chest wall: No tenderness. Abdominal:  
   General: Bowel sounds are normal. There is no distension. Palpations: Abdomen is soft. Musculoskeletal:     
   General: Tenderness (Lumbars) present. Skin: 
   General: Skin is warm and dry. Findings: No erythema or rash. Neurological:  
   Mental Status: He is alert and oriented to person, place, and time. Coordination: Coordination normal.  
   Comments: Decreased sensation in distal legs/feet Psychiatric:     
   Behavior: Behavior normal.  
 
 
 
ASSESSMENT and PLAN Diagnoses and all orders for this visit: 1. Bronchitis 2. Respiratory tract congestion with cough 3. Type 2 diabetes mellitus without complication, without long-term current use of insulin ([facility] [address] 4. Essential hypertension Follow-up and Dispositions · Return if symptoms worsen or fail to improve. 
  
lab results and schedule of future lab studies reviewed with patient 
reviewed diet, exercise and weight control 
reviewed medications and side effects in detail low cholesterol diet, weight control and daily exercise discussed, home glucose monitoring emphasized, all medications, side effects and compliance discussed carefully, foot care discussed and Podiatry visits discussed, annual eye examinations at Ophthalmology discussed, glycohemoglobin and other lab monitoring discussed and long term diabetic complications discussed Reassurance that there is no need for more antibiotics Reassurance that recent CXR was negative

## 2020-02-01 DIAGNOSIS — J40 BRONCHITIS: ICD-10-CM

## 2020-02-01 DIAGNOSIS — R05.8 RESPIRATORY TRACT CONGESTION WITH COUGH: ICD-10-CM

## 2020-02-06 ENCOUNTER — OFFICE VISIT (OUTPATIENT)
Dept: INTERNAL MEDICINE CLINIC | Age: 85
End: 2020-02-06

## 2020-02-06 VITALS
TEMPERATURE: 97.5 F | OXYGEN SATURATION: 96 % | RESPIRATION RATE: 18 BRPM | BODY MASS INDEX: 26.33 KG/M2 | HEART RATE: 64 BPM | WEIGHT: 194.4 LBS | DIASTOLIC BLOOD PRESSURE: 64 MMHG | SYSTOLIC BLOOD PRESSURE: 116 MMHG | HEIGHT: 72 IN

## 2020-02-06 DIAGNOSIS — Z00.00 MEDICARE ANNUAL WELLNESS VISIT, SUBSEQUENT: ICD-10-CM

## 2020-02-06 DIAGNOSIS — I10 ESSENTIAL HYPERTENSION: ICD-10-CM

## 2020-02-06 DIAGNOSIS — I35.0 SEVERE AORTIC STENOSIS: ICD-10-CM

## 2020-02-06 DIAGNOSIS — F33.9 RECURRENT DEPRESSION (HCC): ICD-10-CM

## 2020-02-06 DIAGNOSIS — K21.9 GASTROESOPHAGEAL REFLUX DISEASE WITHOUT ESOPHAGITIS: ICD-10-CM

## 2020-02-06 DIAGNOSIS — G47.00 INSOMNIA, UNSPECIFIED TYPE: ICD-10-CM

## 2020-02-06 DIAGNOSIS — E11.9 TYPE 2 DIABETES MELLITUS WITHOUT COMPLICATION, WITHOUT LONG-TERM CURRENT USE OF INSULIN (HCC): Primary | ICD-10-CM

## 2020-02-06 NOTE — PROGRESS NOTES
Schedule of Personalized Health Plan (Provide Copy to Patient) The best way to stay healthy is to live a healthy lifestyle. A healthy lifestyle includes regular exercise, eating a well-balanced diet, keeping a healthy weight and not smoking. Regular physical exams and screening tests are another important way to take care of yourself. Preventive exams provided by health care providers can find health problems early when treatment works best and can keep you from getting certain diseases or illnesses. Preventive services include exams, lab tests, screenings, shots, monitoring and information to help you take care of your own health. All people over 65 should have a pneumonia shot. Pneumonia shots are usually only needed once in a lifetime unless your doctor decides differently. All people over 65 should have a yearly flu shot. People over 65 are at medium to high risk for Hepatitis B. Three shots are needed for complete protection. In addition to your physical exam, some screening tests are recommended: 
 
Bone mass measurement (dexa scan) is recommended every two years if you have certain risk factors, such as personal history of vertebral fracture or chronic steroid medication use Diabetes Mellitus screening is recommended every year. Glaucoma is an eye disease caused by high pressure in the eye. An eye exam is recommended every year. Cardiovascular screening tests that check your cholesterol and other blood fat (lipid) levels are recommended every five years. Colorectal Cancer screening tests help to find pre-cancerous polyps (growths in the colon) so they can be removed before they turn into cancer. Tests ordered for screening depend on your personal and family history risk factors. Screening for Prostate Cancer is recommended yearly with a digital rectal exam and/or a PSA test 
 
Here is a list of your current Health Maintenance items with a due date: 
Health Maintenance Topic Date Due  Eye Exam Retinal or Dilated  06/18/1945  Shingrix Vaccine Age 50> (1 of 2) 06/18/1987  MICROALBUMIN Q1  12/27/2019  Lipid Screen  04/30/2020  Foot Exam Q1  06/12/2020  GLAUCOMA SCREENING Q2Y  01/09/2021  Medicare Yearly Exam  02/06/2021  
 DTaP/Tdap/Td series (2 - Td) 11/10/2024  Influenza Age 5 to Adult  Completed  Pneumococcal 65+ years  Completed

## 2020-02-06 NOTE — PROGRESS NOTES
Health Maintenance Due Topic Date Due  Eye Exam Retinal or Dilated  06/18/1945  Shingrix Vaccine Age 50> (1 of 2) 06/18/1987  MICROALBUMIN Q1  12/27/2019  Medicare Yearly Exam  12/28/2019 Chief Complaint Patient presents with  Hypertension 4 wk f/u  Coronary Artery Disease  Diabetes [facility] Annual Wellness Visit 1. Have you been to the ER, urgent care clinic since your last visit? Hospitalized since your last visit? No 
 
2. Have you seen or consulted any other health care providers outside of the [facility] since your last visit? Include any pap smears or colon screening. No 
 
3) Do you have an Advance Directive on file? yes 4) Are you interested in receiving information on Advance Directives? NO Patient is accompanied by self and wife I have received verbal consent from Tess De Anda to discuss any/all medical information while they are present in the room.

## 2020-02-06 NOTE — PROGRESS NOTES
This is the Subsequent Medicare Annual Wellness Exam, performed 12 months or more after the Initial AWV or the last Subsequent AWV I have reviewed the patient's medical history in detail and updated the computerized patient record. History Patient Active Problem List  
Diagnosis Code  Type 2 diabetes mellitus without complication, without long-term current use of insulin ([facility]) E11.9  
 Essential hypertension I10  
 Hypercholesterolemia E78.00  Coronary artery disease involving native coronary artery of native heart without angina pectoris I25.10  
 S/P CABG (coronary artery bypass graft) Z95.1  Severe aortic stenosis I35.0  Aortic systolic murmur on examination I35.8  Benign prostatic hyperplasia with lower urinary tract symptoms N40.1  Insomnia G47.00 [facility] Former smoker V61.446  ACP (advance care planning) Z71.89  
 Gait instability R26.81  
 Age-related cataract of both eyes H25.9  Recurrent depression ([address] F33.9  On angiotensin receptor blockers (ARB) T96.151  Gastroesophageal reflux disease without esophagitis K21.9  Cough R05 Past Medical History:  
Diagnosis Date  BPH (benign prostatic hyperplasia)  CAD (coronary artery disease)  Diabetes ([address]  Hearing loss  Hypercholesterolemia  Hypertension  Murmur  Recurrent depression ([address] 8/22/2019 Past Surgical History:  
Procedure Laterality Date  CARDIAC SURG PROCEDURE UNLIST  2006 by-pass  HX CHOLECYSTECTOMY Current Outpatient Medications Medication Sig Dispense Refill  omeprazole (PRILOSEC) 20 mg capsule Take 1 Cap by mouth daily. 30 Cap 0  
 loratadine (CLARITIN) 10 mg tablet Take 1 Tab by mouth daily. 30 Tab 0  
 OTHER Hearing evaluation for possible changes in hearing 1 Each 0  
 traZODone (DESYREL) 50 mg tablet TAKE 2 TABLETS BY MOUTH  NIGHTLY 60 Tab 5  sertraline (ZOLOFT) 100 mg tablet TAKE 1 TABLET BY MOUTH  DAILY 30 Tab 5  fluticasone propionate (FLONASE) 50 mcg/actuation nasal spray 1 [place] by Both Nostrils route two (2) times a day. 1 Bottle 0  
 losartan (COZAAR) 25 mg tablet TAKE ONE-HALF TABLET BY  MOUTH DAILY 45 Tab 3  
 doxazosin (CARDURA) 4 mg tablet TAKE 1 TABLET BY MOUTH  DAILY 90 Tab 1  
 atorvastatin (LIPITOR) 10 mg tablet TAKE 1 TABLET BY MOUTH  DAILY 90 Tab 1  
 hydroCHLOROthiazide (HYDRODIURIL) 12.5 mg tablet TAKE 1 TABLET BY MOUTH  DAILY 90 Tab 1  
 amLODIPine (NORVASC) 10 mg tablet TAKE 1 TABLET BY MOUTH  DAILY 90 Tab 1  
 metFORMIN ER (GLUCOPHAGE XR) 500 mg tablet TAKE 1 TABLET BY MOUTH TWO  TIMES DAILY WITH MEALS 180 Tab 1  
 finasteride (PROSCAR) 5 mg tablet TAKE 1 TABLET BY MOUTH  DAILY 90 Tab 1  
 glipiZIDE SR (GLUCOTROL XL) 5 mg CR tablet TAKE 1 TABLET BY MOUTH  DAILY 90 Tab 1  
 temazepam (RESTORIL) 15 mg capsule TAKE 1 CAPSULE BY MOUTH AT BEDTIME AS NEEDED FOR SLEEP. 90 Cap 1  
 tiZANidine (ZANAFLEX) 2 mg tablet Take 1 Tab by mouth three (3) times daily as needed for Pain. 20 Tab 0  
 glucose blood VI test strips (TRUE METRIX GLUCOSE TEST STRIP) strip Check BS BID  Dx: DM  E11.21 100 Strip 11  
 labetalol (NORMODYNE) 100 mg tablet TAKE 1 TABLET BY MOUTH  DAILY 90 Tab 1  
 aspirin (ASPIRIN) 325 mg tablet Take 1 Tab by mouth daily. 30 Tab 2  cholecalciferol (VITAMIN D3) 1,000 unit cap Take 1 Cap by mouth daily. 30 Cap 2  
 magnesium 250 mg tab Take 1 Tab by mouth daily. 30 Tab 2  
 sodium chloride (OCEAN) 0.65 % nasal squeeze bottle 0.05 mL by Both Nostrils route as needed for Congestion. 30 mL 2  
 FERROUS FUMARATE/VIT BCOMP,C (SUPER B COMPLEX PO) Take  by mouth. Allergies Allergen Reactions  Procaine Other (comments) Pt stated he passed out from procaine and was told not to let anyone use it on him again Family History Problem Relation Age of Onset  Cancer Mother  Cancer Father Social History Tobacco Use  Smoking status: Former Smoker Types: Cigarettes Last attempt to quit: 1990 Years since quittin.4  Smokeless tobacco: Never Used Substance Use Topics  Alcohol use: No  
 
 
Depression Risk Factor Screening:  
 
3 most recent PHQ Screens 2020 Little interest or pleasure in doing things Not at all Feeling down, depressed, irritable, or hopeless Not at all Total Score PHQ 2 0 Alcohol Risk Factor Screening (MALE > 65): Do you average more 1 drink per night or more than 7 drinks a week: No 
 
In the past three months have you have had more than 4 drinks containing alcohol on one occasion: No 
 
 
Functional Ability and Level of Safety:  
Hearing: Hearing is good. The patient wears hearing aids. Activities of Daily Living: The home contains: grab bars Patient needs help with:  transportation Ambulation: with difficulty, uses a cane Fall Risk: 
Fall Risk Assessment, last 12 mths 2020 Able to walk? Yes Fall in past 12 months? No  
Fall with injury? -  
Number of falls in past 12 months - Fall Risk Score -  
 
 
Abuse Screen: 
Patient is not abused Cognitive Screening Has your family/caregiver stated any concerns about your memory: no 
Cognitive Screening: A+O x 3 Patient Care Team  
Patient Care Team: 
Debbie Guzman DO as PCP - General (Internal Medicine) Debbie Guzman DO as PCP - [facility]IT[facility] Hussein Abbott RN as Ambulatory Care Manager Yudith Alcantara MD as Physician (Cardiology) Assessment/Plan Education and counseling provided: 
Are appropriate based on today's review and evaluation Health Maintenance Due Topic Date Due  Eye Exam Retinal or Dilated  1945  Shingrix Vaccine Age 50> (1 of 2) 1987  MICROALBUMIN Q1  2019

## 2020-02-10 RX ORDER — LABETALOL 100 MG/1
TABLET, FILM COATED ORAL
Qty: 90 TAB | Refills: 1 | Status: SHIPPED | OUTPATIENT
Start: 2020-02-10 | End: 2020-01-01

## 2020-02-10 NOTE — PROGRESS NOTES
HISTORY OF PRESENT ILLNESS Brando Ruvalcaba is a 82 y.o. male. Pt. comes in with his wife for f/u. Has multiple medical problems. His recent respiratory issues have resolved for the most part. BP is stable. DM has been stable. Vision is poor but stable. Followed by ophthalmologist.  Has some chronic arthritic pains. Gait is slow but no recent falls. Cardiac status/CAD has been stable. Has been on Zoloft for depression for a long time. Lives with his wife at [facility]. Reports compliance with medications and diet. Med list and most recent labs/studies reviewed with pt. Trying to be active physically to control weight. Needs repeat labs. Reports no other new c/o. HPI Review of Systems Constitutional: Negative. HENT: Positive for hearing loss. Eyes: Positive for blurred vision. Negative for double vision and pain. Respiratory: Negative for shortness of breath. Cardiovascular: Negative for chest pain and leg swelling. Gastrointestinal: Negative for abdominal pain. Genitourinary: Positive for urgency. Negative for dysuria. Musculoskeletal: Positive for joint pain. Negative for falls. Skin: Negative. Neurological: Positive for sensory change. Negative for dizziness, focal weakness and headaches. Endo/Heme/Allergies: Negative. Negative for polydipsia. Psychiatric/Behavioral: Negative for depression. The patient has insomnia. The patient is not nervous/anxious. All other systems reviewed and are negative. Physical Exam 
Vitals signs and nursing note reviewed. Constitutional:   
   General: He is not in acute distress. Appearance: He is well-developed. HENT:  
   Head: Normocephalic and atraumatic. Nose: Nose normal.  
   Mouth/Throat:  
   Mouth: Mucous membranes are moist.  
Eyes:  
   General: No scleral icterus. Conjunctiva/sclera: Conjunctivae normal.  
Neck: Musculoskeletal: Normal range of motion and neck supple. Thyroid: No thyromegaly. Vascular: No JVD. Cardiovascular:  
   Rate and Rhythm: Normal rate and regular rhythm. Heart sounds: Murmur (AS, chronic) present. Comments: Diminished pedal pulses Pulmonary:  
   Effort: Pulmonary effort is normal. No respiratory distress. Breath sounds: Normal breath sounds. No wheezing or rales. Abdominal:  
   General: Bowel sounds are normal. There is no distension. Palpations: Abdomen is soft. Musculoskeletal:     
   General: Tenderness (Lumbars) present. Skin: 
   General: Skin is warm and dry. Findings: No rash. Neurological:  
   Mental Status: He is alert and oriented to person, place, and time. Coordination: Coordination normal.  
   Comments: Decreased sensation in distal legs/feet Psychiatric:     
   Behavior: Behavior normal.  
 
 
 
ASSESSMENT and PLAN Diagnoses and all orders for this visit: 
 
1. Type 2 diabetes mellitus without complication, without long-term current use of insulin ([facility] [address] -     LIPID PANEL 
-     METABOLIC PANEL, COMPREHENSIVE 
-     CBC W/O DIFF 
-     MICROALBUMIN, UR, RAND W/ MICROALB/CREAT RATIO 
-     HEMOGLOBIN A1C WITH EAG 2. Essential hypertension 3. Severe aortic stenosis 4. Gastroesophageal reflux disease without esophagitis 5. Insomnia, unspecified type 6. Recurrent depression ([facility] [address] 7. Medicare annual wellness visit, subsequent Follow-up and Dispositions · Return in about 4 months (around 6/6/2020).  
  
lab results and schedule of future lab studies reviewed with patient 
reviewed diet, exercise and weight control 
reviewed medications and side effects in detail 
low cholesterol diet, weight control and daily exercise discussed, home glucose monitoring emphasized, all medications, side effects and compliance discussed carefully, foot care discussed and Podiatry visits discussed, annual eye examinations at Ophthalmology discussed, glycohemoglobin and other lab monitoring discussed and long term diabetic complications discussed Monitor BS at home with goal of 100-150 F/u with other MD's as scheduled Fall precautions discussed Reassurance Overall stable

## 2020-03-13 NOTE — TELEPHONE ENCOUNTER
Requested Prescriptions     Pending Prescriptions Disp Refills    traZODone (DESYREL) 50 mg tablet 60 Tab 5     02/06/2020 06/04/2020  [facility]

## 2020-04-17 NOTE — ED NOTES
MD Collins administered afrin in both nostrils. Pt tolerated well. Nose clamp back on. Bleeding appears to have slowed down.

## 2020-04-17 NOTE — ED NOTES
Bleeding decreased significantly. Nose clamp still on. Pt states he does not feel like it is bleeding anymore and is not swallowing blood.

## 2020-04-17 NOTE — ED PROVIDER NOTES
The history is provided by the patient. Epistaxis This is a new problem. The current episode started less than 1 hour ago (10 min before EMS arrived). The problem occurs constantly. The problem has not changed since onset. The problem is associated with nothing (He is not on any anticoagulants. He denies history of frequent nosebleeds. ). The bleeding has been from the right nare. He has tried ice and applying pressure for the symptoms. The treatment provided no relief. His past medical history is significant for HTN. His past medical history does not include bleeding disorder, colds, sinus problems, frequent nosebleeds, cancer, allergies or trauma. Past Medical History:  
Diagnosis Date  BPH (benign prostatic hyperplasia)  CAD (coronary artery disease)  Diabetes ([facility] [address]  Hearing loss  Hypercholesterolemia  Hypertension  Murmur  [facility] 2019 Past Surgical History:  
Procedure Laterality Date  CARDIAC SURG PROCEDURE UNLIST   by-pass  HX CHOLECYSTECTOMY Family History:  
Problem Relation Age of Onset  Cancer Mother  Cancer Father Social History Socioeconomic History  Marital status:  Spouse name: Not on file  Number of children: Not on file  Years of education: Not on file  Highest education level: Not on file Occupational History  Not on file Social Needs  Financial resource strain: Not on file  Food insecurity Worry: Not on file Inability: Not on file  Transportation needs Medical: Not on file Non-medical: Not on file Tobacco Use  Smoking status: Former Smoker Types: Cigarettes Last attempt to quit: 1990 Years since quittin.6  Smokeless tobacco: Never Used Substance and Sexual Activity  Alcohol use: No  
 Drug use: No  
 Sexual activity: Not on file Comment:  has 3 children Lifestyle  Physical activity Days per week: Not on file Minutes per session: Not on file  Stress: Not on file Relationships  Social connections Talks on phone: Not on file Gets together: Not on file Attends Religion service: Not on file Active member of club or organization: Not on file Attends meetings of clubs or organizations: Not on file Relationship status: Not on file  Intimate partner violence Fear of current or ex partner: Not on file Emotionally abused: Not on file Physically abused: Not on file Forced sexual activity: Not on file Other Topics Concern  Not on file Social History Narrative  Not on file ALLERGIES: Procaine Review of Systems Constitutional: Negative for appetite change and fever. HENT: Positive for nosebleeds. Negative for congestion and sore throat. Eyes: Negative for discharge and visual disturbance. Respiratory: Negative for cough and shortness of breath. Cardiovascular: Negative for chest pain. Gastrointestinal: Negative for abdominal pain, diarrhea, nausea and vomiting. Genitourinary: Negative for dysuria. Musculoskeletal: Negative. Skin: Negative for rash. Neurological: Negative for dizziness, weakness, light-headedness and headaches. Hematological: Negative for adenopathy. Psychiatric/Behavioral: Negative. All other systems reviewed and are negative. Vitals:  
 04/16/20 2340 04/17/20 0015 BP: 150/70 142/76 Pulse: 63 Resp: 18 Temp: 98.7 °F (37.1 °C) SpO2: 96% 95% Physical Exam 
Vitals signs and nursing note reviewed. Constitutional:   
   Appearance: Normal appearance. He is well-developed. HENT:  
   Head: Normocephalic and atraumatic. Nose:  
   Comments: Moderate bleeding noted from R nostril. Unable to see source of bleeding.   
   Mouth/Throat:  
   Mouth: Mucous membranes are moist.  
Eyes:  
   Conjunctiva/sclera: Conjunctivae normal.  
 Pupils: Pupils are equal, round, and reactive to light. Neck: Musculoskeletal: Normal range of motion and neck supple. Comments: Mild paravertebral tenderness on L upper c-spine. Cardiovascular:  
   Rate and Rhythm: Normal rate and regular rhythm. Pulses: Normal pulses. Heart sounds: Murmur present. Pulmonary:  
   Effort: Pulmonary effort is normal.  
   Breath sounds: Normal breath sounds. Abdominal:  
   General: Abdomen is flat. Bowel sounds are normal.  
   Palpations: Abdomen is soft. Musculoskeletal: Normal range of motion. Skin: 
   General: Skin is warm and dry. Capillary Refill: Capillary refill takes less than 2 seconds. Neurological:  
   General: No focal deficit present. Mental Status: He is alert and oriented to person, place, and time. Cranial Nerves: No cranial nerve deficit. Motor: No abnormal muscle tone. Coordination: Coordination normal.  
Psychiatric:     
   Mood and Affect: Mood normal.     
   Behavior: Behavior normal.  
 
  
 
[facility] Epistaxis Management Date/Time: 4/16/2020 11:40 PM 
Performed by: Minda Richardson MD 
Authorized by: Minda Richardson MD  
 
Consent:  
  Consent obtained:  Verbal 
  Consent given by:  Patient Risks discussed:  Bleeding, infection, nasal injury and pain Alternatives discussed:  No treatment, delayed treatment, alternative treatment, observation and referral 
Anesthesia (see MAR for exact dosages): Anesthesia method:  None Procedure details:  
  Treatment site:  Unable to specify Repair method: Afrin and pressure. Treatment complexity:  Limited Treatment episode: initial   
Post-procedure details:  
  Assessment:  Bleeding stopped Patient tolerance of procedure: Tolerated well, no immediate complications A/P: 
1. Epistaxis - resolved. F/u with ENT. [place] gel. Patient's results have been reviewed with them.   Patient and/or family have verbally conveyed their understanding and agreement of the patient's signs, symptoms, diagnosis, treatment and prognosis and additionally agree to follow up as recommended or return to the Emergency Room should their condition change prior to follow-up. Discharge instructions have also been provided to the patient with some educational information regarding their diagnosis as well a list of reasons why they would want to return to the ER prior to their follow-up appointment should their condition change.

## 2020-04-17 NOTE — PROGRESS NOTES
Patient contacted regarding recent discharge and COVID-19 risk Care Transition Nurse/ Ambulatory Care Manager contacted the patient by telephone to perform post discharge assessment. Verified name and  with patient as identifiers. Patient has following risk factors of: diabetes. CTN/ACM reviewed discharge instructions, medical action plan and red flags related to discharge diagnosis. Reviewed and educated them on any new and changed medications related to discharge diagnosis. Advised obtaining a 90-day supply of all daily and as-needed medications. Education provided regarding infection prevention, and signs and symptoms of COVID-19 and when to seek medical attention with patient who verbalized understanding. Discussed exposure protocols and quarantine from [address] you at higher risk for severe illness  and given an opportunity for questions and concerns. The patient agrees to contact the COVID-19 hotline [phone] or PCP office for questions related to their healthcare. CTN/ACM provided contact information for future reference. From CDC: Are you at higher risk for severe illness?  Wash your hands often.  Avoid close contact (6 feet, which is about two arm lengths) with people who are sick.  Put distance between yourself and other people if COVID-19 is spreading in your community.  Clean and disinfect frequently touched surfaces.  Avoid all cruise travel and non-essential air travel.  Call your healthcare professional if you have concerns about COVID-19 and your underlying condition or if you are sick. For more information on steps you can take to protect yourself, see CDC's How to Protect Yourself Patient/family/caregiver given information for [employer] and agrees to enroll no Patient's preferred e-mail:  n/a Patient's preferred phone number: n/a Based on Loop alert triggers, patient will be contacted by nurse care manager for worsening symptoms. Plan for follow-up call in 7-14 days based on severity of symptoms and risk factors.

## 2020-04-17 NOTE — ED NOTES
Nose clamp adjusted on patient. Pt cleaning dried blood off his hands with huggie wipes. Cleaned Pt's face as best as I could

## 2020-04-17 NOTE — ED TRIAGE NOTES
Nose bleed since 2230, came on all the sudden while reading. Pt's nose actively bleeding, mostly from R nostril. Not on blood thinners

## 2020-04-17 NOTE — DISCHARGE INSTRUCTIONS
- Please use Ayr gel. - Return to ED if the bleeding restarts after hours. - If the bleeding starts during the day, please call ENT and ask to be seen. If they cannot see you, please return to the ED. Patient Education        Nosebleeds: Care Instructions  Your Care Instructions    Nosebleeds are common, especially if you have colds or allergies. Many things can cause a nosebleed. Some nosebleeds stop on their own with pressure. Others need packing. Some get cauterized (sealed). If you have gauze or other packing materials in your nose, you will need to follow up with your doctor to have the packing removed. You may need more treatment if you get nosebleeds a lot. The doctor has checked you carefully, but problems can develop later. If you notice any problems or new symptoms, get medical treatment right away. Follow-up care is a key part of your treatment and safety. Be sure to make and go to all appointments, and call your doctor if you are having problems. It's also a good idea to know your test results and keep a list of the medicines you take. How can you care for yourself at home? · If you get another nosebleed:  ? Sit up and tilt your head slightly forward. This keeps blood from going down your throat. ? Use your thumb and index finger to pinch your nose shut for 10 minutes. Use a clock. Do not check to see if the bleeding has stopped before the 10 minutes are up. If the bleeding has not stopped, pinch your nose shut for another 10 minutes. ? When the bleeding has stopped, try not to pick, rub, or blow your nose for 12 hours. Avoiding these things helps keep your nose from bleeding again. · If your doctor prescribed antibiotics, take them as directed. Do not stop taking them just because you feel better. You need to take the full course of antibiotics. To prevent nosebleeds  · Do not blow your nose too hard. · Try not to lift or strain after a nosebleed.   · Raise your head on a pillow while you sleep.  · Put a thin layer of a saline- or water-based nasal gel, such as NasoGel, inside your nose. Put it on the septum, which divides your nostrils. This will prevent dryness that can cause nosebleeds. · Use a vaporizer or humidifier to add moisture to your bedroom. Follow the directions for cleaning the machine. · Do not use aspirin, ibuprofen (Advil, Motrin), or naproxen (Aleve) for 36 to 48 hours after a nosebleed unless your doctor tells you to. You can use acetaminophen (Tylenol) for pain relief. · Talk to your doctor about stopping any other medicines you are taking. Some medicines may make you more likely to get a nosebleed. · Do not use cold medicines or nasal sprays without first talking to your doctor. They can make your nose dry. When should you call for help? Call 911 anytime you think you may need emergency care. For example, call if:    · You passed out (lost consciousness).    Call your doctor now or seek immediate medical care if:    · You get another nosebleed and your nose is still bleeding after you have applied pressure 3 times for 10 minutes each time (30 minutes total).     · There is a lot of blood running down the back of your throat even after you pinch your nose and tilt your head forward.     · You have a fever.     · You have sinus pain.    Watch closely for changes in your health, and be sure to contact your doctor if:    · You get nosebleeds often, even if they stop.     · You do not get better as expected. Where can you learn more? Go to http://esvin-tammy.info/  Enter S156 in the search box to learn more about \"Nosebleeds: Care Instructions. \"  Current as of: June 26, 2019Content Version: 12.4  © 6679-2425 [employer]. Care instructions adapted under license by [employer] (which disclaims liability or warranty for this information).  If you have questions about a medical condition or this instruction, always ask your healthcare professional. [address] any warranty or liability for your use of this information.

## 2020-06-04 PROBLEM — L89.301 PRESSURE INJURY OF BUTTOCK, STAGE 1: Status: ACTIVE | Noted: 2020-01-01

## 2020-06-04 PROBLEM — R15.9 INCONTINENCE OF FECES: Status: ACTIVE | Noted: 2020-01-01

## 2020-06-04 NOTE — PROGRESS NOTES
HISTORY OF PRESENT ILLNESS Balaji Harris is a 82 y.o. male. Pt. comes in for f/u. Has a few chronic medical issues as documented. Reports having recurrent irritation of buttocks. Reports being sitting most of the time and not very active. Also having some issues with stool incontinence from time to time. Denies any abdominal pain, diarrhea, melena or hematochezia. BP, DM, cardiac status/aortic stenosis have been stable. Lives with his wife. Gait is unsteady but no falls. Chronic pedal edema is stable. Denies any neuropathy symptoms. Taking precautions for COVID-19. Denies any related signs or symptoms. No 
 
PMH/PSH/Allergies/Social History/medication list and most recent studies reviewed with patient. Tobacco use: No 
Alcohol use: No 
 
Reports compliance with medications and diet. Trying to be active physically as tolerated. Reports no other new c/o. HPI Review of Systems Constitutional: Negative. HENT: Positive for hearing loss. Eyes: Negative for blurred vision. Respiratory: Negative for shortness of breath. Cardiovascular: Negative for chest pain and leg swelling. Gastrointestinal: Negative for abdominal pain. Genitourinary: Negative for dysuria and frequency. Musculoskeletal: Positive for joint pain. Negative for falls. Skin: Positive for rash. Negative for itching. Neurological: Negative for dizziness, sensory change, focal weakness and headaches. Endo/Heme/Allergies: Negative for polydipsia. Psychiatric/Behavioral: Negative for depression. The patient has insomnia. The patient is not nervous/anxious. All other systems reviewed and are negative. Physical Exam 
Vitals signs and nursing note reviewed. Constitutional:   
   General: He is not in acute distress. Appearance: He is well-developed. HENT:  
   Head: Normocephalic and atraumatic. Mouth/Throat:  
   Mouth: Mucous membranes are moist.  
Eyes: Conjunctiva/sclera: Conjunctivae normal.  
Neck: Musculoskeletal: Normal range of motion and neck supple. Thyroid: No thyromegaly. Vascular: No JVD. Cardiovascular:  
   Rate and Rhythm: Normal rate and regular rhythm. Heart sounds: Murmur present. Pulmonary:  
   Effort: Pulmonary effort is normal. No respiratory distress. Breath sounds: Normal breath sounds. No wheezing or rales. Abdominal:  
   General: Bowel sounds are normal. There is no distension. Palpations: Abdomen is soft. Musculoskeletal:     
   General: No tenderness. Skin: 
   General: Skin is warm and dry. Findings: Erythema (With small ulcers in medial bilateral buttocks, no drainage) present. Neurological:  
   Mental Status: He is alert and oriented to person, place, and time. Coordination: Coordination normal.  
Psychiatric:     
   Behavior: Behavior normal.  
 
 
 
ASSESSMENT and PLAN Diagnoses and all orders for this visit: 1. Pressure injury of buttock, stage 1, unspecified laterality Advised patient to avoid prolonged sitting in the same position Proper skin care discussed 2. Incontinence of feces, unspecified fecal incontinence type Explained to patient this has to do with weakness of the muscles Advised patient to use the bathroom as soon as having the urge to go Consider using pull-ups/pads if gets worse 3. Type 2 diabetes mellitus without complication, without long-term current use of insulin ([address] 4. Essential hypertension 5. Severe aortic stenosis Other orders 
-     menthol-zinc oxide (CALMOSEPTINE) 0.44-20.6 % oint; Apply to bilateral buttocks TID  Indications: skin irritation Follow-up and Dispositions · Return in about 3 months (around 9/4/2020). lab results and schedule of future lab studies reviewed with patient 
reviewed diet, exercise and weight control 
reviewed medications and side effects in detail low cholesterol diet, weight control and daily exercise discussed, home glucose monitoring emphasized, all medications, side effects and compliance discussed carefully, foot care discussed and Podiatry visits discussed, annual eye examinations at Ophthalmology discussed, glycohemoglobin and other lab monitoring discussed and long term diabetic complications discussed F/u with other MD's as scheduled COVID-19 precautions discussed with pt

## 2020-06-04 NOTE — PROGRESS NOTES
Health Maintenance Due Topic Date Due  Eye Exam Retinal or Dilated  06/18/1945  Shingrix Vaccine Age 50> (1 of 2) 06/18/1987  MICROALBUMIN Q1  12/27/2019  Foot Exam Q1  06/12/2020 Chief Complaint Patient presents with  Hypertension 4 month follow up  Diabetes  Benign Prostatic Hypertrophy 1. Have you been to the ER, urgent care clinic since your last visit? Hospitalized since your last visit? No 
 
2. Have you seen or consulted any other health care providers outside of the [address] [place] [address] since your last visit? Include any pap smears or colon screening. No 
 
3) Do you have an Advance Directive on file? Yes 
 
 
4) Are you interested in receiving information on Advance Directives? NO Patient is accompanied by self I have received verbal consent from Zane Rosas to discuss any/all medical information while they are present in the room.

## 2020-06-05 NOTE — PATIENT INSTRUCTIONS
Jessica Crespo MD  Juan Bauman  
  
   
  
4 mo in person tu th with wife Future Appointments Date Time Provider [facility] 9/3/2020 10:30 AM Dinora Alarcon  [facility]  
10/8/2020  1:40 PM Jessica Crespo  [address]

## 2020-06-05 NOTE — PROGRESS NOTES
VIRTUAL VISIT DOCUMENTATION Pursuant to the emergency declaration under the [facility] waiver authority and the [employer] Act, this Virtual  Visit was conducted, with patient's consent, to reduce the patient's risk of exposure to COVID-19 and provide continuity of care for an established patient. Services were provided through a  synchronous discussion virtually to substitute for in-person clinic visit. CHIEF COMPLAINT Meredith Lee is a 82 y.o. male who was seen by synchronous (real-time) audio technology on 6/5/2020. Patient is being seen today for HTN. Low today, but normally running higher, at 125/70, no sx of dizzines or lightheadedness. Mild swelling but not progressive. ASSESSMENT  
  
HPI: Meredith Lee, a 82y.o. year-old who presents for evaluation of CAD s/p CABG, DM, HTN, Dyslipidemia.  
  
Had cataract surgery and doing well, no chest pain, dyspnea, edema. BP looks good. No more falls. Feeling well. A bit of swelling on the right ankle but not bad.  
  
. No chest pain or dyspnea now. Energy level is good. Walks at [facility] indoors without limitations. Glucose running 120 efe. BP is good. EKG NSR NST He denies significant ramsay or chest pain.  
  
**Records received from [facility] on 11/8/17 Hx of moderate AS, hx of rheumatic fever 1944 
5/20/2008 3 vessel CABG (LIMA to LAD, SVG to OM and PDA) S/p right SFA PTA and left subclavian stent Echo 6/1/17 - LVEF 55-60%, no WMA, grade 1 dd, severe cLVH, MAC extending into the posterior leaflet and mild MR, mild TR, trace PI Lexiscan MPI 5/16/17 - medium sized region of mild lateral ischemia, LVEF 54%, no WMA GAMAL 9/30/08 - placement of 7 x 37 mm EB3 stent in 75% stenotic right common iliac artery left common iliac artery stent is patent, right common femoral artery with 80% heavily calcified eccentric disease involving ostium of the SFA and the profunda, right SFA has 25% luminal disease in the mid areas, right anterior tibia has 99% focal proximal lesion, right peroneal artery is widely patent, right posterior tibial artery as 90% concentric lesion distally, left common femoral artery with 85% eccentric, heavily calcified lesion involving the ostium of the profunda, as well as the SFA, left SFA is 100% occluded in the mid area, left anterior tibial artery is 100% occluded in the proximal to distal area Venous duplex 3/24/16 - no DVT, bilateral greater saphenous veins stripped  
  
Assessment/Plan: 1. DM- managed on oral agents 2. HTN- at goal on current meds 3. Dyslipidemia- on statin, at goal 
4. CAD- CABG c. 2008(LIMA-LAD, SVG-OM, SVG-PDA), s/p PCI(?) 
-cont aspirin,statin 
- mildly abnormal stress test 5/17 [place] in absence of sx  Will manage medically for now 5. PAD with right leg stent- cont aspirin, statin, check RHONDA 
-s/p SFA PTA and LSC stent 6. HFpEF- stable compensated today, severe cLVH 
-cont arb 7. Mod AS with rheumatic heart disease- follow-up echo next appt, moderate today with mean gradient of 32 and no real sx so just watch for now. 8. Venous insufficiency- s/p vein stripping bilat GSV 
  
Echo 12/19 with Ef 60%, mod AS MAC mild cLVH, lv dd 1/19 Echo 60-65% gri DD, RVE, LAE, mild MR, mod AS, mild TR, mild PI Lexiscan 5/17 med lteral ischemia, EF 54% Echo 6/17 EF 60% gr1dd, severe cLVH, MAC mild MR, mild TR mild PI 
 GAMAL 9/30/08 - placement of 7 x 37 mm EB3 stent in 75% stenotic right common iliac artery, left common iliac artery stent is patent, right common femoral artery with 80% heavily calcified eccentric disease involving ostium of the SFA and the profunda, right SFA has 25% luminal disease in the mid areas, right anterior tibia has 99% focal proximal lesion, right peroneal artery is widely patent, right posterior tibial artery as 90% concentric lesion distally, left common femoral artery with 85% eccentric, heavily calcified lesion involving the ostium of the profunda, as well as the SFA, left SFA is 100% occluded in the mid area, left anterior tibial artery is 100% occluded in the proximal to distal area Soc no tob, quit 40 years ago, no etoh Fhx no early cad We discussed the expected course, resolution and complications of the diagnosis(es) in detail. Medication risks, benefits, costs, interactions, and alternatives were discussed as indicated. I advised him to contact the office if his condition worsens, changes or fails to improve as anticipated. He expressed understanding with the diagnosis(es) and plan HISTORY OF PRESENTING ILLNESS Shubham Seaman is a 82 y.o. male ACTIVE PROBLEM LIST Patient Active Problem List  
 Diagnosis Date Noted  Pressure injury of buttock, stage 1 06/04/2020  Incontinence of feces 06/04/2020  On angiotensin receptor blockers (ARB) 01/23/2020  Gastroesophageal reflux disease without esophagitis 01/23/2020  Cough 01/23/2020  [facility] 08/22/2019  Age-related cataract of both eyes 04/25/2019  Gait instability 06/21/2018  Type 2 diabetes mellitus without complication, without long-term current use of insulin ([facility] 09/21/2017  Essential hypertension 09/21/2017  Hypercholesterolemia 09/21/2017  Coronary artery disease involving native coronary artery of native heart without angina pectoris 09/21/2017  S/P CABG (coronary artery bypass graft) 2017  Severe aortic stenosis 2017  Aortic systolic murmur on examination 2017  Benign prostatic hyperplasia with lower urinary tract symptoms 2017  Insomnia 2017  Former smoker 2017  ACP (advance care planning) 2017 PAST MEDICAL HISTORY Past Medical History:  
Diagnosis Date  BPH (benign prostatic hyperplasia)  CAD (coronary artery disease)  Diabetes ([facility]  Hearing loss  Hypercholesterolemia  Hypertension  Murmur  Recurrent depression ([facility] 2019 PAST SURGICAL HISTORY Past Surgical History:  
Procedure Laterality Date  CARDIAC SURG PROCEDURE UNLIST   by-pass  HX CHOLECYSTECTOMY ALLERGIES Allergies Allergen Reactions  Procaine Other (comments) Pt stated he passed out from procaine and was told not to let anyone use it on him again FAMILY HISTORY Family History Problem Relation Age of Onset  Cancer Mother  Cancer Father   
 negative for cardiac disease SOCIAL HISTORY Social History Socioeconomic History  Marital status:  Spouse name: Not on file  Number of children: Not on file  Years of education: Not on file  Highest education level: Not on file Tobacco Use  Smoking status: Former Smoker Types: Cigarettes Last attempt to quit: 1990 Years since quittin.7  Smokeless tobacco: Never Used Substance and Sexual Activity  Alcohol use: No  
 Drug use: No  
 
 
 
MEDICATIONS Current Outpatient Medications Medication Sig  
 menthol-zinc oxide (CALMOSEPTINE) 0.44-20.6 % oint Apply to bilateral buttocks TID  Indications: skin irritation  sertraline (ZOLOFT) 100 mg tablet TAKE 1 TABLET BY MOUTH  DAILY  amLODIPine (NORVASC) 10 mg tablet TAKE 1 TABLET BY MOUTH  DAILY  finasteride (PROSCAR) 5 mg tablet TAKE 1 TABLET BY MOUTH  DAILY  hydroCHLOROthiazide (HYDRODIURIL) 12.5 mg tablet TAKE 1 TABLET BY MOUTH  DAILY  metFORMIN ER (GLUCOPHAGE XR) 500 mg tablet TAKE 1 TABLET BY MOUTH TWO  TIMES DAILY WITH MEALS  glipiZIDE SR (GLUCOTROL XL) 5 mg CR tablet TAKE 1 TABLET BY MOUTH  DAILY  doxazosin (CARDURA) 4 mg tablet TAKE 1 TABLET BY MOUTH  DAILY  atorvastatin (LIPITOR) 10 mg tablet TAKE 1 TABLET BY MOUTH  DAILY  traZODone (DESYREL) 50 mg tablet TAKE 2 TABLETS BY MOUTH  NIGHTLY  labetaloL (NORMODYNE) 100 mg tablet TAKE 1 TABLET BY MOUTH  DAILY  OTHER Hearing evaluation for possible changes in hearing  fluticasone propionate (FLONASE) 50 mcg/actuation nasal spray 1 [place] by Both Nostrils route two (2) times a day.  losartan (COZAAR) 25 mg tablet TAKE ONE-HALF TABLET BY  MOUTH DAILY  temazepam (RESTORIL) 15 mg capsule TAKE 1 CAPSULE BY MOUTH AT BEDTIME AS NEEDED FOR SLEEP.  tiZANidine (ZANAFLEX) 2 mg tablet Take 1 Tab by mouth three (3) times daily as needed for Pain.  glucose blood VI test strips (TRUE METRIX GLUCOSE TEST STRIP) strip Check BS BID  Dx: DM  E11.21  
 aspirin (ASPIRIN) 325 mg tablet Take 1 Tab by mouth daily.  cholecalciferol (VITAMIN D3) 1,000 unit cap Take 1 Cap by mouth daily.  magnesium 250 mg tab Take 1 Tab by mouth daily.  sodium chloride (OCEAN) 0.65 % nasal squeeze bottle 0.05 mL by Both Nostrils route as needed for Congestion.  FERROUS FUMARATE/VIT BCOMP,C (SUPER B COMPLEX PO) Take  by mouth. No current facility-administered medications for this visit. I have reviewed the nurses notes, vitals, problem list, allergy list, medical history, family, social history and medications. REVIEW OF SYMPTOMS Constitutional: Negative for fever, chills, malaise/fatigue and diaphoresis. Respiratory: Negative for cough, hemoptysis, sputum production, shortness of breath and wheezing. Cardiovascular: Negative for chest pain, palpitations, orthopnea, claudication, leg swelling and PND. Gastrointestinal: Negative for heartburn, nausea, vomiting, blood in stool and melena. Genitourinary: Negative for dysuria and flank pain. Musculoskeletal: Negative for joint pain and back pain. Skin: Negative for rash. Neurological: Negative for focal weakness, seizures, loss of consciousness, weakness and headaches. Endo/Heme/Allergies: Negative for abnormal bleeding. Psychiatric/Behavioral: Negative for memory loss. PHYSICAL EXAMINATION Due to this being a TeleHealth evaluation, many elements of the physical examination are unable to be assessed. General: , in no acute distress, cooperative and alert Respiratory: No audible wheezing, no signs of respiratory distress, Extremities:  No edema Neuro: A&Ox3, speech clear,answering questions appropriately DIAGNOSTIC DATA No specialty comments available. LABORATORY DATA Lab Results Component Value Date/Time WBC 8.2 02/26/2020 03:19 PM  
 HGB 12.4 (L) 02/26/2020 03:19 PM  
 HCT 39.3 02/26/2020 03:19 PM  
 PLATELET 186 42/19/8741 03:19 PM  
 MCV 87 02/26/2020 03:19 PM  
  
Lab Results Component Value Date/Time Sodium 144 02/26/2020 03:19 PM  
 Potassium 5.1 02/26/2020 03:19 PM  
 Chloride 101 02/26/2020 03:19 PM  
 CO2 27 02/26/2020 03:19 PM  
 Glucose 131 (H) 02/26/2020 03:19 PM  
 BUN 16 02/26/2020 03:19 PM  
 Creatinine 1.03 02/26/2020 03:19 PM  
 BUN/Creatinine ratio 16 02/26/2020 03:19 PM  
 GFR est AA 78 02/26/2020 03:19 PM  
 GFR est non-AA 67 02/26/2020 03:19 PM  
 Calcium 9.7 02/26/2020 03:19 PM  
 Bilirubin, total 0.4 02/26/2020 03:19 PM  
 Alk.  phosphatase 81 02/26/2020 03:19 PM  
 Protein, total 6.7 02/26/2020 03:19 PM  
 Albumin 4.1 02/26/2020 03:19 PM  
 A-G Ratio 1.6 02/26/2020 03:19 PM  
 ALT (SGPT) 19 02/26/2020 03:19 PM  
  
 
 
 
 FOLLOW-UP  
 
  
 
 
 Patient was made aware and verbalized understanding that an appointment will be scheduled for them for a virtual visit and/or office visit within the above time frame. Patient understanding his/her responsibility to call and change time/date if he/she so chooses. Thank you, Rosalva Gordon,  for allowing me to participate in the care of Balaji Harris. Please do not hesitate to contact me for further questions/concerns. Greater than12 minutes was spent in direct video patient care, planning and chart review. This visit was conducted using [employer] telemedicine services. Robin Goncalves MD 
 
[facility] [address] [facility], [address] [place]        
[phone] / [phone] Fax [address], [address] Yadira Singh 
[phone] / [phone] Fax

## 2020-07-06 NOTE — TELEPHONE ENCOUNTER
Requested Prescriptions     Pending Prescriptions Disp Refills    temazepam (RESTORIL) 15 mg capsule 90 Cap 1     Sig: TAKE 1 CAPSULE BY MOUTH AT BEDTIME AS NEEDED FOR SLEEP.     sertraline (ZOLOFT) 100 mg tablet 30 Tab 5     Sig: TAKE 1 TABLET BY MOUTH  DAILY     06/04/2020 09/030/2020  [facility]

## 2020-08-13 NOTE — PROGRESS NOTES
ADVISED PATIENT OF THE FOLLOWING HEALTH MAINTAINCE DUE Health Maintenance Due Topic Date Due  Eye Exam Retinal or Dilated  06/18/1945  Shingrix Vaccine Age 50> (1 of 2) 06/18/1987  MICROALBUMIN Q1  12/27/2019  Influenza Age 5 to Adult  08/01/2020 Chief Complaint Patient presents with  Bladder Infection  
  buring during urination  Diabetes  Coronary Artery Disease  Cholesterol Problem 1. Have you been to the ER, urgent care clinic since your last visit? Hospitalized since your last visit? No 
 
2. Have you seen or consulted any other health care providers outside of the [address] [place] [address] since your last visit? Include any DEXA scan, mammography  or colon screening. No 
 
3. Do you have an Advance Directive on file? no 
 
4. Do you have a DNR on file? no 
 
Patient is accompanied by self and wife I have received verbal consent from Kelli Medina to discuss any/all medical information while they are present in the room. No flowsheet data found. [address] [facility] 
[address] [facility] [address] Phone: [phone] Fax: [phone] [facility], [address] 
[address] 
[address] [place] [address] Phone: [phone] Fax: [phone] Patient reminded during visit to bring all medication bottles, OTC medications to all appointments. Results for orders placed or performed in visit on 08/13/20 AMB POC URINALYSIS DIP STICK MANUAL W/O MICRO Result Value Ref Range Color (UA POC) CIT Group Clarity (UA POC) Cloudy Glucose (UA POC) Negative Negative Bilirubin (UA POC) Negative Negative Ketones (UA POC) Negative Negative Specific gravity (UA POC) 1.020 1.001 - 1.035 Blood (UA POC) 4+ Negative pH (UA POC) 6.5 4.6 - 8.0 Protein (UA POC) 2+ Negative Urobilinogen (UA POC) normal 0.2 - 1 Nitrites (UA POC) Negative Negative Leukocyte esterase (UA POC) 4+ Negative Urine collected for urine culture and Mirco

## 2020-08-19 NOTE — PROGRESS NOTES
Urine culture positive for UTI. Not sensitive to Keflex. D/c Keflex. Start:  Cipro 250 mg po bid x 7 days.

## 2020-08-19 NOTE — TELEPHONE ENCOUNTER
----- Message from Maryse Albright NP sent at 8/19/2020  3:47 PM EDT -----  Urine culture positive for UTI. Not sensitive to Keflex. D/c Keflex. Start:  Cipro 250 mg po bid x 7 days.

## 2020-08-19 NOTE — TELEPHONE ENCOUNTER
I called and spoke with pts wife, she was notified of urine culture results.  She will have pt go to the pharmacy to  new ABT

## 2020-08-24 PROBLEM — E11.21 TYPE 2 DIABETES WITH NEPHROPATHY (HCC): Status: ACTIVE | Noted: 2020-01-01

## 2020-08-24 NOTE — PROGRESS NOTES
HISTORY OF PRESENT ILLNESS Adama Akers is a 82 y.o. male. Pt. comes in with his wife for f/u. Has a few chronic medical issues as documented. Reports 3 days of dysuria and burning with urination. No blood or other related symptoms. His BP and DM have been stable on medications. Has aortic stenosis. Denies chest pain. Has chronic dyspnea and LARA. Lives at [facility] independent living. Taking precautions regarding COVID-19. Denies any related signs or symptoms including fever, cough, chest pain, GI or  issues. PMH/PSH/Allergies/Social History/medication list and most recent studies reviewed with patient. Tobacco use: No 
Alcohol use: No 
Reports compliance with medications and diet. Trying to be active physically to control weight. Reports no other new c/o. HPI Review of Systems Constitutional: Negative. HENT: Positive for hearing loss. Eyes: Positive for blurred vision. Negative for double vision and pain. Respiratory: Negative for shortness of breath. Cardiovascular: Negative for chest pain and leg swelling. Gastrointestinal: Negative for abdominal pain. Genitourinary: Positive for dysuria and urgency. Negative for flank pain and hematuria. Musculoskeletal: Positive for joint pain. Negative for falls. Skin: Negative. Neurological: Positive for sensory change. Negative for dizziness, focal weakness and headaches. Endo/Heme/Allergies: Negative. Negative for polydipsia. Psychiatric/Behavioral: Negative for depression. The patient has insomnia. The patient is not nervous/anxious. All other systems reviewed and are negative. Physical Exam 
Vitals signs and nursing note reviewed. Constitutional:   
   General: He is not in acute distress. Appearance: He is well-developed. HENT:  
   Head: Normocephalic and atraumatic. Nose: Nose normal.  
   Mouth/Throat:  
   Mouth: Mucous membranes are moist.  
   Pharynx: Oropharynx is clear. Eyes: General: No scleral icterus. Conjunctiva/sclera: Conjunctivae normal.  
Neck: Musculoskeletal: Normal range of motion and neck supple. Thyroid: No thyromegaly. Vascular: No JVD. Cardiovascular:  
   Rate and Rhythm: Normal rate and regular rhythm. Heart sounds: Murmur (AS, chronic) present. Comments: Diminished pedal pulses Pulmonary:  
   Effort: Pulmonary effort is normal. No respiratory distress. Breath sounds: Normal breath sounds. No wheezing or rales. Abdominal:  
   General: Bowel sounds are normal. There is no distension. Palpations: Abdomen is soft. Tenderness: There is no abdominal tenderness. There is no right CVA tenderness or left CVA tenderness. Musculoskeletal:     
   General: Tenderness (Lumbars) present. Skin: 
   General: Skin is warm and dry. Findings: No rash. Neurological:  
   Mental Status: He is alert and oriented to person, place, and time. Coordination: Coordination normal.  
   Comments: Decreased sensation in distal legs/feet Psychiatric:     
   Behavior: Behavior normal.  
 
 
 
ASSESSMENT and PLAN Diagnoses and all orders for this visit: 1. Dysuria -     AMB POC URINALYSIS DIP STICK MANUAL W/O MICRO 
-     CULTURE, URINE 2. Type 2 diabetes mellitus without complication, without long-term current use of insulin ([address] -     MICROALBUMIN, UR, RAND W/ MICROALB/CREAT RATIO 3. Essential hypertension 4. Severe aortic stenosis Other orders -     cephALEXin (KEFLEX) 500 mg capsule; Take 1 Cap by mouth two (2) times a day for 7 days. 
-     SPECIMEN STATUS REPORT Follow-up and Dispositions · Return in about 3 months (around 11/13/2020).  
  
lab results and schedule of future lab studies reviewed with patient 
reviewed diet, exercise and weight control 
reviewed medications and side effects in detail 
low cholesterol diet, weight control and daily exercise discussed, home glucose monitoring emphasized, all medications, side effects and compliance discussed carefully, foot care discussed and Podiatry visits discussed, annual eye examinations at Ophthalmology discussed, glycohemoglobin and other lab monitoring discussed and long term diabetic complications discussed Fall precautions discussed Monitor BS at home with goal of 100-150 F/u with other MD's as scheduled COVID-19 precautions discussed with pt Advised patient to keep up with his fluid/water intake

## 2020-09-02 NOTE — TELEPHONE ENCOUNTER
Requested Prescriptions     Pending Prescriptions Disp Refills    sertraline (ZOLOFT) 100 mg tablet 30 Tab 5     Sig: TAKE 1 TABLET BY MOUTH  DAILY   90 day supply requested  08/13/2020  10/15/2020  [facility]

## 2020-09-10 PROBLEM — L89.322 DECUBITUS ULCER OF LEFT BUTTOCK, STAGE 2 (HCC): Status: ACTIVE | Noted: 2020-01-01

## 2020-09-10 NOTE — PATIENT INSTRUCTIONS
Learning About Preventing Pressure Injuries What are pressure injuries? A pressure injury to the skin is caused by constant pressure over a period of time. The constant pressure blocks the blood supply to the skin. This causes skin cells to die and creates a sore. Pressure injuries are also called bedsores. Pressure injuries usually occur over bony areas, such as the hips, lower back, elbows, heels, and shoulders. Pressure injuries can also occur in places where the skin folds over on itself, or where medical equipment presses on the skin, such as when oxygen tubes press on the ears or cheeks. Pressure injuries can range from red areas on the surface of the skin to severe tissue damage that goes deep into muscle and bone. Severe sores are hard to treat and slow to heal. When pressure injuries do not heal properly, problems such as bone, blood, and skin infections can develop. What causes pressure injuries? Things that cause pressure injuries include: · Pressure on the skin and tissues. For example, the sores may happen when a person lies in bed or sits in a wheelchair for a long time. This is the most common cause of pressure injuries. · Sliding down in a bed or chair, forcing the skin to fold over itself (shear force). · Being pulled across bed sheets or other surfaces (friction burns). As we get older, our skin gets more thin and dry and less elastic, so it is easier to damage. Poor nutrition and not getting enough fluids make these natural changes in the skin worse. Skin in this condition may easily develop a pressure injury. Skin can also be damaged by sweat, feces, or urine, making pressure injuries more likely and harder to heal. 
How can you help prevent pressure injuries? If you are not able to do these things yourself, ask a family member or friend for help. Change position often · In a bed, change position every 2 hours. · In a wheelchair or other type of chair, shift your weight every 15 minutes, and give yourself a full relief of weight every hour. ? For a weight shift, lean forward and to the left and right. Push up out of the chair with your arms. If you have a chair that tilts, use the tilt control to help relieve pressure. ? For a full relief of weight, stand up or move to another chair or bed if you are able to. Personal care · Check your skin every day, especially around bony areas. When a pressure injury is forming, skin temperature can be different than nearby skin. It might be warmer or cooler. The skin can feel either firmer or softer than the surrounding skin. · Keep your skin clean and free of sweat, wound drainage, urine, and feces. · Use skin lotions to keep your skin from drying out and cracking. Barrier lotions or creams have ingredients that can act as a shield to help protect the skin from moisture or irritation. · Try not to slide or slump across sheets in a chair or bed. And try not to sleep in a recliner chair. Lifestyle choices · Eat healthy foods with plenty of protein to help heal damaged skin and to help new skin grow. · Get plenty of fluids. · Stay at a healthy weight. Being either overweight or underweight can make pressure injuries more likely. · If you smoke, stop. Smoking dries the skin and reduces its blood supply. If you need help quitting, talk to your doctor about stop-smoking programs and medicines. These can increase your chances of quitting for good. Ask about using cushions or pads · Overlays are special pads you put on top of a mattress. They provide a softer surface that will fit your body's shape better than a regular mattress. · Cushions or devices can be used to reduce pressure on certain areas of the body. For example, you can use a \"medical heel pillow\" to help prevent pressure injuries on heels. You can also get cushions for seating surfaces, such as wheelchair seats. Talk with your doctor about cushions and pads. Some products, such as doughnut-type devices, may actually cause pressure injuries or make them worse. Where can you learn more? Go to http://esvin-tammy.info/ Enter 966 4495 in the search box to learn more about \"Learning About Preventing Pressure Injuries. \" Current as of: March 4, 2020               Content Version: 12.6 © 1758-2830 [employer]. Care instructions adapted under license by [employer] (which disclaims liability or warranty for this information). If you have questions about a medical condition or this instruction, always ask your healthcare professional. [address] any warranty or liability for your use of this information.

## 2020-09-10 NOTE — PROGRESS NOTES
ADVISED PATIENT OF THE FOLLOWING HEALTH MAINTAINCE DUE Health Maintenance Due Topic Date Due  Eye Exam Retinal or Dilated  06/18/1945  Shingrix Vaccine Age 50> (1 of 2) 06/18/1987  Flu Vaccine (1) 09/01/2020 Chief Complaint Patient presents with  Fall  
  fell getting on bus , fell on his knees,   
 Coronary Artery Disease  Diabetes 1. Have you been to the ER, urgent care clinic since your last visit? Hospitalized since your last visit? No 
 
2. Have you seen or consulted any other health care providers outside of the [facility] since your last visit? Include any DEXA scan, mammography  or colon screening. No 
 
3. Do you have an Advance Directive on file? no 
 
4. Do you have a DNR on file? no 
 
Patient is accompanied by self and wife I have received verbal consent from Ok Both to discuss any/all medical information while they are present in the room. No flowsheet data found. [address] 
[address] [place] [address] Phone: [phone] Fax: [phone] [facility] [address], [address] [facility] 
[address] 
[address] [place] [address] Phone: [phone] Fax: [phone] Patient reminded during visit to bring all medication bottles, OTC medications to all appointments. After obtaining consent, and per orders of Dr Alicia Hay, injection of Influenza  given by Vidhi Banuelos LPN. Patient instructed to remain in clinic for 20 minutes afterwards, and to report any adverse reaction to me immediately.

## 2020-09-21 NOTE — PROGRESS NOTES
HISTORY OF PRESENT ILLNESS Joey Menchaca is a 82 y.o. male. Pt. comes in with his wife for f/u. Has a few chronic medical issues as documented. Reports having a fall while getting on the bus earlier today. Hit his knees. Has some pain with scratches but no major injuries. Gait is unsteady. BP and DM have been stable. Continues have issues with pressure ulcers on buttocks. He is not very active physically. Lives with his wife at [facility]. Denies any exposure to COVID-19. Denies any related signs or symptoms. PMH/PSH/Allergies/Social History/medication list and most recent studies reviewed with patient. Tobacco use: No 
Alcohol use: No 
 
Reports compliance with medications and diet. Trying to be active physically to control weight. Reports no other new c/o. HPI Review of Systems Constitutional: Negative. HENT: Positive for hearing loss. Eyes: Positive for blurred vision. Negative for double vision and pain. Respiratory: Negative for shortness of breath. Cardiovascular: Negative for chest pain and leg swelling. Gastrointestinal: Negative for abdominal pain. Genitourinary: Positive for urgency. Negative for flank pain and hematuria. Musculoskeletal: Positive for falls and joint pain. Skin: Negative. Buttock ulcer Neurological: Positive for sensory change. Negative for dizziness, focal weakness and headaches. Endo/Heme/Allergies: Negative. Negative for polydipsia. Psychiatric/Behavioral: Negative for depression. The patient has insomnia. The patient is not nervous/anxious. All other systems reviewed and are negative. Physical Exam 
Vitals signs and nursing note reviewed. Constitutional:   
   General: He is not in acute distress. Appearance: He is well-developed. HENT:  
   Head: Normocephalic and atraumatic. Mouth/Throat:  
   Mouth: Mucous membranes are moist.  
Eyes:  
   Conjunctiva/sclera: Conjunctivae normal.  
Neck: Musculoskeletal: Normal range of motion and neck supple. Thyroid: No thyromegaly. Vascular: No JVD. Cardiovascular:  
   Rate and Rhythm: Normal rate and regular rhythm. Heart sounds: Murmur present. Pulmonary:  
   Effort: Pulmonary effort is normal. No respiratory distress. Breath sounds: Normal breath sounds. No wheezing or rales. Abdominal:  
   General: Bowel sounds are normal. There is no distension. Palpations: Abdomen is soft. Musculoskeletal:     
   General: No tenderness. Skin: 
   General: Skin is warm and dry. Findings: Erythema present. No rash. Comments: Left medial buttock ulcer, stage II, no drainage or infection Neurological:  
   Mental Status: He is alert and oriented to person, place, and time. Coordination: Coordination normal.  
Psychiatric:     
   Behavior: Behavior normal.  
 
 
 
ASSESSMENT and PLAN Diagnoses and all orders for this visit: 
 
1. Gait instability 2. Fall, initial encounter 3. Decubitus ulcer of left buttock, stage 2 (HCC) Proper skin care and pressure avoidance discussed again Apply DuoDERM to the area daily 3 days till healed 4. Essential hypertension 5. Type 2 diabetes with nephropathy (HCC) 6. Encounter for immunization 
-     INFLUENZA VACCINE INACTIVATED (IIV), SUBUNIT, ADJUVANTED, IM Follow-up and Dispositions · Return if symptoms worsen or fail to improve. 
  
lab results and schedule of future lab studies reviewed with patient 
reviewed diet, exercise and weight control 
reviewed medications and side effects in detail Fall precautions discussed F/u with other MD's as scheduled COVID-19 precautions discussed with pt Reassurance

## 2020-10-15 NOTE — PROGRESS NOTES
HISTORY OF PRESENT ILLNESS Santino Murphy is a 82 y.o. male. Pt. comes with his wife for f/u. Has a few chronic medical issues as documented. Vital signs are stable. DM, BP, aortic stenosis and CAD have been stable. Followed by cardiologist.  Gait is slow but no recent falls. Has had issues with pressure ulcers on buttocks. Tells me is doing better. Has been trying to avoid sitting in same position for too long. Has chronic arthritic pains. Denies any signs or symptoms of Covid 19. Lives [facility] his [facility] at [facility]. PMH/PSH/Allergies/Social History/medication list and most recent studies reviewed with patient. Tobacco use: No 
Alcohol use: no  
 
Reports compliance with medications and diet. Trying to be active physically as tolerated. Reports no other new c/o. HPI Review of Systems Constitutional: Negative. HENT: Positive for hearing loss. Eyes: Positive for blurred vision. Negative for double vision and pain. Respiratory: Negative for shortness of breath. Cardiovascular: Negative for chest pain and leg swelling. Gastrointestinal: Negative for abdominal pain. Genitourinary: Positive for urgency. Negative for flank pain and hematuria. Musculoskeletal: Positive for joint pain. Negative for falls. Skin: Negative. Buttock ulcer Neurological: Positive for sensory change. Negative for dizziness, focal weakness and headaches. Endo/Heme/Allergies: Negative. Negative for polydipsia. Psychiatric/Behavioral: Negative for depression. The patient has insomnia. The patient is not nervous/anxious. All other systems reviewed and are negative. Physical Exam 
Vitals signs and nursing note reviewed. Constitutional:   
   General: He is not in acute distress. Appearance: He is well-developed. HENT:  
   Head: Normocephalic and atraumatic.   
   Mouth/Throat:  
   Mouth: Mucous membranes are moist.  
Eyes:  
   Conjunctiva/sclera: Conjunctivae normal.  
 Neck: Musculoskeletal: Normal range of motion and neck supple. Thyroid: No thyromegaly. Vascular: No JVD. Cardiovascular:  
   Rate and Rhythm: Normal rate and regular rhythm. Heart sounds: Murmur present. Pulmonary:  
   Effort: Pulmonary effort is normal. No respiratory distress. Breath sounds: Normal breath sounds. No wheezing or rales. Abdominal:  
   General: Bowel sounds are normal. There is no distension. Palpations: Abdomen is soft. Musculoskeletal:     
   General: No tenderness. Skin: 
   General: Skin is warm and dry. Findings: Erythema present. No rash. Comments: Left medial buttock ulcer,chronic/stable, no infection Neurological:  
   Mental Status: He is alert and oriented to person, place, and time. Coordination: Coordination normal.  
Psychiatric:     
   Behavior: Behavior normal.  
 
 
 
ASSESSMENT and PLAN Diagnoses and all orders for this visit: 
 
1. Type 2 diabetes with nephropathy (HCC) 2. Essential hypertension 3. Gait instability 4. Severe aortic stenosis Follow-up and Dispositions · Return in about 4 months (around 2/15/2021). lab results and schedule of future lab studies reviewed with patient 
reviewed diet, exercise and weight control 
reviewed medications and side effects in detail 
low cholesterol diet, weight control and daily exercise discussed, home glucose monitoring emphasized, all medications, side effects and compliance discussed carefully, foot care discussed and Podiatry visits discussed, annual eye examinations at Ophthalmology discussed, glycohemoglobin and other lab monitoring discussed and long term diabetic complications discussed Monitor BS at home with goal of 100-150 Monitor BP at home with goal of 140/90 or less Monitor BS at home with goal of 100-150 F/u with other MD's as scheduled COVID-19 precautions discussed with pt 
Proper skin care to avoid pressure ulcers discussed again Overall stable

## 2020-10-18 PROBLEM — R55 SYNCOPE AND COLLAPSE: Status: ACTIVE | Noted: 2020-01-01

## 2020-10-18 PROBLEM — R55 SYNCOPE: Status: ACTIVE | Noted: 2020-01-01

## 2020-10-18 NOTE — ROUTINE PROCESS
TRANSFER - OUT REPORT: 
 
Verbal report given to Staff RN(name) on Carlyn Caro  being transferred to [facility]) for routine progression of care Report consisted of patients Situation, Background, Assessment and  
Recommendations(SBAR). Information from the following report(s) SBAR, Kardex, ED Summary and Recent Results was reviewed with the receiving nurse. Lines:  
Peripheral IV 10/18/20 Left Forearm (Active) Site Assessment Clean, dry, & intact 10/18/20 1719 Phlebitis Assessment 0 10/18/20 1719 Infiltration Assessment 0 10/18/20 1719 Dressing Status Clean, dry, & intact 10/18/20 1719 Hub Color/Line Status Pink 10/18/20 1719 Action Taken Blood drawn 10/18/20 1719 Opportunity for questions and clarification was provided. Patient transported with: 
 Registered Nurse

## 2020-10-18 NOTE — Clinical Note
Temporary pacemaker turned on. Rate = 120 bpm.   Electrical capture and mechanical capture obtained.

## 2020-10-18 NOTE — Clinical Note
Sheath #4: Closed using manual compression. Site secured by Tegaderm. Pressure held for: 12 minutes.

## 2020-10-18 NOTE — Clinical Note
Temporary pacemaker tested. Inserted through the right internal jugular. Temporary Pacer pacing in the right ventricle. Device secured using: tegaderm and suture. Rate = 120 bpm.   10 mA. Electrical capture and mechanical capture obtained.

## 2020-10-18 NOTE — Clinical Note
Bilateral groin, right radial, bilateral chest, bilateral abdomen and bilateral neck clipped prepped with ChloraPrep and draped. Wet prep elapsed drying time: 3 mins.

## 2020-10-18 NOTE — Clinical Note
Unsuccessful left arterial access. Successful central venous access obtained via [facility] by Dr. Fidencio Nicholas.

## 2020-10-18 NOTE — Clinical Note
Temporary pacemaker inserted. Inserted through the right internal jugular. Temporary Pacer pacing in the right ventricle. Device secured using: tegaderm and suture. Rate = 120 bpm.   5 mA. Electrical capture and mechanical capture obtained.

## 2020-10-18 NOTE — Clinical Note
Peripheral Lesion 1. Lesion #1: Pressure = 5 ruben; Duration = 20 sec. Lesion #2: Pressure = 7 ruben; Duration = 20 sec.

## 2020-10-18 NOTE — Clinical Note
TRANSFER - OUT REPORT:     [facility]. Report consisted of patient's Situation, Background, Assessment and   Recommendations(SBAR). Opportunity for questions and clarification was provided. Patient transported with a Registered Nurse. Patient transported to: CCU25.

## 2020-10-18 NOTE — Clinical Note
Multiple views of the saphenous vein graft to the left anterior descending obtained using power injection.

## 2020-10-18 NOTE — Clinical Note
Peripheral Lesion 1. Balloon inflated using single inflation technique. Lesion #1: Pressure = 4 ruben;    Lesion #2: Pressure = 4 ruben;   1ST-30 PULSES X 4  2ND- 30 PULSES X 4

## 2020-10-18 NOTE — Clinical Note
Sheath #2: Closed using manual compression. Site secured by Tegaderm. Pressure held for: 12 minutes.

## 2020-10-18 NOTE — Clinical Note
Temporary pacemaker left in place. Inserted through the right internal jugular. Temporary Pacer pacing in the right ventricle. Device secured using: tegaderm and suture. Rate = 60 bpm.   10 mA. Electrical capture and mechanical capture obtained.

## 2020-10-18 NOTE — Clinical Note
Peripheral Lesion 1. Lesion #1: Pressure = 7 ruben; Duration = 30 sec. Lesion #2: Pressure = 7 ruben; Duration = 11 sec.

## 2020-10-18 NOTE — Clinical Note
Medtronic EnVeo PRO loading system REF [id number])[id number] LOT [id number]    Medtronic ENVeo PRO Delivery Catheter System REF (240) [id number] LOT [id number]

## 2020-10-18 NOTE — Clinical Note
Temporary pacemaker initiates rapid pacing. Temporary Pacer pacing in the right ventricle. Rate = 180 bpm.   Electrical capture and mechanical capture obtained.

## 2020-10-18 NOTE — Clinical Note
Temporary pacemaker initiates rapid pacing. Temporary Pacer pacing in the right ventricle. Rate = 160 bpm.   Electrical capture and mechanical capture obtained.

## 2020-10-18 NOTE — Clinical Note
Temporary pacemaker inserted. Inserted through the right groin. Temporary Pacer pacing in the right ventricle. Rate = 90 bpm.   10 mA. Threshold = 1 mA. Electrical capture and mechanical capture obtained.  RAPID TESTED

## 2020-10-18 NOTE — H&P
History & Physical 
 
Primary Care Provider: Cherelle Patel DO Source of Information: Patient History of Presenting Illness:  
Luis Miguel Tompkins is a 82 y.o. male who presents with syncope History was probably obtained from the patient Patient reports that he lives in [facility], today he was out shopping and as he was coming out he fainted. Per chart patient has history of severe aortic stenosis. Patient feels that he hit his head but denies any other complaints or problems. Patient came to the ER was requested to be observed under the hospitalist service. Patient reports that he is back to baseline he denies any complaints or problems currently. Patient reports that he has been taking his medications on a regular basis. Patient reports that he has not had any contact with known COVID-19 patients. The patient denies any Headache, blurry vision, sore throat, trouble swallowing, trouble with speech, chest pain, SOB, cough, fever, chills, N/V/D, abd pain, urinary symptoms, constipation, recent travels, sick contacts, focal or generalized neurological symptoms,  falls, injuries, rashes, contact with COVID-19 diagnosed patients, hematemesis, melena, hemoptysis, hematuria, rashes, denies starting any new medications and denies any other concerns or problems besides as mentioned above. Review of Systems: A comprehensive review of systems was negative except for that written in the History of Present Illness. Past Medical History:  
Diagnosis Date  BPH (benign prostatic hyperplasia)  CAD (coronary artery disease)  Diabetes ([address]  Hearing loss  Hypercholesterolemia  Hypertension  Murmur  Recurrent depression ([facility] [address] 8/22/2019 Past Surgical History:  
Procedure Laterality Date  CARDIAC SURG PROCEDURE UNLIST  2006 by-pass  HX CHOLECYSTECTOMY Prior to Admission medications Medication Sig Start Date End Date Taking? Authorizing Provider  
sertraline (ZOLOFT) 100 mg tablet TAKE 1 TABLET BY MOUTH  DAILY 9/2/20   Ioana Hutchinson NP  
traZODone (DESYREL) 50 mg tablet Take 2 tablets by mouth at night 8/21/20   [facility]  
temazepam (RESTORIL) 15 mg capsule TAKE 1 CAPSULE BY MOUTH AT BEDTIME AS NEEDED FOR SLEEP. 7/6/20   Magda Mcbride DO  
losartan (COZAAR) 25 mg tablet TAKE ONE-HALF TABLET BY  MOUTH DAILY 7/1/20   Olga Dick NP  
labetaloL (NORMODYNE) 100 mg tablet TAKE 1 TABLET BY MOUTH  DAILY 7/1/20   Olga Dick NP  
fluticasone propionate (FLONASE) 50 mcg/actuation nasal spray ADMINISTER 1 Spray IN Both Nostrils two (2) times a day. 6/8/20   Yary Xiong NP  
menthol-zinc oxide (CALMOSEPTINE) 0.44-20.6 % oint Apply to bilateral buttocks TID  Indications: skin irritation 6/4/20   Jorgito Hunt DO  
amLODIPine (NORVASC) 10 mg tablet TAKE 1 TABLET BY MOUTH  DAILY 5/13/20   Olga Dick NP  
finasteride (PROSCAR) 5 mg tablet TAKE 1 TABLET BY MOUTH  DAILY 5/13/20   Olga Dick NP  
hydroCHLOROthiazide (HYDRODIURIL) 12.5 mg tablet TAKE 1 TABLET BY MOUTH  DAILY 5/13/20   Yary Xiong NP  
metFORMIN ER (GLUCOPHAGE XR) 500 mg tablet TAKE 1 TABLET BY MOUTH TWO  TIMES DAILY WITH MEALS 5/13/20   Olga Dick NP  
glipiZIDE SR (GLUCOTROL XL) 5 mg CR tablet TAKE 1 TABLET BY MOUTH  DAILY 5/13/20   Olga Dick NP  
doxazosin (CARDURA) 4 mg tablet TAKE 1 TABLET BY MOUTH  DAILY 5/13/20   Olga Dick NP  
atorvastatin (LIPITOR) 10 mg tablet TAKE 1 TABLET BY MOUTH  DAILY 5/13/20   Yary Xiong NP  
OTHER Hearing evaluation for possible changes in hearing 1/20/20   Magda Mcbride DO  
tiZANidine (ZANAFLEX) 2 mg tablet Take 1 Tab by mouth three (3) times daily as needed for Pain.  7/11/19   Mirshahi, Claudell Patter, DO  
glucose blood VI test strips (TRUE METRIX GLUCOSE TEST STRIP) strip Check BS BID  Dx: DM  E11.21 7/12/18   Rusty Nett, DO  
aspirin (ASPIRIN) 325 mg tablet Take 1 Tab by mouth daily. 3/29/18   Rusty Nett, DO  
cholecalciferol (VITAMIN D3) 1,000 unit cap Take 1 Cap by mouth daily. 3/29/18   Rusty Nett, DO  
magnesium 250 mg tab Take 1 Tab by mouth daily. 3/29/18   Rusty Nett, DO  
sodium chloride (OCEAN) 0.65 % nasal squeeze bottle 0.05 mL by Both Nostrils route as needed for Congestion. 3/29/18   Millie Hunt, DO  
FERROUS FUMARATE/VIT BCOMP,C (SUPER B COMPLEX PO) Take  by mouth. Provider, Historical  
 
Allergies Allergen Reactions  Procaine Other (comments) Pt stated he passed out from procaine and was told not to let anyone use it on him again Family History Problem Relation Age of Onset  Cancer Mother  Cancer Father SOCIAL HISTORY: 
Patient resides: 
Independently Assisted Living X  
SNF With family care Smoking history:  
None Former X Chronic Alcohol history:  
None Social X Chronic Ambulates:  
Independently X  
w/cane   
w/walker   
w/wc CODE STATUS: 
DNR Full X Other Objective:  
 
Physical Exam:  
 
Visit Vitals BP (!) 128/57 Pulse 98 Temp 98.7 °F (37.1 °C) Resp 18 Ht 6' 3\" (1.905 m) SpO2 96% BMI 24.62 kg/m² General : alert x 3, awake, no acute distress, resting in bed, pleasant male, appears to be stated age HEENT: PEERL, EOMI, moist mucus membrane, TM clear Neck: supple, no JVD, no meningeal signs Chest: Clear to auscultation bilaterally CVS: S1 S2 heard, 3/6 systolic ejection murmur radiating to the right upper sternal border Abd: soft/ Non tender, non distended, BS physiological, Ext: no clubbing, no cyanosis, no edema, brisk 2+ DP pulses Neuro/Psych: pleasant mood and affect, CN 2-12 grossly intact, sensory grossly within normal limit, Strength 5/5 in all extremities, DTR 1+ x 4 Skin: warm EKG: Sinus rhythm, first-degree AV block, nonspecific ST changes. Data Review:  
 
Recent Days: 
Recent Labs 10/18/20 
1527 WBC 8.5 HGB 12.1 HCT 37.3 * Recent Labs 10/18/20 
1527   
K 4.0  
 CO2 30 * BUN 23* CREA 1.36* CA 9.5 ALB 3.6 ALT 20 No results for input(s): PH, PCO2, PO2, HCO3, FIO2 in the last 72 hours. 24 Hour Results: 
Recent Results (from the past 24 hour(s)) EKG, 12 LEAD, INITIAL Collection Time: 10/18/20  3:16 PM  
Result Value Ref Range Ventricular Rate 76 BPM  
 Atrial Rate 76 BPM  
 P-R Interval 344 ms QRS Duration 102 ms Q-T Interval 410 ms QTC Calculation (Bezet) 461 ms Calculated P Axis 73 degrees Calculated R Axis -23 degrees Calculated T Axis 5 degrees Diagnosis Sinus rhythm with 1st degree AV block with premature atrial complexes Left ventricular hypertrophy with repolarization abnormality Anteroseptal infarct , age undetermined No previous ECGs available SAMPLES BEING HELD Collection Time: 10/18/20  3:27 PM  
Result Value Ref Range SAMPLES BEING HELD 1RED,1BLUE   
 COMMENT Add-on orders for these samples will be processed based on acceptable specimen integrity and analyte stability, which may vary by analyte. METABOLIC PANEL, COMPREHENSIVE Collection Time: 10/18/20  3:27 PM  
Result Value Ref Range Sodium 138 136 - 145 mmol/L Potassium 4.0 3.5 - 5.1 mmol/L Chloride 101 97 - 108 mmol/L  
 CO2 30 21 - 32 mmol/L Anion gap 7 5 - 15 mmol/L Glucose 166 (H) 65 - 100 mg/dL BUN 23 (H) 6 - 20 MG/DL Creatinine 1.36 (H) 0.70 - 1.30 MG/DL  
 BUN/Creatinine ratio 17 12 - 20 GFR est AA >60 >60 ml/min/1.73m2 GFR est non-AA 50 (L) >60 ml/min/1.73m2 Calcium 9.5 8.5 - 10.1 MG/DL Bilirubin, total 0.5 0.2 - 1.0 MG/DL  
 ALT (SGPT) 20 12 - 78 U/L  
 AST (SGOT) 18 15 - 37 U/L Alk. phosphatase 94 45 - 117 U/L Protein, total 7.4 6.4 - 8.2 g/dL Albumin 3.6 3.5 - 5.0 g/dL Globulin 3.8 2.0 - 4.0 g/dL A-G Ratio 0.9 (L) 1.1 - 2.2    
CBC WITH AUTOMATED DIFF Collection Time: 10/18/20  3:27 PM  
Result Value Ref Range WBC 8.5 4.1 - 11.1 K/uL  
 RBC 4.38 4.10 - 5.70 M/uL  
 HGB 12.1 12.1 - 17.0 g/dL HCT 37.3 36.6 - 50.3 % MCV 85.2 80.0 - 99.0 FL  
 MCH 27.6 26.0 - 34.0 PG  
 MCHC 32.4 30.0 - 36.5 g/dL  
 RDW 13.7 11.5 - 14.5 % PLATELET 321 (L) 985 - 400 K/uL MPV 12.1 8.9 - 12.9 FL  
 NRBC 0.0 0  WBC ABSOLUTE NRBC 0.00 0.00 - 0.01 K/uL NEUTROPHILS 75 32 - 75 % LYMPHOCYTES 13 12 - 49 % MONOCYTES 7 5 - 13 % EOSINOPHILS 3 0 - 7 % BASOPHILS 1 0 - 1 % IMMATURE GRANULOCYTES 1 (H) 0.0 - 0.5 % ABS. NEUTROPHILS 6.5 1.8 - 8.0 K/UL  
 ABS. LYMPHOCYTES 1.1 0.8 - 3.5 K/UL  
 ABS. MONOCYTES 0.6 0.0 - 1.0 K/UL  
 ABS. EOSINOPHILS 0.2 0.0 - 0.4 K/UL  
 ABS. BASOPHILS 0.0 0.0 - 0.1 K/UL  
 ABS. IMM. GRANS. 0.0 0.00 - 0.04 K/UL  
 DF AUTOMATED    
ECHO ADULT COMPLETE Collection Time: 10/18/20  5:21 PM  
Result Value Ref Range IVSd 1.51 (A) 0.6 - 1.0 cm LVIDd 3.20 (A) 4.2 - 5.9 cm LVIDs 2.12 cm  
 LVOT d 2.02 cm  
 LVPWd 1.45 (A) 0.6 - 1.0 cm  
 LVOT Peak Gradient 4.42 mmHg LVOT SV 72.6 mL  
 LVOT Peak Velocity 105.09 cm/s LVOT VTI 22.59 cm Left Atrium to Aortic Root Ratio 1.23 Left Atrium to Aortic Root Ratio 1.23 Left Atrium to Aortic Root Ratio 1.23 Left Atrium to Aortic Root Ratio 1.23   
 AV Cusp 0.68 cm Aortic Valve Area by Continuity of Peak Velocity 0.85 cm2 Aortic Valve Area by Continuity of Peak Velocity 0.90 cm2 Aortic Valve Area by Continuity of Peak Velocity 0.93 cm2 Aortic Valve Area by Continuity of Peak Velocity 1.13 cm2 Aortic Valve Area by Continuity of Peak Velocity 1.05 cm2 Aortic Valve Area by Continuity of Peak Velocity 1.15 cm2 Aortic Valve Area by Continuity of VTI 0.79 cm2  Aortic Valve Area by Continuity of VTI 0.81 cm2  
 Aortic Valve Area by Continuity of VTI 0.81 cm2 AoV PG 62.97 mmHg AoV PG 35.72 mmHg Aortic Valve Systolic Mean Gradient 46.67 mmHg Aortic Valve Systolic Peak Velocity 791.30 cm/s Aortic Valve Systolic Peak Velocity 789.04 cm/s AoV VTI 91.96 cm  
 MV A Imtiaz 172.52 cm/s Mitral Valve E Wave Deceleration Time 0.39 s MV E Imtiaz 97.80 cm/s Mitral Valve Pressure Half-time 0.11 s  
 MVA (PHT) 1.92 cm2 MVA VTI 1.05 cm2 MV Peak Gradient 18.91 mmHg MV Mean Gradient 5.88 mmHg Mitral Valve Max Velocity 217.45 cm/s Mitral Valve Annulus Velocity Time Integral 68.98 cm Pulmonic Valve Systolic Peak Instantaneous Gradient 6.95 mmHg Tapse 1.75 1.5 - 2.0 cm Triscuspid Valve Regurgitation Peak Gradient 26.51 mmHg Triscuspid Valve Regurgitation Peak Gradient 19.83 mmHg Triscuspid Valve Regurgitation Peak Gradient 24.50 mmHg Triscuspid Valve Regurgitation Peak Gradient 23.77 mmHg Triscuspid Valve Regurgitation Peak Gradient 23.53 mmHg Triscuspid Valve Regurgitation Peak Gradient 20.27 mmHg Triscuspid Valve Regurgitation Peak Gradient 25.00 mmHg Triscuspid Valve Regurgitation Peak Gradient 26.51 mmHg  
 TR Max Velocity 257.46 cm/s  
 TR Max Velocity 222.63 cm/s  
 TR Max Velocity 247.51 cm/s  
 TR Max Velocity 243.78 cm/s  
 TR Max Velocity 242.53 cm/s  
 TR Max Velocity 225.12 cm/s  
 TR Max Velocity 249.99 cm/s  
 TR Max Velocity 257.46 cm/s Ao Root D 3.53 cm  
 MV E/A 0.57 LV Mass .8 88 - 224 g Imaging:  
Ct Head Wo Cont Result Date: 10/18/2020 IMPRESSION: No acute intracranial hemorrhage, mass or infarct.   
 
Assessment:  
 
Syncope: Patient with severe aortic stenosis, likely secondary to the same, observe patient on a telemetry bed, patient needs evaluation for TAVR, orthostatic vitals, gentle IV hydration with closely monitoring volume status, troponin, telemetry, B12, folate, CT of the head, echocardiogram, carotid duplex, supportive care and close monitoring, and bedside echo patient had a peak velocity of about 3.8 m/sec across aortic valve, patient also has some EKG changes. NARAYAN: Likely prerenal, hold hydrochlorothiazide, gentle IV hydration, avoid nephrotoxic medication, renally dose other medication, continue to monitor, reassess as needed Diabetes mellitus type 2: Sliding scale NovoLog insulin, Accu-Cheks, diet control, close monitoring, further intervention per hospital course, reassess as needed Hypertension: Monitor, continue home meds, hold ACE and ARB, continue to monitor Hypercholesterolemia: Continue home medications and continue to monitor BPH: Continue home medication GI DVT prophylaxis: Patient will be on heparin Signed By: Myles Waldron MD   
 October 18, 2020

## 2020-10-18 NOTE — ED PROVIDER NOTES
HPI .  Patient has a history of diabetes, hypertension, hyperlipidemia, BPH, and hearing loss. Patient had 2 echocardiograms in 2019 showing severe aortic stenosis. Problem list also mentions coronary disease. Patient finished shopping at the grocery store and fainted. He fell face down. He feels back to normal now and does not feel he has any significant injuries. He says he is sure he hit his head but does not have a bump on his head. Patient denies neck pain. Past Medical History:  
Diagnosis Date  BPH (benign prostatic hyperplasia)  CAD (coronary artery disease)  Diabetes ([facility] [address]  Hearing loss  Hypercholesterolemia  Hypertension  Murmur  Recurrent depression ([facility] [address] 2019 Past Surgical History:  
Procedure Laterality Date  CARDIAC SURG PROCEDURE UNLIST  2006 by-pass  HX CHOLECYSTECTOMY Family History:  
Problem Relation Age of Onset  Cancer Mother  Cancer Father Social History Socioeconomic History  Marital status:  Spouse name: Not on file  Number of children: Not on file  Years of education: Not on file  Highest education level: Not on file Occupational History  Not on file Social Needs  Financial resource strain: Not on file  Food insecurity Worry: Not on file Inability: Not on file  Transportation needs Medical: Not on file Non-medical: Not on file Tobacco Use  Smoking status: Former Smoker Types: Cigarettes Last attempt to quit: 1990 Years since quittin.1  Smokeless tobacco: Never Used Substance and Sexual Activity  Alcohol use: No  
 Drug use: No  
 Sexual activity: Not on file Comment:  has 3 children Lifestyle  Physical activity Days per week: Not on file Minutes per session: Not on file  Stress: Not on file Relationships  Social connections Talks on phone: Not on file Gets together: Not on file Attends Druze service: Not on file Active member of club or organization: Not on file Attends meetings of clubs or organizations: Not on file Relationship status: Not on file  Intimate partner violence Fear of current or ex partner: Not on file Emotionally abused: Not on file Physically abused: Not on file Forced sexual activity: Not on file Other Topics Concern  Not on file Social History Narrative  Not on file ALLERGIES: Procaine Review of Systems All other systems reviewed and are negative. There were no vitals filed for this visit. Physical Exam 
Vitals signs and nursing note reviewed. Constitutional:   
   Appearance: He is well-developed. HENT:  
   Head: Normocephalic and atraumatic. Eyes:  
   Pupils: Pupils are equal, round, and reactive to light. Neck: Musculoskeletal: Normal range of motion and neck supple. Cardiovascular:  
   Rate and Rhythm: Normal rate and regular rhythm. Heart sounds: Murmur present. No friction rub. No gallop. Comments: 2/6 systolic murmur Pulmonary:  
   Effort: Pulmonary effort is normal. No respiratory distress. Breath sounds: No wheezing or rales. Abdominal:  
   Palpations: Abdomen is soft. Tenderness: There is no abdominal tenderness. There is no rebound. Musculoskeletal: Normal range of motion. General: No tenderness. Skin: 
   Findings: No erythema. Neurological:  
   Mental Status: He is alert. Cranial Nerves: No cranial nerve deficit. Comments: Motor; symmetric Psychiatric:     
   Behavior: Behavior normal.  
 
  
 
MDM Procedures ED EKG interpretation: 
Rhythm: normal sinus rhythm; and irregular ;PACs. Rate (approx.): 75; Axis: normal; P wave: prolonged; QRS interval: normal ; ST/T wave: normal; in  Lead: V3 q; Other findings: left ventricular hypertrophy.  This EKG was interpreted by Jm Zuniga MD,ED Provider. 3:36 PM 
 
 
Note: Patient has critical aortic stenosis and had a rather dramatic syncopal episode. Cardiology will be consulted. Patient is agreeable to be admitted to the medical service. Jm Zuniga MD 
3:37 PM 
 
  
 
Perfect Serve Consult for Admission 5:02 PM 
 
ED Room Number: ER10/10 Patient Name and age:  Marely Fung 82 y.o.  male Working Diagnosis: 1. Syncope and collapse 2. Nonrheumatic aortic valve stenosis COVID-19 Suspicion:  no 
Sepsis present:  no  Reassessment needed: no 
Code Status:  Full Code Readmission: no 
Isolation Requirements:  no 
Recommended Level of Care:  telemetry Department:[facility] Adult ED - [phone] Other: Dr. Varun Shaikh cardiology has seen the patient in the ER today

## 2020-10-18 NOTE — Clinical Note
Temporary pacemaker initiates rapid pacing. Temporary Pacer pacing in the right ventricle. Rate = 200 bpm.   Electrical capture and mechanical capture obtained.

## 2020-10-18 NOTE — ED TRIAGE NOTES
Triage: Pt arrives from the grocery store where he was shopping and had a syncopal episode resulting in a fall. Pt sustained a laceration to the left side of his face. Pt arrives A+O x4, gcs 15. Denies dizziness, headache, chest pain.

## 2020-10-18 NOTE — Clinical Note
Right groin, left radial and right neck and draped. Wet prep solution applied at: 1045. Wet prep solution dried at: 1050. Wet prep elapsed drying time: 5 mins.

## 2020-10-18 NOTE — Clinical Note
Peripheral Lesion 1. Lesion #1: Pressure = 10 ruben; Duration = 10 sec. Lesion #2: Pressure = 10 ruben; Duration = 25 sec. Lesion #3: Pressure = 10 ruben; Duration = 12 sec. Lesion #4: Pressure = 10 ruben; Duration = 7 sec.    L subclavian

## 2020-10-18 NOTE — Clinical Note
Lesion 1: Location: left subclavian artery. History of prior treatment: Prior stent. Total occlusion length: 15 mm.

## 2020-10-18 NOTE — Clinical Note
Temporary pacemaker tested. Inserted through the left internal jugular. Temporary Pacer pacing in the right ventricle. Device secured using: tegaderm, suture and transparent dressing. Rate = 130 bpm.   Electrical capture and mechanical capture obtained.

## 2020-10-18 NOTE — Clinical Note
Lesion 1: Location: common external iliac artery. Indication: occlusive disease. History of prior treatment: Prior stent. Calcification: severe.

## 2020-10-18 NOTE — Clinical Note
Peripheral Lesion 1. Lesion #1: Pressure = 10 ruben; Duration = 49 sec. Lesion #2: Pressure = 3 ruben; Duration = 7 sec. Lesion #3: Pressure = 8 ruben; Duration = 33 sec. Lesion #4: Pressure = 8 ruben; Duration = 60 sec.    OVER AMPLATZ SS

## 2020-10-18 NOTE — PROGRESS NOTES
Bedside shift change report given to Stephen Singleton (oncoming nurse) by Aime Strong (offgoing nurse). Report included the following information SBAR, ED Summary, Intake/Output, Accordion and Recent Results.

## 2020-10-18 NOTE — Clinical Note
Sheath #1: sheath exchanged for KIT INTRO 6FR L10CM MINI WIRE L45CM DIA0.021IN NDL 21GA ANT.  65CM EXCHANGED FOR 10CM

## 2020-10-18 NOTE — Clinical Note
Temporary pacemaker initiates rapid pacing. Temporary Pacer pacing in the right ventricle. Rate = 120 bpm.   Electrical capture and mechanical capture obtained.

## 2020-10-18 NOTE — CONSULTS
Consult Note Assessment: 
 
Patient Active Problem List  
Diagnosis Code  Type 2 diabetes mellitus without complication, without long-term current use of insulin ([facility]) E11.9  
 Essential hypertension I10  
 Hypercholesterolemia E78.00  Coronary artery disease involving native coronary artery of native heart without angina pectoris I25.10  
 S/P CABG (coronary artery bypass graft) Z95.1  Severe aortic stenosis I35.0  Aortic systolic murmur on examination I35.8  Benign prostatic hyperplasia with lower urinary tract symptoms N40.1  Insomnia G47.00 [facility] Former smoker V64.938  ACP (advance care planning) Z71.89  
 Gait instability R26.81  
 Age-related cataract of both eyes H25.9  Recurrent depression ([address] F33.9  On angiotensin receptor blockers (ARB) O12.317  Gastroesophageal reflux disease without esophagitis K21.9  Cough R05  Pressure injury of buttock, stage 1 L89.301  Incontinence of feces R15.9  Type 2 diabetes with nephropathy ([facility]) E11.21  
 Decubitus ulcer of left buttock, stage 2 ([address] H74.002  Syncope and collapse R55 Recommendations: 
 
Monitor, neuro evaluation, consider evaluation for TAVR. His echo today in ER shows peak velocity of about 3.8 m/sec across aortic valve, borderline severe AS. However, he has had falls in the past, also has long AR interval on EKG with atrial ectopy. He was exercising in gym at [facility] prior to it being closed for Covid. Imelda Arias MD 
[phone]  Nahum Shepherd is a 82 y.o. male who presented 10/18/2020 with complaints of syncope today when walking out of grocery store with daughter-in-law . The symptoms began approximately 4 hours ago. He has a history of cardiac disease including CAD, CABG, arrhythmias/ectopy and aotic stenosis. Examination by the ER physician suggested the possibility of syncope due to AS.   At present the patient has significantly improved since initial presentation. Therapy thus far has included O2. Cardiology has been consulted to assist in the management of this patient. **Records received from [facility] on 11/8/17 Hx of moderate AS, hx of rheumatic fever 1944 
5/20/2008 3 vessel CABG (LIMA to LAD, SVG to OM and PDA) S/p right SFA PTA and left subclavian stent Echo 6/1/17 - LVEF 55-60%, no WMA, grade 1 dd, severe cLVH, MAC extending into the posterior leaflet and mild MR, mild TR, trace PI Lexiscan MPI 5/16/17 - medium sized region of mild lateral ischemia, LVEF 54%, no WMA GAMAL 9/30/08 - placement of 7 x 37 mm EB3 stent in 75% stenotic right common iliac artery 
left common iliac artery stent is patent, right common femoral artery with 80% heavily calcified eccentric disease involving ostium of the SFA and the profunda, right SFA has 25% luminal disease in the mid areas, right anterior tibia has 99% focal proximal lesion, right peroneal artery is widely patent, right posterior tibial artery as 90% concentric lesion distally, left common femoral artery with 85% eccentric, heavily calcified lesion involving the ostium of the profunda, as well as the SFA, left SFA is 100% occluded in the mid area, left anterior tibial artery is 100% occluded in the proximal to distal area Venous duplex 3/24/16 - no DVT, bilateral greater saphenous veins stripped  
  
Assessment/Plan: No current facility-administered medications for this encounter. Current Outpatient Medications Medication Sig  sertraline (ZOLOFT) 100 mg tablet TAKE 1 TABLET BY MOUTH  DAILY  traZODone (DESYREL) 50 mg tablet Take 2 tablets by mouth at night  temazepam (RESTORIL) 15 mg capsule TAKE 1 CAPSULE BY MOUTH AT BEDTIME AS NEEDED FOR SLEEP.  losartan (COZAAR) 25 mg tablet TAKE ONE-HALF TABLET BY  MOUTH DAILY  labetaloL (NORMODYNE) 100 mg tablet TAKE 1 TABLET BY MOUTH  DAILY  fluticasone propionate (FLONASE) 50 mcg/actuation nasal spray ADMINISTER 1 Spray IN Both Nostrils two (2) times a day.  menthol-zinc oxide (CALMOSEPTINE) 0.44-20.6 % oint Apply to bilateral buttocks TID  Indications: skin irritation  amLODIPine (NORVASC) 10 mg tablet TAKE 1 TABLET BY MOUTH  DAILY  finasteride (PROSCAR) 5 mg tablet TAKE 1 TABLET BY MOUTH  DAILY  hydroCHLOROthiazide (HYDRODIURIL) 12.5 mg tablet TAKE 1 TABLET BY MOUTH  DAILY  metFORMIN ER (GLUCOPHAGE XR) 500 mg tablet TAKE 1 TABLET BY MOUTH TWO  TIMES DAILY WITH MEALS  glipiZIDE SR (GLUCOTROL XL) 5 mg CR tablet TAKE 1 TABLET BY MOUTH  DAILY  doxazosin (CARDURA) 4 mg tablet TAKE 1 TABLET BY MOUTH  DAILY  atorvastatin (LIPITOR) 10 mg tablet TAKE 1 TABLET BY MOUTH  DAILY  OTHER Hearing evaluation for possible changes in hearing  tiZANidine (ZANAFLEX) 2 mg tablet Take 1 Tab by mouth three (3) times daily as needed for Pain.  glucose blood VI test strips (TRUE METRIX GLUCOSE TEST STRIP) strip Check BS BID  Dx: DM  E11.21  
 aspirin (ASPIRIN) 325 mg tablet Take 1 Tab by mouth daily.  cholecalciferol (VITAMIN D3) 1,000 unit cap Take 1 Cap by mouth daily.  magnesium 250 mg tab Take 1 Tab by mouth daily.  sodium chloride (OCEAN) 0.65 % nasal squeeze bottle 0.05 mL by Both Nostrils route as needed for Congestion.  FERROUS FUMARATE/VIT BCOMP,C (SUPER B COMPLEX PO) Take  by mouth. Past Medical History:  
Diagnosis Date  BPH (benign prostatic hyperplasia)  CAD (coronary artery disease)  Diabetes ([facility]  Hearing loss  Hypercholesterolemia  Hypertension  Murmur  Recurrent depression ([facility] 8/22/2019 Patient Active Problem List  
Diagnosis Code  Type 2 diabetes mellitus without complication, without long-term current use of insulin ([facility]) E11.9  
 Essential hypertension I10  
 Hypercholesterolemia E78.00  Coronary artery disease involving native coronary artery of native heart without angina pectoris I25.10  S/P CABG (coronary artery bypass graft) Z95.1  Severe aortic stenosis I35.0  Aortic systolic murmur on examination I35.8  Benign prostatic hyperplasia with lower urinary tract symptoms N40.1  Insomnia G47.00 [facility] Craze Former smoker A98.861  ACP (advance care planning) Z71.89  
 Gait instability R26.81  
 Age-related cataract of both eyes H25.9  Recurrent depression ([address] F33.9  On angiotensin receptor blockers (ARB) G71.259  Gastroesophageal reflux disease without esophagitis K21.9  Cough R05  Pressure injury of buttock, stage 1 L89.301  Incontinence of feces R15.9  Type 2 diabetes with nephropathy ([facility]) E11.21  
 Decubitus ulcer of left buttock, stage 2 ([address] H56.300  Syncope and collapse R55 Allergies Allergen Reactions  Procaine Other (comments) Pt stated he passed out from procaine and was told not to let anyone use it on him again Social History Tobacco Use  Smoking status: Former Smoker Types: Cigarettes Last attempt to quit: 1990 Years since quittin.1  Smokeless tobacco: Never Used Substance Use Topics  Alcohol use: No  
 Drug use: No  
 
Family History Problem Relation Age of Onset  Cancer Mother  Cancer Father Review of Symptoms: A comprehensive review of systems was negative except for that written in the HPI. Objective: There were no vitals taken for this visit. Physical Exam 
 
There were no vitals taken for this visit. General Appearance:  Well developed, well nourished,alert and oriented x 3,  individual in no acute distress. Ears/Nose/Mouth/Throat:   Hearing grossly normal. 
  
    Neck: Supple. Chest:   Lungs clear to auscultation bilaterally. Cardiovascular:  Regular rate and rhythm, S1, S2 normal, 3/6 murmur, good carotid upstroke. Abdomen:   Soft, non-tender, bowel sounds are active. Extremities: Mild edema bilaterally. Skin: Warm and dry. Cardiographics Telemetry: PAC 
ECG: normal sinus rhythm, PAC's noted, 1st degree AV block Echocardiogram: Abnormal, and reviewed by myself: Probably severe AS, LVH with normal EF, heavy MAC without clear MR or MS. Labs:  
Recent Results (from the past 24 hour(s)) EKG, 12 LEAD, INITIAL Collection Time: 10/18/20  3:16 PM  
Result Value Ref Range Ventricular Rate 76 BPM  
 Atrial Rate 76 BPM  
 P-R Interval 344 ms QRS Duration 102 ms Q-T Interval 410 ms QTC Calculation (Bezet) 461 ms Calculated P Axis 73 degrees Calculated R Axis -23 degrees Calculated T Axis 5 degrees Diagnosis Sinus rhythm with 1st degree AV block with premature atrial complexes Left ventricular hypertrophy with repolarization abnormality Anteroseptal infarct , age undetermined No previous ECGs available SAMPLES BEING HELD Collection Time: 10/18/20  3:27 PM  
Result Value Ref Range SAMPLES BEING HELD 1RED,1BLUE   
 COMMENT Add-on orders for these samples will be processed based on acceptable specimen integrity and analyte stability, which may vary by analyte. METABOLIC PANEL, COMPREHENSIVE Collection Time: 10/18/20  3:27 PM  
Result Value Ref Range Sodium 138 136 - 145 mmol/L Potassium 4.0 3.5 - 5.1 mmol/L Chloride 101 97 - 108 mmol/L  
 CO2 30 21 - 32 mmol/L Anion gap 7 5 - 15 mmol/L Glucose 166 (H) 65 - 100 mg/dL BUN 23 (H) 6 - 20 MG/DL Creatinine 1.36 (H) 0.70 - 1.30 MG/DL  
 BUN/Creatinine ratio 17 12 - 20 GFR est AA >60 >60 ml/min/1.73m2 GFR est non-AA 50 (L) >60 ml/min/1.73m2 Calcium 9.5 8.5 - 10.1 MG/DL Bilirubin, total 0.5 0.2 - 1.0 MG/DL  
 ALT (SGPT) 20 12 - 78 U/L  
 AST (SGOT) 18 15 - 37 U/L Alk. phosphatase 94 45 - 117 U/L Protein, total 7.4 6.4 - 8.2 g/dL Albumin 3.6 3.5 - 5.0 g/dL Globulin 3.8 2.0 - 4.0 g/dL A-G Ratio 0.9 (L) 1.1 - 2.2    
CBC WITH AUTOMATED DIFF  Collection Time: 10/18/20  3:27 PM  
 Result Value Ref Range WBC 8.5 4.1 - 11.1 K/uL  
 RBC 4.38 4.10 - 5.70 M/uL  
 HGB 12.1 12.1 - 17.0 g/dL HCT 37.3 36.6 - 50.3 % MCV 85.2 80.0 - 99.0 FL  
 MCH 27.6 26.0 - 34.0 PG  
 MCHC 32.4 30.0 - 36.5 g/dL  
 RDW 13.7 11.5 - 14.5 % PLATELET 353 (L) 074 - 400 K/uL MPV 12.1 8.9 - 12.9 FL  
 NRBC 0.0 0  WBC ABSOLUTE NRBC 0.00 0.00 - 0.01 K/uL NEUTROPHILS 75 32 - 75 % LYMPHOCYTES 13 12 - 49 % MONOCYTES 7 5 - 13 % EOSINOPHILS 3 0 - 7 % BASOPHILS 1 0 - 1 % IMMATURE GRANULOCYTES 1 (H) 0.0 - 0.5 % ABS. NEUTROPHILS 6.5 1.8 - 8.0 K/UL  
 ABS. LYMPHOCYTES 1.1 0.8 - 3.5 K/UL  
 ABS. MONOCYTES 0.6 0.0 - 1.0 K/UL  
 ABS. EOSINOPHILS 0.2 0.0 - 0.4 K/UL  
 ABS. BASOPHILS 0.0 0.0 - 0.1 K/UL  
 ABS. IMM. GRANS. 0.0 0.00 - 0.04 K/UL  
 DF AUTOMATED Elonda Moritz, MD

## 2020-10-19 NOTE — PROGRESS NOTES
Hospitalist Progress Note Kulwant Ruiz NP Please call  and page for questions. Call physician on-call through the  7pm-7am 
 
Daily Progress Note: 10/19/2020 Primary care provider:Jorgito Hunt DO Date of admission: 10/18/2020  3:07 PM 
 
Admission Summary and Hospital Course:   
 
From H&P 10/18/20: \"Patient reports that he lives in [facility], today he was out shopping and as he was coming out he fainted. Per chart patient has history of severe aortic stenosis. Patient feels that he hit his head but denies any other complaints or problems. Patient came to the ER was requested to be observed under the hospitalist service. Patient reports that he is back to baseline he denies any complaints or problems currently. Patient reports that he has been taking his medications on a regular basis. Patient reports that he has not had any contact with known COVID-19 patients. The patient denies any Headache, blurry vision, sore throat, trouble swallowing, trouble with speech, chest pain, SOB, cough, fever, chills, N/V/D, abd pain, urinary symptoms, constipation, recent travels, sick contacts, focal or generalized neurological symptoms,  falls, injuries, rashes, contact with COVID-19 diagnosed patients, hematemesis, melena, hemoptysis, hematuria, rashes, denies starting any new medications and denies any other concerns or problems besides as mentioned above. \" Subjective:  
Pt seen today no acute distress. He states that he does not recall \"passing out. \"He reports is never happened before. He states that his resting heart rate when he was in his 40s and exercise a lot was in the upper 30s and that now it is in the mid to upper 50s. Currently he denies HA, dizziness, visual changes, cough, SOB, CP, abd pain, n/v/d, dysuria or weakness. Assessment/Plan:  
 
 
Syncope: Unclear etiology hypovolemia vs severe aortic stenosis -Orthostatic VS neg; BP low/normal on admission 
-Head CT neg, labs stable 
-Echo 10/18 w/ EF 55-60%, no RWMA, mod aortic stenosis 
-carotid dopplers pending 
-seen by cards; check orthostatics and re-hydrate, will reassess in AM 
-? TAVR workup 
-Hx UTIs; check UA, CXR to r/o infectious process NARAYAN: POA creat/gfr 1.36/50 (baseline normal kidney function) 
-suspect dehydration 
-LR 500mL IV x1 
-cont gentle IVF hydration 
-give one dose IV albumin (hx diastolic HF, +bilateral rales on exam) -repeat labs in AM 
 
DMII: A1c 7.1 
-start lantus 10u qhs 
-AC/HS accuchecks w/ SSI 
-trend BS HFpEF: Echo 10/18 w/ EF 55-60% 
-hold HCTZ (NARAYAN), BB and ARB for now (low/normal BP-dehydration) -BLL rales on exam; check cxr, give 1 dose IV albumin 
-gentle IV hydration overnight 
-appreciate cards input HTN: resume cardura and norvasc; hold HCTZ, BB and ARB for now, trend BP 
HLD: resume home meds BPH: resume home meds DVTppx: SCDs, heparin 
Gippx: NA 
Code Status: Full code Diet: Cardiac Activity: OOB to chair TID and PRN Discharge: TBD Review of Systems:  
 
Full ROS complete with pertinent positives and negatives as per HPI, otherwise negative Objective:  
Physical Exam:  
 
Visit Vitals BP (!) 113/52 (BP 1 Location: Right arm, BP Patient Position: At rest) Pulse 71 Temp 98.2 °F (36.8 °C) Resp 14 Ht 6' 3\" (1.905 m) Wt 91.7 kg (202 lb 2.6 oz) SpO2 99% BMI 25.27 kg/m² O2 Device: Room air Temp (24hrs), Av.3 °F (36.8 °C), Min:98 °F (36.7 °C), Max:98.7 °F (37.1 °C) 
  10/19 0701 - 10/19 1900 In: 767.5 [I.V.:767.5] Out: 100 [Urine:100]   10/17 1901 - 10/19 0700 In: 240 [P.O.:240] Out: 360 [Urine:360] General:  Alert, cooperative, no distress, appears stated age, frail Lungs:   Good effort, some rales BLL, no distress Heart:  Regular rate and rhythm, S1, S2 normal, harsh systolic murmur 4/6 c/w aortic stenosis Abdomen:   Soft, non-tender, non-distended. Bowel sounds normal.   
Extremities: Extremities normal, atraumatic, no cyanosis or edema. Skin: Skin color, texture, turgor normal. No rashes or lesions Neurologic: CNII-XII intact, A&Ox4, MONZON Data Review:  
   
Recent Days: 
Recent Labs 10/19/20 
0341 10/18/20 
1527 WBC 9.7 8.5 HGB 10.7* 12.1 HCT 32.5* 37.3 * 142* Recent Labs 10/18/20 
1527   
K 4.0  
 CO2 30 * BUN 23* CREA 1.36* CA 9.5 MG 2.1 ALB 3.6 ALT 20 No results for input(s): PH, PCO2, PO2, HCO3, FIO2 in the last 72 hours. 24 Hour Results: 
Recent Results (from the past 24 hour(s)) EKG, 12 LEAD, INITIAL Collection Time: 10/18/20  3:16 PM  
Result Value Ref Range Ventricular Rate 76 BPM  
 Atrial Rate 76 BPM  
 P-R Interval 344 ms QRS Duration 102 ms Q-T Interval 410 ms QTC Calculation (Bezet) 461 ms Calculated P Axis 73 degrees Calculated R Axis -23 degrees Calculated T Axis 5 degrees Diagnosis Sinus rhythm with 1st degree AV block with premature atrial complexes Left ventricular hypertrophy with repolarization abnormality Anteroseptal infarct , age undetermined No previous ECGs available Confirmed by Angelita Card, M.D., Cheryle Lathe ([id number]) on 10/19/2020 10:23:02 AM 
  
SAMPLES BEING HELD Collection Time: 10/18/20  3:27 PM  
Result Value Ref Range SAMPLES BEING HELD 1RED,1BLUE   
 COMMENT Add-on orders for these samples will be processed based on acceptable specimen integrity and analyte stability, which may vary by analyte. METABOLIC PANEL, COMPREHENSIVE Collection Time: 10/18/20  3:27 PM  
Result Value Ref Range Sodium 138 136 - 145 mmol/L Potassium 4.0 3.5 - 5.1 mmol/L Chloride 101 97 - 108 mmol/L  
 CO2 30 21 - 32 mmol/L Anion gap 7 5 - 15 mmol/L Glucose 166 (H) 65 - 100 mg/dL BUN 23 (H) 6 - 20 MG/DL  Creatinine 1.36 (H) 0.70 - 1.30 MG/DL  
 BUN/Creatinine ratio 17 12 - 20 GFR est AA >60 >60 ml/min/1.73m2 GFR est non-AA 50 (L) >60 ml/min/1.73m2 Calcium 9.5 8.5 - 10.1 MG/DL Bilirubin, total 0.5 0.2 - 1.0 MG/DL  
 ALT (SGPT) 20 12 - 78 U/L  
 AST (SGOT) 18 15 - 37 U/L Alk. phosphatase 94 45 - 117 U/L Protein, total 7.4 6.4 - 8.2 g/dL Albumin 3.6 3.5 - 5.0 g/dL Globulin 3.8 2.0 - 4.0 g/dL A-G Ratio 0.9 (L) 1.1 - 2.2    
CBC WITH AUTOMATED DIFF Collection Time: 10/18/20  3:27 PM  
Result Value Ref Range WBC 8.5 4.1 - 11.1 K/uL  
 RBC 4.38 4.10 - 5.70 M/uL  
 HGB 12.1 12.1 - 17.0 g/dL HCT 37.3 36.6 - 50.3 % MCV 85.2 80.0 - 99.0 FL  
 MCH 27.6 26.0 - 34.0 PG  
 MCHC 32.4 30.0 - 36.5 g/dL  
 RDW 13.7 11.5 - 14.5 % PLATELET 358 (L) 619 - 400 K/uL MPV 12.1 8.9 - 12.9 FL  
 NRBC 0.0 0  WBC ABSOLUTE NRBC 0.00 0.00 - 0.01 K/uL NEUTROPHILS 75 32 - 75 % LYMPHOCYTES 13 12 - 49 % MONOCYTES 7 5 - 13 % EOSINOPHILS 3 0 - 7 % BASOPHILS 1 0 - 1 % IMMATURE GRANULOCYTES 1 (H) 0.0 - 0.5 % ABS. NEUTROPHILS 6.5 1.8 - 8.0 K/UL  
 ABS. LYMPHOCYTES 1.1 0.8 - 3.5 K/UL  
 ABS. MONOCYTES 0.6 0.0 - 1.0 K/UL  
 ABS. EOSINOPHILS 0.2 0.0 - 0.4 K/UL  
 ABS. BASOPHILS 0.0 0.0 - 0.1 K/UL  
 ABS. IMM. GRANS. 0.0 0.00 - 0.04 K/UL  
 DF AUTOMATED HEMOGLOBIN A1C WITH EAG Collection Time: 10/18/20  3:27 PM  
Result Value Ref Range Hemoglobin A1c 7.1 (H) 4.0 - 5.6 % Est. average glucose 157 mg/dL MAGNESIUM Collection Time: 10/18/20  3:27 PM  
Result Value Ref Range Magnesium 2.1 1.6 - 2.4 mg/dL ECHO ADULT COMPLETE Collection Time: 10/18/20  5:21 PM  
Result Value Ref Range IVSd 1.51 (A) 0.6 - 1.0 cm LVIDd 3.20 (A) 4.2 - 5.9 cm LVIDs 2.12 cm  
 LVOT d 2.02 cm  
 LVPWd 1.45 (A) 0.6 - 1.0 cm  
 LVOT Peak Gradient 4.42 mmHg LVOT SV 72.6 mL  
 LVOT Peak Velocity 105.09 cm/s LVOT VTI 22.59 cm Left Atrium to Aortic Root Ratio 1.23  Left Atrium to Aortic Root Ratio 1.23   
 Left Atrium to Aortic Root Ratio 1.23 Left Atrium to Aortic Root Ratio 1.23   
 AV Cusp 0.68 cm Aortic Valve Area by Continuity of Peak Velocity 0.85 cm2 Aortic Valve Area by Continuity of Peak Velocity 0.90 cm2 Aortic Valve Area by Continuity of Peak Velocity 0.93 cm2 Aortic Valve Area by Continuity of Peak Velocity 1.13 cm2 Aortic Valve Area by Continuity of Peak Velocity 1.05 cm2 Aortic Valve Area by Continuity of Peak Velocity 1.15 cm2 Aortic Valve Area by Continuity of VTI 0.79 cm2 Aortic Valve Area by Continuity of VTI 0.81 cm2 Aortic Valve Area by Continuity of VTI 0.81 cm2 AoV PG 62.97 mmHg AoV PG 35.72 mmHg Aortic Valve Systolic Mean Gradient 62.21 mmHg Aortic Valve Systolic Peak Velocity 474.11 cm/s Aortic Valve Systolic Peak Velocity 596.97 cm/s AoV VTI 91.96 cm  
 MV A Imtiaz 172.52 cm/s Mitral Valve E Wave Deceleration Time 0.39 s MV E Imtiaz 97.80 cm/s Mitral Valve Pressure Half-time 0.11 s  
 MVA (PHT) 1.92 cm2 MVA VTI 1.05 cm2 MV Peak Gradient 18.91 mmHg MV Mean Gradient 5.88 mmHg Mitral Valve Max Velocity 217.45 cm/s Mitral Valve Annulus Velocity Time Integral 68.98 cm Pulmonic Valve Systolic Peak Instantaneous Gradient 6.95 mmHg Tapse 1.75 1.5 - 2.0 cm Triscuspid Valve Regurgitation Peak Gradient 26.51 mmHg Triscuspid Valve Regurgitation Peak Gradient 19.83 mmHg Triscuspid Valve Regurgitation Peak Gradient 24.50 mmHg Triscuspid Valve Regurgitation Peak Gradient 23.77 mmHg Triscuspid Valve Regurgitation Peak Gradient 23.53 mmHg Triscuspid Valve Regurgitation Peak Gradient 20.27 mmHg Triscuspid Valve Regurgitation Peak Gradient 25.00 mmHg Triscuspid Valve Regurgitation Peak Gradient 26.51 mmHg  
 TR Max Velocity 257.46 cm/s  
 TR Max Velocity 222.63 cm/s  
 TR Max Velocity 247.51 cm/s  
 TR Max Velocity 243.78 cm/s  
 TR Max Velocity 242.53 cm/s  
 TR Max Velocity 225.12 cm/s TR Max Velocity 249.99 cm/s  
 TR Max Velocity 257.46 cm/s Ao Root D 3.53 cm  
 MV E/A 0.57 LV Mass .8 88 - 224 g TSH 3RD GENERATION Collection Time: 10/18/20  9:48 PM  
Result Value Ref Range TSH 1.29 0.36 - 3.74 uIU/mL  
CK Collection Time: 10/18/20  9:48 PM  
Result Value Ref Range  39 - 308 U/L  
TROPONIN I Collection Time: 10/18/20  9:48 PM  
Result Value Ref Range Troponin-I, Qt. <0.05 <0.05 ng/mL VITAMIN B12 Collection Time: 10/18/20  9:48 PM  
Result Value Ref Range Vitamin B12 443 193 - 986 pg/mL FOLATE Collection Time: 10/18/20  9:48 PM  
Result Value Ref Range Folate 20.1 5.0 - 21.0 ng/mL GLUCOSE, POC Collection Time: 10/18/20  9:59 PM  
Result Value Ref Range Glucose (POC) 218 (H) 65 - 100 mg/dL Performed by Santi Ruiz RN traveler SARS-COV-2 Collection Time: 10/18/20 10:47 PM  
Result Value Ref Range Specimen source Nasopharyngeal    
 SARS-CoV-2 Not detected NOTD Specimen source Nasopharyngeal    
 Specimen type NP Swab Health status Symptomatic Testing CBC WITH AUTOMATED DIFF Collection Time: 10/19/20  3:41 AM  
Result Value Ref Range WBC 9.7 4.1 - 11.1 K/uL  
 RBC 3.86 (L) 4.10 - 5.70 M/uL  
 HGB 10.7 (L) 12.1 - 17.0 g/dL HCT 32.5 (L) 36.6 - 50.3 % MCV 84.2 80.0 - 99.0 FL  
 MCH 27.7 26.0 - 34.0 PG  
 MCHC 32.9 30.0 - 36.5 g/dL  
 RDW 13.6 11.5 - 14.5 % PLATELET 604 (L) 475 - 400 K/uL MPV 12.6 8.9 - 12.9 FL  
 NRBC 0.0 0  WBC ABSOLUTE NRBC 0.00 0.00 - 0.01 K/uL NEUTROPHILS 77 (H) 32 - 75 % LYMPHOCYTES 13 12 - 49 % MONOCYTES 9 5 - 13 % EOSINOPHILS 1 0 - 7 % BASOPHILS 0 0 - 1 % IMMATURE GRANULOCYTES 0 0.0 - 0.5 % ABS. NEUTROPHILS 7.3 1.8 - 8.0 K/UL  
 ABS. LYMPHOCYTES 1.3 0.8 - 3.5 K/UL  
 ABS. MONOCYTES 0.9 0.0 - 1.0 K/UL  
 ABS. EOSINOPHILS 0.1 0.0 - 0.4 K/UL  
 ABS. BASOPHILS 0.0 0.0 - 0.1 K/UL  
 ABS. IMM. GRANS. 0.0 0.00 - 0.04 K/UL  
 DF AUTOMATED TROPONIN I Collection Time: 10/19/20  3:41 AM  
Result Value Ref Range Troponin-I, Qt. <0.05 <0.05 ng/mL CK Collection Time: 10/19/20  3:41 AM  
Result Value Ref Range  39 - 308 U/L  
SAMPLES BEING HELD Collection Time: 10/19/20  3:41 AM  
Result Value Ref Range SAMPLES BEING HELD 1SST COMMENT Add-on orders for these samples will be processed based on acceptable specimen integrity and analyte stability, which may vary by analyte. GLUCOSE, POC Collection Time: 10/19/20  8:20 AM  
Result Value Ref Range Glucose (POC) 129 (H) 65 - 100 mg/dL Performed by Ambrose Moore GLUCOSE, POC Collection Time: 10/19/20 12:20 PM  
Result Value Ref Range Glucose (POC) 154 (H) 65 - 100 mg/dL Performed by Meryle Car Problem List: 
Problem List as of 10/19/2020 Date Reviewed: 10/18/2020 Codes Class Noted - Resolved Syncope and collapse ICD-10-CM: R55 
ICD-9-CM: 780.2  10/18/2020 - Present Syncope ICD-10-CM: R55 
ICD-9-CM: 780.2  10/18/2020 - Present Decubitus [facility] of left butt[facility] ICD-10-CM: Q29.483 ICD-9-CM: 707.05, 707.22  9/10/2020 - Present Type 2 diabetes with nephropathy ([facility] ICD-10-CM: E11.21 
ICD-9-CM: 250.40, 583.81  8/24/2020 - Present Pressure injury of buttock, stage 1 ICD-10-CM: L89.301 ICD-9-CM: 707.05, 707.21  6/4/2020 - Present Incontinence of feces ICD-10-CM: R15.9 ICD-9-CM: 787.60  6/4/2020 - Present On angiotensin receptor blockers (ARB) ICD-10-CM: S56.091 ICD-9-CM: V58.69  1/23/2020 - Present Gastroesophageal reflux disease without esophagitis ICD-10-CM: K21.9 ICD-9-CM: 530.81  1/23/2020 - Present Cough ICD-10-CM: R05 ICD-9-CM: 786.2  1/23/2020 - Present Recurrent depression ([facility] ICD-10-CM: F33.9 ICD-9-CM: 296.30  8/22/2019 - Present Age-related cataract of both eyes ICD-10-CM: H25.9 ICD-9-CM: 366.10  4/25/2019 - Present Gait instability ICD-10-CM: R26.81 
ICD-9-CM: 781.2  6/21/2018 - Present Type 2 diabetes mellitus without complication, without long-term current use of insulin (HCC) ICD-10-CM: E11.9 ICD-9-CM: 250.00  9/21/2017 - Present Essential hypertension ICD-10-CM: I10 
ICD-9-CM: 401.9  9/21/2017 - Present Hypercholesterolemia ICD-10-CM: E78.00 ICD-9-CM: 272.0  9/21/2017 - Present Coronary artery disease involving native coronary artery of native heart without angina pectoris ICD-10-CM: I25.10 ICD-9-CM: 414.01  9/21/2017 - Present S/P CABG (coronary artery bypass graft) ICD-10-CM: Z95.1 ICD-9-CM: V45.81  9/21/2017 - Present Severe aortic stenosis ICD-10-CM: I35.0 ICD-9-CM: 424.1  9/21/2017 - Present Aortic systolic murmur on examination ICD-10-CM: I35.8 ICD-9-CM: 424.1  9/21/2017 - Present Benign prostatic hyperplasia with lower urinary tract symptoms ICD-10-CM: N40.1 ICD-9-CM: 600.01  9/21/2017 - Present Insomnia ICD-10-CM: G47.00 ICD-9-CM: 780.52  9/21/2017 - Present Former smoker ICD-10-CM: E45.195 ICD-9-CM: V15.82  9/21/2017 - Present ACP (advance care planning) ICD-10-CM: Z71.89 ICD-9-CM: V65.49  9/21/2017 - Present RESOLVED: Type 2 diabetes with nephropathy ([facility] ICD-10-CM: E11.21 
ICD-9-CM: 250.40, 583.81  4/10/2018 - 4/25/2019 RESOLVED: Type 2 diabetes mellitus with nephropathy [facility] ICD-10-CM: E11.21 
ICD-9-CM: 250.40, 583.81  12/14/2017 - 4/5/2018 RESOLVED: Chronic ulcer of buttock ([facility] ICD-10-CM: X13.518 ICD-9-CM: 707.8  11/16/2017 - 4/25/2019 Medications reviewed Current Facility-Administered Medications Medication Dose Route Frequency  insulin glargine (LANTUS) injection 10 Units  10 Units SubCUTAneous QHS  lactated ringers bolus infusion 500 mL  500 mL IntraVENous ONCE  
 amLODIPine (NORVASC) tablet 5 mg  5 mg Oral DAILY  aspirin tablet 325 mg  325 mg Oral DAILY  atorvastatin (LIPITOR) tablet 10 mg  10 mg Oral QHS  doxazosin (CARDURA) tablet 4 mg  4 mg Oral QHS  finasteride (PROSCAR) tablet 5 mg  5 mg Oral DAILY  sertraline (ZOLOFT) tablet 100 mg  100 mg Oral QPM  
 sodium chloride (NS) flush 5-40 mL  5-40 mL IntraVENous Q8H  
 sodium chloride (NS) flush 5-40 mL  5-40 mL IntraVENous PRN  
 0.9% sodium chloride infusion  75 mL/hr IntraVENous CONTINUOUS  
 acetaminophen (TYLENOL) tablet 650 mg  650 mg Oral Q4H PRN  
 heparin (porcine) injection 5,000 Units  5,000 Units SubCUTAneous Q8H  
 glucose chewable tablet 16 g  4 Tab Oral PRN  
 glucagon (GLUCAGEN) injection 1 mg  1 mg IntraMUSCular PRN  
 dextrose 10% infusion 0-250 mL  0-250 mL IntraVENous PRN  
 insulin lispro (HUMALOG) injection   SubCUTAneous AC&HS  
 melatonin tablet 3 mg  3 mg Oral QHS PRN Care Plan discussed with: Patient/family, nurse Mayda Funk NP Hospitalist 
Providers can reach me on [employer]

## 2020-10-19 NOTE — PROGRESS NOTES
Spoke with Mau Montgomery in regards to covid testing status. She stated to discontinue lab test. Lab called and precautions removed. 16:57- Spoke with Pastora Blackman in regards to bp 144/57. She stated to give albumin. Problem: Diabetes Self-Management Goal: *Using medications safely Description: State effect of diabetes medications on diabetes; name diabetes medication taking, action and side effects. Outcome: Progressing Towards Goal 
  
Problem: Falls - Risk of 
Goal: *Absence of Falls Description: Document Johnpoornima Francine Fall Risk and appropriate interventions in the flowsheet. Outcome: Progressing Towards Goal 
Note: Fall Risk Interventions: 
Mobility Interventions: Communicate number of staff needed for ambulation/transfer Medication Interventions: Patient to call before getting OOB, Evaluate medications/consider consulting pharmacy History of Falls Interventions: Evaluate medications/consider consulting pharmacy Problem: General Medical Care Plan Goal: *Absence of infection signs and symptoms Outcome: [address] Bedside and Verbal shift change report given to Naren (oncoming nurse) by Kylie Kimble (offgoing nurse). Report included the following information SBAR, Kardex and Quality Measures.

## 2020-10-19 NOTE — ACP (ADVANCE CARE PLANNING)
Advance Care Planning Advance Care Planning Activator (Inpatient) Conversation Note Date of ACP Conversation: 10/19/20 Conversation Conducted with:   Patient with Decision Making Capacity ACP Activator: Noe Barger RN 
 
*When Decision Maker makes decisions on behalf of the incapacitated patient: Decision Maker is asked to consider and make decisions based on patient values, known preferences, or best interests. Health Care Decision Maker: 
 
Current Designated Health Care Decision Maker:   Primary Decision Maker: Shahzadlorri Vieiraobed - [phone] Secondary Decision Maker: Georgianna Hammans Son - [phone] (If there is a valid [place] named in the [facility] Makers\" box in the ACP activity, but it is not visible above, be sure to open that field and then select the health care decision maker relationship (ie \"primary\") in the blank space to the right of the name.) Validate  this information as still accurate & up-to-date; edit [place] field as needed.) Note: Assess and validate information in current ACP documents, as indicated. If no Decision Maker listed above or available through scanned documents, then: 
 
If no Authorized Decision Maker has previously been identified, then patient chooses [place]: \"Who would you like to name as your primary health care decision-maker? \" Name: [place]   Relationship: wife Phone number: [phone] \"Can this person be reached easily? \" Bradley Moore \"Who would you like to name as your back-up decision maker? \" Name: Moy Vences  Relationship: son Phone number: [phone] \"Can this person be reached easily? \" Bradley Moore Note: If the relationship of these Decision-Makers to the patient does NOT follow your state's Next of Kin hierarchy, recommend that patient complete ACP document that meets state-specific requirements to allow them to act on the patient's behalf when appropriate. Care Preferences Ventilation: \"If you were in your present state of health and suddenly became very ill and were unable to breathe on your own, what would your preference be about the use of a ventilator (breathing machine) if it were available to you? \" If patient would desire the use of a ventilator (breathing machine), answer \"yes\", if not \"no\":yes \"If your health worsens and it becomes clear that your chance of recovery is unlikely, what would your preference be about the use of a ventilator (breathing machine) if it were available to you? \" Would the patient desire the use of a ventilator (breathing machine)? YES Resuscitation \"CPR works best to restart the heart when there is a sudden event, like a heart attack, in someone who is otherwise healthy. Unfortunately, CPR does not typically restart the heart for people who have serious health conditions or who are very sick. \" \"In the event your heart stopped as a result of an underlying serious health condition, would you want attempts to be made to restart your heart (answer \"yes\" for attempt to resuscitate) or would you prefer a natural death (answer \"no\" for do not attempt to resuscitate)? \" yes. [x] Yes  [] No   Educated Patient / Cheng Martinez regarding differences between Advance Directives and portable DNR orders. Length of ACP Conversation in minutes:  5 Conversation Outcomes: 
[x] ACP discussion completed [x] Existing advance directive reviewed with patient; no changes to patient's previously recorded wishes 
 
 [] New Advance Directive completed 
 [] Portable Do Not Resuscitate prepared for Provider review and signature 
[] POLST/POST/MOLST/MOST prepared for Provider review and signature Follow-up plan:   
[] Schedule follow-up conversation to continue planning 
[] Referred individual to Provider for additional questions/concerns [] Advised patient/agent/surrogate to review completed ACP document and update if needed with changes in condition, patient preferences or care setting  
 
[] This note routed to one or more involved healthcare providers

## 2020-10-19 NOTE — PROGRESS NOTES
ASSESSMENT  
  
HPI: Cammy Roman, a 82y.o. year-old who presents for evaluation of CAD s/p CABG, DM, HTN, Dyslipidemia.  
  
Last seen for low BP, etc. A bit low on admission this time too. Hard to say what the factors are in his syncope. He does have As in the moderate-severe range, and if this is the true cause of syncope the prognosis is poor without intervention. Bp was a bit low on admission, and during echo, which may also affect how severe the As appears, a little dehydrated with a little anemia. Now, overnight with some afib and so also SSS is a concern. I discussed all this with him today and with his son. Possibly a candidate for TAVR  +/- pacer(?leadless) post proc First step is to proceed with [facility] to assess for CAD prior to procedural planning. Have asked Dr. Fernando Graves to consult on this complex case in the morning and potentially proceed with cardiac cath tomorrow. Will need a risk assessment given his PAD as well. No OAC in anticipation of procedure right now, and with syncope, will need to reassess prior to dc. Cont tele for now. Son and patient are in agreement with this plan.  
  
No chest pain or dyspnea pta, walking and doing fine. Energy level is good. Walks at [employer] indoors without limitations. Glucose running 120 efe. BP is good. EKG NSR NST He denies significant ramsay or chest pain.  
 
  
**Records received from [facility] on 11/8/17 Hx of moderate AS, hx of rheumatic fever 1944 
5/20/2008 3 vessel CABG (LIMA to LAD, SVG to OM and PDA) S/p right SFA PTA and left subclavian stent Echo 6/1/17 - LVEF 55-60%, no WMA, grade 1 dd, severe cLVH, MAC extending into the posterior leaflet and mild MR, mild TR, trace PI Lexiscan MPI 5/16/17 - medium sized region of mild lateral ischemia, LVEF 54%, no WMA GAMAL 9/30/08 - placement of 7 x 37 mm EB3 stent in 75% stenotic right common iliac artery left common iliac artery stent is patent, right common femoral artery with 80% heavily calcified eccentric disease involving ostium of the SFA and the profunda, right SFA has 25% luminal disease in the mid areas, right anterior tibia has 99% focal proximal lesion, right peroneal artery is widely patent, right posterior tibial artery as 90% concentric lesion distally, left common femoral artery with 85% eccentric, heavily calcified lesion involving the ostium of the profunda, as well as the SFA, left SFA is 100% occluded in the mid area, left anterior tibial artery is 100% occluded in the proximal to distal area Venous duplex 3/24/16 - no DVT, bilateral greater saphenous veins stripped  
  
Assessment/Plan: 1. DM- managed on oral agents 2. HTN- low on admit, holding meds 3. Dyslipidemia- on statin, at goal 
4. CAD- CABG c. 2008(LIMA-LAD, SVG-OM, SVG-PDA), s/p PCI(?) 
-cont aspirin,statin 
- mildly abnormal stress test 5/17 [facility] in absence of sx  has been medically managed 5. PAD with right leg stent- cont aspirin, statin  
-s/p SFA PTA and LSC stent 6. HFpEF- stable compensated today, mod cLVH(regression) 
-cont arb as bp allows 7. Mod AS with rheumatic heart disease- follow-up echo today with mod-severe AoV at 35mean gradient. Mild MS on echo 8. Venous insufficiency- s/p vein stripping bilat GSV 9. Syncope- certainly could be cardiac versus other- workup as above 
  
Echo 12/19 with Ef 60%, mod AS MAC mild cLVH, lv dd 1/19 Echo 60-65% gri DD, RVE, LAE, mild MR, mod AS, mild TR, mild PI Lexiscan 5/17 med lteral ischemia, EF 54% Echo 6/17 EF 60% gr1dd, severe cLVH, MAC mild MR, mild TR mild PI 
GAMAL 9/30/08 - placement of 7 x 37 mm EB3 stent in 75% stenotic right common iliac artery, left common iliac artery stent is patent, right common femoral artery with 80% heavily calcified eccentric disease involving ostium of the SFA and the profunda, right SFA has 25% luminal disease in the mid areas, right anterior tibia has 99% focal proximal lesion, right peroneal artery is widely patent, right posterior tibial artery as 90% concentric lesion distally, left common femoral artery with 85% eccentric, heavily calcified lesion involving the ostium of the profunda, as well as the SFA, left SFA is 100% occluded in the mid area, left anterior tibial artery is 100% occluded in the proximal to distal area Soc no tob, quit 40 years ago, no etoh Fhx no early cad We discussed the expected course, resolution and complications of the diagnosis(es) in detail. Medication risks, benefits, costs, interactions, and alternatives were discussed as indicated. I advised him to contact the office if his condition worsens, changes or fails to improve as anticipated. He expressed understanding with the diagnosis(es) and plan HISTORY OF PRESENTING ILLNESS Paty Mcdaniels is a 82 y.o. male ACTIVE PROBLEM LIST Patient Active Problem List  
 Diagnosis Date Noted  Syncope and collapse 10/18/2020  Syncope 10/18/2020  Decubitus ulcer of left buttock, stage 2 ([address] 09/10/2020  Type 2 diabetes with nephropathy ([address] 08/24/2020  Pressure injury of buttock, stage 1 06/04/2020  Incontinence of feces 06/04/2020  On angiotensin receptor blockers (ARB) 01/23/2020  Gastroesophageal reflux disease without esophagitis 01/23/2020  Cough 01/23/2020  Recurrent depression ([address] 08/22/2019  Age-related cataract of both eyes 04/25/2019  Gait instability 06/21/2018  Type 2 diabetes mellitus without complication, without long-term current use of insulin ([address] 09/21/2017  Essential hypertension 09/21/2017  Hypercholesterolemia 09/21/2017  Coronary artery disease involving native coronary artery of native heart without angina pectoris 09/21/2017  S/P CABG (coronary artery bypass graft) 09/21/2017  Severe aortic stenosis 09/21/2017  Aortic systolic murmur on examination 2017  Benign prostatic hyperplasia with lower urinary tract symptoms 2017  Insomnia 2017  Former smoker 2017  ACP (advance care planning) 2017 PAST MEDICAL HISTORY Past Medical History:  
Diagnosis Date  BPH (benign prostatic hyperplasia)  CAD (coronary artery disease)  Diabetes ([facility]  Hearing loss  Hypercholesterolemia  Hypertension  Murmur  [facility] depression [facility] 2019 PAST SURGICAL HISTORY Past Surgical History:  
Procedure Laterality Date  CARDIAC SURG PROCEDURE UNLIST   by-pass  HX CHOLECYSTECTOMY ALLERGIES Allergies Allergen Reactions  Procaine Other (comments) Pt stated he passed out from procaine and was told not to let anyone use it on him again FAMILY HISTORY Family History Problem Relation Age of Onset  Cancer Mother  Cancer Father   
 negative for cardiac disease SOCIAL HISTORY Social History Socioeconomic History  Marital status:  Spouse name: Not on file  Number of children: Not on file  Years of education: Not on file  Highest education level: Not on file Tobacco Use  Smoking status: Former Smoker Types: Cigarettes Last attempt to quit: 1990 Years since quittin.1  Smokeless tobacco: Never Used Substance and Sexual Activity  Alcohol use: No  
 Drug use: No  
 
 
 
MEDICATIONS Current Facility-Administered Medications Medication Dose Route Frequency  insulin glargine (LANTUS) injection 10 Units  10 Units SubCUTAneous QHS  amLODIPine (NORVASC) tablet 5 mg  5 mg Oral DAILY  aspirin tablet 325 mg  325 mg Oral DAILY  atorvastatin (LIPITOR) tablet 10 mg  10 mg Oral QHS  doxazosin (CARDURA) tablet 4 mg  4 mg Oral QHS  finasteride (PROSCAR) tablet 5 mg  5 mg Oral DAILY  sertraline (ZOLOFT) tablet 100 mg  100 mg Oral QPM  
 sodium chloride (NS) flush 5-40 mL  5-40 mL IntraVENous Q8H  
 sodium chloride (NS) flush 5-40 mL  5-40 mL IntraVENous PRN  
 0.9% sodium chloride infusion  75 mL/hr IntraVENous CONTINUOUS  
 acetaminophen (TYLENOL) tablet 650 mg  650 mg Oral Q4H PRN  
 heparin (porcine) injection 5,000 Units  5,000 Units SubCUTAneous Q8H  
 glucose chewable tablet 16 g  4 Tab Oral PRN  
 glucagon (GLUCAGEN) injection 1 mg  1 mg IntraMUSCular PRN  
 dextrose 10% infusion 0-250 mL  0-250 mL IntraVENous PRN  
 insulin lispro (HUMALOG) injection   SubCUTAneous AC&HS  
 melatonin tablet 3 mg  3 mg Oral QHS PRN I have reviewed the nurses notes, vitals, problem list, allergy list, medical history, family, social history and medications. REVIEW OF SYMPTOMS Neg except as per HPI PHYSICAL EXAMINATION Visit Vitals /63 (BP 1 Location: Right arm, BP Patient Position: At rest) Pulse 78 Temp 98.6 °F (37 °C) Resp 23 Ht 6' 3\" (1.905 m) Wt 202 lb 2.6 oz (91.7 kg) SpO2 93% BMI 25.27 kg/m² 3/6 JANNY 
 lungs cta 
abd NDT ND Neuro non focal, memory impairment Awake alert, conversant DIAGNOSTIC DATA No specialty comments available. LABORATORY DATA Lab Results Component Value Date/Time WBC 9.7 10/19/2020 03:41 AM  
 HGB 10.7 (L) 10/19/2020 03:41 AM  
 HCT 32.5 (L) 10/19/2020 03:41 AM  
 PLATELET 747 (L) 37/99/6288 03:41 AM  
 MCV 84.2 10/19/2020 03:41 AM  
  
Lab Results Component Value Date/Time  Sodium 138 10/18/2020 03:27 PM  
 Potassium 4.0 10/18/2020 03:27 PM  
 Chloride 101 10/18/2020 03:27 PM  
 CO2 30 10/18/2020 03:27 PM  
 Anion gap 7 10/18/2020 03:27 PM  
 Glucose 166 (H) 10/18/2020 03:27 PM  
 BUN 23 (H) 10/18/2020 03:27 PM  
 Creatinine 1.36 (H) 10/18/2020 03:27 PM  
 BUN/Creatinine ratio 17 10/18/2020 03:27 PM  
 GFR est AA >60 10/18/2020 03:27 PM  
 GFR est non-AA 50 (L) 10/18/2020 03:27 PM  
 Calcium 9.5 10/18/2020 03:27 PM  
 Bilirubin, total 0.5 10/18/2020 03:27 PM  
 Alk. phosphatase 94 10/18/2020 03:27 PM  
 Protein, total 7.4 10/18/2020 03:27 PM  
 Albumin 3.6 10/18/2020 03:27 PM  
 Globulin 3.8 10/18/2020 03:27 PM  
 A-G Ratio 0.9 (L) 10/18/2020 03:27 PM  
 ALT (SGPT) 20 10/18/2020 03:27 PM  
  
 
 
 
 FOLLOW-UP Patient was made aware and verbalized understanding that an appointment will be scheduled for them for a virtual visit and/or office visit within the above time frame. Patient understanding his/her responsibility to call and change time/date if he/she so chooses. Thank you, Eben Gee DO for allowing me to participate in the care of Kaleigh Fox. Please do not hesitate to contact me for further questions/concerns. Greater than12 minutes was spent in direct video patient care, planning and chart review. This visit was conducted using [employer] telemedicine services. Candy Jarquin MD 
 
[address], [address]       
[phone] / [phone] Fax [facility] [address], [address] [facility] 
[phone] / [phone] Fax

## 2020-10-19 NOTE — PROGRESS NOTES
Bedside shift change report given to Emely RN (oncoming nurse) by Vijay Martin RN (offgoing nurse). Report included the following information SBAR, Kardex, ED Summary, Recent Results and Cardiac Rhythm NSR with 1'AVB/Afib. Problem: Diabetes Self-Management Goal: *Disease process and treatment process Description: Define diabetes and identify own type of diabetes; list 3 options for treating diabetes. Outcome: Progressing Towards Goal 
Goal: *Incorporating nutritional management into lifestyle Description: Describe effect of type, amount and timing of food on blood glucose; list 3 methods for planning meals. Outcome: Progressing Towards Goal 
Goal: *Incorporating physical activity into lifestyle Description: State effect of exercise on blood glucose levels. Outcome: Progressing Towards Goal 
Goal: *Developing strategies to promote health/change behavior Description: Define the ABC's of diabetes; identify appropriate screenings, schedule and personal plan for screenings. Outcome: Progressing Towards Goal 
Goal: *Using medications safely Description: State effect of diabetes medications on diabetes; name diabetes medication taking, action and side effects. Outcome: Progressing Towards Goal 
Goal: *Monitoring blood glucose, interpreting and using results Description: Identify recommended blood glucose targets  and personal targets. Outcome: Progressing Towards Goal 
Goal: *Prevention, detection, treatment of acute complications Description: List symptoms of hyper- and hypoglycemia; describe how to treat low blood sugar and actions for lowering  high blood glucose level. Outcome: Progressing Towards Goal 
Goal: *Prevention, detection and treatment of chronic complications Description: Define the natural course of diabetes and describe the relationship of blood glucose levels to long term complications of diabetes.  
Outcome: Progressing Towards Goal 
 Goal: *Developing strategies to address psychosocial issues Description: Describe feelings about living with diabetes; identify support needed and support network Outcome: Progressing Towards Goal 
Goal: *Insulin pump training Outcome: Progressing Towards Goal 
Goal: *Sick day guidelines Outcome: Progressing Towards Goal 
Goal: *Patient Specific Goal (EDIT GOAL, INSERT TEXT) Outcome: Progressing Towards Goal 
  
Problem: Patient Education: Go to Patient Education Activity Goal: Patient/Family Education Outcome: Progressing Towards Goal

## 2020-10-19 NOTE — PROGRESS NOTES
Reason for Admission:  Admitted from home where he states he became dizzy and passed out. History of CAD, DM and HTN. RUR Score:   Observation Plan for utilizing home health:   Was independent at home prior to admit. In light of fall prior to admit, will need to be cleared by PT/OT. Once workup complete will, will be able to formulate discharge needs. PCP: First and Last name:Jorgito Jenkins   
 Name of Practice:  
Are you a current patient: Yes Approximate date of last visit: 10/20 Can you participate in a virtual visit with your PCP: NO 
                 
Current Advanced Directive/Advance Care Plan: has current copy of AMD on chart. Transition of Care Plan: Once medically stable will likely discharge home with home health vs rehab. Covid pending Care Management Interventions PCP Verified by CM: Yes(10/20) Mode of Transport at Discharge: (Family) Current Support Network: Lives with Spouse(Lives in independent at [facility]) Confirm Follow Up Transport: Self The Patient and/or Patient Representative was Provided with a Choice of Provider and Agrees with the Discharge Plan?: (wife [place]) [employer] Information Provided?: No 
Observation notice provided in writing to patient and/or caregiver as well as verbal explanation of the policy. Patients who are in outpatient status also receive the Observation notice. Belinda Friend RN CRM Ext [phone]

## 2020-10-20 PROBLEM — I50.30 (HFPEF) HEART FAILURE WITH PRESERVED EJECTION FRACTION (HCC): Status: ACTIVE | Noted: 2020-01-01

## 2020-10-20 NOTE — PROGRESS NOTES
Bedside and Verbal shift change report given to Mora Granados [facility] (oncoming nurse) by Cherylene Bo, RN (offgoing nurse). Report included the following information SBAR, Kardex, ED Summary, MAR, Recent Results and Cardiac Rhythm NSR.

## 2020-10-20 NOTE — PROGRESS NOTES
Bedside and Verbal shift change report given to [facility] (oncoming nurse) by Kierra Del Rio RN (offgoing nurse). Report included the following information SBAR, Kardex, Procedure Summary, Intake/Output, MAR, Recent Results and Cardiac Rhythm NSR. TRANSFER - OUT REPORT: 
 
Verbal report given to Tamy DOUGLAS(name) on Rainer Velez  being transferred to (unit) for routine progression of care Report consisted of patients Situation, Background, Assessment and  
Recommendations(SBAR). Information from the following report(s) SBAR, Kardex, Procedure Summary, Intake/Output, MAR, Recent Results and Cardiac Rhythm NSR was reviewed with the receiving nurse. Lines:  
Peripheral IV 10/18/20 Left Forearm (Active) Site Assessment Clean, dry, & intact 10/20/20 0820 Phlebitis Assessment 0 10/20/20 0820 Infiltration Assessment 0 10/20/20 0820 Dressing Status Clean, dry, & intact 10/20/20 0820 Dressing Type Transparent;Tape 10/20/20 0820 Hub Color/Line Status Pink; Infusing;Patent 10/20/20 0820 Action Taken Open ports on tubing capped 10/19/20 2030 Alcohol Cap Used Yes 10/20/20 0820 Opportunity for questions and clarification was provided. Patient transported with: 
 [employer]

## 2020-10-20 NOTE — PROGRESS NOTES
Hospitalist Progress Note Buffy Hartmann NP Please call  and page for questions. Call physician on-call through the  7pm-7am 
 
Daily Progress Note: 10/20/2020 Primary care provider:Jorgito Hunt DO Date of admission: 10/18/2020  3:07 PM 
 
Admission Summary and Hospital Course:   
 
From H&P 10/18/20: \"Patient reports that he lives in [facility], today he was out shopping and as he was coming out he fainted. Per chart patient has history of severe aortic stenosis. Patient feels that he hit his head but denies any other complaints or problems. Patient came to the ER was requested to be observed under the hospitalist service. Patient reports that he is back to baseline he denies any complaints or problems currently. Patient reports that he has been taking his medications on a regular basis. Patient reports that he has not had any contact with known COVID-19 patients. The patient denies any Headache, blurry vision, sore throat, trouble swallowing, trouble with speech, chest pain, SOB, cough, fever, chills, N/V/D, abd pain, urinary symptoms, constipation, recent travels, sick contacts, focal or generalized neurological symptoms,  falls, injuries, rashes, contact with COVID-19 diagnosed patients, hematemesis, melena, hemoptysis, hematuria, rashes, denies starting any new medications and denies any other concerns or problems besides as mentioned above. \" Subjective:  
Pt seen today no acute distress. Overnight patient had episode of a tach with RVR with heart rate 140s 150s. EKG reflects atrial fibrillation. Patient and son deny any known history of this. Currently he denies HA, dizziness, visual changes, cough, SOB, CP, abd pain, n/v/d, dysuria or weakness. Assessment/Plan:  
 
 
Syncope: Unclear etiology hypovolemia vs severe aortic stenosis -Orthostatic VS neg 
-Head CT neg, labs stable -Echo 10/18 w/ EF 55-60%, no RWMA, mod aortic stenosis 
-carotid dopplers 10/19: less than 50% stenosis bilaterally; Left vertebral shows resistant flow, suggesting distal occlusion - evaluated by vasc surg 10/20-no intervention needed 
-UA +UTI 
-CTA chest 10/20: no PE; bibasilar atelectasis, 15mm adrenal nodule, low density thyroid nodules, minimal aneurysmal dilatation of ascending aorta -?TAVR workup w/wo PPM 
-for [facility] tomorrow Afib w/ RVR 
-new onset overnight; confirmed on EKG 
-HR controlled w/o intervention 
-hold [address] for now pending [facility] tomorrow (poss PM - SSS?) 
-Monitor on tele, continue heparin 
-If RVR recurs, can consider restarting BB 
 
UTI 
-+gram neg rods on culture 
-cont ceftriaxone PAD 
-Carotid dopplers 10/19: less than 50% stenosis bilaterally; Left vertebral shows resistant flow, suggesting distal occlusion  
-evaluated by vasc surg 10/20-no intervention needed NARAYAN: POA creat/gfr 1.36/50 (baseline normal kidney function) 
-Resolved DMII: A1c 7.1 
-cont lantus 10u qhs 
-AC/HS accuchecks w/ SSI 
-trend BS HFpEF: Echo 10/18 w/ EF 55-60% -HCTZ (NARAYAN), BB and ARB held on admission d/t low/normal BP and NARAYAN 
-bnp elevated today; IVF stopped, 20mg lasix IV given x1 
-consider restarting above meds tomorrow if BP can handle 
-appreciate cards input CTA Chest Incidental Findings:  
Minimal aneurysmal dilatation of ascending aorta Indeterminate 15mm left adrenal nodule Subcentimeter low density thyroid nodules: TSH 1.29 
-f/u as OP 
 
HTN: cont cardura and norvasc; hold HCTZ, BB and ARB for now, trend BP 
HLD: cont home meds BPH: cont home meds DVTppx: SCDs, heparin 
Gippx: NA 
Code Status: Full code Diet: Cardiac; NPO after MN for cath in AM 
Activity: OOB to chair TID and PRN Discharge: TBD Review of Systems:  
 
Full ROS complete with pertinent positives and negatives as per HPI, otherwise negative Objective:  
Physical Exam:  
 
Visit Vitals BP (!) 154/69 (BP 1 Location: Right arm, BP Patient Position: At rest) Pulse 93 Temp 98.9 °F (37.2 °C) Resp 16 Ht 6' 3\" (1.905 m) Wt 92.9 kg (204 lb 12.9 oz) SpO2 95% BMI 25.60 kg/m² O2 Device: Room air Temp (24hrs), Av.3 °F (36.8 °C), Min:98.2 °F (36.8 °C), Max:98.9 °F (37.2 °C) 
  10/20 0701 - 10/20 1900 In: 802.5 [P.O.:120; I.V.:682.5] Out: 250 [Urine:250]   10/18 1901 - 10/20 0700 In: 3230.8 [P.O.:480; I.V.:2750.8] Out: 1810 [[id number]] General:  Alert, cooperative, no distress, appears stated age, frail Lungs:   Good effort, some rales BLL, no distress Heart:  Regular rate and rhythm, S1, S2 normal, harsh systolic murmur 4/6 c/w aortic stenosis Abdomen:   Soft, non-tender, non-distended. Bowel sounds normal.   
Extremities: Extremities normal, atraumatic, no cyanosis or edema. Skin: Skin color, texture, turgor normal. No rashes or lesions Neurologic: CNII-XII intact, A&Ox4, MONZON Data Review:  
   
Recent Days: 
Recent Labs 10/20/20 
1121 10/19/20 
0341 10/18/20 
1527 WBC 7.7 9.7 8.5 HGB 10.1* 10.7* 12.1 HCT 31.0* 32.5* 37.3 * 127* 142* Recent Labs 10/20/20 
7009 10/18/20 
1527  138  
K 3.5 4.0  
 101 CO2 26 30 * 166* BUN 19 23* CREA 1.07 1.36* CA 8.8 9.5 MG 2.0 2.1 ALB  --  3.6 ALT  --  20 No results for input(s): PH, PCO2, PO2, HCO3, FIO2 in the last 72 hours. 24 Hour Results: 
Recent Results (from the past 24 hour(s)) GLUCOSE, POC Collection Time: 10/19/20 12:20 PM  
Result Value Ref Range Glucose (POC) 154 (H) 65 - 100 mg/dL Performed by Jamilah Dunbar, POC Collection Time: 10/19/20  4:44 PM  
Result Value Ref Range Glucose (POC) 122 (H) 65 - 100 mg/dL Performed by Georgette Nicholas W/ MARIAN CULTURE Collection Time: 10/19/20  4:54 PM  
 Specimen: Urine Result Value Ref Range Color YELLOW/STRAW  Appearance CLOUDY (A) CLEAR    
 Specific gravity 1.011 1.003 - 1.030    
 pH (UA) 5.0 5.0 - 8.0 Protein Negative NEG mg/dL Glucose Negative NEG mg/dL Ketone Negative NEG mg/dL Bilirubin Negative NEG Blood TRACE (A) NEG Urobilinogen 0.2 0.2 - 1.0 EU/dL Nitrites Positive (A) NEG Leukocyte Esterase LARGE (A) NEG    
 WBC 20-50 0 - 4 /hpf  
 RBC 0-5 0 - 5 /hpf Epithelial cells FEW FEW /lpf Bacteria 1+ (A) NEG /hpf  
 UA:UC IF INDICATED URINE CULTURE ORDERED (A) CNI    
DUPLEX CAROTID BILATERAL Collection Time: 10/19/20  5:42 PM  
Result Value Ref Range Right cca dist sys 53.4 cm/s Right CCA dist diaz 10.6 cm/s Right CCA prox sys 59.1 cm/s Right CCA prox diaz 14.0 cm/s Right eca sys 309.2 cm/s RIGHT EXTERNAL CAROTID ARTERY D 0.00 cm/s Right ICA dist sys 42.7 cm/s Right ICA dist diaz 9.4 cm/s Right ICA mid sys 69.4 cm/s Right ICA mid diaz 20.2 cm/s Right ICA prox sys 51.3 cm/s Right ICA prox diaz 13.7 cm/s Right vertebral sys 61.1 cm/s RIGHT VERTEBRAL ARTERY D 13.85 cm/s Right ICA/CCA sys 1.3 Left CCA dist sys 73.6 cm/s Left CCA dist diaz 11.4 cm/s Left CCA prox sys 65.6 cm/s Left CCA prox diaz 16.1 cm/s Left ECA sys 193.9 cm/s LEFT EXTERNAL CAROTID ARTERY D 0.00 cm/s Left ICA dist sys 97.4 cm/s Left ICA dist diaz 16.0 cm/s Left ICA mid sys 74.3 cm/s Left ICA mid diaz 11.7 cm/s Left ICA prox sys 50.1 cm/s Left ICA prox diaz 5.1 cm/s Left vertebral sys 25.2 cm/s LEFT VERTEBRAL ARTERY D 0.00 cm/s Left ICA/CCA sys 1.32   
GLUCOSE, POC Collection Time: 10/19/20  9:57 PM  
Result Value Ref Range Glucose (POC) 128 (H) 65 - 100 mg/dL Performed by Fabbypoornima Loya EKG, 12 LEAD, INITIAL Collection Time: 10/19/20 10:20 PM  
Result Value Ref Range Ventricular Rate 100 BPM  
 Atrial Rate 101 BPM  
 QRS Duration 102 ms Q-T Interval 320 ms QTC Calculation (Bezet) 412 ms Calculated R Axis -22 degrees Calculated T Axis 119 degrees Diagnosis Atrial fibrillation with premature ventricular or aberrantly conducted  
complexes Voltage criteria for left ventricular hypertrophy Cannot rule out Septal infarct (cited on or before 18-OCT-2020) ST & T wave abnormality, consider lateral ischemia or digitalis effect When compared with ECG of 18-OCT-2020 15:16, 
Atrial fibrillation has replaced Sinus rhythm Serial changes of Septal infarct present CBC WITH AUTOMATED DIFF Collection Time: 10/20/20  3:33 AM  
Result Value Ref Range WBC 7.7 4.1 - 11.1 K/uL  
 RBC 3.65 (L) 4.10 - 5.70 M/uL  
 HGB 10.1 (L) 12.1 - 17.0 g/dL HCT 31.0 (L) 36.6 - 50.3 % MCV 84.9 80.0 - 99.0 FL  
 MCH 27.7 26.0 - 34.0 PG  
 MCHC 32.6 30.0 - 36.5 g/dL  
 RDW 13.8 11.5 - 14.5 % PLATELET 797 (L) 244 - 400 K/uL MPV 12.7 8.9 - 12.9 FL  
 NRBC 0.0 0  WBC ABSOLUTE NRBC 0.00 0.00 - 0.01 K/uL NEUTROPHILS 69 32 - 75 % LYMPHOCYTES 18 12 - 49 % MONOCYTES 10 5 - 13 % EOSINOPHILS 3 0 - 7 % BASOPHILS 0 0 - 1 % IMMATURE GRANULOCYTES 0 0.0 - 0.5 % ABS. NEUTROPHILS 5.3 1.8 - 8.0 K/UL  
 ABS. LYMPHOCYTES 1.4 0.8 - 3.5 K/UL  
 ABS. MONOCYTES 0.7 0.0 - 1.0 K/UL  
 ABS. EOSINOPHILS 0.2 0.0 - 0.4 K/UL  
 ABS. BASOPHILS 0.0 0.0 - 0.1 K/UL  
 ABS. IMM. GRANS. 0.0 0.00 - 0.04 K/UL  
 DF AUTOMATED METABOLIC PANEL, BASIC Collection Time: 10/20/20  3:33 AM  
Result Value Ref Range Sodium 139 136 - 145 mmol/L Potassium 3.5 3.5 - 5.1 mmol/L Chloride 106 97 - 108 mmol/L  
 CO2 26 21 - 32 mmol/L Anion gap 7 5 - 15 mmol/L Glucose 121 (H) 65 - 100 mg/dL BUN 19 6 - 20 MG/DL Creatinine 1.07 0.70 - 1.30 MG/DL  
 BUN/Creatinine ratio 18 12 - 20 GFR est AA >60 >60 ml/min/1.73m2 GFR est non-AA >60 >60 ml/min/1.73m2 Calcium 8.8 8.5 - 10.1 MG/DL  
NT-PRO BNP Collection Time: 10/20/20  3:33 AM  
Result Value Ref Range  NT pro-BNP 3,154 (H) <450 PG/ML  
MAGNESIUM  
 Collection Time: 10/20/20  3:33 AM  
Result Value Ref Range Magnesium 2.0 1.6 - 2.4 mg/dL GLUCOSE, POC Collection Time: 10/20/20  8:41 AM  
Result Value Ref Range Glucose (POC) 113 (H) 65 - 100 mg/dL Performed by Pam Lees Problem List: 
Problem List as of 10/20/2020 Date Reviewed: 10/18/2020 Codes Class Noted - Resolved (HFpEF) heart failure with preserved ejection fraction (HCC) ICD-10-CM: I50.30 ICD-9-CM: 428.9  10/20/2020 - Present Syncope and collapse ICD-10-CM: R55 
ICD-9-CM: 780.2  10/18/2020 - Present Syncope ICD-10-CM: R55 
ICD-9-CM: 780.2  10/18/2020 - Present Decubitus ulcer of left buttock, stage 2 ([facility] ICD-10-CM: B83.044 ICD-9-CM: 707.05, 707.22  9/10/2020 - Present Type 2 diabetes with nephropathy ([facility] ICD-10-CM: E11.21 
ICD-9-CM: 250.40, 583.81  8/24/2020 - Present Pressure injury of buttock, stage 1 ICD-10-CM: L89.301 ICD-9-CM: 707.05, 707.21  6/4/2020 - Present Incontinence of feces ICD-10-CM: R15.9 ICD-9-CM: 787.60  6/4/2020 - Present On angiotensin receptor blockers (ARB) ICD-10-CM: K30.089 ICD-9-CM: V58.69  1/23/2020 - Present Gastroesophageal reflux disease without esophagitis ICD-10-CM: K21.9 ICD-9-CM: 530.81  1/23/2020 - Present Cough ICD-10-CM: R05 ICD-9-CM: 786.2  1/23/2020 - Present Recurrent depression ([facility] ICD-10-CM: F33.9 ICD-9-CM: 296.30  8/22/2019 - Present Age-related cataract of both eyes ICD-10-CM: H25.9 ICD-9-CM: 366.10  4/25/2019 - Present Gait instability ICD-10-CM: R26.81 
ICD-9-CM: 781.2  6/21/2018 - Present Type 2 diabetes mellitus without complication, without long-term current use of insulin (HCC) ICD-10-CM: E11.9 ICD-9-CM: 250.00  9/21/2017 - Present Essential hypertension ICD-10-CM: I10 
ICD-9-CM: 401.9  9/21/2017 - Present Hypercholesterolemia ICD-10-CM: E78.00 ICD-9-CM: 272.0  9/21/2017 - Present Coronary artery disease involving native coronary artery of native heart without angina pectoris ICD-10-CM: I25.10 ICD-9-CM: 414.01  9/21/2017 - Present S/P CABG (coronary artery bypass graft) ICD-10-CM: Z95.1 ICD-9-CM: V45.81  9/21/2017 - Present Severe aortic stenosis ICD-10-CM: I35.0 ICD-9-CM: 424.1  9/21/2017 - Present Aortic systolic murmur on examination ICD-10-CM: I35.8 ICD-9-CM: 424.1  9/21/2017 - Present Benign prostatic hyperplasia with lower urinary tract symptoms ICD-10-CM: N40.1 ICD-9-CM: 600.01  9/21/2017 - Present Insomnia ICD-10-CM: G47.00 ICD-9-CM: 780.52  9/21/2017 - Present Former smoker ICD-10-CM: Y06.038 ICD-9-CM: V15.82  9/21/2017 - Present ACP (advance care planning) ICD-10-CM: Z71.89 ICD-9-CM: V65.49  9/21/2017 - Present RESOLVED: Type 2 diabetes with nephropathy ([facility] ICD-10-CM: E11.21 
ICD-9-CM: 250.40, 583.81  4/10/2018 - 4/25/2019 RESOLVED: Type 2 diabetes mellitus with nephropathy ([facility] ICD-10-CM: E11.21 
ICD-9-CM: 250.40, 583.81  12/14/2017 - 4/5/2018 RESOLVED: Chronic ulcer of buttock ([facility] ICD-10-CM: D34.279 ICD-9-CM: 707.8  11/16/2017 - 4/25/2019 Medications reviewed Current Facility-Administered Medications Medication Dose Route Frequency  sodium chloride 0.9 % bolus infusion 100 mL  100 mL IntraVENous RAD ONCE  
 iopamidoL (ISOVUE-370) 76 % injection 100 mL  100 mL IntraVENous RAD ONCE  
 sodium chloride (NS) flush 10 mL  10 mL IntraVENous RAD ONCE  
 furosemide (LASIX) injection 20 mg  20 mg IntraVENous ONCE  
 insulin glargine (LANTUS) injection 10 Units  10 Units SubCUTAneous QHS  traZODone (DESYREL) tablet 50 mg  50 mg Oral QHS PRN  
 temazepam (RESTORIL) capsule 15 mg  15 mg Oral QHS PRN  
 cefTRIAXone (ROCEPHIN) 1 g in 0.9% sodium chloride (MBP/ADV) 50 mL  1 g IntraVENous Q24H  
 amLODIPine (NORVASC) tablet 5 mg  5 mg Oral DAILY  aspirin tablet 325 mg  325 mg Oral DAILY  atorvastatin (LIPITOR) tablet 10 mg  10 mg Oral QHS  doxazosin (CARDURA) tablet 4 mg  4 mg Oral QHS  finasteride (PROSCAR) tablet 5 mg  5 mg Oral DAILY  sertraline (ZOLOFT) tablet 100 mg  100 mg Oral QPM  
 sodium chloride (NS) flush 5-40 mL  5-40 mL IntraVENous Q8H  
 sodium chloride (NS) flush 5-40 mL  5-40 mL IntraVENous PRN  
 acetaminophen (TYLENOL) tablet 650 mg  650 mg Oral Q4H PRN  
 heparin (porcine) injection 5,000 Units  5,000 Units SubCUTAneous Q8H  
 glucose chewable tablet 16 g  4 Tab Oral PRN  
 glucagon (GLUCAGEN) injection 1 mg  1 mg IntraMUSCular PRN  
 dextrose 10% infusion 0-250 mL  0-250 mL IntraVENous PRN  
 insulin lispro (HUMALOG) injection   SubCUTAneous AC&HS  
 melatonin tablet 3 mg  3 mg Oral QHS PRN Care Plan discussed with: Patient/family, nurse Dianna Nash NP Hospitalist 
Providers can reach me on PerfectServe

## 2020-10-20 NOTE — PROGRESS NOTES
Hospitalist [employer] Cover[employer] NP Name: Carlyn Caro YOB: 1935 MRN: [id number] Admission Date: 10/18/2020  3:07 PM 
 
Date of service: 10/19/2020 10:24 PM  
  
                         
Overnight update:  
  
 
Paged by RN due to conversion to atrial fibrillation with rates up to 140. Chart reviewed, no history of PAF noted on cardiology note from today. - Patient denies history of PAF, no knowledge of irregular heart beat. Denies chest pain, shortness of breath, nausea, palpitations. Focused objective: 
- Supine in bed, no acute distress, interactive - Lungs CTAB, no wheeze 
- S1S2 irregular, +murmur, rate 104 during exam 
- Telemetry monitor atrial fibrillation rate , /67 Plan: - EKG being obtained at present - Monitor overnight, does not need rate control at this time - AM labs - Cards following Leonila HERNANDEZ-C, PA-C 
[phone] or [place]

## 2020-10-20 NOTE — CONSULTS
[facility] Name:  Buddy Roberson 
MR#:  [id number] :  1935 ACCOUNT #:  [de-identified] DATE OF SERVICE:  10/20/2020 REASON FOR CONSULTATION:  Possible vertebral artery stenosis. HISTORY OF PRESENT ILLNESS:  The patient is an 82-year-old male, who was admitted with syncope. As part of his workup, he had a carotid duplex study done yesterday. This showed mild carotid occlusive disease with high resistant flow in the left vertebral artery, which raised a question of possible more distal stenosis or occlusion. The patient has been well since his admission without other complaints. CT scan of the head on admission was unremarkable. PAST MEDICAL HISTORY: 
1.  Diabetes. 2.  Hypertension. 3.  Coronary artery disease. PAST SURGICAL HISTORY: 
1. Coronary bypass. 2.  Cholecystectomy. ADMISSION MEDICATIONS: 
1.  Zoloft. 2.  Trazodone. 3.  Restoril. 4.  Losartan. 5.  Labetalol. 6.  Amlodipine. 7.  Proscar. 8.  Hydrochlorothiazide. 9.  Metformin. 10.  Glipizide. 11.  Doxazosin. 12.  Atorvastatin. 13.  Zanaflex. ALLERGIES:  PROCAINE. SOCIAL HISTORY:  The patient lives in an assisted living facility independently. FAMILY HISTORY:  Unremarkable. REVIEW OF SYSTEMS:  He has had no recent cardiac, respiratory, GI, , hematologic or psychiatric complaints. PHYSICAL EXAMINATION: 
GENERAL:  He is an elderly male, in no distress. LUNGS:  Bilateral breath sounds. HEART:  Regular rate and rhythm. ABDOMEN:  Soft and nontender. EXTREMITIES:  Unremarkable. NEUROLOGIC:  Grossly normal with intact motor and sensory function. IMPRESSION:  The patient has possible abnormality of his left vertebral artery based on the flow characteristics identified. The artery was opened. Both carotids in the right vertebral artery are open without significant stenosis by carotid duplex.   While it is possible that his symptoms could relate to vertebrobasilar insufficiency, it is extremely unlikely with patent bilateral carotid arteries and a patent right vertebral artery. The findings on duplex scan as it relates to the vertebral artery are nonspecific and probably not clinically relevant. I would not recommend further workup. He was reassured in this regard. We will see him as needed. Ana Coronel MD 
 
 
[id number] 
D:  10/20/2020 11:20 
T:  10/20/2020 13:16 JOB #:  [id number]

## 2020-10-20 NOTE — TELEPHONE ENCOUNTER
Patient's son Esther Em is requesting a call back from provider.     Please call Kenneth Iraheta @ [phone] regarding patient while in the hospital.

## 2020-10-20 NOTE — PROGRESS NOTES
Vascular: 
 
Asked to see for \"possible distal left carotid occlusion\" Patient admitted for syncope. Carotid duplex done yesterday shows mild bilateral carotid stenosis. Left vertebral has resistant flow possibly suggesting a more distal stenosis or occlusion beyond the area that can be imaged. Would not recommend further evaluation of this finding as it is unlikely to represent a clinically significant or treatable abnormality. Please call if questions.

## 2020-10-20 NOTE — PROGRESS NOTES
Problem: General Medical Care Plan Goal: *Vital signs within specified parameters Outcome: Progressing Towards Goal 
Pt remains rate controlled in afib. Pt on heparin. Will continue to monitor. Problem: General Medical Care Plan Goal: *Absence of infection signs and symptoms Outcome: Progressing Towards Goal 
  
Problem: Falls - Risk of 
Goal: *Absence of Falls Description: Document Ivana Guerra Fall Risk and appropriate interventions in the flowsheet. Outcome: Progressing Towards Goal 
Note: Fall Risk Interventions: 
Pt remains free of falls during admission. Call bell and frequently used items within reach. Bedside table within reach. Pt provided nonskid socks and instructed to call out for nurse when in need of assistance. Bedside shift change report given to [facility] RN (oncoming nurse) by Olaf Hairston (offgoing nurse). Report included the following information SBAR, MAR, Recent Results, and Cardiac Rhythm Afib .

## 2020-10-20 NOTE — PROGRESS NOTES
1930: Bedside and Verbal shift change report given to [address] (oncoming nurse) by Bobette Mortimer RN (offgoing nurse). Report included the following information SBAR, Kardex, Intake/Output, Recent Results, Med Rec Status and Cardiac Rhythm NSR.  
 
2120: Monitor tech notified RN that patient had converted from NSR to Afib with a rate between 100-150.  
 
2126: Notified hospitalist NP of rhythm change via perfect serve. New orders received. 2216: Hospitalist at bedside. Orders received to notify if rate sustains over 110.  
 
2355: Bedside and Verbal shift change report given to Toshia [address] 88 (oncoming nurse) by Everton Carl RN (offgoing nurse). Report included the following information SBAR, Kardex, Intake/Output, Recent Results, Med Rec Status and Cardiac Rhythm A Fib.

## 2020-10-21 PROBLEM — I35.0 AORTIC STENOSIS: Status: ACTIVE | Noted: 2020-01-01

## 2020-10-21 NOTE — CONSULTS
[facility] [place]: Malik Quezada 
[phone] Cardiology valvular heart disease consult note Requesting/referring provider: Dr. Sofi Bell, Dr. Regino Ruth, Dr. Kristie High Reason for Consult: Valvular heart disease HPI: Bonny Carlisle, a 82y.o. year-old who presents for evaluation of syncope. He has significant history of coronary artery disease with remote coronary artery bypass grafting with LIMA to LAD, vein graft to OM, vein graft to RPDA. [place] last stress test in 2017 in [place] showed minimal ischemia and he reports no angina and this has been managed medically. He is currently in an assisted care living but has good cognition and relatively functional.  He also has known history of aortic valve disease in the form of aortic stenosis as well as mitral valve disease in the form of predominantly mitral annular calcification. Aortic stenosis has been under surveillance with Dr. Kristie High. On his most recent echocardiograms however there is a suspicion that his aortic stenosis is severe. He now presented to the hospital with episode of sudden onset syncope without any preceding symptoms. Patient did not report of an rapid rate related palpitations. Initial work-up for syncope has been rather unrevealing. His underlying EKG shows normal QRS complex first-degree AV block. During hospitalization he has been noted to be intermittently in multifocal atrial tachycardia/spectrum of atrial fibrillation with rates up to 140 bpm. 
 
I have been consulted to assess patient's aortic valve disease Investigations personally reviewed by me: 
ECG October 2020: First ECG: Sinus rhythm, first-degree AV block, nonspecific ST-T changes;  
second ECG multifocal atrial tachycardia, degree block, nonspecific ST-T changes Echocardiogram October 2020: Normal LV function, likely severe aortic stenosis with peak transaortic velocity of 3.8 to 3.9 m/s, mean gradient of 35 mmHg, calculated aortic valve area by continued equation of 0.8 cm² by mean gradient. Visibly the valve looks extremely calcified with significant restriction of motion. There is moderate to severe mitral annular calcification extending inferior to the mitral valve annulus along the posterior left ventricular wall. Transmitral gradients are also increased at 6mmHg mean gradient consistent with possible mild mitral stenosis. Leaflets were difficult to visualize but do not appear typical for rheumatic mitral stenosis despite history of rheumatic fever as a kid. No significant pulmonary hypertension is noted. Left atrium is significantly enlarged. Assessment/Plan: 1. Likely severe symptomatic calcific senile aortic stenosis 2. Hypertension 3. CKD 4. History of remote smoking 5. Mitral annular calcification with possible mild mitral stenosis 6. Multifocal atrial tachycardia/atrial fibrillation 7. Coronary disease status post coronary bypass grafting remotely with LIMA to LAD, vein graft to OM, vein graft to PDA 8. Preserved LV systolic function 9. Recent syncope of uncertain etiology possibly contributed by severe aortic stenosis in the setting of atrial arrhythmia and anti HTN meds. 10.  History of peripheral arterial disease with prior bilateral common iliac stents, known bilateral common femoral calcification extending in the distal common femoral into bifurcation with bilateral SFA and infrapopliteal disease. Mr. Chidi Hinton presents to the hospital with syncope. He appears to have likely severe aortic stenosis. Gradients may be underestimated either due to atrial fibrillation/multifocal atrial tachycardia or alternatively secondary to paradoxical low flow state. I recommend performing invasive assessment of his aortic valve with cardiac catheterization and simultaneous transaortic gradient assessment.   At the same time we will also perform coronary angiography/graft angiography to rule out progressive coronary artery disease. Assuming aortic valve is in fact severe, he will be a candidate for aortic valve placement. Potentially transcatheter aortic valve replacement would be favorable given multiple comorbidities. However, access for transcatheter aortic valve placement will be a concern and will need to be evaluated. I discussed the risks/benefits/alternatives of the procedure with the patient. Risks include (but are not limited to) bleeding, infection,stroke,heart attack, need for emergency surgery/pericardiocentesis, need for dialysis or death. The patient understands and agrees to proceed with cardiac catheterization. Prior to discharge we will also plan to perform a CT angiogram for TAVR assessment. [x]    High complexity decision making was performed 
[]    Patient is at high-risk of decompensation with multiple organ involvement He  has a past medical history of BPH (benign prostatic hyperplasia), CAD (coronary artery disease), Diabetes ([address] Hearing loss, Hypercholesterolemia, Hypertension, Murmur, and Recurrent depression ([address] (8/22/2019). He also has no past medical history of Anemia, Arrhythmia, Arthritis, Asthma, Autoimmune disease ([address] Calculus of kidney, Cancer ([address] Chronic kidney disease, Chronic obstructive pulmonary disease ([address] Chronic pain, Congestive heart failure ([address] GERD (gastroesophageal reflux disease), Headache, Liver disease, Psychotic disorder ([address] PUD (peptic ulcer disease), Seizures ([address] Stroke ([address] Thromboembolus ([address] Thyroid disease, or Trauma. Review of system:Patient reports no dyspnea/PND/Orthpnea/CP. He reports no cough/fever/focal neurological deficits/abdominal pain. All other systems negative except as above. Family History Problem Relation Age of Onset  Cancer Mother  Cancer Father Social History Socioeconomic History  Marital status:  Spouse name: Not on file  Number of children: Not on file  Years of education: Not on file  Highest education level: Not on file Tobacco Use  Smoking status: Former Smoker Types: Cigarettes Last attempt to quit: 1990 Years since quittin.1  Smokeless tobacco: Never Used Substance and Sexual Activity  Alcohol use: No  
 Drug use: No  
  
PE Vitals:  
 10/20/20 2256 10/21/20 0021 10/21/20 3375 10/21/20 6785 BP: 125/71  118/78 (!) 83/48 Pulse: 93  83 (!) 105 Resp: 16  16 16 Temp: 98.2 °F (36.8 °C)  98.1 °F (36.7 °C) 97.7 °F (36.5 °C) SpO2: 94%  93% 92% Weight:  193 lb 5.5 oz (87.7 kg) Height:      
 Body mass index is 24.17 kg/m². General:    Alert, cooperative, no distress. Psychiatric:    Normal Mood and affect Eye/ENT:      Pupils equal, No asymmetry, Conjunctival pink. Able to hear voice at normal amplitude Lungs:      Visibly symmetric chest expansion, No palpable tenderness. Clear to auscultation bilaterally. Heart[de-identified]    Regular rate and rhythm, S1, [id number], mid to late peaking mid systolic murmur, click, rub or gallop. No JVD, Normal palpable peripheral pulses. No cyanosis Abdomen:     Soft, non-tender. Bowel sounds normal. No masses,  No   
  organomegaly. Extremities:   Extremities normal, atraumatic, no edema. Neurologic:   CN II-XII grossly intact. No gross focal deficits Recent Labs: 
Lab Results Component Value Date/Time Cholesterol, total 114 2020 03:19 PM  
 HDL Cholesterol 40 2020 03:19 PM  
 LDL, calculated 54 2020 03:19 PM  
 Triglyceride 101 2020 03:19 PM  
 
Lab Results Component Value Date/Time Creatinine 0.95 10/21/2020 04:21 AM  
 
Lab Results Component Value Date/Time BUN 16 10/21/2020 04:21 AM  
 
Lab Results Component Value Date/Time Potassium 3.1 (L) 10/21/2020 04:21 AM  
 
Lab Results Component Value Date/Time Hemoglobin A1c 7.1 (H) 10/18/2020 03:27 PM  
 
Lab Results Component Value Date/Time HGB 11.1 (L) 10/21/2020 04:21 AM  
 
Lab Results Component Value Date/Time PLATELET 723 (L)  04:21 AM  
 
 
Reviewed: 
Past Medical History:  
Diagnosis Date  BPH (benign prostatic hyperplasia)  CAD (coronary artery disease)  Diabetes ([facility]  Hearing loss  Hypercholesterolemia  Hypertension  Murmur  Recurrent depression ([facility] 2019 Social History Tobacco Use Smoking Status Former Smoker  Types: Cigarettes  Last attempt to quit: 1990  Years since quittin.1 Smokeless Tobacco Never Used Social History Substance and Sexual Activity Alcohol Use No  
 
Allergies Allergen Reactions  Procaine Other (comments) Pt stated he passed out from procaine and was told not to let anyone use it on him again Family History Problem Relation Age of Onset  Cancer Mother  Cancer Father Current Facility-Administered Medications Medication Dose Route Frequency  ascorbic acid (vitamin C) (VITAMIN C) tablet 1,000 mg  1,000 mg Oral TID  insulin glargine (LANTUS) injection 10 Units  10 Units SubCUTAneous QHS  traZODone (DESYREL) tablet 50 mg  50 mg Oral QHS PRN  
 temazepam (RESTORIL) capsule 15 mg  15 mg Oral QHS PRN  
 cefTRIAXone (ROCEPHIN) 1 g in 0.9% sodium chloride (MBP/ADV) 50 mL  1 g IntraVENous Q24H  
 amLODIPine (NORVASC) tablet 5 mg  5 mg Oral DAILY  aspirin tablet 325 mg  325 mg Oral DAILY  atorvastatin (LIPITOR) tablet 10 mg  10 mg Oral QHS  doxazosin (CARDURA) tablet 4 mg  4 mg Oral QHS  finasteride (PROSCAR) tablet 5 mg  5 mg Oral DAILY  sertraline (ZOLOFT) tablet 100 mg  100 mg Oral QPM  
 sodium chloride (NS) flush 5-40 mL  5-40 mL IntraVENous Q8H  
 sodium chloride (NS) flush 5-40 mL  5-40 mL IntraVENous PRN  
 acetaminophen (TYLENOL) tablet 650 mg  650 mg Oral Q4H PRN  
  heparin (porcine) injection 5,000 Units  5,000 Units SubCUTAneous Q8H  
 glucose chewable tablet 16 g  4 Tab Oral PRN  
 glucagon (GLUCAGEN) injection 1 mg  1 mg IntraMUSCular PRN  
 dextrose 10% infusion 0-250 mL  0-250 mL IntraVENous PRN  
 insulin lispro (HUMALOG) injection   SubCUTAneous AC&HS  
 melatonin tablet 3 mg  3 mg Oral QHS PRN Dalila Gary MD10/21/20 There are other unrelated non-urgent complaints, but due to the busy schedule and the amount of time I've already spent with him, time does not permit me to address these routine issues at today's visit. I've requested another appointment to review these additional issues. ATTENTION:  
This medical record was transcribed using an electronic medical records/speech recognition system. Although proofread, it may and can contain electronic, spelling and other errors. Corrections may be executed at a later time. Please feel free to contact us for any clarifications as needed. [facility] [place] [address], [address]

## 2020-10-21 NOTE — PROGRESS NOTES
Problem: Falls - Risk of 
Goal: *Absence of Falls Description: Document Brandon Gaspar Fall Risk and appropriate interventions in the flowsheet. Outcome: Progressing Towards Goal 
Note: Fall Risk Interventions: 
Mobility Interventions: Communicate number of staff needed for ambulation/transfer, Patient to call before getting OOB, Utilize walker, cane, or other assistive device Medication Interventions: Patient to call before getting OOB, Teach patient to arise slowly History of Falls Interventions: Evaluate medications/consider consulting pharmacy, Investigate reason for fall, Room close to nurse's station Problem: Syncope Goal: *Absence of injury Outcome: Progressing Towards Goal 
Goal: Decrease or eliminate episodes of syncope Outcome: Progressing Towards Goal

## 2020-10-21 NOTE — PROGRESS NOTES
TRANSFER - OUT REPORT: 
 
Verbal report given to Olivia RN(name) on Jeriman Norlander  being transferred to 3(unit) for routine progression of care Report consisted of patients Situation, Background, Assessment and  
Recommendations(SBAR). Information from the following report(s) Procedure Summary, Intake/Output, MAR, Accordion, Recent Results, Med Rec Status and Cardiac Rhythm NSR, rapid at fib when arrived from unit prior to cath was reviewed with the receiving nurse. Lines:  
Peripheral IV 10/21/20 Left Forearm (Active) Opportunity for questions and clarification was provided. Patient transported with: 
 Monitor Registered Nurse  
TR band to be removed 1530 transferred to room 352 via stretcher, rec;d by Olivia DOUGLAS, TR band removed no bleeding noted. Gauze and tegaderm dsg applied

## 2020-10-21 NOTE — PROGRESS NOTES
Problem: Falls - Risk of 
Goal: *Absence of Falls Description: Document Elaine Roman Fall Risk and appropriate interventions in the flowsheet. Outcome: Progressing Towards Goal 
Note: Fall Risk Interventions: 
Mobility Interventions: Communicate number of staff needed for ambulation/transfer, Patient to call before getting OOB, Utilize walker, cane, or other assistive device Medication Interventions: Patient to call before getting OOB, Teach patient to arise slowly History of Falls Interventions: Evaluate medications/consider consulting pharmacy, Investigate reason for fall, Room close to nurse's station

## 2020-10-21 NOTE — PROGRESS NOTES
Cardiac Cath Lab Recovery Arrival Note: 
 
 
Lane Mishar arrived to Cardiac Cath Lab, Recovery Area. Staff introduced to patient. Patient identifiers verified with NAME and DATE OF BIRTH. Procedure verified with patient. Consent forms reviewed and signed by patient or authorized representative and verified. Allergies verified. Patient and family oriented to department. Patient and family informed of procedure and plan of care. Questions answered with review. Patient prepped for procedure, per orders from physician, prior to arrival. 
 
Patient on cardiac monitor, non-invasive blood pressure, SPO2 monitor. On room air. Patient is A&Ox 4. Patient reports no complaints. Patient in stretcher, in low position, with side rails up, call bell within reach, patient instructed to call if assistance as needed. Patient prep in: [address], [place] [address]. Patient family has pager # [phone] Family in: outside hospital.  
Prep by: Cornelio Yusuf in to talk with pt. Pre and post cath site care teaching completed. Left PIV tender and reddened,  New Iv site B/S 121

## 2020-10-21 NOTE — PROGRESS NOTES
Bedside shift change report given to MISAEL Baker (oncoming nurse) by Rj Wolfe RN (offgoing nurse). Report included the following information SBAR, Kardex, Intake/Output, Recent Results and Cardiac Rhythm NSR AVB 1st degree.

## 2020-10-21 NOTE — PROCEDURES
Findings 1. Normal left and right-sided filling pressures 2. No significant pulmonary hypertension with normal PVR and SVR 3. Severe native three-vessel coronary artery disease 4. Patent vein graft to LAD, vein graft to second marginal, vein graft to PDA. First and third marginal are occluded and are getting collateralized from second marginal branch. Second diagonal is occluded and is collateralized from distal LAD 5. Severe aortic stenosis with calculated aortic valve area of 0.83 cm² with mean gradient of 32 mmHg with systolic flow rate of 544 mL per second. Evaluation was performed with patient in normal rhythm rather than tachycardia. 6.  Severe left subclavian stenosis. Left radial access was used for cardiac catheterization. There is a previously placed stent in the left subclavian with 70 to 80% in-stent restenosis that had to be dilated with a 4 followed by 7 mm balloon to get the catheters across left subclavian. Left subclavian stent is protruding into the aortic arch by about 8 to 10 mm. Final angiogram demonstrated residual 30 to 40% stenosis. No flow was visible in the LIMA. Recommendations: 1. Discussion regarding definitive aortic valve therapies 2. No intervention needed for coronary assessment Contrast: 80 cc Access: Left radial artery. Infusion with recommended against left radial access given the left subclavian stent and stenosis. Patient does not have LIMA graft and or grafts are venous originating from the ascending aorta. Procedures performed: Right heart catheterization, left heart catheterization, coronary angiography, graft angiography, left subclavian angiography, left subclavian angioplasty and [id number]

## 2020-10-21 NOTE — PROGRESS NOTES
Problem: Diabetes Self-Management Goal: *Disease process and treatment process Description: Define diabetes and identify own type of diabetes; list 3 options for treating diabetes.  
Outcome: Progressing Towards Goal

## 2020-10-21 NOTE — PROGRESS NOTES
TRANSFER - OUT REPORT: 
 
Verbal report given to [facility] RN(name) on Augustin Deck  being transferred to cath lab(unit) for ordered procedure Report consisted of patients Situation, Background, Assessment and  
Recommendations(SBAR). Information from the following report(s) SBAR, Kardex, [facility] and Recent Results was reviewed with the receiving nurse. Lines:  
Peripheral IV 10/18/20 Left Forearm (Active) Site Assessment Clean, dry, & intact 10/21/20 0411 Phlebitis Assessment 0 10/21/20 0411 Infiltration Assessment 0 10/21/20 0411 Dressing Status Clean, dry, & intact 10/21/20 0411 Dressing Type Transparent;Tape 10/21/20 0411 Hub Color/Line Status Capped;Pink 10/21/20 0411 Action Taken Open ports on tubing capped 10/21/20 0411 Alcohol Cap Used Yes 10/21/20 0411 Opportunity for questions and clarification was provided. Patient transported with: 
 Registered Nurse

## 2020-10-21 NOTE — CONSULTS
Patient: Rafael Lee   Age: 82 y.o. Patient Care Team: 
Kevon Geiger DO as PCP - General (Internal Medicine) Kevon Geiger DO as PCP - [facility] Toño Dooley MD as Physician (Cardiology) PCP: Kevon Geiger DO Cardiologist: Dr. Roxy Arnold Diagnosis/Reason for Consultation: The primary encounter diagnosis was Syncope and collapse. Diagnoses of Nonrheumatic aortic valve stenosis and Aortic stenosis were also pertinent to this visit. Problem List:  
Patient Active Problem List  
Diagnosis Code  Type 2 diabetes mellitus without complication, without long-term current use of insulin ([facility]) E11.9  
 Essential hypertension I10  
 Hypercholesterolemia E78.00  Coronary artery disease involving native coronary artery of native heart without angina pectoris I25.10  
 S/P CABG (coronary artery bypass graft) Z95.1  Severe aortic stenosis I35.0  Aortic systolic murmur on examination I35.8  Benign prostatic hyperplasia with lower urinary tract symptoms N40.1  Insomnia G47.00 Atha Kaushik Former smoker R34.382  ACP (advance care planning) Z71.89  
 Gait instability R26.81  
 Age-related cataract of both eyes H25.9  Recurrent depression ([address] F33.9  On angiotensin receptor blockers (ARB) V97.632  Gastroesophageal reflux disease without esophagitis K21.9  Cough R05  Pressure injury of buttock, stage 1 L89.301  Incontinence of feces R15.9  Type 2 diabetes with nephropathy ([facility]) E11.21  
 Decubitus ulcer of left buttock, stage 2 ([address] J97.305  Syncope and collapse R55  Syncope R55  
 (HFpEF) heart failure with preserved ejection fraction (HCC) I50.30  Aortic stenosis I35.0  
  
  
HPI: 82 y.o.   male with PMHx of AS with history of rheumatic fever 1944, CAD with remote history of CAB (LIMA-LAD, SVG-OM, SVG-RPDA), PVD with stent to bilateral ARON, right SFA PTA and left subclavian stent, HTN, HLD, DM, HFpEF, GERD, BPH, Depression, that is referred to the [address] [place] [address] by Dr. Willy Erwin for interventional evaluation of  Aortic stenosis. He notes that he has been following with Dr. Teo Dsouza for his AS which he has known about for at least a year. He lives with his wife in the independent living apartments of [facility] and had been exercising regularly at the gym there until [place]. Since that time, however, his activity level has decreased significantly- \"Because there is nothing to do\". He denies orthopnea but states that he sleeps in a recliner for comfort purposes. Denies PND. His only real activity is grocery shopping. His son and daughter-in-law do the driving. He had just finished shopping on 10/18 and was pushing his cart out of the store when he had a syncopal episode. He lost consciousness and states he woke up on the ground. He notes that he hit his head but it has never caused him pain. He notes fatigue but, again, has not been very active. He notes shortness of breath with exertion only. Denies palpitations or chest pain. He has had several UTIs with treatment over the last few months and currently is being treated with Rocephin through 10/25. SOB/LARA: Yes Fatigue: Yes Palpitations: No:   
Chest pain: No:   
Chest tightness with activity: Yes Lightheadedness: Yes Syncope: Yes Falls: No:   
Orthopnea: Yes PND: No:   
LE edema: No:   
Medication changes in past 3 months: Yes Blood/Blackness/Tarriness of stools: No:   
Heart Failure Admission w/in past year:  No:   
Heart Failure Admission w/in past 2 weeks: No:  
 
NYHA Classification: IIA Class I (Mild): No limitation of physical activity. Ordinary physical activity does not cause undue fatigue, palpitation, or dyspnea. Class II (Mild): Slight limitation of physical activity. Comfortable at rest, but ordinary physical activity results in fatigue, palpitation, or dyspnea. Class III (Moderate): Marked limitation of physical activity. Comfortable at rest, but less than ordinary activity causes fatigue, palpitation, or dyspnea Class IV (Severe): Unable to carry out any physical activity without discomfort. Symptoms  of cardiac insufficiency at rest.  If any physical activity is undertaken, discomfort is increased. Angina Classification: 0 Class 0: No symptoms Class 1: Angina with strenuous exercise Class 2: Angina with moderate exercise Class 3: Angina with mild exertion Walking 1-2 level blocks at normal pace; Climbing 1 flight of stairs at normal pace Class 4: Angina at any level of physical exertion Past Medical History:  
Diagnosis Date  BPH (benign prostatic hyperplasia)  CAD (coronary artery disease)  Diabetes ([facility]  Hearing loss  Hypercholesterolemia  Hypertension  Murmur  Recurrent depression ([facility] 2019 Endocarditis: No:   
Pulmonary HTN:  No:  Greater than 2/3 systemic: No:  
Immunocompromised/Steroids: No:  
Liver Dz/Cirrhosis: No:    If yes, MELD score:  n/a Last Dental Visit:  Unknown   Any dental pain/concerns: None Past Surgical History:  
Procedure Laterality Date  CARDIAC SURG PROCEDURE UNLIST   by-pass  HX CHOLECYSTECTOMY Social History Tobacco Use  Smoking status: Former Smoker Types: Cigarettes Last attempt to quit: 1990 Years since quittin.1  Smokeless tobacco: Never Used Substance Use Topics  Alcohol use: No  
  
Family History Problem Relation Age of Onset  Cancer Mother  Cancer Father Prior to Admission medications Medication Sig Start Date End Date Taking?  Authorizing Provider  
doxazosin (CARDURA) 4 mg tablet TAKE 1 TABLET BY MOUTH  DAILY 10/20/20  Yes Joanna Dick NP  
hydroCHLOROthiazide (HYDRODIURIL) 12.5 mg tablet TAKE 1 TABLET BY MOUTH  DAILY 10/20/20  Yes Jyoti Dick NP  
 finasteride (PROSCAR) 5 mg tablet TAKE 1 TABLET BY MOUTH  DAILY 10/20/20  Yes Liberty Dick NP  
amLODIPine (NORVASC) 10 mg tablet TAKE 1 TABLET BY MOUTH  DAILY 10/20/20  Yes Joanna Dick NP  
atorvastatin (LIPITOR) 10 mg tablet TAKE 1 TABLET BY MOUTH  DAILY 10/20/20  Yes Arron Dick, HUMBERTO  
metFORMIN ER (GLUCOPHAGE XR) 500 mg tablet TAKE 1 TABLET BY MOUTH  TWICE DAILY WITH MEALS 10/20/20  Yes Joanna Dick NP  
glipiZIDE SR (GLUCOTROL XL) 5 mg CR tablet TAKE 1 TABLET BY MOUTH  DAILY 10/20/20  Yes Liberty Dick NP  
sertraline (ZOLOFT) 100 mg tablet TAKE 1 TABLET BY MOUTH  DAILY 9/2/20  Yes Leora Jackson NP  
traZODone (DESYREL) 50 mg tablet Take 2 tablets by mouth at night 8/21/20  Yes Leora Jackson NP  
temazepam (RESTORIL) 15 mg capsule TAKE 1 CAPSULE BY MOUTH AT BEDTIME AS NEEDED FOR SLEEP. 7/6/20  Yes Jorgito Hunt DO  
losartan (COZAAR) 25 mg tablet TAKE ONE-HALF TABLET BY  MOUTH DAILY 7/1/20  Yes Joanna Dick NP  
labetaloL (NORMODYNE) 100 mg tablet TAKE 1 TABLET BY MOUTH  DAILY 7/1/20  Yes Joanna Dick NP  
fluticasone propionate (FLONASE) 50 mcg/actuation nasal spray ADMINISTER 1 Spray IN Both Nostrils two (2) times a day. 6/8/20  Yes Arron Dick NP  
menthol-zinc oxide (CALMOSEPTINE) 0.44-20.6 % oint Apply to bilateral buttocks TID  Indications: skin irritation 6/4/20  Yes Earnest Hunt,   
OTHER Hearing evaluation for possible changes in hearing 1/20/20  Yes Jorgito Hunt DO  
tiZANidine (ZANAFLEX) 2 mg tablet Take 1 Tab by mouth three (3) times daily as needed for Pain. 7/11/19  Yes Tristan Carroll,   
glucose blood VI test strips (TRUE METRIX GLUCOSE TEST STRIP) strip Check BS BID  Dx: DM  E11.21 7/12/18  Yes Mirshahi, Jorgito, DO  
aspirin (ASPIRIN) 325 mg tablet Take 1 Tab by mouth daily. 3/29/18  Yes Earnest Hunt, DO  
cholecalciferol (VITAMIN D3) 1,000 unit cap Take 1 Cap by mouth daily.  3/29/18  Yes Tristan Carroll, DO  
 magnesium 250 mg tab Take 1 Tab by mouth daily. 3/29/18  Yes Jorgito Hunt, DO  
sodium chloride (OCEAN) 0.65 % nasal squeeze bottle 0.05 mL by Both Nostrils route as needed for Congestion. 3/29/18  Yes Jorgito Hunt, DO  
FERROUS FUMARATE/VIT BCOMP,C (SUPER B COMPLEX PO) Take  by mouth. Yes Provider, Historical  
   
Allergies Allergen Reactions  Procaine Other (comments) Pt stated he passed out from procaine and was told not to let anyone use it on him again Current Medications:  
Current Facility-Administered Medications Medication Dose Route Frequency Provider Last Rate Last Dose  potassium chloride SR (KLOR-CON 10) tablet 40 mEq  40 mEq Oral Q2H Miguelangel Floor, NP   40 mEq at 10/21/20 1739  
 labetaloL (NORMODYNE) tablet 100 mg  100 mg Oral DAILY Miguelangel Floor, NP   100 mg at 10/21/20 1739  
 [START ON 10/22/2020] losartan (COZAAR) tablet 12.5 mg  12.5 mg Oral DAILY Miguelangel Floor, NP      
 ascorbic acid (vitamin C) (VITAMIN C) tablet 1,000 mg  1,000 mg Oral TID Tiffanie Raya MD   1,000 mg at 10/21/20 1739  
 insulin glargine (LANTUS) injection 10 Units  10 Units SubCUTAneous QHS Miguelangel Floor, NP   10 Units at 10/20/20 2225  traZODone (DESYREL) tablet 50 mg  50 mg Oral QHS PRN Miguelangel Floor, NP   50 mg at 10/20/20 2225  temazepam (RESTORIL) capsule 15 mg  15 mg Oral QHS PRN Miguelangel Floor, NP   15 mg at 10/20/20 2225  cefTRIAXone (ROCEPHIN) 1 g in 0.9% sodium chloride (MBP/ADV) 50 mL  1 g IntraVENous Q24H Miguelangel Floor,  mL/hr at 10/20/20 2105 1 g at 10/20/20 2105  amLODIPine (NORVASC) tablet 5 mg  5 mg Oral DAILY Ede Peterson MD   5 mg at 10/21/20 7519  aspirin tablet 325 mg  325 mg Oral DAILY Ede Peterson MD   325 mg at 10/21/20 9881  
 atorvastatin (LIPITOR) tablet 10 mg  10 mg Oral QHS Ede Peterson MD   10 mg at 10/20/20 2111  
 doxazosin (CARDURA) tablet 4 mg  4 mg Oral QHS Ede Peterson MD   4 mg at 10/20/20 2105  finasteride (PROSCAR) tablet 5 mg  5 mg Oral DAILY Magnus Trivedi MD   5 mg at 10/21/20 3694  sertraline (ZOLOFT) tablet 100 mg  100 mg Oral QPM Magnus Trivedi MD   100 mg at 10/21/20 1739  
 sodium chloride (NS) flush 5-40 mL  5-40 mL IntraVENous Q8H Magnus Trivedi MD   10 mL at 10/21/20 1546  sodium chloride (NS) flush 5-40 mL  5-40 mL IntraVENous PRN Magnus Trivedi MD      
 acetaminophen (TYLENOL) tablet 650 mg  650 mg Oral Q4H PRN Magnus Trivedi MD      
 heparin (porcine) injection 5,000 Units  5,000 Units SubCUTAneous Vilma Ocasio MD   Stopped at 10/21/20 0600  
 glucose chewable tablet 16 g  4 Tab Oral PRN Magnus Trivedi MD      
 glucagon (GLUCAGEN) injection 1 mg  1 mg IntraMUSCular PRN Magnus Trivedi MD      
 dextrose 10% infusion 0-250 mL  0-250 mL IntraVENous PRN Magnus Trivedi MD      
 insulin lispro (HUMALOG) injection   SubCUTAneous AC&HS Magnus Trivedi MD   2 Units at 10/21/20 1739  
 melatonin tablet 3 mg  3 mg Oral QHS PRN Diya Mayrona, NP   3 mg at 10/18/20 2248 Vitals: Blood pressure 139/81, pulse 86, temperature 98.2 °F (36.8 °C), resp. rate 20, height 6' 3\" (1.905 m), weight 193 lb 5.5 oz (87.7 kg), SpO2 93 %. Allergies: is allergic to procaine. Review of Systems: Pertinent Positives per HPI [] Unable to obtain  ROS due to  []mental status change  []sedated   []intubated 
 [x]Total of 13 systems reviewed as follows: 
Constitutional: Negative fever, negative chills, +fatigue Eyes:   Negative for amauroses fugax, ENT:   Negative sore throat,oral absecess Endocrine Negative for thyroid replacement Rx; goiter; + DM Respiratory:  Negative chronic cough,sputum production Cards:   Negative for palpitations, lower extremity edema, varicosities, claudication GI:   Negative for dysphagia, bleeding, nausea, vomiting, diarrhea, and abdominal pain, +GERD Genitourinary: Negative for frequency, dysuria, +BPH  
 Integument:  Negative for rash and pruritus Hematologic:  Negative for easy bruising; bleeding dyscarsia Musculoskel: Negative for muscle weakness inhibiting ambulation Neurological:  Negative for stroke, TIA, dizziness, +syncope Behavl/Psych: Negative for feelings of anxiety, + depression Cardiovascular Testing:  
EKG: 10/19/20 Probable Multifocal atrial tachycardia with premature ventricular or  
aberrantly conducted complexes    
Voltage criteria for left ventricular hypertrophy Cannot rule out Septal infarct (cited on or before 18-OCT-2020) ST & T wave abnormality, consider lateral ischemia or digitalis effect Left axis deviation When compared with ECG of 18-OCT-2020 15:16,  
Serial changes of Septal infarct present at 100 bpm 
 
TTE:  10/18/20 Interpretation Summary · Image quality for this study was technically difficult. · LV: Estimated LVEF is 55 - 60%. Normal cavity size, wall thickness and systolic function (ejection fraction normal). Mild (grade 1) left ventricular diastolic dysfunction. · MV: Mitral valve thickening. Mitral valve leaflet calcification. Mitral annular calcification. Mitral valve mean gradient is 6 mmHg. Mild-to-moderate mitral valve stenosis is present. · LA: Mildly dilated left atrium. · AV: Aortic valve leaflet calcification present. Aortic valve mean gradient is 35 mmHg. Moderate aortic valve stenosis is present. · TV: Non-specific thickening of the tricuspid valve. Mild tricuspid valve regurgitation is present. · PA: Pulmonary arterial systolic pressure is 25 mmHg. · RV: Not well visualized. · Pericardium: Pericardial fat pad present. Echo Findings Left Ventricle  Normal cavity size, wall thickness and systolic function (ejection fraction normal). The estimated EF is 55 - 60%. There is mild (grade 1) left ventricular diastolic dysfunction. Atrial fibrillation observed. Left Atrium  Mildly dilated left atrium. Right Ventricle  Not well visualized. Normal cavity size and global systolic function. Right Atrium  Right atrium not well visualized. Normal cavity size. Aortic Valve  Aortic valve sclerosis. There is leaflet calcification. Aortic valve peak gradient is 60 mmHg. Aortic valve mean gradient is 35 mmHg. There is moderate aortic stenosis. Mitral Valve  Mitral valve thickening. Leaflet calcification. Mitral annular calcification. Mitral valve mean gradient is 6 mmHg. Mild-to-moderate stenosis present. Tricuspid Valve  No stenosis. Non-specific thickening. Mild regurgitation. Pulmonic Valve  Pulmonic valve not well visualized. Aorta  Normal aortic root. Pulmonary Artery  Pulmonary arterial systolic pressure (PASP) is 25 mmHg. Pulmonary hypertension not suggested by Doppler findings. IVC/Hepatic Veins  Inferior vena cava not well visualized. Pericardium  No evidence of pericardial effusion. Pericardial fat pad present. TTE procedure Findings TTE Procedure Information  Image quality: technically difficult. The view(s) performed were parasternal, apical, subcostal and suprasternal. Color flow Doppler was performed and pulse wave and/or continuous wave Doppler was performed. Physician declined use of left ventricular opacification agent to enhance imaging. Procedure Staff Technologist/Clinician: Caitlyn Seymour Supporting Staff: None Performing Physician/Midlevel: None 
  
Exam Completion Date/Time: 10/18/20  5:33 PM  
  
  
2D/M-Mode Measurements Dimensions Measurement  Value (Range) LVIDs  2.12 cm LVIDd  3.2 cm (4.2 - 5.9) IVSd  1.51 cm (0.6 - 1.0) LVPWd  1.45 cm (0.6 - 1.0) LV Mass AL  167.8 g (88 - 224) Left Atrium to Aortic Root Ratio  1.23   
1.23   
1.23   
1.23 Tapse  1.75 cm (1.5 - 2.0) M-Mode Measurement  Value (Range) AV Cusp  0.68 cm Aorta Measurements Aorta Measurement  Value (Range) Ao Root D  3.53 cm Aortic Valve Measurements Stenosis Aortic Valve Systolic Peak Velocity  665.41 cm/s   
298.85 cm/s AoV PG  62.97 mmHg 35.72 mmHg Aortic Valve Systolic Mean Gradient  06.18 mmHg AoV VTI  91.96 cm Aortic Valve Area by Continuity of VTI  0.79 cm2   
0.81 cm2   
0.81 cm2 Aortic Valve Area by Continuity of Peak Velocity  0.85 cm2   
0.9 cm2   
0.93 cm2   
1.13 cm2 1.05 cm2 1.15 cm2 LVOT   
LVOT Peak Velocity  105.09 cm/s LVOT Peak Gradient  4.42 mmHg LVOT VTI  22.59 cm   
  
LVOT d  2.02 cm Mitral Valve Measurements Stenosis MV Mean Gradient  5.88 mmHg MV Peak Gradient  18.91 mmHg Mitral Valve Pressure Half-time  0.11 s Mitral Valve Max Velocity  217.45 cm/s   
  
MVA (PHT)  1.92 cm2 Regurgitation MVA VTI  1.05 cm2 Tricuspid Valve Measurements Regurgitation Triscuspid Valve Regurgitation Peak Gradient  26.51 mmHg 19.83 mmHg 24.5 mmHg 23.77 mmHg 23.53 mmHg 20.27 mmHg 25 mmHg 26.51 mmHg   
  
TR Max Velocity  257.46 cm/s   
222.63 cm/s   
247.51 cm/s   
243.78 cm/s   
242.53 cm/s   
225.12 cm/s   
249.99 cm/s   
257.46 cm/s Diastolic Filling/Shunts Diastolic Filling Mitral Valve E Wave Deceleration Time  0.39 s MV E Imtiaz  97.8 cm/s   
  
MV A Imtiaz  172.52 cm/s   
  
MV E/A  0.57 Shunt LVOT SV  72.6 mL Cardiac catheterization: 10/21/20 Conclusion Findings 1. Normal left and right-sided filling pressures 2. No significant pulmonary hypertension with normal PVR and SVR 3. Severe native three-vessel coronary artery disease 4. Patent vein graft to LAD, vein graft to second marginal, vein graft to PDA. First and third marginal are occluded and are getting collateralized from second marginal branch.   Second diagonal is occluded and is collateralized from distal LAD 
 5.  Severe aortic stenosis with calculated aortic valve area of 0.83 cm² with mean gradient of 32 mmHg with systolic flow rate of 489 mL per second. Evaluation was performed with patient in normal rhythm rather than tachycardia. 6.  Severe left subclavian stenosis. Left radial access was used for cardiac catheterization. There is a previously placed stent in the left subclavian with 70 to 80% in-stent restenosis that had to be dilated with a 4 followed by 7 mm balloon to get the catheters across left subclavian. Left subclavian stent is protruding into the aortic arch by about 8 to 10 mm. Final angiogram demonstrated residual 30 to 40% stenosis. No flow was visible in the LIMA. 
  
Recommendations: 1. Discussion regarding definitive aortic valve therapies 2. No intervention needed for coronary assessment 
  
Contrast: 80 cc 
  
Access: Left radial artery. Infusion with recommended against left radial access given the left subclavian stent and stenosis. Patient does not have LIMA graft and or grafts are venous originating from the ascending aorta. Coronary Findings Diagnostic Dominance: Right Graft to 2nd Mrg The graft was visualized by angiography. Graft to RPDA The graft was visualized by angiography. Graft to Prox LAD The graft was visualized by angiography. Intervention No interventions have been documented. Right Heart Right Heart Cath  [place]Stephanie catheter inserted via right internal jugular vein. PA pressure = 28/18 mmHg. PA mean = 21 mmHg. PA Sat = 63 %. Mid RA pressure = 11/11 mmHg. Mid RA mean = 9 mmHg. RV pressure = 33/4 mmHg. RV mean = 10 mmHg. Wedge pressure = 12 mmHg. AO Sat = 93 %. TDCO = 4.1 L/min. Lamonte CO = 5.3 L/min. Lamonte CO 5.3 Lamonte CI 2.48 (125*BSA) Thermo CO 4.1 CI 1.8 Vitals - VO2 Value: 270.89 ml/min  Box Diff Value: 5.04  Hb: 12.3 % Blood Oximetry Information - AV Hb PV: 0 gm/dL Pressure Phase - Phase: Phase: Baseline AO Pressures - AO Systolic: 719 mmHg  AO Diastolic: 59 mmHg  AO Mean: 78 mmHg  Heart Rate: 77 bpm  
PA Pressures - PA Systolic: 28 mmHg  PA Diastolic: 18 mmHg  PA Mean: 21 mmHg RV Pressures - RV Systolic: 33 mmHg  RV End Diastolic: 10 mmHg  RV dP/dt: 624 mmHg/sec LV Pressures - LV Systolic: 767 mmHg  LV End Diastolic: 65 mmHg  LV dP/dt: 432 mmHg/sec RA Pressures - RA Wedge A Wave: 11 mmHg  RA Wedge V Wave: 11 mmHg  RA Wedge Mean: 9 mmHg PCW Pressures - PCW A Wave: 15 mmHg  PCW V Wave: 14 mmHg  PCW Mean: 12 mmHg Atrial Wedge Pressures - Source: Measured  RA Atrial Wedge Heart Rate: 109 bpm  
Cath Pressure - FA End Diastolic Cath Pressure: 77 mmHg  PCW Source: Measured  PCW Heart Rate: 85 bpm  
Cardiac Output Results - TDCO: 4.1 L/min  TDCI: 1.89 L/min/m2  Lamonte C.O.: 5.38 L/min  Lamonte C. I.: 2.48 L/min/m2  QSI Value: 2.48 L/min/m2 Resistance Results - PVR: 175.57 dsc-5  PVR-I: 380.48 dsc-5/m2  SVR: 1025.75 dsc-5  SVR-I: 2222.96 dsc-5/m2  PVR/SVR: 0.17  TPR: 409.65 dsc-5  TPR-I: 887.78 dsc-5/m2  TVR: 1159.54 dsc-5  TVR-I: 2512.91 dsc-5/m2  TPR/TVR: 0.35 Stroke Work Results - LVSW: 62.72 gm*m  LVSW-I: 28.94 gm*m/m2  RVSW: 6.83 gm*m  RVSW-I: 3.15 gm*m/m2 Valve Results - TPR/TVR: 0.35  PVR/SVR: 0.17 Carotid Dopplers: 10/19/20: Interpretation Summary · Evidence of mild, less than 50%, stenosis in the right and left internal carotid arteries. · Right vertebral is patent with antegrade flow. · Left vertebral shows resistant flow, suggesting possible distal occlusion. Cerebrovascular Findings Right Carotid There is no stenosis in the right CCA. There is mild stenosis in the right ICA (<50%). The right ICA has heterogeneous and calcific plaque. The right vertebral is antegrade. There is moderate to severe stenosis in the right ECA. Left Carotid There is no stenosis in the left CCA.  The left CCA has mild and heterogeneous plaque. There is mild stenosis in the left ICA (<50%). The left ICA has calcific plaque. Proximal ICA was not well visualized due to calcific plaque. There is moderate stenosis in the left ECA. The left vertebral is antegrade. Left vertebral shows possible resistant flow, suggesting possible distal occlusion. Carotid Measurements Right PSV  Right EDV  Left PSV  Left EDV   
CCA Prox  59.1 cm/s 14 cm/s   
  
 65.6 cm/s   
  
 16.1 cm/s CCA Dist  53.4 cm/s   
  
 10.6 cm/s   
  
 73.6 cm/s   
  
 11.4 cm/s ICA Prox  51.3 cm/s   
  
 13.7 cm/s   
  
 50.1 cm/s   
  
 5.1 cm/s ICA Mid  69.4 cm/s   
  
 20.2 cm/s   
  
 74.3 cm/s   
  
 11.7 cm/s ICA Dist  42.7 cm/s   
  
 9.4 cm/s   
  
 97.4 cm/s   
  
 16 cm/s ICA/CCA Ratio  1.3   
  
  1.32   
  
   
ECA  309.2 cm/s   
  
 0 cm/s   
  
 193.9 cm/s   
  
 0 cm/s Vertebral  61.1 cm/s   
  
 13.85 cm/s   
  
 25.2 cm/s   
  
 0 cm/s PFTs: FEV1/Predicted:  NONE Normal FEV1 > 1 Liter, Predicted = 100%, DLCO > 80% Mild Lung Disease (60-70% Predicted) Moderate Lung Disease (50-55% Predicted) Severe Lung Disease (< 50% Predicted or PO2 < 60 or pCO2>50 on RA) Gated C/A/P CTA: Will schedule for 10/23 Physical Exam: 
General: Well nourished well groomed male appearing stated age. Resting in bed following cath Neuro: A&OX3. MONZON. PERRL Head:Normocephalic. Atraumatic. Symmetrical 
Neck: Trachea Midline Resp: CTA B. No Adv BS/cough/sputum/tachypnea with seated conversation CV: S1S2 RRR. JANNY III/VI. No JVD/carotid bruits. Pink/warm/dry extremities. No edema GI:Benign ab. Soft. NT/ND. Active BS 
: Voids Integ: No obvious s/s of infection or breakdown Musculo/Skeletal: FROM in all major joints. fair muscle tone Clinic Evaluation:  
KCCQ-12: scanned into EMR 
 
5 meter gait: Needs completed-was on BR following cath Jemima Camp Survey:  Damian Goodpatsy Index ADL - Needs completed STS 2.9 Risk Score / Predicted 30 day mortality: - calculations scanned into EMR 
STS Adult Cardiac Surgery Database Version 2.9 RISK SCORES Procedure: Isolated AVR Risk of Mortality: 3.208% Renal Failure: 4.022% Permanent Stroke: 1.848% Prolonged Ventilation: 11.961% DSW Infection: 0.205% Reoperation: 4.587% Morbidity or Mortality: 16.637% Short Length of Stay: 22.902% Long Length of Stay: 10.605% Assessment/Plan: 1. Aortic stenosis, severe and symptomatic (paradoxical LFLG): Plan for CTA on Friday. Will await results from this for decision making. Dr. Peggy Kaplan to see patient in am.  
 
2. CAD with Hx of CABG: On ASA, Statin, BB, ARB 3. HTN: Controlled on current medications. 4. HLD: On Statin 5. HFpEF: BP management 6. Syncope: Likely related to AS. BICA of <50%. 7. PAD: On ASA, Statin. CTA pending to assess viability of transfemoral approach for TAVR 8. DM: On Metformin, Glipizide at home. Lantus/SSI while in the hospital. A1C of 7.1. 9. MAT/Atrial fibrillation: Noted to be in SR in the CCL today. Currently on BB and . No OAC currently 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 11. UTI: On Rocephin through 10/25 12. BPH: On Proscar 13. Depression: on Zoloft Further plan/care by Dr. Olivia Ware Saw patient, agree with above Risk of morbidity and mortality - high Medical decision making - high complexity 1. Aortic stenosis, severe and symptomatic (paradoxical LFLG): Plan for CTA on Friday. Will await results from this for decision making. Dr. Peggy Kaplan to see patient in am.  
 
2. CAD with Hx of CABG: On ASA, Statin, BB, ARB 3. HTN: Controlled on current medications. 4. HLD: On Statin 5. HFpEF: BP management 6. Syncope: Likely related to AS. BICA of <50%. 7. PAD: On ASA, Statin.  CTA pending to assess viability of transfemoral approach for TAVR 
 
 8. DM: On Metformin, Glipizide at home. Lantus/SSI while in the hospital. A1C of 7.1. 9. MAT/Atrial fibrillation: Noted to be in SR in the CCL today. Currently on BB and . No OAC currently 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 11. UTI: On Rocephin through 10/25 12. BPH: On Proscar 13. Depression: on Zoloft

## 2020-10-21 NOTE — PROGRESS NOTES
10/21/2020 -  
GLORY: 
- RUR: 12% 
- Disposition is TBD dependent on progression: potential discharge to own [place] apartment at [facility] with transport via family - Patient may need TAVR work-up - Patient to have CT angiogram and left heart cath today, 10/21 
- ABX continue - Patient would benefit from PT and OT consults CM notes that patient has been upgraded to INPATIENT status. CM met with patient to discuss IM Letter. Medicare pt has received, reviewed, and signed first IM letter informing them of their right to appeal the discharge. Signed copy has been placed on pt bedside chart. CRM: Juanita Cardona, MPH, [address] [place] [address]; Z: [phone]

## 2020-10-21 NOTE — PROGRESS NOTES
Hospitalist Progress Note Adolfo Morales NP Please call  and page for questions. Call physician on-call through the  7pm-7am 
 
Daily Progress Note: 10/21/2020 Primary care provider:Jorgito Hunt DO Date of admission: 10/18/2020  3:07 PM 
 
Admission Summary and Hospital Course:   
 
From H&P 10/18/20: \"Patient reports that he lives in [facility], today he was out shopping and as he was coming out he fainted. Per chart patient has history of severe aortic stenosis. Patient feels that he hit his head but denies any other complaints or problems. Patient came to the ER was requested to be observed under the hospitalist service. Patient reports that he is back to baseline he denies any complaints or problems currently. Patient reports that he has been taking his medications on a regular basis. Patient reports that he has not had any contact with known COVID-19 patients. The patient denies any Headache, blurry vision, sore throat, trouble swallowing, trouble with speech, chest pain, SOB, cough, fever, chills, N/V/D, abd pain, urinary symptoms, constipation, recent travels, sick contacts, focal or generalized neurological symptoms,  falls, injuries, rashes, contact with COVID-19 diagnosed patients, hematemesis, melena, hemoptysis, hematuria, rashes, denies starting any new medications and denies any other concerns or problems besides as mentioned above. \" Subjective:  
Pt seen today no acute distress. No further episodes of Afib RVR. Pt is s/p LHC this afternoon. Currently he denies HA, dizziness, visual changes, cough, SOB, CP, abd pain, n/v/d, dysuria or weakness. Assessment/Plan:  
 
 
Syncope: Unclear etiology hypovolemia vs severe aortic stenosis -Orthostatic VS neg 
-Head CT neg, labs stable 
-Echo 10/18 w/ EF 55-60%, no RWMA, mod aortic stenosis 
-carotid dopplers 10/19: less than 50% stenosis bilaterally;  Left vertebral shows resistant flow, suggesting distal occlusion - evaluated by vasc surg 10/20-no intervention needed 
-UA +UTI 
-CTA chest 10/20: no PE; bibasilar atelectasis, 15mm adrenal nodule, low density thyroid nodules, minimal aneurysmal dilatation of ascending aorta -?TAVR workup w/wo PPM 
-s/p [facility] today - no stenting Afib w/ RVR 
-new onset 10/19; confirmed on EKG 
-HR controlled w/o intervention 
-hold [address] for now for [facility] today (poss PM - SSS?) 
-Monitor on tele, continue heparin 
-Restart BB 
-Appreciate cards input UTI 
-+gram neg rods on culture; now also growing MRSA 
-cont ceftriaxone, add Vanc pending cultures Hypokalemia 
-3.1; replace with kdur 40meq q2h x2 doses 
-repeat labs in AM 
 
PAD 
-Carotid dopplers 10/19: less than 50% stenosis bilaterally; Left vertebral shows resistant flow, suggesting distal occlusion  
-evaluated by vasc surg 10/20-no intervention needed NARAYAN: POA creat/gfr 1.36/50 (baseline normal kidney function) 
-Resolved DMII: A1c 7.1 
-cont lantus 10u qhs 
-AC/HS accuchecks w/ SSI 
-trend BS HFpEF: Echo 10/18 w/ EF 55-60% -HCTZ (NARAYAN), BB and ARB held on admission d/t low/normal BP and NARAYAN 
-bnp elevated today; IVF stopped, 20mg lasix IV given x1 
-consider restarting above meds tomorrow if BP can handle 
-appreciate cards input CTA Chest Incidental Findings:  
Minimal aneurysmal dilatation of ascending aorta Indeterminate 15mm left adrenal nodule Subcentimeter low density thyroid nodules: TSH 1.29 
-f/u as OP 
 
HTN: cont cardura and norvasc; resume ARB/BB; continue to hold HCTZ 
HLD: cont home meds BPH: cont home meds DVTppx: SCDs, heparin 
Gippx: NA 
Code Status: Full code Diet: Diabetic/cardiac Activity: OOB to chair TID and PRN Discharge: 24-48h pending cardiology clearance Review of Systems:  
 
Full ROS complete with pertinent positives and negatives as per HPI, otherwise negative Objective:  
Physical Exam:  
 
Visit Vitals BP (!) 156/88 (BP 1 Location: Right arm, BP Patient Position: At rest) Pulse 69 Temp 98 °F (36.7 °C) Resp 18 Ht 6' 3\" (1.905 m) Wt 87.7 kg (193 lb 5.5 oz) SpO2 92% BMI 24.17 kg/m² O2 Flow Rate (L/min): 2 l/min O2 Device: Room air Temp (24hrs), Av.2 °F (36.8 °C), Min:97.7 °F (36.5 °C), Max:99.1 °F (37.3 °C) 
  10/21 0701 - 10/21 1900 In: -  
Out: 150 [Urine:150]   10/19 1901 - 10/21 0700 In: 1452.5 [P.O.:720; I.V.:732.5] Out: 4255 [Urine:2425] General:  Alert, cooperative, no distress, appears stated age, frail Lungs:   Good effort, CTA, no distress Heart:  Regular rate and rhythm, S1, S2 normal, harsh systolic murmur 4/6 c/w aortic stenosis Abdomen:   Soft, non-tender, non-distended. Bowel sounds normal.   
Extremities: Extremities normal, atraumatic, no cyanosis or edema. Skin: Skin color, texture, turgor normal. No rashes or lesions Neurologic: CNII-XII intact, A&Ox4, MONZON Data Review:  
   
Recent Days: 
Recent Labs 10/21/20 
0421 10/20/20 
6935 10/19/20 
3761 WBC 7.1 7.7 9.7 HGB 11.1* 10.1* 10.7* HCT 34.1* 31.0* 32.5*  
* 113* 127* Recent Labs 10/21/20 
0421 10/20/20 
6072  139  
K 3.1* 3.5  106 CO2 25 26 * 121* BUN 16 19 CREA 0.95 1.07  
CA 8.8 8.8 MG  --  2.0 No results for input(s): PH, PCO2, PO2, HCO3, FIO2 in the last 72 hours. 24 Hour Results: 
Recent Results (from the past 24 hour(s)) GLUCOSE, POC Collection Time: 10/20/20  4:24 PM  
Result Value Ref Range Glucose (POC) 140 (H) 65 - 100 mg/dL Performed by Philip Gardner (CON) GLUCOSE, POC Collection Time: 10/20/20  9:45 PM  
Result Value Ref Range Glucose (POC) 127 (H) 65 - 100 mg/dL Performed by Hyacinth White (CON) CBC WITH AUTOMATED DIFF Collection Time: 10/21/20  4:21 AM  
Result Value Ref Range WBC 7.1 4.1 - 11.1 K/uL  
 RBC 4.01 (L) 4.10 - 5.70 M/uL  
 HGB 11.1 (L) 12.1 - 17.0 g/dL HCT 34.1 (L) 36.6 - 50.3 % MCV 85.0 80.0 - 99.0 FL  
 MCH 27.7 26.0 - 34.0 PG  
 MCHC 32.6 30.0 - 36.5 g/dL  
 RDW 13.8 11.5 - 14.5 % PLATELET 979 (L) 960 - 400 K/uL MPV 12.7 8.9 - 12.9 FL  
 NRBC 0.0 0  WBC ABSOLUTE NRBC 0.00 0.00 - 0.01 K/uL NEUTROPHILS 63 32 - 75 % LYMPHOCYTES 21 12 - 49 % MONOCYTES 11 5 - 13 % EOSINOPHILS 4 0 - 7 % BASOPHILS 0 0 - 1 % IMMATURE GRANULOCYTES 0 0.0 - 0.5 % ABS. NEUTROPHILS 4.5 1.8 - 8.0 K/UL  
 ABS. LYMPHOCYTES 1.5 0.8 - 3.5 K/UL  
 ABS. MONOCYTES 0.8 0.0 - 1.0 K/UL  
 ABS. EOSINOPHILS 0.3 0.0 - 0.4 K/UL  
 ABS. BASOPHILS 0.0 0.0 - 0.1 K/UL  
 ABS. IMM. GRANS. 0.0 0.00 - 0.04 K/UL  
 DF AUTOMATED METABOLIC PANEL, BASIC Collection Time: 10/21/20  4:21 AM  
Result Value Ref Range Sodium 138 136 - 145 mmol/L Potassium 3.1 (L) 3.5 - 5.1 mmol/L Chloride 103 97 - 108 mmol/L  
 CO2 25 21 - 32 mmol/L Anion gap 10 5 - 15 mmol/L Glucose 103 (H) 65 - 100 mg/dL BUN 16 6 - 20 MG/DL Creatinine 0.95 0.70 - 1.30 MG/DL  
 BUN/Creatinine ratio 17 12 - 20 GFR est AA >60 >60 ml/min/1.73m2 GFR est non-AA >60 >60 ml/min/1.73m2 Calcium 8.8 8.5 - 10.1 MG/DL  
GLUCOSE, POC Collection Time: 10/21/20  7:59 AM  
Result Value Ref Range Glucose (POC) 118 (H) 65 - 100 mg/dL Performed by Darryn Vigilk GLUCOSE, POC Collection Time: 10/21/20 10:25 AM  
Result Value Ref Range Glucose (POC) 121 (H) 65 - 100 mg/dL Performed by Sirena Ascencio Problem List: 
Problem List as of 10/21/2020 Date Reviewed: 10/18/2020 Codes Class Noted - Resolved (HFpEF) heart failure with preserved ejection fraction (HCC) ICD-10-CM: I50.30 ICD-9-CM: 428.9  10/20/2020 - Present Syncope and collapse ICD-10-CM: R55 
ICD-9-CM: 780.2  10/18/2020 - Present Syncope ICD-10-CM: R55 
ICD-9-CM: 780.2  10/18/2020 - Present * (Principal) Aortic stenosis ICD-10-CM: I35.0 ICD-9-CM: 424.1  10/18/2020 - Present Overview Signed 10/21/2020  6:58 AM by Anel Rosales MD  
  Added automatically from request for surgery [id number] Decubitus ulcer of left buttock, stage 2 ([facility] ICD-10-CM: B21.278 ICD-9-CM: 707.05, 707.22  9/10/2020 - Present Type 2 diabetes with nephropathy ([facility] ICD-10-CM: E11.21 
ICD-9-CM: 250.40, 583.81  8/24/2020 - Present Pressure injury of buttock, stage 1 ICD-10-CM: L89.301 ICD-9-CM: 707.05, 707.21  6/4/2020 - Present Incontinence of feces ICD-10-CM: R15.9 ICD-9-CM: 787.60  6/4/2020 - Present On angiotensin receptor blockers (ARB) ICD-10-CM: Y32.603 ICD-9-CM: V58.69  1/23/2020 - Present Gastroesophageal reflux disease without esophagitis ICD-10-CM: K21.9 ICD-9-CM: 530.81  1/23/2020 - Present Cough ICD-10-CM: R05 ICD-9-CM: 786.2  1/23/2020 - Present Recurrent depression ([facility] ICD-10-CM: F33.9 ICD-9-CM: 296.30  8/22/2019 - Present Age-related cataract of both eyes ICD-10-CM: H25.9 ICD-9-CM: 366.10  4/25/2019 - Present Gait instability ICD-10-CM: R26.81 
ICD-9-CM: 781.2  6/21/2018 - Present Type 2 diabetes mellitus without complication, without long-term current use of insulin (HCC) ICD-10-CM: E11.9 ICD-9-CM: 250.00  9/21/2017 - Present Essential hypertension ICD-10-CM: I10 
ICD-9-CM: 401.9  9/21/2017 - Present Hypercholesterolemia ICD-10-CM: E78.00 ICD-9-CM: 272.0  9/21/2017 - Present Coronary artery disease involving native coronary artery of native heart without angina pectoris ICD-10-CM: I25.10 ICD-9-CM: 414.01  9/21/2017 - Present S/P CABG (coronary artery bypass graft) ICD-10-CM: Z95.1 ICD-9-CM: V45.81  9/21/2017 - Present Severe aortic stenosis ICD-10-CM: I35.0 ICD-9-CM: 424.1  9/21/2017 - Present Aortic systolic murmur on examination ICD-10-CM: I35.8 ICD-9-CM: 424.1  9/21/2017 - Present Benign prostatic hyperplasia with lower urinary tract symptoms ICD-10-CM: N40.1 ICD-9-CM: 600.01  9/21/2017 - Present Insomnia ICD-10-CM: G47.00 ICD-9-CM: 780.52  9/21/2017 - Present Former smoker ICD-10-CM: L10.376 ICD-9-CM: V15.82  9/21/2017 - Present ACP (advance care planning) ICD-10-CM: Z71.89 ICD-9-CM: V65.49  9/21/2017 - Present RESOLVED: Type 2 diabetes with nephropathy ([facility] ICD-10-CM: E11.21 
ICD-9-CM: 250.40, 583.81  4/10/2018 - 4/25/2019 RESOLVED: Type 2 diabetes mellitus with nephropathy ([facility] ICD-10-CM: E11.21 
ICD-9-CM: 250.40, 583.81  12/14/2017 - 4/5/2018 RESOLVED: Chronic ulcer of buttock ([facility] ICD-10-CM: R29.448 ICD-9-CM: 707.8  11/16/2017 - 4/25/2019 Medications reviewed Current Facility-Administered Medications Medication Dose Route Frequency  potassium chloride SR (KLOR-CON 10) tablet 40 mEq  40 mEq Oral Q2H  
 labetaloL (NORMODYNE) tablet 100 mg  100 mg Oral DAILY  [START ON 10/22/2020] losartan (COZAAR) tablet 12.5 mg  12.5 mg Oral DAILY  ascorbic acid (vitamin C) (VITAMIN C) tablet 1,000 mg  1,000 mg Oral TID  insulin glargine (LANTUS) injection 10 Units  10 Units SubCUTAneous QHS  traZODone (DESYREL) tablet 50 mg  50 mg Oral QHS PRN  
 temazepam (RESTORIL) capsule 15 mg  15 mg Oral QHS PRN  
 cefTRIAXone (ROCEPHIN) 1 g in 0.9% sodium chloride (MBP/ADV) 50 mL  1 g IntraVENous Q24H  
 amLODIPine (NORVASC) tablet 5 mg  5 mg Oral DAILY  aspirin tablet 325 mg  325 mg Oral DAILY  atorvastatin (LIPITOR) tablet 10 mg  10 mg Oral QHS  doxazosin (CARDURA) tablet 4 mg  4 mg Oral QHS  finasteride (PROSCAR) tablet 5 mg  5 mg Oral DAILY  sertraline (ZOLOFT) tablet 100 mg  100 mg Oral QPM  
 sodium chloride (NS) flush 5-40 mL  5-40 mL IntraVENous Q8H  
 sodium chloride (NS) flush 5-40 mL  5-40 mL IntraVENous PRN  
 acetaminophen (TYLENOL) tablet 650 mg  650 mg Oral Q4H PRN  
  heparin (porcine) injection 5,000 Units  5,000 Units SubCUTAneous Q8H  
 glucose chewable tablet 16 g  4 Tab Oral PRN  
 glucagon (GLUCAGEN) injection 1 mg  1 mg IntraMUSCular PRN  
 dextrose 10% infusion 0-250 mL  0-250 mL IntraVENous PRN  
 insulin lispro (HUMALOG) injection   SubCUTAneous AC&HS  
 melatonin tablet 3 mg  3 mg Oral QHS PRN Care Plan discussed with: Patient/family, nurse Joanie Hook NP Hospitalist 
Providers can reach me on [employer]

## 2020-10-22 NOTE — PROGRESS NOTES
Bedside shift change report given to [address] (oncoming nurse) by SALLY Kirby (offgoing nurse). Report included the following information SBAR, Kardex, Procedure Summary, MAR and Recent Results.

## 2020-10-22 NOTE — PROGRESS NOTES
Pharmacist Note - Vancomycin Dosing Consult provided for this 82 y.o. male for indication of UTI growing enterobacter on culture, now also MRSA Antibiotic regimen(s): ceftriaxone + vancomycin Patient on vancomycin PTA? NO Recent Labs 10/22/20 
5528 10/21/20 
0421 10/20/20 
0838 WBC 9.1 7.1 7.7 CREA 1.00 0.95 1.07  
BUN 20 16 19 Frequency of BMP: daily Height: 190.5 cm Weight: 88.4 kg Est CrCl: 65-70 ml/min Temp (24hrs), Av.2 °F (36.8 °C), Min:98 °F (36.7 °C), Max:98.4 °F (36.9 °C) Cultures: 
  10/19 UCx: >100 K enterobacter aerogenes + MRSA Goal trough = 10 - 15 mcg/mL Therapy will be initiated with a loading dose of 2000 mg IV x 1 to be followed by a maintenance dose of 1500 mg IV every 18 hours for 7 day course of therapy. Pharmacy to follow patient daily and order levels / make dose adjustments as appropriate.

## 2020-10-22 NOTE — PROGRESS NOTES
Problem: Falls - Risk of 
Goal: *Absence of Falls Description: Document Lisette Mcgovern Fall Risk and appropriate interventions in the flowsheet. Outcome: Progressing Towards Goal 
Note: Fall Risk Interventions: 
Mobility Interventions: Communicate number of staff needed for ambulation/transfer, Patient to call before getting OOB, Utilize walker, cane, or other assistive device Medication Interventions: Patient to call before getting OOB Elimination Interventions: Patient to call for help with toileting needs History of Falls Interventions: Investigate reason for fall Problem: General Medical Care Plan Goal: *Vital signs within specified parameters Outcome: Progressing Towards Goal 
Note: Monitor vital signs every four hours.

## 2020-10-22 NOTE — PROGRESS NOTES
[facility] FLOOR Progress Note Admit Date: 10/18/2020 POD: 1 Day Post-Op Procedure:  Procedure(s): 
Left And Right Heart Cath / Coronary Angiography W Grafts Angiography Upper Ext Left Angioplasty Peripheral Artery Subjective:  
 
Mild fatigue and weakness; UTI; creatinine elevated; possible TAVR CT scan tomorrow; likely TAVR this admission Objective:  
 
Blood pressure 129/66, pulse 77, temperature 98.4 °F (36.9 °C), resp. rate 18, height 6' 3\" (1.905 m), weight 194 lb 14.2 oz (88.4 kg), SpO2 92 %. Temp (24hrs), Av.2 °F (36.8 °C), Min:98 °F (36.7 °C), Max:98.4 °F (36.9 °C) Oxygen: CXR Medications reviewed Admission Weight: Last Weight Weight: 205 lb 7.5 oz (93.2 kg) Weight: 194 lb 14.2 oz (88.4 kg) Intake / Output / Drain: 
Current Shift: No intake/output data recorded. Last 24 hrs.: 10/20 1901 - 10/22 0700 In: 0 [P.O.:600; I.V.:50] Out: 3732 [[id number]] EXAM: 
Visit Vitals /66 (BP 1 Location: Right arm, BP Patient Position: At rest) Pulse 77 Temp 98.4 °F (36.9 °C) Resp 18 Ht 6' 3\" (1.905 m) Wt 194 lb 14.2 oz (88.4 kg) SpO2 92% BMI 24.36 kg/m² Labs: 
Recent Results (from the past 24 hour(s)) GLUCOSE, POC Collection Time: 10/21/20 10:25 AM  
Result Value Ref Range Glucose (POC) 121 (H) 65 - 100 mg/dL Performed by Marlo Martinez   
POC ACTIVATED CLOTTING TIME Collection Time: 10/21/20 11:30 AM  
Result Value Ref Range Activated Clotting Time (POC) 208 (H) 79 - 138 SECS  
GLUCOSE, POC Collection Time: 10/21/20  5:28 PM  
Result Value Ref Range Glucose (POC) 182 (H) 65 - 100 mg/dL Performed by Judy Jay GLUCOSE, POC Collection Time: 10/21/20  9:40 PM  
Result Value Ref Range Glucose (POC) 186 (H) 65 - 100 mg/dL Performed by Judy Jay CBC WITH AUTOMATED DIFF Collection Time: 10/22/20  4:37 AM  
Result Value Ref Range WBC 9.1 4.1 - 11.1 K/uL  
 RBC 4.15 4.10 - 5.70 M/uL Continue to monitor   HGB 11.4 (L) 12.1 - 17.0 g/dL HCT 35.5 (L) 36.6 - 50.3 % MCV 85.5 80.0 - 99.0 FL  
 MCH 27.5 26.0 - 34.0 PG  
 MCHC 32.1 30.0 - 36.5 g/dL  
 RDW 14.0 11.5 - 14.5 % PLATELET 275 (L) 675 - 400 K/uL NRBC 0.0 0  WBC ABSOLUTE NRBC 0.00 0.00 - 0.01 K/uL NEUTROPHILS 72 32 - 75 % LYMPHOCYTES 15 12 - 49 % MONOCYTES 10 5 - 13 % EOSINOPHILS 2 0 - 7 % BASOPHILS 0 0 - 1 % IMMATURE GRANULOCYTES 0 0.0 - 0.5 % ABS. NEUTROPHILS 6.6 1.8 - 8.0 K/UL  
 ABS. LYMPHOCYTES 1.4 0.8 - 3.5 K/UL  
 ABS. MONOCYTES 0.9 0.0 - 1.0 K/UL  
 ABS. EOSINOPHILS 0.2 0.0 - 0.4 K/UL  
 ABS. BASOPHILS 0.0 0.0 - 0.1 K/UL  
 ABS. IMM. GRANS. 0.0 0.00 - 0.04 K/UL  
 DF AUTOMATED METABOLIC PANEL, BASIC Collection Time: 10/22/20  4:37 AM  
Result Value Ref Range Sodium 140 136 - 145 mmol/L Potassium 4.1 3.5 - 5.1 mmol/L Chloride 106 97 - 108 mmol/L  
 CO2 26 21 - 32 mmol/L Anion gap 8 5 - 15 mmol/L Glucose 117 (H) 65 - 100 mg/dL BUN 20 6 - 20 MG/DL Creatinine 1.00 0.70 - 1.30 MG/DL  
 BUN/Creatinine ratio 20 12 - 20 GFR est AA >60 >60 ml/min/1.73m2 GFR est non-AA >60 >60 ml/min/1.73m2 Calcium 8.9 8.5 - 10.1 MG/DL MAGNESIUM Collection Time: 10/22/20  4:37 AM  
Result Value Ref Range Magnesium 1.9 1.6 - 2.4 mg/dL GLUCOSE, POC Collection Time: 10/22/20  7:41 AM  
Result Value Ref Range Glucose (POC) 127 (H) 65 - 100 mg/dL Performed by Jessica Parks A/P 
 
1. Aortic stenosis, severe and symptomatic (paradoxical LFLG): Plan for CTA on Friday 2. CAD with Hx of CABG: On ASA, Statin, BB, ARB 
  
3. HTN: Controlled on current medications.  
  
4. HLD: On Statin 
  
5. HFpEF: BP management 
  
6. Syncope: Likely related to AS. BICA of <50%.  
  
7. PAD: On ASA, Statin. CTA pending to assess viability of transfemoral approach for TAVR 
  
8. DM: On Metformin, Glipizide at home.  Lantus/SSI while in the hospital. A1C of 7.1.  
  
 9. MAT/Atrial fibrillation: Noted to be in SR in the CCL today. Currently on BB and . No OAC currently 
  
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 
  
11. UTI: On Rocephin through 10/25 
  
12. BPH: On Proscar 
  
13. Depression: on Zoloft Risk of morbidity and mortality - high Medical decision making - high complexity Signed By: Daniel Pittman MD

## 2020-10-22 NOTE — PROGRESS NOTES
Problem: Diabetes Self-Management Goal: *Incorporating physical activity into lifestyle Description: State effect of exercise on blood glucose levels.  
Outcome: Progressing Towards Goal

## 2020-10-22 NOTE — PROGRESS NOTES
Hospitalist Progress Note Akira Ochoa NP Please call  and page for questions. Call physician on-call through the  7pm-7am 
 
Daily Progress Note: 10/22/2020 Primary care provider:Jorgito Hunt DO Date of admission: 10/18/2020  3:07 PM 
 
Admission Summary and Hospital Course:   
 
From H&P 10/18/20: \"Patient reports that he lives in [facility], today he was out shopping and as he was coming out he fainted. Per chart patient has history of severe aortic stenosis. Patient feels that he hit his head but denies any other complaints or problems. Patient came to the ER was requested to be observed under the hospitalist service. Patient reports that he is back to baseline he denies any complaints or problems currently. Patient reports that he has been taking his medications on a regular basis. Patient reports that he has not had any contact with known COVID-19 patients. The patient denies any Headache, blurry vision, sore throat, trouble swallowing, trouble with speech, chest pain, SOB, cough, fever, chills, N/V/D, abd pain, urinary symptoms, constipation, recent travels, sick contacts, focal or generalized neurological symptoms,  falls, injuries, rashes, contact with COVID-19 diagnosed patients, hematemesis, melena, hemoptysis, hematuria, rashes, denies starting any new medications and denies any other concerns or problems besides as mentioned above. \" Subjective:  
Pt seen today no acute distress. Pt is s/p [facility] yesterday that confirmed severe AS. Currently he denies HA, dizziness, visual changes, cough, SOB, CP, abd pain, n/v/d, dysuria or weakness. Assessment/Plan:  
 
 
Syncope: Unclear etiology hypovolemia vs severe aortic stenosis -Orthostatic VS neg 
-Head CT neg, labs stable 
-Echo 10/18 w/ EF 55-60%, no RWMA, mod aortic stenosis 
-carotid dopplers 10/19: less than 50% stenosis bilaterally;  Left vertebral shows resistant flow, suggesting distal occlusion - evaluated by vasc surg 10/20-no intervention needed 
-UA +UTI 
-CTA chest 10/20: no PE; bibasilar atelectasis, 15mm adrenal nodule, low density thyroid nodules, minimal aneurysmal dilatation of ascending aorta -?TAVR workup w/wo PPM 
-s/p [facility] 10/21- no stenting; severe AS 
-seen by CT surg: repeat CTA tomorrow (need break from contrast) Afib w/ RVR 
-new onset 10/19; confirmed on EKG 
-HR controlled w/o intervention 
-hold [address] for now for [facility] today (poss PM - SSS?) 
-Monitor on tele, continue heparin 
-Cont BB 
-Appreciate cards input UTI 
-+gram neg rods on culture; now also growing MRSA 
-cont ceftriaxone and Vanc pending final culture results Hypokalemia 
-Resolved PAD 
-Carotid dopplers 10/19: less than 50% stenosis bilaterally; Left vertebral shows resistant flow, suggesting distal occlusion  
-evaluated by vasc surg 10/20-no intervention needed NARAYAN: POA creat/gfr 1.36/50 (baseline normal kidney function) 
-Resolved DMII: A1c 7.1 
-cont lantus 10u qhs 
-AC/HS accuchecks w/ SSI 
-trend BS HFpEF: Echo 10/18 w/ EF 55-60% -Stable 
-Cont BB, ARB, HCTZ 
-appreciate cards input CTA Chest Incidental Findings:  
Minimal aneurysmal dilatation of ascending aorta Indeterminate 15mm left adrenal nodule Subcentimeter low density thyroid nodules: TSH 1.29 
-f/u as OP 
 
HTN: cont cardura and norvasc, ARB/BB, restart HCTZ 
HLD: cont home meds BPH: cont home meds DVTppx: SCDs, heparin 
Gippx: NA 
Code Status: Full code Diet: Diabetic/cardiac Activity: OOB to chair TID and PRN Discharge: 24-48h pending cardiology/CT surg clearance Review of Systems:  
 
Full ROS complete with pertinent positives and negatives as per HPI, otherwise negative Objective:  
Physical Exam:  
 
Visit Vitals BP (!) 118/51 (BP 1 Location: Right arm, BP Patient Position: At rest) Pulse 61 Temp 98.6 °F (37 °C) Resp 18 Ht 6' 3\" (1.905 m) Wt 88.4 kg (194 lb 14.2 oz) SpO2 94% BMI 24.36 kg/m² O2 Flow Rate (L/min): 2 l/min O2 Device: Room air Temp (24hrs), Av.5 °F (36.9 °C), Min:98.1 °F (36.7 °C), Max:99 °F (37.2 °C) 
  10/22 0701 - 10/22 1900 In: 240 [P.O.:240] Out: -    10/20 1901 - 10/22 0700 In: 0 [P.O.:600; I.V.:50] Out: 9979 [[id number] General:  Alert, cooperative, no distress, appears stated age, frail Lungs:   Good effort, CTA, no distress Heart:  Regular rate and rhythm, S1, S2 normal, harsh systolic murmur 4/6 c/w aortic stenosis Abdomen:   Soft, non-tender, non-distended. Bowel sounds normal.   
Extremities: Extremities normal, atraumatic, no cyanosis or edema. Skin: Skin color, texture, turgor normal. No rashes or lesions Neurologic: CNII-XII intact, A&Ox4, MONZON Data Review:  
   
Recent Days: 
Recent Labs 10/22/20 
5577 10/21/20 
0421 10/20/20 
1158 WBC 9.1 7.1 7.7 HGB 11.4* 11.1* 10.1* HCT 35.5* 34.1* 31.0*  
* 123* 113* Recent Labs 10/22/20 
3272 10/21/20 
0421 10/20/20 
7232  138 139  
K 4.1 3.1* 3.5  103 106 CO2 26 25 26 * 103* 121* BUN 20 16 19 CREA 1.00 0.95 1.07  
CA 8.9 8.8 8.8 MG 1.9  --  2.0 No results for input(s): PH, PCO2, PO2, HCO3, FIO2 in the last 72 hours. 24 Hour Results: 
Recent Results (from the past 24 hour(s)) GLUCOSE, POC Collection Time: 10/21/20  5:28 PM  
Result Value Ref Range Glucose (POC) 182 (H) 65 - 100 mg/dL Performed by Mary Ellen Tee GLUCOSE, POC Collection Time: 10/21/20  9:40 PM  
Result Value Ref Range Glucose (POC) 186 (H) 65 - 100 mg/dL Performed by Mary Ellen Tee CBC WITH AUTOMATED DIFF Collection Time: 10/22/20  4:37 AM  
Result Value Ref Range WBC 9.1 4.1 - 11.1 K/uL  
 RBC 4.15 4.10 - 5.70 M/uL  
 HGB 11.4 (L) 12.1 - 17.0 g/dL HCT 35.5 (L) 36.6 - 50.3 % MCV 85.5 80.0 - 99.0 FL  
 MCH 27.5 26.0 - 34.0 PG  
 MCHC 32.1 30.0 - 36.5 g/dL  
 RDW 14.0 11.5 - 14.5 % PLATELET 953 (L) 873 - 400 K/uL NRBC 0.0 0  WBC ABSOLUTE NRBC 0.00 0.00 - 0.01 K/uL NEUTROPHILS 72 32 - 75 % LYMPHOCYTES 15 12 - 49 % MONOCYTES 10 5 - 13 % EOSINOPHILS 2 0 - 7 % BASOPHILS 0 0 - 1 % IMMATURE GRANULOCYTES 0 0.0 - 0.5 % ABS. NEUTROPHILS 6.6 1.8 - 8.0 K/UL  
 ABS. LYMPHOCYTES 1.4 0.8 - 3.5 K/UL  
 ABS. MONOCYTES 0.9 0.0 - 1.0 K/UL  
 ABS. EOSINOPHILS 0.2 0.0 - 0.4 K/UL  
 ABS. BASOPHILS 0.0 0.0 - 0.1 K/UL  
 ABS. IMM. GRANS. 0.0 0.00 - 0.04 K/UL  
 DF AUTOMATED METABOLIC PANEL, BASIC Collection Time: 10/22/20  4:37 AM  
Result Value Ref Range Sodium 140 136 - 145 mmol/L Potassium 4.1 3.5 - 5.1 mmol/L Chloride 106 97 - 108 mmol/L  
 CO2 26 21 - 32 mmol/L Anion gap 8 5 - 15 mmol/L Glucose 117 (H) 65 - 100 mg/dL BUN 20 6 - 20 MG/DL Creatinine 1.00 0.70 - 1.30 MG/DL  
 BUN/Creatinine ratio 20 12 - 20 GFR est AA >60 >60 ml/min/1.73m2 GFR est non-AA >60 >60 ml/min/1.73m2 Calcium 8.9 8.5 - 10.1 MG/DL MAGNESIUM Collection Time: 10/22/20  4:37 AM  
Result Value Ref Range Magnesium 1.9 1.6 - 2.4 mg/dL GLUCOSE, POC Collection Time: 10/22/20  7:41 AM  
Result Value Ref Range Glucose (POC) 127 (H) 65 - 100 mg/dL Performed by Judith WOODARD   
GLUCOSE, POC Collection Time: 10/22/20 11:09 AM  
Result Value Ref Range Glucose (POC) 153 (H) 65 - 100 mg/dL Performed by Leonard King Problem List: 
Problem List as of 10/22/2020 Date Reviewed: 10/18/2020 Codes Class Noted - Resolved (HFpEF) heart failure with preserved ejection fraction (HCC) ICD-10-CM: I50.30 ICD-9-CM: 428.9  10/20/2020 - Present Syncope and collapse ICD-10-CM: R55 
ICD-9-CM: 780.2  10/18/2020 - Present Syncope ICD-10-CM: R55 
ICD-9-CM: 780.2  10/18/2020 - Present * (Principal) Aortic stenosis ICD-10-CM: I35.0 ICD-9-CM: 424.1  10/18/2020 - Present Overview Signed 10/21/2020  6:58 AM by Tiffanie Raya MD  
  Added automatically from request for surgery [id number] Decubitus ulcer of left buttock, stage 2 ([facility] ICD-10-CM: E15.794 ICD-9-CM: 707.05, 707.22  9/10/2020 - Present Type 2 diabetes with nephropathy ([facility] ICD-10-CM: E11.21 
ICD-9-CM: 250.40, 583.81  8/24/2020 - Present Pressure injury of buttock, stage 1 ICD-10-CM: L89.301 ICD-9-CM: 707.05, 707.21  6/4/2020 - Present Incontinence of feces ICD-10-CM: R15.9 ICD-9-CM: 787.60  6/4/2020 - Present On angiotensin receptor blockers (ARB) ICD-10-CM: N72.288 ICD-9-CM: V58.69  1/23/2020 - Present Gastroesophageal reflux disease without esophagitis ICD-10-CM: K21.9 ICD-9-CM: 530.81  1/23/2020 - Present Cough ICD-10-CM: R05 ICD-9-CM: 786.2  1/23/2020 - Present Recurrent depression ([facility] ICD-10-CM: F33.9 ICD-9-CM: 296.30  8/22/2019 - Present Age-related cataract of both eyes ICD-10-CM: H25.9 ICD-9-CM: 366.10  4/25/2019 - Present Gait instability ICD-10-CM: R26.81 
ICD-9-CM: 781.2  6/21/2018 - Present Type 2 diabetes mellitus without complication, without long-term current use of insulin (HCC) ICD-10-CM: E11.9 ICD-9-CM: 250.00  9/21/2017 - Present Essential hypertension ICD-10-CM: I10 
ICD-9-CM: 401.9  9/21/2017 - Present Hypercholesterolemia ICD-10-CM: E78.00 ICD-9-CM: 272.0  9/21/2017 - Present Coronary artery disease involving native coronary artery of native heart without angina pectoris ICD-10-CM: I25.10 ICD-9-CM: 414.01  9/21/2017 - Present S/P CABG (coronary artery bypass graft) ICD-10-CM: Z95.1 ICD-9-CM: V45.81  9/21/2017 - Present Severe aortic stenosis ICD-10-CM: I35.0 ICD-9-CM: 424.1  9/21/2017 - Present Aortic systolic murmur on examination ICD-10-CM: I35.8 ICD-9-CM: 424.1  9/21/2017 - Present Benign prostatic hyperplasia with lower urinary tract symptoms ICD-10-CM: N40.1 ICD-9-CM: 600.01  9/21/2017 - Present Insomnia ICD-10-CM: G47.00 ICD-9-CM: 780.52  9/21/2017 - Present Former smoker ICD-10-CM: L07.816 ICD-9-CM: V15.82  9/21/2017 - Present ACP (advance care planning) ICD-10-CM: Z71.89 ICD-9-CM: V65.49  9/21/2017 - Present RESOLVED: Type 2 diabetes with nephropathy ([facility] ICD-10-CM: E11.21 
ICD-9-CM: 250.40, 583.81  4/10/2018 - 4/25/2019 RESOLVED: Type 2 diabetes mellitus with nephropathy ([facility].) ICD-10-CM: E11.21 
ICD-9-CM: 250.40, 583.81  12/14/2017 - 4/5/2018 RESOLVED: Chronic ulcer of buttock ([facility].) ICD-10-CM: V63.003 ICD-9-CM: 707.8  11/16/2017 - 4/25/2019 Medications reviewed Current Facility-Administered Medications Medication Dose Route Frequency  vancomycin - pharmacy to dose    Other Rx Dosing/Monitoring  [START ON 10/23/2020] vancomycin (VANCOCIN) 1500 mg in  ml infusion  1,500 mg IntraVENous Q18H  
 labetaloL (NORMODYNE) tablet 100 mg  100 mg Oral DAILY  losartan (COZAAR) tablet 12.5 mg  12.5 mg Oral DAILY  ascorbic acid (vitamin C) (VITAMIN C) tablet 1,000 mg  1,000 mg Oral TID  insulin glargine (LANTUS) injection 10 Units  10 Units SubCUTAneous QHS  traZODone (DESYREL) tablet 50 mg  50 mg Oral QHS PRN  
 temazepam (RESTORIL) capsule 15 mg  15 mg Oral QHS PRN  
 cefTRIAXone (ROCEPHIN) 1 g in 0.9% sodium chloride (MBP/ADV) 50 mL  1 g IntraVENous Q24H  
 amLODIPine (NORVASC) tablet 5 mg  5 mg Oral DAILY  aspirin tablet 325 mg  325 mg Oral DAILY  atorvastatin (LIPITOR) tablet 10 mg  10 mg Oral QHS  doxazosin (CARDURA) tablet 4 mg  4 mg Oral QHS  finasteride (PROSCAR) tablet 5 mg  5 mg Oral DAILY  sertraline (ZOLOFT) tablet 100 mg  100 mg Oral QPM  
 sodium chloride (NS) flush 5-40 mL  5-40 mL IntraVENous Q8H  
 sodium chloride (NS) flush 5-40 mL  5-40 mL IntraVENous PRN  
  acetaminophen (TYLENOL) tablet 650 mg  650 mg Oral Q4H PRN  
 heparin (porcine) injection 5,000 Units  5,000 Units SubCUTAneous Q8H  
 glucose chewable tablet 16 g  4 Tab Oral PRN  
 glucagon (GLUCAGEN) injection 1 mg  1 mg IntraMUSCular PRN  
 dextrose 10% infusion 0-250 mL  0-250 mL IntraVENous PRN  
 insulin lispro (HUMALOG) injection   SubCUTAneous AC&HS  
 melatonin tablet 3 mg  3 mg Oral QHS PRN Care Plan discussed with: Patient/family, nurse Zach Orellana, NP Hospitalist 
Providers can reach me on [employer]

## 2020-10-22 NOTE — PROGRESS NOTES
TRANSFER - IN REPORT: 
 
Verbal report received from [facility]) on Arley Tesfaye  being received from cath lab(unit) for routine progression of care Report consisted of patients Situation, Background, Assessment and  
Recommendations(SBAR). Information from the following report(s) SBAR, Kardex, Procedure Summary, MAR and Recent Results was reviewed with the receiving nurse. Opportunity for questions and clarification was provided. Assessment completed upon patients arrival to unit and care assumed. Left TR band site intact -no hematoma

## 2020-10-23 NOTE — PROGRESS NOTES
Problem: Syncope Goal: *Absence of injury Outcome: Progressing Towards Goal 
 
Patient is alert and oriented X 4, hard of hearing. No chest pain, shortness of breath. Bedside and Verbal shift change report given to Verito Goins (oncoming nurse) by Russell Izquierdo (offgoing nurse). Report included the following information SBAR, Kardex, Procedure Summary, Intake/Output, MAR, Accordion, Recent Results, Med Rec Status and Cardiac Rhythm NSR.

## 2020-10-23 NOTE — PROGRESS NOTES
ASSESSMENT  
  
HPI: Pranay Gage, a 82y.o. year-old who presents for evaluation of CAD s/p CABG, DM, HTN, Dyslipidemia.  
  
Last seen for low BP, etc. A bit low on admission this time too. Hard to say what the factors are in his syncope. He does have As in the moderate-severe range, and if this is the true cause of syncope the prognosis is poor without intervention. Bp was a bit low on admission, and during echo, which may also affect how severe the As appears, a little dehydrated with a little anemia. Now, overnight with some afib and so also SSS is a concern. I discussed all this with him today and with his son. Possibly a candidate for TAVR  +/- pacer(?leadless) post proc First step is to proceed with [facility] to assess for CAD prior to procedural planning. Have asked Dr. Eric Donovan to consult on this complex case in the morning and potentially proceed with cardiac cath tomorrow. Will need a risk assessment given his PAD as well. No OAC in anticipation of procedure right now, and with syncope, will need to reassess prior to dc. Cont tele for now. Son and patient are in agreement with this plan.  
  
No chest pain or dyspnea pta, walking and doing fine. Energy level is good. Walks at [place] indoors without limitations. Glucose running 120 efe. BP is good. EKG NSR NST Today feeling ok, agreeable with plan for completion of pad assessment and proceeding with TAVR if vascular access is sufficient.   
 
  
**Records received from [facility] on 11/8/17 Hx of moderate AS, hx of rheumatic fever 1944 
5/20/2008 3 vessel CABG (LIMA to LAD, SVG to OM and PDA) S/p right SFA PTA and left subclavian stent Echo 6/1/17 - LVEF 55-60%, no WMA, grade 1 dd, severe cLVH, MAC extending into the posterior leaflet and mild MR, mild TR, trace PI Lexiscan MPI 5/16/17 - medium sized region of mild lateral ischemia, LVEF 54%, no WMA GAMAL 9/30/08 - placement of 7 x 37 mm EB3 stent in 75% stenotic right common iliac artery 
left common iliac artery stent is patent, right common femoral artery with 80% heavily calcified eccentric disease involving ostium of the SFA and the profunda, right SFA has 25% luminal disease in the mid areas, right anterior tibia has 99% focal proximal lesion, right peroneal artery is widely patent, right posterior tibial artery as 90% concentric lesion distally, left common femoral artery with 85% eccentric, heavily calcified lesion involving the ostium of the profunda, as well as the SFA, left SFA is 100% occluded in the mid area, left anterior tibial artery is 100% occluded in the proximal to distal area Venous duplex 3/24/16 - no DVT, bilateral greater saphenous veins stripped  
  
Assessment/Plan: 1. DM- managed on oral agents 2. HTN- low on admit, holding meds 3. Dyslipidemia- on statin, at goal 
4. CAD- CABG c. 2008(LIMA-LAD, SVG-OM, SVG-PDA), s/p PCI(?) 
-cont aspirin,statin 
- mildly abnormal stress test 5/17 [facility] in absence of sx  has been medically managed 5. PAD with right leg stent- cont aspirin, statin  
-s/p SFA PTA and LSC stent 6. HFpEF- stable compensated today, mod cLVH(regression) 
-cont arb as bp allows 7. Mod AS with rheumatic heart disease- follow-up echo today with mod-severe AoV at 35mean gradient. Mild MS on echo 8. Venous insufficiency- s/p vein stripping bilat GSV 9. Syncope- certainly could be cardiac versus other- workup as above 
  
Echo 12/19 with Ef 60%, mod AS MAC mild cLVH, lv dd 1/19 Echo 60-65% gri DD, RVE, LAE, mild MR, mod AS, mild TR, mild PI Lexiscan 5/17 med lteral ischemia, EF 54% Echo 6/17 EF 60% gr1dd, severe cLVH, MAC mild MR, mild TR mild PI 
GAMAL 9/30/08 - placement of 7 x 37 mm EB3 stent in 75% stenotic right common iliac artery, left common iliac artery stent is patent, right common femoral artery with 80% heavily calcified eccentric disease involving ostium of the SFA and the profunda, right SFA has 25% luminal disease in the mid areas, right anterior tibia has 99% focal proximal lesion, right peroneal artery is widely patent, right posterior tibial artery as 90% concentric lesion distally, left common femoral artery with 85% eccentric, heavily calcified lesion involving the ostium of the profunda, as well as the SFA, left SFA is 100% occluded in the mid area, left anterior tibial artery is 100% occluded in the proximal to distal area Soc no tob, quit 40 years ago, no etoh Fhx no early cad We discussed the expected course, resolution and complications of the diagnosis(es) in detail. Medication risks, benefits, costs, interactions, and alternatives were discussed as indicated. I advised him to contact the office if his condition worsens, changes or fails to improve as anticipated. He expressed understanding with the diagnosis(es) and plan HISTORY OF PRESENTING ILLNESS Ibeth Garcia is a 82 y.o. male ACTIVE PROBLEM LIST Patient Active Problem List  
 Diagnosis Date Noted  Aortic stenosis 10/18/2020 Priority: 1 - One  (HFpEF) heart failure with preserved ejection fraction ([address] 10/20/2020  Syncope and collapse 10/18/2020  Syncope 10/18/2020  Decubitus ulcer of left buttock, stage 2 ([address] 09/10/2020  Type 2 diabetes with nephropathy ([address] 08/24/2020  Pressure injury of buttock, stage 1 06/04/2020  Incontinence of feces 06/04/2020  On angiotensin receptor blockers (ARB) 01/23/2020  Gastroesophageal reflux disease without esophagitis 01/23/2020  Cough 01/23/2020  Recurrent depression ([address] 08/22/2019  Age-related cataract of both eyes 04/25/2019  Gait instability 06/21/2018  Type 2 diabetes mellitus without complication, without long-term current use of insulin ([address] 09/21/2017  Essential hypertension 09/21/2017  Hypercholesterolemia 09/21/2017  Coronary artery disease involving native coronary artery of native heart without angina pectoris 2017  S/P CABG (coronary artery bypass graft) 2017  Severe aortic stenosis 2017  Aortic systolic murmur on examination 2017  Benign prostatic hyperplasia with lower urinary tract symptoms 2017  Insomnia 2017  Former smoker 2017  ACP (advance care planning) 2017 PAST MEDICAL HISTORY Past Medical History:  
Diagnosis Date  BPH (benign prostatic hyperplasia)  CAD (coronary artery disease)  Diabetes ([address]  Hearing loss  Hypercholesterolemia  Hypertension  Murmur  Recurrent depression ([address] 2019 PAST SURGICAL HISTORY Past Surgical History:  
Procedure Laterality Date  CARDIAC SURG PROCEDURE UNLIST   by-pass  HX CHOLECYSTECTOMY ALLERGIES Allergies Allergen Reactions  Procaine Other (comments) Pt stated he passed out from procaine and was told not to let anyone use it on him again FAMILY HISTORY Family History Problem Relation Age of Onset  Cancer Mother  Cancer Father   
 negative for cardiac disease SOCIAL HISTORY Social History Socioeconomic History  Marital status:  Spouse name: Not on file  Number of children: Not on file  Years of education: Not on file  Highest education level: Not on file Tobacco Use  Smoking status: Former Smoker Types: Cigarettes Last attempt to quit: 1990 Years since quittin.1  Smokeless tobacco: Never Used Substance and Sexual Activity  Alcohol use: No  
 Drug use: No  
 
 
 
MEDICATIONS Current Facility-Administered Medications Medication Dose Route Frequency  sodium chloride 0.9 % bolus infusion 100 mL  100 mL IntraVENous RAD ONCE  
 iopamidoL (ISOVUE-370) 76 % injection 100 mL  100 mL IntraVENous RAD ONCE  
  sodium chloride (NS) flush 10 mL  10 mL IntraVENous RAD ONCE  
 vancomycin - pharmacy to dose    Other Rx Dosing/Monitoring  vancomycin (VANCOCIN) 1500 mg in  ml infusion  1,500 mg IntraVENous Q18H  
 hydroCHLOROthiazide (HYDRODIURIL) tablet 12.5 mg  12.5 mg Oral DAILY  labetaloL (NORMODYNE) tablet 100 mg  100 mg Oral DAILY  losartan (COZAAR) tablet 12.5 mg  12.5 mg Oral DAILY  ascorbic acid (vitamin C) (VITAMIN C) tablet 1,000 mg  1,000 mg Oral TID  insulin glargine (LANTUS) injection 10 Units  10 Units SubCUTAneous QHS  traZODone (DESYREL) tablet 50 mg  50 mg Oral QHS PRN  
 temazepam (RESTORIL) capsule 15 mg  15 mg Oral QHS PRN  
 cefTRIAXone (ROCEPHIN) 1 g in 0.9% sodium chloride (MBP/ADV) 50 mL  1 g IntraVENous Q24H  
 amLODIPine (NORVASC) tablet 5 mg  5 mg Oral DAILY  aspirin tablet 325 mg  325 mg Oral DAILY  atorvastatin (LIPITOR) tablet 10 mg  10 mg Oral QHS  doxazosin (CARDURA) tablet 4 mg  4 mg Oral QHS  finasteride (PROSCAR) tablet 5 mg  5 mg Oral DAILY  sertraline (ZOLOFT) tablet 100 mg  100 mg Oral QPM  
 sodium chloride (NS) flush 5-40 mL  5-40 mL IntraVENous Q8H  
 sodium chloride (NS) flush 5-40 mL  5-40 mL IntraVENous PRN  
 acetaminophen (TYLENOL) tablet 650 mg  650 mg Oral Q4H PRN  
 heparin (porcine) injection 5,000 Units  5,000 Units SubCUTAneous Q8H  
 glucose chewable tablet 16 g  4 Tab Oral PRN  
 glucagon (GLUCAGEN) injection 1 mg  1 mg IntraMUSCular PRN  
 dextrose 10% infusion 0-250 mL  0-250 mL IntraVENous PRN  
 insulin lispro (HUMALOG) injection   SubCUTAneous AC&HS  
 melatonin tablet 3 mg  3 mg Oral QHS PRN I have reviewed the nurses notes, vitals, problem list, allergy list, medical history, family, social history and medications. REVIEW OF SYMPTOMS Neg except as per HPI PHYSICAL EXAMINATION Visit Vitals BP (!) 153/66 (BP 1 Location: Right arm, BP Patient Position: At rest) Pulse 68  
 Temp 97.9 °F (36.6 °C) Resp 16 Ht 6' 3\" (1.905 m) Wt 197 lb 8.5 oz (89.6 kg) SpO2 94% BMI 24.69 kg/m² 3/6 JANNY 
 lungs cta 
abd NDT ND Neuro non focal, memory impairment Awake alert, conversant DIAGNOSTIC DATA No specialty comments available. LABORATORY DATA Lab Results Component Value Date/Time WBC 6.7 10/23/2020 07:16 AM  
 HGB 11.0 (L) 10/23/2020 07:16 AM  
 HCT 34.2 (L) 10/23/2020 07:16 AM  
 PLATELET 397 (L) 84/07/9487 07:16 AM  
 MCV 87.0 10/23/2020 07:16 AM  
  
Lab Results Component Value Date/Time Sodium 138 10/23/2020 07:16 AM  
 Potassium 3.6 10/23/2020 07:16 AM  
 Chloride 108 10/23/2020 07:16 AM  
 CO2 24 10/23/2020 07:16 AM  
 Anion gap 6 10/23/2020 07:16 AM  
 Glucose 109 (H) 10/23/2020 07:16 AM  
 BUN 17 10/23/2020 07:16 AM  
 Creatinine 0.87 10/23/2020 07:16 AM  
 BUN/Creatinine ratio 20 10/23/2020 07:16 AM  
 GFR est AA >60 10/23/2020 07:16 AM  
 GFR est non-AA >60 10/23/2020 07:16 AM  
 Calcium 8.6 10/23/2020 07:16 AM  
 Bilirubin, total 0.5 10/18/2020 03:27 PM  
 Alk. phosphatase 94 10/18/2020 03:27 PM  
 Protein, total 7.4 10/18/2020 03:27 PM  
 Albumin 3.6 10/18/2020 03:27 PM  
 Globulin 3.8 10/18/2020 03:27 PM  
 A-G Ratio 0.9 (L) 10/18/2020 03:27 PM  
 ALT (SGPT) 20 10/18/2020 03:27 PM  
  
 
 
 
 FOLLOW-UP Patient was made aware and verbalized understanding that an appointment will be scheduled for them for a virtual visit and/or office visit within the above time frame. Patient understanding his/her responsibility to call and change time/date if he/she so chooses. Thank you, Hamp Dance, DO for allowing me to participate in the care of Ibeth Garcia. Please do not hesitate to contact me for further questions/concerns. Greater than12 minutes was spent in direct video patient care, planning and chart review. This visit was conducted using [employer] telemedicine services.   
 
 
Ramiro Whyte MD 
 
 [address] [facility] [address], [address] [place], [facility] [address]       
[phone] / [phone] Fax [facility] [address], [address] [facility], [address] 
[phone] / [phone] Fax

## 2020-10-23 NOTE — PROGRESS NOTES
[facility] FLOOR Progress Note Admit Date: 10/18/2020 TAVR Workup Subjective:  
Pt seen with Dr. Warren Pak. For CTA C/A/P today; dyspnea, fatigue, and weakness Objective:  
 
Visit Vitals BP (!) 153/66 (BP 1 Location: Right arm, BP Patient Position: At rest) Pulse 68 Temp 97.9 °F (36.6 °C) Resp 16 Ht 6' 3\" (1.905 m) Wt 197 lb 8.5 oz (89.6 kg) SpO2 94% BMI 24.69 kg/m² Temp (24hrs), Av.2 °F (36.8 °C), Min:97.2 °F (36.2 °C), Max:99 °F (37.2 °C) Last 24hr Input/Output: 
 
Intake/Output Summary (Last 24 hours) at 10/23/2020 1044 Last data filed at 10/22/2020 2243 Gross per 24 hour Intake 360 ml Output 300 ml Net 60 ml  
  
 
EKG/Rhythm:  
Probable Multifocal atrial tachycardia (100 bpm) with premature ventricular or  
aberrantly conducted complexes    
Voltage criteria for left ventricular hypertrophy Oxygen: RA 
 
CXR:  
CXR Results  (Last 48 hours) None Admission Weight: Last Weight Weight: 205 lb 7.5 oz (93.2 kg) Weight: 197 lb 8.5 oz (89.6 kg) EXAM: 
General:  Pleasant, cooperative Lungs:   Clear to auscultation bilaterally. Heart:  Regular rate and rhythm, S1, S2 normal, no murmur, click, rub or gallop. Abdomen:   Soft, non-tender. Bowel sounds normal. No masses,  No organomegaly. Extremities:  No edema. PPP Neurologic:  Gross motor and sensory apparatus intact. Lab Data Reviewed:  
Recent Labs 10/23/20 
4328 10/23/20 
4771 WBC  --  6.7 HGB  --  11.0*  
HCT  --  34.2*  
PLT  --  123* NA  --  138 K  --  3.6 BUN  --  17  
CREA  --  0.87 GLU  --  109* GLUCPOC 123*  --   
 
 
 
Assessment:  
 
Principal Problem: Aortic stenosis (10/18/2020) Overview: Added automatically from request for surgery [id number] Active Problems: 
  Syncope and collapse (10/18/2020) Syncope (10/18/2020) (HFpEF) heart failure with preserved ejection fraction ([address] (10/20/2020) Plan/Recommendations/Medical Decision Makin. Aortic stenosis, severe and symptomatic (paradoxical LFLG): Plan for CTA today. Will await results from this for decision making.  
  
2. CAD with Hx of CABG: On ASA, Statin, BB, ARB 
  
3. HTN: Controlled on current medications. Care for primary team. 
  
4. HLD: On Statin 
  
5. HFpEF: BP management 
  
6. Syncope: Likely related to AS. BICA of <50%.  
  
7. PAD: On ASA, Statin. CTA pending to assess viability of transfemoral approach for TAVR 
  
8. DM: On Metformin, Glipizide at home. Lantus/SSI while in the hospital. A1C of 7.1.  
  
9. MAT/Atrial fibrillation: Currently in NSR 60s. Currently on BB and . No OAC currently 
  
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 
  
11. UTI: On vancomycin and ceftriaxone. MRSA and enterobacter on culture. ID following.  
  
12. BPH: On Proscar 
  
13. Depression: on Zoloft Signed By: SIMON Au Saw patient, agree with above Risk of morbidity and mortality - high Medical decision making - high complexity 1. Aortic stenosis, severe and symptomatic (paradoxical LFLG): Plan for CTA today. Will await results from this for decision making.  
  
2. CAD with Hx of CABG: On ASA, Statin, BB, ARB 
  
3. HTN: Controlled on current medications. Care for primary team. 
  
4. HLD: On Statin 
  
5. HFpEF: BP management 
  
6. Syncope: Likely related to AS. BICA of <50%.  
  
7. PAD: On ASA, Statin. CTA pending to assess viability of transfemoral approach for TAVR 
  
8. DM: On Metformin, Glipizide at home. Lantus/SSI while in the hospital. A1C of 7.1.  
  
9. MAT/Atrial fibrillation: Currently in NSR 60s. Currently on BB and . No OAC currently 
  
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 
  
11. UTI: On vancomycin and ceftriaxone. MRSA and enterobacter on culture. ID following.  
  
12. BPH: On Proscar 
  
13. Depression: on Zoloft

## 2020-10-23 NOTE — PROGRESS NOTES
ASSESSMENT  
  
HPI: Anahi Bhatt, a 82y.o. year-old who presents for evaluation of CAD s/p CABG, DM, HTN, Dyslipidemia.  
  
Last seen for low BP, etc. A bit low on admission this time too. Hard to say what the factors are in his syncope. He does have As in the moderate-severe range, and if this is the true cause of syncope the prognosis is poor without intervention. Bp was a bit low on admission, and during echo, which may also affect how severe the As appears, a little dehydrated with a little anemia. Now, overnight with some afib and so also SSS is a concern. I discussed all this with him today and with his son. Possibly a candidate for TAVR  +/- pacer(?leadless) post proc First step is to proceed with [facility] to assess for CAD prior to procedural planning. Have asked Dr. Dinorah Nevarez to consult on this complex case in the morning and potentially proceed with cardiac cath tomorrow. Will need a risk assessment given his PAD as well. No OAC in anticipation of procedure right now, and with syncope, will need to reassess prior to dc. Cont tele for now. Son and patient are in agreement with this plan.  
  
No chest pain or dyspnea pta, walking and doing fine. Energy level is good. Walks at [employer] indoors without limitations. Glucose running 120 efe. BP is good. EKG NSR NST Today feeling ok, waiting on cath.  
 
  
**Records received from [facility] on 11/8/17 Hx of moderate AS, hx of rheumatic fever 1944 
5/20/2008 3 vessel CABG (LIMA to LAD, SVG to OM and PDA) S/p right SFA PTA and left subclavian stent Echo 6/1/17 - LVEF 55-60%, no WMA, grade 1 dd, severe cLVH, MAC extending into the posterior leaflet and mild MR, mild TR, trace PI Lexiscan MPI 5/16/17 - medium sized region of mild lateral ischemia, LVEF 54%, no WMA GAMAL 9/30/08 - placement of 7 x 37 mm EB3 stent in 75% stenotic right common iliac artery left common iliac artery stent is patent, right common femoral artery with 80% heavily calcified eccentric disease involving ostium of the SFA and the profunda, right SFA has 25% luminal disease in the mid areas, right anterior tibia has 99% focal proximal lesion, right peroneal artery is widely patent, right posterior tibial artery as 90% concentric lesion distally, left common femoral artery with 85% eccentric, heavily calcified lesion involving the ostium of the profunda, as well as the SFA, left SFA is 100% occluded in the mid area, left anterior tibial artery is 100% occluded in the proximal to distal area Venous duplex 3/24/16 - no DVT, bilateral greater saphenous veins stripped  
  
Assessment/Plan: 1. DM- managed on oral agents 2. HTN- low on admit, holding meds 3. Dyslipidemia- on statin, at goal 
4. CAD- CABG c. 2008(LIMA-LAD, SVG-OM, SVG-PDA), s/p PCI(?) 
-cont aspirin,statin 
- mildly abnormal stress test 5/17 [facility] in absence of sx  has been medically managed 5. PAD with right leg stent- cont aspirin, statin  
-s/p SFA PTA and LSC stent 6. HFpEF- stable compensated today, mod cLVH(regression) 
-cont arb as bp allows 7. Mod AS with rheumatic heart disease- follow-up echo today with mod-severe AoV at 35mean gradient. Mild MS on echo 8. Venous insufficiency- s/p vein stripping bilat GSV 9. Syncope- certainly could be cardiac versus other- workup as above 
  
Echo 12/19 with Ef 60%, mod AS MAC mild cLVH, lv dd 1/19 Echo 60-65% gri DD, RVE, LAE, mild MR, mod AS, mild TR, mild PI Lexiscan 5/17 med lteral ischemia, EF 54% Echo 6/17 EF 60% gr1dd, severe cLVH, MAC mild MR, mild TR mild PI 
GAMAL 9/30/08 - placement of 7 x 37 mm EB3 stent in 75% stenotic right common iliac artery, left common iliac artery stent is patent, right common femoral artery with 80% heavily calcified eccentric disease involving ostium of the SFA and the profunda, right SFA has 25% luminal disease in the mid areas, right anterior tibia has 99% focal proximal lesion, right peroneal artery is widely patent, right posterior tibial artery as 90% concentric lesion distally, left common femoral artery with 85% eccentric, heavily calcified lesion involving the ostium of the profunda, as well as the SFA, left SFA is 100% occluded in the mid area, left anterior tibial artery is 100% occluded in the proximal to distal area Soc no tob, quit 40 years ago, no etoh Fhx no early cad We discussed the expected course, resolution and complications of the diagnosis(es) in detail. Medication risks, benefits, costs, interactions, and alternatives were discussed as indicated. I advised him to contact the office if his condition worsens, changes or fails to improve as anticipated. He expressed understanding with the diagnosis(es) and plan HISTORY OF PRESENTING ILLNESS Medhat Horne is a 82 y.o. male ACTIVE PROBLEM LIST Patient Active Problem List  
 Diagnosis Date Noted  Aortic stenosis 10/18/2020 Priority: 1 - One  (HFpEF) heart failure with preserved ejection fraction ([address] 10/20/2020  Syncope and collapse 10/18/2020  Syncope 10/18/2020  Decubitus ulcer of left buttock, stage 2 ([address] 09/10/2020  Type 2 diabetes with nephropathy ([address] 08/24/2020  Pressure injury of buttock, stage 1 06/04/2020  Incontinence of feces 06/04/2020  On angiotensin receptor blockers (ARB) 01/23/2020  Gastroesophageal reflux disease without esophagitis 01/23/2020  Cough 01/23/2020  Recurrent depression ([address] 08/22/2019  Age-related cataract of both eyes 04/25/2019  Gait instability 06/21/2018  Type 2 diabetes mellitus without complication, without long-term current use of insulin ([address] 09/21/2017  Essential hypertension 09/21/2017  Hypercholesterolemia 09/21/2017  Coronary artery disease involving native coronary artery of native heart without angina pectoris 2017  S/P CABG (coronary artery bypass graft) 2017  Severe aortic stenosis 2017  Aortic systolic murmur on examination 2017  Benign prostatic hyperplasia with lower urinary tract symptoms 2017  Insomnia 2017  Former smoker 2017  ACP (advance care planning) 2017 PAST MEDICAL HISTORY Past Medical History:  
Diagnosis Date  BPH (benign prostatic hyperplasia)  CAD (coronary artery disease)  Diabetes ([facility]  Hearing loss  Hypercholesterolemia  Hypertension  Murmur  Recurrent depression ([facility] 2019 PAST SURGICAL HISTORY Past Surgical History:  
Procedure Laterality Date  CARDIAC SURG PROCEDURE UNLIST   by-pass  HX CHOLECYSTECTOMY ALLERGIES Allergies Allergen Reactions  Procaine Other (comments) Pt stated he passed out from procaine and was told not to let anyone use it on him again FAMILY HISTORY Family History Problem Relation Age of Onset  Cancer Mother  Cancer Father   
 negative for cardiac disease SOCIAL HISTORY Social History Socioeconomic History  Marital status:  Spouse name: Not on file  Number of children: Not on file  Years of education: Not on file  Highest education level: Not on file Tobacco Use  Smoking status: Former Smoker Types: Cigarettes Last attempt to quit: 1990 Years since quittin.1  Smokeless tobacco: Never Used Substance and Sexual Activity  Alcohol use: No  
 Drug use: No  
 
 
 
MEDICATIONS Current Facility-Administered Medications Medication Dose Route Frequency  sodium chloride 0.9 % bolus infusion 100 mL  100 mL IntraVENous RAD ONCE  
 iopamidoL (ISOVUE-370) 76 % injection 100 mL  100 mL IntraVENous RAD ONCE  
 sodium chloride (NS) flush 10 mL  10 mL IntraVENous RAD ONCE  
  vancomycin - pharmacy to dose    Other Rx Dosing/Monitoring  vancomycin (VANCOCIN) 1500 mg in  ml infusion  1,500 mg IntraVENous Q18H  
 hydroCHLOROthiazide (HYDRODIURIL) tablet 12.5 mg  12.5 mg Oral DAILY  labetaloL (NORMODYNE) tablet 100 mg  100 mg Oral DAILY  losartan (COZAAR) tablet 12.5 mg  12.5 mg Oral DAILY  ascorbic acid (vitamin C) (VITAMIN C) tablet 1,000 mg  1,000 mg Oral TID  insulin glargine (LANTUS) injection 10 Units  10 Units SubCUTAneous QHS  traZODone (DESYREL) tablet 50 mg  50 mg Oral QHS PRN  
 temazepam (RESTORIL) capsule 15 mg  15 mg Oral QHS PRN  
 cefTRIAXone (ROCEPHIN) 1 g in 0.9% sodium chloride (MBP/ADV) 50 mL  1 g IntraVENous Q24H  
 amLODIPine (NORVASC) tablet 5 mg  5 mg Oral DAILY  aspirin tablet 325 mg  325 mg Oral DAILY  atorvastatin (LIPITOR) tablet 10 mg  10 mg Oral QHS  doxazosin (CARDURA) tablet 4 mg  4 mg Oral QHS  finasteride (PROSCAR) tablet 5 mg  5 mg Oral DAILY  sertraline (ZOLOFT) tablet 100 mg  100 mg Oral QPM  
 sodium chloride (NS) flush 5-40 mL  5-40 mL IntraVENous Q8H  
 sodium chloride (NS) flush 5-40 mL  5-40 mL IntraVENous PRN  
 acetaminophen (TYLENOL) tablet 650 mg  650 mg Oral Q4H PRN  
 heparin (porcine) injection 5,000 Units  5,000 Units SubCUTAneous Q8H  
 glucose chewable tablet 16 g  4 Tab Oral PRN  
 glucagon (GLUCAGEN) injection 1 mg  1 mg IntraMUSCular PRN  
 dextrose 10% infusion 0-250 mL  0-250 mL IntraVENous PRN  
 insulin lispro (HUMALOG) injection   SubCUTAneous AC&HS  
 melatonin tablet 3 mg  3 mg Oral QHS PRN I have reviewed the nurses notes, vitals, problem list, allergy list, medical history, family, social history and medications. REVIEW OF SYMPTOMS Neg except as per HPI PHYSICAL EXAMINATION Visit Vitals BP (!) 153/66 (BP 1 Location: Right arm, BP Patient Position: At rest) Pulse 68 Temp 97.9 °F (36.6 °C) Resp 16 Ht 6' 3\" (1.905 m) Wt 197 lb 8.5 oz (89.6 kg) SpO2 94% BMI 24.69 kg/m² 3/6 JANNY 
 lungs cta 
abd NDT ND Neuro non focal, memory impairment Awake alert, conversant DIAGNOSTIC DATA No specialty comments available. LABORATORY DATA Lab Results Component Value Date/Time WBC 6.7 10/23/2020 07:16 AM  
 HGB 11.0 (L) 10/23/2020 07:16 AM  
 HCT 34.2 (L) 10/23/2020 07:16 AM  
 PLATELET 689 (L) 26/47/5618 07:16 AM  
 MCV 87.0 10/23/2020 07:16 AM  
  
Lab Results Component Value Date/Time Sodium 138 10/23/2020 07:16 AM  
 Potassium 3.6 10/23/2020 07:16 AM  
 Chloride 108 10/23/2020 07:16 AM  
 CO2 24 10/23/2020 07:16 AM  
 Anion gap 6 10/23/2020 07:16 AM  
 Glucose 109 (H) 10/23/2020 07:16 AM  
 BUN 17 10/23/2020 07:16 AM  
 Creatinine 0.87 10/23/2020 07:16 AM  
 BUN/Creatinine ratio 20 10/23/2020 07:16 AM  
 GFR est AA >60 10/23/2020 07:16 AM  
 GFR est non-AA >60 10/23/2020 07:16 AM  
 Calcium 8.6 10/23/2020 07:16 AM  
 Bilirubin, total 0.5 10/18/2020 03:27 PM  
 Alk. phosphatase 94 10/18/2020 03:27 PM  
 Protein, total 7.4 10/18/2020 03:27 PM  
 Albumin 3.6 10/18/2020 03:27 PM  
 Globulin 3.8 10/18/2020 03:27 PM  
 A-G Ratio 0.9 (L) 10/18/2020 03:27 PM  
 ALT (SGPT) 20 10/18/2020 03:27 PM  
  
 
 
 
 FOLLOW-UP Patient was made aware and verbalized understanding that an appointment will be scheduled for them for a virtual visit and/or office visit within the above time frame. Patient understanding his/her responsibility to call and change time/date if he/she so chooses. Thank you, Thad Fitzpatrick DO for allowing me to participate in the care of Moises Mercedes. Please do not hesitate to contact me for further questions/concerns. Greater than12 minutes was spent in direct video patient care, planning and chart review. This visit was conducted using [employer] telemedicine services. Irasema Taylor MD 
 
9 [facility] [address] [facility], [address]       
[phone] / [phone] Fax [place] [address], [address] Yaidra Singh 
[phone] / [phone] Fax

## 2020-10-23 NOTE — PROGRESS NOTES
ASSESSMENT  
  
HPI: Santino Murphy, a 82y.o. year-old who presents for evaluation of CAD s/p CABG, DM, HTN, Dyslipidemia.  
  
Last seen for low BP, etc. A bit low on admission this time too. Hard to say what the factors are in his syncope. He does have As in the moderate-severe range, and if this is the true cause of syncope the prognosis is poor without intervention. Bp was a bit low on admission, and during echo, which may also affect how severe the As appears, a little dehydrated with a little anemia. Now, overnight with some afib and so also SSS is a concern. I discussed all this with him today and with his son. Possibly a candidate for TAVR  +/- pacer(?leadless) post proc First step is to proceed with [facility] to assess for CAD prior to procedural planning. Have asked Dr. Elver Garay to consult on this complex case in the morning and potentially proceed with cardiac cath tomorrow. Will need a risk assessment given his PAD as well. No OAC in anticipation of procedure right now, and with syncope, will need to reassess prior to dc. Cont tele for now. Son and patient are in agreement with this plan.  
  
No chest pain or dyspnea pta, walking and doing fine. Energy level is good. Walks at [employer] indoors without limitations. Glucose running 120 efe. BP is good. EKG NSR NST Today feeling ok, agreeable with plan for completion of pad assessment and proceeding with TAVR if vascular access is sufficient. BP creeping up, crt is stable post cath.  
 
  
**Records received from [facility] on 11/8/17 Hx of moderate AS, hx of rheumatic fever 1944 
5/20/2008 3 vessel CABG (LIMA to LAD, SVG to OM and PDA) S/p right SFA PTA and left subclavian stent Echo 6/1/17 - LVEF 55-60%, no WMA, grade 1 dd, severe cLVH, MAC extending into the posterior leaflet and mild MR, mild TR, trace PI Lexiscan MPI 5/16/17 - medium sized region of mild lateral ischemia, LVEF 54%, no WMA GAMAL 9/30/08 - placement of 7 x 37 mm EB3 stent in 75% stenotic right common iliac artery 
left common iliac artery stent is patent, right common femoral artery with 80% heavily calcified eccentric disease involving ostium of the SFA and the profunda, right SFA has 25% luminal disease in the mid areas, right anterior tibia has 99% focal proximal lesion, right peroneal artery is widely patent, right posterior tibial artery as 90% concentric lesion distally, left common femoral artery with 85% eccentric, heavily calcified lesion involving the ostium of the profunda, as well as the SFA, left SFA is 100% occluded in the mid area, left anterior tibial artery is 100% occluded in the proximal to distal area Venous duplex 3/24/16 - no DVT, bilateral greater saphenous veins stripped  
  
Assessment/Plan: 1. DM- managed on oral agents 2. HTN- low on admit, holding meds 3. Dyslipidemia- on statin, at goal 
4. CAD- CABG c. 2008(LIMA-LAD, SVG-OM, SVG-PDA), s/p PCI(?) 
-cont aspirin,statin 
- mildly abnormal stress test 5/17 [facility] in absence of sx  has been medically managed 5. PAD with right leg stent- cont aspirin, statin  
-s/p SFA PTA and LSC stent 6. HFpEF- stable compensated today, mod cLVH(regression) 
-cont arb as bp allows 7. Mod AS with rheumatic heart disease- follow-up echo today with mod-severe AoV at 35mean gradient. Mild MS on echo 8. Venous insufficiency- s/p vein stripping bilat GSV 9. Syncope- certainly could be cardiac versus other- workup as above 
  
Echo 12/19 with Ef 60%, mod AS MAC mild cLVH, lv dd 1/19 Echo 60-65% gri DD, RVE, LAE, mild MR, mod AS, mild TR, mild PI Lexiscan 5/17 med lteral ischemia, EF 54% Echo 6/17 EF 60% gr1dd, severe cLVH, MAC mild MR, mild TR mild PI 
GAMAL 9/30/08 - placement of 7 x 37 mm EB3 stent in 75% stenotic right common iliac artery, left common iliac artery stent is patent, right common femoral artery with 80% heavily calcified eccentric disease involving ostium of the SFA and the profunda, right SFA has 25% luminal disease in the mid areas, right anterior tibia has 99% focal proximal lesion, right peroneal artery is widely patent, right posterior tibial artery as 90% concentric lesion distally, left common femoral artery with 85% eccentric, heavily calcified lesion involving the ostium of the profunda, as well as the SFA, left SFA is 100% occluded in the mid area, left anterior tibial artery is 100% occluded in the proximal to distal area Soc no tob, quit 40 years ago, no etoh Fhx no early cad We discussed the expected course, resolution and complications of the diagnosis(es) in detail. Medication risks, benefits, costs, interactions, and alternatives were discussed as indicated. I advised him to contact the office if his condition worsens, changes or fails to improve as anticipated. He expressed understanding with the diagnosis(es) and plan HISTORY OF PRESENTING ILLNESS Hasmukh Session is a 82 y.o. male ACTIVE PROBLEM LIST Patient Active Problem List  
 Diagnosis Date Noted  Aortic stenosis 10/18/2020 Priority: 1 - One  (HFpEF) heart failure with preserved ejection fraction ([address] 10/20/2020  Syncope and collapse 10/18/2020  Syncope 10/18/2020  Decubitus ulcer of left buttock, stage 2 ([address] 09/10/2020  Type 2 diabetes with nephropathy ([address] 08/24/2020  Pressure injury of buttock, stage 1 06/04/2020  Incontinence of feces 06/04/2020  On angiotensin receptor blockers (ARB) 01/23/2020  Gastroesophageal reflux disease without esophagitis 01/23/2020  Cough 01/23/2020  Recurrent depression ([address] 08/22/2019  Age-related cataract of both eyes 04/25/2019  Gait instability 06/21/2018  Type 2 diabetes mellitus without complication, without long-term current use of insulin ([address] 09/21/2017  Essential hypertension 09/21/2017  Hypercholesterolemia 09/21/2017  Coronary artery disease involving native coronary artery of native heart without angina pectoris 2017  S/P CABG (coronary artery bypass graft) 2017  Severe aortic stenosis 2017  Aortic systolic murmur on examination 2017  Benign prostatic hyperplasia with lower urinary tract symptoms 2017  Insomnia 2017  Former smoker 2017  ACP (advance care planning) 2017 PAST MEDICAL HISTORY Past Medical History:  
Diagnosis Date  BPH (benign prostatic hyperplasia)  CAD (coronary artery disease)  Diabetes ([facility]  Hearing loss  Hypercholesterolemia  Hypertension  Murmur  Recurrent depression ([facility] 2019 PAST SURGICAL HISTORY Past Surgical History:  
Procedure Laterality Date  CARDIAC SURG PROCEDURE UNLIST   by-pass  HX CHOLECYSTECTOMY ALLERGIES Allergies Allergen Reactions  Procaine Other (comments) Pt stated he passed out from procaine and was told not to let anyone use it on him again FAMILY HISTORY Family History Problem Relation Age of Onset  Cancer Mother  Cancer Father   
 negative for cardiac disease SOCIAL HISTORY Social History Socioeconomic History  Marital status:  Spouse name: Not on file  Number of children: Not on file  Years of education: Not on file  Highest education level: Not on file Tobacco Use  Smoking status: Former Smoker Types: Cigarettes Last attempt to quit: 1990 Years since quittin.1  Smokeless tobacco: Never Used Substance and Sexual Activity  Alcohol use: No  
 Drug use: No  
 
 
 
MEDICATIONS Current Facility-Administered Medications Medication Dose Route Frequency  sodium chloride 0.9 % bolus infusion 100 mL  100 mL IntraVENous RAD ONCE  
 iopamidoL (ISOVUE-370) 76 % injection 100 mL  100 mL IntraVENous RAD ONCE  
  sodium chloride (NS) flush 10 mL  10 mL IntraVENous RAD ONCE  
 vancomycin - pharmacy to dose    Other Rx Dosing/Monitoring  vancomycin (VANCOCIN) 1500 mg in  ml infusion  1,500 mg IntraVENous Q18H  
 hydroCHLOROthiazide (HYDRODIURIL) tablet 12.5 mg  12.5 mg Oral DAILY  labetaloL (NORMODYNE) tablet 100 mg  100 mg Oral DAILY  losartan (COZAAR) tablet 12.5 mg  12.5 mg Oral DAILY  ascorbic acid (vitamin C) (VITAMIN C) tablet 1,000 mg  1,000 mg Oral TID  insulin glargine (LANTUS) injection 10 Units  10 Units SubCUTAneous QHS  traZODone (DESYREL) tablet 50 mg  50 mg Oral QHS PRN  
 temazepam (RESTORIL) capsule 15 mg  15 mg Oral QHS PRN  
 cefTRIAXone (ROCEPHIN) 1 g in 0.9% sodium chloride (MBP/ADV) 50 mL  1 g IntraVENous Q24H  
 amLODIPine (NORVASC) tablet 5 mg  5 mg Oral DAILY  aspirin tablet 325 mg  325 mg Oral DAILY  atorvastatin (LIPITOR) tablet 10 mg  10 mg Oral QHS  doxazosin (CARDURA) tablet 4 mg  4 mg Oral QHS  finasteride (PROSCAR) tablet 5 mg  5 mg Oral DAILY  sertraline (ZOLOFT) tablet 100 mg  100 mg Oral QPM  
 sodium chloride (NS) flush 5-40 mL  5-40 mL IntraVENous Q8H  
 sodium chloride (NS) flush 5-40 mL  5-40 mL IntraVENous PRN  
 acetaminophen (TYLENOL) tablet 650 mg  650 mg Oral Q4H PRN  
 heparin (porcine) injection 5,000 Units  5,000 Units SubCUTAneous Q8H  
 glucose chewable tablet 16 g  4 Tab Oral PRN  
 glucagon (GLUCAGEN) injection 1 mg  1 mg IntraMUSCular PRN  
 dextrose 10% infusion 0-250 mL  0-250 mL IntraVENous PRN  
 insulin lispro (HUMALOG) injection   SubCUTAneous AC&HS  
 melatonin tablet 3 mg  3 mg Oral QHS PRN I have reviewed the nurses notes, vitals, problem list, allergy list, medical history, family, social history and medications. REVIEW OF SYMPTOMS Neg except as per HPI PHYSICAL EXAMINATION Visit Vitals BP (!) 153/66 (BP 1 Location: Right arm, BP Patient Position: At rest) Pulse 68  
 Temp 97.9 °F (36.6 °C) Resp 16 Ht 6' 3\" (1.905 m) Wt 197 lb 8.5 oz (89.6 kg) SpO2 94% BMI 24.69 kg/m² 3/6 JANNY 
 lungs cta 
abd NDT ND Neuro non focal, memory impairment Awake alert, conversant DIAGNOSTIC DATA No specialty comments available. LABORATORY DATA Lab Results Component Value Date/Time WBC 6.7 10/23/2020 07:16 AM  
 HGB 11.0 (L) 10/23/2020 07:16 AM  
 HCT 34.2 (L) 10/23/2020 07:16 AM  
 PLATELET 647 (L) 76/93/4396 07:16 AM  
 MCV 87.0 10/23/2020 07:16 AM  
  
Lab Results Component Value Date/Time Sodium 138 10/23/2020 07:16 AM  
 Potassium 3.6 10/23/2020 07:16 AM  
 Chloride 108 10/23/2020 07:16 AM  
 CO2 24 10/23/2020 07:16 AM  
 Anion gap 6 10/23/2020 07:16 AM  
 Glucose 109 (H) 10/23/2020 07:16 AM  
 BUN 17 10/23/2020 07:16 AM  
 Creatinine 0.87 10/23/2020 07:16 AM  
 BUN/Creatinine ratio 20 10/23/2020 07:16 AM  
 GFR est AA >60 10/23/2020 07:16 AM  
 GFR est non-AA >60 10/23/2020 07:16 AM  
 Calcium 8.6 10/23/2020 07:16 AM  
 Bilirubin, total 0.5 10/18/2020 03:27 PM  
 Alk. phosphatase 94 10/18/2020 03:27 PM  
 Protein, total 7.4 10/18/2020 03:27 PM  
 Albumin 3.6 10/18/2020 03:27 PM  
 Globulin 3.8 10/18/2020 03:27 PM  
 A-G Ratio 0.9 (L) 10/18/2020 03:27 PM  
 ALT (SGPT) 20 10/18/2020 03:27 PM  
  
 
 
 
 FOLLOW-UP Patient was made aware and verbalized understanding that an appointment will be scheduled for them for a virtual visit and/or office visit within the above time frame. Patient understanding his/her responsibility to call and change time/date if he/she so chooses. Thank you, Sushila Spaulding DO for allowing me to participate in the care of Charlesetta Shells. Please do not hesitate to contact me for further questions/concerns. Greater than12 minutes was spent in direct video patient care, planning and chart review. This visit was conducted using [employer] telemedicine services.   
 
 
Alyce Khoury MD 
 
 9 [facility] [address] [facility], [address] [place] [address]       
[phone] / [phone] Fax [place] [address], [address] Yadira Singh 
[phone] / [phone] Fax

## 2020-10-23 NOTE — PROGRESS NOTES
Hospitalist Progress Note Glenroy Gunter NP Please call  and page for questions. Call physician on-call through the  7pm-7am 
 
Daily Progress Note: 10/23/2020 Primary care provider:Jorgito Hunt DO Date of admission: 10/18/2020  3:07 PM 
 
Admission Summary and Hospital Course:   
 
From H&P 10/18/20: \"Patient reports that he lives in [place], today he was out shopping and as he was coming out he fainted. Per chart patient has history of severe aortic stenosis. Patient feels that he hit his head but denies any other complaints or problems. Patient came to the ER was requested to be observed under the hospitalist service. Patient reports that he is back to baseline he denies any complaints or problems currently. Patient reports that he has been taking his medications on a regular basis. Patient reports that he has not had any contact with known COVID-19 patients. The patient denies any Headache, blurry vision, sore throat, trouble swallowing, trouble with speech, chest pain, SOB, cough, fever, chills, N/V/D, abd pain, urinary symptoms, constipation, recent travels, sick contacts, focal or generalized neurological symptoms,  falls, injuries, rashes, contact with COVID-19 diagnosed patients, hematemesis, melena, hemoptysis, hematuria, rashes, denies starting any new medications and denies any other concerns or problems besides as mentioned above. \" Subjective:  
Pt seen today no acute distress. Pt is s/p [facility] 10/21 that confirmed severe AS. Currently he denies HA, dizziness, visual changes, cough, SOB, CP, abd pain, n/v/d, dysuria or weakness. For repeat CTA today for TAVR workup. Assessment/Plan:  
 
 
Syncope: Unclear etiology hypovolemia vs severe aortic stenosis -Orthostatic VS neg 
-Head CT neg, labs stable 
-Echo 10/18 w/ EF 55-60%, no RWMA, mod aortic stenosis -carotid dopplers 10/19: less than 50% stenosis bilaterally; Left vertebral shows resistant flow, suggesting distal occlusion - evaluated by vasc surg 10/20-no intervention needed 
-UA +UTI 
-CTA chest 10/20: no PE; bibasilar atelectasis, 15mm adrenal nodule, low density thyroid nodules, minimal aneurysmal dilatation of ascending aorta - notified pt and he is agreeable to OP follow up -?TAVR workup w/wo PPM 
-s/p C 10/21- no stenting; severe AS 
-seen by CT surg: repeat CTA today; likely TAVR this admission Afib w/ RVR 
-new onset 10/19; confirmed on EKG 
-HR controlled w/o intervention 
-hold [address] for now per cards d/t likely TAVR; will need to be started prior to DC 
-Monitor on tele, continue heparin 
-Cont BB 
-Appreciate cards input UTI 
-culture shows Enterobacter aerogenes and MRSA 
-cont ceftriaxone and Vanc 
-consult ID for recs as pt also likely to have TAVR soon Hypokalemia 
-Resolved PAD 
-Carotid dopplers 10/19: less than 50% stenosis bilaterally; Left vertebral shows resistant flow, suggesting distal occlusion  
-evaluated by vasc surg 10/20-no intervention needed NARAYAN: POA creat/gfr 1.36/50 (baseline normal kidney function) 
-Resolved DMII: A1c 7.1 -inc lantus to 15u qhs 
-AC/HS accuchecks w/ SSI 
-trend BS HFpEF: Echo 10/18 w/ EF 55-60% -Stable 
-Cont BB, ARB, HCTZ 
-appreciate cards input CTA Chest Incidental Findings:  
Minimal aneurysmal dilatation of ascending aorta Indeterminate 15mm left adrenal nodule Subcentimeter low density thyroid nodules: TSH 1.29 
-f/u as OP 
 
HTN: cont cardura and norvasc, ARB/BB and HCTZ; monitor BP 
HLD: cont home meds BPH: cont home meds DVTppx: SCDs, heparin 
Gippx: NA 
Code Status: Full code Diet: Diabetic/cardiac Activity: OOB to chair TID and PRN Discharge: TBD Pending cardiology/CT surg clearance vs TAVR Review of Systems:  
 
Full ROS complete with pertinent positives and negatives as per HPI, otherwise negative Objective:  
Physical Exam:  
 
Visit Vitals /60 (BP 1 Location: Right arm, BP Patient Position: At rest) Pulse 64 Temp 98 °F (36.7 °C) Resp 16 Ht 6' 3\" (1.905 m) Wt 89.6 kg (197 lb 8.5 oz) SpO2 94% BMI 24.69 kg/m² O2 Flow Rate (L/min): 2 l/min O2 Device: Room air Temp (24hrs), Av °F (36.7 °C), Min:97.2 °F (36.2 °C), Max:98.6 °F (37 °C) 
  10/23 0701 - 10/23 1900 In: 240 [P.O.:240] Out: 150 [Urine:150]   10/21 1901 - 10/23 07 In: 600 [P.O.:600] Out: 525 [Urine:525] General:  Alert, cooperative, no distress, appears stated age, frail Lungs:   Good effort, CTA, no distress Heart:  Regular rate and rhythm, S1, S2 normal, harsh systolic murmur 4/6 c/w aortic stenosis Abdomen:   Soft, non-tender, non-distended. Bowel sounds normal.   
Extremities: Extremities normal, atraumatic, no cyanosis or edema. Skin: Skin color, texture, turgor normal. No rashes or lesions Neurologic: CNII-XII intact, A&Ox4, MONZON Data Review:  
   
Recent Days: 
Recent Labs 10/23/20 
5925 10/22/20 
4699 10/21/20 
0421 WBC 6.7 9.1 7.1 HGB 11.0* 11.4* 11.1*  
HCT 34.2* 35.5* 34.1*  
* 130* 123* Recent Labs 10/23/20 
0984 10/22/20 
4729 10/21/20 
0421  140 138  
K 3.6 4.1 3.1*  
 106 103 CO2 24 26 25 * 117* 103* BUN 17 20 16 CREA 0.87 1.00 0.95  
CA 8.6 8.9 8.8 MG 2.1 1.9  -- No results for input(s): PH, PCO2, PO2, HCO3, FIO2 in the last 72 hours. 24 Hour Results: 
Recent Results (from the past 24 hour(s)) GLUCOSE, POC Collection Time: 10/22/20  4:55 PM  
Result Value Ref Range Glucose (POC) 135 (H) 65 - 100 mg/dL Performed by Raine Valle GLUCOSE, POC Collection Time: 10/22/20  9:33 PM  
Result Value Ref Range Glucose (POC) 161 (H) 65 - 100 mg/dL Performed by Nicholls [place] CBC WITH AUTOMATED DIFF Collection Time: 10/23/20  7:16 AM  
Result Value Ref Range WBC 6.7 4.1 - 11.1 K/uL RBC 3.93 (L) 4.10 - 5.70 M/uL  
 HGB 11.0 (L) 12.1 - 17.0 g/dL HCT 34.2 (L) 36.6 - 50.3 % MCV 87.0 80.0 - 99.0 FL  
 MCH 28.0 26.0 - 34.0 PG  
 MCHC 32.2 30.0 - 36.5 g/dL  
 RDW 14.2 11.5 - 14.5 % PLATELET 120 (L) 943 - 400 K/uL MPV 12.5 8.9 - 12.9 FL  
 NRBC 0.0 0  WBC ABSOLUTE NRBC 0.00 0.00 - 0.01 K/uL NEUTROPHILS 64 32 - 75 % LYMPHOCYTES 21 12 - 49 % MONOCYTES 11 5 - 13 % EOSINOPHILS 4 0 - 7 % BASOPHILS 0 0 - 1 % IMMATURE GRANULOCYTES 0 0.0 - 0.5 % ABS. NEUTROPHILS 4.3 1.8 - 8.0 K/UL  
 ABS. LYMPHOCYTES 1.4 0.8 - 3.5 K/UL  
 ABS. MONOCYTES 0.7 0.0 - 1.0 K/UL  
 ABS. EOSINOPHILS 0.3 0.0 - 0.4 K/UL  
 ABS. BASOPHILS 0.0 0.0 - 0.1 K/UL  
 ABS. IMM. GRANS. 0.0 0.00 - 0.04 K/UL  
 DF AUTOMATED    
 RBC COMMENTS OVALOCYTES 1+ METABOLIC PANEL, BASIC Collection Time: 10/23/20  7:16 AM  
Result Value Ref Range Sodium 138 136 - 145 mmol/L Potassium 3.6 3.5 - 5.1 mmol/L Chloride 108 97 - 108 mmol/L  
 CO2 24 21 - 32 mmol/L Anion gap 6 5 - 15 mmol/L Glucose 109 (H) 65 - 100 mg/dL BUN 17 6 - 20 MG/DL Creatinine 0.87 0.70 - 1.30 MG/DL  
 BUN/Creatinine ratio 20 12 - 20 GFR est AA >60 >60 ml/min/1.73m2 GFR est non-AA >60 >60 ml/min/1.73m2 Calcium 8.6 8.5 - 10.1 MG/DL MAGNESIUM Collection Time: 10/23/20  7:16 AM  
Result Value Ref Range Magnesium 2.1 1.6 - 2.4 mg/dL GLUCOSE, POC Collection Time: 10/23/20  7:44 AM  
Result Value Ref Range Glucose (POC) 123 (H) 65 - 100 mg/dL Performed by Lucille Blackmon GLUCOSE, POC Collection Time: 10/23/20 11:28 AM  
Result Value Ref Range Glucose (POC) 224 (H) 65 - 100 mg/dL Performed by Roseline Ruvalcaba (CON) Problem List: 
Problem List as of 10/23/2020 Date Reviewed: 10/18/2020 Codes Class Noted - Resolved (HFpEF) heart failure with preserved ejection fraction (HCC) ICD-10-CM: I50.30 ICD-9-CM: 428.9  10/20/2020 - Present Syncope and collapse ICD-10-CM: R55 
ICD-9-CM: 780.2  10/18/2020 - Present Syncope ICD-10-CM: R55 
ICD-9-CM: 780.2  10/18/2020 - Present * (Principal) Aortic stenosis ICD-10-CM: I35.0 ICD-9-CM: 424.1  10/18/2020 - Present Overview Signed 10/21/2020  6:58 AM by Vickye Spatz, MD  
  Added automatically from request for surgery [id number] Decubitus ulcer of left buttock, stage 2 ([facility] ICD-10-CM: P07.016 ICD-9-CM: 707.05, 707.22  9/10/2020 - Present Type 2 diabetes with nephropathy ([facility] ICD-10-CM: E11.21 
ICD-9-CM: 250.40, 583.81  8/24/2020 - Present Pressure injury of buttock, stage 1 ICD-10-CM: L89.301 ICD-9-CM: 707.05, 707.21  6/4/2020 - Present Incontinence of feces ICD-10-CM: R15.9 ICD-9-CM: 787.60  6/4/2020 - Present On angiotensin receptor blockers (ARB) ICD-10-CM: U86.322 ICD-9-CM: V58.69  1/23/2020 - Present Gastroesophageal reflux disease without esophagitis ICD-10-CM: K21.9 ICD-9-CM: 530.81  1/23/2020 - Present Cough ICD-10-CM: R05 ICD-9-CM: 786.2  1/23/2020 - Present Recurrent depression ([facility] ICD-10-CM: F33.9 ICD-9-CM: 296.30  8/22/2019 - Present Age-related cataract of both eyes ICD-10-CM: H25.9 ICD-9-CM: 366.10  4/25/2019 - Present Gait instability ICD-10-CM: R26.81 
ICD-9-CM: 781.2  6/21/2018 - Present Type 2 diabetes mellitus without complication, without long-term current use of insulin (HCC) ICD-10-CM: E11.9 ICD-9-CM: 250.00  9/21/2017 - Present Essential hypertension ICD-10-CM: I10 
ICD-9-CM: 401.9  9/21/2017 - Present Hypercholesterolemia ICD-10-CM: E78.00 ICD-9-CM: 272.0  9/21/2017 - Present Coronary artery disease involving native coronary artery of native heart without angina pectoris ICD-10-CM: I25.10 ICD-9-CM: 414.01  9/21/2017 - Present S/P CABG (coronary artery bypass graft) ICD-10-CM: Z95.1 ICD-9-CM: V45.81  9/21/2017 - Present Severe aortic stenosis ICD-10-CM: I35.0 ICD-9-CM: 424.1  9/21/2017 - Present Aortic systolic murmur on examination ICD-10-CM: I35.8 ICD-9-CM: 424.1  9/21/2017 - Present Benign prostatic hyperplasia with lower urinary tract symptoms ICD-10-CM: N40.1 ICD-9-CM: 600.01  9/21/2017 - Present Insomnia ICD-10-CM: G47.00 ICD-9-CM: 780.52  9/21/2017 - Present Former smoker ICD-10-CM: Z02.103 ICD-9-CM: V15.82  9/21/2017 - Present ACP (advance care planning) ICD-10-CM: Z71.89 ICD-9-CM: V65.49  9/21/2017 - Present RESOLVED: Type 2 diabetes with nephropathy ([facility] ICD-10-CM: E11.21 
ICD-9-CM: 250.40, 583.81  4/10/2018 - 4/25/2019 RESOLVED: Type 2 diabetes mellitus with nephropathy ([facility] ICD-10-CM: E11.21 
ICD-9-CM: 250.40, 583.81  12/14/2017 - 4/5/2018 RESOLVED: Chronic ulcer of buttock ([facility] ICD-10-CM: T42.242 ICD-9-CM: 707.8  11/16/2017 - 4/25/2019 Medications reviewed Current Facility-Administered Medications Medication Dose Route Frequency  sodium chloride 0.9 % bolus infusion 100 mL  100 mL IntraVENous RAD ONCE  
 iopamidoL (ISOVUE-370) 76 % injection 100 mL  100 mL IntraVENous RAD ONCE  
 sodium chloride (NS) flush 10 mL  10 mL IntraVENous RAD ONCE  
 iopamidoL (ISOVUE-370) 76 % injection 50 mL  50 mL IntraVENous RAD ONCE  
 vancomycin - pharmacy to dose    Other Rx Dosing/Monitoring  vancomycin (VANCOCIN) 1500 mg in  ml infusion  1,500 mg IntraVENous Q18H  
 hydroCHLOROthiazide (HYDRODIURIL) tablet 12.5 mg  12.5 mg Oral DAILY  labetaloL (NORMODYNE) tablet 100 mg  100 mg Oral DAILY  losartan (COZAAR) tablet 12.5 mg  12.5 mg Oral DAILY  ascorbic acid (vitamin C) (VITAMIN C) tablet 1,000 mg  1,000 mg Oral TID  insulin glargine (LANTUS) injection 10 Units  10 Units SubCUTAneous QHS  traZODone (DESYREL) tablet 50 mg  50 mg Oral QHS PRN  
 temazepam (RESTORIL) capsule 15 mg  15 mg Oral QHS PRN  
  cefTRIAXone (ROCEPHIN) 1 g in 0.9% sodium chloride (MBP/ADV) 50 mL  1 g IntraVENous Q24H  
 amLODIPine (NORVASC) tablet 5 mg  5 mg Oral DAILY  aspirin tablet 325 mg  325 mg Oral DAILY  atorvastatin (LIPITOR) tablet 10 mg  10 mg Oral QHS  doxazosin (CARDURA) tablet 4 mg  4 mg Oral QHS  finasteride (PROSCAR) tablet 5 mg  5 mg Oral DAILY  sertraline (ZOLOFT) tablet 100 mg  100 mg Oral QPM  
 sodium chloride (NS) flush 5-40 mL  5-40 mL IntraVENous Q8H  
 sodium chloride (NS) flush 5-40 mL  5-40 mL IntraVENous PRN  
 acetaminophen (TYLENOL) tablet 650 mg  650 mg Oral Q4H PRN  
 heparin (porcine) injection 5,000 Units  5,000 Units SubCUTAneous Q8H  
 glucose chewable tablet 16 g  4 Tab Oral PRN  
 glucagon (GLUCAGEN) injection 1 mg  1 mg IntraMUSCular PRN  
 dextrose 10% infusion 0-250 mL  0-250 mL IntraVENous PRN  
 insulin lispro (HUMALOG) injection   SubCUTAneous AC&HS  
 melatonin tablet 3 mg  3 mg Oral QHS PRN Care Plan discussed with: Patient/family, nurse Rosy Stanton, NP Hospitalist 
Providers can reach me on [employer]

## 2020-10-23 NOTE — CONSULTS
Infectious Disease  Note Consult received for + urine culture Came to see patient twice He is off the floor for testing D/W Ms Hansa Jacquesyadira NP and reviewed  Chart Patient presented wt syncope and plans for TAVR for stenosis Urine cx wt Enterobacter and MRSA > 100K colonies, UA wt 25-50 pyuria On vancomycin and ceftriaxone Many times, MRSA in urine is spill over from blood infection. Check blood cx (may be suppressed as on antibiotics ) ? ? Nguyễn before but urine culture not reported as nguyễn specimen Check renal US to ensure no fluid collections Monitor renal function closely Please call weekend on call ID back if consult needed over weekend Samanta Roca,  
3:11 PM

## 2020-10-24 NOTE — PROGRESS NOTES
[address] [facility]  Hospitalist Group Hospitalist Progress Note [address] [facility], DO Answering service: [phone] OR [phone] from in house phone Date of Service:  10/24/2020 NAME:  Harper Blevins :  1935 MRN:  [id number] Admission Summary:  
Patient reports that he lives in [facility], today he was out shopping and as he was coming out he fainted.  Per chart patient has history of severe aortic stenosis.  Patient feels that he hit his head but denies any other complaints or problems.  Patient came to the ER was requested to be observed under the hospitalist service. Tona Rivera reports that he is back to baseline he denies any complaints or problems currently.  Patient reports that he has been taking his medications on a regular basis. Tona Rivera reports that he has not had any contact with known COVID-19 patients. Interval history / Subjective: Follow up syncope. Patient seen and examined. No acute complaints. Inquiring about CTA results. Preliminary results shared with patient. Will await further input from cardiac surgery regarding femoral approach for TAVR. Assessment & Plan:  
 
Severe Aortic Stenosis: 
Syncope:  
-Cardiac surgery and cardiology following.  Consideration for TAVR +/- pacemaker 
-CT C/A/P pelvis complete 10/23 
-Echo 10/18 with normal EF 55-60%, moderate aortic stenosis 
-[facility] 10/21 with severe aortic stenosis, aortic valve area 0.83 cm2, severe left subclavian in-stent restenosis s/p balloon dilatation, no stenting to vein grafting   
  
Afib w/ RVR 
-new onset 10/19; confirmed on EKG 
-HR controlled w/o intervention 
-hold [address] for now per cards d/t likely TAVR; will need to be started prior to DC 
-Monitor on tele, continue heparin 
-Cont BB 
  
UTI:  
-culture shows Enterobacter aerogenes and MRSA 
-cont ceftriaxone and Vanc  
-blood cultures NGTD 
  
 Hypokalemia: Resolved 
  
Carotid artery stenosis:  
-Carotid dopplers 10/19: less than 50% stenosis bilaterally; Left vertebral shows resistant flow, suggesting distal occlusion  
-evaluated by vasc surg 10/20-no intervention needed 
  
NARAYAN: POA creat/gfr 1.36/50 (baseline normal kidney function) 
-Resolved DMII: A1c 7.1 -inc lantus to 15u qhs 
-AC/HS accuchecks w/ SSI 
-trend BS 
  
HFpEF: Echo 10/18 w/ EF 55-60% -Stable 
-Cont BB, ARB, HCTZ 
-appreciate cards input 
  
CTA Chest Incidental Findings:  
Minimal aneurysmal dilatation of ascending aorta Indeterminate 15mm left adrenal nodule Subcentimeter low density thyroid nodules: TSH 1.29 
-f/u as OP Code status: full DVT prophylaxis: heparin Care Plan discussed with: Patient/Family Anticipated Disposition: SNF/LTC Anticipated Discharge: Greater than 48 hours Hospital Problems  Date Reviewed: 10/18/2020 Codes Class Noted POA (HFpEF) heart failure with preserved ejection fraction (HCC) ICD-10-CM: I50.30 ICD-9-CM: 428.9  10/20/2020 Unknown Syncope and collapse ICD-10-CM: R55 
ICD-9-CM: 780.2  10/18/2020 Unknown Syncope ICD-10-CM: R55 
ICD-9-CM: 780.2  10/18/2020 Unknown * (Principal) Aortic stenosis ICD-10-CM: I35.0 ICD-9-CM: 424.1  10/18/2020 Overview Signed 10/21/2020  6:58 AM by Avril Cerna MD  
  Added automatically from request for surgery [id number] Review of Systems:  
Negative unless stated above Vital Signs:  
 Last 24hrs VS reviewed since prior progress note. Most recent are: 
Visit Vitals BP (!) 145/53 (BP 1 Location: Right arm, BP Patient Position: At rest) Pulse 84 Temp 97.7 °F (36.5 °C) Resp 18 Ht 6' 3\" (1.905 m) Wt 92.4 kg (203 lb 11.3 oz) SpO2 93% BMI 25.46 kg/m² Intake/Output Summary (Last 24 hours) at 10/24/2020 2872 Last data filed at 10/24/2020 5635 Gross per 24 hour Intake 1290 ml Output 1890 ml Net -600 ml Physical Examination:  
 
 
     
Constitutional:  No acute distress, cooperative, pleasant ENT:  Oral mucosa moist, oropharynx benign. Resp:  CTA bilaterally. No wheezing/rhonchi/rales. No accessory muscle use CV:  Regular rhythm, normal rate, +systolic murmur GI:  Soft, non distended, non tender. normoactive bowel sounds, no hepatosplenomegaly Musculoskeletal:  No edema, warm, 2+ pulses throughout Neurologic:  Moves all extremities. Data Review:  
 Review and/or order of clinical lab test 
Review and/or order of tests in the radiology section of CPT Review and/or order of tests in the medicine section of CPT Labs:  
 
Recent Labs 10/24/20 
0304 10/23/20 
8556 WBC 7.1 6.7 HGB 10.3* 11.0*  
HCT 31.0* 34.2*  
* 123* Recent Labs 10/24/20 
0304 10/23/20 
7931 10/22/20 
6688  138 140  
K 3.5 3.6 4.1  108 106 CO2 24 24 26 BUN 16 17 20 CREA 0.80 0.87 1.00  109* 117* CA 8.5 8.6 8.9 MG 1.9 2.1 1.9 No results for input(s): ALT, AP, TBIL, TBILI, TP, ALB, GLOB, GGT, AML, LPSE in the last 72 hours. No lab exists for component: SGOT, GPT, AMYP, HLPSE No results for input(s): INR, PTP, APTT, INREXT in the last 72 hours. No results for input(s): FE, TIBC, PSAT, FERR in the last 72 hours. Lab Results Component Value Date/Time Folate 20.1 10/18/2020 09:48 PM  
  
No results for input(s): PH, PCO2, PO2 in the last 72 hours. No results for input(s): CPK, CKNDX, TROIQ in the last 72 hours. No lab exists for component: CPKMB Lab Results Component Value Date/Time Cholesterol, total 114 02/26/2020 03:19 PM  
 HDL Cholesterol 40 02/26/2020 03:19 PM  
 LDL, calculated 54 02/26/2020 03:19 PM  
 Triglyceride 101 02/26/2020 03:19 PM  
 
Lab Results Component Value Date/Time  Glucose (POC) 113 (H) 10/24/2020 07:58 AM  
 Glucose (POC) 170 (H) 10/23/2020 10:18 PM  
 Glucose (POC) 136 (H) 10/23/2020 05:18 PM  
 Glucose (POC) 224 (H) 10/23/2020 11:28 AM  
 Glucose (POC) 123 (H) 10/23/2020 07:44 AM  
 
Lab Results Component Value Date/Time Color YELLOW/STRAW 10/19/2020 04:54 PM  
 Appearance CLOUDY (A) 10/19/2020 04:54 PM  
 Specific gravity 1.011 10/19/2020 04:54 PM  
 pH (UA) 5.0 10/19/2020 04:54 PM  
 Protein Negative 10/19/2020 04:54 PM  
 Glucose Negative 10/19/2020 04:54 PM  
 Ketone Negative 10/19/2020 04:54 PM  
 Bilirubin Negative 10/19/2020 04:54 PM  
 Urobilinogen 0.2 10/19/2020 04:54 PM  
 Nitrites Positive (A) 10/19/2020 04:54 PM  
 Leukocyte Esterase LARGE (A) 10/19/2020 04:54 PM  
 Epithelial cells FEW 10/19/2020 04:54 PM  
 Bacteria 1+ (A) 10/19/2020 04:54 PM  
 WBC 20-50 10/19/2020 04:54 PM  
 RBC 0-5 10/19/2020 04:54 PM  
 
 
 
Medications Reviewed:  
 
Current Facility-Administered Medications Medication Dose Route Frequency  insulin glargine (LANTUS) injection 15 Units  15 Units SubCUTAneous QHS  vancomycin - pharmacy to dose    Other Rx Dosing/Monitoring  vancomycin (VANCOCIN) 1500 mg in  ml infusion  1,500 mg IntraVENous Q18H  
 hydroCHLOROthiazide (HYDRODIURIL) tablet 12.5 mg  12.5 mg Oral DAILY  labetaloL (NORMODYNE) tablet 100 mg  100 mg Oral DAILY  losartan (COZAAR) tablet 12.5 mg  12.5 mg Oral DAILY  ascorbic acid (vitamin C) (VITAMIN C) tablet 1,000 mg  1,000 mg Oral TID  traZODone (DESYREL) tablet 50 mg  50 mg Oral QHS PRN  
 temazepam (RESTORIL) capsule 15 mg  15 mg Oral QHS PRN  
 cefTRIAXone (ROCEPHIN) 1 g in 0.9% sodium chloride (MBP/ADV) 50 mL  1 g IntraVENous Q24H  
 amLODIPine (NORVASC) tablet 5 mg  5 mg Oral DAILY  aspirin tablet 325 mg  325 mg Oral DAILY  atorvastatin (LIPITOR) tablet 10 mg  10 mg Oral QHS  doxazosin (CARDURA) tablet 4 mg  4 mg Oral QHS  finasteride (PROSCAR) tablet 5 mg  5 mg Oral DAILY  sertraline (ZOLOFT) tablet 100 mg  100 mg Oral QPM  
 sodium chloride (NS) flush 5-40 mL  5-40 mL IntraVENous Q8H  
  sodium chloride (NS) flush 5-40 mL  5-40 mL IntraVENous PRN  
 acetaminophen (TYLENOL) tablet 650 mg  650 mg Oral Q4H PRN  
 heparin (porcine) injection 5,000 Units  5,000 Units SubCUTAneous Q8H  
 glucose chewable tablet 16 g  4 Tab Oral PRN  
 glucagon (GLUCAGEN) injection 1 mg  1 mg IntraMUSCular PRN  
 dextrose 10% infusion 0-250 mL  0-250 mL IntraVENous PRN  
 insulin lispro (HUMALOG) injection   SubCUTAneous AC&HS  
 melatonin tablet 3 mg  3 mg Oral QHS PRN  
 
______________________________________________________________________ EXPECTED LENGTH OF STAY: 4d 2h 
ACTUAL LENGTH OF STAY:          4 [address] [facility]

## 2020-10-24 NOTE — PROGRESS NOTES
ASSESSMENT  
  
HPI: Paty Mcdaniels, a 82y.o. year-old who presents for evaluation of CAD s/p CABG, DM, HTN, Dyslipidemia.  
  
No more afib overnight, hr has been stable. SSS still possible. No more syncope after ivf and laying flat without difficulty. Possibly a candidate for TAVR  +/- pacer(?leadless) post proc [facility] done, Deniz Wilson and Charan Baer are considering his case. No OAC in anticipation of procedure right now, and with syncope, will need to reassess prior to dc. Cont tele for now. Son and patient are in agreement with this plan.  
  
No chest pain or dyspnea pta, walking and doing fine. Energy level is good. Walks at [employer] indoors without limitations. Glucose running 120 efe. BP is good. EKG NSR NST Today feeling ok, agreeable with plan for completion of pad assessment and proceeding with TAVR if vascular access is sufficient. BP creeping up, crt is stable post cath.  
 
  
**Records  from [facility] on 11/8/17 Hx of moderate AS, hx of rheumatic fever 1944 5/20/2008 3 vessel CABG (SVG to LAD, SVG to OM and PDA) S/p right SFA PTA and left subclavian stent Echo 6/1/17 - LVEF 55-60%, no WMA, grade 1 dd, severe cLVH, MAC extending into the posterior leaflet and mild MR, mild TR, trace PI Lexiscan MPI 5/16/17 - medium sized region of mild lateral ischemia, LVEF 54%, no WMA GAMAL 9/30/08 - placement of 7 x 37 mm EB3 stent in 75% stenotic right common iliac artery 
left common iliac artery stent is patent, right common femoral artery with 80% heavily calcified eccentric disease involving ostium of the SFA and the profunda, right SFA has 25% luminal disease in the mid areas, right anterior tibia has 99% focal proximal lesion, right peroneal artery is widely patent, right posterior tibial artery as 90% concentric lesion distally, left common femoral artery with 85% eccentric, heavily calcified lesion involving the ostium of the profunda, as well as the SFA, left SFA is 100% occluded in the mid area, left anterior tibial artery is 100% occluded in the proximal to distal area Venous duplex 3/24/16 - no DVT, bilateral greater saphenous veins stripped  
  
Assessment/Plan: 1. DM- managed on oral agents 2. HTN- low on admit, holding meds 3. Dyslipidemia- on statin, at goal 
4. CAD- CABG c. 2008(LIMA-LAD, SVG-OM, SVG-PDA), s/p PCI(?) 
-cont aspirin,statin 
- mildly abnormal stress test 5/17 [facility] in absence of sx  has been medically managed 5. PAD with right leg stent- cont aspirin, statin  
-s/p SFA PTA and LSC stent 6. HFpEF- stable compensated today, mod cLVH(regression) 
-cont arb as bp allows 7. Mod AS with rheumatic heart disease- follow-up echo with mod-severe AoV at 35mean gradient. Mild MS on echo 8. Venous insufficiency- s/p vein stripping bilat GSV 9. Syncope- certainly could be cardiac versus other- workup as above 
  
Echo 12/19 with Ef 60%, mod AS MAC mild cLVH, lv dd 1/19 Echo 60-65% gri DD, RVE, LAE, mild MR, mod AS, mild TR, mild PI Lexiscan 5/17 med lteral ischemia, EF 54% Echo 6/17 EF 60% gr1dd, severe cLVH, MAC mild MR, mild TR mild PI 
GAMAL 9/30/08 - placement of 7 x 37 mm EB3 stent in 75% stenotic right common iliac artery, left common iliac artery stent is patent, right common femoral artery with 80% heavily calcified eccentric disease involving ostium of the SFA and the profunda, right SFA has 25% luminal disease in the mid areas, right anterior tibia has 99% focal proximal lesion, right peroneal artery is widely patent, right posterior tibial artery as 90% concentric lesion distally, left common femoral artery with 85% eccentric, heavily calcified lesion involving the ostium of the profunda, as well as the SFA, left SFA is 100% occluded in the mid area, left anterior tibial artery is 100% occluded in the proximal to distal area Soc no tob, quit 40 years ago, no etoh Fhx no early cad We discussed the expected course, resolution and complications of the diagnosis(es) in detail. Medication risks, benefits, costs, interactions, and alternatives were discussed as indicated. I advised him to contact the office if his condition worsens, changes or fails to improve as anticipated. He expressed understanding with the diagnosis(es) and plan HISTORY OF PRESENTING ILLNESS Daniel Murrell is a 82 y.o. male ACTIVE PROBLEM LIST Patient Active Problem List  
 Diagnosis Date Noted  Aortic stenosis 10/18/2020 Priority: 1 - One  (HFpEF) heart failure with preserved ejection fraction ([address] 10/20/2020  Syncope and collapse 10/18/2020  Syncope 10/18/2020  Decubitus ulcer of left buttock, stage 2 ([address] 09/10/2020  Type 2 diabetes with nephropathy ([address] 08/24/2020  Pressure injury of buttock, stage 1 06/04/2020  Incontinence of feces 06/04/2020  On angiotensin receptor blockers (ARB) 01/23/2020  Gastroesophageal reflux disease without esophagitis 01/23/2020  Cough 01/23/2020  Recurrent depression ([address] 08/22/2019  Age-related cataract of both eyes 04/25/2019  Gait instability 06/21/2018  Type 2 diabetes mellitus without complication, without long-term current use of insulin ([address] 09/21/2017  Essential hypertension 09/21/2017  Hypercholesterolemia 09/21/2017  Coronary artery disease involving native coronary artery of native heart without angina pectoris 09/21/2017  S/P CABG (coronary artery bypass graft) 09/21/2017  Severe aortic stenosis 09/21/2017  Aortic systolic murmur on examination 09/21/2017  Benign prostatic hyperplasia with lower urinary tract symptoms 09/21/2017  Insomnia 09/21/2017  Former smoker 09/21/2017  ACP (advance care planning) 09/21/2017 PAST MEDICAL HISTORY Past Medical History:  
Diagnosis Date  BPH (benign prostatic hyperplasia)  CAD (coronary artery disease)  Diabetes [facility]  Hearing loss  Hypercholesterolemia  Hypertension  Murmur  Recurrent depression [facility] 2019 PAST SURGICAL HISTORY Past Surgical History:  
Procedure Laterality Date  CARDIAC SURG PROCEDURE UNLIST   by-pass  HX CHOLECYSTECTOMY ALLERGIES Allergies Allergen Reactions  Procaine Other (comments) Pt stated he passed out from procaine and was told not to let anyone use it on him again FAMILY HISTORY Family History Problem Relation Age of Onset  Cancer Mother  Cancer Father   
 negative for cardiac disease SOCIAL HISTORY Social History Socioeconomic History  Marital status:  Spouse name: Not on file  Number of children: Not on file  Years of education: Not on file  Highest education level: Not on file Tobacco Use  Smoking status: Former Smoker Types: Cigarettes Last attempt to quit: 1990 Years since quittin.1  Smokeless tobacco: Never Used Substance and Sexual Activity  Alcohol use: No  
 Drug use: No  
 
 
 
MEDICATIONS Current Facility-Administered Medications Medication Dose Route Frequency  insulin glargine (LANTUS) injection 15 Units  15 Units SubCUTAneous QHS  vancomycin - pharmacy to dose    Other Rx Dosing/Monitoring  vancomycin (VANCOCIN) 1500 mg in  ml infusion  1,500 mg IntraVENous Q18H  
 hydroCHLOROthiazide (HYDRODIURIL) tablet 12.5 mg  12.5 mg Oral DAILY  labetaloL (NORMODYNE) tablet 100 mg  100 mg Oral DAILY  losartan (COZAAR) tablet 12.5 mg  12.5 mg Oral DAILY  ascorbic acid (vitamin C) (VITAMIN C) tablet 1,000 mg  1,000 mg Oral TID  traZODone (DESYREL) tablet 50 mg  50 mg Oral QHS PRN  
 temazepam (RESTORIL) capsule 15 mg  15 mg Oral QHS PRN  
 cefTRIAXone (ROCEPHIN) 1 g in 0.9% sodium chloride (MBP/ADV) 50 mL  1 g IntraVENous Q24H  
 amLODIPine (NORVASC) tablet 5 mg  5 mg Oral DAILY  aspirin tablet 325 mg  325 mg Oral DAILY  atorvastatin (LIPITOR) tablet 10 mg  10 mg Oral QHS  doxazosin (CARDURA) tablet 4 mg  4 mg Oral QHS  finasteride (PROSCAR) tablet 5 mg  5 mg Oral DAILY  sertraline (ZOLOFT) tablet 100 mg  100 mg Oral QPM  
 sodium chloride (NS) flush 5-40 mL  5-40 mL IntraVENous Q8H  
 sodium chloride (NS) flush 5-40 mL  5-40 mL IntraVENous PRN  
 acetaminophen (TYLENOL) tablet 650 mg  650 mg Oral Q4H PRN  
 heparin (porcine) injection 5,000 Units  5,000 Units SubCUTAneous Q8H  
 glucose chewable tablet 16 g  4 Tab Oral PRN  
 glucagon (GLUCAGEN) injection 1 mg  1 mg IntraMUSCular PRN  
 dextrose 10% infusion 0-250 mL  0-250 mL IntraVENous PRN  
 insulin lispro (HUMALOG) injection   SubCUTAneous AC&HS  
 melatonin tablet 3 mg  3 mg Oral QHS PRN I have reviewed the nurses notes, vitals, problem list, allergy list, medical history, family, social history and medications. REVIEW OF SYMPTOMS Neg except as per HPI PHYSICAL EXAMINATION Visit Vitals BP (!) 117/57 (BP 1 Location: Right arm, BP Patient Position: At rest) Pulse 65 Temp 97.9 °F (36.6 °C) Resp 18 Ht 6' 3\" (1.905 m) Wt 203 lb 11.3 oz (92.4 kg) SpO2 92% BMI 25.46 kg/m² 3/6 JANNY 
 lungs cta 
abd NDT ND Neuro non focal, memory impairment Awake alert, conversant DIAGNOSTIC DATA No specialty comments available. LABORATORY DATA Lab Results Component Value Date/Time WBC 7.1 10/24/2020 03:04 AM  
 HGB 10.3 (L) 10/24/2020 03:04 AM  
 HCT 31.0 (L) 10/24/2020 03:04 AM  
 PLATELET 714 (L) 04/01/7245 03:04 AM  
 MCV 84.0 10/24/2020 03:04 AM  
  
Lab Results Component Value Date/Time  Sodium 137 10/24/2020 03:04 AM  
 Potassium 3.5 10/24/2020 03:04 AM  
 Chloride 106 10/24/2020 03:04 AM  
 CO2 24 10/24/2020 03:04 AM  
 Anion gap 7 10/24/2020 03:04 AM  
 Glucose 100 10/24/2020 03:04 AM  
 BUN 16 10/24/2020 03:04 AM  
 Creatinine 0.80 10/24/2020 03:04 AM  
 BUN/Creatinine ratio 20 10/24/2020 03:04 AM  
 GFR est AA >60 10/24/2020 03:04 AM  
 GFR est non-AA >60 10/24/2020 03:04 AM  
 Calcium 8.5 10/24/2020 03:04 AM  
 Bilirubin, total 0.5 10/18/2020 03:27 PM  
 Alk. phosphatase 94 10/18/2020 03:27 PM  
 Protein, total 7.4 10/18/2020 03:27 PM  
 Albumin 3.6 10/18/2020 03:27 PM  
 Globulin 3.8 10/18/2020 03:27 PM  
 A-G Ratio 0.9 (L) 10/18/2020 03:27 PM  
 ALT (SGPT) 20 10/18/2020 03:27 PM  
  
 
 
 
 FOLLOW-UP Patient was made aware and verbalized understanding that an appointment will be scheduled for them for a virtual visit and/or office visit within the above time frame. Patient understanding his/her responsibility to call and change time/date if he/she so chooses. Thank you, Eben Gee DO for allowing me to participate in the care of Kaleigh Fox. Please do not hesitate to contact me for further questions/concerns. Greater than12 minutes was spent in direct video patient care, planning and chart review. This visit was conducted using [employer] telemedicine services. Candy Jarquin MD 
 
[address] [facility] [address], [address] [facility]       
[phone] / [phone] Fax [facility] [address], [address] 
[phone] / [phone] Fax

## 2020-10-24 NOTE — PROGRESS NOTES
Bedside shift change report given to Fabiola Dunbar RN (oncoming nurse) by Tami Hill RN (offgoing nurse). Report included the following information SBAR, Kardex, Recent Results and Cardiac Rhythm NSR 1st degree AVB.

## 2020-10-24 NOTE — PROGRESS NOTES
Problem: Diabetes Self-Management Goal: *Disease process and treatment process Description: Define diabetes and identify own type of diabetes; list 3 options for treating diabetes. Outcome: Progressing Towards Goal 
Goal: *Incorporating nutritional management into lifestyle Description: Describe effect of type, amount and timing of food on blood glucose; list 3 methods for planning meals. Outcome: Progressing Towards Goal 
  
Problem: Falls - Risk of 
Goal: *Absence of Falls Description: Document Leora Rothmiguelangel Fall Risk and appropriate interventions in the flowsheet. Outcome: Progressing Towards Goal 
Note: Fall Risk Interventions: 
Mobility Interventions: Bed/chair exit alarm, Patient to call before getting OOB, Utilize walker, cane, or other assistive device Mentation Interventions: Bed/chair exit alarm, Door open when patient unattended Medication Interventions: Patient to call before getting OOB, Teach patient to arise slowly Elimination Interventions: Urinal in reach, Patient to call for help with toileting needs History of Falls Interventions: Bed/chair exit alarm, Room close to nurse's station Problem: Syncope Goal: *Absence of injury Outcome: Progressing Towards Goal 
Goal: Decrease or eliminate episodes of syncope Outcome: Progressing Towards Goal

## 2020-10-25 NOTE — PROGRESS NOTES
Bedside shift change report given to [address] [facility] (oncoming nurse) by Anay Oliver (offgoing nurse). Report included the following information SBAR, Kardex, Intake/Output, MAR and Cardiac Rhythm NSR.

## 2020-10-25 NOTE — PROGRESS NOTES
Problem: Diabetes Self-Management Goal: *Disease process and treatment process Description: Define diabetes and identify own type of diabetes; list 3 options for treating diabetes. Outcome: Progressing Towards Goal 
  
Problem: Falls - Risk of 
Goal: *Absence of Falls Description: Document Raymond Baird Fall Risk and appropriate interventions in the flowsheet. Outcome: Progressing Towards Goal 
Note: Fall Risk Interventions: 
Mobility Interventions: Patient to call before getting OOB Mentation Interventions: Bed/chair exit alarm, Door open when patient unattended, Eyeglasses and hearing aids, Room close to nurse's station, Toileting rounds Medication Interventions: Patient to call before getting OOB Elimination Interventions: Call light in reach History of Falls Interventions: Investigate reason for fall, Room close to nurse's station Problem: Patient Education: Go to Patient Education Activity Goal: Patient/Family Education Outcome: Progressing Towards Goal 
  
Problem: General Medical Care Plan Goal: *Absence of infection signs and symptoms Outcome: Progressing Towards Goal 
  
Problem: Syncope Goal: *Absence of injury Outcome: Progressing Towards Goal 
Goal: Decrease or eliminate episodes of syncope Outcome: Progressing Towards Goal 
  
Problem: Risk for Spread of Infection Goal: Prevent transmission of infectious organism to others Description: Prevent the transmission of infectious organisms to other patients, staff members, and visitors. Outcome: Progressing Towards Goal 
  
Problem: Pressure Injury - Risk of 
Goal: *Prevention of pressure injury Description: Document Justin Scale and appropriate interventions in the flowsheet. Outcome: Progressing Towards Goal 
Note: Pressure Injury Interventions: 
Sensory Interventions: Float heels, Maintain/enhance activity level Moisture Interventions: Absorbent underpads, Check for incontinence Q2 hours and as needed, Minimize layers, Offer toileting Q_hr Activity Interventions: Increase time out of bed Mobility Interventions: HOB 30 degrees or less Nutrition Interventions: Document food/fluid/supplement intake Friction and Shear Interventions: HOB 30 degrees or less, Minimize layers Problem: Patient Education: Go to Patient Education Activity Goal: Patient/Family Education Outcome: Progressing Towards Goal

## 2020-10-25 NOTE — PROGRESS NOTES
[address] [facility] Adult  Hospitalist Group Hospitalist Progress Note [address] [facility], DO Answering service: [phone] OR [phone] from in house phone Date of Service:  10/25/2020 NAME:  Rafael Lee :  1935 MRN:  [id number] Admission Summary:  
Patient reports that he lives in [facility], today he was out shopping and as he was coming out he fainted.  Per chart patient has history of severe aortic stenosis.  Patient feels that he hit his head but denies any other complaints or problems.  Patient came to the ER was requested to be observed under the hospitalist service. Doe Antonio reports that he is back to baseline he denies any complaints or problems currently.  Patient reports that he has been taking his medications on a regular basis. Doe Antonio reports that he has not had any contact with known COVID-19 patients. Interval history / Subjective: Follow up syncope. Patient seen and examined. No acute complaints. Assessment & Plan:  
 
Severe Aortic Stenosis: 
Syncope:  
-Cardiac surgery and cardiology following. Consideration for TAVR +/- pacemaker 
-CT C/A/P pelvis complete 10/23 
-Echo 10/18 with normal EF 55-60%, moderate aortic stenosis 
-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   
  
Afib w/ RVR: resolved -new onset 10/19; confirmed on EKG 
-HR controlled w/o intervention 
-hold [address] for now per cards d/t likely TAVR; will need to be started prior to DC 
-Monitor on tele, continue heparin 
-Cont BB 
  
UTI:  
-culture shows Enterobacter aerogenes and MRSA 
-cont ceftriaxone x 7 days and Vanc  
-blood cultures NGTD 
-appreciate ID 
  
Hypokalemia: replete as needed 
  
Carotid artery stenosis:  
-Carotid dopplers 10/19: less than 50% stenosis bilaterally;  Left vertebral shows resistant flow, suggesting distal occlusion  
-evaluated by vasc surg 10/20-no intervention needed 
  
NARAYAN: POA creat/gfr 1.36/50 (baseline normal kidney function) 
-Resolved DMII: A1c 7.1 -inc lantus to 15u qhs 
-AC/HS accuchecks w/ SSI 
-trend BS 
  
HFpEF: Echo 10/18 w/ EF 55-60% -Stable 
-Cont BB, ARB, HCTZ 
-appreciate cards input 
  
CTA Chest Incidental Findings:  
Minimal aneurysmal dilatation of ascending aorta Indeterminate 15mm left adrenal nodule Subcentimeter low density thyroid nodules: TSH 1.29 
-f/u as OP Code status: full DVT prophylaxis: heparin Care Plan discussed with: Patient/Family Anticipated Disposition: SNF/LTC Anticipated Discharge: Greater than 48 hours Hospital Problems  Date Reviewed: 10/18/2020 Codes Class Noted POA (HFpEF) heart failure with preserved ejection fraction (HCC) ICD-10-CM: I50.30 ICD-9-CM: 428.9  10/20/2020 Unknown Syncope and collapse ICD-10-CM: R55 
ICD-9-CM: 780.2  10/18/2020 Unknown Syncope ICD-10-CM: R55 
ICD-9-CM: 780.2  10/18/2020 Unknown * (Principal) Aortic stenosis ICD-10-CM: I35.0 ICD-9-CM: 424.1  10/18/2020 Overview Signed 10/21/2020  6:58 AM by Kanwal Lemon MD  
  Added automatically from request for surgery [id number] Review of Systems:  
Negative unless stated above Vital Signs:  
 Last 24hrs VS reviewed since prior progress note. Most recent are: 
Visit Vitals /63 (BP 1 Location: Right arm, BP Patient Position: At rest) Pulse (!) 59 Temp 98.6 °F (37 °C) Resp 16 Ht 6' 3\" (1.905 m) Wt 90.6 kg (199 lb 11.8 oz) SpO2 95% BMI 24.97 kg/m² Intake/Output Summary (Last 24 hours) at 10/25/2020 1525 Last data filed at 10/25/2020 1404 Gross per 24 hour Intake 360 ml Output 925 ml Net -565 ml Physical Examination:  
 
 
     
Constitutional:  No acute distress, cooperative, pleasant ENT:  Oral mucosa moist, oropharynx benign. Resp:  CTA bilaterally. No wheezing/rhonchi/rales. No accessory muscle use CV:  Regular rhythm, normal rate, +systolic murmur GI:  Soft, non distended, non tender. normoactive bowel sounds, no hepatosplenomegaly Musculoskeletal:  No edema, warm, 2+ pulses throughout Neurologic:  Moves all extremities. Data Review:  
 Review and/or order of clinical lab test 
Review and/or order of tests in the radiology section of CPT Review and/or order of tests in the medicine section of CPT Labs:  
 
Recent Labs 10/25/20 
0309 10/24/20 
0304 WBC 7.1 7.1 HGB 10.5* 10.3* HCT 31.6* 31.0*  
* 119* Recent Labs 10/25/20 
0309 10/24/20 
0304 10/23/20 
4987  137 138  
K 3.3* 3.5 3.6  106 108 CO2 25 24 24 BUN 20 16 17 CREA 0.98 0.80 0.87 * 100 109* CA 8.9 8.5 8.6 MG 2.0 1.9 2.1 No results for input(s): ALT, AP, TBIL, TBILI, TP, ALB, GLOB, GGT, AML, LPSE in the last 72 hours. No lab exists for component: SGOT, GPT, AMYP, HLPSE No results for input(s): INR, PTP, APTT, INREXT, INREXT in the last 72 hours. No results for input(s): FE, TIBC, PSAT, FERR in the last 72 hours. Lab Results Component Value Date/Time Folate 20.1 10/18/2020 09:48 PM  
  
No results for input(s): PH, PCO2, PO2 in the last 72 hours. No results for input(s): CPK, CKNDX, TROIQ in the last 72 hours. No lab exists for component: CPKMB Lab Results Component Value Date/Time Cholesterol, total 114 02/26/2020 03:19 PM  
 HDL Cholesterol 40 02/26/2020 03:19 PM  
 LDL, calculated 54 02/26/2020 03:19 PM  
 Triglyceride 101 02/26/2020 03:19 PM  
 
Lab Results Component Value Date/Time  Glucose (POC) 101 (H) 10/25/2020 08:08 AM  
 Glucose (POC) 139 (H) 10/24/2020 09:56 PM  
 Glucose (POC) 151 (H) 10/24/2020 04:23 PM  
 Glucose (POC) 143 (H) 10/24/2020 11:31 AM  
 Glucose (POC) 113 (H) 10/24/2020 07:58 AM  
 
 Lab Results Component Value Date/Time Color YELLOW/STRAW 10/19/2020 04:54 PM  
 Appearance CLOUDY (A) 10/19/2020 04:54 PM  
 Specific gravity 1.011 10/19/2020 04:54 PM  
 pH (UA) 5.0 10/19/2020 04:54 PM  
 Protein Negative 10/19/2020 04:54 PM  
 Glucose Negative 10/19/2020 04:54 PM  
 Ketone Negative 10/19/2020 04:54 PM  
 Bilirubin Negative 10/19/2020 04:54 PM  
 Urobilinogen 0.2 10/19/2020 04:54 PM  
 Nitrites Positive (A) 10/19/2020 04:54 PM  
 Leukocyte Esterase LARGE (A) 10/19/2020 04:54 PM  
 Epithelial cells FEW 10/19/2020 04:54 PM  
 Bacteria 1+ (A) 10/19/2020 04:54 PM  
 WBC 20-50 10/19/2020 04:54 PM  
 RBC 0-5 10/19/2020 04:54 PM  
 
 
 
Medications Reviewed:  
 
Current Facility-Administered Medications Medication Dose Route Frequency  insulin glargine (LANTUS) injection 15 Units  15 Units SubCUTAneous QHS  vancomycin - pharmacy to dose    Other Rx Dosing/Monitoring  vancomycin (VANCOCIN) 1500 mg in  ml infusion  1,500 mg IntraVENous Q18H  
 hydroCHLOROthiazide (HYDRODIURIL) tablet 12.5 mg  12.5 mg Oral DAILY  labetaloL (NORMODYNE) tablet 100 mg  100 mg Oral DAILY  losartan (COZAAR) tablet 12.5 mg  12.5 mg Oral DAILY  ascorbic acid (vitamin C) (VITAMIN C) tablet 1,000 mg  1,000 mg Oral TID  traZODone (DESYREL) tablet 50 mg  50 mg Oral QHS PRN  
 temazepam (RESTORIL) capsule 15 mg  15 mg Oral QHS PRN  
 cefTRIAXone (ROCEPHIN) 1 g in 0.9% sodium chloride (MBP/ADV) 50 mL  1 g IntraVENous Q24H  
 amLODIPine (NORVASC) tablet 5 mg  5 mg Oral DAILY  aspirin tablet 325 mg  325 mg Oral DAILY  atorvastatin (LIPITOR) tablet 10 mg  10 mg Oral QHS  doxazosin (CARDURA) tablet 4 mg  4 mg Oral QHS  finasteride (PROSCAR) tablet 5 mg  5 mg Oral DAILY  sertraline (ZOLOFT) tablet 100 mg  100 mg Oral QPM  
 sodium chloride (NS) flush 5-40 mL  5-40 mL IntraVENous Q8H  
 sodium chloride (NS) flush 5-40 mL  5-40 mL IntraVENous PRN  
  acetaminophen (TYLENOL) tablet 650 mg  650 mg Oral Q4H PRN  
 heparin (porcine) injection 5,000 Units  5,000 Units SubCUTAneous Q8H  
 glucose chewable tablet 16 g  4 Tab Oral PRN  
 glucagon (GLUCAGEN) injection 1 mg  1 mg IntraMUSCular PRN  
 dextrose 10% infusion 0-250 mL  0-250 mL IntraVENous PRN  
 insulin lispro (HUMALOG) injection   SubCUTAneous AC&HS  
 melatonin tablet 3 mg  3 mg Oral QHS PRN  
 
______________________________________________________________________ EXPECTED LENGTH OF STAY: 4d 2h 
ACTUAL LENGTH OF STAY:          5 [address] [facility]

## 2020-10-26 NOTE — PROGRESS NOTES
Cardiac Surgery Care Coordinator-  Met with Chaz Mchugh, Introduced role of the Cardiac Surgery Care Coordinator. Reviewed plan of care and encouraged him to verbalive. He has a good understanding of the plan of care. He requests that I update his son. Placed update call to Psychiatric, reviewed plan of care and encouraged him to verbalize. He is without questions at this time.  Mika Jarrett RN

## 2020-10-26 NOTE — PROGRESS NOTES
[facility] FLOOR Progress Note Admit Date: 10/18/2020 TAVR Workup Subjective:  
Pt seen with Dr. Irasema Arnold. 13 beat run of VT over the weekend. No complaints currently. Mild fatigue and weakness; denies chest pain; hopefully TAVR this week Objective:  
 
Visit Vitals BP (!) 146/65 (BP 1 Location: Right arm, BP Patient Position: At rest;Sitting) Pulse 66 Temp 97.9 °F (36.6 °C) Resp 16 Ht 6' 3\" (1.905 m) Wt 199 lb 8.3 oz (90.5 kg) SpO2 95% BMI 24.94 kg/m² Temp (24hrs), Av.2 °F (36.8 °C), Min:97.9 °F (36.6 °C), Max:98.6 °F (37 °C) Last 24hr Input/Output: 
 
Intake/Output Summary (Last 24 hours) at 10/26/2020 9980 Last data filed at 10/25/2020 2358 Gross per 24 hour Intake 600 ml Output 1475 ml Net -875 ml EKG/Rhythm:  
Probable Multifocal atrial tachycardia (100 bpm) with premature ventricular or  
aberrantly conducted complexes    
Voltage criteria for left ventricular hypertrophy Oxygen: RA 
 
CXR:  
CXR Results  (Last 48 hours) None Admission Weight: Last Weight Weight: 205 lb 7.5 oz (93.2 kg) Weight: 199 lb 8.3 oz (90.5 kg) EXAM: 
General:  Pleasant, cooperative Lungs:   Clear to auscultation bilaterally. Heart:  Regular rate and rhythm, S1, S2 normal, no murmur, click, rub or gallop. Abdomen:   Soft, non-tender. Bowel sounds normal. No masses,  No organomegaly. Extremities:  No edema. PPP Neurologic:  Gross motor and sensory apparatus intact. Lab Data Reviewed:  
Recent Labs 10/26/20 
6627 10/26/20 
2430 WBC  --  6.7 HGB  --  10.9* HCT  --  33.2*  
PLT  --  131* NA  --  140 K  --  3.6 BUN  --  15  
CREA  --  0.87 GLU  --  100 GLUCPOC 111*  --   
 
 
 
Assessment:  
 
Principal Problem: Aortic stenosis (10/18/2020) Overview: Added automatically from request for surgery [id number] Active Problems: 
  Syncope and collapse (10/18/2020) Syncope (10/18/2020) (HFpEF) heart failure with preserved ejection fraction ([facility] [address] (10/20/2020) Plan/Recommendations/Medical Decision Makin. Aortic stenosis, severe and symptomatic (paradoxical LFLG): Plan for CTA today. Valve team to review for decision making.  
  
2. CAD with Hx of CABG: On ASA, Statin, BB, ARB 
  
3. HTN: Controlled on current medications. Care for primary team. 
  
4. HLD: On Statin 
  
5. HFpEF: BP management 
  
6. Syncope: Likely related to AS. BICA of <50%.  
  
7. PAD: On ASA, Statin. CTA pending to assess viability of transfemoral approach for TAVR. Bilateral iliacs 6 mm.  
  
8. DM: On Metformin, Glipizide at home. Lantus/SSI while in the hospital. A1C of 7.1.  
  
9. MAT/Atrial fibrillation: Currently in NSR 60s. Currently on BB and . No OAC currently 
  
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 
  
11. UTI: On vancomycin and ceftriaxone. MRSA and enterobacter on culture. ID following.  
  
12. BPH: On Proscar 
  
13. Depression: on Zoloft Dispo: Timing/approach of TAVR pending. Signed By: SIMON Hudson [place] patient, agree with above Risk of morbidity and mortality - high Medical decision making - high complexity 1. Aortic stenosis, severe and symptomatic (paradoxical LFLG): Plan for CTA today. Valve team to review for decision making.  
  
2. CAD with Hx of CABG: On ASA, Statin, BB, ARB 
  
3. HTN: Controlled on current medications. Care for primary team. 
  
4. HLD: On Statin 
  
5. HFpEF: BP management 
  
6. Syncope: Likely related to AS. BICA of <50%.  
  
7. PAD: On ASA, Statin. CTA pending to assess viability of transfemoral approach for TAVR. Bilateral iliacs 6 mm.  
  
8. DM: On Metformin, Glipizide at home. Lantus/SSI while in the hospital. A1C of 7.1.  
  
9. MAT/Atrial fibrillation: Currently in NSR 60s. Currently on BB and . No OAC currently 
  
10.  Mild MS with MAC: Will evaluate MAC more closely with CTA 
  
 11. UTI: On vancomycin and ceftriaxone. MRSA and enterobacter on culture. ID following.  
  
12. BPH: On Proscar 
  
13. Depression: on Zoloft

## 2020-10-26 NOTE — PROGRESS NOTES
Bedside and Verbal shift change report given to Loraine Stewart RN (oncoming nurse) by Angel Schaffer RN (offgoing nurse). Report included the following information SBAR, Kardex, OR Summary, MAR, Recent Results and Cardiac Rhythm NSR, 1st Degree AVB.

## 2020-10-26 NOTE — PROGRESS NOTES
[facility] Adult  Hospitalist Group Hospitalist Progress Note Renae Santoro NP Answering service: [phone] OR [phone] from in house phone Date of Service:  10/26/2020 NAME:  Aaprna Flower :  1935 MRN:  [id number] Admission Summary:  
Patient reports that he lives in [facility], today he was out shopping and as he was coming out he fainted.  Per chart patient has history of severe aortic stenosis.  Patient feels that he hit his head but denies any other complaints or problems.  Patient came to the ER was requested to be observed under the hospitalist service. Gopal Sheikh reports that he is back to baseline he denies any complaints or problems currently.  Patient reports that he has been taking his medications on a regular basis. Gopal Sheikh reports that he has not had any contact with known COVID-19 patients. Interval history / Subjective: Follow up syncope. Patient seen and examined. No acute complaints. Awaiting TAVR this week, possibly wed/thurs. Assessment & Plan:  
 
Severe Aortic Stenosis: 
Syncope:  
-Stable 
-Echo 10/18 with normal EF 55-60%, moderate aortic stenosis 
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   
-for TAVR this week 
-appreciate cardiology and cardiac surgery input 
  
Afib w/ RVR: resolved -new onset 10/19; confirmed on EKG 
-HR controlled w/o intervention 
-hold [address] [place] [address] for now per cards d/t TAVR; will need to be started prior to DC 
-Monitor on tele, continue heparin 
-Cont BB 
  
UTI:  
-culture shows Enterobacter aerogenes and MRSA 
-completed ceftriaxone x 7 days (last dose 10/25) 
-cont vanc (last dose 10/28) -blood cultures NGTD 
-appreciate ID 
  
Hypokalemia: replete as needed 
  
Carotid artery stenosis: -Carotid dopplers 10/19: less than 50% stenosis bilaterally; Left vertebral shows resistant flow, suggesting distal occlusion  
-evaluated by vasc surg 10/20-no intervention needed 
  
NARAYAN: POA creat/gfr 1.36/50 (baseline normal kidney function) 
-Resolved DMII: A1c 7.1 -inc lantus to 15u qhs 
-AC/HS accuchecks w/ SSI 
-trend BS 
  
HFpEF: Echo 10/18 w/ EF 55-60% -Stable 
-Cont BB, ARB, HCTZ 
-appreciate cards input 
  
CTA Chest Incidental Findings:  
Minimal aneurysmal dilatation of ascending aorta Indeterminate 15mm left adrenal nodule Subcentimeter low density thyroid nodules: TSH 1.29 
-f/u as OP Code status: full DVT prophylaxis: heparin Care Plan discussed with: Patient/Family Anticipated Disposition: SNF/LTC Anticipated Discharge: Greater than 48 hours Hospital Problems  Date Reviewed: 10/18/2020 Codes Class Noted POA (HFpEF) heart failure with preserved ejection fraction (HCC) ICD-10-CM: I50.30 ICD-9-CM: 428.9  10/20/2020 Unknown Syncope and collapse ICD-10-CM: R55 
ICD-9-CM: 780.2  10/18/2020 Unknown Syncope ICD-10-CM: R55 
ICD-9-CM: 780.2  10/18/2020 Unknown * (Principal) Aortic stenosis ICD-10-CM: I35.0 ICD-9-CM: 424.1  10/18/2020 Overview Signed 10/21/2020  6:58 AM by Josh Elizabeth MD  
  Added automatically from request for surgery [id number] Review of Systems:  
Negative unless stated above Vital Signs:  
 Last 24hrs VS reviewed since prior progress note. Most recent are: 
Visit Vitals BP (!) 146/65 (BP 1 Location: Right arm, BP Patient Position: At rest;Sitting) Pulse 66 Temp 97.9 °F (36.6 °C) Resp 16 Ht 6' 3\" (1.905 m) Wt 90.5 kg (199 lb 8.3 oz) SpO2 95% BMI 24.94 kg/m² Intake/Output Summary (Last 24 hours) at 10/26/2020 2960 Last data filed at 10/25/2020 2358 Gross per 24 hour Intake 600 ml Output 1475 ml Net -875 ml Physical Examination: Constitutional:  No acute distress, cooperative, pleasant ENT:  Oral mucosa moist, oropharynx benign. Resp:  CTA bilaterally. No wheezing/rhonchi/rales. No accessory muscle use CV:  Regular rhythm, normal rate, +systolic murmur GI:  Soft, non distended, non tender. normoactive bowel sounds Musculoskeletal:  No edema, warm, 2+ pulses throughout Neurologic:  Moves all extremities. Data Review:  
 Review and/or order of clinical lab test 
Review and/or order of tests in the radiology section of CPT Review and/or order of tests in the medicine section of CPT Labs:  
 
Recent Labs 10/26/20 
0450 10/25/20 
0309 WBC 6.7 7.1 HGB 10.9* 10.5* HCT 33.2* 31.6*  
* 121* Recent Labs 10/26/20 
8716 10/25/20 
0309 10/24/20 
0304  138 137  
K 3.6 3.3* 3.5  107 106 CO2 27 25 24 BUN 15 20 16 CREA 0.87 0.98 0.80  126* 100 CA 9.0 8.9 8.5 MG 1.9 2.0 1.9 No results for input(s): ALT, AP, TBIL, TBILI, TP, ALB, GLOB, GGT, AML, LPSE in the last 72 hours. No lab exists for component: SGOT, GPT, AMYP, HLPSE No results for input(s): INR, PTP, APTT, INREXT, INREXT in the last 72 hours. No results for input(s): FE, TIBC, PSAT, FERR in the last 72 hours. Lab Results Component Value Date/Time Folate 20.1 10/18/2020 09:48 PM  
  
No results for input(s): PH, PCO2, PO2 in the last 72 hours. No results for input(s): CPK, CKNDX, TROIQ in the last 72 hours. No lab exists for component: CPKMB Lab Results Component Value Date/Time Cholesterol, total 114 02/26/2020 03:19 PM  
 HDL Cholesterol 40 02/26/2020 03:19 PM  
 LDL, calculated 54 02/26/2020 03:19 PM  
 Triglyceride 101 02/26/2020 03:19 PM  
 
Lab Results Component Value Date/Time  Glucose (POC) 111 (H) 10/26/2020 08:26 AM  
 Glucose (POC) 170 (H) 10/25/2020 09:59 PM  
 Glucose (POC) 119 (H) 10/25/2020 04:16 PM  
 Glucose (POC) 178 (H) 10/25/2020 11:56 AM  
 Glucose (POC) 101 (H) 10/25/2020 08:08 AM  
 
Lab Results Component Value Date/Time Color YELLOW/STRAW 10/19/2020 04:54 PM  
 Appearance CLOUDY (A) 10/19/2020 04:54 PM  
 Specific gravity 1.011 10/19/2020 04:54 PM  
 pH (UA) 5.0 10/19/2020 04:54 PM  
 Protein Negative 10/19/2020 04:54 PM  
 Glucose Negative 10/19/2020 04:54 PM  
 Ketone Negative 10/19/2020 04:54 PM  
 Bilirubin Negative 10/19/2020 04:54 PM  
 Urobilinogen 0.2 10/19/2020 04:54 PM  
 Nitrites Positive (A) 10/19/2020 04:54 PM  
 Leukocyte Esterase LARGE (A) 10/19/2020 04:54 PM  
 Epithelial cells FEW 10/19/2020 04:54 PM  
 Bacteria 1+ (A) 10/19/2020 04:54 PM  
 WBC 20-50 10/19/2020 04:54 PM  
 RBC 0-5 10/19/2020 04:54 PM  
 
 
 
Medications Reviewed:  
 
Current Facility-Administered Medications Medication Dose Route Frequency  insulin glargine (LANTUS) injection 15 Units  15 Units SubCUTAneous QHS  vancomycin - pharmacy to dose    Other Rx Dosing/Monitoring  vancomycin (VANCOCIN) 1500 mg in  ml infusion  1,500 mg IntraVENous Q18H  
 hydroCHLOROthiazide (HYDRODIURIL) tablet 12.5 mg  12.5 mg Oral DAILY  labetaloL (NORMODYNE) tablet 100 mg  100 mg Oral DAILY  losartan (COZAAR) tablet 12.5 mg  12.5 mg Oral DAILY  ascorbic acid (vitamin C) (VITAMIN C) tablet 1,000 mg  1,000 mg Oral TID  traZODone (DESYREL) tablet 50 mg  50 mg Oral QHS PRN  
 temazepam (RESTORIL) capsule 15 mg  15 mg Oral QHS PRN  
 amLODIPine (NORVASC) tablet 5 mg  5 mg Oral DAILY  aspirin tablet 325 mg  325 mg Oral DAILY  atorvastatin (LIPITOR) tablet 10 mg  10 mg Oral QHS  doxazosin (CARDURA) tablet 4 mg  4 mg Oral QHS  finasteride (PROSCAR) tablet 5 mg  5 mg Oral DAILY  sertraline (ZOLOFT) tablet 100 mg  100 mg Oral QPM  
 sodium chloride (NS) flush 5-40 mL  5-40 mL IntraVENous Q8H  
 sodium chloride (NS) flush 5-40 mL  5-40 mL IntraVENous PRN  
 acetaminophen (TYLENOL) tablet 650 mg  650 mg Oral Q4H PRN  
  heparin (porcine) injection 5,000 Units  5,000 Units SubCUTAneous Q8H  
 glucose chewable tablet 16 g  4 Tab Oral PRN  
 glucagon (GLUCAGEN) injection 1 mg  1 mg IntraMUSCular PRN  
 dextrose 10% infusion 0-250 mL  0-250 mL IntraVENous PRN  
 insulin lispro (HUMALOG) injection   SubCUTAneous AC&HS  
 melatonin tablet 3 mg  3 mg Oral QHS PRN  
 
______________________________________________________________________ EXPECTED LENGTH OF STAY: 4d 2h 
ACTUAL LENGTH OF STAY:          6 Charito Hauser NP

## 2020-10-26 NOTE — CONSULTS
Infectious Disease Consult Note Reason for Consult: Ecoli and MRSA in urine Date of Consultation: October 26, 2020 Date of Admission: 10/18/2020 Referring Physician: HUMBERTO Dwyer HPI: 
Emperatriz Shepherd is a 82y.o. year old male with history of coronary artery disease, history of bypass, BPH, diabetes, history of peripheral vascular disease with stenting per patient lower extremity, who was admitted after syncope. Patient tells me clearly that he had no symptoms prior to episode including chest pain, shortness of breath, lightheadedness, headache. He also denies a history of nausea vomiting diarrhea or back pain. He denies dysuria other than his frequency with urination at night which is not new and secondary to BPH per patient. He says this nocturnal urination has not changed in frequency. Work-up led to the diagnosis of aortic stenosis with plans for aortic valve surgery during this hospitalization. I was consulted as his urine culture obtained on 10/19/2020 was positive for E. coli and MRSA. Urine analysis on 10/19/2020 was positive for nitrates, leukocyte Estrace, 20-50 WBCs, 1+ bacteria, trace blood, and was cloudy in appearance. He was started on ceftriaxone and vancomycin  IV. He has had a renal ultrasound. I had attempted to see the patient on Friday of last week but he was undergoing procedures and was not able to see him that day. Today, patient states he is  feeling very well. He is eager to get surgery done and go home eventually. He denies any particular symptoms. Patient denies history of Rosen catheter or recent urological procedures. He denies history of ureteral stents or any other hardware indwelling in his body other than the stents he thinks he has for peripheral vascular disease in his lower extremities as well as his coronary artery disease bypass graft/coronary artery stents possibly. Past Medical History: 
Past Medical History:  
Diagnosis Date  BPH (benign prostatic hyperplasia)  CAD (coronary artery disease)  Diabetes ([facility] [address]  Hearing loss  Hypercholesterolemia  Hypertension  Murmur  Recurrent depression ([facility] [address] 8/22/2019 Surgical History: 
Past Surgical History:  
Procedure Laterality Date  CARDIAC SURG PROCEDURE UNLIST  2006 by-pass  HX CHOLECYSTECTOMY Family History:  
Family History Problem Relation Age of Onset  Cancer Mother  Cancer Father Social History: · Living Situation: At home with his wife, retired, used to work with [employer] · Tobacco: Quit many years ago · Alcohol: Denied · Illicit Drugs: Denied · Denied recent animal exposure or travel history Allergies: Allergies Allergen Reactions  Procaine Other (comments) Pt stated he passed out from procaine and was told not to let anyone use it on him again Review of Systems: 
Ten point review of systems obtained and per HPI All others negative Medications: No current facility-administered medications on file prior to encounter. Current Outpatient Medications on File Prior to Encounter Medication Sig Dispense Refill  sertraline (ZOLOFT) 100 mg tablet TAKE 1 TABLET BY MOUTH  DAILY 90 Tab 1  
 traZODone (DESYREL) 50 mg tablet Take 2 tablets by mouth at night 180 Tab 1  
 temazepam (RESTORIL) 15 mg capsule TAKE 1 CAPSULE BY MOUTH AT BEDTIME AS NEEDED FOR SLEEP. 90 Cap 1  
 losartan (COZAAR) 25 mg tablet TAKE ONE-HALF TABLET BY  MOUTH DAILY 45 Tab 3  
 labetaloL (NORMODYNE) 100 mg tablet TAKE 1 TABLET BY MOUTH  DAILY 90 Tab 1  
 fluticasone propionate (FLONASE) 50 mcg/actuation nasal spray ADMINISTER 1 Spray IN Both Nostrils two (2) times a day.  16 g 0  
 menthol-zinc oxide (CALMOSEPTINE) 0.44-20.6 % oint Apply to bilateral buttocks TID  Indications: skin irritation 113 g 5  
 OTHER Hearing evaluation for possible changes in hearing 1 Each 0  
  tiZANidine (ZANAFLEX) 2 mg tablet Take 1 Tab by mouth three (3) times daily as needed for Pain. 20 Tab 0  
 glucose blood VI test strips (TRUE METRIX GLUCOSE TEST STRIP) strip Check BS BID  Dx: DM  E11.21 100 Strip 11  
 aspirin (ASPIRIN) 325 mg tablet Take 1 Tab by mouth daily. 30 Tab 2  cholecalciferol (VITAMIN D3) 1,000 unit cap Take 1 Cap by mouth daily. 30 Cap 2  
 magnesium 250 mg tab Take 1 Tab by mouth daily. 30 Tab 2  
 sodium chloride (OCEAN) 0.65 % nasal squeeze bottle 0.05 mL by Both Nostrils route as needed for Congestion. 30 mL 2  
 FERROUS FUMARATE/VIT BCOMP,C (SUPER B COMPLEX PO) Take  by mouth. IV antibiotics  
ceftriaxone  
vancomycin Physical Exam: 
 
Vitals:  
Patient Vitals for the past 24 hrs: 
 Temp Pulse Resp BP SpO2  
10/26/20 1200 97.9 °F (36.6 °C) 71 20 134/68 93 % 10/26/20 0912 98 °F (36.7 °C) 86 20 137/71 90 % 10/26/20 0441 97.9 °F (36.6 °C) 66 16 (!) 146/65 95 % 10/25/20 2358 98 °F (36.7 °C) 65 16 (!) 141/57 94 % 10/25/20 2115 98.2 °F (36.8 °C) 64 17 (!) 144/68 95 % 10/25/20 1541 98.4 °F (36.9 °C) 60 16 (!) 155/62 96 % ·  
· GEN: NAD 
· HEENT:  no scleral icterus,  no thrush · CV: S1, S2 heard regularly, systolic ejection murmur heard · Lungs: Clear to auscultation bilaterally · Abdomen: soft, non distended, non tender,  no CVA tenderness · Genitourinary: no nguyễn · Extremities: no edema · Neuro: Alert, oriented to self, place and situation, moves all extremities to commands, verbal  
· Skin: no rash · Psych: good affect, good eye contact, non tearful · Musculoskeletal nontender to palpation of the back, knees, ankles Labs:  
Recent Results (from the past 24 hour(s)) GLUCOSE, POC Collection Time: 10/25/20  4:16 PM  
Result Value Ref Range Glucose (POC) 119 (H) 65 - 100 mg/dL Performed by Jessica COTO, POC Collection Time: 10/25/20  9:59 PM  
Result Value Ref Range Glucose (POC) 170 (H) 65 - 100 mg/dL Performed by Nasreen Blancas CBC WITH AUTOMATED DIFF Collection Time: 10/26/20  4:50 AM  
Result Value Ref Range WBC 6.7 4.1 - 11.1 K/uL  
 RBC 3.92 (L) 4.10 - 5.70 M/uL  
 HGB 10.9 (L) 12.1 - 17.0 g/dL HCT 33.2 (L) 36.6 - 50.3 % MCV 84.7 80.0 - 99.0 FL  
 MCH 27.8 26.0 - 34.0 PG  
 MCHC 32.8 30.0 - 36.5 g/dL  
 RDW 14.6 (H) 11.5 - 14.5 % PLATELET 284 (L) 106 - 400 K/uL MPV 12.2 8.9 - 12.9 FL  
 NRBC 0.0 0  WBC ABSOLUTE NRBC 0.00 0.00 - 0.01 K/uL NEUTROPHILS 66 32 - 75 % LYMPHOCYTES 18 12 - 49 % MONOCYTES 10 5 - 13 % EOSINOPHILS 5 0 - 7 % BASOPHILS 0 0 - 1 % IMMATURE GRANULOCYTES 1 (H) 0.0 - 0.5 % ABS. NEUTROPHILS 4.4 1.8 - 8.0 K/UL  
 ABS. LYMPHOCYTES 1.2 0.8 - 3.5 K/UL  
 ABS. MONOCYTES 0.7 0.0 - 1.0 K/UL  
 ABS. EOSINOPHILS 0.3 0.0 - 0.4 K/UL  
 ABS. BASOPHILS 0.0 0.0 - 0.1 K/UL  
 ABS. IMM. GRANS. 0.0 0.00 - 0.04 K/UL  
 DF AUTOMATED METABOLIC PANEL, BASIC Collection Time: 10/26/20  4:50 AM  
Result Value Ref Range Sodium 140 136 - 145 mmol/L Potassium 3.6 3.5 - 5.1 mmol/L Chloride 107 97 - 108 mmol/L  
 CO2 27 21 - 32 mmol/L Anion gap 6 5 - 15 mmol/L Glucose 100 65 - 100 mg/dL BUN 15 6 - 20 MG/DL Creatinine 0.87 0.70 - 1.30 MG/DL  
 BUN/Creatinine ratio 17 12 - 20 GFR est AA >60 >60 ml/min/1.73m2 GFR est non-AA >60 >60 ml/min/1.73m2 Calcium 9.0 8.5 - 10.1 MG/DL MAGNESIUM Collection Time: 10/26/20  4:50 AM  
Result Value Ref Range Magnesium 1.9 1.6 - 2.4 mg/dL GLUCOSE, POC Collection Time: 10/26/20  8:26 AM  
Result Value Ref Range Glucose (POC) 111 (H) 65 - 100 mg/dL Performed by Rajesh Huntley GLUCOSE, POC Collection Time: 10/26/20 11:56 AM  
Result Value Ref Range Glucose (POC) 219 (H) 65 - 100 mg/dL Performed by Rajesh Huntley Microbiology Data: 
  
  Blood:10/23/20 Component  Value  Ref Range & Units  Status Special Requests:  NO SPECIAL REQUESTS     Preliminary Culture result:  NO GROWTH 3 DAYS     Preliminary Result History Urine: 10/19/20 Urine    
     
Component  Value  Ref Range & Units  Status Special Requests:  NO SPECIAL REQUESTS Reflexed from [id number]     Final   
[place] Count  >100,000 COLONIES/mL     Final   
Culture result:  ENTEROBACTER AEROGENESAbnormal       Final   
Culture result: Abnormal        Final   
* METHICILLIN RESISTANT STAPHYLOCOCCUS AUREUS * (PREDOMINATING) Susceptibility Enterobacter aerogenes  Staphylococcus aureus Methcillin Resistant EVER  EVER Amikacin ($)  <=2 ug/mL  S Cefazolin ($)   R Cefepime ($$)  <=1 ug/mL  S Cefoxitin  >=64 ug/mL  R Ceftazidime ($)  <=1 ug/mL  S Ceftriaxone ($)  <=1 ug/mL  S Ciprofloxacin ($)  <=0.25 ug/mL  S  >=8 ug/mL  R Daptomycin ($$$$$)    0.25 ug/mL  S Doxycycline ($$)    <=0.5 ug/mL  S Gentamicin ($)  <=1 ug/mL  S  <=0.5 ug/mL  S Levofloxacin ($)  1 ug/mL  S1  >=8 ug/mL  R Linezolid ($$$$$)    2 ug/mL  S Meropenem ($$)  <=0.25 ug/mL  S Moxifloxacin ($$$$)    4 ug/mL  I Nitrofurantoin  64 ug/mL  I  <=16 ug/mL  S Oxacillin    >=4 ug/mL  R Piperacillin/Tazobac ($)  32 ug/mL  I Rifampin ($$$$)    <=0.5 ug/mL  S2 Tetracycline    <=1 ug/mL  S Tobramycin ($)  <=1 ug/mL  S Trimeth/Sulfa  <=20 ug/mL  S  <=10 ug/mL  S Vancomycin ($)    <=0.5 ug/mL  S Imaging:  
Renal US 
FINDINGS:  
Right kidney: 12.1 cm in length. Normal echogenicity. No nephrolithiasis or 
hydronephrosis. Left kidney:  11.3 cm in length. Normal echogenicity. No nephrolithiasis or 
hydronephrosis. Abdominal aorta/common iliac arteries/IVC:  Visualized portions are normal.   
Urinary Bladder: Incompletely distended. Mild prostate enlargement. Other:  N/A. 
  
IMPRESSION IMPRESSION:  
No significant abnormality or acute process. TTE 10/18/20 Echo Findings Left Ventricle  Normal cavity size, wall thickness and systolic function (ejection fraction normal). The estimated EF is 55 - 60%. There is mild (grade 1) left ventricular diastolic dysfunction. Atrial fibrillation observed. Left Atrium  Mildly dilated left atrium. Right Ventricle  Not well visualized. Normal cavity size and global systolic function. Right Atrium  Right atrium not well visualized. Normal cavity size. Aortic Valve  Aortic valve sclerosis. There is leaflet calcification. Aortic valve peak gradient is 60 mmHg. Aortic valve mean gradient is 35 mmHg. There is moderate aortic stenosis. Mitral Valve  Mitral valve thickening. Leaflet calcification. Mitral annular calcification. Mitral valve mean gradient is 6 mmHg. Mild-to-moderate stenosis present. Tricuspid Valve  No stenosis. Non-specific thickening. Mild regurgitation. Pulmonic Valve  Pulmonic valve not well visualized. Aorta  Normal aortic root. Pulmonary Artery  Pulmonary arterial systolic pressure (PASP) is 25 mmHg. Pulmonary hypertension not suggested by Doppler findings. IVC/Hepatic Veins  Inferior vena cava not well visualized. Pericardium  No evidence of pericardial effusion. Pericardial fat pad present. Assessment / Plan:  
 
 
82y.o. year old male with history of coronary artery disease, history of bypass, BPH, diabetes, history of peripheral vascular disease with stenting per patient lower extremity, who was admitted after syncope. Work-up led to the diagnosis of aortic stenosis with plans for aortic valve surgery during this hospitalization. I was consulted as his urine culture obtained on 10/19/2020 was positive for E. coli and MRSA. Urine analysis on 10/19/2020 was positive for nitrates, leukocyte Estrace, 20-50 WBCs, 1+ bacteria, trace blood, and was cloudy in appearance. He was started on ceftriaxone and vancomycin  IV. He has had a renal ultrasound. 1) urine with E. coli and MRSA on 10/19/2020 Discussed with patient and his son regarding the concern of colonization Would be difficult to know for sure if it is infection but may be more towards colonization based on his history MRSA in the urine is usually from blood and blood cultures were checked and negative However blood cultures were done in the setting of antibiotics He does not appear to be systemically ill Agree with completing the course of  7 days antibiotics with  ceftriaxone and vancomycin as far as he tolerates without any diarrhea and renal function is stable. CT with circumferential bladder wall thickening and small bladder diverticula. Plans per primary team regarding this work-up He is already completed 7-day therapy of ceftriaxone Vancomycin trough goal 10-15 Antibiotic side effects/adverse effects/toxicities discussed including gastrointestinal, renal, hematological , ability to lower siezure threshold, risk for C diff infection. Probiotics, daily yogurt encouraged. 2)Aortic stenosis Per cardiac surgery and cardiology 3) BPH hx  
 
4) CAD S/P CABG 
 
5)  patient reported history of peripheral vascular disease status post stenting to lower extremities 6) right hepatic lobe enhancing lesion Plans per primary team 
 
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 
 
8) dvt px I will sign off at this time. Thank for the opportunity to participate in the care of this patient. Please contact with questions or concerns.

## 2020-10-26 NOTE — PROGRESS NOTES
Bedside and Verbal shift change report given to Vicky Mckeon (oncoming nurse) by Chata Ontiveros RN (offgoing nurse). Report included the following information SBAR, Kardex, ED Summary, Procedure Summary, Intake/Output, MAR, Recent Results and Cardiac Rhythm NSR with 1st degree AV block.

## 2020-10-26 NOTE — PROGRESS NOTES
Problem: Diabetes Self-Management Goal: *Disease process and treatment process Description: Define diabetes and identify own type of diabetes; list 3 options for treating diabetes. Outcome: Progressing Towards Goal 
Goal: *Incorporating nutritional management into lifestyle Description: Describe effect of type, amount and timing of food on blood glucose; list 3 methods for planning meals. Outcome: Progressing Towards Goal 
Goal: *Developing strategies to promote health/change behavior Description: Define the ABC's of diabetes; identify appropriate screenings, schedule and personal plan for screenings. Outcome: Progressing Towards Goal 
Goal: *Using medications safely Description: State effect of diabetes medications on diabetes; name diabetes medication taking, action and side effects. Outcome: Progressing Towards Goal 
Goal: *Monitoring blood glucose, interpreting and using results Description: Identify recommended blood glucose targets  and personal targets. Outcome: Progressing Towards Goal 
Goal: *Prevention, detection, treatment of acute complications Description: List symptoms of hyper- and hypoglycemia; describe how to treat low blood sugar and actions for lowering  high blood glucose level. Outcome: Progressing Towards Goal 
Goal: *Prevention, detection and treatment of chronic complications Description: Define the natural course of diabetes and describe the relationship of blood glucose levels to long term complications of diabetes. Outcome: Progressing Towards Goal 
Goal: *Developing strategies to address psychosocial issues Description: Describe feelings about living with diabetes; identify support needed and support network Outcome: Progressing Towards Goal 
Goal: *Patient Specific Goal (EDIT GOAL, INSERT TEXT) Outcome: Progressing Towards Goal 
  
Problem: Patient Education: Go to Patient Education Activity Goal: Patient/Family Education Outcome: Progressing Towards Goal 
  
 Problem: Falls - Risk of 
Goal: *Absence of Falls Description: Document Lion Rogers Fall Risk and appropriate interventions in the flowsheet. Outcome: Progressing Towards Goal 
Note: Fall Risk Interventions: 
Mobility Interventions: Bed/chair exit alarm, Strengthening exercises (ROM-active/passive), Utilize walker, cane, or other assistive device Mentation Interventions: Adequate sleep, hydration, pain control, Bed/chair exit alarm, Door open when patient unattended, Evaluate medications/consider consulting pharmacy, More frequent rounding, Reorient patient, Room close to nurse's station Medication Interventions: Bed/chair exit alarm, Evaluate medications/consider consulting pharmacy, Teach patient to arise slowly Elimination Interventions: Bed/chair exit alarm, Call light in reach, Urinal in reach History of Falls Interventions: Bed/chair exit alarm, Investigate reason for fall, Room close to nurse's station Problem: Patient Education: Go to Patient Education Activity Goal: Patient/Family Education Outcome: Progressing Towards Goal 
  
Problem: General Medical Care Plan Goal: *Vital signs within specified parameters Outcome: Progressing Towards Goal 
Goal: *Absence of infection signs and symptoms Outcome: Progressing Towards Goal 
Goal: *Optimal pain control at patient's stated goal 
Outcome: Progressing Towards Goal 
Goal: *Skin integrity maintained Outcome: Progressing Towards Goal 
  
Problem: Patient Education: Go to Patient Education Activity Goal: Patient/Family Education Outcome: Progressing Towards Goal 
  
Problem: Cath Lab Procedures: Post-Cath Day 1 Goal: Off Pathway (Use only if patient is Off Pathway) Outcome: Progressing Towards Goal 
Goal: Activity/Safety Outcome: Progressing Towards Goal 
Goal: Diagnostic Test/Procedures Outcome: Progressing Towards Goal 
Goal: Nutrition/Diet Outcome: Progressing Towards Goal 
Goal: Discharge Planning Outcome: Progressing Towards Goal 
 Goal: Medications Outcome: Progressing Towards Goal 
Goal: Respiratory Outcome: Progressing Towards Goal 
Goal: Treatments/Interventions/Procedures Outcome: Progressing Towards Goal 
Goal: Psychosocial 
Outcome: Progressing Towards Goal 
  
Problem: Syncope Goal: *Absence of injury Outcome: Progressing Towards Goal 
Goal: Decrease or eliminate episodes of syncope Outcome: Progressing Towards Goal 
  
Problem: Patient Education: Go to Patient Education Activity Goal: Patient/Family Education Outcome: Progressing Towards Goal

## 2020-10-26 NOTE — PROGRESS NOTES
10/26/2020 -  
GLORY: 
- RUR: 14% 
- Disposition is TBD dependent on progression: potential discharge to own [place] apartment at [facility] with transport via family - Patient will need 2nd IM Letter prior to discharge - ABX continue - TAVR plan is pending CSS input - CM anticipates that patient will need to work with PT and OT both pre and post surgery CRM: Gita Segura, MPH, [address] [place] [address]; Z: [phone]

## 2020-10-26 NOTE — TELEPHONE ENCOUNTER
Patients son is calling to follow up with Apoorva Abbasi. Patients son is inquiring on the patients results from his CT scan on Monday as well as the patients overall care. Please advise.     Phone: [phone]

## 2020-10-27 NOTE — TELEPHONE ENCOUNTER
Returned son's call, 2 pt identifiers used    He wanted to know an update for tentative surgery. He has a lot of questions. He is unsure about what surgery and when it will be preformed. He has left a M with Denis Paul RN. I agreed he should wait for her return call as she would be the best  for surgery with Dr. Claudia Bell. Dr. Rj Peter is out of the office this week.

## 2020-10-27 NOTE — CONSULTS
Requesting Provider: Ede Peterson MD - Reason for Consultation: \"Bladder wall thickening, diverticula\" Pre-existing [facility] Patient:   No 
 
         
 
Patient: Pranay Gage MRN: [id number]  SSN: [id number] YOB: 1935  Age: 82 y.o. Sex: male Location: [facility]/ Code Status: Full Code PCP: Trudy Mcarthur, [address] [place] [address] [phone] Emergency Contact:  Primary Emergency Contact: hemant carty, Home Phone: [phone] Race/Nondenominational/Language: WHITE OR  / NO PREFERENCE / Speaks ENGLISH Payor: Payor: [place] / Plan: [place] MEDICARE PART A & B / Product Type: Medicare /   
Prior Admission Data: 4/17/20 [facility] EMERGENCY DEPT Hospitalized:  Hospital Day: 10 - Admitted 10/18/2020  3:07 PM  
POD # 6 Days Post-Op Procedure(s): 
Left And Right Heart Cath / Coronary Angiography W Grafts Angiography Upper Ext Left Angioplasty Peripheral Artery by Hanny Turner MD - Blood Loss: * No values recorded between 10/21/2020 10:37 AM and 10/21/2020 12:47 PM * 2 Hr 9 Min 24 Sec CONSULTANTS 
IP CONSULT TO CARDIOLOGY 
IP CONSULT TO VASCULAR SURGERY 
IP CONSULT TO INFECTIOUS DISEASES 
IP CONSULT TO UROLOGY ADMISSION DIAGNOSES 
  ICD-10-CM ICD-9-CM 1. Syncope and collapse  R55 780.2 2. Nonrheumatic aortic valve stenosis  I35.0 424.1 3. Aortic stenosis  I35.0 424.1 4. Chronic heart failure with preserved ejection fraction (HCC)  I50.32 428.9 5. ACP (advance care planning)  Z71.89 V65.49  
6. Benign prostatic hyperplasia with lower urinary tract symptoms, symptom details unspecified  N40.1 600.01  
7. Type 2 diabetes mellitus without complication, without long-term current use of insulin ([facility])  E11.9 250.00  
8. Severe aortic stenosis  I35.0 424.1 9. S/P CABG (coronary artery bypass graft)  Z95.1 V45.81  
10. Recurrent depression ([facility])  F33.9 296.30  
11. Gastroesophageal reflux disease without esophagitis  K21.9 530.81  
12. Hypercholesterolemia  E78.00 272.0 13. Gait instability  R26.81 781. 2 Assessment/Plan: · Bladder wall thickening, bladder diverticula · BPH, on Proscar 
 
-Monitor for urinary retention. Bladder scan following next void and document amount. 
-Rosen placement for PVR >350 mL. 
-2018 PSA: 1.2 
-Cr WNL. Continue to monitor. 
-Continue Proscar. -OP FU to reassess. · UTI 
 
- 
Culture result: Abnormal          Final   
ENTEROBACTER AEROGENES Culture result: Abnormal          Final   
* METHICILLIN RESISTANT STAPHYLOCOCCUS AUREUS * (PREDOMINATING) Continue current abx, Vanc. ID input and arrangement of out pt IV abx if needed. · Adrenal lesion · Renal lesion 
 
-Lesions seen incidentally on CT. OP FU after DC for assessment. CC: Syncope HPI: He is a 82 y.o. male, resident of [facility], w/ PMHx of severe aortic stenosis, HTN, CAD, s/p CABG, DMT2, kidney stones, BPH, on proscar, who presented to ER w/ cc of syncope while shopping today. He is awaiting TAVR this week. Urology consulted for CT abd/pelvis findings:   
Circumferential bladder wall thickening with small bladder diverticula. No hydronephrosis. Also seen on CT:  
ADRENALS: Right is normal. Indeterminant 15 mm left adrenal nodule Renal US:  
no hydro bilaterally, mildly enlarged prostate. Pt reports PSA and AVIVA performed @ PCP, he denies having a Urologist. He reports difficulty starting his stream, but feels he empties completely. No bladder scans recorded during this admission. WBC 7.3   Hgb 10.3   Cr WNL 
K 3.4 
UA (+) Vancomycin + Problem: bladder wall thickening; Location: bladder; Quality:chronic, Severity: mild; Timing:years, Context: as above in HPI; Better/Worse: TBD, Associated s/s:difficulty starting stream   
 Temp (24hrs), Av.4 °F (36.9 °C), Min:97.8 °F (36.6 °C), Max:99.1 °F (37.3 °C) Urinary Status: Voiding Creatinine Date/Time Value Ref Range Status 10/27/2020 04:16 AM 0.78 0.70 - 1.30 MG/DL Final  
 10/26/2020 04:50 AM 0.87 0.70 - 1.30 MG/DL Final  
10/25/2020 03:09 AM 0.98 0.70 - 1.30 MG/DL Final  
10/24/2020 03:04 AM 0.80 0.70 - 1.30 MG/DL Final  
10/23/2020 07:16 AM 0.87 0.70 - 1.30 MG/DL Final  
 
Current Antimicrobial Therapy (168h ago, onward) Ordered     Start Stop  
 10/22/20 1019  vancomycin (VANCOCIN) 1500 mg in  ml infusion  1,500 mg,   IntraVENous,   EVERY 18 HOURS    
 10/23/20 0600 10/29/20 0559  
 10/22/20 1008  vancomycin - pharmacy to dose   Other,   RX DOSING/MONITORING    
 10/22/20 1008 --  
  
 
Key Anti-Platelet Anticoagulant Meds   
    
  
 aspirin (ASPIRIN) 325 mg tablet (Taking) Take 1 Tab by mouth daily. Diet: DIET DIABETIC CONSISTENT CARB Regular DIET ONE TIME MESSAGE 
DIET NPO - % Diet Eaten: 50 % Labs Lab Results Component Value Date/Time WBC 7.3 10/27/2020 04:16 AM  
 HCT 31.8 (L) 10/27/2020 04:16 AM  
 PLATELET 558 (L) 52/66/4634 04:16 AM  
 Sodium 140 10/27/2020 04:16 AM  
 Potassium 3.4 (L) 10/27/2020 04:16 AM  
 Chloride 107 10/27/2020 04:16 AM  
 CO2 27 10/27/2020 04:16 AM  
 BUN 19 10/27/2020 04:16 AM  
 Creatinine 0.78 10/27/2020 04:16 AM  
 Glucose 131 (H) 10/27/2020 04:16 AM  
 Calcium 8.8 10/27/2020 04:16 AM  
 Magnesium 2.0 10/27/2020 04:16 AM  
 
UA:  
Lab Results Component Value Date/Time Color YELLOW/STRAW 10/19/2020 04:54 PM  
 Appearance CLOUDY (A) 10/19/2020 04:54 PM  
 Specific gravity 1.011 10/19/2020 04:54 PM  
 pH (UA) 5.0 10/19/2020 04:54 PM  
 Protein Negative 10/19/2020 04:54 PM  
 Glucose Negative 10/19/2020 04:54 PM  
 Ketone Negative 10/19/2020 04:54 PM  
 Bilirubin Negative 10/19/2020 04:54 PM  
 Urobilinogen 0.2 10/19/2020 04:54 PM  
 Nitrites Positive (A) 10/19/2020 04:54 PM  
 Leukocyte Esterase LARGE (A) 10/19/2020 04:54 PM  
 Epithelial cells FEW 10/19/2020 04:54 PM  
 Bacteria 1+ (A) 10/19/2020 04:54 PM  
 WBC 20-50 10/19/2020 04:54 PM  
 RBC 0-5 10/19/2020 04:54 PM  
 
Imaging Results for orders placed during the hospital encounter of 10/18/20 CTA ABD PELV W WO CONT Narrative *PRELIMINARY REPORT* Study performed for preoperative evaluation. Atherosclerotic vascular disease. Mild dilatation ascending thoracic aorta as seen on recent CT chest. No 
abdominal aortic aneurysm or dissection. No acute process Preliminary report was provided by Dr. Jesika Rausch, the on-call radiologist, at 9154 Final report to follow. *END PRELIMINARY REPORT* INDICATION:  Preop evaluation for aortic valve replacement EXAM:  CT angiography chest, abdomen, and pelvis WITH  CONTRAST 
 
COMPARISON:  CT chest 10/20/2020 TECHNIQUE:  Thin collimation axial images were obtained through the chest, 
abdomen, and pelvis with IV contrast administration during the arterial phase. Multiplanar and 3-D reconstructions were obtained. Oral contrast was not 
administered. CT dose reduction was achieved through use of a standardized 
protocol tailored for this examination and automatic exposure control for dose 
modulation. FINDINGS: 
 
Arteriogram: 
 
Thoracic aorta: Coarse aortic annular and valvular calcifications. Aneurysmal 
dilatation of the ascending aorta measuring 4.1 cm. Thoracic aorta is patent 
with mild atherosclerosis but no significant stenosis. Great vessel origins are 
patent with moderate atherosclerosis. Proximal left subclavian artery stent is 
noted. Abdominal aorta: Abdominal aorta is patent and normal in caliber with mild to 
moderate atherosclerosis. No significant abdominal aortic stenosis. Celiac 
artery is patent with moderate to severe ostial stenosis. Superior mesenteric 
artery is patent with severe proximal atherosclerosis. Inferior mesenteric 
artery is patent with likely ostial stenosis. Bilateral renal arteries are 
patent with mild to moderate right and moderate left proximal atherosclerosis Right common iliac artery is patent with sequelae of proximal endovascular stent 
placement and minimal luminal diameter of 6 mm. Left common iliac artery is 
patent with sequelae of endovascular stent placement and minimal luminal 
diameter of 6 mm. Bilateral internal iliac arteries are patent  and normal in 
caliber with moderate atherosclerosis. Right external iliac artery is patent 
with minimal luminal diameter of 8 mm. Left external iliac artery is patent with 
no significant stenosis and sequelae of proximal endovascular stent placement. Minimal luminal diameter of the left external iliac artery of approximately 7 
mm. Bilateral common femoral arteries are patent with mild atherosclerosis and 
minimal luminal diameter of 8 mm. Moderate atherosclerosis in the distal left 
superficial femoral artery. Chest: 
 
LUNGS: No focal mass or consolidation. Bilateral calcified granulomas. Minimal 
left basilar subsegmental atelectasis. LYMPH NODES: No thoracic lymphadenopathy by CT criteria. Calcified bilateral 
hilar and mediastinal lymph nodes. PLEURAL FLUID: No pleural effusion. PERICARDIAL FLUID: No pericardial effusion. THYROID: Subcentimeter low-density nodules OTHER: Coarse mitral annular calcifications Abdomen: Arterial phase of imaging limits evaluation of the solid abdominal 
organs. LIVER: Nonspecific arterially enhancing lesion in segment 8 measuring 8 mm 
(series 3, image 84). No other focal liver abnormality. No biliary ductal 
dilatation. GALLBLADDER: Surgically absent SPLEEN: Small calcified granuloma PANCREAS: No mass or ductal dilatation. ADRENALS: Right is normal. Indeterminant 15 mm left adrenal nodule KIDNEYS/URETERS: Symmetric nephrograms with a couple low-density lesions too 
small to characterize and indeterminate left interpolar intermediate density 
lesion measuring 1 cm (series 3, image 130). No hydronephrosis PERITONEUM: No abdominal lymphadenopathy or ascites. COLON: No dilatation or wall thickening. APPENDIX: Unremarkable. SMALL BOWEL: No dilatation or wall thickening. STOMACH: Unremarkable. Pelvis: PELVIS: No pelvic lymphadenopathy or free fluid. Mild circumferential bladder 
wall thickening with small bladder diverticula BONES: Moderate degenerative changes in the lumbar spine ADDITIONAL COMMENTS: N/A Impression Impression: 
 
Minimal aneurysmal dilatation of the ascending aorta. No significant aortic 
stenosis. No significant iliac artery stenosis. Moderate to severe mesenteric arterial atherosclerosis. Nonspecific subcentimeter arterially enhancing lesion in the right hepatic lobe. Indeterminate 1 cm left renal lesion. Indeterminate 15 mm left adrenal nodule. Circumferential bladder wall thickening with small bladder diverticula US Results (most recent): 
Results from [place] encounter on 10/18/20 US RETROPERITONEUM COMP Narrative INDICATION:  UTI EXAM:  RENAL ULTRASOUND 
 
COMPARISON:  None TECHNIQUE: Routine ultrasound images of the kidneys and retroperitoneum were 
obtained. FINDINGS:  
Right kidney: 12.1 cm in length. Normal echogenicity. No nephrolithiasis or 
hydronephrosis. Left kidney:  11.3 cm in length. Normal echogenicity. No nephrolithiasis or 
hydronephrosis. Abdominal aorta/common iliac arteries/IVC:  Visualized portions are normal.   
Urinary Bladder: Incompletely distended. Mild prostate enlargement. Other:  N/A. Impression IMPRESSION:  
No significant abnormality or acute process. Cultures All Micro Results Procedure Component Value Units Date/Time CULTURE, BLOOD, PAIRED [[id number]] Collected:  10/23/20 1835 Order Status:  Completed Specimen:  Blood Updated:  10/27/20 0543 Special Requests: NO SPECIAL REQUESTS Culture result: NO GROWTH 4 DAYS     
 CULTURE, URINE [[id number]]  (Abnormal)  (Susceptibility) Collected:  10/19/20 4385 Order Status:  Completed Specimen:  Urine Updated:  10/23/20 0449 Special Requests: --     
  NO SPECIAL REQUESTS Reflexed from [id number] [place] Count --     
  >100,000 COLONIES/mL Culture result: ENTEROBACTER AEROGENES     
      
  * METHICILLIN RESISTANT STAPHYLOCOCCUS AUREUS * (PREDOMINATING) CULTURE, URINE [[id number]] Collected:  10/19/20 2100 Order Status:  Canceled Specimen:  Urine from Clean catch Past History: (Complete 2+/3 areas) Allergies Allergen Reactions  Procaine Other (comments) Pt stated he passed out from procaine and was told not to let anyone use it on him again Current Facility-Administered Medications Medication Dose Route Frequency  potassium chloride SR (KLOR-CON 10) tablet 40 mEq  40 mEq Oral Q2H  
 insulin glargine (LANTUS) injection 15 Units  15 Units SubCUTAneous QHS  vancomycin - pharmacy to dose    Other Rx Dosing/Monitoring  vancomycin (VANCOCIN) 1500 mg in  ml infusion  1,500 mg IntraVENous Q18H  
 hydroCHLOROthiazide (HYDRODIURIL) tablet 12.5 mg  12.5 mg Oral DAILY  [Held by provider] labetaloL (NORMODYNE) tablet 100 mg  100 mg Oral DAILY  losartan (COZAAR) tablet 12.5 mg  12.5 mg Oral DAILY  ascorbic acid (vitamin C) (VITAMIN C) tablet 1,000 mg  1,000 mg Oral TID  traZODone (DESYREL) tablet 50 mg  50 mg Oral QHS PRN  
 temazepam (RESTORIL) capsule 15 mg  15 mg Oral QHS PRN  
 amLODIPine (NORVASC) tablet 5 mg  5 mg Oral DAILY  aspirin tablet 325 mg  325 mg Oral DAILY  atorvastatin (LIPITOR) tablet 10 mg  10 mg Oral QHS  doxazosin (CARDURA) tablet 4 mg  4 mg Oral QHS  finasteride (PROSCAR) tablet 5 mg  5 mg Oral DAILY  sertraline (ZOLOFT) tablet 100 mg  100 mg Oral QPM  
 sodium chloride (NS) flush 5-40 mL  5-40 mL IntraVENous Q8H  
 sodium chloride (NS) flush 5-40 mL  5-40 mL IntraVENous PRN  
 acetaminophen (TYLENOL) tablet 650 mg  650 mg Oral Q4H PRN  
 heparin (porcine) injection 5,000 Units  5,000 Units SubCUTAneous Q8H  
  glucose chewable tablet 16 g  4 Tab Oral PRN  
 glucagon (GLUCAGEN) injection 1 mg  1 mg IntraMUSCular PRN  
 dextrose 10% infusion 0-250 mL  0-250 mL IntraVENous PRN  
 insulin lispro (HUMALOG) injection   SubCUTAneous AC&HS  
 melatonin tablet 3 mg  3 mg Oral QHS PRN Prior to Admission medications Medication Sig Start Date End Date Taking? Authorizing Provider  
doxazosin (CARDURA) 4 mg tablet TAKE 1 TABLET BY MOUTH  DAILY 10/20/20  Yes Joanna Dick NP  
hydroCHLOROthiazide (HYDRODIURIL) 12.5 mg tablet TAKE 1 TABLET BY MOUTH  DAILY 10/20/20  Yes Randal Dick NP  
finasteride (PROSCAR) 5 mg tablet TAKE 1 TABLET BY MOUTH  DAILY 10/20/20  Yes Joanna Dick NP  
amLODIPine (NORVASC) 10 mg tablet TAKE 1 TABLET BY MOUTH  DAILY 10/20/20  Yes Joanna Dick NP  
atorvastatin (LIPITOR) 10 mg tablet TAKE 1 TABLET BY MOUTH  DAILY 10/20/20  Yes Randal Dick NP  
metFORMIN ER (GLUCOPHAGE XR) 500 mg tablet TAKE 1 TABLET BY MOUTH  TWICE DAILY WITH MEALS 10/20/20  Yes Joanna Dick NP  
glipiZIDE SR (GLUCOTROL XL) 5 mg CR tablet TAKE 1 TABLET BY MOUTH  DAILY 10/20/20  Yes Joanna Dick NP  
sertraline (ZOLOFT) 100 mg tablet TAKE 1 TABLET BY MOUTH  DAILY 9/2/20  Yes Hali Arceo NP  
traZODone (DESYREL) 50 mg tablet Take 2 tablets by mouth at night 8/21/20  Yes Hali Arceo NP  
temazepam (RESTORIL) 15 mg capsule TAKE 1 CAPSULE BY MOUTH AT BEDTIME AS NEEDED FOR SLEEP. 7/6/20  Yes Jorgito Hunt DO  
losartan (COZAAR) 25 mg tablet TAKE ONE-HALF TABLET BY  MOUTH DAILY 7/1/20  Yes Joanna Dick NP  
labetaloL (NORMODYNE) 100 mg tablet TAKE 1 TABLET BY MOUTH  DAILY 7/1/20  Yes Joanna Dick NP  
fluticasone propionate (FLONASE) 50 mcg/actuation nasal spray ADMINISTER 1 Spray IN Both Nostrils two (2) times a day.  6/8/20  Yes Randal Dick NP  
menthol-zinc oxide (CALMOSEPTINE) 0.44-20.6 % oint Apply to bilateral buttocks TID  Indications: skin irritation 6/4/20  Yes Jae Hunt, DO  
OTHER Hearing evaluation for possible changes in hearing 1/20/20  Yes Jorgito Hunt DO  
tiZANidine (ZANAFLEX) 2 mg tablet Take 1 Tab by mouth three (3) times daily as needed for Pain. 7/11/19  Yes Gloria Aguilar, DO  
glucose blood VI test strips (TRUE METRIX GLUCOSE TEST STRIP) strip Check BS BID  Dx: DM  E11.21 7/12/18  Yes Jorgito Hunt DO  
aspirin (ASPIRIN) 325 mg tablet Take 1 Tab by mouth daily. 3/29/18  Yes Jae Hunt, DO  
cholecalciferol (VITAMIN D3) 1,000 unit cap Take 1 Cap by mouth daily. 3/29/18  Yes Jorgito Hunt DO  
magnesium 250 mg tab Take 1 Tab by mouth daily. 3/29/18  Yes Jorgito Hunt DO  
sodium chloride (OCEAN) 0.65 % nasal squeeze bottle 0.05 mL by Both Nostrils route as needed for Congestion. 3/29/18  Yes Jorgito Hunt DO  
FERROUS FUMARATE/VIT BCOMP,C (SUPER B COMPLEX PO) Take  by mouth. Yes Provider, Historical  
  
 
PMHx:  has a past medical history of BPH (benign prostatic hyperplasia), CAD (coronary artery disease), Diabetes ([address] Hearing loss, Hypercholesterolemia, Hypertension, Murmur, and Recurrent depression ([address] (8/22/2019). He also has no past medical history of Anemia, Arrhythmia, Arthritis, Asthma, Autoimmune disease ([address] Calculus of kidney, Cancer ([address] Chronic kidney disease, Chronic obstructive pulmonary disease ([address] Chronic pain, Congestive heart failure ([address] GERD (gastroesophageal reflux disease), Headache, Liver disease, Psychotic disorder ([address] PUD (peptic ulcer disease), Seizures ([address] Stroke ([address] Thromboembolus ([address] Thyroid disease, or Trauma. PSurgHx:  has a past surgical history that includes pr cardiac surg procedure unlist (2006) and hx cholecystectomy. PSocHx:  reports that he quit smoking about 30 years ago. His smoking use included cigarettes. He has never used smokeless tobacco. He reports that he does not drink alcohol or use drugs. ROS:  (Complete - 10 systems) - DENIES: Weightloss (Constitutional), Dry mouth (ENMT), Chest pain (CV), SOB (Respiratory), Constipation (GI), Weakness (MS), Pallor (Skin), TIA Sx (Neuro), Confusion (Psych), Easy bruising (Heme) Physical Exam: (Comprehesive - 8+ 1995 Systems)  
 
(1) Constitutional:  FIO2:   on SpO2: O2 Sat (%): 94 % O2 Device: Room air O2 Flow Rate (L/min): 2 l/min Patient Vitals for the past 24 hrs: 
 BP Temp Pulse Resp SpO2 Weight 10/27/20 1202 (!) 152/68 98.8 °F (37.1 °C) 76 21 94 %   
10/27/20 0947 120/60 98.2 °F (36.8 °C) 77 18 (!) 64 %   
10/27/20 0750 (!) 148/71 97.8 °F (36.6 °C) 69 20 93 %   
10/27/20 0413 115/63 97.9 °F (36.6 °C) 65 17 93 % 90.9 kg (200 lb 6.4 oz) 10/27/20 0023 118/61 98.8 °F (37.1 °C) 75 17 93 %   
10/26/20 1948 (!) 149/68 99.1 °F (37.3 °C) 68 17 93 %   
10/26/20 1618 130/60 98.1 °F (36.7 °C) 69 18 94 %  Date 10/26/20 0700 - 10/27/20 7450 10/27/20 0700 - 10/28/20 8943 Shift 4941-0089 7142-7127 24 Hour Total 8792-4227 0130-9227 24 Hour Total  
INTAKE Shift Total(mL/kg) OUTPUT Urine(mL/kg/hr) 225(0.2) 750(0.7) 975(0.4) 100  100 Urine Voided 225 750 975 100  100 Shift Total(mL/kg) 225(2.5) 750(8.3) 975(10.7) 100(1.1)  100(1.1) NET -225 -750 -975 -100  -100 Weight (kg) 90.5 90.9 90.9 90.9 90.9 90.9  
  
(2) ENMT:   moist mucous membranes, normal sinuses (3) Respiratory:  breathing easily, no distress (4) GI:  no abdominal masses, tenderness  
(5) :   normal  
(6) Lymphatic:  no adenopathy, neck supple  
(7) Muscloskeletal:  no gross deformity, normal ROM  
(8) Skin:  no rash, warm & dry  
(9) Neuro:  no focal deficits, normal speech Signed By: Yvonne Negron NP  - October 27, 2020

## 2020-10-27 NOTE — PROGRESS NOTES
Cardiac Surgery Care Coordinator-  Met with Mane Linton, reviewed role of the Cardiac Surgery Care Coordinator. Reviewed plan of care and began pre-op education. Discussed day of surgery expectations for the pt and family. Provided Mr Sherry Velasquez with the TAVR educational pamphlet from the [address] [place] [address]. Encouraged Mane Linton  to verbalize and offered emotional support. 1400- Placed update call to Mr Sherry Velasquez son, reviewed plan of care and encouraged him to verbalize. Answered questions. Will continue to follow.  Stephania Nixon RN

## 2020-10-27 NOTE — PROGRESS NOTES
Bedside and Verbal shift change report given to Suraj Grimes (oncoming nurse) by Earlene Loera RN (offgoing nurse). Report included the following information SBAR, Kardex, ED Summary, Procedure Summary, Intake/Output, MAR, Recent Results and Cardiac Rhythm NSR, 1st degree block.

## 2020-10-27 NOTE — PROGRESS NOTES
Problem: Diabetes Self-Management Goal: *Disease process and treatment process Description: Define diabetes and identify own type of diabetes; list 3 options for treating diabetes. Outcome: Progressing Towards Goal 
Goal: *Incorporating nutritional management into lifestyle Description: Describe effect of type, amount and timing of food on blood glucose; list 3 methods for planning meals. Outcome: Progressing Towards Goal 
Goal: *Incorporating physical activity into lifestyle Description: State effect of exercise on blood glucose levels. Outcome: Progressing Towards Goal 
Goal: *Developing strategies to promote health/change behavior Description: Define the ABC's of diabetes; identify appropriate screenings, schedule and personal plan for screenings. Outcome: Progressing Towards Goal 
Goal: *Using medications safely Description: State effect of diabetes medications on diabetes; name diabetes medication taking, action and side effects. Outcome: Progressing Towards Goal 
Goal: *Monitoring blood glucose, interpreting and using results Description: Identify recommended blood glucose targets  and personal targets. Outcome: Progressing Towards Goal 
Goal: *Prevention, detection, treatment of acute complications Description: List symptoms of hyper- and hypoglycemia; describe how to treat low blood sugar and actions for lowering  high blood glucose level. Outcome: Progressing Towards Goal 
Goal: *Prevention, detection and treatment of chronic complications Description: Define the natural course of diabetes and describe the relationship of blood glucose levels to long term complications of diabetes. Outcome: Progressing Towards Goal 
Goal: *Developing strategies to address psychosocial issues Description: Describe feelings about living with diabetes; identify support needed and support network Outcome: Progressing Towards Goal 
Goal: *Insulin pump training Outcome: Progressing Towards Goal 
 Goal: *Sick day guidelines Outcome: Progressing Towards Goal 
Goal: *Patient Specific Goal (EDIT GOAL, INSERT TEXT) Outcome: Progressing Towards Goal 
  
Problem: Patient Education: Go to Patient Education Activity Goal: Patient/Family Education Outcome: Progressing Towards Goal 
  
Problem: Falls - Risk of 
Goal: *Absence of Falls Description: Document Jean Paul Escamilla Fall Risk and appropriate interventions in the flowsheet. Outcome: Progressing Towards Goal 
Note: Fall Risk Interventions: 
Mobility Interventions: Bed/chair exit alarm, Assess mobility with egress test, Communicate number of staff needed for ambulation/transfer Mentation Interventions: Adequate sleep, hydration, pain control, Bed/chair exit alarm, Evaluate medications/consider consulting pharmacy, Room close to nurse's station Medication Interventions: Bed/chair exit alarm, Evaluate medications/consider consulting pharmacy Elimination Interventions: Bed/chair exit alarm, Call light in reach History of Falls Interventions: Bed/chair exit alarm, Investigate reason for fall, Evaluate medications/consider consulting pharmacy Problem: Patient Education: Go to Patient Education Activity Goal: Patient/Family Education Outcome: Progressing Towards Goal 
  
Problem: General Medical Care Plan Goal: *Vital signs within specified parameters Outcome: Progressing Towards Goal 
Goal: *Absence of infection signs and symptoms Outcome: Progressing Towards Goal 
Goal: *Optimal pain control at patient's stated goal 
Outcome: Progressing Towards Goal 
Goal: *Skin integrity maintained Outcome: Progressing Towards Goal 
  
Problem: Patient Education: Go to Patient Education Activity Goal: Patient/Family Education Outcome: Progressing Towards Goal 
  
Problem: Cath Lab Procedures: Pre-Procedure Goal: Off Pathway (Use only if patient is Off Pathway) Outcome: Progressing Towards Goal 
Goal: Activity/Safety Outcome: Progressing Towards Goal 
 Goal: Consults, if ordered Outcome: Progressing Towards Goal 
Goal: Diagnostic Test/Procedures Outcome: Progressing Towards Goal 
Goal: Nutrition/Diet Outcome: Progressing Towards Goal 
Goal: Discharge Planning Outcome: Progressing Towards Goal 
Goal: Medications Outcome: Progressing Towards Goal 
Goal: Respiratory Outcome: Progressing Towards Goal 
Goal: Treatments/Interventions/Procedures Outcome: Progressing Towards Goal 
Goal: Psychosocial 
Outcome: Progressing Towards Goal 
Goal: *Verbalize description of procedure Outcome: Progressing Towards Goal 
Goal: *Consent signed Outcome: Progressing Towards Goal 
  
Problem: Cath Lab Procedures: Post-Cath Day of Procedure (Initiate SCIP Measures for Post-Op Care) Goal: Off Pathway (Use only if patient is Off Pathway) Outcome: Progressing Towards Goal 
Goal: Activity/Safety Outcome: Progressing Towards Goal 
Goal: Consults, if ordered Outcome: Progressing Towards Goal 
Goal: Diagnostic Test/Procedures Outcome: Progressing Towards Goal 
Goal: Nutrition/Diet Outcome: Progressing Towards Goal 
Goal: Discharge Planning Outcome: Progressing Towards Goal 
Goal: Medications Outcome: Progressing Towards Goal 
Goal: Respiratory Outcome: Progressing Towards Goal 
Goal: Treatments/Interventions/Procedures Outcome: Progressing Towards Goal 
Goal: Psychosocial 
Outcome: Progressing Towards Goal 
Goal: *Procedure site is without bleeding and signs of infection six hours post sheath removal 
Outcome: Progressing Towards Goal 
Goal: *Hemodynamically stable Outcome: Progressing Towards Goal 
Goal: *Optimal pain control at patient's stated goal 
Outcome: Progressing Towards Goal 
  
Problem: Cath Lab Procedures: Post-Cath Day 1 Goal: Off Pathway (Use only if patient is Off Pathway) Outcome: Progressing Towards Goal 
Goal: Activity/Safety Outcome: Progressing Towards Goal 
Goal: Diagnostic Test/Procedures Outcome: Progressing Towards Goal 
Goal: Nutrition/Diet Outcome: Progressing Towards Goal 
Goal: Discharge Planning Outcome: Progressing Towards Goal 
Goal: Medications Outcome: Progressing Towards Goal 
Goal: Respiratory Outcome: Progressing Towards Goal 
Goal: Treatments/Interventions/Procedures Outcome: Progressing Towards Goal 
Goal: Psychosocial 
Outcome: Progressing Towards Goal 
  
Problem: Cath Lab Procedures: Discharge Outcomes Goal: *Stable cardiac rhythm Outcome: Progressing Towards Goal 
Goal: *Hemodynamically stable Outcome: Progressing Towards Goal 
Goal: *Optimal pain control at patient's stated goal 
Outcome: Progressing Towards Goal 
Goal: *Pulses palpable, skin color within defined limits, skin temperature warm Outcome: Progressing Towards Goal 
Goal: *Lungs clear or at baseline Outcome: Progressing Towards Goal 
Goal: *Demonstrates ability to perform prescribed activity without shortness of breath or discomfort Outcome: Progressing Towards Goal 
Goal: *Verbalizes home exercise program, activity guidelines, cardiac precautions Outcome: Progressing Towards Goal 
Goal: *Verbalizes understanding and describes prescribed diet Outcome: Progressing Towards Goal 
Goal: *Verbalizes understanding and describes medication purposes and frequencies Outcome: Progressing Towards Goal 
Goal: *Identifies cardiac risk factors Outcome: Progressing Towards Goal 
Goal: *No signs and symptoms of infection or wound complications Outcome: Progressing Towards Goal 
Goal: *Anxiety reduced or absent Outcome: Progressing Towards Goal 
Goal: *Verbalizes and demonstrates incision care Outcome: Progressing Towards Goal 
Goal: *Understands and describes signs and symptoms to report to providers(Stroke Metric) Outcome: Progressing Towards Goal 
Goal: *Describes follow-up/return visits to physicians Outcome: Progressing Towards Goal 
Goal: *Describes available resources and support systems Outcome: Progressing Towards Goal 
Goal: *Influenza immunization Outcome: Progressing Towards Goal 
Goal: *Pneumococcal immunization Outcome: Progressing Towards Goal 
  
Problem: Syncope Goal: *Absence of injury Outcome: Progressing Towards Goal 
Goal: Decrease or eliminate episodes of syncope Outcome: Progressing Towards Goal 
  
Problem: Patient Education: Go to Patient Education Activity Goal: Patient/Family Education Outcome: Progressing Towards Goal

## 2020-10-27 NOTE — TELEPHONE ENCOUNTER
Pt son Andrey Ghosh is calling wanting to speak with Dr Sintia Acevedo he has a questions about his father surg.  He has gotten 3 different things told to him of when it is gonna happen Please call him at [phone]

## 2020-10-27 NOTE — PROGRESS NOTES
[facility] Adult  Hospitalist Group Hospitalist Progress Note Chip Rosales NP Answering service: [phone] OR [phone] from in house phone Date of Service:  10/27/2020 NAME:  Luis Miguel Tompkins :  1935 MRN:  [id number] Admission Summary:  
Patient reports that he lives in [facility], today he was out shopping and as he was coming out he fainted.  Per chart patient has history of severe aortic stenosis.  Patient feels that he hit his head but denies any other complaints or problems.  Patient came to the ER was requested to be observed under the hospitalist service. Andrew Rico reports that he is back to baseline he denies any complaints or problems currently.  Patient reports that he has been taking his medications on a regular basis. Andrew Rico reports that he has not had any contact with known COVID-19 patients. Interval history / Subjective: Follow up syncope. Patient seen and examined. No acute complaints. Awaiting TAVR this week, possibly wed/thurs. CTA shows bladder wall thickening/diverticula. Assessment & Plan:  
 
Severe Aortic Stenosis: 
Syncope:  
-Stable 
-Echo 10/18 with normal EF 55-60%, moderate aortic stenosis 
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   
-for TAVR this week 
-OOB to chair and ambulate TID and PRN w/ assistance -appreciate cardiology and cardiac surgery input 
  
Afib w/ RVR: resolved -new onset 10/19; confirmed on EKG 
-HR controlled w/o intervention 
-hold [place] for now per cards d/t TAVR; will need to be started prior to DC 
-Monitor on tele, continue heparin 
-Cont BB 
  
UTI:  
-culture shows Enterobacter aerogenes and MRSA 
-completed ceftriaxone x 7 days (last dose 10/25) 
-cont vanc (last dose 10/28) -blood cultures NGTD -appreciate ID Bladder diverticula: w/ circumferential bladder wall thickening 
-consult urology 
  
Hypokalemia: 3.4 today 
-Give 40meq PO q2h x2 doses 
-recheck labs in AM 
  
Carotid artery stenosis:  
-Carotid dopplers 10/19: less than 50% stenosis bilaterally; Left vertebral shows resistant flow, suggesting distal occlusion  
-evaluated by vasc surg 10/20-no intervention needed 
  
NARAYAN: POA creat/gfr 1.36/50 (baseline normal kidney function) 
-Resolved DMII: A1c 7.1 -inc lantus to 15u qhs 
-AC/HS accuchecks w/ SSI 
-trend BS 
  
HFpEF: Echo 10/18 w/ EF 55-60% -Stable 
-Cont BB, ARB, HCTZ 
-appreciate cards input 
  
CTA Chest Incidental Findings:  
Minimal aneurysmal dilatation of ascending aorta Right hepatic lobe lesion Indeterminate 15mm left adrenal nodule Subcentimeter low density thyroid nodules: TSH 1.29 
-f/u as OP Code status: full DVT prophylaxis: heparin Care Plan discussed with: Patient/Family Anticipated Disposition: SNF/LTC Anticipated Discharge: Greater than 48 hours Hospital Problems  Date Reviewed: 10/18/2020 Codes Class Noted POA (HFpEF) heart failure with preserved ejection fraction (HCC) ICD-10-CM: I50.30 ICD-9-CM: 428.9  10/20/2020 Unknown Syncope and collapse ICD-10-CM: R55 
ICD-9-CM: 780.2  10/18/2020 Unknown Syncope ICD-10-CM: R55 
ICD-9-CM: 780.2  10/18/2020 Unknown * (Principal) Aortic stenosis ICD-10-CM: I35.0 ICD-9-CM: 424.1  10/18/2020 Overview Signed 10/21/2020  6:58 AM by Toño Dooley MD  
  Added automatically from request for surgery [id number] Review of Systems:  
Negative unless stated above Vital Signs:  
 Last 24hrs VS reviewed since prior progress note. Most recent are: 
Visit Vitals /60 (BP 1 Location: Right arm, BP Patient Position: At rest) Pulse 77 Temp 98.2 °F (36.8 °C) Resp 18 Ht 6' 3\" (1.905 m) Wt 90.9 kg (200 lb 6.4 oz) SpO2 (!) 64% BMI 25.05 kg/m² Intake/Output Summary (Last 24 hours) at 10/27/2020 1118 Last data filed at 10/27/2020 9366 Gross per 24 hour Intake  Output 850 ml Net -850 ml Physical Examination:  
 
 
     
Constitutional:  No acute distress, cooperative, pleasant ENT:  Oral mucosa moist, oropharynx benign. Resp:  CTA bilaterally. No wheezing/rhonchi/rales. No accessory muscle use CV:  Regular rhythm, normal rate, +systolic murmur GI:  Soft, non distended, non tender. normoactive bowel sounds Musculoskeletal:  No edema, warm, 2+ pulses throughout Neurologic:  Moves all extremities. Data Review:  
 Review and/or order of clinical lab test 
Review and/or order of tests in the radiology section of CPT Review and/or order of tests in the medicine section of CPT Labs:  
 
Recent Labs 10/27/20 
0137 10/26/20 
0450 WBC 7.3 6.7 HGB 10.3* 10.9* HCT 31.8* 33.2*  
* 131* Recent Labs 10/27/20 
1168 10/26/20 
0450 10/25/20 
0309  140 138  
K 3.4* 3.6 3.3*  
 107 107 CO2 27 27 25 BUN 19 15 20 CREA 0.78 0.87 0.98  
* 100 126* CA 8.8 9.0 8.9 MG 2.0 1.9 2.0 No results for input(s): ALT, AP, TBIL, TBILI, TP, ALB, GLOB, GGT, AML, LPSE in the last 72 hours. No lab exists for component: SGOT, GPT, AMYP, HLPSE No results for input(s): INR, PTP, APTT, INREXT, INREXT in the last 72 hours. No results for input(s): FE, TIBC, PSAT, FERR in the last 72 hours. Lab Results Component Value Date/Time Folate 20.1 10/18/2020 09:48 PM  
  
No results for input(s): PH, PCO2, PO2 in the last 72 hours. No results for input(s): CPK, CKNDX, TROIQ in the last 72 hours. No lab exists for component: CPKMB Lab Results Component Value Date/Time Cholesterol, total 114 02/26/2020 03:19 PM  
 HDL Cholesterol 40 02/26/2020 03:19 PM  
 LDL, calculated 54 02/26/2020 03:19 PM  
 Triglyceride 101 02/26/2020 03:19 PM  
 
Lab Results Component Value Date/Time Glucose (POC) 113 (H) 10/27/2020 07:46 AM  
 Glucose (POC) 168 (H) 10/26/2020 09:33 PM  
 Glucose (POC) 152 (H) 10/26/2020 04:15 PM  
 Glucose (POC) 219 (H) 10/26/2020 11:56 AM  
 Glucose (POC) 111 (H) 10/26/2020 08:26 AM  
 
Lab Results Component Value Date/Time Color YELLOW/STRAW 10/19/2020 04:54 PM  
 Appearance CLOUDY (A) 10/19/2020 04:54 PM  
 Specific gravity 1.011 10/19/2020 04:54 PM  
 pH (UA) 5.0 10/19/2020 04:54 PM  
 Protein Negative 10/19/2020 04:54 PM  
 Glucose Negative 10/19/2020 04:54 PM  
 Ketone Negative 10/19/2020 04:54 PM  
 Bilirubin Negative 10/19/2020 04:54 PM  
 Urobilinogen 0.2 10/19/2020 04:54 PM  
 Nitrites Positive (A) 10/19/2020 04:54 PM  
 Leukocyte Esterase LARGE (A) 10/19/2020 04:54 PM  
 Epithelial cells FEW 10/19/2020 04:54 PM  
 Bacteria 1+ (A) 10/19/2020 04:54 PM  
 WBC 20-50 10/19/2020 04:54 PM  
 RBC 0-5 10/19/2020 04:54 PM  
 
 
 
Medications Reviewed:  
 
Current Facility-Administered Medications Medication Dose Route Frequency  potassium chloride SR (KLOR-CON 10) tablet 40 mEq  40 mEq Oral Q2H  
 insulin glargine (LANTUS) injection 15 Units  15 Units SubCUTAneous QHS  vancomycin - pharmacy to dose    Other Rx Dosing/Monitoring  vancomycin (VANCOCIN) 1500 mg in  ml infusion  1,500 mg IntraVENous Q18H  
 hydroCHLOROthiazide (HYDRODIURIL) tablet 12.5 mg  12.5 mg Oral DAILY  [Held by provider] labetaloL (NORMODYNE) tablet 100 mg  100 mg Oral DAILY  losartan (COZAAR) tablet 12.5 mg  12.5 mg Oral DAILY  ascorbic acid (vitamin C) (VITAMIN C) tablet 1,000 mg  1,000 mg Oral TID  traZODone (DESYREL) tablet 50 mg  50 mg Oral QHS PRN  
 temazepam (RESTORIL) capsule 15 mg  15 mg Oral QHS PRN  
 amLODIPine (NORVASC) tablet 5 mg  5 mg Oral DAILY  aspirin tablet 325 mg  325 mg Oral DAILY  atorvastatin (LIPITOR) tablet 10 mg  10 mg Oral QHS  doxazosin (CARDURA) tablet 4 mg  4 mg Oral QHS  finasteride (PROSCAR) tablet 5 mg  5 mg Oral DAILY  sertraline (ZOLOFT) tablet 100 mg  100 mg Oral QPM  
 sodium chloride (NS) flush 5-40 mL  5-40 mL IntraVENous Q8H  
 sodium chloride (NS) flush 5-40 mL  5-40 mL IntraVENous PRN  
 acetaminophen (TYLENOL) tablet 650 mg  650 mg Oral Q4H PRN  
 heparin (porcine) injection 5,000 Units  5,000 Units SubCUTAneous Q8H  
 glucose chewable tablet 16 g  4 Tab Oral PRN  
 glucagon (GLUCAGEN) injection 1 mg  1 mg IntraMUSCular PRN  
 dextrose 10% infusion 0-250 mL  0-250 mL IntraVENous PRN  
 insulin lispro (HUMALOG) injection   SubCUTAneous AC&HS  
 melatonin tablet 3 mg  3 mg Oral QHS PRN  
 
______________________________________________________________________ EXPECTED LENGTH OF STAY: 4d 2h 
ACTUAL LENGTH OF STAY:          7 Trey Landry NP

## 2020-10-27 NOTE — PROGRESS NOTES
10/27/2020 -  
GLORY: 
- RUR: 14% 
- Disposition is TBD dependent on progression: potential discharge to own [place] apartment at [facility] with transport via family - Patient will need 2nd IM Letter prior to discharge - Patient to complete vanc 10/25 
- Patient likely to have TAVR Wed 10/28 
- CM anticipates that patient will need to work with PT and OT both pre and post surgery CRM: Krystin Felix, MPH, [facility]; Z: [phone]

## 2020-10-27 NOTE — PROGRESS NOTES
Problem: Diabetes Self-Management Goal: *Disease process and treatment process Description: Define diabetes and identify own type of diabetes; list 3 options for treating diabetes. Outcome: Progressing Towards Goal 
Goal: *Incorporating nutritional management into lifestyle Description: Describe effect of type, amount and timing of food on blood glucose; list 3 methods for planning meals. Outcome: Progressing Towards Goal 
Goal: *Incorporating physical activity into lifestyle Description: State effect of exercise on blood glucose levels. Outcome: Progressing Towards Goal 
Goal: *Developing strategies to promote health/change behavior Description: Define the ABC's of diabetes; identify appropriate screenings, schedule and personal plan for screenings. Outcome: Progressing Towards Goal 
Goal: *Using medications safely Description: State effect of diabetes medications on diabetes; name diabetes medication taking, action and side effects. Outcome: Progressing Towards Goal 
Goal: *Monitoring blood glucose, interpreting and using results Description: Identify recommended blood glucose targets  and personal targets. Outcome: Progressing Towards Goal 
Goal: *Prevention, detection, treatment of acute complications Description: List symptoms of hyper- and hypoglycemia; describe how to treat low blood sugar and actions for lowering  high blood glucose level. Outcome: Progressing Towards Goal 
Goal: *Prevention, detection and treatment of chronic complications Description: Define the natural course of diabetes and describe the relationship of blood glucose levels to long term complications of diabetes. Outcome: Progressing Towards Goal 
Goal: *Developing strategies to address psychosocial issues Description: Describe feelings about living with diabetes; identify support needed and support network Outcome: Progressing Towards Goal 
  
Problem: Falls - Risk of 
Goal: *Absence of Falls Description: Document Seven Rocha Fall Risk and appropriate interventions in the flowsheet. Outcome: Progressing Towards Goal 
Note: Fall Risk Interventions: 
Mobility Interventions: Bed/chair exit alarm, Assess mobility with egress test, Communicate number of staff needed for ambulation/transfer Mentation Interventions: Adequate sleep, hydration, pain control, Bed/chair exit alarm, Evaluate medications/consider consulting pharmacy, Room close to nurse's station Medication Interventions: Bed/chair exit alarm, Evaluate medications/consider consulting pharmacy Elimination Interventions: Bed/chair exit alarm, Call light in reach History of Falls Interventions: Bed/chair exit alarm, Investigate reason for fall, Evaluate medications/consider consulting pharmacy Problem: Patient Education: Go to Patient Education Activity Goal: Patient/Family Education Outcome: Progressing Towards Goal 
  
Problem: General Medical Care Plan Goal: *Vital signs within specified parameters Outcome: Progressing Towards Goal 
Goal: *Absence of infection signs and symptoms Outcome: Progressing Towards Goal 
Goal: *Optimal pain control at patient's stated goal 
Outcome: Progressing Towards Goal 
Goal: *Skin integrity maintained Outcome: Progressing Towards Goal 
  
Problem: Patient Education: Go to Patient Education Activity Goal: Patient/Family Education Outcome: Progressing Towards Goal 
  
Problem: Syncope Goal: *Absence of injury Outcome: Progressing Towards Goal 
Goal: Decrease or eliminate episodes of syncope Outcome: Progressing Towards Goal 
  
Problem: Patient Education: Go to Patient Education Activity Goal: Patient/Family Education Outcome: Progressing Towards Goal

## 2020-10-27 NOTE — PROGRESS NOTES
[facility] FLOOR Progress Note Admit Date: 10/18/2020 TAVR Workup Subjective:  
Pt seen with Dr. Coleen Marina. No complaints, discussed TAVR and answered questions. Objective:  
 
Visit Vitals BP (!) 148/71 (BP 1 Location: Right arm, BP Patient Position: At rest;Sitting) Pulse 69 Temp 97.8 °F (36.6 °C) Resp 20 Ht 6' 3\" (1.905 m) Wt 200 lb 6.4 oz (90.9 kg) SpO2 93% BMI 25.05 kg/m² Temp (24hrs), Av.3 °F (36.8 °C), Min:97.8 °F (36.6 °C), Max:99.1 °F (37.3 °C) Last 24hr Input/Output: 
 
Intake/Output Summary (Last 24 hours) at 10/27/2020 3623 Last data filed at 10/27/2020 8222 Gross per 24 hour Intake  Output 750 ml Net -750 ml  
  
EKG/Rhythm:  
Probable Multifocal atrial tachycardia (100 bpm) with premature ventricular or  
aberrantly conducted complexes    
Voltage criteria for left ventricular hypertrophy Oxygen: RA 
 
CXR:  
CXR Results  (Last 48 hours) None Admission Weight: Last Weight Weight: 205 lb 7.5 oz (93.2 kg) Weight: 200 lb 6.4 oz (90.9 kg) EXAM: 
General:  Pleasant, cooperative Lungs:   Clear to auscultation bilaterally. Heart:  Regular rate and rhythm, S1, S2 normal, no murmur, click, rub or gallop. Abdomen:   Soft, non-tender. Bowel sounds normal. No masses,  No organomegaly. Extremities:  No edema. PPP Neurologic:  Gross motor and sensory apparatus intact. Lab Data Reviewed:  
Recent Labs 10/27/20 
1545 10/27/20 
9700 WBC  --  7.3 HGB  --  10.3* HCT  --  31.8* PLT  --  128* NA  --  140 K  --  3.4*  
BUN  --  19  
CREA  --  0.78 GLU  --  131* GLUCPOC 113*  --   
 
 
 
Assessment:  
 
Principal Problem: Aortic stenosis (10/18/2020) Overview: Added automatically from request for surgery [id number] Active Problems: 
  Syncope and collapse (10/18/2020) Syncope (10/18/2020) (HFpEF) heart failure with preserved ejection fraction ([address] (10/20/2020) Plan/Recommendations/Medical Decision Making: 1. Aortic stenosis, severe and symptomatic (paradoxical LFLG): Plan for CTA today. Valve team to review for decision making.  
  
2. CAD with Hx of CABG: On ASA, Statin, BB, ARB. Hold labetalol pre-TAVR.  
  
3. HTN: Controlled on current medications. Care for primary team. 
  
4. HLD: On Statin 
  
5. HFpEF: BP management 
  
6. Syncope: Likely related to AS. BICA of <50%.  
  
7. PAD: On ASA, Statin. CTA pending to assess viability of transfemoral approach for TAVR. Bilateral iliacs 6 mm.  
  
8. DM: On Metformin, Glipizide at home. Lantus/SSI while in the hospital. A1C of 7.1.  
  
9. MAT/Atrial fibrillation: Currently in NSR 60s. Currently on BB and . No OAC currently. Hold BB for TAVR.  
  
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 
  
11. UTI: On vancomycin complete 10/28 and ceftriaxone (completed). MRSA and enterobacter on culture. ID signed off. 
  
12. BPH: On Proscar 
  
13. Depression: on Zoloft Dispo: Plan for TAVR 10/28. Approach TBD. Consents completed and on chart. Signed By: SIMON Manzano Saw patient, agree with above Risk of morbidity and mortality - high Medical decision making - high complexity 1. Aortic stenosis, severe and symptomatic (paradoxical LFLG): Plan for CTA today. Valve team to review for decision making.  
  
2. CAD with Hx of CABG: On ASA, Statin, BB, ARB. Hold labetalol pre-TAVR.  
  
3. HTN: Controlled on current medications. Care for primary team. 
  
4. HLD: On Statin 
  
5. HFpEF: BP management 
  
6. Syncope: Likely related to AS. BICA of <50%.  
  
7. PAD: On ASA, Statin. CTA pending to assess viability of transfemoral approach for TAVR. Bilateral iliacs 6 mm.  
  
8. DM: On Metformin, Glipizide at home. Lantus/SSI while in the hospital. A1C of 7.1.  
  
9. MAT/Atrial fibrillation: Currently in NSR 60s. Currently on BB and . No OAC currently. Hold BB for TAVR.   
  
 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 
  
11. UTI: On vancomycin complete 10/28 and ceftriaxone (completed). MRSA and enterobacter on culture. ID signed off. 
  
12. BPH: On Proscar 
  
13. Depression: on Zoloft

## 2020-10-28 NOTE — ANESTHESIA PROCEDURE NOTES
Central Line Placement Start time: 10/28/2020 7:55 AM 
End time: 10/28/2020 8:15 AM 
Performed by: Mare Samson MD 
Authorized by: Mare Samson MD  
 
Indications: vascular access and central pressure monitoring Preanesthetic Checklist: patient identified, risks and benefits discussed, anesthesia consent, site marked, patient being monitored and timeout performed Pre-procedure: All elements of maximal sterile barrier technique followed? Yes   
2% Chlorhexidine for cutaneous antisepsis, Hand hygiene performed prior to catheter insertion and Ultrasound guidance Sterile Ultrasound Technique followed?: No   
 
 
 
 
Procedure:  
Prep:  Chlorhexidine Orientation:  Left Patient position:  Trendelenburg Catheter type:  Quad lumen Catheter size:  8.5 Fr Catheter length:  16 cm Number of attempts:  1 Successful placement: Yes Assessment:  
Post-procedure:  Catheter secured, sterile dressing applied and sterile dressing with CHG applied Assessment:  Blood return through all ports and free fluid flow Insertion:  Uncomplicated Patient tolerance:  Patient tolerated the procedure well with no immediate complications

## 2020-10-28 NOTE — PROGRESS NOTES
Bedside shift change report given to Kathy Alejo (oncoming nurse) by Karina Bryant (offgoing nurse). Report included the following information SBAR, Kardex, ED Summary, MAR, Accordion, Recent Results, Med Rec Status, Cardiac Rhythm SR  and Alarm Parameters .

## 2020-10-28 NOTE — PROGRESS NOTES
Physical Therapy 10/28/2020 Orders received and chart reviewed up to date. Pt POD 0 TAVR. Will follow-up tomorrow for PT evaluation as appropriate. Recommend with nursing patient to complete as able in order to maintain strength, endurance and independence: OOB to chair 3x/day. Thank you.  
Linda Ca, PT, DPT

## 2020-10-28 NOTE — PROGRESS NOTES
Mickie Bennett arrived to CCU from OR s/p transcatheter aortic valve replacement with 34 Evolut R with 3+ post dilatation with Shockwave by Deniz Casper and Claudia Bell. His procedure was complicated by need to keep temporary pacing wire with need for pacing and intubation. Patient is intubated and sedated. Bilateral groin sites soft with gauze dressing-clean, dry, intact. PPP. Anticoagulation plan for ASA 325mg daily. Plan for TTE and EKG tomorrow morning. Visit Vitals BP (!) 110/39 Pulse 82 Temp 97.7 °F (36.5 °C) Resp 12 Ht 6' 3\" (1.905 m) Wt 198 lb 3.1 oz (89.9 kg) SpO2 97% BMI 24.77 kg/m² Ammy Jurado, HUMBERTO

## 2020-10-28 NOTE — PERIOP NOTES
TRANSFER - IN REPORT: 
 
Verbal report received from ALEXANDRU Zavala 20 on Anahi Bhatt  being received from [facility] for routine progression of care Report consisted of patients Situation, Background, Assessment and  
Recommendations(SBAR). Information from the following report(s) SBAR, Intake/Output, MAR and Recent Results was reviewed with the receiving nurse. Opportunity for questions and clarification was provided. Assessment completed upon patients arrival to unit and care assumed.

## 2020-10-28 NOTE — PROGRESS NOTES
Attending assumed by Dr. Kirill Short team. Discussed with Alba Valdez NP. Hospitalist team will sing off at this time. Please feel free to call us if a need arises.

## 2020-10-28 NOTE — PROGRESS NOTES
1230: Pt arrived to CCU 25. Report received from Dr. Annette Rice, cath team, and anesthesia. Plan to extubate pt. Primary Nurse Suzy De La Cruz RN and Natalia Dickey RN performed a dual skin assessment on this patient Impairment noted- see wound doc flow sheet Justin score is 20 Pt purple buttocks/unblanchable, bilaterally. 1310: Propofol stopped. 1340: Pt placed on SBT by RT.  
 
1415: RT at bedside for ABG. 1420: Pt extubated to 4L NC 
 
1430: Adonay gtt stopped. 1515: Dr. Annette Rice at bedside. Pt's pacer decreased to a rate of 60 by MD.  
 
1730: Dr. Annette Rice at bedside. EKG performed. Removed pt's temporary pacer. 1930: Bedside shift change report given to Alexa De La Rosa RN (oncoming nurse) by Amanda Fallon RN (offgoing nurse). Report included the following information SBAR, ED Summary, Procedure Summary, Intake/Output, MAR and Cardiac Rhythm NSR w/1st deg AVB, BBB.

## 2020-10-28 NOTE — ANESTHESIA POSTPROCEDURE EVALUATION
Procedure(s): 
Transcatheter Aortic Valve Replacement, Shockwave, Balloon Valvuloplasty, Transthoracic Echo and KENAN by Dr. Cleve Aggarwal. MAC Anesthesia Post Evaluation Patient location during evaluation: PACU Note status: Adequate. Level of consciousness: responsive to verbal stimuli and sleepy but conscious Pain management: satisfactory to patient Airway patency: patent Anesthetic complications: no 
Cardiovascular status: acceptable Respiratory status: acceptable Hydration status: acceptable Comments: +Post-Anesthesia Evaluation and Assessment Patient: Chaz Mchugh MRN: [id number]  SSN: [id number] YOB: 1935  Age: 82 y.o. Sex: male Cardiovascular Function/Vital Signs BP (!) 110/39   Pulse 80   Temp (!) 34.8 °C (94.6 °F)   Resp 17   Ht 6' 3\" (1.905 m)   Wt 89.9 kg (198 lb 3.1 oz)   SpO2 100%   BMI 24.77 kg/m² Patient is status post Procedure(s): 
Transcatheter Aortic Valve Replacement, Shockwave, Balloon Valvuloplasty, Transthoracic Echo and KENAN by Dr. Cleve Aggarwal. Nausea/Vomiting: Controlled. Postoperative hydration reviewed and adequate. Pain: 
Pain Scale 1: Adult Nonverbal Pain Scale (10/28/20 1230) Pain Intensity 1: 0 (10/28/20 1230) Managed. Neurological Status: At baseline. Mental Status and Level of Consciousness: Arousable. Pulmonary Status:  
O2 Device: Room air (10/28/20 1590) Adequate oxygenation and airway patent. Complications related to anesthesia: None Post-anesthesia assessment completed. No concerns. I have evaluated the patient and the patient is stable and ready to be discharged from PACU . Signed By: Dalton Dunbar MD  
 10/28/2020 INITIAL Post-op Vital signs:  
Vitals Value Taken Time BP Temp Pulse 80 10/28/2020  2:25 PM  
Resp 21 10/28/2020  2:25 PM  
SpO2 97 % 10/28/2020  2:25 PM  
Vitals shown include unvalidated device data.

## 2020-10-28 NOTE — ANESTHESIA PREPROCEDURE EVALUATION
Relevant Problems No relevant active problems Anesthetic History No history of anesthetic complications Review of Systems / Medical History Patient summary reviewed, nursing notes reviewed and pertinent labs reviewed Pulmonary Within defined limits Neuro/Psych Within defined limits Psychiatric history Cardiovascular Within defined limits Hypertension Valvular problems/murmurs: aortic stenosis Angina CHF 
 
CAD and PAD Exercise tolerance: <4 METS 
  
GI/Hepatic/Renal 
Within defined limits Endo/Other Within defined limits Diabetes Other Findings Physical Exam 
 
Airway Mallampati: III 
TM Distance: 4 - 6 cm Neck ROM: decreased range of motion Mouth opening: Normal 
 
 Cardiovascular Regular rate and rhythm,  S1 and S2 normal,  no murmur, click, rub, or gallop Rhythm: regular Murmur: Grade 2, Aortic area Dental 
 
Dentition: Edentulous Pulmonary Breath sounds clear to auscultation Abdominal 
GI exam deferred Other Findings Anesthetic Plan ASA: 4 Anesthesia type: MAC Monitoring Plan: Arterial line Induction: Intravenous Anesthetic plan and risks discussed with: Patient

## 2020-10-28 NOTE — PROGRESS NOTES
Cardiac Surgery Care Coordinator- Placed update call to Mr Hermelinda Coombs son. Reviewed plan of care and encouraged him to verbalize. He stated he will be waiting at home today. Will continue to follow and update family. 1035- Placed update call to Mr Darius's son, reviewed plan of care and encouraged him to verbalize. Elisa Epstein final update call to Mr Mccoy's son Dedra Vincent. Reviewed plan of care and exchanged contact information. He stated he will call later today to schedule a visit. Will continue to follow.  Marisa Lynn RN

## 2020-10-28 NOTE — PROGRESS NOTES
Occupational Therapy 10/28/20 Orders received, chart review completed. Note patient POD #0 s/p TAVR. OT will follow up tomorrow for evaluation. Recommend OOB to chair three times a day for meals, self-completion of ADLs as able and medically stable. Thank you, Donny Ayers, OTD, OTR/L

## 2020-10-28 NOTE — PROGRESS NOTES
1930: Bedside and Verbal shift change report given to Ceci German RN (oncoming nurse) by Anette Mccloud RN (offgoing nurse). Report included the following information SBAR, Kardex, ED Summary, OR Summary, Procedure Summary, Intake/Output, MAR, Recent Results, Med Rec Status and Cardiac Rhythm NSR/1AVB. 2000: Resumed pt care. Groin sites WDL, no bleeding/no hematoma. Pulses palpable. Pt in A-fib 120's. Dr. Thony Jarrett pagemaria teresa, no orders received. Pt refusing turns, educated on importance of shifting weight to prevent pressure injuries. Pt acknowledge and said they will shift their weight on their own. Wound care consult in d/t PTA to CCU [facility]. 2300: R arterial sheath removed, no bleeding/no hematoma. Pt temp 100.4, PRN tylenol given. 0100: Pt back in sinus tach (110's) with 1AVB, BBB & PVC's. Will continue to monitor 0430: Daily EKG performed. AM labs drawn and sent, L IJ sheath pulled, no bleeding/no hematoma. Rosen removed per order. 0600: K+ 3.8, replaced per protocol 0730: Bedside and Verbal shift change report given to Shantel Bianchi RN (oncoming nurse) by Ceci German RN (offgoing nurse). Report included the following information SBAR, Kardex, ED Summary, OR Summary, Procedure Summary, Intake/Output, MAR, Recent Results, Med Rec Status and Cardiac Rhythm NSR/1AVB.

## 2020-10-29 NOTE — PROGRESS NOTES
10/29/2020 -  
GLORY: 
- RUR: 21% 
- Disposition is TBD dependent on progression: potential discharge to own [place] apartment at [facility] with transport via family - Patient will need 2nd IM Letter prior to discharge - Patient is POD#1 from TAVR 
- Patient is S/P extubation 10/28 
- Patient may need a pacer, dependent on cardio input - Patient has been weaned down to [address] 
- PT and OT pending CRM: Sylvain Grijalva, MPH, [facility]; Z: [phone] 
 
13:05 - Patient had Code Blue called for agonal breathing and multiple second pause. Patient became responsive on own. Patient to have temporary pacer placed again today, 10/29. CRM: Sylvain Grijalva, MPH, [address] [place] [address]; Z: [phone]

## 2020-10-29 NOTE — PROGRESS NOTES
0730 Bedside and Verbal shift change report given to Gerhardt Earthly and Aparna Sousa (oncoming nurse) by Alexa De La Rosa (offgoing nurse). Report included the following information SBAR, Kardex, ED Summary, Procedure Summary, Intake/Output, MAR, Accordion and Recent Results. 1301 Pt had pause, agonal breathing, unresponsive. Code Blue called. Doctor Phu North and Annette Rice @ bedside. Pt became responsive on own. A/O. Orders for Pt to go down to cath lab for temp pacer per Dr. Annette Rice.

## 2020-10-29 NOTE — PROGRESS NOTES
Problem: Diabetes Self-Management Goal: *Disease process and treatment process Description: Define diabetes and identify own type of diabetes; list 3 options for treating diabetes. Outcome: Progressing Towards Goal 
Goal: *Incorporating nutritional management into lifestyle Description: Describe effect of type, amount and timing of food on blood glucose; list 3 methods for planning meals. Outcome: Progressing Towards Goal 
Goal: *Incorporating physical activity into lifestyle Description: State effect of exercise on blood glucose levels. Outcome: Progressing Towards Goal 
Goal: *Developing strategies to promote health/change behavior Description: Define the ABC's of diabetes; identify appropriate screenings, schedule and personal plan for screenings. Outcome: Progressing Towards Goal 
Goal: *Using medications safely Description: State effect of diabetes medications on diabetes; name diabetes medication taking, action and side effects. Outcome: Progressing Towards Goal 
Goal: *Monitoring blood glucose, interpreting and using results Description: Identify recommended blood glucose targets  and personal targets. Outcome: Progressing Towards Goal 
Goal: *Prevention, detection, treatment of acute complications Description: List symptoms of hyper- and hypoglycemia; describe how to treat low blood sugar and actions for lowering  high blood glucose level. Outcome: Progressing Towards Goal 
Goal: *Prevention, detection and treatment of chronic complications Description: Define the natural course of diabetes and describe the relationship of blood glucose levels to long term complications of diabetes. Outcome: Progressing Towards Goal 
Goal: *Developing strategies to address psychosocial issues Description: Describe feelings about living with diabetes; identify support needed and support network Outcome: Progressing Towards Goal 
Goal: *Insulin pump training Outcome: Progressing Towards Goal 
 Goal: *Sick day guidelines Outcome: Progressing Towards Goal 
Goal: *Patient Specific Goal (EDIT GOAL, INSERT TEXT) Outcome: Progressing Towards Goal 
  
Problem: Patient Education: Go to Patient Education Activity Goal: Patient/Family Education Outcome: Progressing Towards Goal 
  
Problem: Falls - Risk of 
Goal: *Absence of Falls Description: Document Fahad Cooper Fall Risk and appropriate interventions in the flowsheet. Outcome: Progressing Towards Goal 
Note: Fall Risk Interventions: 
Mobility Interventions: Communicate number of staff needed for ambulation/transfer, Patient to call before getting OOB, PT Consult for mobility concerns, PT Consult for assist device competence, Strengthening exercises (ROM-active/passive), Utilize walker, cane, or other assistive device, Bed/chair exit alarm, Assess mobility with egress test 
 
Mentation Interventions: Adequate sleep, hydration, pain control, Bed/chair exit alarm, Door open when patient unattended, Evaluate medications/consider consulting pharmacy, Increase mobility, More frequent rounding, Toileting rounds, Update white board Medication Interventions: Evaluate medications/consider consulting pharmacy, Bed/chair exit alarm, Patient to call before getting OOB, Teach patient to arise slowly Elimination Interventions: Call light in reach, Patient to call for help with toileting needs, Toileting schedule/hourly rounds, Urinal in reach History of Falls Interventions: Bed/chair exit alarm, Consult care management for discharge planning, Door open when patient unattended, Evaluate medications/consider consulting pharmacy, Assess for delayed presentation/identification of injury for 48 hrs (comment for end date) Problem: Patient Education: Go to Patient Education Activity Goal: Patient/Family Education Outcome: Progressing Towards Goal 
  
Problem: General Medical Care Plan Goal: *Vital signs within specified parameters Outcome: Progressing Towards Goal 
Goal: *Absence of infection signs and symptoms Outcome: Progressing Towards Goal 
Goal: *Optimal pain control at patient's stated goal 
Outcome: Progressing Towards Goal 
Goal: *Skin integrity maintained Outcome: Progressing Towards Goal 
  
Problem: Patient Education: Go to Patient Education Activity Goal: Patient/Family Education Outcome: Progressing Towards Goal 
  
Problem: Cath Lab Procedures: Discharge Outcomes Goal: *Stable cardiac rhythm Outcome: Progressing Towards Goal 
Goal: *Hemodynamically stable Outcome: Progressing Towards Goal 
Goal: *Optimal pain control at patient's stated goal 
Outcome: Progressing Towards Goal 
Goal: *Pulses palpable, skin color within defined limits, skin temperature warm Outcome: Progressing Towards Goal 
Goal: *Lungs clear or at baseline Outcome: Progressing Towards Goal 
Goal: *Demonstrates ability to perform prescribed activity without shortness of breath or discomfort Outcome: Progressing Towards Goal 
Goal: *Verbalizes home exercise program, activity guidelines, cardiac precautions Outcome: Progressing Towards Goal 
Goal: *Verbalizes understanding and describes prescribed diet Outcome: Progressing Towards Goal 
Goal: *Verbalizes understanding and describes medication purposes and frequencies Outcome: Progressing Towards Goal 
Goal: *Identifies cardiac risk factors Outcome: Progressing Towards Goal 
Goal: *No signs and symptoms of infection or wound complications Outcome: Progressing Towards Goal 
Goal: *Anxiety reduced or absent Outcome: Progressing Towards Goal 
Goal: *Verbalizes and demonstrates incision care Outcome: Progressing Towards Goal 
Goal: *Understands and describes signs and symptoms to report to providers(Stroke Metric) Outcome: Progressing Towards Goal 
Goal: *Describes follow-up/return visits to physicians Outcome: Progressing Towards Goal 
Goal: *Describes available resources and support systems Outcome: Progressing Towards Goal 
Goal: *Influenza immunization Outcome: Progressing Towards Goal 
Goal: *Pneumococcal immunization Outcome: Progressing Towards Goal 
  
Problem: Risk for Spread of Infection Goal: Prevent transmission of infectious organism to others Description: Prevent the transmission of infectious organisms to other patients, staff members, and visitors. Outcome: Progressing Towards Goal 
  
Problem: Patient Education:  Go to Education Activity Goal: Patient/Family Education Outcome: Progressing Towards Goal 
  
Problem: Syncope Goal: *Absence of injury Outcome: Progressing Towards Goal 
Goal: Decrease or eliminate episodes of syncope Outcome: Progressing Towards Goal 
  
Problem: Patient Education: Go to Patient Education Activity Goal: Patient/Family Education Outcome: Progressing Towards Goal 
  
Problem: Pressure Injury - Risk of 
Goal: *Prevention of pressure injury Description: Document Justin Scale and appropriate interventions in the flowsheet. Outcome: Progressing Towards Goal 
  
Problem: Patient Education: Go to Patient Education Activity Goal: Patient/Family Education Outcome: Progressing Towards Goal 
  
Problem: Patient Education: Go to Patient Education Activity Goal: Patient/Family Education Outcome: Progressing Towards Goal 
  
Problem: Pre-procedure, TAVR Goal: *Verbalizes Description of Procedure Outcome: Progressing Towards Goal 
Goal: *Hemodynamically stable Outcome: Progressing Towards Goal 
Goal: *Verbalizes Description of Procedure Outcome: Progressing Towards Goal 
Goal: Activity/Safety Outcome: Progressing Towards Goal 
Goal: Consults Outcome: Progressing Towards Goal 
Goal: Diagnostic Tests/Procedures Outcome: Progressing Towards Goal 
Goal: Nutrition/Diet Outcome: Progressing Towards Goal 
Goal: Medications Outcome: Progressing Towards Goal 
Goal: Respiratory Outcome: Progressing Towards Goal 
Goal: Treatments/Interventions/Procedures Outcome: Progressing Towards Goal 
Goal: Psychosocial Needs Outcome: Progressing Towards Goal 
  
Problem: Post-procedure,Day of TAVR Goal: *Stable Cardiac Rhythm Outcome: Progressing Towards Goal 
Goal: *Hemodynamically stable Outcome: Progressing Towards Goal 
Goal: *Optimal pain control at patient's stated goal 
Outcome: Progressing Towards Goal 
Goal: *Lungs clear or at baseline Outcome: Progressing Towards Goal 
Goal: *Demonstrates progressive activity Outcome: Progressing Towards Goal 
Goal: *Procedure site is without bleeding and signs of infection six hours post sheath removal 
Outcome: Progressing Towards Goal 
Goal: *Pulses palpable, skin color within defined limits, skin temperature warm Outcome: Progressing Towards Goal 
Goal: *Bilateral lower extremities neurovascularly intact Outcome: Progressing Towards Goal 
Goal: Activity/Safety Outcome: Progressing Towards Goal 
Goal: Consults Outcome: Progressing Towards Goal 
Goal: Diagnostic Tests/Procedures Outcome: Progressing Towards Goal 
Goal: Nutrition/Diet Outcome: Progressing Towards Goal 
Goal: Discharge Planning Outcome: Progressing Towards Goal 
Goal: Medications Outcome: Progressing Towards Goal 
Goal: Respiratory Outcome: Progressing Towards Goal 
Goal: Treatments/Interventions/Procedures Outcome: Progressing Towards Goal 
Goal: Psychosocial Needs Outcome: Progressing Towards Goal 
  
Problem: Post-procedure Day 1,TAVR Goal: *Stable Cardiac Rhythm Outcome: Progressing Towards Goal 
Goal: *Hemodynamically stable Outcome: Progressing Towards Goal 
Goal: *Optimal pain control at patient's stated goal 
Outcome: Progressing Towards Goal 
Goal: *Lungs clear or at baseline Outcome: Progressing Towards Goal 
Goal: *Demonstrates progressive activity Outcome: Progressing Towards Goal 
Goal: *No signs of infection or puncture site complication Outcome: Progressing Towards Goal 
Goal: *Verbalizes and demonstrates puncture site care Outcome: Progressing Towards Goal 
Goal: Activity/Safety Outcome: Progressing Towards Goal 
Goal: Consults Outcome: Progressing Towards Goal 
Goal: Diagnostic Tests/Procedures Outcome: Progressing Towards Goal 
Goal: Nutrition/Diet Outcome: Progressing Towards Goal 
Goal: Discharge Planning Outcome: Progressing Towards Goal 
Goal: Medications Outcome: Progressing Towards Goal 
Goal: Respiratory Outcome: Progressing Towards Goal 
Goal: Treatments/Interventions/Procedures Outcome: Progressing Towards Goal 
Goal: Psychosocial Needs Outcome: Progressing Towards Goal 
  
Problem: Post-procedure,Day 2/Day of Discharge,TAVR Goal: *Stable Cardiac Rhythm Outcome: Progressing Towards Goal 
Goal: *Hemodynamically stable Outcome: Progressing Towards Goal 
Goal: *Optimal pain control at patient's stated goal 
Outcome: Progressing Towards Goal 
Goal: *Lungs clear or at baseline Outcome: Progressing Towards Goal 
Goal: *Demonstrates ability to perform prescribed activity without shortness of breath or discomfort Outcome: Progressing Towards Goal 
Goal: [facility] Activity Guidelines Outcome: Progressing Towards Goal 
Goal: *No signs of infection or puncture site complication Outcome: Progressing Towards Goal 
Goal: *Verbalizes and demonstrates puncture site care Outcome: Progressing Towards Goal 
Goal: *Verbalizes understanding and describes prescribed diet Outcome: Progressing Towards Goal 
Goal: *Verbalizes understanding and describes medication purposes and frequencies Outcome: Progressing Towards Goal 
Goal: *Anxiety reduced or absent Outcome: Progressing Towards Goal 
Goal: *Understands and describes signs and symptoms to report to providers Outcome: Progressing Towards Goal 
Goal: *Describes follow-up/return visits to physicians Outcome: Progressing Towards Goal 
Goal: *Describes available resources and support systems Outcome: Progressing Towards Goal 
Goal: *Influenza immunization Outcome: Progressing Towards Goal 
Goal: *Pneumococcal immunization Outcome: Progressing Towards Goal 
Goal: *Identifies and verbalizes understanding of the valve ID card, antibiotic card and Red reminder bracelet Outcome: Progressing Towards Goal 
Goal: Activity/Safety Outcome: Progressing Towards Goal 
Goal: Consults Outcome: Progressing Towards Goal 
Goal: Diagnostic Tests/Procedures Outcome: Progressing Towards Goal 
Goal: Nutrition/Diet Outcome: Progressing Towards Goal 
Goal: Discharge Planning Outcome: Progressing Towards Goal 
Goal: Medications Outcome: Progressing Towards Goal 
Goal: Respiratory Outcome: Progressing Towards Goal 
Goal: Treatments/Interventions/Procedures Outcome: Progressing Towards Goal 
Goal: Psychosocial Needs Outcome: Progressing Towards Goal 
  
Problem: Pain Goal: *Control of Pain Outcome: Progressing Towards Goal 
Goal: *PALLIATIVE CARE:  Alleviation of Pain Outcome: Progressing Towards Goal 
  
Problem: Patient Education: Go to Patient Education Activity Goal: Patient/Family Education Outcome: Progressing Towards Goal 
  
Problem: Patient Education: Go to Patient Education Activity Goal: Patient/Family Education Outcome: Progressing Towards Goal 
  
Problem: Heart Failure: Day 1 Goal: Off Pathway (Use only if patient is Off Pathway) Outcome: Progressing Towards Goal 
Goal: Activity/Safety Outcome: Progressing Towards Goal 
Goal: Consults, if ordered Outcome: Progressing Towards Goal 
Goal: Diagnostic Test/Procedures Outcome: Progressing Towards Goal 
Goal: Nutrition/Diet Outcome: Progressing Towards Goal 
Goal: Discharge Planning Outcome: Progressing Towards Goal 
Goal: Medications Outcome: Progressing Towards Goal 
Goal: Respiratory Outcome: Progressing Towards Goal 
Goal: Treatments/Interventions/Procedures Outcome: Progressing Towards Goal 
Goal: Psychosocial 
Outcome: Progressing Towards Goal 
Goal: *Oxygen saturation within defined limits Outcome: Progressing Towards Goal 
Goal: *Hemodynamically stable Outcome: Progressing Towards Goal 
Goal: *Optimal pain control at patient's stated goal 
Outcome: Progressing Towards Goal 
Goal: *Anxiety reduced or absent Outcome: Progressing Towards Goal 
  
Problem: Heart Failure: Day 2 Goal: Off Pathway (Use only if patient is Off Pathway) Outcome: Progressing Towards Goal 
Goal: Activity/Safety Outcome: Progressing Towards Goal 
Goal: Consults, if ordered Outcome: Progressing Towards Goal 
Goal: Diagnostic Test/Procedures Outcome: Progressing Towards Goal 
Goal: Nutrition/Diet Outcome: Progressing Towards Goal 
Goal: Discharge Planning Outcome: Progressing Towards Goal 
Goal: Medications Outcome: Progressing Towards Goal 
Goal: Respiratory Outcome: Progressing Towards Goal 
Goal: Treatments/Interventions/Procedures Outcome: Progressing Towards Goal 
Goal: Psychosocial 
Outcome: Progressing Towards Goal 
Goal: *Oxygen saturation within defined limits Outcome: Progressing Towards Goal 
Goal: *Hemodynamically stable Outcome: Progressing Towards Goal 
Goal: *Optimal pain control at patient's stated goal 
Outcome: Progressing Towards Goal 
Goal: *Anxiety reduced or absent Outcome: Progressing Towards Goal 
Goal: *Demonstrates progressive activity Outcome: Progressing Towards Goal 
  
Problem: Heart Failure: Day 3 Goal: Off Pathway (Use only if patient is Off Pathway) Outcome: Progressing Towards Goal 
Goal: Activity/Safety Outcome: Progressing Towards Goal 
Goal: Diagnostic Test/Procedures Outcome: Progressing Towards Goal 
Goal: Nutrition/Diet Outcome: Progressing Towards Goal 
Goal: Discharge Planning Outcome: Progressing Towards Goal 
Goal: Medications Outcome: Progressing Towards Goal 
Goal: Respiratory Outcome: Progressing Towards Goal 
Goal: Treatments/Interventions/Procedures Outcome: Progressing Towards Goal 
Goal: Psychosocial 
Outcome: Progressing Towards Goal 
 Goal: *Oxygen saturation within defined limits Outcome: Progressing Towards Goal 
Goal: *Hemodynamically stable Outcome: Progressing Towards Goal 
Goal: *Optimal pain control at patient's stated goal 
Outcome: Progressing Towards Goal 
Goal: *Anxiety reduced or absent Outcome: Progressing Towards Goal 
Goal: *Demonstrates progressive activity Outcome: Progressing Towards Goal 
  
Problem: Heart Failure: Day 4 Goal: Off Pathway (Use only if patient is Off Pathway) Outcome: Progressing Towards Goal 
Goal: Activity/Safety Outcome: Progressing Towards Goal 
Goal: Diagnostic Test/Procedures Outcome: Progressing Towards Goal 
Goal: Nutrition/Diet Outcome: Progressing Towards Goal 
Goal: Discharge Planning Outcome: Progressing Towards Goal 
Goal: Medications Outcome: Progressing Towards Goal 
Goal: Respiratory Outcome: Progressing Towards Goal 
Goal: Treatments/Interventions/Procedures Outcome: Progressing Towards Goal 
Goal: Psychosocial 
Outcome: Progressing Towards Goal 
Goal: *Oxygen saturation within defined limits Outcome: Progressing Towards Goal 
Goal: *Hemodynamically stable Outcome: Progressing Towards Goal 
Goal: *Optimal pain control at patient's stated goal 
Outcome: Progressing Towards Goal 
Goal: *Anxiety reduced or absent Outcome: Progressing Towards Goal 
Goal: *Demonstrates progressive activity Outcome: Progressing Towards Goal 
  
Problem: Heart Failure: Day 5 Goal: Off Pathway (Use only if patient is Off Pathway) Outcome: Progressing Towards Goal 
Goal: Activity/Safety Outcome: Progressing Towards Goal 
Goal: Diagnostic Test/Procedures Outcome: Progressing Towards Goal 
Goal: Nutrition/Diet Outcome: Progressing Towards Goal 
Goal: Discharge Planning Outcome: Progressing Towards Goal 
Goal: Medications Outcome: Progressing Towards Goal 
Goal: Respiratory Outcome: Progressing Towards Goal 
Goal: Treatments/Interventions/Procedures Outcome: Progressing Towards Goal 
 Goal: Psychosocial 
Outcome: Progressing Towards Goal 
  
Problem: Heart Failure: Discharge Outcomes Goal: *Demonstrates ability to perform prescribed activity without shortness of breath or discomfort Outcome: Progressing Towards Goal 
Goal: *Left ventricular function assessment completed prior to or during stay, or planned for post-discharge Outcome: Progressing Towards Goal 
Goal: *ACEI prescribed if LVEF less than 40% and no contraindications or ARB prescribed Outcome: Progressing Towards Goal 
Goal: *Verbalizes understanding and describes prescribed diet Outcome: Progressing Towards Goal 
Goal: *Verbalizes understanding/describes prescribed medications Outcome: Progressing Towards Goal 
Goal: *Describes available resources and support systems Description: (eg: Home Health, Palliative Care, [facility] [employer]) Outcome: Progressing Towards Goal 
Goal: *Describes smoking cessation resources Outcome: Progressing Towards Goal 
Goal: *Understands and describes signs and symptoms to report to providers(Stroke Metric) Outcome: Progressing Towards Goal 
Goal: *Describes/verbalizes understanding of follow-up/return appt Description: (eg: to physicians, diabetes treatment coordinator, and other resources Outcome: Progressing Towards Goal 
Goal: *Describes importance of continuing daily weights and changes to report to physician Outcome: Progressing Towards Goal

## 2020-10-29 NOTE — PROGRESS NOTES
Cardiac Surgery Care Coordinator- Met with Sidra Johns, reviewed plan of care and informed him that I have updated his son. He is without questions at this time. Placed update call to [facility], the son of Sidra Johns. He is without questions at this time. Saige Araiza RN 
 
1500- Placed update call to Dylon Álvarez, reviewed plan of care per Dr. Hawa Duffy. He stated he will be visiting later today. Will continue to follow.  Saige Araiza RN

## 2020-10-29 NOTE — PROGRESS NOTES
0730 Bedside and Verbal shift change report given to Gypsy Lua and Celeste Lara (oncoming nurse) by Lindy Bonilla (offgoing nurse). Report included the following information SBAR, Kardex, ED Summary, Procedure Summary, Intake/Output, MAR, Accordion and Recent Results. 1700 Bedside and Verbal shift change report given to Kirti (oncoming nurse) by Ayush Ozuna (offgoing nurse). Report included the following information SBAR, Kardex, ED Summary, Procedure Summary, Intake/Output, MAR, Accordion and Recent Results.

## 2020-10-29 NOTE — PROGRESS NOTES
[place]: Brett Wilson 
[phone] Cardiology valvular heart disease consult note Requesting/referring provider: Dr. Karon Deluca, Dr. Anastacio Urbano, Dr. Bhaskar Mane Reason for Consult: Valvular heart disease Subjective/interim: Did well overnight with no issues. Ambulated to chair in AM. NO dyspnea. At about 8 AM developed MAT with rate related LBBB. At around 1 pm rapid response called with patient transiently not responding. Rhythm showed high degree AV block. With ventricular standstill for 5 sec. Patient taken urgently to cath lab for temp pacer placement. Doing well other wise hemodynamically stable. Investigations personally reviewed by me: 
ECG October 2020: First ECG: Sinus rhythm, first-degree AV block, nonspecific ST-T changes;  
second ECG multifocal atrial tachycardia, degree block, nonspecific ST-T changes Echocardiogram October 2020: Normal LV function, likely severe aortic stenosis with peak transaortic velocity of 3.8 to 3.9 m/s, mean gradient of 35 mmHg, calculated aortic valve area by continued equation of 0.8 cm² by mean gradient. Visibly the valve looks extremely calcified with significant restriction of motion. There is moderate to severe mitral annular calcification extending inferior to the mitral valve annulus along the posterior left ventricular wall. Transmitral gradients are also increased at 6mmHg mean gradient consistent with possible mild mitral stenosis. Leaflets were difficult to visualize but do not appear typical for rheumatic mitral stenosis despite history of rheumatic fever as a kid. No significant pulmonary hypertension is noted. Left atrium is significantly enlarged. ECG 5 PM October 28 2020: QRS down 202 ms, W[place] interval down to about 300 to 350 ms, essentially similar to what it was on ECG on 19 October. Assessment/Plan: 1. Likely severe symptomatic calcific senile aortic stenosis 2.  Hypertension 3. CKD 4. History of remote smoking 5. Mitral annular calcification with possible mild mitral stenosis 6. Multifocal atrial tachycardia/atrial fibrillation 7. Coronary disease status post coronary bypass grafting remotely with LIMA to LAD, vein graft to OM, vein graft to PDA 8. Preserved LV systolic function 9. Recent syncope of uncertain etiology possibly contributed by severe aortic stenosis in the setting of atrial arrhythmia and anti HTN meds. 10.  History of peripheral arterial disease with prior bilateral common iliac stents, known bilateral common femoral calcification extending in the distal common femoral into bifurcation with bilateral SFA and infrapopliteal disease. Likely bilateral femoral endarterectomies have been performed in the past as well. 11. Tachy masood syndrome with post TAVR intermittent LBBB and intermittent high degree AV blcok in the setting of known 1st degree AV block. Mr. Bueno Son presents to the hospital with syncope. He appears to have likely severe aortic stenosis in addition to intermittent atrial fibrillation/multifocal tachycardia/tachybrady. He has underlying first-degree AV block. He underwent transcatheter aortic valve placement today with 34 mm Medtronic evolute transcatheter heart valve. After having left bundle branch block as well as hemodynamic compromise due to paravalvular leak, he is now doing much better. His left bundle branch block has resolved. Because of sensing abnormality of the pacemaker, temporary pacemaker is now removed. He is conducting 1 is to 1 with normalization of QRS as well as DE intervals to his baseline of 102 ms and 320 ms respectively.   He does have mild to moderate paravalvular leak but appears to be currently tolerating external.  Given his age and multiple comorbidities, as long as his LV function stays stable he does not have any significant hemodynamic issues from this mild to moderate PVL, we will plan no intervention. He developed worsening AV block this afternoon. Patient is set up to undergo permanent pacemaker implantation tomorrow with Dr. Myles Jo and possibly discharge over weekend. His Atorvastatin will be increased to 40 mg daily. BB may be started after permanent pacemaker placement. Will be discharged on aspirin alone. I personally spent 15 minutes of critical care time. This included time to manage systemic replacement with including eventually removal of his pacemaker as well as performing bedside echocardiogram by myself along with decision to evaluate and get the patient extubated. This is time spent at this critically ill patient's bedside actively involved in patient care as well as the coordination of care and discussions with the patient's family. This does not include any procedural time which has been billed separately. [x]    High complexity decision making was performed 
[x]    Patient is at high-risk of decompensation with multiple organ involvement HPI: Aparna Flower, a 82y.o. year-old who presents for evaluation of syncope. He has significant history of coronary artery disease with remote coronary artery bypass grafting with LIMA to LAD, vein graft to OM, vein graft to RPDA. [place] stress [place] in 2017 [place] showed minimal ischemia and he reports no angina and this has been managed medically. He is currently in an assisted care living but has good cognition and relatively functional.  He also has known history of aortic valve disease in the form of aortic stenosis as well as mitral valve disease in the form of predominantly mitral annular calcification. Aortic stenosis has been under surveillance with Dr. Gilda Landeros. On his most recent echocardiograms however there is a suspicion that his aortic stenosis is severe.  
 
He now presented to the hospital with episode of sudden onset syncope without any preceding symptoms. Patient did not report of an rapid rate related palpitations. Initial work-up for syncope has been rather unrevealing. His underlying EKG shows normal QRS complex first-degree AV block. During hospitalization he has been noted to be intermittently in multifocal atrial tachycardia/spectrum of atrial fibrillation with rates up to 140 bpm. 
 
 
 
He  has a past medical history of BPH (benign prostatic hyperplasia), CAD (coronary artery disease), Diabetes ([address] Hearing loss, Hypercholesterolemia, Hypertension, Murmur, and Recurrent depression ([address] (2019). He also has no past medical history of Anemia, Arrhythmia, Arthritis, Asthma, Autoimmune disease ([address] Calculus of kidney, Cancer ([address] Chronic kidney disease, Chronic obstructive pulmonary disease ([address] Chronic pain, Congestive heart failure ([address] GERD (gastroesophageal reflux disease), Headache, Liver disease, Psychotic disorder ([facility] PUD (peptic ulcer disease), Seizures ([facility] Stroke ([facility] Thromboembolus ([facility] Thyroid disease, or Trauma. Review of system:Patient reports no dyspnea/PND/Orthpnea/CP. He reports no cough/fever/focal neurological deficits/abdominal pain. All other systems negative except as above. Family History Problem Relation Age of Onset  Cancer Mother  Cancer Father Social History Socioeconomic History  Marital status:  Spouse name: Not on file  Number of children: Not on file  Years of education: Not on file  Highest education level: Not on file Tobacco Use  Smoking status: Former Smoker Types: Cigarettes Last attempt to quit: 1990 Years since quittin.1  Smokeless tobacco: Never Used Substance and Sexual Activity  Alcohol use: No  
 Drug use: No  
  
PE Vitals:  
 10/29/20 1120 10/29/20 1125 10/29/20 1200 10/29/20 1445 BP: 96/74 117/60 (!) 125/51 (!) 114/52 Pulse: (!) 117 (!) 105 (!) 101 92 Resp:    18  
 Temp:   99 °F (37.2 °C) SpO2:   93% Weight:      
Height:      
 Body mass index is 25.46 kg/m². General:    Alert, cooperative, no distress. Psychiatric:    Normal Mood and affect Eye/ENT:      Pupils equal, No asymmetry, Conjunctival pink. Able to hear voice at normal amplitude Lungs:      Visibly symmetric chest expansion, No palpable tenderness. Clear to auscultation bilaterally. Heart[de-identified]    Regular rate and rhythm, S1, [id number], mid to late peaking mid systolic murmur, click, rub or gallop. No JVD, Normal palpable peripheral pulses. No cyanosis Abdomen:     Soft, non-tender. Bowel sounds normal. No masses,  No   
  organomegaly. Extremities:   Extremities normal, atraumatic, no edema. Neurologic:   CN II-XII grossly intact. No gross focal deficits Recent Labs: 
Lab Results Component Value Date/Time Cholesterol, total 114 2020 03:19 PM  
 HDL Cholesterol 40 2020 03:19 PM  
 LDL, calculated 54 2020 03:19 PM  
 Triglyceride 101 2020 03:19 PM  
 
Lab Results Component Value Date/Time Creatinine 0.78 10/29/2020 04:44 AM  
 
Lab Results Component Value Date/Time BUN 14 10/29/2020 04:44 AM  
 
Lab Results Component Value Date/Time Potassium 3.8 10/29/2020 04:44 AM  
 
Lab Results Component Value Date/Time Hemoglobin A1c 7.1 (H) 10/18/2020 03:27 PM  
 
Lab Results Component Value Date/Time HGB 9.2 (L) 10/29/2020 04:44 AM  
 
Lab Results Component Value Date/Time PLATELET 149 (L)  04:44 AM  
 
 
Reviewed: 
Past Medical History:  
Diagnosis Date  BPH (benign prostatic hyperplasia)  CAD (coronary artery disease)  Diabetes ([address]  Hearing loss  Hypercholesterolemia  Hypertension  Murmur  Recurrent depression ([facility] 2019 Social History Tobacco Use Smoking Status Former Smoker  Types: Cigarettes  Last attempt to quit: 1990  Years since quittin.1 Smokeless Tobacco Never Used Social History Substance and Sexual Activity Alcohol Use No  
 
Allergies Allergen Reactions  Procaine Other (comments) Pt stated he passed out from procaine and was told not to let anyone use it on him again Family History Problem Relation Age of Onset  Cancer Mother  Cancer Father Current Facility-Administered Medications Medication Dose Route Frequency  balsam peru-castor oiL (VENELEX) ointment   Topical BID  sodium chloride (NS) flush 5-40 mL  5-40 mL IntraVENous Q8H  
 sodium chloride (NS) flush 5-40 mL  5-40 mL IntraVENous PRN  
 0.45% sodium chloride infusion  10 mL/hr IntraVENous CONTINUOUS  
 acetaminophen (TYLENOL) tablet 650 mg  650 mg Oral Q4H PRN  
 traMADoL (ULTRAM) tablet  mg   mg Oral Q6H PRN  
 oxyCODONE-acetaminophen (PERCOCET) 5-325 mg per tablet 1-2 Tab  1-2 Tab Oral Q4H PRN  
 morphine injection 2 mg  2 mg IntraVENous Q2H PRN  
 naloxone (NARCAN) injection 0.4 mg  0.4 mg IntraVENous PRN  
 ondansetron (ZOFRAN) injection 4 mg  4 mg IntraVENous Q4H PRN  
 albuterol (PROVENTIL VENTOLIN) nebulizer solution 2.5 mg  2.5 mg Nebulization Q4H PRN  
 famotidine (PEPCID) tablet 20 mg  20 mg Oral Q12H  
 magnesium oxide (MAG-OX) tablet 400 mg  400 mg Oral BID  calcium chloride 1 g in 0.9% sodium chloride 250 mL IVPB  1 g IntraVENous PRN  
 bisacodyL (DULCOLAX) suppository 10 mg  10 mg Rectal DAILY PRN  
 senna-docusate (PERICOLACE) 8.6-50 mg per tablet 1 Tab  1 Tab Oral BID  ELECTROLYTE REPLACEMENT NOTE: Nurse to review Serum Potassium and Magnesuim levels and Initiate Electrolyte Replacement Protocol as needed  1 Each Other PRN  niCARdipine (CARDENE) 25 mg in 0.9% sodium chloride 250 mL infusion  0-15 mg/hr IntraVENous TITRATE  PHENYLephrine (ANATOLY-SYNEPHRINE) 30 mg in 0.9% sodium chloride 250 mL infusion   mcg/min IntraVENous TITRATE  insulin glargine (LANTUS) injection 15 Units  15 Units SubCUTAneous QHS  ascorbic acid (vitamin C) (VITAMIN C) tablet 1,000 mg  1,000 mg Oral TID  traZODone (DESYREL) tablet 50 mg  50 mg Oral QHS PRN  
 temazepam (RESTORIL) capsule 15 mg  15 mg Oral QHS PRN  
 aspirin tablet 325 mg  325 mg Oral DAILY  atorvastatin (LIPITOR) tablet 10 mg  10 mg Oral QHS  doxazosin (CARDURA) tablet 4 mg  4 mg Oral QHS  finasteride (PROSCAR) tablet 5 mg  5 mg Oral DAILY  sertraline (ZOLOFT) tablet 100 mg  100 mg Oral QPM  
 insulin lispro (HUMALOG) injection   SubCUTAneous AC&HS Nick Garcia MD10/29/20 There are other unrelated non-urgent complaints, but due to the busy schedule and the amount of time I've already spent with him, time does not permit me to address these routine issues at today's visit. I've requested another appointment to review these additional issues. ATTENTION:  
This medical record was transcribed using an electronic medical records/speech recognition system. Although proofread, it may and can contain electronic, spelling and other errors. Corrections may be executed at a later time. Please feel free to contact us for any clarifications as needed. [address] [facility] heart and Vascular [address], [address]

## 2020-10-29 NOTE — PROGRESS NOTES
Procedure Urgent temporary pacemaker placement from right IJV approach. Uncomplicated. Transferred back to ICU. confirmed pacing at 0.5 mA Output set at 10 mA, back up rate of 60 pm 
 
Recs. Permanent pacemaker placement with Dr. Mcnamara Or tomorrow.

## 2020-10-29 NOTE — WOUND CARE
WOCN Note:  
 
New consult for sacrum. Chart shows: 
Admitted for fall with syncope; TAVR 10/28 Assessment:  
Communicative and responds to request to turn and does so independently onto left side. Cleaned of small formed stool. Bed: camron with NYA mattress Bilateral heel and sacral skin intact and without erythema. Heels offloaded with pillows. Bilateral buttocks intact but peppered with elevated, deep purple lesions that appear most like varicosities. NOT bullae. FULLY manuel. Recommendations:   
Venelex twice daily. Turn/reposition approximately every 2 hours and offload heels with pillows at all times in bed. Transition of Care: Plan to follow weekly and as needed while admitted to hospital.  
 
DELFINA Luo, RN, [facility]eller Certified Wound, Ostomy, Continence Nurse 
office [phone] 
pager [phone] or call  to page

## 2020-10-29 NOTE — PROGRESS NOTES
[facility] [place]: Elver Garay 
[phone] Cardiology valvular heart disease consult note Requesting/referring provider: Dr. Loreta Griggs, Dr. Hernan Elder, Dr. Sally Salgado Reason for Consult: Valvular heart disease Subjective/interim: Underwent complex transcatheter aortic valve replacement today after iliac stent angioplasty with shockwave balloon followed by placement of 34 mm evolute R transcatheter heart prosthesis. Procedure was complicated by access issues as well as postimplantation paravalvular leak causing hemodynamic compromise as well as postprocedural left bundle branch block. However following valve postdilatation he has subsequently recovered and is now off pressors with adequate blood pressure response without pressor support. He was briefly intubated during the procedure to get a transesophageal echocardiogram which showed mild paravalvular leak at the end of the procedure. Investigations personally reviewed by me: 
ECG October 2020: First ECG: Sinus rhythm, first-degree AV block, nonspecific ST-T changes;  
second ECG multifocal atrial tachycardia, degree block, nonspecific ST-T changes Echocardiogram October 2020: Normal LV function, likely severe aortic stenosis with peak transaortic velocity of 3.8 to 3.9 m/s, mean gradient of 35 mmHg, calculated aortic valve area by continued equation of 0.8 cm² by mean gradient. Visibly the valve looks extremely calcified with significant restriction of motion. There is moderate to severe mitral annular calcification extending inferior to the mitral valve annulus along the posterior left ventricular wall. Transmitral gradients are also increased at 6mmHg mean gradient consistent with possible mild mitral stenosis. Leaflets were difficult to visualize but do not appear typical for rheumatic mitral stenosis despite history of rheumatic fever as a kid. No significant pulmonary hypertension is noted. Left atrium is significantly enlarged. ECG 5 PM October 28 2020: QRS down 202 ms, OR interval down to about 300 to 350 ms, essentially similar to what it was on ECG on 19 October. Assessment/Plan: 1. Likely severe symptomatic calcific senile aortic stenosis 2. Hypertension 3. CKD 4. History of remote smoking 5. Mitral annular calcification with possible mild mitral stenosis 6. Multifocal atrial tachycardia/atrial fibrillation 7. Coronary disease status post coronary bypass grafting remotely with LIMA to LAD, vein graft to OM, vein graft to PDA 8. Preserved LV systolic function 9. Recent syncope of uncertain etiology possibly contributed by severe aortic stenosis in the setting of atrial arrhythmia and anti HTN meds. 10.  History of peripheral arterial disease with prior bilateral common iliac stents, known bilateral common femoral calcification extending in the distal common femoral into bifurcation with bilateral SFA and infrapopliteal disease. Likely bilateral femoral endarterectomies have been performed in the past as well. Mr. Ayanna Zarate presents to the hospital with syncope. He appears to have likely severe aortic stenosis in addition to intermittent atrial fibrillation/multifocal tachycardia. He has underlying first-degree AV block. He underwent transcatheter aortic valve placement today with 34 mm Medtronic evolute transcatheter heart valve. After having left bundle branch block as well as hemodynamic compromise due to paravalvular leak, he is now doing much better. His left bundle branch block has resolved. Because of sensing abnormality of the pacemaker, temporary pacemaker is now removed. He is conducting 1 is to 1 with normalization of QRS as well as OR intervals to his baseline of 102 ms and 320 ms respectively.   He does have mild to moderate paravalvular leak but appears to be currently tolerating external.  Given his age and multiple comorbidities, as long as his LV function stays stable he does not have any significant hemodynamic issues from this mild to moderate PVL, we will plan no intervention. Based on his current ECG, I suspect he may not necessarily need a pacemaker unless he demonstrates more atrial fibrillation/multifocal atrial tachycardia which is difficult to control with beta-blockade. Plan will be to initiate him on aspirin 81 mg daily with or without anticoagulation. He does have moderate mitral disease in the form of combination of moderate MR and MS which at this point of time does not require any intervention. Should he demonstrate symptoms of congestive heart failure due to mitral valve disease in future, we will consider interventions. I understand ending talking to the patient and the family is that they would not like to entertain extremely invasive procedures amounting to open heart surgery. I personally spent 40 minutes of critical care time. This included time to manage systemic replacement with including eventually removal of his pacemaker as well as performing bedside echocardiogram by myself along with decision to evaluate and get the patient extubated. This is time spent at this critically ill patient's bedside actively involved in patient care as well as the coordination of care and discussions with the patient's family. This does not include any procedural time which has been billed separately. [x]    High complexity decision making was performed 
[x]    Patient is at high-risk of decompensation with multiple organ involvement HPI: Marely Fung, a 82y.o. year-old who presents for evaluation of syncope. He has significant history of coronary artery disease with remote coronary artery bypass grafting with LIMA to LAD, vein graft to OM, vein graft to RPDA.   [place] stress [place] in 2017 in [place] showed minimal ischemia and he reports no angina and this has been managed medically. He is currently in an assisted care living but has good cognition and relatively functional.  He also has known history of aortic valve disease in the form of aortic stenosis as well as mitral valve disease in the form of predominantly mitral annular calcification. Aortic stenosis has been under surveillance with Dr. Seema Stover. On his most recent echocardiograms however there is a suspicion that his aortic stenosis is severe. He now presented to the hospital with episode of sudden onset syncope without any preceding symptoms. Patient did not report of an rapid rate related palpitations. Initial work-up for syncope has been rather unrevealing. His underlying EKG shows normal QRS complex first-degree AV block. During hospitalization he has been noted to be intermittently in multifocal atrial tachycardia/spectrum of atrial fibrillation with rates up to 140 bpm. 
 
 
 
He  has a past medical history of BPH (benign prostatic hyperplasia), CAD (coronary artery disease), Diabetes ([address] Hearing loss, Hypercholesterolemia, Hypertension, Murmur, and Recurrent depression ([address] (8/22/2019). He also has no past medical history of Anemia, Arrhythmia, Arthritis, Asthma, Autoimmune disease ([address] Calculus of kidney, Cancer ([address] Chronic kidney disease, Chronic obstructive pulmonary disease ([address] Chronic pain, Congestive heart failure ([address] GERD (gastroesophageal reflux disease), Headache, Liver disease, Psychotic disorder ([address] PUD (peptic ulcer disease), Seizures ([address] Stroke ([address] Thromboembolus ([address] Thyroid disease, or Trauma. Review of system:Patient reports no dyspnea/PND/Orthpnea/CP. He reports no cough/fever/focal neurological deficits/abdominal pain. All other systems negative except as above. Family History Problem Relation Age of Onset  Cancer Mother  Cancer Father Social History Socioeconomic History  Marital status:  Spouse name: Not on file  Number of children: Not on file  Years of education: Not on file  Highest education level: Not on file Tobacco Use  Smoking status: Former Smoker Types: Cigarettes Last attempt to quit: 1990 Years since quittin.1  Smokeless tobacco: Never Used Substance and Sexual Activity  Alcohol use: No  
 Drug use: No  
  
PE Vitals:  
 10/28/20 1600 10/28/20 1700 10/28/20 1800 10/28/20 1900 BP: (!) 122/49 Pulse: 87 77 77 91 Resp: 19 21 23 23 Temp: 97 °F (36.1 °C) SpO2: 97% 97% 93% 94% Weight:      
Height:      
 Body mass index is 24.77 kg/m². General:    Alert, cooperative, no distress. Psychiatric:    Normal Mood and affect Eye/ENT:      Pupils equal, No asymmetry, Conjunctival pink. Able to hear voice at normal amplitude Lungs:      Visibly symmetric chest expansion, No palpable tenderness. Clear to auscultation bilaterally. Heart[de-identified]    Regular rate and rhythm, S1, [id number], mid to late peaking mid systolic murmur, click, rub or gallop. No JVD, Normal palpable peripheral pulses. No cyanosis Abdomen:     Soft, non-tender. Bowel sounds normal. No masses,  No   
  organomegaly. Extremities:   Extremities normal, atraumatic, no edema. Neurologic:   CN II-XII grossly intact. No gross focal deficits Recent Labs: 
Lab Results Component Value Date/Time Cholesterol, total 114 2020 03:19 PM  
 HDL Cholesterol 40 2020 03:19 PM  
 LDL, calculated 54 2020 03:19 PM  
 Triglyceride 101 2020 03:19 PM  
 
Lab Results Component Value Date/Time Creatinine 0.75 10/28/2020 12:26 PM  
 
Lab Results Component Value Date/Time BUN 15 10/28/2020 12:26 PM  
 
Lab Results Component Value Date/Time Potassium 3.7 10/28/2020 12:26 PM  
 
Lab Results Component Value Date/Time Hemoglobin A1c 7.1 (H) 10/18/2020 03:27 PM  
 
Lab Results Component Value Date/Time HGB 8.5 (L) 10/28/2020 12:26 PM  
 
Lab Results Component Value Date/Time PLATELET 98 (L)  12:26 PM  
 
 
Reviewed: 
Past Medical History:  
Diagnosis Date  BPH (benign prostatic hyperplasia)  CAD (coronary artery disease)  Diabetes ([facility]  Hearing loss  Hypercholesterolemia  Hypertension  Murmur  Recurrent depression [facility] 2019 Social History Tobacco Use Smoking Status Former Smoker  Types: Cigarettes  Last attempt to quit: 1990  Years since quittin.1 Smokeless Tobacco Never Used Social History Substance and Sexual Activity Alcohol Use No  
 
Allergies Allergen Reactions  Procaine Other (comments) Pt stated he passed out from procaine and was told not to let anyone use it on him again Family History Problem Relation Age of Onset  Cancer Mother  Cancer Father Current Facility-Administered Medications Medication Dose Route Frequency  sodium chloride (NS) flush 5-40 mL  5-40 mL IntraVENous Q8H  
 sodium chloride (NS) flush 5-40 mL  5-40 mL IntraVENous PRN  
 0.45% sodium chloride infusion  10 mL/hr IntraVENous CONTINUOUS  
 0.9% sodium chloride infusion  9 mL/hr IntraVENous CONTINUOUS  
 acetaminophen (TYLENOL) tablet 650 mg  650 mg Oral Q4H PRN  
 traMADoL (ULTRAM) tablet  mg   mg Oral Q6H PRN  
 oxyCODONE-acetaminophen (PERCOCET) 5-325 mg per tablet 1-2 Tab  1-2 Tab Oral Q4H PRN  
 morphine injection 2 mg  2 mg IntraVENous Q2H PRN  
 naloxone (NARCAN) injection 0.4 mg  0.4 mg IntraVENous PRN  
 ceFAZolin (ANCEF) 2 g/20 mL in sterile water IV syringe  2 g IntraVENous Q8H  
 ondansetron (ZOFRAN) injection 4 mg  4 mg IntraVENous Q4H PRN  
 albuterol (PROVENTIL VENTOLIN) nebulizer solution 2.5 mg  2.5 mg Nebulization Q4H PRN  
 [START ON 10/29/2020] famotidine (PEPCID) tablet 20 mg  20 mg Oral Q12H  [START ON 10/29/2020] magnesium oxide (MAG-OX) tablet 400 mg  400 mg Oral BID  calcium chloride 1 g in 0.9% sodium chloride 250 mL IVPB  1 g IntraVENous PRN  
 bisacodyL (DULCOLAX) suppository 10 mg  10 mg Rectal DAILY PRN  
 [START ON 10/29/2020] senna-docusate (PERICOLACE) 8.6-50 mg per tablet 1 Tab  1 Tab Oral BID  ELECTROLYTE REPLACEMENT NOTE: Nurse to review Serum Potassium and Magnesuim levels and Initiate Electrolyte Replacement Protocol as needed  1 Each Other PRN  niCARdipine (CARDENE) 25 mg in 0.9% sodium chloride 250 mL infusion  0-15 mg/hr IntraVENous TITRATE  PHENYLephrine (ANATOLY-SYNEPHRINE) 30 mg in 0.9% sodium chloride 250 mL infusion   mcg/min IntraVENous TITRATE  insulin glargine (LANTUS) injection 15 Units  15 Units SubCUTAneous QHS  ascorbic acid (vitamin C) (VITAMIN C) tablet 1,000 mg  1,000 mg Oral TID  traZODone (DESYREL) tablet 50 mg  50 mg Oral QHS PRN  
 temazepam (RESTORIL) capsule 15 mg  15 mg Oral QHS PRN  
 aspirin tablet 325 mg  325 mg Oral DAILY  atorvastatin (LIPITOR) tablet 10 mg  10 mg Oral QHS  doxazosin (CARDURA) tablet 4 mg  4 mg Oral QHS  finasteride (PROSCAR) tablet 5 mg  5 mg Oral DAILY  sertraline (ZOLOFT) tablet 100 mg  100 mg Oral QPM  
 insulin lispro (HUMALOG) injection   SubCUTAneous AC&HS Colin Dowell MD10/28/20 There are other unrelated non-urgent complaints, but due to the busy schedule and the amount of time I've already spent with him, time does not permit me to address these routine issues at today's visit. I've requested another appointment to review these additional issues. ATTENTION:  
This medical record was transcribed using an electronic medical records/speech recognition system. Although proofread, it may and can contain electronic, spelling and other errors. Corrections may be executed at a later time. Please feel free to contact us for any clarifications as needed. [address] [facility] heart [facility] V[facility] [place] [address], [address]

## 2020-10-29 NOTE — PROGRESS NOTES
Problem: Self Care Deficits Care Plan (Adult) Goal: *Acute Goals and Plan of Care (Insert Text) Description:  
FUNCTIONAL STATUS PRIOR TO ADMISSION: Patient was independent/mod I with mobility and ADLs, uses SPC PRN and a shower chair. HOME SUPPORT: The patient lived with his wife but did not require assist. Lives at [address] [place] [address], has supportive children nearby. Occupational Therapy Goals Initiated 10/29/2020 1. Patient will perform grooming in standing >3 minutes VSS with modified independence within 7 day(s). 2.  Patient will perform lower body dressing with supervision/set-up within 7 day(s). 3.  Patient will perform toilet transfers with modified independence within 7 day(s). 4.  Patient will perform all aspects of toileting with modified independence within 7 day(s). 5.  Patient will participate in upper extremity therapeutic exercise/activities with independence for 5 minutes VSS within 7 day(s). 6.  Patient will utilize energy conservation techniques during functional activities with verbal cues within 7 day(s). Outcome: Progressing Towards Goal 
 OCCUPATIONAL THERAPY EVALUATION Patient: Nerissa Frye (18 y.o. male) Date: 10/29/2020 Primary Diagnosis: Syncope [R55] Syncope [R55] Procedure(s) (LRB): 
INSERT TEMPORARY PACEMAKER (N/A) Day of Surgery Precautions:    
 
ASSESSMENT Based on the objective data described below, the patient presents with decreased endurance and strength, mildly decreased balance and decreased activity tolerance following admission for syncope, now POD 1 TAVR. At baseline pt is I/mod I with ADLs and mobility, lives with his wife at [address] [place] [address]. Received supine in bed, agreeable to participate. Performed bed mobility with CGA, good sitting balance. Using RW he ambulated 2 laps in room 2 times (seated rest break in between) with CGA.  Pt with drop in BP noted with mobility, pt asymptomatic and BP recovered with seated rest. O2 sats stable on RA. Pt performed grooming ADLs while seated without assistance, and remained in chair at end of session. Anticipate pt will progress well with therapy. He reports he had been receiving HH therapy PTA, and recommend resumption of this at discharge. Vitals:  
  10/29/20 1106 10/29/20 1114 10/29/20 1120 10/29/20 1125 BP: (!) 130/55 115/64 96/74 117/60 BP 1 Location: Right arm Right arm Right arm Right arm BP Patient Position: Supine; At rest Sitting; At rest Post activity; Sitting Sitting, post activity Pulse: 82 100 (!) 117 (!) 105 Current Level of Function Impacting Discharge (ADLs/self-care): CGA transfers short distance, independent-mod A ADLs Functional Outcome Measure: The patient scored 55/100 on the Barthel Index outcome measure. Other factors to consider for discharge: mod I baseline Patient will benefit from skilled therapy intervention to address the above noted impairments. PLAN : 
Recommendations and Planned Interventions: self care training, functional mobility training, therapeutic exercise, balance training, therapeutic activities, endurance activities, patient education, home safety training, and family training/education Frequency/Duration: Patient will be followed by occupational therapy 5 times a week to address goals. Recommendation for discharge: (in order for the patient to meet his/her long term goals) Occupational therapy at least 2 days/week in the home This discharge recommendation: 
Has not yet been discussed the attending provider and/or case management IF patient discharges home will need the following DME: TBD SUBJECTIVE:  
Patient stated I feel pretty good.  OBJECTIVE DATA SUMMARY:  
HISTORY:  
Past Medical History:  
Diagnosis Date  
 BPH (benign prostatic hyperplasia) CAD (coronary artery disease) Diabetes ([facility] Hearing loss Hypercholesterolemia Hypertension Murmur Recurrent depression ([facility] 8/22/2019 Past Surgical History:  
Procedure Laterality Date CARDIAC SURG PROCEDURE UNLIST  2006 by-pass HX CHOLECYSTECTOMY Expanded or extensive additional review of patient history:  
 
Home Situation Home Environment: Independent living([facility]) One/Two Story Residence: One story Living Alone: No(lives with wife) Support Systems: Family member(s) Patient Expects to be Discharged to[de-identified] Assisted living Current DME Used/Available at Home: [employer], straight, Walker, Glucometer, Shower chair, [employer] Tub or Shower Type: Shower Hand dominance: Right EXAMINATION OF PERFORMANCE DEFICITS: 
Cognitive/Behavioral Status: 
Neurologic State: Alert Orientation Level: Oriented X4 Cognition: Follows commands Perception: Appears intact Perseveration: No perseveration noted Safety/Judgement: Awareness of environment; Fall prevention Hearing: Auditory Auditory Impairment: Hard of hearing, bilateral, Hearing aid(s) Vision/Perceptual:   
  
    
Acuity: Within Defined Limits Range of Motion: 
AROM: Within functional limits PROM: Within functional limits Strength: 
Strength: Generally decreased, functional 
  
  
Coordination: 
Coordination: Within functional limits Fine Motor Skills-Upper: Left Intact; Right Intact Gross Motor Skills-Upper: Left Intact; Right Intact Tone & Sensation: 
Tone: Normal 
Sensation: Intact Balance: 
Sitting: Intact Standing: Intact; With support(RW) Functional Mobility and Transfers for ADLs: 
Bed Mobility: 
Supine to Sit: Contact guard assistance Transfers: 
Sit to Stand: Contact guard assistance; Adaptive equipment; Additional time Stand to Sit: Contact guard assistance Bed to Chair: Contact guard assistance; Adaptive equipment ADL Assessment: 
Feeding: Independent Oral Facial Hygiene/Grooming: Setup(seated) Bathing: Minimum assistance Upper Body Dressing: Setup Lower Body Dressing: Moderate assistance Toileting: Minimum assistance ADL Intervention and task modifications: 
  
 
Grooming Position Performed: Seated in chair Washing Hands: Set-up Cognitive Retraining Safety/Judgement: Awareness of environment; Fall prevention Functional Measure: 
Barthel Index: 
 
Bathin Bladder: 10 Bowels: 10 
Groomin Dressin Feeding: 10 Mobility: 0 Stairs: 0 Toilet Use: 5 Transfer (Bed to Chair and Back): 10 Total: 55/100 The Barthel ADL Index: Guidelines 1. The index should be used as a record of what a patient does, not as a record of what a patient could do. 2. The main aim is to establish degree of independence from any help, physical or verbal, however minor and for whatever reason. 3. The need for supervision renders the patient not independent. 4. A patient's performance should be established using the best available evidence. Asking the patient, friends/relatives and nurses are the usual sources, but direct observation and common sense are also important. However direct testing is not needed. 5. Usually the patient's performance over the preceding 24-48 hours is important, but occasionally longer periods will be relevant. 6. Middle categories imply that the patient supplies over 50 per cent of the effort. 7. Use of aids to be independent is allowed. Amy Caly., Barthel, DChiloW. (0207). Functional evaluation: the Barthel Index. [facility] (14)2. KEISHA De La Fuente, Jenny Eric, Nikunj Glover., [place], [facility] (). Measuring the change indisability after inpatient rehabilitation; comparison of the responsiveness of the Barthel Index and Functional Gordon Measure. Journal of Neurology, Neurosurgery, and Psychiatry, 66(4), 955-989.  
Ash Montero, N.[place].A, VALENTINO Gutiérrez, & Elisabeth Arias MChiloA. (2004.) Assessment of post-stroke quality of life in cost-effectiveness studies: The usefulness of the Barthel Index and the EuroQoL-5D. [facility], [phone] Occupational Therapy Evaluation Charge Determination History Examination Decision-Making LOW Complexity : Brief history review  LOW Complexity : 1-3 performance deficits relating to physical, cognitive , or psychosocial skils that result in activity limitations and / or participation restrictions  LOW Complexity : No comorbidities that affect functional and no verbal or physical assistance needed to complete eval tasks Based on the above components, the patient evaluation is determined to be of the following complexity level: LOW Pain Ratin/10 Activity Tolerance:  
Fair Please refer to the flowsheet for vital signs taken during this treatment. After treatment patient left in no apparent distress:   
Sitting in chair and Call bell within reach COMMUNICATION/EDUCATION:  
The patients plan of care was discussed with: Physical therapist and Registered nurse. Home safety education was provided and the patient/caregiver indicated understanding., Patient/family have participated as able in goal setting and plan of care. , and Patient/family agree to work toward stated goals and plan of care. This patients plan of care is appropriate for delegation to [facility]. Thank you for this referral. 
Stephie Grewal OT Time Calculation: 28 mins

## 2020-10-29 NOTE — PROGRESS NOTES
1930: Bedside and Verbal shift change report given to Ashtyn Sandoval RN (oncoming nurse) by Phyllis Comer RN (offgoing nurse). Report included the following information SBAR, Kardex, ED Summary, Procedure Summary, Intake/Output, MAR, Recent Results, Med Rec Status and Cardiac Rhythm NSR/1AVB. 2000: Resumed pt care, VSS, not complaining of pain. Dr. Morgan Castillo at bedside explaining procedure for [facility] tomorrow, consent obtained. Orders to only given 8units of scheduled lantus tonight d/t possibility of a later procedure and being NPO for most of the day tomorrow. TVP: Rate: 60BPM, 10mAp, 0.8sensitivity 2100: Full CHG bath provided, pt tolerated well. #20 PIV placed in RFA. 2200: Pt in A-fib rate 110's CODE DOCUMENTATION:  
7873: Pt unresponsive, asystole, no pulse, code blue called, CPR initiated. 2358: Dr. Yahaira Lee at bedside, TVP rate increased to 80BPM, mAp increased to 25.  
 
2359: Pulse check, + pulse. Pt responsive, following commands and tracking. Placed on NRB, O2 sat 98%. /68 (95) 
 
0005: Orders for A-line. Pt placed on transcutaneous pacer pads as well. 0012: RT at bedside, placed on Hi-Flow NC @ 40L 100%. 0022: Art line placed. BP 70/50's. Orders for Adonay received. 0034: Adonay started @ 150 per Dr. Moore Borne: Cardiology paged, made aware of the events. No orders received. 0045: TVP not pacing appropriately despite multiple change of settings. TVP turned off, pt on transcutaneous pacer pads (Rate 60, mAp 10). 0400: AM labs drawn and sent. Daily EKG performed: A-fib  
 
0730: Bedside and Verbal shift change report given to Magan Sotelo RN (oncoming nurse) by Ashtyn Sandoval RN (offgoing nurse). Report included the following information SBAR, Kardex, ED Summary, OR Summary, Procedure Summary, Intake/Output, MAR, Recent Results, Med Rec Status and Cardiac Rhythm A fib.

## 2020-10-29 NOTE — PROGRESS NOTES
Problem: Mobility Impaired (Adult and Pediatric) Goal: *Acute Goals and Plan of Care (Insert Text) Description: FUNCTIONAL STATUS PRIOR TO ADMISSION: Patient was modified independent using a cane for functional mobility. HOME SUPPORT PRIOR TO ADMISSION: The patient lived with wife but did not require assist.  The patient endorses being open to home health for therapy. He resides at [facility] in an independent living apartment. Has children in the area. Physical Therapy Goals Initiated 10/29/2020 1. Patient will move from supine to sit and sit to supine , scoot up and down, and roll side to side in bed with modified independence within 7 day(s). 2.  Patient will transfer from bed to chair and chair to bed with modified independence using the least restrictive device within 7 day(s). 3.  Patient will perform sit to stand with modified independence within 7 day(s). 4.  Patient will ambulate with modified independence for 300 feet with the least restrictive device within 7 day(s). Outcome: Not Met PHYSICAL THERAPY EVALUATION Patient: Polly Baumgarten (35 y.o. male) Date: 10/29/2020 Primary Diagnosis: Syncope [R55] Syncope [R55] Procedure(s) (LRB): 
INSERT TEMPORARY PACEMAKER (N/A) Day of Surgery Precautions: fall ASSESSMENT Based on the objective data described below, the patient presents with impaired balance, decreased strength and endurance, and decreased activity tolerance s/p TAVR, POD 1. Noted pacer may be needed. Currently patient is functioning below his baseline of modified independent with SPC. He is CGA for bed mobility and transfers. He ambulated with a rolling walker today 30 ft x2 with CGA w/ seated rest between, steady gait. BP was found to be orthostatic with supine to sit, patient denied symptoms. Also noted decrease in BP s/p the first walk. BP was stable on the second walk w/ patient voicing no symptoms of light headedness throughout this visit. See vitals below. Patient remained sitting up in the chair. Anticipate steady functional gains. Currently recommending home with resumption of HHPT when medically appropriate. Therapy will continue to follow progressing as able to gait with a cane. Current Level of Function Impacting Discharge (mobility/balance): CGA, has not walked household distance Functional Outcome Measure: The patient scored 55/100 on the Barthel outcome measure. Other factors to consider for discharge: PLOF indep; open to HHPT Patient will benefit from skilled therapy intervention to address the above noted impairments. PLAN : 
Recommendations and Planned Interventions: bed mobility training, transfer training, gait training, therapeutic exercises, patient and family training/education and therapeutic activities Frequency/Duration: Patient will be followed by physical therapy:  5 times a week to address goals. Recommendation for discharge: (in order for the patient to meet his/her long term goals) Physical therapy at least 2 days/week in the home This discharge recommendation: 
Has not yet been discussed the attending provider and/or case management IF patient discharges home will need the following DME: none anticipated. Pt owns [facility] and [facility] SUBJECTIVE:  
Patient stated I feel pretty good.  OBJECTIVE DATA SUMMARY:  
HISTORY:   
Past Medical History:  
Diagnosis Date  BPH (benign prostatic hyperplasia)  CAD (coronary artery disease)  Diabetes ([address]  Hearing loss  Hypercholesterolemia  Hypertension  Murmur  Recurrent depression ([address] 8/22/2019 Past Surgical History:  
Procedure Laterality Date  CARDIAC SURG PROCEDURE UNLIST  2006 by-pass  HX CHOLECYSTECTOMY Personal factors and/or comorbidities impacting plan of care: PMH Home Situation Home Environment: Independent living([facility]) One/Two Story Residence: One story Living Alone: No(lives with wife) Support Systems: Family member(s) Patient Expects to be Discharged to[de-identified] Assisted living Current DME Used/Available at Home: [address] [facility], [place], straight, Walker, Glucometer, Shower chair, [employer] Tub or Shower Type: Shower EXAMINATION/PRESENTATION/DECISION MAKING:  
Critical Behavior: 
Neurologic State: Alert Orientation Level: Oriented X4 Cognition: Follows commands Safety/Judgement: Awareness of environment, Fall prevention Hearing: Auditory Auditory Impairment: Hard of hearing, bilateral, Hearing aid(s) Range Of Motion: 
AROM: Within functional limits PROM: Within functional limits Strength:   
Strength: Generally decreased, functional 
  
  
  
  
  
  
Tone & Sensation:  
Tone: Normal 
  
  
  
  
Sensation: Intact Coordination: 
Coordination: Within functional limits Vision:  
Acuity: Within Defined Limits Functional Mobility: 
Bed Mobility: 
  
Supine to Sit: Contact guard assistance Transfers: 
Sit to Stand: Contact guard assistance; Adaptive equipment; Additional time Stand to Sit: Contact guard assistance Bed to Chair: Contact guard assistance; Adaptive equipment Balance:  
Sitting: Intact Standing: Intact; With support(RW) 
Ambulation/Gait Training: 
Ambulated 30 ft x2 with rolling walker & gait belt with CGA x1 and min assist of another for lines/ equip Gait with the walker is steady with shortened strides, slow henri. Functional Measure: 
Barthel Index: 
 
Bathin Bladder: 10 Bowels: 10 
Groomin Dressin Feeding: 10 Mobility: 0 Stairs: 0 Toilet Use: 5 Transfer (Bed to Chair and Back): 10 Total: 55/100 The Barthel ADL Index: Guidelines 1. The index should be used as a record of what a patient does, not as a record of what a patient could do. 2. The main aim is to establish degree of independence from any help, physical or verbal, however minor and for whatever reason. 3. The need for supervision renders the patient not independent. 4. A patient's performance should be established using the best available evidence. Asking the patient, friends/relatives and nurses are the usual sources, but direct observation and common sense are also important. However direct testing is not needed. 5. Usually the patient's performance over the preceding 24-48 hours is important, but occasionally longer periods will be relevant. 6. Middle categories imply that the patient supplies over 50 per cent of the effort. 7. Use of aids to be independent is allowed. Marianna Dacosta., Barthel, D.W. (5776). Functional evaluation: the Barthel Index. [facility] (14)2. [employer] [place], J.J.M.F, Chet Hughes., Rosario Puente., [place], [address] [place] [address] (). Measuring the change indisability after inpatient rehabilitation; comparison of the responsiveness of the Barthel Index and Functional [place] Measure. Journal of Neurology, Neurosurgery, and Psychiatry, 66(4), 730-220. Hannah Goodman, N.J.A, VALENTINO Gutiérrez, & Xochitl Acosta, MChiloA. (2004.) Assessment of post-stroke quality of life in cost-effectiveness studies: The usefulness of the Barthel Index and the EuroQoL-5D. [facility], 13, 129-70 Physical Therapy Evaluation Charge Determination History Examination Presentation Decision-Making LOW Complexity : Zero comorbidities / personal factors that will impact the outcome / POC LOW Complexity : 1-2 Standardized tests and measures addressing body structure, function, activity limitation and / or participation in recreation  LOW Complexity : Stable, uncomplicated  LOW Complexity : FOTO score of  Based on the above components, the patient evaluation is determined to be of the following complexity level: LOW Pain Ratin/10 Activity Tolerance:  
Fair Vitals:  
 10/29/20 1106 10/29/20 1114 10/29/20 1120 10/29/20 1125 BP: (!) 130/55 115/64 96/74 117/60 BP Patient Position: Supine; At rest Sitting; At rest Post activity; Sitting Sitting Pulse: 82 100 (!) 117 (!) 105 After treatment patient left in no apparent distress:  
Sitting in chair, Call bell within reach and RN aware COMMUNICATION/EDUCATION:  
The patients plan of care was discussed with: Registered nurse. Fall prevention education was provided and the patient/caregiver indicated understanding., Patient/family have participated as able in goal setting and plan of care. and Patient/family agree to work toward stated goals and plan of care. Thank you for this referral. 
Dave Bauer, PT Time Calculation: 28 mins

## 2020-10-29 NOTE — PROGRESS NOTES
Cardiac Cath Lab Procedure Area Arrival Note: 
 
Ibeth Garcia arrived to Cardiac Cath Lab, Procedure Area. Patient identifiers verified with NAME and DATE OF BIRTH. Procedure verified with patient. Consent forms verified. Allergies verified. Patient informed of procedure and plan of care. Questions answered with review. Patient voiced understanding of procedure and plan of care. Patient on cardiac monitor, non-invasive blood pressure, SPO2 monitor. On RA and placed on O2 @ 2 lpm via NC.  IV of NSS on pump at 25 ml/hr. Patient status doing well without problems. Patient is A&Ox 4. Patient reports no complaints of chest pain or shortness of breath. Patient medicated during procedure with orders obtained and verified by Dr. Fili Turpin. Refer to patients Cardiac Cath Lab PROCEDURE REPORT for vital signs, assessment, status, and response during procedure, printed at end of case. Printed report on chart or scanned into chart. 1450 TRANSFER - OUT REPORT: 
 
Verbal report given to Frida Victoria RN(name) on Ibeth Garcia  being transferred to CCU 25(unit) for routine progression of care Report consisted of patients Situation, Background, Assessment and  
Recommendations(SBAR). Information from the following report(s) SBAR, Procedure Summary, MAR and Cardiac Rhythm NSR 70's was reviewed with the receiving nurse. Lines:  
Peripheral IV 10/21/20 Left Forearm (Active) Site Assessment Clean, dry, & intact 10/29/20 1200 Phlebitis Assessment 0 10/29/20 1200 Infiltration Assessment 0 10/29/20 1200 Dressing Status Clean, dry, & intact 10/29/20 1200 Dressing Type Transparent;Tape 10/29/20 1200 Hub Color/Line Status Pink 10/29/20 1200 Action Taken Open ports on tubing capped 10/29/20 1200 Alcohol Cap Used Yes 10/29/20 1200 Peripheral IV 10/28/20 Anterior;Left;Proximal Forearm (Active) Site Assessment Clean, dry, & intact 10/29/20 1200 Phlebitis Assessment 0 10/29/20 1200 Infiltration Assessment 0 10/29/20 1200 Dressing Status Clean, dry, & intact 10/29/20 1200 Dressing Type Transparent;Tape 10/29/20 1200 Hub Color/Line Status Pink 10/29/20 1200 Action Taken Open ports on tubing capped 10/29/20 1200 Alcohol Cap Used Yes 10/29/20 1200 Opportunity for questions and clarification was provided. Patient transported with: 
 Monitor Registered Nurse Tech

## 2020-10-29 NOTE — PROGRESS NOTES
[facility] ICU Progress Note Admit Date: 10/18/2020 POD:  1 Day Post-Op Procedure:  Procedure(s): 
Transcatheter Aortic Valve Replacement, Shockwave, Balloon Valvuloplasty, Transthoracic Echo and KENAN by Dr. Suyapa Reynolds Subjective:  
Pt seen with Dr. Nat Luque. Tmax 100.6, room air. Sitting up in bed with breakfast. No complaints. Temp pacer removed last evening by Dr. Jose Porter. Objective:  
Vitals: 
Blood pressure 117/60, pulse (!) 105, temperature 99.3 °F (37.4 °C), resp. rate 25, height 6' 3\" (1.905 m), weight 203 lb 11.3 oz (92.4 kg), SpO2 93 %. Temp (24hrs), Av.8 °F (36.6 °C), Min:94.3 °F (34.6 °C), Max:100.6 °F (38.1 °C) EKG/Rhythm:  Sinus rhythm with 1st degree AV block with premature atrial complexes Left bundle branch block FL of 304 QRS of 136 Extubation Date / Time: 10/28/2020 at 1420 Oxygen Therapy: 
Oxygen Therapy O2 Sat (%): 93 % (10/29/20 1100) Pulse via Oximetry: 125 beats per minute (10/28/20 2300) O2 Device: Room air (10/29/20 0400) O2 Flow Rate (L/min): 4 l/min (10/28/20 1420) FIO2 (%): 50 % (10/28/20 1337) CXR:  
CXR Results  (Last 48 hours) 10/29/20 0526  XR CHEST PORT Final result Impression:  IMPRESSION:   
No acute disease. Shallow volumes and atelectasis. Narrative:  PORTABLE CHEST RADIOGRAPH/S: 10/29/2020 5:26 AM  
   
INDICATION: Postop heart. COMPARISON: 10/28/2020, 10/27/2020. TECHNIQUE: Portable frontal semiupright radiograph/s of the chest.  
   
FINDINGS:   
The lungs are hypoinflated. Left basilar atelectasis and pulmonary vascular  
crowding are associated. The left hemidiaphragm is eventrated or elevated. The  
central airways are patent. No pneumothorax and no large pleural effusion. Post  
CABG and transcatheter aortic valve replacement. Mitral annular calcifications  
are moderate. 10/28/20 1300  XR CHEST PORT Final result  Impression:  IMPRESSION:  
 1. Lines and tubes as detailed above small left pleural effusion Narrative:  INDICATION:  postop heart EXAM: Portable chest 1247 hours. Comparison 10/27/2020 FINDINGS: Endotracheal tube overlies the trachea at the level of the clavicles. There is a left neck central venous catheter. This appears to be a sheath with a  
secondary catheter projecting over the right atrium. Cardiac silhouette is not  
enlarged. Patient is post median sternotomy. Patient is post aortic stent  
grafting. Small left pleural effusion. Mild interstitial prominence unchanged  
with no pneumothorax. 10/27/20 1527  XR CHEST PA LAT Final result Impression:  IMPRESSION:  
1. No acute process Narrative:  Exam:  2 view chest  
   
Indication: Preop heart surgery. COMPARISON: 10/19/2020 PA and lateral views demonstrate patient status post median sternotomy. Heart  
size is normal. There is mitral annulus calcification. The lungs are clear acute process there is mild scarring at the left lung base. There are degenerative changes of the thoracic spine. Admission Weight: Last Weight Weight: 205 lb 7.5 oz (93.2 kg) Weight: 203 lb 11.3 oz (92.4 kg) Intake / Output / Drain: 
Current Shift: 10/29 0701 - 10/29 1900 In: 20 [I.V.:20] Out: 125 [Urine:125] Last 24 hrs.:  
 
Intake/Output Summary (Last 24 hours) at 10/29/2020 1137 Last data filed at 10/29/2020 1100 Gross per 24 hour Intake 1584.13 ml Output 1865 ml Net -280.87 ml EXAM: 
General:    No acute distress. Lungs:   Clear to auscultation bilaterally. Groin Incisions:  No erythema, drainage or swelling. Dressings removed. Ecchymotic Heart:  Regular rate and rhythm, S1, S2 normal, no murmur, click, rub or gallop. Abdomen:   Soft, non-tender. Bowel sounds normal. No masses,  No organomegaly. Extremities:  No edema. PPP. Neurologic:  Gross motor and sensory apparatus intact. Labs:  
Recent Labs 10/29/20 
7070 10/29/20 
0444  10/28/20 
1226 WBC  --  12.1*  --  10.0 HGB  --  9.2*  --  8.5* HCT  --  28.7*  --  26.1*  
PLT  --  112*  --  98* NA  --  140  --  140 K  --  3.8  --  3.7 BUN  --  14  --  15  
CREA  --  0.78  --  0.75 GLU  --  91  --  107* GLUCPOC 88  --    < >  --   
INR  --   --   --  1.3*  
 < > = values in this interval not displayed. Assessment:  
 
Principal Problem: Aortic stenosis (10/18/2020) Overview: Added automatically from request for surgery [id number] Active Problems: 
  Syncope and collapse (10/18/2020) Syncope (10/18/2020) (HFpEF) heart failure with preserved ejection fraction ([address] (10/20/2020) Plan/Recommendations/Medical Decision Makin. Aortic stenosis, severe and symptomatic (paradoxical LFLG) s/p TAVR:  mg only for AC. Echo completed yesterday and reviewed by Dr. Nemesio Hobson. 
  
2.  CAD with Hx of CABG: On ASA, Statin. BB on hold due to LBBB and concern for PPM need following procedure 10/28. Restart ARB when needed-BP borderline low at this point.  
  
3. HTN: Controlled on current medications.  
  
4. HLD: On Statin 
  
5. HFpEF: BP management 
  
6. Syncope: Likely related to AS. BICA of <50%.  
  
7. PAD: On ASA, Statin. 8. DM: On Metformin, Glipizide at home. Lantus/SSI while in the hospital. A1C of 7.1.  
  
9. MAT/Atrial fibrillation: Atrial fibrillation overnight. Currently in NSR. Currently on . No OAC historically.  
  
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 Continue to monitor 
  
11. UTI: On vancomycin completed 10/28 and ceftriaxone (completed). MRSA and enterobacter on culture. ID signed off. 
  
12. BPH: On Proscar 
  
13. Depression: on Zoloft 
  
Dispo: PT/OT/Cardiac Rehab-will await recommendations. Case Management for discharge planning.  Transfer out of CCU to [facility] today.  
 
  
 
 Signed By: Kelsey Duncan NP Saw patient, agree with above Risk of morbidity and mortality - high Medical decision making - high complexity 1. Aortic stenosis, severe and symptomatic (paradoxical LFLG) s/p TAVR:  mg only for AC. Echo completed yesterday and reviewed by Dr. Sheridan Monaco. 
  
2.  CAD with Hx of CABG: On ASA, Statin. BB on hold due to LBBB and concern for PPM need following procedure 10/28. Restart ARB when needed-BP borderline low at this point.  
  
3. HTN: Controlled on current medications.  
  
4. HLD: On Statin 
  
5. HFpEF: BP management 
  
6. Syncope: Likely related to AS. BICA of <50%.  
  
7. PAD: On ASA, Statin. 8. DM: On Metformin, Glipizide at home. Lantus/SSI while in the hospital. A1C of 7.1.  
  
9. MAT/Atrial fibrillation: Atrial fibrillation overnight. Currently in NSR. Currently on . No OAC historically.  
  
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 Continue to monitor 
  
11. UTI: On vancomycin completed 10/28 and ceftriaxone (completed). MRSA and enterobacter on culture. ID signed off. 
  
12. BPH: On Proscar 
  
13. Depression: on Zoloft

## 2020-10-29 NOTE — PROCEDURES
PROCEDURALISTS:?  
Surgeon 1. Bertin Gilmore MD? CoSurgeon 1. Mecca Finley MD 
 
PROCEDURES: 
1. Angiography of the ascending aorta and aortoiliac bifurcation 2. Temporary transvenous pacemaker insertion 3. Left heart catheterization 4. Implantation of a 24 mm Medtronic Evolut R transcatheter heart aortic valve via the left transfemoral approach 5. Status post intravascular lithotripsy with shockwave balloon followed by PTA involving left common iliac artery and left external iliac artery. 6.  16F left femoral artery access with Perclose pre-closure / 6F left femoral vein access with manual compression / 6F left femoral artery access with Perclose closure 7. Temporary transvenous pacemaker placement from left internal jugular approach. INDICATIONS:?Akash Perdomo is a 82 y.o. ?old male  with severe symptomatic aortic stenosis, NYHA class II symptom status. ? He is referred for transfemoral TAVR procedure. He was deemed as high risk for SAVR. PRE-OP DIAGNOSIS: 
1. Severe, symptomatic aortic stenosis (NYHA, Class III) 2. Severe three-vessel coronary artery disease 3. No cardiomyopathy 4. Atrial fibrillation 5. Multifocal atrial tachycardia 6. Peripheral vascular disease 7. Status post bilateral iliac angioplasty and stenting 8. Status post left subclavian stent 9. Moderate mitral regurgitation and mild mitral stenosis POST-OP DIAGNOSIS: 
1. Status post successful implantation of a 24 mm Medtronic Evolut R aortic valve via the left transfemoral approach 2. Post operative conduction block in the form of left bundle branch block requiring placement of temporary pacemaker PROCEDURAL DETAILS: The risks (death, myocardial infarction, stroke, bleeding, vascular complications, renal dysfunction, allergic reactions, and other), benefits, and alternatives were explained and discussed. Signed, informed consent was obtained.  The patient was placed on the table and prepped and draped in the usual fashion. ARTERIAL ACCESS:?  
- Right: Right femoral artery access was obtained using ultrasound and a micropuncture needle and sheath system. ? Angiography confirmed adequate position within the mid right CFA without significant angiographic stenosis or irregularity. Pre-close technique was performed using two orthogonally positioned Perclose closure devices. ? A 6F sheath was placed before it was later up-sized to the large bore TAVR sheath. ? Post-procedure, the sheath was removed and the arteriotomy was closed using the pre-close technique. Final hemostasis was excellent. - Left: A 6 [place] sheath was placed in the left common femoral artery without difficulty. ? Post-procedure, the sheath was removed and hemostasis was achieved with a Perclose closure device. - A 6F sheath was placed in the left common femoral vein without difficulty. Post-procedure the sheath was removed and manual compression was applied to achieve hemostasis. PROCEDURAL MEDICATIONS:?  
Monitored anaesthesia care (MAC) with conscious sedation. Please see Anesthesia record for full details. during later part of the case to get better imaging with transesophageal echocardiogram patient was placed under general anesthesia and intubated. Local anesthesia - 1% lidocaine was administered to the bilateral groins. ?? FINDINGS: 
ANGIOGRAPHY OF THE ASCENDING AORTA:? There is significant calcification of the aortic valve and annulus. ? Appropriate co-planar views were identified for valve deployment. Post-deployment, the transcatheter valve appeared well positioned with appropriate function and only trace to mild para-valvular aortic regurgitation. ? Coronary arteries remained patent without obstruction. ? AORTOILIAC ANGIOGRAPHY:?The abdominal aorta and bilateral iliac arteries are  patent without evidence of any dissection. ?  There is no evidence of dissection, perforation or other complications. ?Right common iliac artery stent had 60 to 70% stenosis. Left common and external iliac artery stents had underexpansion with 50% stenosis which after treatment with intravascular lithotripsy balloon an 8 mm balloon reduced to less than 20% stenosis INTERVENTION: 
-Patient was known to have underexpanded common iliac and external iliac stent with calcium surrounding the stents with small left diameter of 4.8 mm by CT scan. Hence to allow delivery of transcatheter heart valve from normal axis, intravascular lithotripsy was performed with 7 mm shockwave balloon followed by postdilatation with 8 mm balloon. Following this with significant difficulty 16 [place] citizen sheath as well as transcatheter valve could be delivered into the aorta across stents. - A temporary transvenous pacemaker wire was inserted via the left common femoral vein access and positioned in the RV apical position. ? Appropriate position and function were confirmed. Rapid ventricular pacing (160 BPM) was performed in concert with the valve implantations procedures. - Left heart catheterization: aortic valve crossed with a 0.035 inch straight wire and this wire was exchanged out for an Confida wire. Systemic heparin was administered to maintain an ACT between 250-300 seconds. - BAV was performed using 24 mm true balloon in conjunction with rapid pacing prior to transcatheter aortic valve deployment. - Transaortic valve implantation: Rapid pacing performed in coordination with deployment of a transcatheter valve. Initial post deployment imaging as well as hemodynamics were consistent with moderate to severe paravalvular leak. Hence multiple post dilatations were performed using 28 mm true balloon for the LVOT portion of the valve and 28 mm Z-Med balloon at 2 ruben for the central portion of the valve. Following this the diastolic pressures improved along with overall hemodynamics.   Final ? Post-deployment angiography and transthoracic echocardiography confirmed appropriate position and function, there was mild to moderate para-valvular regurgitation. LV function was preserved. ? There was no pericardial effusion. ?  
- The temporary pacemaker was removed from femoral approach in place from left intrajugular approach due to development of left bundle branch block post valve deployment. CONTRAST VOLUME:?120 mL Omnipaque BLOOD LOSS:Minimal 
COMPLICATIONS:?None SPECIMENS:?None RECOMMENDATIONS 
- Admit to CVICU - Aspirin and OAC 
- Echocardiogram, labs and ECG in AM 
- Early ambulation following extubation

## 2020-10-29 NOTE — INTERDISCIPLINARY ROUNDS
Multidisciplinary rounds were held Thursday October 29, 2020. Today's plan/goal includes (but not limited to): Stable HR, decision on Pacemaker placement

## 2020-10-30 PROBLEM — I44.2 THIRD DEGREE AV BLOCK (HCC): Status: ACTIVE | Noted: 2020-01-01

## 2020-10-30 PROBLEM — Z95.0 PACEMAKER: Status: ACTIVE | Noted: 2020-01-01

## 2020-10-30 NOTE — PROGRESS NOTES
0005: placed pt on HFNC 40L 100% per Dr Carrion Blend. Post code blue due to loss of pulse on impella. Rosc achieved, no meds given, bagged with 100% oxygen.

## 2020-10-30 NOTE — PROGRESS NOTES
Occupational Therapy 10/30/2020 Chart reviewed in preparation for therapy session. Noted events yesterday (code x2), now on 40L HiFlow NCO2, FiO2 80% and plan for pacemaker placement today. Pt is currently off the floor in CCL. Therapy will defer until medically appropriate. Will continue to follow peripherally. Thank you.

## 2020-10-30 NOTE — PROGRESS NOTES
Brief follow-up note: 
Patient was seen and evaluated by my colleague earlier today. No further acute event, had dual-chamber Medtronic pacemaker inserted earlier this morning without complication. Supplemental oxygen weaned down to nasal cannula Phenylephrine being weaned down. No neurological deficit noticed. We will continue to follow

## 2020-10-30 NOTE — PROGRESS NOTES
TRANSFER - OUT REPORT: 
 
Verbal report given to Loretta(name) on Arley Tesafye  being transferred to [address] [place] [address]) for routine progression of care Report consisted of patients Situation, Background, Assessment and  
Recommendations(SBAR). Information from the following report(s) Procedure Summary and MAR was reviewed with the receiving nurse. Lines:  
Peripheral IV 10/21/20 Left Forearm (Active) Site Assessment Clean, dry, & intact 10/30/20 0800 Phlebitis Assessment 0 10/30/20 0800 Infiltration Assessment 0 10/30/20 0800 Dressing Status Clean, dry, & intact 10/30/20 0800 Dressing Type Transparent 10/30/20 0800 Hub Color/Line Status Pink 10/30/20 0800 Action Taken Open ports on tubing capped 10/30/20 0800 Alcohol Cap Used Yes 10/30/20 0800 Peripheral IV 10/28/20 Anterior;Left;Proximal Forearm (Active) Site Assessment Clean, dry, & intact 10/30/20 0800 Phlebitis Assessment 0 10/30/20 0800 Infiltration Assessment 0 10/30/20 0800 Dressing Status Clean, dry, & intact 10/30/20 0800 Dressing Type Transparent 10/30/20 0800 Hub Color/Line Status Pink 10/30/20 0800 Action Taken Open ports on tubing capped 10/30/20 0800 Alcohol Cap Used Yes 10/30/20 0800 Peripheral IV 10/29/20 Anterior;Right Forearm (Active) Site Assessment Clean, dry, & intact 10/30/20 0800 Phlebitis Assessment 0 10/30/20 0800 Infiltration Assessment 0 10/30/20 0800 Dressing Status Clean, dry, & intact 10/30/20 0800 Dressing Type Transparent 10/30/20 0800 Hub Color/Line Status Pink 10/30/20 0800 Action Taken Open ports on tubing capped 10/30/20 0800 Alcohol Cap Used Yes 10/30/20 0800 Arterial Line 10/30/20 Right Radial artery (Active) Site Assessment Clean, dry, & intact 10/30/20 0800 Dressing Status Clean, dry, & intact 10/30/20 0800 Dressing Type Disk with Chlorhexadine gluconate (CHG); Transparent 10/30/20 0800 Line Status Intact and in place 10/30/20 0800 Treatment Zeroed or re-zeroed 10/30/20 0800 Affected Extremity/Extremities Color distal to insertion site pink (or appropriate for race); Pulses palpable;Range of motion performed 10/30/20 0800 Opportunity for questions and clarification was provided. Patient transported with: 
 Monitor O2 @ 15 liters Registered Nurse Tech

## 2020-10-30 NOTE — CONSULTS
[facility] 
 
ICU Team Consult Note Name: Rainer Velez : 1935 MRN: [id number] Date: 10/30/2020 Subjective:  
Consult Note: 10/30/2020 Reason for ICU Admission: Complete Heart Block HPI: 82M w/ hx of CAD with hx of CABGx3 presented with syncope, patient went to cath lab for [facility] cath which identified severe aortic stenosis, patient underwent transcatheter aortic valve replacement, with cardiology and CTS, pt developed complete HB, requiring TVP placement intra-op, TVP removed on 10/28/2020, however patient noted to have complete HB again on 10/29, TVP replaced, permanent pacer placement planned on 10/30/2020. On 10/29/2020 at 2357 patient was noted to be unresponsive and in asystole, code blue called, CPR started, Patient had ROSC after 2 minutes of CPR, noted native rhythm in Afib RVR, TVP inappropriate, transitioned to transcutaneous back up pacing. Cardiology notified, no change regarding plan for Permanent pacer placement. Patient alert and awake after ROSC, saturating well and following commands POD:1 Day Post-Op S/P: Procedure(s): 
INSERT TEMPORARY PACEMAKER Active Problem List:  
 
Problem List  Date Reviewed: 10/18/2020 Codes Class (HFpEF) heart failure with preserved ejection fraction (HCC) ICD-10-CM: I50.30 ICD-9-CM: 428.9 Syncope and collapse ICD-10-CM: R55 
ICD-9-CM: 780.2 Syncope ICD-10-CM: R55 
ICD-9-CM: 780.2 * (Principal) Aortic stenosis ICD-10-CM: I35.0 ICD-9-CM: 424.1 Overview Signed 10/21/2020  6:58 AM by Angie Dickinson MD  
  Added automatically from request for surgery [id number] Decubitus ulcer of left buttock, stage 2 ([facility] [address] ICD-10-CM: Z92.389 ICD-9-CM: 707.05, 707.22 Type 2 diabetes with nephropathy (HCC) ICD-10-CM: E11.21 
ICD-9-CM: 250.40, 583.81 Pressure injury of buttock, stage 1 ICD-10-CM: L89.301 ICD-9-CM: 707.05, 707.21   
   
 Incontinence of feces ICD-10-CM: R15.9 ICD-9-CM: 787.60 On angiotensin receptor blockers (ARB) ICD-10-CM: C72.141 ICD-9-CM: V58.69 Gastroesophageal reflux disease without esophagitis ICD-10-CM: K21.9 ICD-9-CM: 530.81 Cough ICD-10-CM: R05 ICD-9-CM: 786.2 Recurrent depression ([facility] ICD-10-CM: F33.9 ICD-9-CM: 296.30 Age-related cataract of both eyes ICD-10-CM: H25.9 ICD-9-CM: 366.10 Gait instability ICD-10-CM: R26.81 
ICD-9-CM: 494. 2 Type 2 diabetes mellitus without complication, without long-term current use of insulin (HCC) ICD-10-CM: E11.9 ICD-9-CM: 250.00 Essential hypertension ICD-10-CM: I10 
ICD-9-CM: 401.9 Hypercholesterolemia ICD-10-CM: E78.00 ICD-9-CM: 272.0 Coronary artery disease involving native coronary artery of native heart without angina pectoris ICD-10-CM: I25.10 ICD-9-CM: 414.01 S/P CABG (coronary artery bypass graft) ICD-10-CM: Z95.1 ICD-9-CM: V45.81 Severe aortic stenosis ICD-10-CM: I35.0 ICD-9-CM: 424.1 Aortic systolic murmur on examination ICD-10-CM: I35.8 ICD-9-CM: 424.1 Benign prostatic hyperplasia with lower urinary tract symptoms ICD-10-CM: N40.1 ICD-9-CM: 600.01 Insomnia ICD-10-CM: G47.00 ICD-9-CM: 780.52 Former smoker ICD-10-CM: Z32.756 ICD-9-CM: V15.82   
   
 ACP (advance care planning) ICD-10-CM: Z71.89 ICD-9-CM: V65.49 Past Medical History:  
 
 has a past medical history of BPH (benign prostatic hyperplasia), CAD (coronary artery disease), Diabetes ([facility] [address] Hearing loss, Hypercholesterolemia, Hypertension, Murmur, and Recurrent depression ([address] (8/22/2019).  He also has no past medical history of Anemia, Arrhythmia, Arthritis, Asthma, Autoimmune disease ([facility] Calculus of kidney, Cancer ([facility] Chronic kidney disease, Chronic obstructive pulmonary disease ([facility] Chronic pain, Congestive heart failure ([facility] GERD (gastroesophageal reflux disease), Headache, Liver disease, Psychotic disorder ([facility] [address] PUD (peptic ulcer disease), Seizures ([facility] [address] Stroke ([facility] [address] Thromboembolus ([facility] [address] Thyroid disease, or Trauma. Past Surgical History:  
 
 has a past surgical history that includes pr cardiac surg procedure unlist (2006) and hx cholecystectomy. Home Medications:  
 
Prior to Admission medications Medication Sig Start Date End Date Taking? Authorizing Provider  
doxazosin (CARDURA) 4 mg tablet TAKE 1 TABLET BY MOUTH  DAILY 10/20/20  Yes Joanna Dick NP  
hydroCHLOROthiazide (HYDRODIURIL) 12.5 mg tablet TAKE 1 TABLET BY MOUTH  DAILY 10/20/20  Yes Aide Dick NP  
finasteride (PROSCAR) 5 mg tablet TAKE 1 TABLET BY MOUTH  DAILY 10/20/20  Yes Joanna Dick NP  
amLODIPine (NORVASC) 10 mg tablet TAKE 1 TABLET BY MOUTH  DAILY 10/20/20  Yes Joanna Dick NP  
atorvastatin (LIPITOR) 10 mg tablet TAKE 1 TABLET BY MOUTH  DAILY 10/20/20  Yes Aide Dick NP  
metFORMIN ER (GLUCOPHAGE XR) 500 mg tablet TAKE 1 TABLET BY MOUTH  TWICE DAILY WITH MEALS 10/20/20  Yes Joanna Dick NP  
glipiZIDE SR (GLUCOTROL XL) 5 mg CR tablet TAKE 1 TABLET BY MOUTH  DAILY 10/20/20  Yes Joanna Dick NP  
sertraline (ZOLOFT) 100 mg tablet TAKE 1 TABLET BY MOUTH  DAILY 9/2/20  Yes Alhaji Hamilton NP  
traZODone (DESYREL) 50 mg tablet Take 2 tablets by mouth at night 8/21/20  Yes Alhaji Hamilton NP  
temazepam (RESTORIL) 15 mg capsule TAKE 1 CAPSULE BY MOUTH AT BEDTIME AS NEEDED FOR SLEEP. 7/6/20  Yes Jorgito Hunt DO  
losartan (COZAAR) 25 mg tablet TAKE ONE-HALF TABLET BY  MOUTH DAILY 7/1/20  Yes Joanna Dick NP  
labetaloL (NORMODYNE) 100 mg tablet TAKE 1 TABLET BY MOUTH  DAILY 7/1/20  Yes Joanna Dick NP  
fluticasone propionate (FLONASE) 50 mcg/actuation nasal spray ADMINISTER 1 Spray IN Both Nostrils two (2) times a day.  6/8/20  Yes Brandi Morelos NP  
 menthol-zinc oxide (CALMOSEPTINE) 0.44-20.6 % oint Apply to bilateral buttocks TID  Indications: skin irritation 20  Yes Jorgito Hunt, DO  
OTHER Hearing evaluation for possible changes in hearing 20  Yes Jorgito Hunt DO  
tiZANidine (ZANAFLEX) 2 mg tablet Take 1 Tab by mouth three (3) times daily as needed for Pain. 19  Yes Román Yung DO  
glucose blood VI test strips (TRUE METRIX GLUCOSE TEST STRIP) strip Check BS BID  Dx: DM  E11.21 18  Yes Jorgito Hunt DO  
aspirin (ASPIRIN) 325 mg tablet Take 1 Tab by mouth daily. 3/29/18  Yes Danay Hunt DO  
cholecalciferol (VITAMIN D3) 1,000 unit cap Take 1 Cap by mouth daily. 3/29/18  Yes Jorgito Hunt DO  
magnesium 250 mg tab Take 1 Tab by mouth daily. 3/29/18  Yes Jorgito Hunt DO  
sodium chloride (OCEAN) 0.65 % nasal squeeze bottle 0.05 mL by Both Nostrils route as needed for Congestion. 3/29/18  Yes Jorgito Hunt DO  
FERROUS FUMARATE/VIT BCOMP,C (SUPER B COMPLEX PO) Take  by mouth. Yes Provider, Historical  
 
 
Allergies/Social/Family History: Allergies Allergen Reactions  Procaine Other (comments) Pt stated he passed out from procaine and was told not to let anyone use it on him again Social History Tobacco Use  Smoking status: Former Smoker Types: Cigarettes Last attempt to quit: 1990 Years since quittin.1  Smokeless tobacco: Never Used Substance Use Topics  Alcohol use: No  
  
Family History Problem Relation Age of Onset  Cancer Mother  Cancer Father Review of Systems:  
 
Pertinent items are noted in HPI. Objective:  
Vital Signs: 
Visit Vitals /63 Pulse 100 Temp 97.9 °F (36.6 °C) Resp 23 Ht 6' 3\" (1.905 m) Wt 92.4 kg (203 lb 11.3 oz) SpO2 98% BMI 25.46 kg/m² O2 Flow Rate (L/min): 40 l/min O2 Device: Hi flow nasal cannula Temp (24hrs), Av.5 °F (36.9 °C), Min:97.2 °F (36.2 °C), Max:100 °F (37.8 °C) Intake/Output:  
 
Intake/Output Summary (Last 24 hours) at 10/30/2020 0122 Last data filed at 10/30/2020 0100 Gross per 24 hour Intake 230.13 ml Output 830 ml Net -599.87 ml Physical Exam: 
 
General:  fatigued, cooperative, no distress, appears stated age Eye: PERRL, EOM's intact Neurologic: somnolent, easily awake to voice, oriented, normal mood, grossly non-focal 
Neck:  normal, TVP in place Lungs: normal effort Heart: irregularly irregular Abdomen:  soft, non-tender Cardiovascular:  no edema, irregular pulses Skin:  no rash or abnormalities LABS AND  DATA: Personally reviewed Recent Labs 10/29/20 
0444 10/28/20 
1226 WBC 12.1* 10.0 HGB 9.2* 8.5* HCT 28.7* 26.1*  
* 98* Recent Labs 10/29/20 
0444 10/28/20 
1226  140  
K 3.8 3.7 * 108 CO2 24 24 BUN 14 15 CREA 0.78 0.75 GLU 91 107* CA 8.1* 7.8*  
MG 2.1 1.8 Recent Labs 10/29/20 
0444 10/28/20 
1226 AP 72 64  
TP 5.7* 5.4* ALB 3.0* 2.7*  
GLOB 2.7 2.7 Recent Labs 10/28/20 
1226 INR 1.3* PTP 13.2* APTT 36.5* Recent Labs 10/28/20 
1413 10/27/20 
1036 PHI 7.31* 7.42  
PCO2I 45.1* 43.1 PO2I 167* 71* FIO2I 0.50 21 No results for input(s): CPK, CKMB, TROIQ, BNPP in the last 72 hours. Hemodynamics:  
PAP:   CO:    
Wedge:   CI:    
CVP:    SVR:    
  PVR:    
 
Ventilator Settings: 
Mode Rate Tidal Volume Pressure FiO2 PEEP Spontaneous   500 ml  5 cm H2O 100 % 5 cm H20 Peak airway pressure: 10 cm H2O Minute ventilation: 6.81 l/min MEDS: Reviewed Chest X-Ray: personally reviewed and report checked 10/18/2020 · Image quality for this study was technically difficult. · LV: Estimated LVEF is 55 - 60%. Normal cavity size, wall thickness and systolic function (ejection fraction normal). Mild (grade 1) left ventricular diastolic dysfunction. · MV: Mitral valve thickening. Mitral valve leaflet calcification.  Mitral annular calcification. Mitral valve mean gradient is 6 mmHg. Mild-to-moderate mitral valve stenosis is present. · LA: Mildly dilated left atrium. · AV: Aortic valve leaflet calcification present. Aortic valve mean gradient is 35 mmHg. Moderate aortic valve stenosis is present. · TV: Non-specific thickening of the tricuspid valve. Mild tricuspid valve regurgitation is present. · PA: Pulmonary arterial systolic pressure is 25 mmHg. · RV: Not well visualized. · Pericardium: Pericardial fat pad present. Assessment:  
 
ICU Problems: 
- Cardiac arrest 
- Third degree Heart block - CAD 
- leukocytosis - Chronic anemia - Aortic Stenosis s/p TAVR 
 
ICU Comprehensive Plan of Care:  
Plans for this Shift: 1. Patient awake and alert, not encephalopathic, will continue monitor at this time, avoid sedatives and opioids 2. Start HFNC for oxygenation 3. Transition to back up- cutaneous pacing, TVP showing inappropriate pacing, concern for placement issues 4. Cardiology notified, proceed with plan for permanent pacer on 10/30/2020 5. Scuddy inserted to monitor BP 6. Monitor UOP 7. Keep NPO 8. Start peripheral vaso active agents, hypotension may be 2/2 arrest will monitor for pressor requirement Multidisciplinary Rounds Completed: Yes ABCDEF Bundle/Checklist 
Pain Medications: Oxycodone Target RASS: N/A Sedation Medications: None CAM-ICU:  Negative Mobility: Bedrest 
PT/OT: Will require PT/OT post procedure Restraints: None needed at this time Discussed Plan of Care (goals of care): Yes Addressed Code Status: Full Code CARDIOVASCULAR Cardiac Gtts: Phenylephrine SBP Goal of: > 90 mmHg MAP Goal of: > 65 mmHg Transfusion Trigger (Hgb): <7 g/dL RESPIRATORY Vent Goals: N/A 
DVT Prophylaxis (if no, list reason): SCD's or Sequential Compression Device SPO2 Goal: > 92% Pulmonary toilet: Incentive Spirometry GI/ Rosen Catheter Present: No 
GI Prophylaxis: Pepcid (famotidine) Nutrition: Yes IVFs: None Bowel Movement: No 
Bowel Regimen: Senna and Docusate (Colace) Insulin: glargine with sliding scale ANTIBIOTICS Antibiotics: Ancef T/L/D Tubes: None Lines: Peripheral IV and Arterial Line Drains: None SPECIAL EQUIPMENT Temporary Pacemaker DISPOSITION Stay in ICU CRITICAL CARE CONSULTANT NOTE I had a face to face encounter with the patient, reviewed and interpreted patient data including clinical events, labs, images, vital signs, I/O's, and examined patient. I have discussed the case and the plan and management of the patient's care with the consulting services, the bedside nurses and the respiratory therapist.   
 
NOTE OF PERSONAL INVOLVEMENT IN CARE This patient has a high probability of imminent, clinically significant deterioration, which requires the highest level of preparedness to intervene urgently. I participated in the decision-making and personally managed or directed the management of the following life and organ supporting interventions that required my frequent assessment to treat or prevent imminent deterioration. I personally spent 75 minutes of critical care time. This is time spent at this critically ill patient's bedside actively involved in patient care as well as the coordination of care and discussions with the patient's family. This does not include any procedural time which has been billed separately. Edwar Randolph MD 
Staff CHRIS/ [address] 
10/30/2020

## 2020-10-30 NOTE — PROGRESS NOTES
10/30/2020 -  
GLORY: 
- RUR: 18% 
- Disposition is TBD dependent on progression: potential discharge to own [place] apartment at [facility] with transport via family - Patient will need 2nd IM Letter prior to discharge - Patient is S/P Code Blue x 2 10/29 - Temporary Pacer placed 10/29 
- Patient is on Hi Flow NC at [address] 
- Patient to have permanent pacer placed today, 10/30 - Therapies deferred until medically appropriate CRM: Luna Stringer, MPH, [address] [place] [address]; Z: [phone]

## 2020-10-30 NOTE — PROGRESS NOTES
Cardiac Cath Lab Procedure Area Arrival Note: 
 
Luis Miguel Tompkins arrived to Cardiac Cath Lab, Procedure Area. Patient identifiers verified with NAME and DATE OF BIRTH. Procedure verified with patient. Consent forms verified. Allergies verified. Patient informed of procedure and plan of care. Questions answered with review. Patient voiced understanding of procedure and plan of care. Patient on cardiac monitor, non-invasive blood pressure, SPO2 monitor. On 10L via NRB mask. IV of NSS on pump at 50 ml/hr. Patient status doing well without problems. Patient is A&Ox 4. Patient reports no complaints of chest pain or shortness of breath. Patient medicated during procedure with orders obtained and verified by Dr. Alysa Ruiz. Refer to patients Cardiac Cath Lab PROCEDURE REPORT for vital signs, assessment, status, and response during procedure, printed at end of case. Printed report on chart or scanned into chart. 1150 Transfer to [facility] RR from Procedure Area Verbal report given to RT Renny(R) on Luis Miguel Tompkins being transferred to Cardiac Cath Lab  for routine progression of care   Patient is post Dual PPI procedure. Patient stable upon transfer to . Report consisted of patients Situation, Background, Assessment and  
Recommendations(SBAR). Information from the following report(s) SBAR, Procedure Summary and MAR was reviewed with the receiving nurse. Opportunity for questions and clarification was provided. Patient medicated during procedure with orders obtained and verified by Dr. Alysa Ruiz. Refer to patient PROCEDURE REPORT for vital signs, assessment, status, and response during procedure.

## 2020-10-30 NOTE — PROGRESS NOTES
Cardiac Surgery Care Coordinator- Met with Arley Tesfaye, he is currently being transferred to Cath Lab for his pacemaker insertion. Will continue to follow. Placed update call to Mr Mccoy's son Carri Patel. He is without questions at this time. 1400- Placed update call to Carri Patel, the son of Arley Tesfaye. Provided update and encouraged him to verbalize. He is without questions at this time. Will continue to follow.  Kathern Landau, RN

## 2020-10-30 NOTE — PROGRESS NOTES
Cardiac Electrophysiology Hospital Progress Note REFERRING PROVIDER: Dr Stacie Zurita Subjective:  
  
Mickie Bennett is a 82 y.o. patient who is seen for consideration for pacemaker implant. He underwent TAVR on 10/28/2020. Prior to TAVR, had 1st degree AVB (at times >320 ms). Post TAVR, had LBBB, 1st degree AVB to 300 ms. Required temporary pacing via catheter, but LBBB resolved & temp pacer removed. Patient subsequently had asystole with 3rd degree AVB during atrial tachycardia, coincided with syncope. Had temp pacer replaced 10/29/2020. VSS with temp pacer in place. Had been mildly febrile earlier in admission, afebrile today. Previous: LHC/RHC (10/21/2020): Severe native 3V CAD. Patent SVT-LAD, SVG-2nd OM, SVT-PDA. Severe AS with OLIVIER 0.83 cm2, mean grad 32 mmHg. Severe subclavian stenosis, previously placed stent in left SCV with 70-80% ISR, required balloon to advance catheters across. No flow visible in LIMA. Echo (10/18/2020): LVEF 69-92%, grade 1 diastolic dysfunction. Mildly dilated LA. MAC, mild to mod MS. Mod AS. Mild TR. Pericardial fat pad present. Enterococcus UTI in 08/2020, now in isolation for history of Enterobacter UTI 10 days ago. Prior syncope felt to be due to AS. S/p CABG x 3 05/20/2008 (SVG-LAD, SVG-OM & PDA). Hx MAT. History of rheumatic fever 1946. Mother-in-law had pacemaker. Patient Active Problem List  
Diagnosis Code  Type 2 diabetes mellitus without complication, without long-term current use of insulin ([facility]) E11.9  
 Essential hypertension I10  
 Hypercholesterolemia E78.00  Coronary artery disease involving native coronary artery of native heart without angina pectoris I25.10  
 S/P CABG (coronary artery bypass graft) Z95.1  Severe aortic stenosis I35.0  Aortic systolic murmur on examination I35.8  Benign prostatic hyperplasia with lower urinary tract symptoms N40.1  Insomnia G47.00  
 Edith Em Former smoker E07.854  ACP (advance care planning) Z71.89  
 Gait instability R26.81  
 Age-related cataract of both eyes H25.9  Recurrent depression ([facility] [address] F33.9  On angiotensin receptor blockers (ARB) X33.329  Gastroesophageal reflux disease without esophagitis K21.9  Cough R05  Pressure injury of buttock, stage 1 L89.301  Incontinence of feces R15.9  Type 2 diabetes with nephropathy (HCC) E11.21  
 Decubitus ulcer of left buttock, stage 2 ([address] E48.463  Syncope and collapse R55  Syncope R55  
 (HFpEF) heart failure with preserved ejection fraction (HCC) I50.30  Aortic stenosis I35.0  
 Pacemaker Z95.0  Third degree AV block (HCC) I44.2 Current Facility-Administered Medications Medication Dose Route Frequency Provider Last Rate Last Dose  PHENYLephrine (ANATOLY-SYNEPHRINE) 30 mg in 0.9% sodium chloride 250 mL infusion   mcg/min IntraVENous TITRATE Kristen Beavers MD 50 mL/hr at 10/30/20 1345 100 mcg/min at 10/30/20 1345  sodium chloride (NS) flush 5-40 mL  5-40 mL IntraVENous Q8H Marisol VILLATORO, NP   10 mL at 10/30/20 1501  
 sodium chloride (NS) flush 5-40 mL  5-40 mL IntraVENous PRN Anil Lujan NP      
 ceFAZolin (ANCEF) 2 g/20 mL in sterile water IV syringe  2 g IntraVENous Q8H Marisol VILLATORO, NP      
 [START ON 10/31/2020] cephALEXin (KEFLEX) capsule 250 mg  250 mg Oral TID Anil Lujan NP      
 atorvastatin (LIPITOR) tablet 40 mg  40 mg Oral QHS Yosef Yi MD      
 metoprolol tartrate (LOPRESSOR) tablet 50 mg  50 mg Oral BID MD Sanna Baugh ON 10/31/2020] aspirin chewable tablet 81 mg  81 mg Oral DAILY Verlie Shone, NP      
 sodium chloride (NS) flush 5-40 mL  5-40 mL IntraVENous Q8H Verlie Shone, NP   10 mL at 10/30/20 1501  
 sodium chloride (NS) flush 5-40 mL  5-40 mL IntraVENous PRN Verlie Shone, NP      
 balsam peru-castor oiL (VENELEX) ointment   Topical BID Wynette Flies N, NP      
 acetaminophen (TYLENOL) tablet 650 mg  650 mg Oral Q4H [facility]N [facility], NP   650 mg at 10/28/20 2310  
 traMADoL (ULTRAM) tablet  mg   mg Oral Q6H PRN Nakule Dylon, NP      
 oxyCODONE-acetaminophen (PERCOCET) 5-325 mg per tablet 1-2 Tab  1-2 Tab Oral Q4H PRN Jennifer Tobias, NP      
 naloxone [facility]) injection 0.4 mg  0.4 mg IntraVENous PRN Jennifer Osborne, NP      
 ondansetron [facility]) injection 4 mg  4 mg IntraVENous Q4H PRN Jennifer Osborne, NP      
 albuterol (PROVENTIL VENTOLIN) nebulizer solution 2.5 mg  2.5 mg Nebulization Q4H PRN Jennifer Osborne, NP      
 famotidine (PEPCID) tablet 20 mg  20 mg Oral Q12H Jennifer Osborne, NP   20 mg at 10/30/20 4615  magnesium oxide (MAG-OX) tablet 400 mg  400 mg Oral BID [facility], NP   400 mg at 10/30/20 7964  bisacodyL (DULCOLAX) suppository 10 mg  10 mg Rectal DAILY PRN Jennifer Tobias, NP      
 senna-docusate (PERICOLACE) 8.6-50 mg per tablet 1 Tab  1 Tab Oral BID Lucillie Bras, NP   1 Tab at 10/30/20 0105  insulin glargine (LANTUS) injection 15 Units  15 Units SubCUTAneous QHS [facility]   8 Units at 10/29/20 2113  ascorbic acid (vitamin C) (VITAMIN C) tablet 1,000 mg  1,000 mg Oral TID Lucillie [facility], NP   1,000 mg at 10/30/20 1672  traZODone (DESYREL) tablet 50 mg  50 mg Oral QHS PRN [facility], NP   50 mg at 10/26/20 2157  temazepam (RESTORIL) capsule 15 mg  15 mg Oral QHS PRN Lucillie [facility], NP   15 mg at 10/29/20 2107  doxazosin (CARDURA) tablet 4 mg  4 mg Oral QHS [facility], NP   4 mg at 10/29/20 2108  finasteride (PROSCAR) tablet 5 mg  5 mg Oral DAILY Jennifer Tobias NP   5 mg at 10/30/20 1259  sertraline (ZOLOFT) tablet 100 mg  100 mg Oral QPM Jennifer Tobias NP   100 mg at 10/29/20 1712  
 insulin lispro (HUMALOG) injection   SubCUTAneous AC&HS Jennifer Tobias NP   Stopped at 10/30/20 1130 Allergies Allergen Reactions  Procaine Other (comments) Pt stated he passed out from procaine and was told not to let anyone use it on him again Past Medical History:  
Diagnosis Date  BPH (benign prostatic hyperplasia)  CAD (coronary artery disease)  Diabetes ([facility]  Hearing loss  Hypercholesterolemia  Hypertension  Murmur  Recurrent depression ([facility] 2019  Third degree AV block ([facility] 10/30/2020 Past Surgical History:  
Procedure Laterality Date  CARDIAC SURG PROCEDURE UNLIST  2006 by-pass  HX CHOLECYSTECTOMY  NM INS NEW/RPLCMT PRM PM W/TRANSV ELTRD ATRIAL&VENT  10/30/2020  NM INS NEW/RPLCMT PRM PM W/TRANSV ELTRD ATRIAL&VENT N/A 10/30/2020 INSERT PPM DUAL performed by Amy Lynn MD at [address], [facility] Family History Problem Relation Age of Onset  Cancer Mother  Cancer Father Social History Tobacco Use  Smoking status: Former Smoker Types: Cigarettes Last attempt to quit: 1990 Years since quittin.1  Smokeless tobacco: Never Used Substance Use Topics  Alcohol use: No  
  
 
Review of Systems: All other review of systems otherwise negative. Constitutional: Negative for fever, chills, weight loss, + malaise/fatigue. HEENT: Negative for nosebleeds, vision changes. Respiratory: Negative for cough, hemoptysis Cardiovascular: Negative for chest pain, palpitations, orthopnea, claudication, leg swelling, syncope, and PND. Gastrointestinal: Negative for nausea, vomiting, diarrhea, blood in stool and melena. Genitourinary: Negative for dysuria, and hematuria. Musculoskeletal: Negative for myalgias, arthralgia. Skin: Negative for rash. Heme: Does not bleed or bruise easily. Neurological: Negative for speech change and focal weakness Objective:  
 
Visit Vitals BP (!) 156/64 Pulse 98 Temp 97.8 °F (36.6 °C) Resp (!) 31 Ht 6' 3\" (1.905 m) Wt 208 lb 15.9 oz (94.8 kg) SpO2 95% BMI 26.12 kg/m² Physical Exam:  
Constitutional: Pleasant. No respiratory distress. Head: Normocephalic and atraumatic. Eyes: Pupils are equal, round. ENT: Hearing grossly normal. 
Neck: Supple. Right IJ temp pacing catheter. Cardiovascular: Normal rate, regular rhythm. Exam reveals no gallop and no friction rub. No murmur heard. Pulmonary/Chest: Effort normal and No wheezes. Abdominal: Soft, no tenderness. Musculoskeletal: No edema. Neurological: alert, oriented. Skin: Skin is warm and dry Psychiatric: normal mood and affect. Behavior is normal. Judgment and thought content normal.   
 
CMP:  
Lab Results Component Value Date/Time  10/30/2020 04:02 AM  
 K 3.9 10/30/2020 04:02 AM  
  10/30/2020 04:02 AM  
 CO2 24 10/30/2020 04:02 AM  
 AGAP 7 10/30/2020 04:02 AM  
  (H) 10/30/2020 04:02 AM  
 BUN 16 10/30/2020 04:02 AM  
 CREA 0.76 10/30/2020 04:02 AM  
 GFRAA >60 10/30/2020 04:02 AM  
 GFRNA >60 10/30/2020 04:02 AM  
 CA 8.5 10/30/2020 04:02 AM  
 MG 2.3 10/30/2020 04:02 AM  
 
CBC:  
Lab Results Component Value Date/Time WBC 14.9 (H) 10/30/2020 04:02 AM  
 HGB 9.0 (L) 10/30/2020 04:02 AM  
 HCT 27.2 (L) 10/30/2020 04:02 AM  
  (L) 10/30/2020 04:02 AM  
  
 
Assessment/Plan:  
Long First degree av block before TAVR for AS Hx of CABG and CAD 
PAD IDDM Third degree av block with PAT episode and syncope, requiring placement of another temporary pacing catheter Hx of enterobacter UTI At risk of recurrent 3rd degree AVB due to underlying severely long 1st degree AVB. Risks/benefits of dual chamber pacemaker reviewed. He agrees to proceed with pacemaker today. eGnet Cordova M.D. Electrophysiology/Cardiology [facility] [address], [place] [address] [facility], [address] [place] [address]                               
[phone]

## 2020-10-30 NOTE — CONSULTS
Cardiac Electrophysiology Hospital Consultation Note REFERRING PROVIDER: Dr Hernán Matias Subjective:  
  
Daniel Murrell is a 82 y.o. patient who is seen for pacemaker He had syncope today with at least 5 seconds of third degree av block during atrial tachycardia He has hx of first degree av block before TAVR for aortic stenosis. FL intervals leading up to TAVR had been at times >320 ms He had LBBB and first degree av block to 300 ms post TAVR and required temporary pacing via a catheter but LBBB resolved and temp pacing catheter was removed before the asystole with third degree av block and syncope occurred He went back to cath lab for temp pacing catheter placement via right IJ access this pm 
He is now feeling well 
+ coughs Low grade temp 100.6 Hx of enterroccus UTI  In August and now in isolation for hx of enterobacter UTI 10 days ago His mother in law had pacemaker Left and right heart cath 10/21/2020 Severe native three-vessel coronary artery disease Patent vein graft to LAD, vein graft to second marginal, vein graft to PDA. First and third marginal are occluded and are getting collateralized from second marginal branch. Second diagonal is occluded and is collateralized from distal LAD Severe aortic stenosis with calculated aortic valve area of 0.83 cm² with mean gradient of 32 mmHg with systolic flow rate of 474 mL per second. Evaluation was performed with patient in normal rhythm rather than tachycardia. Severe left subclavian stenosis. There is a previously placed stent in the left subclavian with 70 to 80% in-stent restenosis that had to be dilated with a 4 followed by 7 mm balloon to get the catheters across left subclavian. Left subclavian stent is protruding into the aortic arch by about 8 to 10 mm. Final angiogram demonstrated residual 30 to 40% stenosis. No flow was visible in the LIMA 2 D echo 10/18/2020 LVEF 55%, mild to moderate MS, moderate AS Mild TR 
 
 Hx of MAT Hx of syncope felt to be due to AS Hx of moderate AS, hx of rheumatic fever 1946 
5/20/2008 3 vessel CABG (SVG to LAD, SVG to OM and PDA) Patient Active Problem List  
Diagnosis Code  Type 2 diabetes mellitus without complication, without long-term current use of insulin ([facility]) E11.9  
 Essential hypertension I10  
 Hypercholesterolemia E78.00  Coronary artery disease involving native coronary artery of native heart without angina pectoris I25.10  
 S/P CABG (coronary artery bypass graft) Z95.1  Severe aortic stenosis I35.0  Aortic systolic murmur on examination I35.8  Benign prostatic hyperplasia with lower urinary tract symptoms N40.1  Insomnia G47.00 [facility] Former smoker R61.859  ACP (advance care planning) Z71.89  
 Gait instability R26.81  
 Age-related cataract of both eyes H25.9  Recurrent depression ([facility] F33.9  On angiotensin receptor blockers (ARB) V52.344  Gastroesophageal reflux disease without esophagitis K21.9  Cough R05  Pressure injury of buttock, stage 1 L89.301  Incontinence of feces R15.9  Type 2 diabetes with nephropathy ([facility]) E11.21  
 Decubitus ulcer of left buttock, stage 2 ([facility] [address] I08.597  Syncope and collapse R55  Syncope R55  
 (HFpEF) heart failure with preserved ejection fraction ([facility]) I50.30  Aortic stenosis I35.0 Current Facility-Administered Medications Medication Dose Route Frequency Provider Last Rate Last Dose  sodium chloride (NS) flush 5-40 mL  5-40 mL IntraVENous Q8H Maria Fernanda Paredes NP   10 mL at 10/29/20 1458  sodium chloride (NS) flush 5-40 mL  5-40 mL IntraVENous PRN Maria Fernanda Paredes NP      
 balsam peru-castor oiL (VENELEX) ointment   Topical BID Maria Fernanda Paredes NP      
 sodium chloride (NS) flush 5-40 mL  5-40 mL IntraVENous Q8H Alaina Null MD      
 sodium chloride (NS) flush 5-40 mL  5-40 mL IntraVENous PRN Alaina Null MD      
  [START ON 10/30/2020] ceFAZolin (ANCEF) 2 g/20 mL in sterile water IV syringe  2 g IntraVENous Dimitri Arango MD      
 acetaminophen (TYLENOL) tablet 650 mg  650 mg Oral Q4H PRN [place] Adeola, NP   650 mg at 10/28/20 2310  
 traMADoL (ULTRAM) tablet  mg   mg Oral Q6H PRN [place] Adeola, NP      
 oxyCODONE-acetaminophen (PERCOCET) 5-325 mg per tablet 1-2 Tab  1-2 Tab Oral Q4H PRN [place] Adeola, NP      
 naloxone [facility]) injection 0.4 mg  0.4 mg IntraVENous PRN [place] Adeola, NP      
 ondansetron [facility]) injection 4 mg  4 mg IntraVENous Q4H PRN [place] Adeola, NP      
 albuterol (PROVENTIL VENTOLIN) nebulizer solution 2.5 mg  2.5 mg Nebulization Q4H PRN [place] Adeola, NP      
 famotidine (PEPCID) tablet 20 mg  20 mg Oral Q12H [place] Adeola, NP   20 mg at 10/29/20 7931  
 magnesium oxide (MAG-OX) tablet 400 mg  400 mg Oral BID [place] Adeola, NP   400 mg at 10/29/20 1712  bisacodyL (DULCOLAX) suppository 10 mg  10 mg Rectal DAILY PRN [place] Adeola, NP      
 senna-docusate (PERICOLACE) 8.6-50 mg per tablet 1 Tab  1 Tab Oral BID [place] Adeola, NP   1 Tab at 10/29/20 1712  
 insulin glargine (LANTUS) injection 15 Units  15 Units SubCUTAneous QHS [place] Adeola, NP   15 Units at 10/28/20 2108  ascorbic acid (vitamin C) (VITAMIN C) tablet 1,000 mg  1,000 mg Oral TID [place] Adeola, NP   1,000 mg at 10/29/20 1712  traZODone (DESYREL) tablet 50 mg  50 mg Oral QHS PRN [place] Adeola, NP   50 mg at 10/26/20 2157  temazepam (RESTORIL) capsule 15 mg  15 mg Oral QHS PRN [place] Adeola, NP   15 mg at 10/28/20 2310  aspirin tablet 325 mg  325 mg Oral DAILY De [place] Adeola, NP   325 mg at 10/29/20 2009  
 atorvastatin (LIPITOR) tablet 10 mg  10 mg Oral QHS De [place] Adeola, NP   10 mg at 10/28/20 2103  doxazosin (CARDURA) tablet 4 mg  4 mg Oral QHS De [place] Adeola, NP   4 mg at 10/28/20 2103  finasteride (PROSCAR) tablet 5 mg  5 mg Oral DAILY Maru Shiloh, NP   5 mg at 10/29/20 8020  sertraline (ZOLOFT) tablet 100 mg  100 mg Oral QPM Maru Shiloh, NP   100 mg at 10/29/20 1712  
 insulin lispro (HUMALOG) injection   SubCUTAneous AC&HS Maru Shiloh, NP   3 Units at 10/29/20 1726 Allergies Allergen Reactions  Procaine Other (comments) Pt stated he passed out from procaine and was told not to let anyone use it on him again Past Medical History:  
Diagnosis Date  BPH (benign prostatic hyperplasia)  CAD (coronary artery disease)  Diabetes ([facility]  Hearing loss  Hypercholesterolemia  Hypertension  Murmur  Recurrent depression ([facility] [address] 2019 Past Surgical History:  
Procedure Laterality Date  CARDIAC SURG PROCEDURE UNLIST   by-pass  HX CHOLECYSTECTOMY Family History Problem Relation Age of Onset  Cancer Mother  Cancer Father Social History Tobacco Use  Smoking status: Former Smoker Types: Cigarettes Last attempt to quit: 1990 Years since quittin.1  Smokeless tobacco: Never Used Substance Use Topics  Alcohol use: No  
  
 
Review of Systems:  
Constitutional: Negative for fever, chills, weight loss, + malaise/fatigue. HEENT: Negative for nosebleeds, vision changes. Respiratory: Negative for cough, hemoptysis Cardiovascular: Negative for chest pain, palpitations, orthopnea, claudication, leg swelling, syncope, and PND. Gastrointestinal: Negative for nausea, vomiting, diarrhea, blood in stool and melena. Genitourinary: Negative for dysuria, and hematuria. Musculoskeletal: Negative for myalgias, arthralgia. Skin: Negative for rash. Heme: Does not bleed or bruise easily. Neurological: Negative for speech change and focal weakness Objective:  
 
Visit Vitals /69 Pulse (!) 101 Temp 97.2 °F (36.2 °C) Resp 25 Ht 6' 3\" (1.905 m) Wt 203 lb 11.3 oz (92.4 kg) SpO2 94% BMI 25.46 kg/m² Physical Exam:  
Constitutional: pleasant. No respiratory distress. Head: Normocephalic and atraumatic. Eyes: Pupils are equal, round ENT: hearing normal 
Neck: supple. Right IJ temp pacing catheter Cardiovascular: Normal rate, regular rhythm. Exam reveals no gallop and no friction rub. No murmur heard. Pulmonary/Chest: Effort normal and No wheezes. Abdominal: Soft, no tenderness. Musculoskeletal: no edema. Neurological: alert, oriented. Skin: Skin is warm and dry Psychiatric: normal mood and affect. Behavior is normal. Judgment and thought content normal.   
 
CMP:  
Lab Results Component Value Date/Time  10/29/2020 04:44 AM  
 K 3.8 10/29/2020 04:44 AM  
  (H) 10/29/2020 04:44 AM  
 CO2 24 10/29/2020 04:44 AM  
 AGAP 5 10/29/2020 04:44 AM  
 GLU 91 10/29/2020 04:44 AM  
 BUN 14 10/29/2020 04:44 AM  
 CREA 0.78 10/29/2020 04:44 AM  
 GFRAA >60 10/29/2020 04:44 AM  
 GFRNA >60 10/29/2020 04:44 AM  
 CA 8.1 (L) 10/29/2020 04:44 AM  
 MG 2.1 10/29/2020 04:44 AM  
 ALB 3.0 (L) 10/29/2020 04:44 AM  
 TP 5.7 (L) 10/29/2020 04:44 AM  
 GLOB 2.7 10/29/2020 04:44 AM  
 AGRAT 1.1 10/29/2020 04:44 AM  
 ALT 20 10/29/2020 04:44 AM  
 
CBC:  
Lab Results Component Value Date/Time WBC 12.1 (H) 10/29/2020 04:44 AM  
 HGB 9.2 (L) 10/29/2020 04:44 AM  
 HCT 28.7 (L) 10/29/2020 04:44 AM  
  (L) 10/29/2020 04:44 AM  
  
 
Assessment/Plan:  
Long First degree av block before TAVR for AS Hx of CABG and CAD 
PAD IDDM Third degree av block with PAT episode and syncope, requiring placement of another temporary pacing catheter Hx of enterobacter UTI He is at risk of recurrent third degree av block due to underlying severely long first degree av block I have discussed with him about risks and benefits of dual chamber pacemaker implant and he agrees to proceed tomorrow I discussed with his nurse about halving his lantus insulin tonight as he will be NPO at times Due to recent enterobacter UTI, he may have to go later in the day for pacemaker implant as the room has to be cleaned up afterwards Risks involve device implant include but are not limited to bleeding, infection, valvular damage, heart attack, stroke, lung collapse (pneumothorax or hemothorax), heart collapse (pericardial tamponade), heart perforation, kidney failure, death. Elective or emergency surgery may be required to repair some of these complications. Prolonged hospitalization would be required. General anesthesia may be required for the procedure. Thank you for involving me in this patient's care and please call with further concerns or questions. Augusto Rhodes M.D. Electrophysiology/Cardiology [address] [place] [address] [facility] [place] [address], [address] [place] [address]                               
[phone]

## 2020-10-30 NOTE — PROGRESS NOTES
1157: Pt unresponsive, asystole, no pulse, code blue called, CPR initiated. 1158: Dr. Aurelia Frost at bedside, TVP rate increased to 80BPM, mAp increased to 25.  
1159: Pulse check, + pulse. Pt responsive, following commands and tracking. Placed on NRB, O2 sat 98%. /68 (95) 
0005: Orders for A-line. Pt placed on transcutaneous pacer pads as well. 0012: RT at bedside, placed on Hi-Flow NC @ 40L 100%. 0022: Art line placed. BP 70/50's. Orders for Adonay received. 0034: Adonay started @  
0038: Cardiology paged, made aware of the events. No orders received. 0045: TVP not pacing appropriately despite multiple change of settings. TVP turned off, pt on transcutaneous pacer pads (Rate 60, mAp 20).

## 2020-10-30 NOTE — PROGRESS NOTES
1930: Bedside and Verbal shift change report given to Janeth Meng RN (oncoming nurse) by Pa chun RN (offgoing nurse). Report included the following information SBAR, Kardex, Procedure Summary, Intake/Output, MAR, Recent Results, Med Rec Status and Cardiac Rhythm Paced vs A fib.  
 
2000: Resumed pt care, VSS, on 5L via NC. Noticed pt with increased WOB, coarse sounding on auscultation. CXR at bedside. Orders placed for speech evaluation for possible silent aspiration. Drips: Amio @ 1, Adonay @ 50  
 
2330: Amio gtt decreased to 0.5 per order  
 
0000: Pt arterial line dampened, attempted to flush with no improvement. Dr. Soumya Tobias notified, orders to d/c art line. 0030: Art line removed. Manual pressure 134/68 (90). Pt with agonal like breathing, able to be aroused/following commands - O2 @ 95% on 4L NC. Still sounding coarse, deep suctioned with minimal output. Pt attempting to cough but not able to clear secretions. 0040: MAP 90, Adonay off  
 
0100: Pt with episodes of inappropriate pacing, Dr. Soumya Tobias notified, will continue to monitor  
 
0400: AM labs drawn and sent, daily EKG performed: NSR/1AVB/LBBB 
 
0545: Pt complaining of aching chest pain (post CPR), pt repositioned, PRN tylenol given. 0600: Pacemaker interrogated  
 
0720: Dr. Mich Seaman at bedside, [facility] to d/c R IJ sheath  
 
0730: Bedside and Verbal shift change report given to Radha Cavanaugh RN (oncoming nurse) by Janeth Meng RN (offgoing nurse). Report included the following information SBAR, Kardex, ED Summary, OR Summary, Procedure Summary, Intake/Output, MAR, Recent Results, Med Rec Status and Cardiac Rhythm Paced vs NSR/1AVB/BBB.

## 2020-10-30 NOTE — PROCEDURES
[facility] Procedure Note - Arterial Access:  
Performed by Scott Christianson MD. 
 
Immediately prior to the procedure, the patient was reevaluated and found suitable for the planned procedure and any planned medications. Immediately prior to the procedure a time out was called to verify the correct patient, procedure, equipment, staff, and marking as appropriate. Central line Bundle: 
Full sterile barrier precautions used. 5 mL 1% Lidocaine placed at insertion site. Insertion Date: 10/30/2020 Procedure Location:  CCU. Condition: Emergency. Consent:  YES. Method: Seldinger technique. Site Prep: ChloraPrep and Sterile draping. Procedure: Arterial Catheter Insertion in Right, Radial Artery Catheter inserted into a new site. Number of Attempts:  1 Indication: Monitoring and Blood Drawing. There was bright red, pulsatile blood return. Femoral Site? no. If Yes, reason femoral site was chosen: N/A Catheter secured. Biopatch in place? yes. Sterile Bio-occlusive dressing placed. Complication None. The procedure was tolerated well. Scott Christianson MD  
Critical Care Medicine [facility]

## 2020-10-30 NOTE — PROGRESS NOTES
Physical Therapy Chart reviewed in preparation for therapy session. Noted events yesterday (code x2), now on 40L HiFlow NCO2, FiO2 80% and plan for pacemaker placement today. Therapy will defer until medically appropriate. Will continue to follow peripherally. Thank you.

## 2020-10-30 NOTE — PROGRESS NOTES
His Atorvastatin will be increased to 40 mg daily. BB may be started after permanent pacemaker placement to suppress multifocal tachycardia. Ok to add Diltiazem if there is blood pressure allows. Will be discharged on aspirin alone. OK to be discharged over weekend.

## 2020-10-30 NOTE — INTERDISCIPLINARY ROUNDS
Multidisciplinary rounds were held Friday, October 30, 2020.   Today's plan/goal includes (but not limited to): Permanent pacemaker placement, decrease pressor need, stable VS  
 
 Onset: 0130  Location/description: pt's mother states pt fell off the bed. Pt hit his head. Approx. 3ft fall onto hardwood. No behavior changes, no LOC. No hematoma, no laceration. No n/v, a/o.  Associated Symptoms: none  What improves/worsens symptoms: none  Symptom specific medications: none  Intake and Output: normal  Activity level: normal  Temperature (route and time): not taken  Weight:   Wt Readings from Last 1 Encounters:   06/11/20 9.66 kg (48 %, Z= -0.04)*     * Growth percentiles are based on WHO (Boys, 0-2 years) data.        Recent Care: 6/11/20 ov  Did the patient have a positive coronavirus screening?: No, not asked    PLAN:  Home Care Advice provided    Caller agrees to follow recommendations.    Reason for Disposition  • Scalp swelling, bruise or pain (all triage questions negative)    Protocols used: TRAUMA - HEAD-P-

## 2020-10-30 NOTE — PROGRESS NOTES
[facility] ICU Progress Note Admit Date: 10/18/2020 POD:  2 Day Post-Op Procedure:  Procedure(s): 
Transcatheter Aortic Valve Replacement, Shockwave, Balloon Valvuloplasty, Transthoracic Echo and KENAN by Dr. Robin Real Subjective:  
Pt seen with Dr. Carmen Galindo. Tmax 99, Hi flow at 40l/min and 80% FiO2. Resting in bed. Noted events of overnight-Asystole with temp pacer non-capture. 2 minutes of CPR with ROSC to Afib. Plan for PPM today; mild dyspnea, fatigue, and weakness; denies chest pain Objective:  
Vitals: 
Blood pressure 123/68, pulse 81, temperature 98 °F (36.7 °C), resp. rate 30, height 6' 3\" (1.905 m), weight 208 lb 15.9 oz (94.8 kg), SpO2 99 %. Temp (24hrs), Av °F (36.7 °C), Min:97.2 °F (36.2 °C), Max:99 °F (37.2 °C) EKG/Rhythm:   
Sinus rhythm with 1st degree AV block with premature atrial complexes Left bundle branch block ID of 304 QRS of 136 Oxygen Therapy: 
Oxygen Therapy O2 Sat (%): 99 % (10/30/20 0900) Pulse via Oximetry: 80 beats per minute (10/30/20 0900) O2 Device: Hi flow nasal cannula (10/30/20 08) O2 Flow Rate (L/min): 40 l/min (10/30/20 0800) FIO2 (%): 80 % (10/30/20 0800) CXR:  
CXR Results  (Last 48 hours) 10/30/20 0524  XR CHEST PORT Final result Impression:  IMPRESSION: No acute finding or significant cardiopulmonary change. Narrative:  EXAM: Portable CXR. 0448 hours. COMPARISON: 10/29/2020. INDICATION: postop heart FINDINGS:  
There is interval placement of a right IJ temporary unipolar pacemaker. TAVR  
remains. The lungs show no acute finding. Heart is normal in size. There is no  
overt pulmonary edema. There is no pneumothorax. 10/29/20 0526  XR CHEST PORT Final result Impression:  IMPRESSION:   
No acute disease. Shallow volumes and atelectasis. Narrative:  PORTABLE CHEST RADIOGRAPH/S: 10/29/2020 5:26 AM  
   
INDICATION: Postop heart. COMPARISON: 10/28/2020, 10/27/2020. TECHNIQUE: Portable frontal semiupright radiograph/s of the chest.  
   
FINDINGS:   
The lungs are hypoinflated. Left basilar atelectasis and pulmonary vascular  
crowding are associated. The left hemidiaphragm is eventrated or elevated. The  
central airways are patent. No pneumothorax and no large pleural effusion. Post  
CABG and transcatheter aortic valve replacement. Mitral annular calcifications  
are moderate. 10/28/20 1300  XR CHEST PORT Final result Impression:  IMPRESSION:  
1. Lines and tubes as detailed above small left pleural effusion Narrative:  INDICATION:  postop heart EXAM: Portable chest 1247 hours. Comparison 10/27/2020 FINDINGS: Endotracheal tube overlies the trachea at the level of the clavicles. There is a left neck central venous catheter. This appears to be a sheath with a  
secondary catheter projecting over the right atrium. Cardiac silhouette is not  
enlarged. Patient is post median sternotomy. Patient is post aortic stent  
grafting. Small left pleural effusion. Mild interstitial prominence unchanged  
with no pneumothorax. Admission Weight: Last Weight Weight: 205 lb 7.5 oz (93.2 kg) Weight: 208 lb 15.9 oz (94.8 kg) Intake / Output / Drain: 
Current Shift: 10/30 0701 - 10/30 1900 In: 175 [I.V.:175] Out: 75 [Urine:75] Last 24 hrs.:  
 
Intake/Output Summary (Last 24 hours) at 10/30/2020 1053 Last data filed at 10/30/2020 0900 Gross per 24 hour Intake 918.13 ml Output 850 ml Net 68.13 ml EXAM: 
General:    No acute distress. Lungs:   Decreased in the bases Groin Incisions:  No erythema, drainage or swelling. Ecchymotic Heart:  Regular rate and rhythm, S1, S2 normal, no murmur, click, rub or gallop. Abdomen:   Soft, non-tender. Bowel sounds normal. No masses,  No organomegaly. Extremities:  No edema. PPP. Neurologic:  Gross motor and sensory apparatus intact. Labs: Recent Labs 10/30/20 
0654 10/30/20 
0402  10/28/20 
1226 WBC  --  14.9*   < > 10.0 HGB  --  9.0*   < > 8.5* HCT  --  27.2*   < > 26.1*  
PLT  --  100*   < > 98* NA  --  139   < > 140 K  --  3.9   < > 3.7 BUN  --  16   < > 15 CREA  --  0.76   < > 0.75 GLU  --  160*   < > 107* GLUCPOC 149*  --    < >  --   
INR  --   --   --  1.3*  
 < > = values in this interval not displayed. Assessment:  
 
Principal Problem: Aortic stenosis (10/18/2020) Overview: Added automatically from request for surgery [id number] Active Problems: 
  Syncope and collapse (10/18/2020) Syncope (10/18/2020) (HFpEF) heart failure with preserved ejection fraction ([address] (10/20/2020) Plan/Recommendations/Medical Decision Makin. Aortic stenosis, severe and symptomatic (paradoxical LFLG) s/p TAVR:  mg only for AC. Echo completed yesterday and reviewed by Dr. Brayan Tesfaye. 
  
2.  CAD with Hx of CABG: On ASA, Statin. BB on hold due to LBBB and need for PPM need following procedure 10/28. Restart ARB when needed-BP borderline low at this point.  
  
3. HTN: Controlled on current medications.  
  
4. HLD: On Statin 
  
5. HFpEF: BP management 
  
6. Syncope: Likely related to AS. BICA of <50%.  
  
7. PAD: On ASA, Statin. 8. DM: On Metformin, Glipizide at home. Lantus/SSI while in the hospital. A1C of 7.1.  
  
9. MAT/Atrial fibrillation/Asystole: Intermittent Atrial fibrillation. Currently in NSR. Currently on . No OAC historically. Plan for PPM today. Temp pacing pads in place 
  
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 Continue to monitor 
  
11. UTI: On vancomycin completed 10/28 and ceftriaxone (completed). MRSA and enterobacter on culture. ID signed off. 
  
12. BPH: On Proscar 
  
13. Depression: on Zoloft 
  
Dispo: PT/OT/Cardiac Rehab-will await recommendations. Case Management for discharge planning. PPM today. Keep in CCU today due to need for temp pacing. Addendum: Spoke with Dr. Fernando Graves who added Metoprolol for HR control with MAT. Plan to keep in CCU overnight. Wean Adonay and Oxygen. Decrease ASA to 81mg daily. Signed By: Rustam Nickerson NP Saw patient, agree with above Risk of morbidity and mortality - high Medical decision making - high complexity 1. Aortic stenosis, severe and symptomatic (paradoxical LFLG) s/p TAVR:  mg only for AC. Echo completed yesterday and reviewed by Dr. Fernando Graves. 
  
2.  CAD with Hx of CABG: On ASA, Statin. BB on hold due to LBBB and need for PPM need following procedure 10/28. Restart ARB when needed-BP borderline low at this point.  
  
3. HTN: Controlled on current medications.  
  
4. HLD: On Statin 
  
5. HFpEF: BP management 
  
6. Syncope: Likely related to AS. BICA of <50%.  
  
7. PAD: On ASA, Statin. 8. DM: On Metformin, Glipizide at home. Lantus/SSI while in the hospital. A1C of 7.1.  
  
9. MAT/Atrial fibrillation/Asystole: Intermittent Atrial fibrillation. Currently in NSR. Currently on . No OAC historically. Plan for PPM today. Temp pacing pads in place 
  
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 Continue to monitor 
  
11. UTI: On vancomycin completed 10/28 and ceftriaxone (completed). MRSA and enterobacter on culture. ID signed off. 
  
12. BPH: On Proscar 
  
13. Depression: on Zoloft

## 2020-10-30 NOTE — PROGRESS NOTES
0730: Bedside shift change report given to MISAEL Zayas (oncoming nurse) by Ashtyn Sandoval RN (offgoing nurse). Report included the following information SBAR, Kardex, ED Summary, OR Summary, Procedure Summary, Intake/Output, MAR, Recent Results, Cardiac Rhythm A-Fib vs Paced vs 1 AVB vs BBBB and Dual Neuro Assessment. 0900: Dr Maegan Dyer and cardiac surgery team rounding @ the bedside. Team updated on overnight events, MD concerned about long wait for PM while being transcutaneously paced. Dr Morgan Castillo moved pt's case up earlier in the morning. 1000: TRANSFER - OUT REPORT: 
 
Verbal report given to CCL(name) on Moises Mercedes  being transferred to [facility](unit) for ordered procedure Report consisted of patients Situation, Background, Assessment and  
Recommendations(SBAR). Information from the following report(s) SBAR, Kardex, ED Summary, OR Summary, Intake/Output, MAR, Recent Results, Cardiac Rhythm TCP vs A-Fib vs 1AVB vs BBB and Dual Neuro Assessment was reviewed with the receiving nurse. Lines:  
Peripheral IV 10/21/20 Left Forearm (Active) Site Assessment Clean, dry, & intact 10/30/20 1600 Phlebitis Assessment 0 10/30/20 1600 Infiltration Assessment 0 10/30/20 1600 Dressing Status Clean, dry, & intact 10/30/20 1600 Dressing Type Transparent 10/30/20 1600 Hub Color/Line Status Pink 10/30/20 1600 Action Taken Open ports on tubing capped 10/30/20 1600 Alcohol Cap Used Yes 10/30/20 1600 Peripheral IV 10/28/20 Anterior;Left;Proximal Forearm (Active) Site Assessment Clean, dry, & intact 10/30/20 1600 Phlebitis Assessment 0 10/30/20 1600 Infiltration Assessment 0 10/30/20 1600 Dressing Status Clean, dry, & intact 10/30/20 1600 Dressing Type Transparent 10/30/20 1600 Hub Color/Line Status Pink 10/30/20 1600 Action Taken Open ports on tubing capped 10/30/20 1600 Alcohol Cap Used Yes 10/30/20 1600 Peripheral IV 10/29/20 Anterior;Right Forearm (Active) Site Assessment Clean, dry, & intact 10/30/20 1600 Phlebitis Assessment 0 10/30/20 1600 Infiltration Assessment 0 10/30/20 1600 Dressing Status Clean, dry, & intact 10/30/20 1600 Dressing Type Transparent 10/30/20 1600 Hub Color/Line Status Pink 10/30/20 1600 Action Taken Open ports on tubing capped 10/30/20 1600 Alcohol Cap Used Yes 10/30/20 1600 Arterial Line 10/30/20 Right Radial artery (Active) Site Assessment Clean, dry, & intact 10/30/20 1600 Dressing Status Clean, dry, & intact 10/30/20 1600 Dressing Type Disk with Chlorhexadine gluconate (CHG); Transparent 10/30/20 1600 Line Status Intact and in place 10/30/20 1600 Treatment Zeroed or re-zeroed 10/30/20 1600 Affected Extremity/Extremities Color distal to insertion site pink (or appropriate for race); Pulses palpable;Range of motion performed 10/30/20 1600 Opportunity for questions and clarification was provided. Patient transported with: 
 Monitor O2 @ NRB liters Registered Nurse 
 
1140: TRANSFER - IN REPORT: 
 
Verbal report received from CCL(name) on Dale Blow  being received from [facility](unit) for routine post - op Report consisted of patients Situation, Background, Assessment and  
Recommendations(SBAR). Information from the following report(s) SBAR, Kardex, Procedure Summary, Intake/Output, Recent Results, Cardiac Rhythm Paced/A-Fib and Dual Neuro Assessment was reviewed with the receiving nurse. Opportunity for questions and clarification was provided. Assessment completed upon patients arrival to unit and care assumed. 1500: Bedside shift change report given to Pa chun RN (oncoming nurse) by Candi Butts RN (offgoing nurse). Report included the following information SBAR, Kardex, ED Summary, OR Summary, Procedure Summary, Intake/Output, MAR, Recent Results, Cardiac Rhythm Paced and Dual Neuro Assessment.

## 2020-10-30 NOTE — CARDIO/PULMONARY
Cardiac Rehab: Trans-catheter education folder at the bedside. Met with Aparna Flower to review cardiac surgery post discharge instructions and to discuss participation in the Cardiac Rehab Program. 
 
Educated using teach back method. Reviewed daily weights and temperatures, signs and symptoms of infection at surgery sites,  and use of incentive spirometer. Encouraged Heart Healthy, Low Sodium (2000 mg) diet. Red reminder bracelet given. Discussed purpose of bracelet, duration of wear, and when to call surgeons office. Discussed Cardiac Rehab Program benefits, format, and encouraged participation. [facility] cardiac rehab is on AVS and referral was sent. General questions answered. Aparna Flower verbalized understanding.   
 
Will continue to follow for educational needs and enrollment in the Cardiac Rehab Program. Jamar Hays RN

## 2020-10-30 NOTE — PROGRESS NOTES
Problem: Diabetes Self-Management Goal: *Disease process and treatment process Description: Define diabetes and identify own type of diabetes; list 3 options for treating diabetes. Outcome: Progressing Towards Goal 
Goal: *Incorporating nutritional management into lifestyle Description: Describe effect of type, amount and timing of food on blood glucose; list 3 methods for planning meals. Outcome: Progressing Towards Goal 
Goal: *Incorporating physical activity into lifestyle Description: State effect of exercise on blood glucose levels. Outcome: Progressing Towards Goal 
Goal: *Developing strategies to promote health/change behavior Description: Define the ABC's of diabetes; identify appropriate screenings, schedule and personal plan for screenings. Outcome: Progressing Towards Goal 
Goal: *Using medications safely Description: State effect of diabetes medications on diabetes; name diabetes medication taking, action and side effects. Outcome: Progressing Towards Goal 
Goal: *Monitoring blood glucose, interpreting and using results Description: Identify recommended blood glucose targets  and personal targets. Outcome: Progressing Towards Goal 
Goal: *Prevention, detection, treatment of acute complications Description: List symptoms of hyper- and hypoglycemia; describe how to treat low blood sugar and actions for lowering  high blood glucose level. Outcome: Progressing Towards Goal 
Goal: *Prevention, detection and treatment of chronic complications Description: Define the natural course of diabetes and describe the relationship of blood glucose levels to long term complications of diabetes. Outcome: Progressing Towards Goal 
Goal: *Developing strategies to address psychosocial issues Description: Describe feelings about living with diabetes; identify support needed and support network Outcome: Progressing Towards Goal 
Goal: *Insulin pump training Outcome: Progressing Towards Goal 
 Goal: *Sick day guidelines Outcome: Progressing Towards Goal 
Goal: *Patient Specific Goal (EDIT GOAL, INSERT TEXT) Outcome: Progressing Towards Goal 
  
Problem: Patient Education: Go to Patient Education Activity Goal: Patient/Family Education Outcome: Progressing Towards Goal 
  
Problem: Falls - Risk of 
Goal: *Absence of Falls Description: Document Ancelmo Garza Fall Risk and appropriate interventions in the flowsheet. Outcome: Progressing Towards Goal 
Note: Fall Risk Interventions: 
Mobility Interventions: Communicate number of staff needed for ambulation/transfer, Patient to call before getting OOB, PT Consult for mobility concerns, PT Consult for assist device competence, Strengthening exercises (ROM-active/passive), Assess mobility with egress test, Bed/chair exit alarm, OT consult for ADLs Mentation Interventions: Adequate sleep, hydration, pain control, Door open when patient unattended, Evaluate medications/consider consulting pharmacy, Increase mobility, More frequent rounding, Reorient patient, Toileting rounds Medication Interventions: Evaluate medications/consider consulting pharmacy, Patient to call before getting OOB, Teach patient to arise slowly Elimination Interventions: Call light in reach, Bed/chair exit alarm, Patient to call for help with toileting needs, Toileting schedule/hourly rounds, Urinal in reach History of Falls Interventions: Bed/chair exit alarm, Consult care management for discharge planning, Door open when patient unattended, Evaluate medications/consider consulting pharmacy, Assess for delayed presentation/identification of injury for 48 hrs (comment for end date) Problem: Patient Education: Go to Patient Education Activity Goal: Patient/Family Education Outcome: Progressing Towards Goal 
  
Problem: General Medical Care Plan Goal: *Vital signs within specified parameters Outcome: Progressing Towards Goal 
Goal: *Absence of infection signs and symptoms Outcome: Progressing Towards Goal 
Goal: *Optimal pain control at patient's stated goal 
Outcome: Progressing Towards Goal 
Goal: *Skin integrity maintained Outcome: Progressing Towards Goal 
  
Problem: Patient Education: Go to Patient Education Activity Goal: Patient/Family Education Outcome: Progressing Towards Goal 
  
Problem: Cath Lab Procedures: Discharge Outcomes Goal: *Stable cardiac rhythm Outcome: Progressing Towards Goal 
Goal: *Hemodynamically stable Outcome: Progressing Towards Goal 
Goal: *Optimal pain control at patient's stated goal 
Outcome: Progressing Towards Goal 
Goal: *Pulses palpable, skin color within defined limits, skin temperature warm Outcome: Progressing Towards Goal 
Goal: *Lungs clear or at baseline Outcome: Progressing Towards Goal 
Goal: *Demonstrates ability to perform prescribed activity without shortness of breath or discomfort Outcome: Progressing Towards Goal 
Goal: *Verbalizes home exercise program, activity guidelines, cardiac precautions Outcome: Progressing Towards Goal 
Goal: *Verbalizes understanding and describes prescribed diet Outcome: Progressing Towards Goal 
Goal: *Verbalizes understanding and describes medication purposes and frequencies Outcome: Progressing Towards Goal 
Goal: *Identifies cardiac risk factors Outcome: Progressing Towards Goal 
Goal: *No signs and symptoms of infection or wound complications Outcome: Progressing Towards Goal 
Goal: *Anxiety reduced or absent Outcome: Progressing Towards Goal 
Goal: *Verbalizes and demonstrates incision care Outcome: Progressing Towards Goal 
Goal: *Understands and describes signs and symptoms to report to providers(Stroke Metric) Outcome: Progressing Towards Goal 
Goal: *Describes follow-up/return visits to physicians Outcome: Progressing Towards Goal 
Goal: *Describes available resources and support systems Outcome: Progressing Towards Goal 
Goal: *Influenza immunization Outcome: Progressing Towards Goal 
Goal: *Pneumococcal immunization Outcome: Progressing Towards Goal 
  
Problem: Risk for Spread of Infection Goal: Prevent transmission of infectious organism to others Description: Prevent the transmission of infectious organisms to other patients, staff members, and visitors. Outcome: Progressing Towards Goal 
  
Problem: Patient Education:  Go to Education Activity Goal: Patient/Family Education Outcome: Progressing Towards Goal 
  
Problem: Syncope Goal: *Absence of injury Outcome: Progressing Towards Goal 
Goal: Decrease or eliminate episodes of syncope Outcome: Progressing Towards Goal 
  
Problem: Patient Education: Go to Patient Education Activity Goal: Patient/Family Education Outcome: Progressing Towards Goal 
  
Problem: Pressure Injury - Risk of 
Goal: *Prevention of pressure injury Description: Document Justin Scale and appropriate interventions in the flowsheet. Outcome: Progressing Towards Goal 
  
Problem: Patient Education: Go to Patient Education Activity Goal: Patient/Family Education Outcome: Progressing Towards Goal 
  
Problem: Patient Education: Go to Patient Education Activity Goal: Patient/Family Education Outcome: Progressing Towards Goal 
  
Problem: Post-procedure Day 1,TAVR Goal: *Stable Cardiac Rhythm Outcome: Progressing Towards Goal 
Goal: *Hemodynamically stable Outcome: Progressing Towards Goal 
Goal: *Optimal pain control at patient's stated goal 
Outcome: Progressing Towards Goal 
Goal: *Lungs clear or at baseline Outcome: Progressing Towards Goal 
Goal: *Demonstrates progressive activity Outcome: Progressing Towards Goal 
Goal: *No signs of infection or puncture site complication Outcome: Progressing Towards Goal 
Goal: *Verbalizes and demonstrates puncture site care Outcome: Progressing Towards Goal 
Goal: Activity/Safety Outcome: Progressing Towards Goal 
Goal: Consults Outcome: Progressing Towards Goal 
 Goal: Diagnostic Tests/Procedures Outcome: Progressing Towards Goal 
Goal: Nutrition/Diet Outcome: Progressing Towards Goal 
Goal: Discharge Planning Outcome: Progressing Towards Goal 
Goal: Medications Outcome: Progressing Towards Goal 
Goal: Respiratory Outcome: Progressing Towards Goal 
Goal: Treatments/Interventions/Procedures Outcome: Progressing Towards Goal 
Goal: Psychosocial Needs Outcome: Progressing Towards Goal 
  
Problem: Post-procedure,Day 2/Day of Discharge,TAVR Goal: *Stable Cardiac Rhythm Outcome: Progressing Towards Goal 
Goal: *Hemodynamically stable Outcome: Progressing Towards Goal 
Goal: *Optimal pain control at patient's stated goal 
Outcome: Progressing Towards Goal 
Goal: *Lungs clear or at baseline Outcome: Progressing Towards Goal 
Goal: *Demonstrates ability to perform prescribed activity without shortness of breath or discomfort Outcome: Progressing Towards Goal 
Goal: [facility] Activity Guidelines Outcome: Progressing Towards Goal 
Goal: *No signs of infection or puncture site complication Outcome: Progressing Towards Goal 
Goal: *Verbalizes and demonstrates puncture site care Outcome: Progressing Towards Goal 
Goal: *Verbalizes understanding and describes prescribed diet Outcome: Progressing Towards Goal 
Goal: *Verbalizes understanding and describes medication purposes and frequencies Outcome: Progressing Towards Goal 
Goal: *Anxiety reduced or absent Outcome: Progressing Towards Goal 
Goal: *Understands and describes signs and symptoms to report to providers Outcome: Progressing Towards Goal 
Goal: *Describes follow-up/return visits to physicians Outcome: Progressing Towards Goal 
Goal: *Describes available resources and support systems Outcome: Progressing Towards Goal 
Goal: *Influenza immunization Outcome: Progressing Towards Goal 
Goal: *Pneumococcal immunization Outcome: Progressing Towards Goal 
 Goal: *Identifies and verbalizes understanding of the valve ID card, antibiotic card and Red reminder bracelet Outcome: Progressing Towards Goal 
Goal: Activity/Safety Outcome: Progressing Towards Goal 
Goal: Consults Outcome: Progressing Towards Goal 
Goal: Diagnostic Tests/Procedures Outcome: Progressing Towards Goal 
Goal: Nutrition/Diet Outcome: Progressing Towards Goal 
Goal: Discharge Planning Outcome: Progressing Towards Goal 
Goal: Medications Outcome: Progressing Towards Goal 
Goal: Respiratory Outcome: Progressing Towards Goal 
Goal: Treatments/Interventions/Procedures Outcome: Progressing Towards Goal 
Goal: Psychosocial Needs Outcome: Progressing Towards Goal 
  
Problem: Pain Goal: *Control of Pain Outcome: Progressing Towards Goal 
Goal: *PALLIATIVE CARE:  Alleviation of Pain Outcome: Progressing Towards Goal 
  
Problem: Patient Education: Go to Patient Education Activity Goal: Patient/Family Education Outcome: Progressing Towards Goal 
  
Problem: Patient Education: Go to Patient Education Activity Goal: Patient/Family Education Outcome: Progressing Towards Goal 
  
Problem: Heart Failure: Day 3 Goal: Off Pathway (Use only if patient is Off Pathway) Outcome: Progressing Towards Goal 
Goal: Activity/Safety Outcome: Progressing Towards Goal 
Goal: Diagnostic Test/Procedures Outcome: Progressing Towards Goal 
Goal: Nutrition/Diet Outcome: Progressing Towards Goal 
Goal: Discharge Planning Outcome: Progressing Towards Goal 
Goal: Medications Outcome: Progressing Towards Goal 
Goal: Respiratory Outcome: Progressing Towards Goal 
Goal: Treatments/Interventions/Procedures Outcome: Progressing Towards Goal 
Goal: Psychosocial 
Outcome: Progressing Towards Goal 
Goal: *Oxygen saturation within defined limits Outcome: Progressing Towards Goal 
Goal: *Hemodynamically stable Outcome: Progressing Towards Goal 
Goal: *Optimal pain control at patient's stated goal 
 Outcome: Progressing Towards Goal 
Goal: *Anxiety reduced or absent Outcome: Progressing Towards Goal 
Goal: *Demonstrates progressive activity Outcome: Progressing Towards Goal 
  
Problem: Heart Failure: Day 4 Goal: Off Pathway (Use only if patient is Off Pathway) Outcome: Progressing Towards Goal 
Goal: Activity/Safety Outcome: Progressing Towards Goal 
Goal: Diagnostic Test/Procedures Outcome: Progressing Towards Goal 
Goal: Nutrition/Diet Outcome: Progressing Towards Goal 
Goal: Discharge Planning Outcome: Progressing Towards Goal 
Goal: Medications Outcome: Progressing Towards Goal 
Goal: Respiratory Outcome: Progressing Towards Goal 
Goal: Treatments/Interventions/Procedures Outcome: Progressing Towards Goal 
Goal: Psychosocial 
Outcome: Progressing Towards Goal 
Goal: *Oxygen saturation within defined limits Outcome: Progressing Towards Goal 
Goal: *Hemodynamically stable Outcome: Progressing Towards Goal 
Goal: *Optimal pain control at patient's stated goal 
Outcome: Progressing Towards Goal 
Goal: *Anxiety reduced or absent Outcome: Progressing Towards Goal 
Goal: *Demonstrates progressive activity Outcome: Progressing Towards Goal 
  
Problem: Heart Failure: Day 5 Goal: Off Pathway (Use only if patient is Off Pathway) Outcome: Progressing Towards Goal 
Goal: Activity/Safety Outcome: Progressing Towards Goal 
Goal: Diagnostic Test/Procedures Outcome: Progressing Towards Goal 
Goal: Nutrition/Diet Outcome: Progressing Towards Goal 
Goal: Discharge Planning Outcome: Progressing Towards Goal 
Goal: Medications Outcome: Progressing Towards Goal 
Goal: Respiratory Outcome: Progressing Towards Goal 
Goal: Treatments/Interventions/Procedures Outcome: Progressing Towards Goal 
Goal: Psychosocial 
Outcome: Progressing Towards Goal 
  
Problem: Heart Failure: Discharge Outcomes Goal: *Demonstrates ability to perform prescribed activity without shortness of breath or discomfort Outcome: Progressing Towards Goal 
Goal: *Left ventricular function assessment completed prior to or during stay, or planned for post-discharge Outcome: Progressing Towards Goal 
Goal: *ACEI prescribed if LVEF less than 40% and no contraindications or ARB prescribed Outcome: Progressing Towards Goal 
Goal: *Verbalizes understanding and describes prescribed diet Outcome: Progressing Towards Goal 
Goal: *Verbalizes understanding/describes prescribed medications Outcome: Progressing Towards Goal 
Goal: *Describes available resources and support systems Description: (eg: Home Health, Palliative Care, [employer]) Outcome: Progressing Towards Goal 
Goal: *Describes smoking cessation resources Outcome: Progressing Towards Goal 
Goal: *Understands and describes signs and symptoms to report to providers(Stroke Metric) Outcome: Progressing Towards Goal 
Goal: *Describes/verbalizes understanding of follow-up/return appt Description: (eg: to physicians, diabetes treatment coordinator, and other resources Outcome: Progressing Towards Goal 
Goal: *Describes importance of continuing daily weights and changes to report to physician Outcome: Progressing Towards Goal 
  
Problem: Infection - Risk of, Central Venous Catheter-Associated Bloodstream Infection Goal: *Absence of infection signs and symptoms Outcome: Progressing Towards Goal 
  
Problem: Patient Education: Go to Patient Education Activity Goal: Patient/Family Education Outcome: Progressing Towards Goal 
  
Problem: Patient Education: Go to Patient Education Activity Goal: Patient/Family Education Outcome: Progressing Towards Goal 
  
Problem: Patient Education: Go to Patient Education Activity Goal: Patient/Family Education Outcome: Progressing Towards Goal 
  
Problem: Patient Education: Go to Patient Education Activity Goal: Patient/Family Education Outcome: Progressing Towards Goal 
  
 Problem: Patient Education: Go to Patient Education Activity Goal: Patient/Family Education Outcome: Progressing Towards Goal 
  
Problem: Pacer/ICD: Pre-Procedure Goal: Off Pathway (Use only if patient is Off Pathway) Outcome: Progressing Towards Goal 
Goal: Activity/Safety Outcome: Progressing Towards Goal 
Goal: Consults, if ordered Outcome: Progressing Towards Goal 
Goal: Diagnostic Test/Procedures Outcome: Progressing Towards Goal 
Goal: Nutrition/Diet Outcome: Progressing Towards Goal 
Goal: Discharge Planning Outcome: Progressing Towards Goal 
Goal: Medications Outcome: Progressing Towards Goal 
Goal: Respiratory Outcome: Progressing Towards Goal 
Goal: Treatments/Interventions/Procedures Outcome: Progressing Towards Goal 
Goal: Psychosocial 
Outcome: Progressing Towards Goal 
Goal: *Verbalize description of procedure Outcome: Progressing Towards Goal 
Goal: *Consent signed Outcome: Progressing Towards Goal 
  
Problem: Pacer/ICD: Post-Procedure Goal: Off Pathway (Use only if patient is Off Pathway) Outcome: Progressing Towards Goal 
Goal: Activity/Safety Outcome: Progressing Towards Goal 
Goal: Consults, if ordered Outcome: Progressing Towards Goal 
Goal: Diagnostic Test/Procedures Outcome: Progressing Towards Goal 
Goal: Nutrition/Diet Outcome: Progressing Towards Goal 
Goal: Discharge Planning Outcome: Progressing Towards Goal 
Goal: Medications Outcome: Progressing Towards Goal 
Goal: Respiratory Outcome: Progressing Towards Goal 
Goal: Treatments/Interventions/Procedures Outcome: Progressing Towards Goal 
Goal: Psychosocial 
Outcome: Progressing Towards Goal 
Goal: *Hemodynamically stable Outcome: Progressing Towards Goal 
Goal: *Optimal pain control at patient's stated goal 
Outcome: Progressing Towards Goal 
Goal: *Absence of signs and symptoms of infection or wound complication Outcome: Progressing Towards Goal 
  
Problem: Pacer/ICD: Day 1 
 Goal: Off Pathway (Use only if patient is Off Pathway) Outcome: Progressing Towards Goal 
Goal: Activity/Safety Outcome: Progressing Towards Goal 
Goal: Diagnostic Test/Procedures Outcome: Progressing Towards Goal 
Goal: Nutrition/Diet Outcome: Progressing Towards Goal 
Goal: Discharge Planning Outcome: Progressing Towards Goal 
Goal: Medications Outcome: Progressing Towards Goal 
Goal: Respiratory Outcome: Progressing Towards Goal 
Goal: Treatments/Interventions/Procedures Outcome: Progressing Towards Goal 
Goal: Psychosocial 
Outcome: Progressing Towards Goal 
  
Problem: Pacer/ICD: Discharge Outcomes Goal: *Hemodynamically stable Outcome: Progressing Towards Goal 
Goal: *Stable cardiac rhythm Outcome: Progressing Towards Goal 
Goal: *Lungs clear or at baseline Outcome: Progressing Towards Goal 
Goal: *Demonstrates ability to perform prescribed activity without shortness of breath or discomfort Outcome: Progressing Towards Goal 
Goal: *Verbalizes home exercise program, activity guidelines, cardiac precautions Outcome: Progressing Towards Goal 
Goal: *Verbalizes understanding and describes prescribed diet Outcome: Progressing Towards Goal 
Goal: *Verbalizes understanding and describes medication purposes and frequencies Outcome: Progressing Towards Goal 
Goal: *Identifies cardiac risk factors Outcome: Progressing Towards Goal 
Goal: *No signs and symptoms of infection or wound complications Outcome: Progressing Towards Goal 
Goal: *Anxiety reduced or absent Outcome: Progressing Towards Goal 
Goal: *Understands and describes signs and symptoms to report to providers(Stroke Metric) Outcome: Progressing Towards Goal 
Goal: *Describes follow-up/return visits to physicians Outcome: Progressing Towards Goal 
Goal: *Describes available resources and support systems Outcome: Progressing Towards Goal 
Goal: *Influenza immunization Outcome: Progressing Towards Goal 
Goal: *Pneumococcal immunization Outcome: Progressing Towards Goal 
Goal: *Optimal pain control at patient's stated goal 
Outcome: Progressing Towards Goal

## 2020-10-30 NOTE — PROGRESS NOTES
1500[facility]  
 
[facility]: Pt increased HR 130s, afib, with increased WOB. Notified Dr. Daron Laguna, orders received for amio bolus and amio gtt. 1930: Bedside shift change report given to Soniya Fritz RN (oncoming nurse) by Siena Maguire RN (offgoing nurse). Report included the following information SBAR, Kardex, Procedure Summary, Intake/Output, MAR and Cardiac Rhythm Afib/paced.

## 2020-10-30 NOTE — PROCEDURES
Cardiac Procedure Note Patient: Nerissa Frye  MRN: [id number]  SSN: [id number] YOB: 1935 Age: 82 y.o. 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 Moira Cook MD 
 
Assistant(s):  None Anesthesia: Moderate Sedation Estimated Blood Loss: Less than 10 mL Specimens Removed: None Findings: RAA lead RV mid septal lead Complications: None Implants: dual chamber [employer] pacemaker Signed by:  Moira Cook MD  10/30/2020  11:59 AM

## 2020-10-31 NOTE — OP NOTES
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   
  
    
  
 
[]Hide copied text []Hover for details PROCEDURALISTS:?  
Surgeon 1. Marisol Ohara MD 
Co-Surgeon 1. [facility], MD? 
 
  
PROCEDURES: 
1. Angiography of the ascending aorta and aortoiliac bifurcation 2. Temporary transvenous pacemaker insertion 3. Left heart catheterization 4. Implantation of a 24 mm Medtronic Evolut R transcatheter heart aortic valve via the left transfemoral approach 5. Status post intravascular lithotripsy with shockwave balloon followed by PTA involving left common iliac artery and left external iliac artery. 6.  16F left femoral artery access with Perclose pre-closure / 6F left femoral vein access with manual compression / 6F left femoral artery access with Perclose closure 7. Temporary transvenous pacemaker placement from left internal jugular approach. 
  
INDICATIONS:?Akash Venegas is a 82 y.o. ?old male  with severe symptomatic aortic stenosis, NYHA class II symptom status. ? He is referred for transfemoral TAVR procedure. He was deemed as high risk for SAVR. 
  
PRE-OP DIAGNOSIS: 
1. Severe, symptomatic aortic stenosis (NYHA, Class III) 2. Severe three-vessel coronary artery disease 3. No cardiomyopathy 4. Atrial fibrillation 5. Multifocal atrial tachycardia 6. Peripheral vascular disease 7. Status post bilateral iliac angioplasty and stenting 8. Status post left subclavian stent 9.  Moderate mitral regurgitation and mild mitral stenosis 
  
POST-OP DIAGNOSIS: 
1. Status post successful implantation of a 24 mm Medtronic Evolut R aortic valve via the left transfemoral approach 2. Post operative conduction block in the form of left bundle branch block requiring placement of temporary pacemaker 
  
PROCEDURAL DETAILS: The risks (death, myocardial infarction, stroke, bleeding, vascular complications, renal dysfunction, allergic reactions, and other), benefits, and alternatives were explained and discussed. Signed, informed consent was obtained. The patient was placed on the table and prepped and draped in the usual fashion. 
  
ARTERIAL ACCESS:?  
- Right: Right femoral artery access was obtained using ultrasound and a micropuncture needle and sheath system. ? Angiography confirmed adequate position within the mid right CFA without significant angiographic stenosis or irregularity. Pre-close technique was performed using two orthogonally positioned Perclose closure devices. ? A 6F sheath was placed before it was later up-sized to the large bore TAVR sheath. ? Post-procedure, the sheath was removed and the arteriotomy was closed using the pre-close technique. Final hemostasis was excellent. - Left: A 6 [place] sheath was placed in the left common femoral artery without difficulty. ? Post-procedure, the sheath was removed and hemostasis was achieved with a Perclose closure device. - A 6F sheath was placed in the left common femoral vein without difficulty. Post-procedure the sheath was removed and manual compression was applied to achieve hemostasis. 
  
PROCEDURAL MEDICATIONS:?  
Monitored anaesthesia care (MAC) with conscious sedation. Please see Anesthesia record for full details. during later part of the case to get better imaging with transesophageal echocardiogram patient was placed under general anesthesia and intubated.   Local anesthesia - 1% lidocaine was administered to the bilateral groins. ??  
  
FINDINGS: 
ANGIOGRAPHY OF THE ASCENDING AORTA:? There is significant calcification of the aortic valve and annulus. ? Appropriate co-planar views were identified for valve deployment. Post-deployment, the transcatheter valve appeared well positioned with appropriate function and only trace to mild para-valvular aortic regurgitation. ? Coronary arteries remained patent without obstruction. ? AORTOILIAC ANGIOGRAPHY:?The abdominal aorta and bilateral iliac arteries are  patent without evidence of any dissection. ? There is no evidence of dissection, perforation or other complications. ?Right common iliac artery stent had 60 to 70% stenosis. Left common and external iliac artery stents had underexpansion with 50% stenosis which after treatment with intravascular lithotripsy balloon an 8 mm balloon reduced to less than 20% stenosis 
  INTERVENTION: 
-Patient was known to have underexpanded common iliac and external iliac stent with calcium surrounding the stents with small left diameter of 4.8 mm by CT scan. Hence to allow delivery of transcatheter heart valve from normal axis, intravascular lithotripsy was performed with 7 mm shockwave balloon followed by postdilatation with 8 mm balloon. Following this with significant difficulty 16 [place] sheath as well as transcatheter valve could be delivered into the aorta across stents. - A temporary transvenous pacemaker wire was inserted via the left common femoral vein access and positioned in the RV apical position. ? Appropriate position and function were confirmed. Rapid ventricular pacing (160 BPM) was performed in concert with the valve implantations procedures. - Left heart catheterization: aortic valve crossed with a 0.035 inch straight wire and this wire was exchanged out for an Confida wire. Systemic heparin was administered to maintain an ACT between 250-300 seconds. - BAV was performed using 24 mm true balloon in conjunction with rapid pacing prior to transcatheter aortic valve deployment. - Transaortic valve implantation: Rapid pacing performed in coordination with deployment of a transcatheter valve. Initial post deployment imaging as well as hemodynamics were consistent with moderate to severe paravalvular leak. Hence multiple post dilatations were performed using 28 mm true balloon for the LVOT portion of the valve and 28 mm Z-Med balloon at 2 ruben for the central portion of the valve. Following this the diastolic pressures improved along with overall hemodynamics. Final ? Post-deployment angiography and transthoracic echocardiography confirmed appropriate position and function, there was mild to moderate para-valvular regurgitation. LV function was preserved. ? There was no pericardial effusion. ?  
- The temporary pacemaker was removed from femoral approach in place from left intrajugular approach due to development of left bundle branch block post valve deployment. 
  
CONTRAST VOLUME:?120 mL Omnipaque BLOOD LOSS:Minimal 
COMPLICATIONS:?None SPECIMENS:?None 
  
RECOMMENDATIONS 
- Admit to CVICU - Aspirin and OAC 
- Echocardiogram, labs and ECG in AM 
- Early ambulation following extubation

## 2020-10-31 NOTE — PROGRESS NOTES
Problem: Diabetes Self-Management Goal: *Disease process and treatment process Description: Define diabetes and identify own type of diabetes; list 3 options for treating diabetes. Outcome: Progressing Towards Goal 
Goal: *Incorporating nutritional management into lifestyle Description: Describe effect of type, amount and timing of food on blood glucose; list 3 methods for planning meals. Outcome: Progressing Towards Goal 
Goal: *Incorporating physical activity into lifestyle Description: State effect of exercise on blood glucose levels. Outcome: Progressing Towards Goal 
Goal: *Developing strategies to promote health/change behavior Description: Define the ABC's of diabetes; identify appropriate screenings, schedule and personal plan for screenings. Outcome: Progressing Towards Goal 
Goal: *Using medications safely Description: State effect of diabetes medications on diabetes; name diabetes medication taking, action and side effects. Outcome: Progressing Towards Goal 
Goal: *Monitoring blood glucose, interpreting and using results Description: Identify recommended blood glucose targets  and personal targets. Outcome: Progressing Towards Goal 
Goal: *Prevention, detection, treatment of acute complications Description: List symptoms of hyper- and hypoglycemia; describe how to treat low blood sugar and actions for lowering  high blood glucose level. Outcome: Progressing Towards Goal 
Goal: *Prevention, detection and treatment of chronic complications Description: Define the natural course of diabetes and describe the relationship of blood glucose levels to long term complications of diabetes. Outcome: Progressing Towards Goal 
Goal: *Developing strategies to address psychosocial issues Description: Describe feelings about living with diabetes; identify support needed and support network Outcome: Progressing Towards Goal 
Goal: *Insulin pump training Outcome: Progressing Towards Goal 
 Goal: *Sick day guidelines Outcome: Progressing Towards Goal 
Goal: *Patient Specific Goal (EDIT GOAL, INSERT TEXT) Outcome: Progressing Towards Goal 
  
Problem: Patient Education: Go to Patient Education Activity Goal: Patient/Family Education Outcome: Progressing Towards Goal 
  
Problem: Falls - Risk of 
Goal: *Absence of Falls Description: Document Loreta Mcgovern Fall Risk and appropriate interventions in the flowsheet. Outcome: Progressing Towards Goal 
Note: Fall Risk Interventions: 
Mobility Interventions: Assess mobility with egress test, Bed/chair exit alarm, Communicate number of staff needed for ambulation/transfer, Patient to call before getting OOB, Strengthening exercises (ROM-active/passive) Mentation Interventions: Adequate sleep, hydration, pain control, Bed/chair exit alarm, Door open when patient unattended, Evaluate medications/consider consulting pharmacy, Increase mobility, Reorient patient, More frequent rounding Medication Interventions: Evaluate medications/consider consulting pharmacy, Bed/chair exit alarm, Patient to call before getting OOB, Teach patient to arise slowly Elimination Interventions: Bed/chair exit alarm, Call light in reach, Patient to call for help with toileting needs, Toileting schedule/hourly rounds, Urinal in reach History of Falls Interventions: Bed/chair exit alarm, Consult care management for discharge planning, Door open when patient unattended, Evaluate medications/consider consulting pharmacy, Room close to nurse's station, Assess for delayed presentation/identification of injury for 48 hrs (comment for end date) Problem: Patient Education: Go to Patient Education Activity Goal: Patient/Family Education Outcome: Progressing Towards Goal 
  
Problem: General Medical Care Plan Goal: *Vital signs within specified parameters Outcome: Progressing Towards Goal 
Goal: *Absence of infection signs and symptoms Outcome: Progressing Towards Goal 
 Goal: *Optimal pain control at patient's stated goal 
Outcome: Progressing Towards Goal 
Goal: *Skin integrity maintained Outcome: Progressing Towards Goal 
  
Problem: Patient Education: Go to Patient Education Activity Goal: Patient/Family Education Outcome: Progressing Towards Goal 
  
Problem: Cath Lab Procedures: Discharge Outcomes Goal: *Stable cardiac rhythm Outcome: Progressing Towards Goal 
Goal: *Hemodynamically stable Outcome: Progressing Towards Goal 
Goal: *Optimal pain control at patient's stated goal 
Outcome: Progressing Towards Goal 
Goal: *Pulses palpable, skin color within defined limits, skin temperature warm Outcome: Progressing Towards Goal 
Goal: *Lungs clear or at baseline Outcome: Progressing Towards Goal 
Goal: *Demonstrates ability to perform prescribed activity without shortness of breath or discomfort Outcome: Progressing Towards Goal 
Goal: *Verbalizes home exercise program, activity guidelines, cardiac precautions Outcome: Progressing Towards Goal 
Goal: *Verbalizes understanding and describes prescribed diet Outcome: Progressing Towards Goal 
Goal: *Verbalizes understanding and describes medication purposes and frequencies Outcome: Progressing Towards Goal 
Goal: *Identifies cardiac risk factors Outcome: Progressing Towards Goal 
Goal: *No signs and symptoms of infection or wound complications Outcome: Progressing Towards Goal 
Goal: *Anxiety reduced or absent Outcome: Progressing Towards Goal 
Goal: *Verbalizes and demonstrates incision care Outcome: Progressing Towards Goal 
Goal: *Understands and describes signs and symptoms to report to providers(Stroke Metric) Outcome: Progressing Towards Goal 
Goal: *Describes follow-up/return visits to physicians Outcome: Progressing Towards Goal 
Goal: *Describes available resources and support systems Outcome: Progressing Towards Goal 
Goal: *Influenza immunization Outcome: Progressing Towards Goal 
 Goal: *Pneumococcal immunization Outcome: Progressing Towards Goal 
  
Problem: Risk for Spread of Infection Goal: Prevent transmission of infectious organism to others Description: Prevent the transmission of infectious organisms to other patients, staff members, and visitors. Outcome: Progressing Towards Goal 
  
Problem: Patient Education:  Go to Education Activity Goal: Patient/Family Education Outcome: Progressing Towards Goal 
  
Problem: Syncope Goal: *Absence of injury Outcome: Progressing Towards Goal 
Goal: Decrease or eliminate episodes of syncope Outcome: Progressing Towards Goal 
  
Problem: Patient Education: Go to Patient Education Activity Goal: Patient/Family Education Outcome: Progressing Towards Goal 
  
Problem: Pressure Injury - Risk of 
Goal: *Prevention of pressure injury Description: Document Justin Scale and appropriate interventions in the flowsheet. Outcome: Progressing Towards Goal 
  
Problem: Patient Education: Go to Patient Education Activity Goal: Patient/Family Education Outcome: Progressing Towards Goal 
  
Problem: Patient Education: Go to Patient Education Activity Goal: Patient/Family Education Outcome: Progressing Towards Goal 
  
Problem: Post-procedure Day 1,TAVR Goal: *Stable Cardiac Rhythm Outcome: Progressing Towards Goal 
Goal: *Hemodynamically stable Outcome: Progressing Towards Goal 
Goal: *Optimal pain control at patient's stated goal 
Outcome: Progressing Towards Goal 
Goal: *Lungs clear or at baseline Outcome: Progressing Towards Goal 
Goal: *Demonstrates progressive activity Outcome: Progressing Towards Goal 
Goal: *No signs of infection or puncture site complication Outcome: Progressing Towards Goal 
Goal: *Verbalizes and demonstrates puncture site care Outcome: Progressing Towards Goal 
Goal: Activity/Safety Outcome: Progressing Towards Goal 
Goal: Consults Outcome: Progressing Towards Goal 
Goal: Diagnostic Tests/Procedures Outcome: Progressing Towards Goal 
Goal: Nutrition/Diet Outcome: Progressing Towards Goal 
Goal: Discharge Planning Outcome: Progressing Towards Goal 
Goal: Medications Outcome: Progressing Towards Goal 
Goal: Respiratory Outcome: Progressing Towards Goal 
Goal: Treatments/Interventions/Procedures Outcome: Progressing Towards Goal 
Goal: Psychosocial Needs Outcome: Progressing Towards Goal 
  
Problem: Post-procedure,Day 2/Day of Discharge,TAVR Goal: *Stable Cardiac Rhythm Outcome: Progressing Towards Goal 
Goal: *Hemodynamically stable Outcome: Progressing Towards Goal 
Goal: *Optimal pain control at patient's stated goal 
Outcome: Progressing Towards Goal 
Goal: *Lungs clear or at baseline Outcome: Progressing Towards Goal 
Goal: *Demonstrates ability to perform prescribed activity without shortness of breath or discomfort Outcome: Progressing Towards Goal 
Goal: [facility] Activity Guidelines Outcome: Progressing Towards Goal 
Goal: *No signs of infection or puncture site complication Outcome: Progressing Towards Goal 
Goal: *Verbalizes and demonstrates puncture site care Outcome: Progressing Towards Goal 
Goal: *Verbalizes understanding and describes prescribed diet Outcome: Progressing Towards Goal 
Goal: *Verbalizes understanding and describes medication purposes and frequencies Outcome: Progressing Towards Goal 
Goal: *Anxiety reduced or absent Outcome: Progressing Towards Goal 
Goal: *Understands and describes signs and symptoms to report to providers Outcome: Progressing Towards Goal 
Goal: *Describes follow-up/return visits to physicians Outcome: Progressing Towards Goal 
Goal: *Describes available resources and support systems Outcome: Progressing Towards Goal 
Goal: *Influenza immunization Outcome: Progressing Towards Goal 
Goal: *Pneumococcal immunization Outcome: Progressing Towards Goal 
Goal: *Identifies and verbalizes understanding of the valve ID card, antibiotic card and Red reminder bracelet Outcome: Progressing Towards Goal 
Goal: Activity/Safety Outcome: Progressing Towards Goal 
Goal: Consults Outcome: Progressing Towards Goal 
Goal: Diagnostic Tests/Procedures Outcome: Progressing Towards Goal 
Goal: Nutrition/Diet Outcome: Progressing Towards Goal 
Goal: Discharge Planning Outcome: Progressing Towards Goal 
Goal: Medications Outcome: Progressing Towards Goal 
Goal: Respiratory Outcome: Progressing Towards Goal 
Goal: Treatments/Interventions/Procedures Outcome: Progressing Towards Goal 
Goal: Psychosocial Needs Outcome: Progressing Towards Goal 
  
Problem: Pain Goal: *Control of Pain Outcome: Progressing Towards Goal 
Goal: *PALLIATIVE CARE:  Alleviation of Pain Outcome: Progressing Towards Goal 
  
Problem: Patient Education: Go to Patient Education Activity Goal: Patient/Family Education Outcome: Progressing Towards Goal 
  
Problem: Patient Education: Go to Patient Education Activity Goal: Patient/Family Education Outcome: Progressing Towards Goal 
  
Problem: Heart Failure: Day 3 Goal: Off Pathway (Use only if patient is Off Pathway) Outcome: Progressing Towards Goal 
Goal: Activity/Safety Outcome: Progressing Towards Goal 
Goal: Diagnostic Test/Procedures Outcome: Progressing Towards Goal 
Goal: Nutrition/Diet Outcome: Progressing Towards Goal 
Goal: Discharge Planning Outcome: Progressing Towards Goal 
Goal: Medications Outcome: Progressing Towards Goal 
Goal: Respiratory Outcome: Progressing Towards Goal 
Goal: Treatments/Interventions/Procedures Outcome: Progressing Towards Goal 
Goal: Psychosocial 
Outcome: Progressing Towards Goal 
Goal: *Oxygen saturation within defined limits Outcome: Progressing Towards Goal 
Goal: *Hemodynamically stable Outcome: Progressing Towards Goal 
Goal: *Optimal pain control at patient's stated goal 
Outcome: Progressing Towards Goal 
Goal: *Anxiety reduced or absent Outcome: Progressing Towards Goal 
Goal: *Demonstrates progressive activity Outcome: Progressing Towards Goal 
  
Problem: Heart Failure: Day 4 Goal: Off Pathway (Use only if patient is Off Pathway) Outcome: Progressing Towards Goal 
Goal: Activity/Safety Outcome: Progressing Towards Goal 
Goal: Diagnostic Test/Procedures Outcome: Progressing Towards Goal 
Goal: Nutrition/Diet Outcome: Progressing Towards Goal 
Goal: Discharge Planning Outcome: Progressing Towards Goal 
Goal: Medications Outcome: Progressing Towards Goal 
Goal: Respiratory Outcome: Progressing Towards Goal 
Goal: Treatments/Interventions/Procedures Outcome: Progressing Towards Goal 
Goal: Psychosocial 
Outcome: Progressing Towards Goal 
Goal: *Oxygen saturation within defined limits Outcome: Progressing Towards Goal 
Goal: *Hemodynamically stable Outcome: Progressing Towards Goal 
Goal: *Optimal pain control at patient's stated goal 
Outcome: Progressing Towards Goal 
Goal: *Anxiety reduced or absent Outcome: Progressing Towards Goal 
Goal: *Demonstrates progressive activity Outcome: Progressing Towards Goal 
  
Problem: Heart Failure: Day 5 Goal: Off Pathway (Use only if patient is Off Pathway) Outcome: Progressing Towards Goal 
Goal: Activity/Safety Outcome: Progressing Towards Goal 
Goal: Diagnostic Test/Procedures Outcome: Progressing Towards Goal 
Goal: Nutrition/Diet Outcome: Progressing Towards Goal 
Goal: Discharge Planning Outcome: Progressing Towards Goal 
Goal: Medications Outcome: Progressing Towards Goal 
Goal: Respiratory Outcome: Progressing Towards Goal 
Goal: Treatments/Interventions/Procedures Outcome: Progressing Towards Goal 
Goal: Psychosocial 
Outcome: Progressing Towards Goal 
  
Problem: Heart Failure: Discharge Outcomes Goal: *Demonstrates ability to perform prescribed activity without shortness of breath or discomfort Outcome: Progressing Towards Goal 
 Goal: *Left ventricular function assessment completed prior to or during stay, or planned for post-discharge Outcome: Progressing Towards Goal 
Goal: *ACEI prescribed if LVEF less than 40% and no contraindications or ARB prescribed Outcome: Progressing Towards Goal 
Goal: *Verbalizes understanding and describes prescribed diet Outcome: Progressing Towards Goal 
Goal: *Verbalizes understanding/describes prescribed medications Outcome: Progressing Towards Goal 
Goal: *Describes available resources and support systems Description: (eg: Home Health, Palliative Care, [employer]) Outcome: Progressing Towards Goal 
Goal: *Describes smoking cessation resources Outcome: Progressing Towards Goal 
Goal: *Understands and describes signs and symptoms to report to providers(Stroke Metric) Outcome: Progressing Towards Goal 
Goal: *Describes/verbalizes understanding of follow-up/return appt Description: (eg: to physicians, diabetes treatment coordinator, and other resources Outcome: Progressing Towards Goal 
Goal: *Describes importance of continuing daily weights and changes to report to physician Outcome: Progressing Towards Goal 
  
Problem: Infection - Risk of, Central Venous Catheter-Associated Bloodstream Infection Goal: *Absence of infection signs and symptoms Outcome: Progressing Towards Goal 
  
Problem: Patient Education: Go to Patient Education Activity Goal: Patient/Family Education Outcome: Progressing Towards Goal 
  
Problem: Patient Education: Go to Patient Education Activity Goal: Patient/Family Education Outcome: Progressing Towards Goal 
  
Problem: Patient Education: Go to Patient Education Activity Goal: Patient/Family Education Outcome: Progressing Towards Goal 
  
Problem: Patient Education: Go to Patient Education Activity Goal: Patient/Family Education Outcome: Progressing Towards Goal 
  
Problem: Pacer/ICD: Day 1 Goal: Off Pathway (Use only if patient is Off Pathway) Outcome: Progressing Towards Goal 
Goal: Activity/Safety Outcome: Progressing Towards Goal 
Goal: Diagnostic Test/Procedures Outcome: Progressing Towards Goal 
Goal: Nutrition/Diet Outcome: Progressing Towards Goal 
Goal: Discharge Planning Outcome: Progressing Towards Goal 
Goal: Medications Outcome: Progressing Towards Goal 
Goal: Respiratory Outcome: Progressing Towards Goal 
Goal: Treatments/Interventions/Procedures Outcome: Progressing Towards Goal 
Goal: Psychosocial 
Outcome: Progressing Towards Goal 
  
Problem: Pacer/ICD: Discharge Outcomes Goal: *Hemodynamically stable Outcome: Progressing Towards Goal 
Goal: *Stable cardiac rhythm Outcome: Progressing Towards Goal 
Goal: *Lungs clear or at baseline Outcome: Progressing Towards Goal 
Goal: *Demonstrates ability to perform prescribed activity without shortness of breath or discomfort Outcome: Progressing Towards Goal 
Goal: *Verbalizes home exercise program, activity guidelines, cardiac precautions Outcome: Progressing Towards Goal 
Goal: *Verbalizes understanding and describes prescribed diet Outcome: Progressing Towards Goal 
Goal: *Verbalizes understanding and describes medication purposes and frequencies Outcome: Progressing Towards Goal 
Goal: *Identifies cardiac risk factors Outcome: Progressing Towards Goal 
Goal: *No signs and symptoms of infection or wound complications Outcome: Progressing Towards Goal 
Goal: *Anxiety reduced or absent Outcome: Progressing Towards Goal 
Goal: *Understands and describes signs and symptoms to report to providers(Stroke Metric) Outcome: Progressing Towards Goal 
Goal: *Describes follow-up/return visits to physicians Outcome: Progressing Towards Goal 
Goal: *Describes available resources and support systems Outcome: Progressing Towards Goal 
Goal: *Influenza immunization Outcome: Progressing Towards Goal 
Goal: *Pneumococcal immunization Outcome: Progressing Towards Goal 
 Goal: *Optimal pain control at patient's stated goal 
Outcome: Progressing Towards Goal 
  
Problem: Hypotension Goal: *Blood pressure within specified parameters Outcome: Progressing Towards Goal 
Goal: *Fluid volume balance Outcome: Progressing Towards Goal 
Goal: *Labs within defined limits Outcome: Progressing Towards Goal 
  
Problem: Patient Education: Go to Patient Education Activity Goal: Patient/Family Education Outcome: Progressing Towards Goal

## 2020-10-31 NOTE — PROGRESS NOTES
Problem: Mobility Impaired (Adult and Pediatric) Goal: *Acute Goals and Plan of Care (Insert Text) Description: FUNCTIONAL STATUS PRIOR TO ADMISSION: Patient was modified independent using a cane for functional mobility. HOME SUPPORT PRIOR TO ADMISSION: The patient lived with wife but did not require assist.  The patient endorses being open to home health for therapy. He resides at [facility] in an independent living apartment. Has children in the area. Physical Therapy Goals Initiated 10/29/2020 1. Patient will move from supine to sit and sit to supine , scoot up and down, and roll side to side in bed with modified independence within 7 day(s). 2.  Patient will transfer from bed to chair and chair to bed with modified independence using the least restrictive device within 7 day(s). 3.  Patient will perform sit to stand with modified independence within 7 day(s). 4.  Patient will ambulate with modified independence for 300 feet with the least restrictive device within 7 day(s). Outcome: Progressing Towards Goal 
Note: Physical Therapy Goals Revised 10/31/2020 1. Patient will move from supine to sit and sit to supine , scoot up and down, and roll side to side in bed with minimal assistance/contact guard assist within 7 day(s). 2.  Patient will transfer from bed to chair and chair to bed with minimal assistance/contact guard assist using the least restrictive device within 7 day(s). 3.  Patient will perform sit to stand with minimal assistance/contact guard assist within 7 day(s). 4.  Patient will ambulate with minimal assistance/contact guard assist for 30 feet with the least restrictive device within 7 day(s). PHYSICAL THERAPY REEVALUATION Patient: Dale Heck (16 y.o. male) Date: 10/31/2020 Primary Diagnosis: Syncope [R55] Syncope [R55] Procedure(s) (LRB): 
INSERT PPM DUAL (N/A) 1 Day Post-Op Precautions:     
 
ASSESSMENT Based on the objective data described below, the patient presents with fairly significant debility following a TVAR earlier in the week and a pacemaker yesterday. Two days ago he ambulated 30 feet x 2 with a RW. Today he has the LUE in a sling with pacemaker precautions and requited max assist of 2 to come to sitting EOB without using the LUE. He was unable to hold himself upright and was leaning very far forward. When asked he stated he was just too weak to sit up so we returned him fairly quickly to supine. His PO2 is good but he is SOB with exertion. He is cooperative and motivated and wanted to get up but just couldn't do it today. He is doing his ankle pumps in the bed. Current Level of Function Impacting Discharge (mobility/balance): Not yet able to sit EOB. Functional Outcome Measure: The patient scored 30 on the Barthel outcome measure which is indicative of 70% debility. Other factors to consider for discharge: Not yet ready for disch. Patient will benefit from skilled therapy intervention to address the above noted impairments. PLAN : 
Recommendations and Planned Interventions: bed mobility training, transfer training, gait training, and therapeutic exercises Frequency/Duration: Patient will be followed by physical therapy:  daily to address goals. Recommendation for discharge: (in order for the patient to meet his/her long term goals) To be determined: may need rehab. This discharge recommendation: 
Has not yet been discussed the attending provider and/or case management IF patient discharges home will need the following DME: to be determined (TBD) SUBJECTIVE:  
Patient stated I just can't do it today.  OBJECTIVE DATA SUMMARY:  
HISTORY:   
Past Medical History:  
Diagnosis Date  
 BPH (benign prostatic hyperplasia) CAD (coronary artery disease) Diabetes ([facility] [address] Hearing loss Hypercholesterolemia Hypertension Murmur Recurrent depression ([facility] [address] 8/22/2019 Third degree AV block ([facility] [address] 10/30/2020 Past Surgical History:  
Procedure Laterality Date CARDIAC SURG PROCEDURE UNLIST  2006 by-pass HX CHOLECYSTECTOMY    
 NE INS NEW/RPLCMT PRM PM W/TRANSV ELTRD ATRIAL&VENT  10/30/2020 NE INS NEW/RPLCMT PRM PM W/TRANSV ELTRD ATRIAL&VENT N/A 10/30/2020 INSERT PPM DUAL performed by Daisha Duncan MD at [place] [address], [facility]/[address] CATH LAB Personal factors and/or comorbidities impacting plan of care: none Home Situation Home Environment: Independent living([facility]) One/Two Story Residence: One story Living Alone: No(lives with wife) Support Systems: Family member(s) Patient Expects to be Discharged to[de-identified] Assisted living Current DME Used/Available at Home: Manny Harness, straight, Walker, Glucometer, Shower chair, [employer] Tub or Shower Type: Shower EXAMINATION/PRESENTATION/DECISION MAKING:  
Critical Behavior: 
Neurologic State: Alert Orientation Level: Oriented X4 Cognition: Appropriate decision making, Appropriate for age attention/concentration, Appropriate safety awareness, Follows commands Safety/Judgement: Awareness of environment, Fall prevention Hearing: Auditory Auditory Impairment: Hard of hearing, bilateral, Hearing aid(s) Skin:  per nursing. Edema: per nursing. Range Of Motion: 
AROM: (LUE pacemaker precautions. ) PROM: Within functional limits(LUE pacemaker precautions. ) Strength:   
Strength: Generally decreased, functional 
  
  
  
  
  
  
Tone & Sensation:  
Tone: Normal 
  
  
  
  
Sensation: Intact Coordination: 
Coordination: Within functional limits Vision:  
  
Functional Mobility: 
Bed Mobility: 
Rolling: Maximum assistance;Assist x1;Additional time Supine to Sit: Assist x2;Maximum assistance Sit to Supine: Assist x2;Maximum assistance Scooting: Assist x2;Maximum assistance Transfers: 
 Unable at this time. Balance: Sitting: Impaired Sitting - Static: Poor (constant support) Ambulation/Gait Training: 
  
  
  
  
  
  
  
  
  
  
  
  
  
  
  
  
  
 Unable at this time. Functional Measure: 
Barthel Index: 
 
Bathin Bladder: 10 Bowels: 10 
Groomin Dressin Feedin Mobility: 0 Stairs: 0 Toilet Use: 0 Transfer (Bed to Chair and Back): 0 Total: 30/100 The Barthel ADL Index: Guidelines 1. The index should be used as a record of what a patient does, not as a record of what a patient could do. 2. The main aim is to establish degree of independence from any help, physical or verbal, however minor and for whatever reason. 3. The need for supervision renders the patient not independent. 4. A patient's performance should be established using the best available evidence. Asking the patient, friends/relatives and nurses are the usual sources, but direct observation and common sense are also important. However direct testing is not needed. 5. Usually the patient's performance over the preceding 24-48 hours is important, but occasionally longer periods will be relevant. 6. Middle categories imply that the patient supplies over 50 per cent of the effort. 7. Use of aids to be independent is allowed. Dana-Farber Cancer InstituteChilo, Barthel, D.W. (1229). Functional evaluation: the Barthel Index. [address] [facility] (14)2. KEISHA Madden, Clemente Harris., [facility], [address] [place] [address] (). Measuring the change indisability after inpatient rehabilitation; comparison of the responsiveness of the Barthel Index and Functional [place] Measure. Journal of Neurology, Neurosurgery, and Psychiatry, 66(4), 420-878. JUNIOR Conti, VALENTINO Gutiérrez, & Nu Rosales MCAROL. (2004.) Assessment of post-stroke quality of life in cost-effectiveness studies: The usefulness of the Barthel Index and the EuroQoL-5D. [facility], 13, 998-08 Physical Therapy Evaluation Charge Determination History Examination Presentation Decision-Making MEDIUM  Complexity : 1-2 comorbidities / personal factors will impact the outcome/ POC  MEDIUM Complexity : 3 Standardized tests and measures addressing body structure, function, activity limitation and / or participation in recreation  MEDIUM Complexity : Evolving with changing characteristics  MEDIUM Complexity : FOTO score of 26-74 Based on the above components, the patient evaluation is determined to be of the following complexity level: MEDIUM Activity Tolerance:  
Poor Please refer to the flowsheet for vital signs taken during this treatment. After treatment patient left in no apparent distress:  
Supine in bed, Heels elevated for pressure relief, and Call bell within reach COMMUNICATION/EDUCATION:  
The patients plan of care was discussed with: Registered nurse and Rehabilitation technician. Fall prevention education was provided and the patient/caregiver indicated understanding., Patient/family have participated as able in goal setting and plan of care. , and Patient/family agree to work toward stated goals and plan of care. Thank you for this referral. 
Jose Bahena, PT Time Calculation: 30 mins

## 2020-10-31 NOTE — PROGRESS NOTES
[facility] ICU Progress Note Admit Date: 10/18/2020 POD:  3 Day Post-Op Procedure:  Procedure(s): 
Transcatheter Aortic Valve Replacement, Shockwave, Balloon Valvuloplasty, Transthoracic Echo and KENAN by Dr. Lopez [place] Subjective:  
Pt seen with Dr. Rena Cartagena. Resting in bed. Lethargic but arousable and oriented when questioned. On 4L NC. Difficulties in clearing secretions; mild dyspnea, fatigue, and weakness Objective:  
Vitals: 
Blood pressure (!) 150/61, pulse 80, temperature 97.7 °F (36.5 °C), resp. rate 28, height 6' 3\" (1.905 m), weight 201 lb 15.1 oz (91.6 kg), SpO2 96 %. Temp (24hrs), Av.2 °F (36.8 °C), Min:97.6 °F (36.4 °C), Max:98.9 °F (37.2 °C) EKG/Rhythm:   
Sinus rhythm with 1st degree AV block with premature atrial complexes Left bundle branch block NC of 304 QRS of 136 Oxygen Therapy: 
Oxygen Therapy O2 Sat (%): 96 % (10/31/20 0900) Pulse via Oximetry: 106 beats per minute (10/30/20 1900) O2 Device: Nasal cannula (10/31/20 08) O2 Flow Rate (L/min): 4 l/min (10/31/20 0800) FIO2 (%): 80 % (10/30/20 0800) CXR:  
CXR Results  (Last 48 hours) 10/31/20 0508  XR CHEST PORT Final result Impression:  IMPRESSION: No change. Narrative:  Clinical indication: Postop heart. Portable AP semiupright view of the chest obtained, comparison . Mild  
increase in vascular congestion is stable. 10/30/20 1959  XR CHEST PORT Final result Impression:  IMPRESSION:  
1. No interval change Narrative:  INDICATION:  dyspnea EXAM: Portable chest  . Comparison earlier same day. FINDINGS: There is no significant change in appearance the lungs. Hazy  
opacification at the left base unchanged. Cardiomediastinal silhouette is  
unchanged. No pneumothorax. Left chest AICD unchanged. 10/30/20 1256  XR CHEST PORT Final result Impression:  IMPRESSION: No pneumothorax. Narrative:  EXAM: Portable CXR. 1216 hours. COMPARISON: 0448 hours INDICATION: Pacemaker placement. FINDINGS:  
There is no pneumothorax. Pacemaker has been placed via the left subclavian  
vein. Temporary pacemaker lead has been removed with stable right IJ sheath. The lungs show unchanged nonspecific left base haziness. Heart is normal in  
size. There is no pulmonary edema. There is no previous median sternotomy. TAVR  
is again noted. 10/30/20 0524  XR CHEST PORT Final result Impression:  IMPRESSION: No acute finding or significant cardiopulmonary change. Narrative:  EXAM: Portable CXR. 0448 hours. COMPARISON: 10/29/2020. INDICATION: postop heart FINDINGS:  
There is interval placement of a right IJ temporary unipolar pacemaker. TAVR  
remains. The lungs show no acute finding. Heart is normal in size. There is no  
overt pulmonary edema. There is no pneumothorax. Admission Weight: Last Weight Weight: 205 lb 7.5 oz (93.2 kg) Weight: 201 lb 15.1 oz (91.6 kg) Intake / Output / Drain: 
Current Shift: 10/31 0701 - 10/31 1900 In: 36 [I.V.:40] Out: - Last 24 hrs.:  
 
Intake/Output Summary (Last 24 hours) at 10/31/2020 2821 Last data filed at 10/31/2020 0900 Gross per 24 hour Intake 1186.76 ml Output 345 ml Net 841.76 ml EXAM: 
General:    No acute distress. Lungs:   Decreased in the bases Groin Incisions:  No erythema, drainage or swelling. Ecchymotic Heart:  Regular rate and rhythm, S1, S2 normal, no murmur, click, rub or gallop. Abdomen:   Soft, non-tender. Bowel sounds normal. No masses,  No organomegaly. Extremities:  No edema. PPP. Neurologic:  Gross motor and sensory apparatus intact. Labs:  
Recent Labs 10/31/20 
7550 10/31/20 
0348  10/28/20 
1226 WBC  --  13.6*   < > 10.0 HGB  --  8.9*   < > 8.5* HCT  --  28.1*   < > 26.1*  
PLT  --  86*   < > 98* NA  --  139   < > 140 K  --  4.1   < > 3.7 BUN  --  21*   < > 15 CREA  --  0.85   < > 0.75 GLU  --  204*   < > 107* GLUCPOC 189*  --    < >  --   
INR  --   --   --  1.3*  
 < > = values in this interval not displayed. Assessment:  
 
Principal Problem: Aortic stenosis (10/18/2020) Overview: Added automatically from request for surgery [id number] Active Problems: 
  Syncope and collapse (10/18/2020) Syncope (10/18/2020) (HFpEF) heart failure with preserved ejection fraction ([address] (10/20/2020) Pacemaker (10/30/2020) Overview: 10/30/2020 dual chamber medtronic pacemaker Third degree AV block ([address] (10/30/2020) Plan/Recommendations/Medical Decision Makin. Aortic stenosis, severe and symptomatic (paradoxical LFLG) s/p TAVR: ASA 81 mg only for AC. Echo completed and reviewed by Dr. Abdulkadir Bone. 
  
2.  CAD with Hx of CABG: On ASA, Statin. BB 
  
3. HTN: Controlled on current medications. Can restart losartan when BP allows. 
  
4. HLD: On Statin 
  
5. HFpEF: BP management 
  
6. Syncope: Likely related to AS. BICA of <50%.  
  
7. PAD: On ASA, Statin. 8. DM: On Metformin, Glipizide at home. Lantus/SSI while in the hospital. A1C of 7.1.  
  
9. MAT/Atrial fibrillation/Asystole: Intermittent Atrial fibrillation. Currently in NSR. Currently on . No OAC historically. s/p PPM. Cont BB. 
  
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 Continue to monitor 
  
11. UTI: On vancomycin completed 10/28 and ceftriaxone (completed). MRSA and enterobacter on culture. ID signed off. 
  
12. BPH: On Proscar 
  
13. Depression: on Zoloft 14. Lethargy: hold restoril, OOB to chair 15. Acute post op resp failure, pulm edema: wean NC for O2 sats >92%. Diurese today. IS as able, add mucinex to help clear secretions. 
  
Dispo: PT/OT/Cardiac Rehab-will await recommendations. Case Management for discharge planning. Keep in CCU until mental status improves.  
 
Signed By: SIMON Jasso  
 
 Saw patient, agree with above Risk of morbidity and mortality - high Medical decision making - high complexity 1. Aortic stenosis, severe and symptomatic (paradoxical LFLG) s/p TAVR: ASA 81 mg only for AC. Echo completed and reviewed by Dr. Latisha Duran. 
  
2.  CAD with Hx of CABG: On ASA, Statin. BB 
  
3. HTN: Controlled on current medications. Can restart losartan when BP allows. 
  
4. HLD: On Statin 
  
5. HFpEF: BP management 
  
6. Syncope: Likely related to AS. BICA of <50%.  
  
7. PAD: On ASA, Statin. 8. DM: On Metformin, Glipizide at home. Lantus/SSI while in the hospital. A1C of 7.1.  
  
9. MAT/Atrial fibrillation/Asystole: Intermittent Atrial fibrillation. Currently in NSR. Currently on . No OAC historically. s/p PPM. Cont BB. 
  
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 Continue to monitor 
  
11. UTI: On vancomycin completed 10/28 and ceftriaxone (completed). MRSA and enterobacter on culture. ID signed off. 
  
12. BPH: On Proscar 
  
13. Depression: on Zoloft 14. Lethargy: hold restoril, OOB to chair 15. Acute post op resp failure, pulm edema: wean NC for O2 sats >92%. Diurese today. IS as able, add mucinex to help clear secretions.

## 2020-10-31 NOTE — PROGRESS NOTES
Pacemaker check this morning by [employer] rep was done and showed proper function Dressing on Future Appointments Date Time Provider Lowell Lyons 11/13/2020 10:20 AM PACEMAKER3, SY VALERA AMB  
11/13/2020 10:30 AM WOUND CHECKS, SY VALERA AMB  
12/23/2020 11:00 AM MD GEOVANI Summers BS AMB  
2/11/2021  9:30 AM Leydi Judge DO [facility] BS AMB

## 2020-10-31 NOTE — PROGRESS NOTES
0730 Bedside and Verbal shift change report given to Nicole Love (oncoming nurse) by Kade Epperson (offgoing nurse). Report included the following information SBAR, Kardex, ED Summary, Procedure Summary, Intake/Output, MAR, Accordion and Recent Results. Lowell Richardson @ bedside.

## 2020-10-31 NOTE — PROGRESS NOTES
[facility] 
 
ICU TEAM Progress Note Name: Anahi Bhatt : 1935 MRN: [id number] Date: 10/31/2020 I Subjective:  
Progress Note: 10/31/2020 Reason for ICU Admission: Status post TAVR Interval history: 
85M w/ hx of CAD with hx of CABGx3 presented with syncope, patient went to cath lab for [facility] cath which identified severe aortic stenosis, patient underwent transcatheter aortic valve replacement, with cardiology and CTS, pt developed complete HB, requiring TVP placement intra-op, TVP removed on 10/28/2020, however patient noted to have complete HB again on 10/29, TVP replaced, permanent pacer placement planned on 10/30/2020. On 10/29/2020 at 2357 patient was noted to be unresponsive and in asystole, code blue called, CPR started, Patient had ROSC after 2 minutes of CPR, noted native rhythm in Afib RVR, TVP inappropriate, transitioned to transcutaneous back up pacing. The morning of  he underwent permanent pacemaker placement with good result. Later that evening went into A. fib with RVR and amiodarone was started. Overnight Events:  
No acute event overnight, seems somewhat lethargic this morning but improving with time likely due to medication. No fever required low-dose phenylephrine overnight but this has been discontinued now. Active Problem List:  
 
Problem List  Date Reviewed: 10/18/2020 Codes Class Pacemaker ICD-10-CM: Z95.0 ICD-9-CM: V45.01 Overview Signed 10/30/2020 11:57 AM by Adrian Cleveland MD  
  10/30/2020 dual chamber medtronic pacemaker Third degree AV block (HCC) ICD-10-CM: I44.2 ICD-9-CM: 426.0 (HFpEF) heart failure with preserved ejection fraction (HCC) ICD-10-CM: I50.30 ICD-9-CM: 428.9 Syncope and collapse ICD-10-CM: R55 
ICD-9-CM: 780.2 Syncope ICD-10-CM: R55 
ICD-9-CM: 780.2 * (Principal) Aortic stenosis ICD-10-CM: I35.0 ICD-9-CM: 424.1 Overview Signed 10/21/2020  6:58 AM by Gadiel Ta MD  
  Added automatically from request for surgery [id number] Decubitus ulcer of left buttock, stage 2 ([facility] ICD-10-CM: N89.055 ICD-9-CM: 707.05, 707.22 Type 2 diabetes with nephropathy (HCC) ICD-10-CM: E11.21 
ICD-9-CM: 250.40, 583.81 Pressure injury of buttock, stage 1 ICD-10-CM: L89.301 ICD-9-CM: 707.05, 707.21 Incontinence of feces ICD-10-CM: R15.9 ICD-9-CM: 787.60 On angiotensin receptor blockers (ARB) ICD-10-CM: T85.643 ICD-9-CM: V58.69 Gastroesophageal reflux disease without esophagitis ICD-10-CM: K21.9 ICD-9-CM: 530.81 Cough ICD-10-CM: R05 ICD-9-CM: 786.2 Recurrent depression ([facility] ICD-10-CM: F33.9 ICD-9-CM: 296.30 Age-related cataract of both eyes ICD-10-CM: H25.9 ICD-9-CM: 366.10 Gait instability ICD-10-CM: R26.81 
ICD-9-CM: 869. 2 Type 2 diabetes mellitus without complication, without long-term current use of insulin (HCC) ICD-10-CM: E11.9 ICD-9-CM: 250.00 Essential hypertension ICD-10-CM: I10 
ICD-9-CM: 401.9 Hypercholesterolemia ICD-10-CM: E78.00 ICD-9-CM: 272.0 Coronary artery disease involving native coronary artery of native heart without angina pectoris ICD-10-CM: I25.10 ICD-9-CM: 414.01 S/P CABG (coronary artery bypass graft) ICD-10-CM: Z95.1 ICD-9-CM: V45.81 Severe aortic stenosis ICD-10-CM: I35.0 ICD-9-CM: 424.1 Aortic systolic murmur on examination ICD-10-CM: I35.8 ICD-9-CM: 424.1 Benign prostatic hyperplasia with lower urinary tract symptoms ICD-10-CM: N40.1 ICD-9-CM: 600.01 Insomnia ICD-10-CM: G47.00 ICD-9-CM: 780.52 Former smoker ICD-10-CM: R23.677 ICD-9-CM: V15.82   
   
 ACP (advance care planning) ICD-10-CM: Z71.89 ICD-9-CM: V65.49 Past Medical History:  
 
 has a past medical history of BPH (benign prostatic hyperplasia), CAD (coronary artery disease), Diabetes ([facility] Hearing loss, Hypercholesterolemia, Hypertension, Murmur, Recurrent depression ([facility] (8/22/2019), and Third degree AV block ([facility] (10/30/2020). He also has no past medical history of Anemia, Arthritis, Asthma, Autoimmune disease ([facility] Calculus of kidney, Cancer ([facility] Chronic kidney disease, Chronic obstructive pulmonary disease ([facility] Chronic pain, Congestive heart failure ([facility] GERD (gastroesophageal reflux disease), Headache, Liver disease, Psychotic disorder ([facility] PUD (peptic ulcer disease), Seizures ([facility] Stroke ([facility] Thromboembolus ([facility] Thyroid disease, or Trauma. Past Surgical History:  
 
 has a past surgical history that includes pr cardiac surg procedure unlist (2006); hx cholecystectomy; pr ins new/rplcmt prm pm w/transv eltrd atrial&vent (10/30/2020); and pr ins new/rplcmt prm pm w/transv eltrd atrial&vent (N/A, 10/30/2020). Home Medications:  
 
Prior to Admission medications Medication Sig Start Date End Date Taking?  Authorizing Provider  
doxazosin (CARDURA) 4 mg tablet TAKE 1 TABLET BY MOUTH  DAILY 10/20/20  Yes Joanna Dick NP  
hydroCHLOROthiazide (HYDRODIURIL) 12.5 mg tablet TAKE 1 TABLET BY MOUTH  DAILY 10/20/20  Yes Enedina Dick NP  
finasteride (PROSCAR) 5 mg tablet TAKE 1 TABLET BY MOUTH  DAILY 10/20/20  Yes Joanna Dick NP  
amLODIPine (NORVASC) 10 mg tablet TAKE 1 TABLET BY MOUTH  DAILY 10/20/20  Yes Joanna Dick NP  
atorvastatin (LIPITOR) 10 mg tablet TAKE 1 TABLET BY MOUTH  DAILY 10/20/20  Yes Enedina Dick NP  
metFORMIN ER (GLUCOPHAGE XR) 500 mg tablet TAKE 1 TABLET BY MOUTH  TWICE DAILY WITH MEALS 10/20/20  Yes Joanna Dick NP  
glipiZIDE SR (GLUCOTROL XL) 5 mg CR tablet TAKE 1 TABLET BY MOUTH  DAILY 10/20/20  Yes Joanna Dick NP  
sertraline (ZOLOFT) 100 mg tablet TAKE 1 TABLET BY MOUTH  DAILY 9/2/20  Yes Gypsy Hays NP  
 traZODone (DESYREL) 50 mg tablet Take 2 tablets by mouth at night 8/21/20  Yes Stevenson Donohue NP  
temazepam (RESTORIL) 15 mg capsule TAKE 1 CAPSULE BY MOUTH AT BEDTIME AS NEEDED FOR SLEEP. 7/6/20  Yes Jorgito Hunt DO  
losartan (COZAAR) 25 mg tablet TAKE ONE-HALF TABLET BY  MOUTH DAILY 7/1/20  Yes Joanna Dick NP  
labetaloL (NORMODYNE) 100 mg tablet TAKE 1 TABLET BY MOUTH  DAILY 7/1/20  Yes Joanna Dick NP  
fluticasone propionate (FLONASE) 50 mcg/actuation nasal spray ADMINISTER 1 Spray IN Both Nostrils two (2) times a day. 6/8/20  Yes Kallie Dick NP  
menthol-zinc oxide (CALMOSEPTINE) 0.44-20.6 % oint Apply to bilateral buttocks TID  Indications: skin irritation 6/4/20  Yes Kevin Hunt DO  
OTHER Hearing evaluation for possible changes in hearing 1/20/20  Yes Jorgito Hunt DO  
tiZANidine (ZANAFLEX) 2 mg tablet Take 1 Tab by mouth three (3) times daily as needed for Pain. 7/11/19  Yes Ania Mota DO  
glucose blood VI test strips (TRUE METRIX GLUCOSE TEST STRIP) strip Check BS BID  Dx: DM  E11.21 7/12/18  Yes Jorgito Hunt DO  
aspirin (ASPIRIN) 325 mg tablet Take 1 Tab by mouth daily. 3/29/18  Yes Kevin Hunt DO  
cholecalciferol (VITAMIN D3) 1,000 unit cap Take 1 Cap by mouth daily. 3/29/18  Yes Jorgito Hunt DO  
magnesium 250 mg tab Take 1 Tab by mouth daily. 3/29/18  Yes Jorgito Hunt DO  
sodium chloride (OCEAN) 0.65 % nasal squeeze bottle 0.05 mL by Both Nostrils route as needed for Congestion. 3/29/18  Yes Jorgito Hunt DO  
FERROUS FUMARATE/VIT BCOMP,C (SUPER B COMPLEX PO) Take  by mouth. Yes Provider, Historical  
 
 
Allergies/Social/Family History: Allergies Allergen Reactions  Procaine Other (comments) Pt stated he passed out from procaine and was told not to let anyone use it on him again Social History Tobacco Use  Smoking status: Former Smoker Types: Cigarettes Last attempt to quit: 9/8/1990 Years since quittin.1  Smokeless tobacco: Never Used Substance Use Topics  Alcohol use: No  
  
Family History Problem Relation Age of Onset  Cancer Mother  Cancer Father Review of Systems: I reviewed 10 system and they are negative but as in interval history Objective:  
Vital Signs: 
Visit Vitals BP (!) 137/57 Pulse 68 Temp 97.9 °F (36.6 °C) Resp 29 Ht 6' 3\" (1.905 m) Wt 91.6 kg (201 lb 15.1 oz) SpO2 97% BMI 25.24 kg/m² O2 Flow Rate (L/min): 4 l/min O2 Device: Nasal cannula Temp (24hrs), Av.3 °F (36.8 °C), Min:97.6 °F (36.4 °C), Max:98.9 °F (37.2 °C) Intake/Output:  
 
Intake/Output Summary (Last 24 hours) at 10/31/2020 1341 Last data filed at 10/31/2020 1211 Gross per 24 hour Intake 1007.8 ml Output 325 ml Net 682.8 ml Physical Exam: 
 
General:  Alert, cooperative, chronically ill looking not in distress Eyes:  Sclera anicteric. Pupils equally round and reactive to light. Mouth/Throat: Mucous membranes normal, oral pharynx clear Neck: Supple Lungs:    Fine crepitation bilaterally, good effort, pacemaker site clean CV:  Regular rate and rhythm,no murmur, click, rub or gallop Abdomen:   Soft, non-tender. bowel sounds normal. non-distended Extremities: No cyanosis or edema Skin: Skin color, texture, turgor normal. no acute rash or lesions Lymph nodes: Cervical and supraclavicular normal  
Musculoskeletal: No swelling or deformity Lines/Devices:  Intact, no erythema, drainage or tenderness Psych: Alert and oriented, normal mood affect given the setting LABS AND  DATA: Personally reviewed Recent Labs 10/31/20 
9874 10/30/20 
0402 WBC 13.6* 14.9* HGB 8.9* 9.0*  
HCT 28.1* 27.2*  
PLT 86* 100* Recent Labs 10/31/20 
5963 10/30/20 
0402  139  
K 4.1 3.9  108 CO2 27 24 BUN 21* 16  
CREA 0.85 0.76 * 160* CA 8.5 8.5 MG 2.4 2.3 Recent Labs 10/29/20 
0441 AP 72 TP 5.7* ALB 3.0*  
GLOB 2.7 No results for input(s): INR, PTP, APTT, INREXT in the last 72 hours. Recent Labs 10/28/20 
1413 PHI 7.31* PCO2I 45.1*  
PO2I 167* FIO2I 0.50 No results for input(s): CPK, CKMB, TROIQ, BNPP in the last 72 hours. Hemodynamics:  
PAP:   CO:    
Wedge:   CI:    
CVP:    SVR:    
  PVR:    
 
Ventilator Settings: 
Mode Rate Tidal Volume Pressure FiO2 PEEP Spontaneous   500 ml  5 cm H2O 80 % 5 cm H20 Peak airway pressure: 10 cm H2O Minute ventilation: 6.81 l/min MEDS: Reviewed Chest X-Ray: CXR Results  (Last 48 hours) 10/31/20 0508  XR CHEST PORT Final result Impression:  IMPRESSION: No change. Narrative:  Clinical indication: Postop heart. Portable AP semiupright view of the chest obtained, comparison October 30. Mild  
increase in vascular congestion is stable. 10/30/20 1959  XR CHEST PORT Final result Impression:  IMPRESSION:  
1. No interval change Narrative:  INDICATION:  dyspnea EXAM: Portable chest 1929 . Comparison earlier same day. FINDINGS: There is no significant change in appearance the lungs. Hazy  
opacification at the left base unchanged. Cardiomediastinal silhouette is  
unchanged. No pneumothorax. Left chest AICD unchanged. 10/30/20 1256  XR CHEST PORT Final result Impression:  IMPRESSION: No pneumothorax. Narrative:  EXAM: Portable CXR. 1216 hours. COMPARISON: 0448 hours INDICATION: Pacemaker placement. FINDINGS:  
There is no pneumothorax. Pacemaker has been placed via the left subclavian  
vein. Temporary pacemaker lead has been removed with stable right IJ sheath. The lungs show unchanged nonspecific left base haziness. Heart is normal in  
size. There is no pulmonary edema. There is no previous median sternotomy. TAVR  
is again noted. 10/30/20 0524  XR CHEST PORT Final result Impression:  IMPRESSION: No acute finding or significant cardiopulmonary change. Narrative:  EXAM: Portable CXR. 0448 hours. COMPARISON: 10/29/2020. INDICATION: postop heart FINDINGS:  
There is interval placement of a right IJ temporary unipolar pacemaker. TAVR  
remains. The lungs show no acute finding. Heart is normal in size. There is no  
overt pulmonary edema. There is no pneumothorax. Assessment and Plan:  
Evolving pulmonary edema: Agree with diuresis. Improving, now down to nasal cannula. Wean as tolerated Encephalopathy/delirium: Improving, likely related to sedative at nighttime, discontinue trazodone and Restoril. Monitor closely Severe aortic stenosis status post TAVR: On aspirin I appreciate cardiology and cardiovascular surgeon input. Hopefully will be able to transfer out of the unit soon. DISPOSITION Stay in ICU CRITICAL CARE CONSULTANT NOTE I had a face to face encounter with the patient, reviewed and interpreted patient data including clinical events, labs, images, vital signs, I/O's, and examined patient. I have discussed the case and the plan and management of the patient's care with the consulting services, the bedside nurses and the respiratory therapist.   
 
NOTE OF PERSONAL INVOLVEMENT IN CARE This patient has a high probability of imminent, clinically significant deterioration, which requires the highest level of preparedness to intervene urgently. I participated in the decision-making and personally managed or directed the management of the following life and organ supporting interventions that required my frequent assessment to treat or prevent imminent deterioration. I personally spent 40 minutes of critical care time.   This is time spent at this critically ill patient's bedside actively involved in patient care as well as the coordination of care and discussions with the patient's family. This does not include any procedural time which has been billed separately. Terry Short M.D. Staff Intensivist/Pulmonologist 
[facility] 
10/31/2020

## 2020-11-01 NOTE — PROGRESS NOTES
[facility] ICU Progress Note Admit Date: 10/18/2020 POD:  4 Day Post-Op Procedure:  Procedure(s): 
Transcatheter Aortic Valve Replacement, Shockwave, Balloon Valvuloplasty, Transthoracic Echo and KENAN by Dr. Karlee Torre Subjective:  
Pt seen with Dr. Akanksha Kennedy. Much more lucid this morning. Max assist to get out of bed. BP elevated; mild dyspnea, fatigue, and weakness;  
 
 Objective:  
Vitals: 
Blood pressure (!) 157/57, pulse 77, temperature 97.6 °F (36.4 °C), resp. rate 24, height 6' 3\" (1.905 m), weight 201 lb 1 oz (91.2 kg), SpO2 98 %. Temp (24hrs), Av.8 °F (36.6 °C), Min:97.6 °F (36.4 °C), Max:98 °F (36.7 °C) EKG/Rhythm:   
Sinus rhythm with 1st degree AV block with premature atrial complexes Left bundle branch block MS of 304 QRS of 136 Oxygen Therapy: 
Oxygen Therapy O2 Sat (%): 98 % (20) Pulse via Oximetry: 106 beats per minute (10/30/20 1900) O2 Device: Nasal cannula (20) O2 Flow Rate (L/min): 4 l/min (20) FIO2 (%): 80 % (10/30/20 08) CXR:  
CXR Results  (Last 48 hours) 10/31/20 0508  XR CHEST PORT Final result Impression:  IMPRESSION: No change. Narrative:  Clinical indication: Postop heart. Portable AP semiupright view of the chest obtained, comparison . Mild  
increase in vascular congestion is stable. 10/30/20 1959  XR CHEST PORT Final result Impression:  IMPRESSION:  
1. No interval change Narrative:  INDICATION:  dyspnea EXAM: Portable chest  . Comparison earlier same day. FINDINGS: There is no significant change in appearance the lungs. Hazy  
opacification at the left base unchanged. Cardiomediastinal silhouette is  
unchanged. No pneumothorax. Left chest AICD unchanged. 10/30/20 1256  XR CHEST PORT Final result Impression:  IMPRESSION: No pneumothorax. Narrative:  EXAM: Portable CXR. 1216 hours. COMPARISON: 0448 hours INDICATION: Pacemaker placement. FINDINGS:  
There is no pneumothorax. Pacemaker has been placed via the left subclavian  
vein. Temporary pacemaker lead has been removed with stable right IJ sheath. The lungs show unchanged nonspecific left base haziness. Heart is normal in  
size. There is no pulmonary edema. There is no previous median sternotomy. TAVR  
is again noted. Admission Weight: Last Weight Weight: 205 lb 7.5 oz (93.2 kg) Weight: 201 lb 1 oz (91.2 kg) Intake / Harnett Cuna / Drain: 
Current Shift: 11/01 0701 - 11/01 1900 In: 20 [I.V.:20] Out: - Last 24 hrs.:  
 
Intake/Output Summary (Last 24 hours) at 11/1/2020 6348 Last data filed at 11/1/2020 0800 Gross per 24 hour Intake 460 ml Output 1000 ml Net -540 ml EXAM: 
General:    No acute distress. Lungs:   Decreased in the bases Groin Incisions:  No erythema, drainage or swelling. Ecchymotic Heart:  Regular rate and rhythm, S1, S2 normal, no murmur, click, rub or gallop. Abdomen:   Soft, non-tender. Bowel sounds normal. No masses,  No organomegaly. Extremities:  No edema. PPP. Neurologic:  Gross motor and sensory apparatus intact. Labs:  
Recent Labs 11/01/20 
0755 11/01/20 
0406 WBC  --  13.5* HGB  --  8.9* HCT  --  27.8* PLT  --  87* NA  --  138 K  --  4.2 BUN  --  26* CREA  --  0.96  
GLU  --  197* GLUCPOC 148*  --   
 
 
 Assessment:  
 
Principal Problem: Aortic stenosis (10/18/2020) Overview: Added automatically from request for surgery [id number] Active Problems: 
  Syncope and collapse (10/18/2020) Syncope (10/18/2020) (HFpEF) heart failure with preserved ejection fraction ([address] (10/20/2020) Pacemaker (10/30/2020) Overview: 10/30/2020 dual chamber medtronic pacemaker Third degree AV block ([address] (10/30/2020) Plan/Recommendations/Medical Decision Making: 1. Aortic stenosis, severe and symptomatic (paradoxical LFLG) s/p TAVR: ASA 81 mg only for AC. Echo completed and reviewed by Dr. Saul Li. 
  
2.  CAD with Hx of CABG: On ASA, Statin. BB 
  
3. HTN: Controlled on current medications. Restart losartan today 
  
4. HLD: On Statin 
  
5. HFpEF 
  
6. Syncope: Likely related to AS. BICA of <50%.  
  
7. PAD: On ASA, Statin. 8. DM: On Metformin, Glipizide at home. Lantus/SSI while in the hospital. A1C of 7.1.  
  
9. MAT/Atrial fibrillation/Asystole: Intermittent Atrial fibrillation. Currently in NSR. Currently on ASA 81. No OAC historically. s/p PPM. Cont BB. 
  
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 Continue to monitor 
  
11. UTI: On vancomycin completed 10/28 and ceftriaxone (completed). MRSA and enterobacter on culture. ID signed off. 
  
12. BPH: On Proscar 
  
13. Depression: on Zoloft 14. Lethargy/debility: hold restoril, OOB as much as possible. PT/OT. 15. Acute post op resp failure, pulm edema: wean NC for O2 sats >92%. Diurese. IS as able, add mucinex to help clear secretions. 
  
Dispo: PT/OT/Cardiac Rehab-will await recommendations. Case Management for discharge planning. Will likely need rehab. Transfer to stepdown. Signed By: SIMON Farias Saw patient, agree with above Risk of morbidity and mortality - high Medical decision making - high complexity 1. Aortic stenosis, severe and symptomatic (paradoxical LFLG) s/p TAVR: ASA 81 mg only for AC.  
  
2.  CAD with Hx of CABG: On ASA, Statin. BB 
  
3. HTN: Controlled on current medications. Restart losartan today 
  
4. HLD: On Statin 
  
5. HFpEF 
  
6. Syncope: Likely related to AS. BICA of <50%.  
  
7. PAD: On ASA, Statin. 8. DM: On Metformin, Glipizide at home. Lantus/SSI while in the hospital. A1C of 7.1.  
  
9. MAT/Atrial fibrillation/Asystole: Intermittent Atrial fibrillation. Currently in NSR. Currently on ASA 81. No OAC historically.  s/p PPM. Cont BB. 
  
 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 Continue to monitor 
  
11. UTI: On vancomycin completed 10/28 and ceftriaxone (completed). MRSA and enterobacter on culture. ID signed off. 
  
12. BPH: On Proscar 
  
13. Depression: on Zoloft 14. Lethargy/debility: hold restoril, OOB as much as possible. PT/OT. 15. Acute post op resp failure, pulm edema: wean NC for O2 sats >92%. Diurese.  IS as able, add mucinex to help clear secretions.

## 2020-11-01 NOTE — PROGRESS NOTES
TRANSFER - IN REPORT: 
 
Verbal report received from Jaspal(name) on Paulett Collet  being received from CCU(unit) for routine progression of care Report consisted of patients Situation, Background, Assessment and  
Recommendations(SBAR). Information from the following report(s) SBAR, Kardex, Intake/Output, MAR, Recent Results, Med Rec Status and Cardiac Rhythm SR to paced was reviewed with the receiving nurse. Opportunity for questions and clarification was provided. Assessment completed upon patients arrival to unit and care assumed.

## 2020-11-01 NOTE — PROGRESS NOTES
1930: Bedside shift report received from [facility]. 
0130: Primofit external male catheter placed on patient due to spilling and difficulties using urinal. 
0200: The ending of Daylight Saving Time occurred at 0200 hrs. Documentation of patient care and medications administered is done with respect to the time change. 0400: am labs drawn. 0730: Bedside and Verbal shift change report given to [facility] (oncoming nurse) by Wilfredo Hernandez (offgoing nurse). Report included the following information SBAR, Kardex, Intake/Output, MAR, Accordion, Recent Results and Cardiac Rhythm paced.

## 2020-11-01 NOTE — PROGRESS NOTES
[facility] 
 
ICU TEAM Progress Note Name: Shiraz Loja : 1935 MRN: [id number] Date: 2020 I Subjective:  
Progress Note: 2020 Reason for ICU Admission: Status post TAVR Interval history: 
85M w/ hx of CAD with hx of CABGx3 presented with syncope, patient went to cath lab for [address] cath which identified severe aortic stenosis, patient underwent transcatheter aortic valve replacement, with cardiology and CTS, pt developed complete HB, requiring TVP placement intra-op, TVP removed on 10/28/2020, however patient noted to have complete HB again on 10/29, TVP replaced, permanent pacer placement planned on 10/30/2020. On 10/29/2020 at 2357 patient was noted to be unresponsive and in asystole, code blue called, CPR started, Patient had ROSC after 2 minutes of CPR, noted native rhythm in Afib RVR, TVP inappropriate, transitioned to transcutaneous back up pacing. The morning of  he underwent permanent pacemaker placement with good result. Overnight Events:  
No acute event, hemodynamically remained stable, much more awake, her profoundly weak but cooperative. Tolerating diet. No issues with the pacemaker site. Active Problem List:  
 
Problem List  Date Reviewed: 10/18/2020 Codes Class Pacemaker ICD-10-CM: Z95.0 ICD-9-CM: V45.01 Overview Signed 10/30/2020 11:57 AM by Lady Asha MD  
  10/30/2020 dual chamber medtronic pacemaker Third degree AV block (HCC) ICD-10-CM: I44.2 ICD-9-CM: 426.0 (HFpEF) heart failure with preserved ejection fraction (HCC) ICD-10-CM: I50.30 ICD-9-CM: 428.9 Syncope and collapse ICD-10-CM: R55 
ICD-9-CM: 780.2 Syncope ICD-10-CM: R55 
ICD-9-CM: 780.2 * (Principal) Aortic stenosis ICD-10-CM: I35.0 ICD-9-CM: 424.1 Overview Signed 10/21/2020  6:58 AM by Jeffrey Simon MD  
  Added automatically from request for surgery [id number] Decubitus ulcer of left buttock, stage 2 ([facility] ICD-10-CM: E59.052 ICD-9-CM: 707.05, 707.22 Type 2 diabetes with nephropathy (HCC) ICD-10-CM: E11.21 
ICD-9-CM: 250.40, 583.81 Pressure injury of buttock, stage 1 ICD-10-CM: L89.301 ICD-9-CM: 707.05, 707.21 Incontinence of feces ICD-10-CM: R15.9 ICD-9-CM: 787.60 On angiotensin receptor blockers (ARB) ICD-10-CM: K94.220 ICD-9-CM: V58.69 Gastroesophageal reflux disease without esophagitis ICD-10-CM: K21.9 ICD-9-CM: 530.81 Cough ICD-10-CM: R05 ICD-9-CM: 786.2 Recurrent depression ([facility] ICD-10-CM: F33.9 ICD-9-CM: 296.30 Age-related cataract of both eyes ICD-10-CM: H25.9 ICD-9-CM: 366.10 Gait instability ICD-10-CM: R26.81 
ICD-9-CM: 296. 2 Type 2 diabetes mellitus without complication, without long-term current use of insulin (HCC) ICD-10-CM: E11.9 ICD-9-CM: 250.00 Essential hypertension ICD-10-CM: I10 
ICD-9-CM: 401.9 Hypercholesterolemia ICD-10-CM: E78.00 ICD-9-CM: 272.0 Coronary artery disease involving native coronary artery of native heart without angina pectoris ICD-10-CM: I25.10 ICD-9-CM: 414.01 S/P CABG (coronary artery bypass graft) ICD-10-CM: Z95.1 ICD-9-CM: V45.81 Severe aortic stenosis ICD-10-CM: I35.0 ICD-9-CM: 424.1 Aortic systolic murmur on examination ICD-10-CM: I35.8 ICD-9-CM: 424.1 Benign prostatic hyperplasia with lower urinary tract symptoms ICD-10-CM: N40.1 ICD-9-CM: 600.01 Insomnia ICD-10-CM: G47.00 ICD-9-CM: 780.52 Former smoker ICD-10-CM: E35.903 ICD-9-CM: V15.82   
   
 ACP (advance care planning) ICD-10-CM: Z71.89 ICD-9-CM: V65.49 Past Medical History:  
 
 has a past medical history of BPH (benign prostatic hyperplasia), CAD (coronary artery disease), Diabetes ([facility] Hearing loss, Hypercholesterolemia, Hypertension, Murmur, Recurrent depression ([facility] (8/22/2019), and Third degree AV block ([facility] (10/30/2020). He also has no past medical history of Anemia, Arthritis, Asthma, Autoimmune disease ([facility] Calculus of kidney, Cancer ([facility] Chronic kidney disease, Chronic obstructive pulmonary disease ([facility] Chronic pain, Congestive heart failure ([facility] GERD (gastroesophageal reflux disease), Headache, Liver disease, Psychotic disorder ([facility] PUD (peptic ulcer disease), Seizures ([facility] Stroke ([facility] Thromboembolus ([facility] Thyroid disease, or Trauma. Past Surgical History:  
 
 has a past surgical history that includes pr cardiac surg procedure unlist (2006); hx cholecystectomy; pr ins new/rplcmt prm pm w/transv eltrd atrial&vent (10/30/2020); and pr ins new/rplcmt prm pm w/transv eltrd atrial&vent (N/A, 10/30/2020). Home Medications:  
 
Prior to Admission medications Medication Sig Start Date End Date Taking?  Authorizing Provider  
doxazosin (CARDURA) 4 mg tablet TAKE 1 TABLET BY MOUTH  DAILY 10/20/20  Yes Joanna Dick NP  
hydroCHLOROthiazide (HYDRODIURIL) 12.5 mg tablet TAKE 1 TABLET BY MOUTH  DAILY 10/20/20  Yes Edilma Dick NP  
finasteride (PROSCAR) 5 mg tablet TAKE 1 TABLET BY MOUTH  DAILY 10/20/20  Yes Joanna Dick NP  
amLODIPine (NORVASC) 10 mg tablet TAKE 1 TABLET BY MOUTH  DAILY 10/20/20  Yes Joanna Dick NP  
atorvastatin (LIPITOR) 10 mg tablet TAKE 1 TABLET BY MOUTH  DAILY 10/20/20  Yes Edilma Dick NP  
metFORMIN ER (GLUCOPHAGE XR) 500 mg tablet TAKE 1 TABLET BY MOUTH  TWICE DAILY WITH MEALS 10/20/20  Yes Joanna Dick NP  
glipiZIDE SR (GLUCOTROL XL) 5 mg CR tablet TAKE 1 TABLET BY MOUTH  DAILY 10/20/20  Yes Joanna Dick NP  
sertraline (ZOLOFT) 100 mg tablet TAKE 1 TABLET BY MOUTH  DAILY 9/2/20  Yes Sean Dooley NP  
traZODone (DESYREL) 50 mg tablet Take 2 tablets by mouth at night 8/21/20  Yes Sean Dooley NP  
temazepam (RESTORIL) 15 mg capsule TAKE 1 CAPSULE BY MOUTH AT BEDTIME AS NEEDED FOR SLEEP. 20  Yes Jorgito Hunt DO  
losartan (COZAAR) 25 mg tablet TAKE ONE-HALF TABLET BY  MOUTH DAILY 20  Yes Joanna Dick NP  
labetaloL (NORMODYNE) 100 mg tablet TAKE 1 TABLET BY MOUTH  DAILY 20  Yes Joanna Dick NP  
fluticasone propionate (FLONASE) 50 mcg/actuation nasal spray ADMINISTER 1 Spray IN Both Nostrils two (2) times a day. 20  Yes Fay Dick NP  
menthol-zinc oxide (CALMOSEPTINE) 0.44-20.6 % oint Apply to bilateral buttocks TID  Indications: skin irritation 20  Yes Danay Hunt DO  
OTHER Hearing evaluation for possible changes in hearing 20  Yes Jorgito Hunt DO  
tiZANidine (ZANAFLEX) 2 mg tablet Take 1 Tab by mouth three (3) times daily as needed for Pain. 19  Yes Román Yung DO  
glucose blood VI test strips (TRUE METRIX GLUCOSE TEST STRIP) strip Check BS BID  Dx: DM  E11.21 18  Yes Jorgito Hunt DO  
aspirin (ASPIRIN) 325 mg tablet Take 1 Tab by mouth daily. 3/29/18  Yes Danay Hunt DO  
cholecalciferol (VITAMIN D3) 1,000 unit cap Take 1 Cap by mouth daily. 3/29/18  Yes Jorgito Hunt DO  
magnesium 250 mg tab Take 1 Tab by mouth daily. 3/29/18  Yes Jorgito Hunt DO  
sodium chloride (OCEAN) 0.65 % nasal squeeze bottle 0.05 mL by Both Nostrils route as needed for Congestion. 3/29/18  Yes Jorgito Hunt DO  
FERROUS FUMARATE/VIT BCOMP,C (SUPER B COMPLEX PO) Take  by mouth. Yes Provider, Historical  
 
 
Allergies/Social/Family History: Allergies Allergen Reactions  Procaine Other (comments) Pt stated he passed out from procaine and was told not to let anyone use it on him again Social History Tobacco Use  Smoking status: Former Smoker Types: Cigarettes Last attempt to quit: 1990 Years since quittin.1  Smokeless tobacco: Never Used Substance Use Topics  Alcohol use: No  
  
Family History Problem Relation Age of Onset  Cancer Mother  Cancer Father Review of Systems:  
 
10 systems reviewed and are negative but as in interval history Objective:  
Vital Signs: 
Visit Vitals BP (!) 150/46 Pulse 80 Temp 98 °F (36.7 °C) Resp 24 Ht 6' 3\" (1.905 m) Wt 91.2 kg (201 lb 1 oz) SpO2 98% BMI 25.13 kg/m² O2 Flow Rate (L/min): 4 l/min O2 Device: Nasal cannula Temp (24hrs), Av.9 °F (36.6 °C), Min:97.7 °F (36.5 °C), Max:98 °F (36.7 °C) Intake/Output:  
 
Intake/Output Summary (Last 24 hours) at 2020 7358 Last data filed at 2020 0700 Gross per 24 hour Intake 480 ml Output 1000 ml Net -520 ml Physical Exam: 
 
General:  Alert, cooperative, chronically ill looking not in distress Eyes:  Sclera anicteric. Pupils equally round and reactive to light. Mouth/Throat: Mucous membranes normal, oral pharynx clear Neck: Supple Lungs:     Good air entry bilaterally, good effort, pacemaker site clean CV:  Regular rate and rhythm,no murmur, click, rub or gallop Abdomen:   Soft, non-tender. bowel sounds normal. non-distended Extremities: No cyanosis or edema Skin: Skin color, texture, turgor normal. no acute rash or lesions Lymph nodes: Cervical and supraclavicular normal  
Musculoskeletal: No swelling or deformity Lines/Devices:  Intact, no erythema, drainage or tenderness Psych: Alert and oriented, normal mood affect given the setting LABS AND  DATA: Personally reviewed Recent Labs 11/01/20 
0406 10/31/20 
5561 WBC 13.5* 13.6* HGB 8.9* 8.9* HCT 27.8* 28.1*  
PLT 87* 86* Recent Labs 20 
0406 10/31/20 
2599 10/30/20 
8290  139 139  
K 4.2 4.1 3.9  108 108 CO2 29 27 24 BUN 26* 21* 16  
CREA 0.96 0.85 0.76 * 204* 160* CA 9.1 8.5 8.5 MG  --  2.4 2.3 No results for input(s): AP, TBIL, TP, ALB, GLOB, AML, LPSE in the last 72 hours. No lab exists for component: SGOT, GPT, AMYP No results for input(s): INR, PTP, APTT, INREXT in the last 72 hours. No results for input(s): PHI, PCO2I, PO2I, FIO2I in the last 72 hours. No results for input(s): CPK, CKMB, TROIQ, BNPP in the last 72 hours. Hemodynamics:  
PAP:   CO:    
Wedge:   CI:    
CVP:    SVR:    
  PVR:    
 
Ventilator Settings: 
Mode Rate Tidal Volume Pressure FiO2 PEEP Spontaneous   500 ml  5 cm H2O 80 % 5 cm H20 Peak airway pressure: 10 cm H2O Minute ventilation: 6.81 l/min MEDS: Reviewed Chest X-Ray: CXR Results  (Last 48 hours) 10/31/20 0508  XR CHEST PORT Final result Impression:  IMPRESSION: No change. Narrative:  Clinical indication: Postop heart. Portable AP semiupright view of the chest obtained, comparison October 30. Mild  
increase in vascular congestion is stable. 10/30/20 1959  XR CHEST PORT Final result Impression:  IMPRESSION:  
1. No interval change Narrative:  INDICATION:  dyspnea EXAM: Portable chest 1929 . Comparison earlier same day. FINDINGS: There is no significant change in appearance the lungs. Hazy  
opacification at the left base unchanged. Cardiomediastinal silhouette is  
unchanged. No pneumothorax. Left chest AICD unchanged. 10/30/20 1256  XR CHEST PORT Final result Impression:  IMPRESSION: No pneumothorax. Narrative:  EXAM: Portable CXR. 1216 hours. COMPARISON: 0448 hours INDICATION: Pacemaker placement. FINDINGS:  
There is no pneumothorax. Pacemaker has been placed via the left subclavian  
vein. Temporary pacemaker lead has been removed with stable right IJ sheath. The lungs show unchanged nonspecific left base haziness. Heart is normal in  
size. There is no pulmonary edema. There is no previous median sternotomy. TAVR  
is again noted. Assessment and Plan:  
Evolving pulmonary edema: Improved with diuresis. Remains on IV Lasix Encephalopathy/delirium: Improving, likely related to sedative at nighttime, discontinue trazodone and Restoril. Monitor closely Severe aortic stenosis status post TAVR: On aspirin Diabetes mellitus type 2: Not at goal, increase the dose of Lantus to 20 units. Continue sliding scale. Patient remained on amiodarone drip, started by cardiothoracic surgeon for presumed A. fib post pacemaker insertion. May consider discontinue or perhaps switching to oral.  I will defer to CTS/cardiology in that regard. I appreciate cardiology and cardiovascular surgeon input. DISPOSITION Pending CTS/cardiology evaluation CRITICAL CARE CONSULTANT NOTE I had a face to face encounter with the patient, reviewed and interpreted patient data including clinical events, labs, images, vital signs, I/O's, and examined patient. I have discussed the case and the plan and management of the patient's care with the consulting services, the bedside nurses and the respiratory therapist.   
 
NOTE OF PERSONAL INVOLVEMENT IN CARE This patient has a high probability of imminent, clinically significant deterioration, which requires the highest level of preparedness to intervene urgently. I participated in the decision-making and personally managed or directed the management of the following life and organ supporting interventions that required my frequent assessment to treat or prevent imminent deterioration. I personally spent 35 minutes of critical care time. This is time spent at this critically ill patient's bedside actively involved in patient care as well as the coordination of care and discussions with the patient's family. This does not include any procedural time which has been billed separately. Paulo Salamanca M.D. Staff Intensivist/Pulmonologist 
[facility] 
11/1/2020

## 2020-11-01 NOTE — PROGRESS NOTES
Cardiology Hospital Progress Note Subjective:  
  
Rafael Lee is a 82 y.o. patient who is seen for Dr Saul Li 
intensivist said amiodarone was used for PAF He underwent TAVR on 10/28/2020. Prior to TAVR, had 1st degree AVB (at times >320 ms). Post TAVR, had LBBB, 1st degree AVB to 300 ms. Required temporary pacing via catheter, but LBBB resolved & temp pacer removed. Patient subsequently had asystole with 3rd degree AVB during atrial tachycardia, coincided with syncope. Had temp pacer replaced 10/29/2020. Then dual chamber pacemaker 10/30/2020 He sat up in bed He hopes he can move out of CCU Previous: LHC/RHC (10/21/2020): Severe native 3V CAD. Patent SVT-LAD, SVG-2nd OM, SVT-PDA. Severe AS with OLIVIER 0.83 cm2, mean grad 32 mmHg. Severe subclavian stenosis, previously placed stent in left SCV with 70-80% ISR, required balloon to advance catheters across. No flow visible in LIMA. Echo (10/18/2020): LVEF 42-49%, grade 1 diastolic dysfunction. Mildly dilated LA. MAC, mild to mod MS. Mod AS. Mild TR. Pericardial fat pad present. Enterococcus UTI in 08/2020, now in isolation for history of Enterobacter UTI 10 days ago. Prior syncope felt to be due to AS. S/p CABG x 3 05/20/2008 (SVG-LAD, SVG-OM & PDA). Hx MAT. History of rheumatic fever 1946. Mother-in-law had pacemaker. Problem List  Date Reviewed: 10/18/2020 Codes Class Noted Pacemaker ICD-10-CM: Z95.0 ICD-9-CM: V45.01  10/30/2020 Overview Signed 10/30/2020 11:57 AM by Melissa Randall MD  
  10/30/2020 dual chamber medtronic pacemaker Third degree AV block (HCC) ICD-10-CM: I44.2 ICD-9-CM: 426.0  10/30/2020 (HFpEF) heart failure with preserved ejection fraction (HCC) ICD-10-CM: I50.30 ICD-9-CM: 428.9  10/20/2020 Syncope and collapse ICD-10-CM: R55 
ICD-9-CM: 780.2  10/18/2020 Syncope ICD-10-CM: R55 
ICD-9-CM: 780.2  10/18/2020 * (Principal) Aortic stenosis ICD-10-CM: I35.0 ICD-9-CM: 424.1  10/18/2020 Overview Signed 10/21/2020  6:58 AM by Gadiel Ta MD  
  Added automatically from request for surgery [id number] Decubitus ulcer of left buttock, stage 2 ([facility] ICD-10-CM: Z97.900 ICD-9-CM: 707.05, 707.22  9/10/2020 Type 2 diabetes with nephropathy ([facility] ICD-10-CM: E11.21 
ICD-9-CM: 250.40, 583.81  8/24/2020 Pressure injury of buttock, stage 1 ICD-10-CM: L89.301 ICD-9-CM: 707.05, 707.21  6/4/2020 Incontinence of feces ICD-10-CM: R15.9 ICD-9-CM: 787.60  6/4/2020 On angiotensin receptor blockers (ARB) ICD-10-CM: P34.076 ICD-9-CM: V58.69  1/23/2020 Gastroesophageal reflux disease without esophagitis ICD-10-CM: K21.9 ICD-9-CM: 530.81  1/23/2020 Cough ICD-10-CM: R05 ICD-9-CM: 786.2  1/23/2020 Recurrent depression ([facility] ICD-10-CM: F33.9 ICD-9-CM: 296.30  8/22/2019 Age-related cataract of both eyes ICD-10-CM: H25.9 ICD-9-CM: 366.10  4/25/2019 Gait instability ICD-10-CM: R26.81 
ICD-9-CM: 781.2  6/21/2018 Type 2 diabetes mellitus without complication, without long-term current use of insulin (HCC) ICD-10-CM: E11.9 ICD-9-CM: 250.00  9/21/2017 Essential hypertension ICD-10-CM: I10 
ICD-9-CM: 401.9  9/21/2017 Hypercholesterolemia ICD-10-CM: E78.00 ICD-9-CM: 272.0  9/21/2017 Coronary artery disease involving native coronary artery of native heart without angina pectoris ICD-10-CM: I25.10 ICD-9-CM: 414.01  9/21/2017 S/P CABG (coronary artery bypass graft) ICD-10-CM: Z95.1 ICD-9-CM: V45.81  9/21/2017 Severe aortic stenosis ICD-10-CM: I35.0 ICD-9-CM: 424.1  9/21/2017 Aortic systolic murmur on examination ICD-10-CM: I35.8 ICD-9-CM: 424.1  9/21/2017 Benign prostatic hyperplasia with lower urinary tract symptoms ICD-10-CM: N40.1 ICD-9-CM: 600.01  9/21/2017  Insomnia ICD-10-CM: G47.00 
 ICD-9-CM: 780.52  9/21/2017 Former smoker ICD-10-CM: E99.196 ICD-9-CM: V15.82  9/21/2017 ACP (advance care planning) ICD-10-CM: Z71.89 ICD-9-CM: V65.49  9/21/2017 Current Facility-Administered Medications Medication Dose Route Frequency Provider Last Rate Last Dose  insulin glargine (LANTUS) injection 20 Units  20 Units SubCUTAneous QHS Bobby Pineda MD      
 guaiFENesin ER (MUCINEX) tablet 1,200 mg  1,200 mg Oral Q12H Gaila Phong, PA   1,200 mg at 11/01/20 1914  furosemide (LASIX) injection 40 mg  40 mg IntraVENous BID Gaila Phong, PA   40 mg at 11/01/20 6218  potassium chloride SR (KLOR-CON 10) tablet 20 mEq  20 mEq Oral BID Gaila Phong, PA   20 mEq at 11/01/20 0810  
 PHENYLephrine (ANATOLY-SYNEPHRINE) 30 mg in 0.9% sodium chloride 250 mL infusion   mcg/min IntraVENous TITRATE Yvette Hall MD   Stopped at 10/31/20 0040  
 sodium chloride (NS) flush 5-40 mL  5-40 mL IntraVENous Q8H Phoebe VILLATORO NP   10 mL at 10/31/20 2131  
 sodium chloride (NS) flush 5-40 mL  5-40 mL IntraVENous PRN Rashida Bangura NP      
 cephALEXin (KEFLEX) capsule 250 mg  250 mg Oral TID Phoebe VILLATORO NP   250 mg at 11/01/20 0810  
 atorvastatin (LIPITOR) tablet 40 mg  40 mg Oral QHS Meche Richardson MD   40 mg at 10/31/20 2200  
 metoprolol tartrate (LOPRESSOR) tablet 50 mg  50 mg Oral BID Meche Richardson MD   50 mg at 11/01/20 1768  aspirin chewable tablet 81 mg  81 mg Oral DAILY Gregory Yousif NP   81 mg at 11/01/20 0810  
 amiodarone (CORDARONE) 375 mg/250 mL D5W infusion  0.5-1 mg/min IntraVENous TITRATE Lilian Eli MD 20 mL/hr at 11/01/20 0132 0.5 mg/min at 11/01/20 0132  sodium chloride (NS) flush 5-40 mL  5-40 mL IntraVENous Q8H Gregory Benja, NP   10 mL at 10/31/20 2131  
 sodium chloride (NS) flush 5-40 mL  5-40 mL IntraVENous PRN Gregory Benja, NP      
  balsam peru-castor oiL (VENELEX) ointment   Topical BID Maryse Easonk, NP      
 acetaminophen (TYLENOL) tablet 650 mg  650 mg Oral Q4H PRN Maryse Easonk, NP   650 mg at 10/31/20 9700  traMADoL (ULTRAM) tablet  mg   mg Oral Q6H PRN Maryse Easonk, NP   50 mg at 10/31/20 2224  oxyCODONE-acetaminophen (PERCOCET) 5-325 mg per tablet 1-2 Tab  1-2 Tab Oral Q4H PRN Maryse Krishnamurthy, NP      
 naloxone [facility]) injection 0.4 mg  0.4 mg IntraVENous PRN Maryse Krishnamurthy, NP      
 ondansetron [facility]) injection 4 mg  4 mg IntraVENous Q4H PRN Maryse Krishnamurthy, NP      
 albuterol (PROVENTIL VENTOLIN) nebulizer solution 2.5 mg  2.5 mg Nebulization Q4H PRN Maryse Krishnamurthy, NP      
 famotidine (PEPCID) tablet 20 mg  20 mg Oral Q12H Maryse Krishnamurthy, NP   20 mg at 11/01/20 0810  
 magnesium oxide (MAG-OX) tablet 400 mg  400 mg Oral BID Maryse Easonk, NP   400 mg at 11/01/20 0810  
 bisacodyL (DULCOLAX) suppository 10 mg  10 mg Rectal DAILY PRN Maryse Krishnamurthy, NP      
 senna-docusate (PERICOLACE) 8.6-50 mg per tablet 1 Tab  1 Tab Oral BID Angiery Limerick, NP   1 Tab at 11/01/20 0810  
 ascorbic acid (vitamin C) (VITAMIN C) tablet 1,000 mg  1,000 mg Oral TID Angiery Jenarok, NP   1,000 mg at 11/01/20 0810  
 doxazosin (CARDURA) tablet 4 mg  4 mg Oral QHS Angiery Limerick, NP   4 mg at 10/31/20 2131  
 finasteride (PROSCAR) tablet 5 mg  5 mg Oral DAILY Henriettaamary Limerick, NP   5 mg at 11/01/20 5071  sertraline (ZOLOFT) tablet 100 mg  100 mg Oral QPM Maryse Easonk, NP   100 mg at 10/31/20 1729  
 insulin lispro (HUMALOG) injection   SubCUTAneous AC&HS Maryse Warrenricardoowen, NP   2 Units at 11/01/20 7804 Allergies Allergen Reactions  Procaine Other (comments) Pt stated he passed out from procaine and was told not to let anyone use it on him again Past Medical History:  
Diagnosis Date  BPH (benign prostatic hyperplasia)  CAD (coronary artery disease)  Diabetes ([facility]  Hearing loss  Hypercholesterolemia  Hypertension  Murmur  Recurrent depression ([facility] 2019  Third degree AV block ([facility] 10/30/2020 Past Surgical History:  
Procedure Laterality Date  CARDIAC SURG PROCEDURE UNLIST  2006 by-pass  HX CHOLECYSTECTOMY  MD INS NEW/RPLCMT PRM PM W/TRANSV ELTRD ATRIAL&VENT  10/30/2020  MD INS NEW/RPLCMT PRM PM W/TRANSV ELTRD ATRIAL&VENT N/A 10/30/2020 INSERT PPM DUAL performed by Saige Muir MD at [address], [facility] CATH LAB Family History Problem Relation Age of Onset  Cancer Mother  Cancer Father Social History Tobacco Use  Smoking status: Former Smoker Types: Cigarettes Last attempt to quit: 1990 Years since quittin.1  Smokeless tobacco: Never Used Substance Use Topics  Alcohol use: No  
  
 
Review of Systems: All other review of systems otherwise negative. Constitutional: Negative for fever, chills, weight loss, + malaise/fatigue. HEENT: Negative for nosebleeds, vision changes. Respiratory: Negative for cough, hemoptysis Cardiovascular: Negative for chest pain, palpitations, orthopnea, claudication, leg swelling, syncope, and PND. Gastrointestinal: Negative for nausea, vomiting, diarrhea, blood in stool and melena. Genitourinary: Negative for dysuria, and hematuria. Musculoskeletal: Negative for myalgias, arthralgia. Skin: Negative for rash. Heme: Does not bleed or bruise easily. Neurological: Negative for speech change and focal weakness Objective:  
 
Visit Vitals BP (!) 157/57 Pulse 77 Temp 97.6 °F (36.4 °C) Resp 24 Ht 6' 3\" (1.905 m) Wt 201 lb 1 oz (91.2 kg) SpO2 98% BMI 25.13 kg/m² Physical Exam:  
Constitutional: Pleasant. Thin. No respiratory distress. Head: Normocephalic and atraumatic. Eyes: Pupils are equal, round. ENT: Hearing grossly normal. 
Neck: Supple. Right IJ temp pacing catheter. Cardiovascular: Normal rate, regular rhythm. Exam reveals no gallop and no friction rub. No murmur heard. Pulmonary/Chest: Effort normal and No wheezes. Abdominal: Soft, no tenderness. Musculoskeletal: No edema. Neurological: alert, oriented. Skin: left sided pacer site without hematoma, dressing over Psychiatric: normal mood and affect. Behavior is normal. Judgment and thought content normal.   
 
CMP:  
Lab Results Component Value Date/Time  11/01/2020 04:06 AM  
 K 4.2 11/01/2020 04:06 AM  
  11/01/2020 04:06 AM  
 CO2 29 11/01/2020 04:06 AM  
 AGAP 5 11/01/2020 04:06 AM  
  (H) 11/01/2020 04:06 AM  
 BUN 26 (H) 11/01/2020 04:06 AM  
 CREA 0.96 11/01/2020 04:06 AM  
 GFRAA >60 11/01/2020 04:06 AM  
 GFRNA >60 11/01/2020 04:06 AM  
 CA 9.1 11/01/2020 04:06 AM  
 
CBC:  
Lab Results Component Value Date/Time WBC 13.5 (H) 11/01/2020 04:06 AM  
 HGB 8.9 (L) 11/01/2020 04:06 AM  
 HCT 27.8 (L) 11/01/2020 04:06 AM  
 PLT 87 (L) 11/01/2020 04:06 AM  
  
 
Assessment/Plan:  
Long First degree av block before TAVR for AS Hx of CABG and CAD 
PAD IDDM Third degree av block with PAT episode and syncope, requiring placement of another temporary pacing catheter Hx of enterobacter UTI He had PAT in the past 
I will check with [employer] rep later if pacemaker has shown atrial fibrillation If not I would recommend to stop amiodarone and continue with aspirin and metoprolol He is anemic and has low platelet Addendum [employer] rep checked his pacemaker No AFIB noted Leads function normally Carlos Manuel Brownlee M.D. Electrophysiology/Cardiology [address] [facility] [place] [address]                               
[phone]

## 2020-11-01 NOTE — PROGRESS NOTES
Problem: Mobility Impaired (Adult and Pediatric) Goal: *Acute Goals and Plan of Care (Insert Text) Description: FUNCTIONAL STATUS PRIOR TO ADMISSION: Patient was modified independent using a cane for functional mobility. HOME SUPPORT PRIOR TO ADMISSION: The patient lived with wife but did not require assist.  The patient endorses being open to home health for therapy. He resides at [facility] in an independent living apartment. Has children in the area. Physical Therapy Goals Initiated 10/29/2020 1. Patient will move from supine to sit and sit to supine , scoot up and down, and roll side to side in bed with modified independence within 7 day(s). 2.  Patient will transfer from bed to chair and chair to bed with modified independence using the least restrictive device within 7 day(s). 3.  Patient will perform sit to stand with modified independence within 7 day(s). 4.  Patient will ambulate with modified independence for 300 feet with the least restrictive device within 7 day(s). Outcome: Progressing Towards Goal 
  
PHYSICAL THERAPY TREATMENT Patient: Sumi Gaspar (5 y.o. male) Date: 11/1/2020 Diagnosis: Syncope [R55] Syncope [R55] Aortic stenosis Procedure(s) (LRB): 
INSERT PPM DUAL (N/A) 2 Days Post-Op Precautions:   
Chart, physical therapy assessment, plan of care and goals were reviewed. ASSESSMENT Patient continues with skilled PT services and is progressing towards goals. Pt was able to improve mobility and decrease assist level. Pt was able to ambulate a short distance but with decrease stability. Pt was educated on pacer precautions and was able to recall later in session. PTA pt ambulated with a cane at this time pt required bilateral UE support. Depending on progression pt may benefit from inpt rehab at discharge.  .  
 
Current Level of Function Impacting Discharge (mobility/balance): mod/min A x2 
 
 Other factors to consider for discharge: decrease balance, pacer precautions requiring assistance PLAN : 
Patient continues to benefit from skilled intervention to address the above impairments. Continue treatment per established plan of care. to address goals. Recommendation for discharge: (in order for the patient to meet his/her long term goals) To be determined: inpt rehab ? This discharge recommendation: 
Has not yet been discussed the attending provider and/or case management IF patient discharges home will need the following DME: to be determined (TBD) SUBJECTIVE:  
Patient stated I haven't walked much.  OBJECTIVE DATA SUMMARY:  
Critical Behavior: 
Neurologic State: Alert Orientation Level: Oriented X4 Cognition: Appropriate decision making, Appropriate safety awareness, Appropriate for age attention/concentration Safety/Judgement: Awareness of environment, Fall prevention Functional Mobility Training: 
Bed Mobility: 
  
Supine to Sit: Moderate assistance Transfers: 
Sit to Stand: Minimum assistance;Assist x2 Stand to Sit: Minimum assistance;Assist x2 Balance: 
Sitting: Intact Standing: Impaired Standing - Static: Fair Standing - Dynamic : Fair Ambulation/Gait Training: 
Distance (ft): 40 Feet (ft) Assistive Device: Gait belt(bilateral HHA) Ambulation - Level of Assistance: Minimal assistance; Moderate assistance;Assist x2 Gait Abnormalities: Decreased step clearance; Path deviations Base of Support: Center of gravity altered Speed/Zaida: Slow Step Length: Left shortened;Right shortened Stairs: 
  
  
   
 
 
Pain Rating: No complaints Activity Tolerance:  
Limited Please refer to the flowsheet for vital signs taken during this treatment. After treatment patient left in no apparent distress:  
Sitting in chair and Call bell within reach COMMUNICATION/COLLABORATION:  
 The patients plan of care was discussed with: Registered nurse. Marquis Hoffmann, JAHAIRA Time Calculation: 17 mins

## 2020-11-01 NOTE — PROGRESS NOTES
0730 Bedside and Verbal shift change report given to Ap Farnsworth (oncoming nurse) by Kristine Singleton (offgoing nurse). Report included the following information SBAR, Kardex, Procedure Summary, Intake/Output, MAR, Accordion and Recent Results.

## 2020-11-01 NOTE — INTERDISCIPLINARY ROUNDS
Multidisciplinary rounds were held 11/1/2020. Today's plan/goal includes (but not limited to): PT/OT, improved breathing, wean O2.

## 2020-11-02 NOTE — PROGRESS NOTES
[facility] FLOOR Progress Note Admit Date: 10/18/2020 Procedure:  Procedure(s): 
 
10/28/20:  Transcatheter Aortic Valve Replacement, Shockwave, Balloon Valvuloplasty, Transthoracic Echo and KENAN by Dr. Jason Arce   
 
10/30/2020: INSERT PPM DUAL Subjective:  
Pt seen with Dr. Thierry Tristan. Afebrile, Oxygen at 4L per NC. In bed with breakfast. BP has been labile-elevated this morning. Getting am medications now; mild dyspnea, fatigue, and weakness Objective:  
 
Visit Vitals BP (!) 174/39 (BP 1 Location: Right arm, BP Patient Position: At rest) Pulse 70 Temp 97.6 °F (36.4 °C) Resp 20 Ht 6' 3\" (1.905 m) Wt 190 lb 7.6 oz (86.4 kg) SpO2 99% BMI 23.81 kg/m² Temp (24hrs), Av.3 °F (36.8 °C), Min:97.5 °F (36.4 °C), Max:98.7 °F (37.1 °C) Last 24hr Input/Output: 
 
Intake/Output Summary (Last 24 hours) at 2020 9902 Last data filed at 2020 5163 Gross per 24 hour Intake 440 ml Output 1850 ml Net -1410 ml  
  
 
EKG/Rhythm: Paced in the 80s Oxygen: 4 L per NC 
 
CXR:  
CXR Results  (Last 48 hours) None Admission Weight: Last Weight Weight: 205 lb 7.5 oz (93.2 kg) Weight: 190 lb 7.6 oz (86.4 kg) EXAM: 
General: No acute distress. Lungs:   Clear to auscultation bilaterally. Incision:  No erythema, drainage or swelling. Heart:  Regular rate and rhythm, S1, S2 normal, no murmur, click, rub or gallop. Abdomen:   Soft, non-tender. Bowel sounds normal. No masses,  No organomegaly. +BM  Extremities:  No edema. PPP Neurologic:  Gross motor and sensory apparatus intact. Activity: Up with assist.  
 
Lab Data Reviewed:  
Recent Labs 20 
7576 20 
2600 WBC  --  10.6 HGB  --  8.9* HCT  --  27.0*  
PLT  --  108* NA  --  140 K  --  3.5 BUN  --  31* CREA  --  0.97 GLU  --  132* GLUCPOC 137*  --   
 
 
 
Assessment:  
 
Principal Problem: Aortic stenosis (10/18/2020) Overview: Added automatically from request for surgery [id number] Active Problems: 
  Syncope and collapse (10/18/2020) Syncope (10/18/2020) (HFpEF) heart failure with preserved ejection fraction ([address] (10/20/2020) Pacemaker (10/30/2020) Overview: 10/30/2020 dual chamber medtronic pacemaker Third degree AV block ([facility] [address] (10/30/2020) Plan/Recommendations/Medical Decision Makin. Aortic stenosis, severe and symptomatic (paradoxical LFLG) s/p TAVR: ASA 81 mg only for AC. Echo completed and reviewed by Dr. Rachel Sidhu. 
  
2.  CAD with Hx of CABG: On ASA, Statin, BB 
  
3. HTN: Elevated this morning but did have some low systolics overnight. Getting am meds now. Will re-evaluate 
  
4. HLD: On Statin 
  
5. HFpEF: BP management 
  
6. Syncope: Prior to admission. ? AS related but post TAVR with CHB now s/p PPM. BICA of <50%.  
  
7. PAD: On ASA, Statin.  
  
8. DM: On Metformin, Glipizide at home. Lantus/SSI while in the hospital. A1C of 7.1.  
  
9. MAT/Atrial fibrillation/Asystole: Intermittent Atrial fibrillation. Currently in NSR. Currently on ASA 81. No OAC historically. s/p PPM. Cont BB. 
  
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 Continue to monitor 
  
11. UTI: On vancomycin completed 10/28 and ceftriaxone (completed). MRSA and enterobacter on culture. ID signed off. 
  
12. BPH: On Proscar 
  
13. Depression: on Zoloft 
  
14. Lethargy/debility: hold restoril, OOB as much as possible. PT/OT.  
  
15. Acute post op resp failure, pulm edema: wean NC for O2 sats >92%. Diurese. IS as able, add mucinex to help clear secretions. 
  
Dispo: PT/OT/Cardiac Rehab-will await recommendations. Case Management for discharge planning. Will likely need rehab. Signed By: Daniel Méndez NP Saw patient, agree with above Risk of morbidity and mortality - high Medical decision making - high complexity 1. Aortic stenosis, severe and symptomatic (paradoxical LFLG) s/p TAVR: ASA 81 mg only for AC. Echo completed and reviewed by Dr. Aissatou Agosto. 
  
2.  CAD with Hx of CABG: On ASA, Statin, BB 
  
3. HTN: Elevated this morning but did have some low systolics overnight. Getting am meds now. Will re-evaluate 
  
4. HLD: On Statin 
  
5. HFpEF: BP management 
  
6. Syncope: Prior to admission. ? AS related but post TAVR with CHB now s/p PPM. BICA of <50%.  
  
7. PAD: On ASA, Statin.  
  
8. DM: On Metformin, Glipizide at home. Lantus/SSI while in the hospital. A1C of 7.1.  
  
9. MAT/Atrial fibrillation/Asystole: Intermittent Atrial fibrillation. Currently in NSR. Currently on ASA 81. No OAC historically. s/p PPM. Cont BB. 
  
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 Continue to monitor 
  
11. UTI: On vancomycin completed 10/28 and ceftriaxone (completed). MRSA and enterobacter on culture. ID signed off. 
  
12. BPH: On Proscar 
  
13. Depression: on Zoloft 
  
14. Lethargy/debility: hold restoril, OOB as much as possible. PT/OT.  
  
15. Acute post op resp failure, pulm edema: wean NC for O2 sats >92%. Diurese. IS as able, add mucinex to help clear secretions.

## 2020-11-02 NOTE — PROGRESS NOTES
Problem: Mobility Impaired (Adult and Pediatric) Goal: *Acute Goals and Plan of Care (Insert Text) Description: FUNCTIONAL STATUS PRIOR TO ADMISSION: Patient was modified independent using a cane for functional mobility. HOME SUPPORT PRIOR TO ADMISSION: The patient lived with wife but did not require assist.  The patient endorses being open to home health for therapy. He resides at [facility] in an independent living apartment. Has children in the area. Physical Therapy Goals Initiated 10/29/2020 1. Patient will move from supine to sit and sit to supine , scoot up and down, and roll side to side in bed with modified independence within 7 day(s). 2.  Patient will transfer from bed to chair and chair to bed with modified independence using the least restrictive device within 7 day(s). 3.  Patient will perform sit to stand with modified independence within 7 day(s). 4.  Patient will ambulate with modified independence for 300 feet with the least restrictive device within 7 day(s). Outcome: Progressing Towards Goal 
  
PHYSICAL THERAPY TREATMENT Patient: Qamar Hamilton (63 y.o. male) Date: 11/2/2020 Diagnosis: Syncope [R55] Syncope [R55] Aortic stenosis Procedure(s) (LRB): 
INSERT PPM DUAL (N/A) 3 Days Post-Op Precautions:   
Chart, physical therapy assessment, plan of care and goals were reviewed. ASSESSMENT Patient continues with skilled PT services and is progressing towards goals. Pt was able to increase gait tolerance. Pt expressed SOB with task SpO2 92% on 4 L O2 HR 81 bpm. Initiated gait with cane but decrease stability with task so switched to rolling walker with improved balance. Pt has decrease adherence to pacer precautions requiring frequent v.c to not utilize left UE. Pt desires to return home with HHPT. Pt needs to improve transfers and decrease assistance.   Pt has rollator at home will attempt trial  
 
 Current Level of Function Impacting Discharge (mobility/balance):mod A Other factors to consider for discharge: pacer adherence PLAN : 
Patient continues to benefit from skilled intervention to address the above impairments. Continue treatment per established plan of care. to address goals. Recommendation for discharge: (in order for the patient to meet his/her long term goals) Physical therapy at least 2 days/week in the home AND ensure assist and/or supervision for safety with functional mobility vs rehab due to assist level This discharge recommendation: 
Has not yet been discussed the attending provider and/or case management IF patient discharges home will need the following DME: to be determined (TBD) SUBJECTIVE:  
Patient stated I am ready.  OBJECTIVE DATA SUMMARY:  
Critical Behavior: 
Neurologic State: Alert Orientation Level: Oriented X4 Cognition: Appropriate decision making, Appropriate for age attention/concentration, Appropriate safety awareness, Follows commands Safety/Judgement: Awareness of environment, Fall prevention Functional Mobility Training: 
Bed Mobility: 
  
Supine to Sit: Moderate assistance(for trunk and adherence to pacer precautions) Transfers: 
Sit to Stand: Minimum assistance(from elevated surface, mod A from chair. v.c for sequencing ) Stand to Sit: Minimum assistance(v.c for sequencing decrease control) Balance: 
Sitting: Intact Standing: Impaired Standing - Static: Good Standing - Dynamic : Fair Ambulation/Gait Training: 
Distance (ft): 70 Feet (ft) Assistive Device: Gait belt;Walker, rolling Ambulation - Level of Assistance: Contact guard assistance Gait Abnormalities: Decreased step clearance Base of Support: Widened Speed/Zaida: Slow Step Length: Left shortened;Right shortened Stairs: Therapeutic Exercises:  
 
Pain Rating: No complaints Activity Tolerance:  
requires rest breaks and observed SOB with activity Please refer to the flowsheet for vital signs taken during this treatment. After treatment patient left in no apparent distress:  
Sitting in chair and Call bell within reach COMMUNICATION/COLLABORATION:  
The patients plan of care was discussed with: Occupational therapist and Registered nurse. Joya Mena PTA Time Calculation: 18 mins

## 2020-11-02 NOTE — PROGRESS NOTES
TRANSITION OF CARE PLAN  
RUR 19%  MODERATE Chart reviewed. Received consult for Home Health for Skilled Nursing services. Met with patient at bedside to discuss discharge plan. Patient requested CM to call his wife, [place] to discuss recommendations. Call placed to patient's wife, [place] [phone] to discuss plan. No answer and unable to leave voice message. CM will call back later. CM to follow. ESTELA Castillo,CRM

## 2020-11-02 NOTE — PROGRESS NOTES
1930: Bedside shift change report given to 2755 [address] Myers (oncoming nurse) by Waqas Manzano (offgoing nurse). Report included the following information SBAR, Kardex, Intake/Output, MAR and Cardiac Rhythm Paced/NSR.  
 
0730: Bedside shift change report given to [address] [facility] and Yaritza DOUGLAS (oncoming nurse) by Ara Hartmann RN (offgoing nurse). Report included the following information SBAR, Kardex, Intake/Output, MAR and Cardiac Rhythm paced/NSR.

## 2020-11-02 NOTE — PROGRESS NOTES
0730 Bedside shift change report given to marcela (oncoming nurse) by Kota Lugo (offgoing nurse). Report included the following information SBAR, Kardex, Intake/Output, MAR and Recent Results.

## 2020-11-02 NOTE — PROGRESS NOTES
Problem: Self Care Deficits Care Plan (Adult) Goal: *Acute Goals and Plan of Care (Insert Text) Description:  
FUNCTIONAL STATUS PRIOR TO ADMISSION: Patient was independent/mod I with mobility and ADLs, uses SPC PRN and a shower chair. HOME SUPPORT: The patient lived with his wife but did not require assist. Lives at [address] [place] [address], has supportive children nearby. Occupational Therapy Goals Initiated 10/29/2020 1. Patient will perform grooming in standing >3 minutes VSS with modified independence within 7 day(s). 2.  Patient will perform lower body dressing with supervision/set-up within 7 day(s). 3.  Patient will perform toilet transfers with modified independence within 7 day(s). 4.  Patient will perform all aspects of toileting with modified independence within 7 day(s). 5.  Patient will participate in upper extremity therapeutic exercise/activities with independence for 5 minutes VSS within 7 day(s). - CHANGED 6.  Patient will utilize energy conservation techniques during functional activities with verbal cues within 7 day(s). Re-Evaluation s/p pacemaker placement on 11/2/20 1. Patient will perform grooming in standing >3 minutes VSS with modified independence within 7 day(s). 2.  Patient will perform lower body dressing with supervision/set-up within 7 day(s). 3.  Patient will perform toilet transfers with modified independence within 7 day(s). 4.  Patient will perform all aspects of toileting with modified independence within 7 day(s). 5.  Patient will verbalize and demonstrate PPM precautions during functional activities with min verbal cues within 7 day(s). 6.  Patient will utilize energy conservation techniques during functional activities with verbal cues within 7 day(s). Outcome: Progressing Towards Goal 
  
OCCUPATIONAL THERAPY RE-EVALUATION Patient: Cammy Roman (81 y.o. male) Date: 11/2/2020 Diagnosis: Syncope [R55] Syncope [R55] Aortic stenosis Procedure(s) (LRB): 
INSERT PPM DUAL (N/A) 3 Days Post-Op Precautions: PPM precautions Chart, occupational therapy assessment, plan of care, and goals were reviewed. ASSESSMENT Based on the objective data described below, decreased cardiopulmonary endurance, impaired functional mobility and balance, new onset PPM precautions, and decreased activity tolerance, now POD #3 of PPM placement. Patient received supine with HOB elevated with 4L O2 via NC. Patient able to demonstrate modified tailor sitting in bed to don B socks with min assist required to completely thread toes. Patient able to come to EOB with mod assist at trunk to achieve upright position. Patient with difficulty verbalizing PPM precautions, only able to recall \"do not lift. \" Reviewed all precautions with patient then verbalizing understanding - frequent cues needed for compliance during transfers with somewhat improved carryover by conclusion of session. Following bathroom mobility and static standing in prep for improved standing ADL tolerance and participation, patient returning to chair for seated rest break and light grooming tasks. As monitored this session, at lowest point SpO2 down to 90% but generally maintaining in the mid 90s with paced activity. Existing goals remain appropriate with exception of one goal modified to address carryover of PPM precautions. Anticipate patient will be able to return home with [facility] services and wife's support once medically cleared for discharge. Current Level of Function Impacting Discharge (ADLs): mod A to stand from low surface height; min A to stand from higher surface; CGA for bathroom mobility; requires pacing and frequent cues for compliance with PPM precautions Other factors to consider for discharge: now on 4L O2, no supplementary O2 at baseline PLAN : 
Recommendations and Planned Interventions: self care training, functional mobility training, balance training, therapeutic activities, endurance activities, patient education and home safety training Frequency/Duration: Patient will be followed by occupational therapy 5 times a week to address goals. Recommend with staff: OOB to chair with assist x2 for safety and to ensure compliance with PPM precautions Recommend next OT session: standing ADLs, energy conservation Recommendation for discharge: (in order for the patient to meet his/her long term goals) Occupational therapy at least 2 days/week in the home This discharge recommendation: 
Has not yet been discussed the attending provider and/or case management Equipment recommendations for successful discharge (if) home: TBD SUBJECTIVE:  
Patient stated I'm feeling better.  OBJECTIVE DATA SUMMARY:  
Hospital course since last seen and reason for reevaluation: PPM placement on 10/30, now POD #3 Cognitive/Behavioral Status: 
Neurologic State: Alert; Appropriate for age Orientation Level: Oriented X4 Cognition: Follows commands; Appropriate for age attention/concentration; Appropriate safety awareness Perception: Appears intact Perseveration: No perseveration noted Safety/Judgement: Insight into deficits; Fall prevention; Awareness of environment Hearing: Auditory Auditory Impairment: Hard of hearing, bilateral, Hearing aid(s) Vision/Perceptual: No reported changes since operation on 10/30 Range of Motion: 
Generally decreased, functional (LUE restricted 2/2 PPM precautions) Strength: 
Generally decreased, functional (LUE restricted 2/2 PPM precautions) Coordination: 
 Within functional limits Functional Mobility and Transfers for ADLs: 
Bed Mobility: 
Supine to Sit: Moderate assistance Scooting: Stand-by assistance Transfers: 
Sit to Stand: Minimum assistance; Moderate assistance Functional Transfers Bathroom Mobility: Contact guard assistance Toilet Transfer : Minimum assistance(infer 2/2 balance and mobility from lower surface height) Bed to Chair: Contact guard assistance; Adaptive equipment Balance: 
Sitting: Intact Standing: Impaired Standing - Static: Good Standing - Dynamic : Fair ADL Assessment: 
Feeding: Independent Oral Facial Hygiene/Grooming: Contact guard assistance(for standing) Upper Body Dressing: Setup Lower Body Dressing: Moderate assistance(infer 2/2 mobility, balance, and endurance) Toileting: Minimum assistance(infer 2/2 mobility and balance) ADL Intervention and task modifications: 
Grooming Grooming Assistance: Set-up Position Performed: Seated in chair Washing Face: Independent Brushing Teeth: Independent Brushing/Combing Hair: Independent Lower Body Dressing Assistance Dressing Assistance: Minimum assistance Socks: Minimum assistance Position Performed: (supine with HOB elevated) Cues: Verbal cues provided;Visual cues provided Cognitive Retraining Safety/Judgement: Insight into deficits; Fall prevention; Awareness of environment Therapeutic Exercises:  
Generally decreased with SOB noted with light activity - SpO2 90% at lowest point, improving to mid 90s with seated rest and cues for pacing activity. Functional Measure: 
Barthel Index: 
 
Bathin Bladder: 5 Bowels: 10 
Groomin Dressin Feeding: 10 Mobility: 5 Stairs: 0 Toilet Use: 5 Transfer (Bed to Chair and Back): 10 Total: 55/100 The Barthel ADL Index: Guidelines 1. The index should be used as a record of what a patient does, not as a record of what a patient could do. 2. The main aim is to establish degree of independence from any help, physical or verbal, however minor and for whatever reason. 3. The need for supervision renders the patient not independent. 4. A patient's performance should be established using the best available evidence.  Asking the patient, friends/relatives and nurses are the usual sources, but direct observation and common sense are also important. However direct testing is not needed. 5. Usually the patient's performance over the preceding 24-48 hours is important, but occasionally longer periods will be relevant. 6. Middle categories imply that the patient supplies over 50 per cent of the effort. 7. Use of aids to be independent is allowed. Marsha De La Torre., Barthel, D.W. (6928). Functional evaluation: the Barthel Index. [address] [facility] (14)2. [facility] [place] SAMANTA CagleF, Ardelle Sport., Dewanda Fill., [place], [address] [facility] (1999). Measuring the change indisability after inpatient rehabilitation; comparison of the responsiveness of the Barthel Index and Functional [place] Measure. Journal of Neurology, Neurosurgery, and Psychiatry, 66(4), 215-752. Arsenio Houston, N.[place].A, VALENTINO Gutiérrez, & Mitch Shannon MCAROL. (2004.) Assessment of post-stroke quality of life in cost-effectiveness studies: The usefulness of the Barthel Index and the EuroQoL-5D. [facility], 13, [phone] Pain: No pain reported. Activity Tolerance:  
Fair, desaturates with exertion and requires oxygen and requires rest breaks Please refer to the flowsheet for vital signs taken during this treatment. After treatment patient left in no apparent distress:  
Sitting in chair and Call bell within reach COMMUNICATION/COLLABORATION:  
The patients plan of care was discussed with: Physical therapist and Registered nurse. Laura Falcon OT Time Calculation: 30 mins

## 2020-11-02 NOTE — PROGRESS NOTES
Consult received and appreciated, however order entered per protocol, which is a nursing order. SLP requires a physician's order to complete swallowing evaluation. If formal evaluation is desired, please re-consult with MD order. Thanks.   
Robyn Gonzalez, SLP

## 2020-11-02 NOTE — PROGRESS NOTES
Comprehensive Nutrition Assessment Type and Reason for Visit: RD nutrition re-screen/LOS Nutrition Recommendations/Plan:   
Continue current diet Nutrition Assessment:   
Pt admitted for aortic stenosis. PMHx: CAD, DM, HTN, depression. S/p TAVR Pt sleeping soundly at time of visit. Negative malnutrition screen on admit. Minimal documentation of PO intake available. Will add Glucerna BID (440kcal, 20g protein) for added protein. Malnutrition Assessment: 
Malnutrition Status:  No malnutrition Nutritionally Significant Medications: vit C, cardura, pepcid, lasix, lanuts (20 units), SSI, mag-ox, KCl, pericolace, zoloft Estimated Daily Nutrient Needs: 
Energy (kcal): 1972(MSJ x 1.2); Weight Used for Energy Requirements: NIWNXWT(03.0AR) Protein (g): 86-103g (1-1.2g/kg); Weight Used for Protein Requirements: AYIGLXW(40.7HQ) Fluid (ml/day): 2000ml; Weight Used for Fluid Requirements: Current(86.4kg) Nutrition Related Findings:      
BM: 11/1 Edema: none Wounds:       
 
Current Nutrition Therapies:  
Diet: cardiac, consistent CHO, 2000kcal 
Supplements: none Additional Caloric Sources:     
Meal intake:  
Patient Vitals for the past 168 hrs: 
 % Diet Eaten 10/29/20 2000 75 % Anthropometric Measures: 
· Height:  6' 3\" (190.5 cm) · Current Body Wt:  86.4 kg (190 lb 7.6 oz) · Admission Body Wt:      
· Usual Body Wt:       
· Ideal Body Wt:   :  97.2 % Wt Readings from Last 10 Encounters:  
11/02/20 86.4 kg (190 lb 7.6 oz) 10/15/20 89.4 kg (197 lb)  
09/10/20 88.9 kg (196 lb) 08/13/20 88 kg (194 lb) 06/05/20 88 kg (194 lb) 06/04/20 88.1 kg (194 lb 4.8 oz) 02/06/20 88.2 kg (194 lb 6.4 oz) 01/23/20 88.3 kg (194 lb 9.6 oz) 01/16/20 87.5 kg (193 lb) 01/09/20 89.4 kg (197 lb 3.2 oz) Nutrition Diagnosis:  
· Increased nutrient needs related to (healing and recovery) as evidenced by (s/p TAVR) Nutrition Interventions: Food and/or Nutrient Delivery: Continue current diet, Start oral nutrition supplement Nutrition Education and Counseling: No recommendations at this time Coordination of Nutrition Care: No recommendation at this time Goals: 
Continued consumption of at least 75% meals in 5-7 days Nutrition Monitoring and Evaluation:  
Behavioral-Environmental Outcomes: None identified Food/Nutrient Intake Outcomes: Supplement intake, Food and nutrient intake Physical Signs/Symptoms Outcomes: Weight Discharge Planning: Too soon to determine Jem Greene, RD  [phone], Pager #[phone] or via [employer]

## 2020-11-02 NOTE — PROGRESS NOTES
Problem: Patient Education: Go to Patient Education Activity Goal: Patient/Family Education Outcome: Progressing Towards Goal 
  
Problem: Falls - Risk of 
Goal: *Absence of Falls Description: Document Lori Patel Fall Risk and appropriate interventions in the flowsheet. Outcome: Progressing Towards Goal 
Note: Fall Risk Interventions: 
Mobility Interventions: Communicate number of staff needed for ambulation/transfer, Patient to call before getting OOB Mentation Interventions: Adequate sleep, hydration, pain control, Evaluate medications/consider consulting pharmacy Medication Interventions: Patient to call before getting OOB Elimination Interventions: Call light in reach History of Falls Interventions: Consult care management for discharge planning Problem: Patient Education: Go to Patient Education Activity Goal: Patient/Family Education Outcome: Progressing Towards Goal 
  
Problem: General Medical Care Plan Goal: *Vital signs within specified parameters Outcome: Progressing Towards Goal 
Goal: *Absence of infection signs and symptoms Outcome: Progressing Towards Goal 
Goal: *Optimal pain control at patient's stated goal 
Outcome: Progressing Towards Goal 
Goal: *Skin integrity maintained Outcome: Progressing Towards Goal 
  
Problem: Patient Education: Go to Patient Education Activity Goal: Patient/Family Education Outcome: Progressing Towards Goal 
  
Problem: Cath Lab Procedures: Discharge Outcomes Goal: *Stable cardiac rhythm Outcome: Progressing Towards Goal 
Goal: *Hemodynamically stable Outcome: Progressing Towards Goal 
Goal: *Optimal pain control at patient's stated goal 
Outcome: Progressing Towards Goal 
Goal: *Pulses palpable, skin color within defined limits, skin temperature warm Outcome: Progressing Towards Goal 
Goal: *Lungs clear or at baseline Outcome: Progressing Towards Goal 
Goal: *Demonstrates ability to perform prescribed activity without shortness of breath or discomfort Outcome: Progressing Towards Goal 
Goal: *Verbalizes home exercise program, activity guidelines, cardiac precautions Outcome: Progressing Towards Goal 
Goal: *Verbalizes understanding and describes prescribed diet Outcome: Progressing Towards Goal 
Goal: *Verbalizes understanding and describes medication purposes and frequencies Outcome: Progressing Towards Goal 
Goal: *Identifies cardiac risk factors Outcome: Progressing Towards Goal 
Goal: *No signs and symptoms of infection or wound complications Outcome: Progressing Towards Goal 
Goal: *Anxiety reduced or absent Outcome: Progressing Towards Goal 
Goal: *Verbalizes and demonstrates incision care Outcome: Progressing Towards Goal 
Goal: *Understands and describes signs and symptoms to report to providers(Stroke Metric) Outcome: Progressing Towards Goal 
Goal: *Describes follow-up/return visits to physicians Outcome: Progressing Towards Goal 
Goal: *Describes available resources and support systems Outcome: Progressing Towards Goal 
Goal: *Influenza immunization Outcome: Progressing Towards Goal 
Goal: *Pneumococcal immunization Outcome: Progressing Towards Goal 
  
Problem: Risk for Spread of Infection Goal: Prevent transmission of infectious organism to others Description: Prevent the transmission of infectious organisms to other patients, staff members, and visitors. Outcome: Progressing Towards Goal 
  
Problem: Patient Education:  Go to Education Activity Goal: Patient/Family Education Outcome: Progressing Towards Goal 
  
Problem: Syncope Goal: *Absence of injury Outcome: Progressing Towards Goal 
Goal: Decrease or eliminate episodes of syncope Outcome: Progressing Towards Goal 
  
Problem: Patient Education: Go to Patient Education Activity Goal: Patient/Family Education Outcome: Progressing Towards Goal 
  
Problem: Pressure Injury - Risk of 
Goal: *Prevention of pressure injury Description: Document Justin Scale and appropriate interventions in the flowsheet. Outcome: Progressing Towards Goal 
Note: Pressure Injury Interventions: 
Sensory Interventions: Assess changes in LOC Moisture Interventions: Internal/External urinary devices Activity Interventions: PT/OT evaluation Mobility Interventions: HOB 30 degrees or less Nutrition Interventions: Document food/fluid/supplement intake Friction and Shear Interventions: Apply protective barrier, creams and emollients Problem: Patient Education: Go to Patient Education Activity Goal: Patient/Family Education Outcome: Progressing Towards Goal 
  
Problem: Patient Education: Go to Patient Education Activity Goal: Patient/Family Education Outcome: Progressing Towards Goal 
  
Problem: Post-procedure,Day 2/Day of Discharge,TAVR Goal: *Stable Cardiac Rhythm Outcome: Progressing Towards Goal 
Goal: *Hemodynamically stable Outcome: Progressing Towards Goal 
Goal: *Optimal pain control at patient's stated goal 
Outcome: Progressing Towards Goal 
Goal: *Lungs clear or at baseline Outcome: Progressing Towards Goal 
Goal: *Demonstrates ability to perform prescribed activity without shortness of breath or discomfort Outcome: Progressing Towards Goal 
Goal: [facility] Activity Guidelines Outcome: Progressing Towards Goal 
Goal: *No signs of infection or puncture site complication Outcome: Progressing Towards Goal 
Goal: *Verbalizes and demonstrates puncture site care Outcome: Progressing Towards Goal 
Goal: *Verbalizes understanding and describes prescribed diet Outcome: Progressing Towards Goal 
Goal: *Verbalizes understanding and describes medication purposes and frequencies Outcome: Progressing Towards Goal 
Goal: *Anxiety reduced or absent Outcome: Progressing Towards Goal 
Goal: *Understands and describes signs and symptoms to report to providers Outcome: Progressing Towards Goal 
 Goal: *Describes follow-up/return visits to physicians Outcome: Progressing Towards Goal 
Goal: *Describes available resources and support systems Outcome: Progressing Towards Goal 
Goal: *Influenza immunization Outcome: Progressing Towards Goal 
Goal: *Pneumococcal immunization Outcome: Progressing Towards Goal 
Goal: *Identifies and verbalizes understanding of the valve ID card, antibiotic card and Red reminder bracelet Outcome: Progressing Towards Goal 
Goal: Activity/Safety Outcome: Progressing Towards Goal 
Goal: Consults Outcome: Progressing Towards Goal 
Goal: Diagnostic Tests/Procedures Outcome: Progressing Towards Goal 
Goal: Nutrition/Diet Outcome: Progressing Towards Goal 
Goal: Discharge Planning Outcome: Progressing Towards Goal 
Goal: Medications Outcome: Progressing Towards Goal 
Goal: Respiratory Outcome: Progressing Towards Goal 
Goal: Treatments/Interventions/Procedures Outcome: Progressing Towards Goal 
Goal: Psychosocial Needs Outcome: Progressing Towards Goal 
  
Problem: Pain Goal: *Control of Pain Outcome: Progressing Towards Goal 
Goal: *PALLIATIVE CARE:  Alleviation of Pain Outcome: Progressing Towards Goal 
  
Problem: Patient Education: Go to Patient Education Activity Goal: Patient/Family Education Outcome: Progressing Towards Goal

## 2020-11-03 PROBLEM — Z95.2 S/P TAVR (TRANSCATHETER AORTIC VALVE REPLACEMENT): Status: ACTIVE | Noted: 2020-01-01

## 2020-11-03 NOTE — PROGRESS NOTES
Spiritual Care Assessment/Progress Note [address] 
 
 
NAME: Emperatriz Shepherd      MRN: [id number] AGE: 82 y.o. SEX: male Evangelical Affiliation: No preference Language: English  
 
11/3/2020 Spiritual Assessment begun in [facility] [place] [facility] CV SERVICES UNIT through conversation with: 
  
    []Patient        [] Family    [] Friend(s) Reason for Consult: Initial/Spiritual assessment, patient floor Spiritual beliefs: (Please include comment if needed) 
   [] Identifies with a kelby tradition:     
   [] Supported by a kelby community:        
   [] Claims no spiritual orientation:       
   [] Seeking spiritual identity:            
   [] Adheres to an individual form of spirituality:       
   [x] Not able to assess:                   
 
    
Identified resources for coping:  
   [] Prayer                           
   [] Music                  [] Guided Imagery 
   [] Family/friends                 [] Pet visits [] Devotional reading                         [] Unknown 
   [] Other:                                          
 
 
Interventions offered during this visit: (See comments for more details) Patient Interventions: Initial visit, Other (comment)(Note left at bedside) Family/Friend(s): Other (comment)(Note left at bedside) Plan of Care: 
 
 [] Support spiritual and/or cultural needs  
 [] Support AMD and/or advance care planning process    
 [] Support grieving process 
 [] Coordinate Rites and/or Rituals  
 [] Coordination with community clergy [] No spiritual needs identified at this time 
 [] Detailed Plan of Care below (See Comments)  [] Make referral to Music Therapy 
[] Make referral to Pet Therapy    
[] Make referral to Addiction services 
[] Make referral to [facility] 
[] Make referral to Spiritual Care Partner 
[] No future visits requested       
[] Follow up visits as needed Comments: Visited Mr Chidi Hinton in room 460 for initial spiritual assessment. Mr Nikhil Oh appeared to be sleeping and no family was present. Left note at bedside assuring patient and family of ongoing  availability for support. : . Clare Peña. Rebekah Pelaez; [facility], to contact [phone] [address] call: [phone]

## 2020-11-03 NOTE — PROGRESS NOTES
Transitions of Care: 20% risk of re-admission  
 
 -Patient will discharge to [address] [facility] with home health PT/OT/Skilled nursing services CM will need HH PT/OT orders added, notified provider 
 -Family to transport 
 -Cardiac Surgery follow-up The CM spoke with the Cardiac Surgery NP- possible discharge tomorrow pending weaning of Oxygen. The CM called the patient's spouse, [place], to introduce role of CM and discuss anticipated discharge tomorrow. The CM discussed home health services- [place] is agreeable, she is deferring to her son Silvestre. CM will call Silvestre to discuss further. [place] confirmed the patient lives at [address] [facility]. CM called Silvestre, the patient's son- he confirmed information, he lives locally and is available to assist as needed upon discharge. CM discussed home health, Silvestre is agreeable- Freedom of Choice offered, referral placed to [facility]. Family will transport home upon discharge, [place] confirmed patient already has walker in the home. Referral placed to [facility] via cclink. The CM met with the patient at bedside- agreeable to above (he deferred to family for home health choice). Medicare pt has received and reviewed [employer] letter informing them of their right to appeal the discharge. Copy has been placed on pt bedside chart. [facility] has accepted the patient for home health services- information on AVS. ESTELA Harper

## 2020-11-03 NOTE — PROGRESS NOTES
Occupational Therapy: defer Chart reviewed, consulted with RN, attempted OT treatment session. Patient received supine in bed and politely declined activity at this time, citing need for rest.  Will defer OT and will f/u as able and appropriate.   
 
Flavia Devi, OTR/L

## 2020-11-03 NOTE — WOUND CARE
WOCN Note: Follow-up visit for buttocks. Chart shows: 
Admitted for fall with syncope; TAVR 10/28 
  
Assessment:  
Communicative and up to bathroom for toileting 
  
Bilateral buttocks intact but peppered with elevated, deep purple lesions that appear most like varicosities. NOT bullae. FULLY manuel. Essentially unchanged since last assessment.  
  
Recommendations:   
Venelex twice daily. Turn/reposition approximately every 2 hours and offload heels with pillows at all times in bed. 
  
Transition of Care: Plan to follow weekly and as needed while admitted to hospital.  
 
SOTO JonesN, RN, [employer] Certified Wound, Ostomy, Continence Nurse 
office [phone] 
pager [phone] or call  to page

## 2020-11-03 NOTE — PROGRESS NOTES
Problem: Mobility Impaired (Adult and Pediatric) Goal: *Acute Goals and Plan of Care (Insert Text) Description: FUNCTIONAL STATUS PRIOR TO ADMISSION: Patient was modified independent using a cane for functional mobility. HOME SUPPORT PRIOR TO ADMISSION: The patient lived with wife but did not require assist.  The patient endorses being open to home health for therapy. He resides at [facility] in an independent living apartment. Has children in the area. Physical Therapy Goals Initiated 10/29/2020 1. Patient will move from supine to sit and sit to supine , scoot up and down, and roll side to side in bed with modified independence within 7 day(s). 2.  Patient will transfer from bed to chair and chair to bed with modified independence using the least restrictive device within 7 day(s). 3.  Patient will perform sit to stand with modified independence within 7 day(s). 4.  Patient will ambulate with modified independence for 300 feet with the least restrictive device within 7 day(s). Outcome: Progressing Towards Goal 
 
PHYSICAL THERAPY TREATMENT Patient: Bonny Carlisle (83 y.o. male) Date: 11/3/2020 Diagnosis: Syncope [R55] Syncope [R55] Aortic stenosis Procedure(s) (LRB): 
INSERT PPM DUAL (N/A) 4 Days Post-Op Precautions:   
Chart, physical therapy assessment, plan of care and goals were reviewed. ASSESSMENT Patient continues with skilled PT services and is progressing towards goals. Reviewed pacer precautions. Pt is aware but require v.c for adherence. Pt Has  difficult time with sit to stand due to height. Pt was able to increase gait tolerance. Pt was able to use the rollator to assist with balance and energy conservation. Pt required standing rest break. SpO2 96-98% on room air  bpm. Pt could benefit from continued mobilization with nursing. Pt is hopeful to return home with HHPT Current Level of Function Impacting Discharge (mobility/balance): mod A with sit to stand Other factors to consider for discharge: pacer adherence, sit to stand assist  
    
 
PLAN : 
Patient continues to benefit from skilled intervention to address the above impairments. Continue treatment per established plan of care. to address goals. Recommendation for discharge: (in order for the patient to meet his/her long term goals) Physical therapy at least 2 days/week in the home AND ensure assist and/or supervision for safety with functional mobility as needed This discharge recommendation: 
Has not yet been discussed the attending provider and/or case management IF patient discharges home will need the following DME: patient owns DME required for discharge SUBJECTIVE:  
Patient stated I can keep going .  OBJECTIVE DATA SUMMARY:  
Critical Behavior: 
Neurologic State: Alert Orientation Level: Oriented X4 Cognition: Appropriate decision making, Appropriate for age attention/concentration, Appropriate safety awareness, Follows commands Safety/Judgement: Insight into deficits, Fall prevention, Awareness of environment Functional Mobility Training: 
Bed Mobility: 
  
  
  
  
  
  
Transfers: 
Sit to Stand: Moderate assistance Stand to Sit: Contact guard assistance(derease control) Balance: 
Sitting: Intact Standing: Impaired Standing - Static: Good Standing - Dynamic : Fair Ambulation/Gait Training: 
Distance (ft): 150 Feet (ft) Assistive Device: Gait belt;Walker, rollator Ambulation - Level of Assistance: Stand-by assistance;Contact guard assistance Gait Abnormalities: Decreased step clearance Base of Support: Widened Speed/Zaida: Slow Step Length: Left shortened;Right shortened Stairs: Therapeutic Exercises:  
 
Pain Rating: No complaints Activity Tolerance:  
SpO2 stable on RA and requires rest breaks After treatment patient left in no apparent distress: Sitting in chair and Call bell within reach COMMUNICATION/COLLABORATION:  
The patients plan of care was discussed with: Registered nurse. Fidelina Kirby PTA Time Calculation: 24 mins

## 2020-11-03 NOTE — PROGRESS NOTES
1930: Bedside shift change report given to [id number]  (oncoming nurse) by Jodee Davdi and Yaritza DOUGLAS (offgoing nurse). Report included the following information SBAR, Kardex, Intake/Output, MAR and Cardiac Rhythm NSR/paced. 2030: Weaned O2 down to 3L. Will continue to monitor. 2345: O2 sats dropped to 88%. Turned O2 back up to 4L. Will continue to monitor.

## 2020-11-03 NOTE — PROGRESS NOTES
0730: Bedside shift change report given to Rojelio Barahona and Dorys Lainez (oncoming nurse) by [facility] (offgoing nurse). Report included the following information SBAR, Kardex, Intake/Output, MAR and Recent Results. 1930: Bedside shift change report given to Ellis Tay (oncoming nurse) by Kenard Sandhoff (offgoing nurse). Report included the following information SBAR, Kardex, ED Summary, Intake/Output, MAR and Recent Results.

## 2020-11-03 NOTE — PROGRESS NOTES
[facility] [place]: Latisha Duran 
[phone] Cardiology valvular heart disease progress note Requesting/referring provider: Dr. Jacob Crowell, Dr. Shraddha James, Dr. Jazmin Frederick Reason for Consult: Valvular heart disease Subjective/interim: Did well overnight with no issues. Ambulating well. Still requiring 2 to 4 L of oxygen by nasal cannula. But does not report shortness of breath. Investigations personally reviewed by me: 
ECG October 2020: First ECG: Sinus rhythm, first-degree AV block, nonspecific ST-T changes;  
second ECG multifocal atrial tachycardia, high degree AVblock, nonspecific ST-T changes Echocardiogram October 2020: Normal LV function, likely severe aortic stenosis with peak transaortic velocity of 3.8 to 3.9 m/s, mean gradient of 35 mmHg, calculated aortic valve area by continued equation of 0.8 cm² by mean gradient. Visibly the valve looks extremely calcified with significant restriction of motion. There is moderate to severe mitral annular calcification extending inferior to the mitral valve annulus along the posterior left ventricular wall. Transmitral gradients are also increased at 6mmHg mean gradient consistent with possible mild mitral stenosis. Leaflets were difficult to visualize but do not appear typical for rheumatic mitral stenosis despite history of rheumatic fever as a kid. No significant pulmonary hypertension is noted. Left atrium is significantly enlarged. ECG 5 PM October 28 2020: QRS down 202 ms, WV interval down to about 300 to 350 ms, essentially similar to what it was on ECG on 19 October. Echocardiogram November 2020 post TAVR: Normal LV function, mild PVL, mild functional mitral stenosis, appropriate gradients across aortic transcatheter prosthesis. EOA was not properly calculated. Assessment/Plan: 1. Severe symptomatic calcific senile aortic stenosis s/p TAVR with 24 mm evolute R prosthesis Explained discharge instructions to patient with all questions answered.  Patient encouraged to follow up with PCP, and return to the ER for worsening symptoms.  VSS upon discharge.  Patient ambulated to lobby with steady gait.  Patient confirmed that he had a safe way to get home.        2.  Hypertension 3. CKD 4. History of remote smoking 5. Mitral annular calcification with possible mild mitral stenosis 6. Multifocal atrial tachycardia/atrial fibrillation 7. Coronary disease status post coronary bypass grafting remotely with LIMA to LAD, vein graft to OM, vein graft to PDA 8. Preserved LV systolic function 9. Recent syncope of uncertain etiology possibly contributed by severe aortic stenosis in the setting of atrial arrhythmia and anti HTN meds. 10.  History of peripheral arterial disease with prior bilateral common iliac stents, known bilateral common femoral calcification extending in the distal common femoral into bifurcation with bilateral SFA and infrapopliteal disease. Likely bilateral femoral endarterectomies have been performed in the past as well. 11. Tachy masood syndrome with post TAVR intermittent LBBB and intermittent high degree AV block in the setting of known 1st degree AV block. Now status post dual-chamber pacemaker Mr. Ayanna Zarate presents to the hospital with syncope. He appears to have likely severe aortic stenosis in addition to intermittent atrial fibrillation/multifocal tachycardia/tachybrady. He has underlying first-degree AV block. He underwent transcatheter aortic valve placement today with 34 mm Medtronic evolute transcatheter heart valve. Post TAVR course was complicated by progression of underlying AV block requiring placement of permanent pacemaker on Friday. He is doing well from that standpoint. Currently he is not requiring significant pacing. Atorvastatin has been increased to 40 mg daily. He is currently tolerating metoprolol 50 mg twice a day. Will be discharged on aspirin alone. For further control of blood pressure, if blood pressure persistently high increasing metoprolol and/or losartan may be considered. However for now due to overnight low blood pressures it may be pertinent to continue current medical therap. Overall he seems to be doing well and is awaiting placement hopefully after his oxygen requirements come down. In the interim is reasonable to attempt transitioning IV to oral diuretics. He is net -1.4 L the past 24 hours. [x]    High complexity decision making was performed 
[]    Patient is at high-risk of decompensation with multiple organ involvement HPI: Mane Linton, a 82y.o. year-old who presents for evaluation of syncope. He has significant history of coronary artery disease with remote coronary artery bypass grafting with LIMA to LAD, vein graft to OM, vein graft to RPDA. [place] stress [place] in [place] in [place] showed minimal ischemia and he reports no angina and this has been managed medically. He is currently in an assisted care living but has good cognition and relatively functional.  He also has known history of aortic valve disease in the form of aortic stenosis as well as mitral valve disease in the form of predominantly mitral annular calcification. Aortic stenosis has been under surveillance with Dr. Michael Pruitt. On his most recent echocardiograms however there is a suspicion that his aortic stenosis is severe. He now presented to the hospital with episode of sudden onset syncope without any preceding symptoms. Patient did not report of an rapid rate related palpitations. Initial work-up for syncope has been rather unrevealing. His underlying EKG shows normal QRS complex first-degree AV block. During hospitalization he has been noted to be intermittently in multifocal atrial tachycardia/spectrum of atrial fibrillation with rates up to 140 bpm. 
 
 
 
He  has a past medical history of BPH (benign prostatic hyperplasia), CAD (coronary artery disease), Diabetes ([address] Hearing loss, Hypercholesterolemia, Hypertension, Murmur, Recurrent depression ([address] (8/22/2019), and Third degree AV block ([address] (10/30/2020).  He also has no past medical history of Anemia, Arthritis, Asthma, Autoimmune disease ([facility] Calculus of kidney, Cancer [facility] Chronic kidney disease, Chronic obstructive pulmonary disease ([facility] Chronic pain, Congestive heart failure ([facility] GERD (gastroesophageal reflux disease), Headache, Liver disease, Psychotic disorder ([facility] PUD (peptic ulcer disease), Seizures ([facility] Stroke ([facility] Thromboembolus ([facility] Thyroid disease, or Trauma. Review of system:Patient reports no dyspnea/PND/Orthpnea/CP. He reports no cough/fever/focal neurological deficits/abdominal pain. All other systems negative except as above. Family History Problem Relation Age of Onset  Cancer Mother  Cancer Father Social History Socioeconomic History  Marital status:  Spouse name: Not on file  Number of children: Not on file  Years of education: Not on file  Highest education level: Not on file Tobacco Use  Smoking status: Former Smoker Types: Cigarettes Last attempt to quit: 1990 Years since quittin.1  Smokeless tobacco: Never Used Substance and Sexual Activity  Alcohol use: No  
 Drug use: No  
  
PE Vitals:  
 20 0852 20 1140 20 1525 20 1701 BP: (!) 164/56 121/61 (!) 162/63 Pulse:  65 64 Resp:  20 18 Temp:  98.2 °F (36.8 °C) 98.1 °F (36.7 °C) SpO2:  99% 99% Weight:      
Height:    6' 3\" (1.905 m) Body mass index is 23.81 kg/m². General:    Alert, cooperative, no distress. Psychiatric:    Normal Mood and affect Eye/ENT:      Pupils equal, No asymmetry, Conjunctival pink. Able to hear voice at normal amplitude Lungs:      Visibly symmetric chest expansion, No palpable tenderness. Clear to auscultation bilaterally. Heart[de-identified]    Regular rate and rhythm, S1, T0mflkhxol, mid to late peaking mid systolic murmur, click, rub or gallop. No JVD, Normal palpable peripheral pulses. No cyanosis Abdomen:     Soft, non-tender. Bowel sounds normal. No masses,  No   
  organomegaly. Extremities:   Extremities normal, atraumatic, no edema. Neurologic:   CN II-XII grossly intact. No gross focal deficits Recent Labs: 
Lab Results Component Value Date/Time Cholesterol, total 114 2020 03:19 PM  
 HDL Cholesterol 40 2020 03:19 PM  
 LDL, calculated 54 2020 03:19 PM  
 Triglyceride 101 2020 03:19 PM  
 
Lab Results Component Value Date/Time Creatinine 0.97 2020 03:37 AM  
 
Lab Results Component Value Date/Time BUN 31 (H) 2020 03:37 AM  
 
Lab Results Component Value Date/Time Potassium 3.5 2020 03:37 AM  
 
Lab Results Component Value Date/Time Hemoglobin A1c 7.1 (H) 10/18/2020 03:27 PM  
 
Lab Results Component Value Date/Time HGB 8.9 (L) 2020 03:37 AM  
 
Lab Results Component Value Date/Time PLATELET 395 (L)  03:37 AM  
 
 
Reviewed: 
Past Medical History:  
Diagnosis Date  BPH (benign prostatic hyperplasia)  CAD (coronary artery disease)  Diabetes ([address]  Hearing loss  Hypercholesterolemia  Hypertension  Murmur  Recurrent depression ([facility] 2019  Third degree AV block ([facility] 10/30/2020 Social History Tobacco Use Smoking Status Former Smoker  Types: Cigarettes  Last attempt to quit: 1990  Years since quittin.1 Smokeless Tobacco Never Used Social History Substance and Sexual Activity Alcohol Use No  
 
Allergies Allergen Reactions  Procaine Other (comments) Pt stated he passed out from procaine and was told not to let anyone use it on him again Family History Problem Relation Age of Onset  Cancer Mother  Cancer Father Current Facility-Administered Medications Medication Dose Route Frequency  sodium chloride (NS) flush 5-40 mL  5-40 mL IntraVENous Q8H  
  sodium chloride (NS) flush 5-40 mL  5-40 mL IntraVENous PRN  
 insulin glargine (LANTUS) injection 20 Units  20 Units SubCUTAneous QHS  losartan (COZAAR) tablet 50 mg  50 mg Oral DAILY  guaiFENesin ER (MUCINEX) tablet 1,200 mg  1,200 mg Oral Q12H  furosemide (LASIX) injection 40 mg  40 mg IntraVENous BID  potassium chloride SR (KLOR-CON 10) tablet 20 mEq  20 mEq Oral BID  cephALEXin (KEFLEX) capsule 250 mg  250 mg Oral TID  atorvastatin (LIPITOR) tablet 40 mg  40 mg Oral QHS  metoprolol tartrate (LOPRESSOR) tablet 50 mg  50 mg Oral BID  aspirin chewable tablet 81 mg  81 mg Oral DAILY  balsam peru-castor oiL (VENELEX) ointment   Topical BID  acetaminophen (TYLENOL) tablet 650 mg  650 mg Oral Q4H PRN  
 traMADoL (ULTRAM) tablet  mg   mg Oral Q6H PRN  
 oxyCODONE-acetaminophen (PERCOCET) 5-325 mg per tablet 1-2 Tab  1-2 Tab Oral Q4H PRN  
 naloxone (NARCAN) injection 0.4 mg  0.4 mg IntraVENous PRN  
 ondansetron (ZOFRAN) injection 4 mg  4 mg IntraVENous Q4H PRN  
 albuterol (PROVENTIL VENTOLIN) nebulizer solution 2.5 mg  2.5 mg Nebulization Q4H PRN  
 famotidine (PEPCID) tablet 20 mg  20 mg Oral Q12H  
 magnesium oxide (MAG-OX) tablet 400 mg  400 mg Oral BID  
 bisacodyL (DULCOLAX) suppository 10 mg  10 mg Rectal DAILY PRN  
 senna-docusate (PERICOLACE) 8.6-50 mg per tablet 1 Tab  1 Tab Oral BID  ascorbic acid (vitamin C) (VITAMIN C) tablet 1,000 mg  1,000 mg Oral TID  doxazosin (CARDURA) tablet 4 mg  4 mg Oral QHS  finasteride (PROSCAR) tablet 5 mg  5 mg Oral DAILY  sertraline (ZOLOFT) tablet 100 mg  100 mg Oral QPM  
 insulin lispro (HUMALOG) injection   SubCUTAneous AC&HS Reyes Lam, MD11/02/20 There are other unrelated non-urgent complaints, but due to the busy schedule and the amount of time I've already spent with him, time does not permit me to address these routine issues at today's visit. I've requested another appointment to review these additional issues. ATTENTION:  
This medical record was transcribed using an electronic medical records/speech recognition system. Although proofread, it may and can contain electronic, spelling and other errors. Corrections may be executed at a later time. Please feel free to contact us for any clarifications as needed. [facility] [place] [address], [address]

## 2020-11-03 NOTE — PROGRESS NOTES
Problem: Patient Education: Go to Patient Education Activity Goal: Patient/Family Education Outcome: Progressing Towards Goal 
  
Problem: Falls - Risk of 
Goal: *Absence of Falls Description: Document Tory Burt Fall Risk and appropriate interventions in the flowsheet. Outcome: Progressing Towards Goal 
Note: Fall Risk Interventions: 
Mobility Interventions: OT consult for ADLs, PT Consult for mobility concerns, PT Consult for assist device competence, Utilize walker, cane, or other assistive device Mentation Interventions: Gait belt with transfers/ambulation, Eyeglasses and hearing aids, Toileting rounds Medication Interventions: Teach patient to arise slowly, Evaluate medications/consider consulting pharmacy, Patient to call before getting OOB Elimination Interventions: Call light in reach, Toileting schedule/hourly rounds History of Falls Interventions: Door open when patient unattended, Utilize gait belt for transfer/ambulation, Vital signs minimum Q4HRs X 24 hrs (comment for end date) Problem: General Medical Care Plan Goal: *Vital signs within specified parameters Outcome: Progressing Towards Goal 
Goal: *Skin integrity maintained Outcome: Progressing Towards Goal 
  
Problem: Risk for Spread of Infection Goal: Prevent transmission of infectious organism to others Description: Prevent the transmission of infectious organisms to other patients, staff members, and visitors. Outcome: Progressing Towards Goal 
  
Problem: Syncope Goal: *Absence of injury Outcome: Progressing Towards Goal 
  
Problem: Pressure Injury - Risk of 
Goal: *Prevention of pressure injury Description: Document Justin Scale and appropriate interventions in the flowsheet. Outcome: Progressing Towards Goal 
Note: Pressure Injury Interventions: 
Sensory Interventions: Keep linens dry and wrinkle-free, Chair cushion, Minimize linen layers, Assess changes in LOC Moisture Interventions: Minimize layers, Check for incontinence Q2 hours and as needed, Absorbent underpads, Apply protective barrier, creams and emollients Activity Interventions: Assess need for specialty bed, Chair cushion, PT/OT evaluation, Pressure redistribution bed/mattress(bed type) Mobility Interventions: Assess need for specialty bed, Chair cushion, PT/OT evaluation, Pressure redistribution bed/mattress (bed type), HOB 30 degrees or less Nutrition Interventions: Document food/fluid/supplement intake Friction and Shear Interventions: Apply protective barrier, creams and emollients, Lift sheet, HOB 30 degrees or less Problem: Pain Goal: *Control of Pain Outcome: Progressing Towards Goal 
  
Problem: Breathing Pattern - Ineffective Goal: *Absence of hypoxia Outcome: Progressing Towards Goal 
Goal: *Use of effective breathing techniques Outcome: Progressing Towards Goal

## 2020-11-03 NOTE — PROGRESS NOTES
[facility] FLOOR Progress Note Admit Date: 10/18/2020 Procedure:  Procedure(s): 
 
10/28/20:  Transcatheter Aortic Valve Replacement, Shockwave, Balloon Valvuloplasty, Transthoracic Echo and KENAN by Dr. Lisseth Villar   
 
10/30/2020: INSERT PPM DUAL Subjective:  
Pt seen with Dr. Evan Waldron. Afebrile, Oxygen at 4L per NC. Attempt to wean overnight, 02 sats dropped to 88%. Audible course cough. Dyspnea, denies chest pain, mild fatigue Objective:  
 
Visit Vitals BP (!) 154/62 (BP 1 Location: Right arm, BP Patient Position: At rest) Pulse 68 Temp 97.9 °F (36.6 °C) Resp 20 Ht 6' 3\" (1.905 m) Wt 187 lb 2.7 oz (84.9 kg) SpO2 100% BMI 23.39 kg/m² Temp (24hrs), Av.2 °F (36.8 °C), Min:97.8 °F (36.6 °C), Max:98.8 °F (37.1 °C) Last 24hr Input/Output: 
 
Intake/Output Summary (Last 24 hours) at 11/3/2020 0840 Last data filed at 11/3/2020 6681 Gross per 24 hour Intake 960 ml Output 1250 ml Net -290 ml  
  
 
EKG/Rhythm: Paced in the 80s Oxygen: 4 L per NC 
 
CXR:  
CXR Results  (Last 48 hours) None Admission Weight: Last Weight Weight: 205 lb 7.5 oz (93.2 kg) Weight: 187 lb 2.7 oz (84.9 kg) EXAM: 
General: No acute distress. Lungs:   Clear to auscultation bilaterally. Incision:  No erythema, drainage or swelling. Heart:  Regular rate and rhythm, S1, S2 normal, no murmur, click, rub or gallop. Abdomen:   Soft, non-tender. Bowel sounds normal. No masses,  No organomegaly. +BM  Extremities:  No edema. PPP Neurologic:  Gross motor and sensory apparatus intact. Activity: Up with assist.  
 
Lab Data Reviewed:  
Recent Labs 20 
8918 20 
8989 WBC  --  8.5 HGB  --  9.0* HCT  --  28.0*  
PLT  --  113* NA  --  141 K  --  3.7 BUN  --  32* CREA  --  0.84 GLU  --  124* GLUCPOC 164*  --   
INR  --  1.2* Assessment:  
 
Principal Problem: Aortic stenosis (10/18/2020) Overview: Added automatically from request for surgery [id number] Active Problems: 
  Syncope and collapse (10/18/2020) Syncope (10/18/2020) (HFpEF) heart failure with preserved ejection fraction ([address] (10/20/2020) Pacemaker (10/30/2020) Overview: 10/30/2020 dual chamber medtronic pacemaker Third degree AV block ([address] (10/30/2020) S/P TAVR (transcatheter aortic valve replacement) (11/3/2020) Plan/Recommendations/Medical Decision Makin. Aortic stenosis, severe and symptomatic (paradoxical LFLG) s/p TAVR: ASA 81 mg only for AC. Echo completed and reviewed by Dr. Aissatou Agosto.   
  
2.  CAD with Hx of CABG: On ASA, Statin, BB 
  
3. HTN: Cont lopressor, losartan, IV lasix. Goal SPB < 160 
  
4. HLD: On Statin 
  
5. HFpEF: BP management 
  
6. Syncope: Prior to admission. ? AS related but post TAVR with CHB now s/p PPM. BICA of <50%.  
  
7. PAD: On ASA, Statin.  
  
8. DM: On Metformin, Glipizide at home. Increase Lantus to 25 units qhs. A1C of 7.1. BS ACHS, SSI Per orders  
  
9. MAT/Atrial fibrillation/Asystole: Intermittent Atrial fibrillation. Currently in NSR. Currently on ASA 81. No OAC historically. s/p PPM. Cont BB. On keflex x 5 days for PPM prophylaxis.  
  
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 Continue to monitor 
  
11. UTI: On vancomycin completed 10/28 and ceftriaxone (completed). MRSA and enterobacter on culture. ID signed off. 
  
12. BPH: On Proscar, cardura.  
  
13. Depression: on Zoloft 
  
14. Lethargy/debility:  OOB as much as possible. PT/OT.  
  
15. Acute post op resp failure, pulm edema: wean NC for O2 sats >92%. On lasix 40 mg IV BID --Weight trending down. CXR This morning doesn't show overt edema or plerual effusions. IS as able, cont mucinex to help clear secretions. Add steroid nebs. ELLA may contribute some overnight. 16. Hypokalemia: increased scheduled repletion.   Monitor  
  
 Dispo: PT/OT/Cardiac Rehab. Case Management for discharge planning. Rec  PT/OT, CM to discuss w/ wife. Not medically ready for discharge Signed By: Rogelio Escalera NP Saw patient, agree with above Risk of morbidity and mortality - high Medical decision making - high complexity 1. Aortic stenosis, severe and symptomatic (paradoxical LFLG) s/p TAVR: ASA 81 mg only for AC. Echo completed and reviewed by Dr. Wesley Huggins.   
  
2.  CAD with Hx of CABG: On ASA, Statin, BB 
  
3. HTN: Cont lopressor, losartan, IV lasix. Goal SPB < 160 
  
4. HLD: On Statin 
  
5. HFpEF: BP management 
  
6. Syncope: Prior to admission. ? AS related but post TAVR with CHB now s/p PPM. BICA of <50%.  
  
7. PAD: On ASA, Statin.  
  
8. DM: On Metformin, Glipizide at home. Increase Lantus to 25 units qhs. A1C of 7.1. BS ACHS, SSI Per orders  
  
9. MAT/Atrial fibrillation/Asystole: Intermittent Atrial fibrillation. Currently in NSR. Currently on ASA 81. No OAC historically. s/p PPM. Cont BB. On keflex x 5 days for PPM prophylaxis.  
  
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 Continue to monitor 
  
11. UTI: On vancomycin completed 10/28 and ceftriaxone (completed). MRSA and enterobacter on culture. ID signed off. 
  
12. BPH: On Proscar, cardura.  
  
13. Depression: on Zoloft 
  
14. Lethargy/debility:  OOB as much as possible. PT/OT.  
  
15. Acute post op resp failure, pulm edema: wean NC for O2 sats >92%. On lasix 40 mg IV BID --Weight trending down. CXR This morning doesn't show overt edema or plerual effusions. IS as able, cont mucinex to help clear secretions. Add steroid nebs. ELLA may contribute some overnight. 16. Hypokalemia: increased scheduled repletion. Monitor

## 2020-11-04 NOTE — PROGRESS NOTES
0730: Bedside and Verbal shift change report given to Frederic Vasquez (oncoming nurse) by Henri Urena (offgoing nurse). Report included the following information SBAR, Kardex, OR Summary, Procedure Summary, Intake/Output, MAR, Recent Results and Cardiac Rhythm NSR AVB 1st deg. Problem: Falls - Risk of 
Goal: *Absence of Falls Description: Document Kymberly Bradley Fall Risk and appropriate interventions in the flowsheet. Outcome: Progressing Towards Goal 
Note: Fall Risk Interventions: 
Mobility Interventions: OT consult for ADLs, PT Consult for mobility concerns, PT Consult for assist device competence, Utilize walker, cane, or other assistive device Mentation Interventions: Gait belt with transfers/ambulation, Eyeglasses and hearing aids, Toileting rounds Medication Interventions: Teach patient to arise slowly, Evaluate medications/consider consulting pharmacy, Patient to call before getting OOB Elimination Interventions: Call light in reach, Toileting schedule/hourly rounds History of Falls Interventions: Door open when patient unattended, Utilize gait belt for transfer/ambulation, Vital signs minimum Q4HRs X 24 hrs (comment for end date) Up with 2 person assistance and rollator walker. Call bell and personal effects within reach. Bed locked and in low position. Non skid footwear in place. Problem: Post-procedure,Day 2/Day of Discharge,TAVR Goal: *Lungs clear or at baseline Outcome: Progressing Towards Goal 
Goal: *No signs of infection or puncture site complication Outcome: Progressing Towards Goal 
Goal: *Anxiety reduced or absent Outcome: Progressing Towards Goal 
Goal: *Identifies and verbalizes understanding of the valve ID card, antibiotic card and Red reminder bracelet Outcome: Progressing Towards Goal 
Goal: Activity/Safety Outcome: Progressing Towards Goal

## 2020-11-04 NOTE — DISCHARGE INSTRUCTIONS
PATIENT INSTRUCTIONS POST-PACEMAKER IMPLANT    1. No heavy lifting or exercises with the left arm for 4 weeks. This includes the following:  Do not raise arm above the shoulder level to comb hair, pull on clothes, etc... Do not use the affected arm to pull up or push up from a seated or laying  down position. Do not allow anyone else to pull on the affected arm. 2. Remove aquacel dressing in a week, or it can be removed in clinic at follow up visit if you prefer. 3.  Do not drive for 3 days. 4.  Call Dr. Mcnamara Or at [phone] if you experience any of the following symptoms:  1. Redness at the pacemaker site  2. Swelling at or around the pacemaker or in the left arm  3. Pain around the pacemaker  4. Dizziness, lightheadedness, fainting spells  5. Lack of energy  6. Shortness of breath  7. Rapid heart rate  8. Chest or muscle twitches    5. Follow-up with Dr. Lofton Number office as scheduled below. Future Appointments   Date Time Provider Lowell Lyons   11/13/2020 10:20 AM Lexx Sibley BS AMB   11/13/2020 10:30 AM WOUND CHECKSSY BS AMB   12/23/2020 11:00 AM MD GEOVANI Enriquez BS AMB   2/11/2021  9:30 AM Usama Haji DO [facility] BS AMB     6. You may use pain medication and ice pack for pain relief as needed. You may wear the sling as a reminder to keep your arm below the your shoulder. Augusto Rhodes M.D.  [facility]  Electrophysiology/Cardiology  [address] [facility] [place]  [address], [address] , [facility] [address]  [address]  [phone]        Cardiac Surgery Specialist    [address] [place] [address] [address]      [place] [address]                                       [address], [address] [facility]  Office- [phone]  Fax- [phone]       Office- [phone]  Fax- [phone]  _____________________________________________________________  Dr. Orson Serve Dr. Beryle Kohut Dr. Viann Eisenmenger [facility]   Cain Amezquita PA-C Selinda Cutter PA-C Abigail Carolan PA-C Shelda Balm, PA-C              [place], [place]     Arleen Roy, AGPCNP  _____________________________________________________________     Name:Akash Venegas     Surgery & Date:     10/28/20:  Transcatheter Aortic Valve Replacement, Shockwave, Balloon Valvuloplasty, Transthoracic Echo and KENAN by Dr. Lisseth Villar       10/30/2020: INSERT PPM DUAL    Discharge Date: 11/04/20     MEDICATIONS:  Please refer to your After Visit Summary for your medication list.       DO NOT TAKE ANY MEDICATIONS THAT ARE NOT ON THIS LIST    INSTRUCTIONS:  1. NO SMOKING OR TOBACCO PRODUCTS  2. Do not follow the activity/exercise instructions in your discharge book given to you as an inpatient. You have no activity restrictions. 3. You may shower. Wash all incisions twice daily with mild soap and water. No lotions, ointments or powder. 4. Call the office immediately for any redness, swelling, or drainage from your incision. 5. Take your temperature daily and call for a temperature of 101 degrees or higher or for any symptoms that make you think you have and infection. 6. Weigh yourself each morning. Call if you gain more than 5 pounds in 48 hours. 7. Use the incentive spirometer 6-8 times a day-10 breaths each time. 8. Walk several hundred feet several times daily. DIET  Eat an American Heart Association diet. If you are having trouble with your appetite, eat what you can. Try eating small, frequent meals throughout the day. ACTIVITY  1. You have no activity restrictions. You may resume your daily activities at home, based on your comfort level. You may also drive. FOLLOW UP  1.  Your first follow up appointment will be on 11/9/20 at [address]. Our office is located in [address] [place] [address]. Your second follow up appointment will be in four weeks, on 12/2/20 at [facility]. You will need to have an ECHO prior to your appointment time. Our office will set that up for you. Please call our office at [phone] if you are unable to make either one of these appointments. 2. You will be receiving a call before your 3 day follow up appointment to begin cardiac rehab. They are located in the [address] [place] [address] on [facility]. Their phone number is [phone]. Please call if you have not been contacted 2-3 weeks after discharge from the hospital.  3. We will make an appointment for you with your cardiologist in 4-5 weeks. 4. Consult you primary care physician regarding your influenza &   pneumovax vaccines. 5.   Please bring all medications with you to your appointment.     Signature:___________________________________________________

## 2020-11-04 NOTE — PROGRESS NOTES
[facility] FLOOR Progress Note Admit Date: 10/18/2020 Procedure:  Procedure(s): 
 
10/28/20:  Transcatheter Aortic Valve Replacement, Shockwave, Balloon Valvuloplasty, Transthoracic Echo and KENAN by Dr. Rocío Srinivasan   
 
10/30/2020: INSERT PPM DUAL Subjective:  
 
Pt seen with Dr. Marisa Estrada. Tmax 99.1, on room air. Feels well this morning and would like to go home. Objective:  
 
Visit Vitals BP (!) 150/51 (BP 1 Location: Left arm, BP Patient Position: At rest) Pulse 72 Temp 97.6 °F (36.4 °C) Resp 18 Ht 6' 3\" (1.905 m) Wt 186 lb 11.7 oz (84.7 kg) SpO2 96% BMI 23.34 kg/m² Temp (24hrs), Av.1 °F (36.7 °C), Min:97.6 °F (36.4 °C), Max:99.1 °F (37.3 °C) Last 24hr Input/Output: 
 
Intake/Output Summary (Last 24 hours) at 2020 0820 Last data filed at 2020 6436 Gross per 24 hour Intake 720 ml Output 1075 ml Net -355 ml  
  
 
EKG/Rhythm: Paced in the 80s Oxygen: Room air CXR:  
CXR Results  (Last 48 hours) 20 0801  XR CHEST PORT Final result Impression:  IMPRESSION: There is slight left basilar atelectasis. Right lung is clear Narrative:  EXAM:  XR CHEST PORT INDICATION:  s/p TAVR, oxygen requirement COMPARISON:  10/31/2020 FINDINGS: A portable AP radiograph of the chest was obtained at 747 hours. Pacer  
leads are unchanged. Patient is status post TAVR. Bindu Staggers There is no pneumothorax. There is slight atelectasis left base. Bidnu Staggers Heart size is stable. .  There is slight  
scoliosis thoracic spine. Admission Weight: Last Weight Weight: 205 lb 7.5 oz (93.2 kg) Weight: 186 lb 11.7 oz (84.7 kg) EXAM: 
General: No acute distress. Lungs:   Clear to auscultation bilaterally. Incision:  No erythema, drainage or swelling. Heart:  Regular rate and rhythm, S1, S2 normal, no murmur, click, rub or gallop. Abdomen:   Soft, non-tender. Bowel sounds normal. No masses,  No organomegaly. +BM 11/3 Extremities:  No edema. PPP Neurologic:  Gross motor and sensory apparatus intact. Activity: Up with assist.  
 
Lab Data Reviewed:  
Recent Labs 20 
4013 20 
0429  20 
1480 WBC  --  7.9  --  8.5 HGB  --  8.8*  --  9.0* HCT  --  26.8*  --  28.0*  
PLT  --  123*  --  113* NA  --  141  --  141 K  --  3.9  --  3.7 BUN  --  32*  --  32* CREA  --  0.95  --  0.84 GLU  --  149*  --  124* GLUCPOC 156*  --    < >  --   
INR  --   --   --  1.2*  
 < > = values in this interval not displayed. Assessment:  
 
Principal Problem: Aortic stenosis (10/18/2020) Overview: Added automatically from request for surgery [id number] Active Problems: 
  Syncope and collapse (10/18/2020) Syncope (10/18/2020) (HFpEF) heart failure with preserved ejection fraction ([address] (10/20/2020) Pacemaker (10/30/2020) Overview: 10/30/2020 dual chamber medtronic pacemaker Third degree AV block ([address] (10/30/2020) S/P TAVR (transcatheter aortic valve replacement) (11/3/2020) Plan/Recommendations/Medical Decision Makin. Aortic stenosis, severe and symptomatic (paradoxical LFLG) s/p TAVR: ASA 81 mg only for AC. Echo completed and reviewed by Dr. Damir Flores.   
  
2.  CAD with Hx of CABG: On ASA, Statin, BB 
  
3. HTN: Cont lopressor, losartan, lasix. Goal SPB < 160 
  
4. HLD: On Statin 
  
5. HFpEF: BP management 
  
6. Syncope: Prior to admission. ? AS related but post TAVR with CHB now s/p PPM. BICA of <50%.  
  
7. PAD: On ASA, Statin.  
  
8. DM: On Metformin, Glipizide at home. Increase Lantus to 25 units qhs. A1C of 7.1. BS ACHS, SSI Per orders  
  
9. MAT/Atrial fibrillation/Asystole: Intermittent Atrial fibrillation. Currently in NSR/Paced. Currently on ASA 81. No OAC historically. s/p PPM. Cont BB.    
  
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 Continue to monitor 
  
11. UTI: On vancomycin completed 10/28 and ceftriaxone (completed).  MRSA and enterobacter on culture. ID signed off. 
  
12. BPH: On Proscar, cardura.  
  
13. Depression: on Zoloft 
  
14. Lethargy/debility:  OOB as much as possible. PT/OT.  
  
15. Acute post op resp failure, pulm edema: keep O2 sats >92%. On lasix 40 mg BID --Weight trending down. CXR doesn't show overt edema or plerual effusions. IS as able, cont mucinex to help clear secretions. ELLA may contribute some overnight. 16. Hypokalemia: increased scheduled repletion. Monitor  
  
Dispo: PT/OT/Cardiac Rehab. Case Management for discharge planning. Rec  PT/OT. Plan to discharge today Signed By: Imelda Ureña NP

## 2020-11-04 NOTE — PROGRESS NOTES
Transitions of Care: 23% risk of re-admission  
 
 -Patient will discharge home with [facility] PT/OT/RN 
 -Family to transport 
 -Cardiac Surgery follow-up The CM spoke with the Cardiac Surgery NP, patient medically ready for discharge- PT/OT recommending home health, established with [facility]. The CM called the patient's son Angeles Gracia- he will provide transportation home today at 2:00 p.m. The patient is ambulating around the unit with walker (has at baseline), CM made Orion aware that he will need to continue to work on sit-to-stand, Angeles Gracia endorses this is not a new issue, PT/OT made aware- recommending home health PT/OT. The CM called Severa Spruce with [facility], aware of discharge today. The CM met with the patient at bedside- denied concern for transition home today, endorses feeling ready for discharge. The patient already received IM letter in compliance with CMS guidelines, 11/03- in compliance with CMS guidelines.   
 
ESTELA Lai

## 2020-11-04 NOTE — PROGRESS NOTES
Problem: Self Care Deficits Care Plan (Adult) Goal: *Acute Goals and Plan of Care (Insert Text) Description:  
FUNCTIONAL STATUS PRIOR TO ADMISSION: Patient was independent/mod I with mobility and ADLs, uses SPC PRN and a shower chair. HOME SUPPORT: The patient lived with his wife but did not require assist. Lives at [address] [place] [address], has supportive children nearby. Occupational Therapy Goals Initiated 10/29/2020 1. Patient will perform grooming in standing >3 minutes VSS with modified independence within 7 day(s). 2.  Patient will perform lower body dressing with supervision/set-up within 7 day(s). 3.  Patient will perform toilet transfers with modified independence within 7 day(s). 4.  Patient will perform all aspects of toileting with modified independence within 7 day(s). 5.  Patient will participate in upper extremity therapeutic exercise/activities with independence for 5 minutes VSS within 7 day(s). - CHANGED 6.  Patient will utilize energy conservation techniques during functional activities with verbal cues within 7 day(s). Re-Evaluation s/p pacemaker placement on 11/2/20 1. Patient will perform grooming in standing >3 minutes VSS with modified independence within 7 day(s). 2.  Patient will perform lower body dressing with supervision/set-up within 7 day(s). 3.  Patient will perform toilet transfers with modified independence within 7 day(s). 4.  Patient will perform all aspects of toileting with modified independence within 7 day(s). 5.  Patient will verbalize and demonstrate PPM precautions during functional activities with min verbal cues within 7 day(s). 6.  Patient will utilize energy conservation techniques during functional activities with verbal cues within 7 day(s). Outcome: Progressing Towards Goal 
 
OCCUPATIONAL THERAPY TREATMENT Patient: Qamar Hamilton (89 y.o. male) Date: 11/4/2020 Diagnosis: Syncope [R55] Syncope [R55] Aortic stenosis Procedure(s) (LRB): 
INSERT PPM DUAL (N/A) 5 Days Post-Op Precautions:  fall, LUE PPM 
Chart, occupational therapy assessment, plan of care, and goals were reviewed. ASSESSMENT Patient continues with skilled OT services and is progressing towards goals. Patient received sitting in chair following PT, anticipating d/c home  this afternoon. Noted patient continues to require minimum to moderate assistance for sit- PT (variable with seat height). He reports difficulty standing PTA due to progressive BLE weakness since he has not been able to use gym during pandemic. Patient politely declined additional activity at this time, reporting fatigue. He was receptive to extensive education on LUE PPM precautions + ADL and mobility modifications, using raised seat heights (sleeps in a tall recliner, has comfort height toilet + raised toilet seat with rails),  fall prevention, and energy conservation. Patient demonstrates good understanding of eduction and was provided with handouts for reinforcement. Recommend assistance at home for transfers as well as HHOT to promote safety and independence in home environment. Current Level of Function Impacting Discharge (ADLs): up to minimum-moderate assistance for sit- PT, infer assistance standing component of ADLs PLAN : 
Patient continues to benefit from skilled intervention to address the above impairments. Continue treatment per established plan of care. to address goals. Recommendation for discharge: (in order for the patient to meet his/her long term goals) Recommend assistance at home for transfers/ standing component of ADLs as well as HHOT to promote safety and independence in home environment. This discharge recommendation: 
Has been made in collaboration with the attending provider and/or case management SUBJECTIVE:  
Patient stated It's still difficult to stand.  OBJECTIVE DATA SUMMARY:  
Cognitive/Behavioral Status: Neurologic State: Alert Orientation Level: Oriented X4 Cognition: Appropriate for age attention/concentration Functional Mobility and Transfers for ADLs: 
 
 
Transfers: 
Sit to Stand: (height dependent min/mod A) per PT Balance: per PT Sitting: Intact Standing: Impaired Standing - Static: Good Standing - Dynamic : Good Pain: 
Patient did not report  pain Activity Tolerance: VSS After treatment patient left in no apparent distress:  
Sitting in chair and Call bell within reach COMMUNICATION/COLLABORATION:  
The patients plan of care was discussed with: Physical therapy assistant, Registered nurse, and Case management. Elaine Overall, OT Time Calculation: 24 mins

## 2020-11-04 NOTE — PROGRESS NOTES
0730: Bedside shift change report given to [address] [place] [address] (oncoming nurse) by John Law (offgoing nurse). Report included the following information SBAR and Kardex. 1400: I have reviewed discharge instructions with the patient. The patient verbalized understanding. Discharge Medication List as of 11/4/2020  1:02 PM  
  
START taking these medications Details  
aspirin 81 mg chewable tablet Take 1 Tab by mouth daily. , Normal, Disp-30 Tab,R-11  
  
acetaminophen (TYLENOL) 325 mg tablet Take 2 Tabs by mouth every four (4) hours as needed for Pain., OTC  
  
cephALEXin (KEFLEX) 250 mg capsule Take 1 Cap by mouth three (3) times daily for 1 day., Normal, Disp-3 Cap,R-0  
  
guaiFENesin ER (MUCINEX) 1,200 mg Ta12 ER tablet Take 1 Tab by mouth every twelve (12) hours for 14 days. , Normal, Disp-28 Tab,R-0  
  
metoprolol tartrate (LOPRESSOR) 50 mg tablet Take 1 Tab by mouth two (2) times a day for 60 days. , Normal, Disp-60 Tab,R-1  
  
senna-docusate (PERICOLACE) 8.6-50 mg per tablet Take 1 Tab by mouth daily as needed for Constipation., OTC  
  
  
CONTINUE these medications which have CHANGED Details  
losartan (COZAAR) 50 mg tablet Take 1 Tab by mouth daily for 60 days. , Normal, Disp-30 Tab,R-1  
  
atorvastatin (LIPITOR) 40 mg tablet Take 1 Tab by mouth nightly for 60 days. , Normal, Disp-30 Tab,R-1 CONTINUE these medications which have NOT CHANGED Details  
doxazosin (CARDURA) 4 mg tablet TAKE 1 TABLET BY MOUTH  DAILY, Normal, Disp-90 Tab,R-3Requesting 1 year supply  
  
hydroCHLOROthiazide (HYDRODIURIL) 12.5 mg tablet TAKE 1 TABLET BY MOUTH  DAILY, Normal, Disp-90 Tab,R-3Requesting 1 year supply  
  
finasteride (PROSCAR) 5 mg tablet TAKE 1 TABLET BY MOUTH  DAILY, Normal, Disp-90 Tab,R-3Requesting 1 year supply  
  
metFORMIN ER (GLUCOPHAGE XR) 500 mg tablet TAKE 1 TABLET BY MOUTH  TWICE DAILY WITH MEALS, Normal, Disp-180 Tab,R-3Requesting 1 year supply glipiZIDE SR (GLUCOTROL XL) 5 mg CR tablet TAKE 1 TABLET BY MOUTH  DAILY, Normal, Disp-90 Tab,R-3Requesting 1 year supply  
  
sertraline (ZOLOFT) 100 mg tablet TAKE 1 TABLET BY MOUTH  DAILY, Normal, Disp-90 Tab,R-1  
  
temazepam (RESTORIL) 15 mg capsule TAKE 1 CAPSULE BY MOUTH AT BEDTIME AS NEEDED FOR SLEEP., Normal, Disp-90 Cap,R-1Not to exceed 6 additional fills before 09/26/2018  
  
fluticasone propionate (FLONASE) 50 mcg/actuation nasal spray ADMINISTER 1 Spray IN Both Nostrils two (2) times a day., Normal, Disp-16 g, R-0  
  
menthol-zinc oxide (CALMOSEPTINE) 0.44-20.6 % oint Apply to bilateral buttocks TID  Indications: skin irritation, Normal, Disp-113 g, R-5  
  
OTHER Hearing evaluation for possible changes in hearing, Print, Disp-1 Each, R-0  
  
tiZANidine (ZANAFLEX) 2 mg tablet Take 1 Tab by mouth three (3) times daily as needed for Pain., Normal, Disp-20 Tab, R-0  
  
glucose blood VI test strips (TRUE METRIX GLUCOSE TEST STRIP) strip Check BS BID  Dx: DM  E11.21, Normal, Disp-100 Strip, R-11  
  
cholecalciferol (VITAMIN D3) 1,000 unit cap Take 1 Cap by mouth daily. , Normal, Disp-30 Cap, R-2  
  
magnesium 250 mg tab Take 1 Tab by mouth daily. , Normal, Disp-30 Tab, R-2  
  
sodium chloride (OCEAN) 0.65 % nasal squeeze bottle 0.05 mL by Both Nostrils route as needed for Congestion. , Normal, Disp-30 mL, R-2  
  
FERROUS FUMARATE/VIT BCOMP,C (SUPER B COMPLEX PO) Take  by mouth., Historical Med  
  
  
STOP taking these medications  
  
 amLODIPine (NORVASC) 10 mg tablet Comments:  
Reason for Stopping:   
   
 traZODone (DESYREL) 50 mg tablet Comments:  
Reason for Stopping:   
   
 labetaloL (NORMODYNE) 100 mg tablet Comments:  
Reason for Stopping:   
   
 aspirin (ASPIRIN) 325 mg tablet Comments:  
Reason for Stopping:   
   
  
 
Problem: Falls - Risk of 
Goal: *Absence of Falls Description: Document Marcy Doherty Fall Risk and appropriate interventions in the flowsheet. Outcome: Progressing Towards Goal 
Note: Fall Risk Interventions: 
Mobility Interventions: Communicate number of staff needed for ambulation/transfer Mentation Interventions: Door open when patient unattended Medication Interventions: Patient to call before getting OOB, Evaluate medications/consider consulting pharmacy Elimination Interventions: Call light in reach History of Falls Interventions: Door open when patient unattended Problem: Syncope Goal: *Absence of injury Outcome: Progressing Towards Goal 
  
Problem: Pressure Injury - Risk of 
Goal: *Prevention of pressure injury Description: Document Justin Scale and appropriate interventions in the flowsheet. Outcome: Progressing Towards Goal 
Note: Pressure Injury Interventions: 
Sensory Interventions: Keep linens dry and wrinkle-free, Chair cushion, Minimize linen layers, Assess changes in LOC Moisture Interventions: Minimize layers, Absorbent underpads Activity Interventions: Increase time out of bed, Pressure redistribution bed/mattress(bed type), PT/OT evaluation Mobility Interventions: HOB 30 degrees or less, Pressure redistribution bed/mattress (bed type), PT/OT evaluation Nutrition Interventions: Document food/fluid/supplement intake Friction and Shear Interventions: Foam dressings/transparent film/skin sealants, Transfer aides:transfer board/Delroy lift/ceiling lift, Apply protective barrier, creams and emollients Problem: Patient Education: Go to Patient Education Activity Goal: Patient/Family Education Outcome: Progressing Towards Goal

## 2020-11-04 NOTE — PROGRESS NOTES
Problem: Mobility Impaired (Adult and Pediatric) Goal: *Acute Goals and Plan of Care (Insert Text) Description: FUNCTIONAL STATUS PRIOR TO ADMISSION: Patient was modified independent using a cane for functional mobility. HOME SUPPORT PRIOR TO ADMISSION: The patient lived with wife but did not require assist.  The patient endorses being open to home health for therapy. He resides at [facility] in an independent living apartment. Has children in the area. Physical Therapy Goals Initiated 10/29/2020 1. Patient will move from supine to sit and sit to supine , scoot up and down, and roll side to side in bed with modified independence within 7 day(s). 2.  Patient will transfer from bed to chair and chair to bed with modified independence using the least restrictive device within 7 day(s). 3.  Patient will perform sit to stand with modified independence within 7 day(s). 4.  Patient will ambulate with modified independence for 300 feet with the least restrictive device within 7 day(s). Outcome: Progressing Towards Goal 
 
 
PHYSICAL THERAPY TREATMENT Patient: Di Arias (17 y.o. male) Date: 11/4/2020 Diagnosis: Syncope [R55] Syncope [R55] Aortic stenosis Procedure(s) (LRB): 
INSERT PPM DUAL (N/A) 5 Days Post-Op Precautions:   
Chart, physical therapy assessment, plan of care and goals were reviewed. ASSESSMENT Patient continues with skilled PT services and is progressing towards goals. Pt was able to recall pacer precautions, but difficulties with adherence. Pt has difficulties with sit to stand requiring assistance. The required assistance varies dependent on height of surface. Per CM who contacted son, pt has a long standing issue with sit to stand. Pt's son will assist on day of discharge and practice with pt. Pt sleeps and stays in recliner. Pt able to ambulate with rollator with no LOB. Pt is not expressing any concerns with discharge.  Pt HR did elevated 160bpm but decreased with rest 
 
Current Level of Function Impacting Discharge (mobility/balance):mod A Other factors to consider for discharge:sit to stand, pacer precautions PLAN : 
Patient continues to benefit from skilled intervention to address the above impairments. Continue treatment per established plan of care. to address goals. Recommendation for discharge: (in order for the patient to meet his/her long term goals) Physical therapy at least 2 days/week in the home AND ensure assist and/or supervision for safety with transfers This discharge recommendation: 
Has not yet been discussed the attending provider and/or case management IF patient discharges home will need the following DME: patient owns DME required for discharge SUBJECTIVE:  
Patient stated I am going home today.  OBJECTIVE DATA SUMMARY:  
Critical Behavior: 
Neurologic State: Alert Orientation Level: Oriented X4 Cognition: Appropriate for age attention/concentration Safety/Judgement: Insight into deficits, Fall prevention, Awareness of environment Functional Mobility Training: 
Bed Mobility: 
  
  
  
  
  
  
Transfers: 
Sit to Stand: (height dependent min/mod A) Stand to Sit: Contact guard assistance Balance: 
Sitting: Intact Standing: Impaired Standing - Static: Good Standing - Dynamic : Good Ambulation/Gait Training: 
Distance (ft): 150 Feet (ft) Assistive Device: Gait belt;Walker, rollator Ambulation - Level of Assistance: Stand-by assistance Gait Abnormalities: Decreased step clearance Speed/Zaida: Slow Step Length: Left shortened;Right shortened Stairs: Therapeutic Exercises:  
 
Pain Rating: 
None Activity Tolerance:  
SpO2 stable on RA After treatment patient left in no apparent distress:  
Sitting in chair and Call bell within reach COMMUNICATION/COLLABORATION:  
 The patients plan of care was discussed with: Registered nurse. Darshan Lee PTA Time Calculation: 23 mins

## 2020-11-04 NOTE — DISCHARGE SUMMARY
[facility] Discharge Summary Patient ID: Harper Blevins [id number] 
9 y.o. 
1935 Admit date: 10/18/2020 Discharge date: 11/4/2020 Admitting Physician: Rafael Christensen MD  
 
Referring Cardiologist:  Dr. Vignesh Lorenz PCP:  Sushila Spaulding DO Admitting Diagnoses: Aortic stenosis Discharge Diagnoses:  
 
Hospital Problems  Date Reviewed: 10/18/2020 Codes Class Noted POA  
 S/P TAVR (transcatheter aortic valve replacement) ICD-10-CM: K09.6 ICD-9-CM: V43.3  11/3/2020 Unknown Pacemaker ICD-10-CM: Z95.0 ICD-9-CM: V45.01  10/30/2020 Unknown Overview Signed 10/30/2020 11:57 AM by Sue Mcmahan MD  
  10/30/2020 dual chamber medtronic pacemaker Third degree AV block (HCC) ICD-10-CM: I44.2 ICD-9-CM: 426.0  10/30/2020 Unknown (HFpEF) heart failure with preserved ejection fraction (HCC) ICD-10-CM: I50.30 ICD-9-CM: 428.9  10/20/2020 Unknown Syncope and collapse ICD-10-CM: R55 
ICD-9-CM: 780.2  10/18/2020 Unknown Syncope ICD-10-CM: R55 
ICD-9-CM: 780.2  10/18/2020 Unknown * (Principal) Aortic stenosis ICD-10-CM: I35.0 ICD-9-CM: 424.1  10/18/2020 Overview Signed 10/21/2020  6:58 AM by Sonia Vale MD  
  Added automatically from request for surgery [id number] Discharged Condition: stable Disposition: Home with family, home health, home PT/OT Procedures for this admission:  
 
10/28/20:  Transcatheter Aortic Valve Replacement, Shockwave, Balloon Valvuloplasty, Transthoracic Echo and KENAN by Dr. Denny Irving   
  
10/30/2020: INSERT PPM DUAL Discharge Medications: My Medications START taking these medications Instructions Each Dose to Equal Morning Noon Evening Bedtime  
acetaminophen 325 mg tablet Commonly known as:  TYLENOL Your last dose was: Your next dose is: Take 2 Tabs by mouth every four (4) hours as needed for Pain. 650 mg 
  
  
  
  
  
aspirin 81 mg chewable tablet Start taking on:  November 5, 2020 Replaces:  aspirin 325 mg tablet Your last dose was: Your next dose is: Take 1 Tab by mouth daily. 81 mg 
  
  
  
  
  
cephALEXin 250 mg capsule Commonly known as:  Lary Zamora Your last dose was: Your next dose is: Take 1 Cap by mouth three (3) times daily for 1 day. 250 mg 
  
  
  
  
  
guaiFENesin 1,200 mg Ta12 ER tablet Commonly known as:  [place] [address] Your last dose was: Your next dose is: Take 1 Tab by mouth every twelve (12) hours for 14 days. 1,200 mg 
  
  
  
  
  
metoprolol tartrate 50 mg tablet Commonly known as:  LOPRESSOR Your last dose was: Your next dose is: Take 1 Tab by mouth two (2) times a day for 60 days. 50 mg 
  
  
  
  
  
senna-docusate 8.6-50 mg per tablet Commonly known as:  Maximino Spencer Your last dose was: Your next dose is: Take 1 Tab by mouth daily as needed for Constipation. 1 Tab CHANGE how you take these medications Instructions Each Dose to Equal Morning Noon Evening Bedtime  
atorvastatin 40 mg tablet Commonly known as:  LIPITOR What changed:   
· medication strength · See the new instructions. Your last dose was: Your next dose is: Take 1 Tab by mouth nightly for 60 days. 40 mg 
  
  
  
  
  
losartan 50 mg tablet Commonly known as:  COZAAR Start taking on:  November 5, 2020 What changed:   
· medication strength · See the new instructions. Your last dose was: Your next dose is: Take 1 Tab by mouth daily for 60 days. 50 mg 
  
  
  
  
  
metFORMIN  mg tablet Commonly known as:  GLUCOPHAGE XR What changed:  See the new instructions. Your last dose was: Your next dose is: TAKE 1 TABLET BY MOUTH  TWICE DAILY WITH MEALS 
   
  
  
  
  
  
CONTINUE taking these medications Instructions Each Dose to Equal Morning Noon Evening Bedtime cholecalciferol 25 mcg (1,000 unit) Cap Commonly known as:  Vitamin D3 Your last dose was: Your next dose is: Take 1 Cap by mouth daily. 1,000 Units 
  
  
  
  
  
doxazosin 4 mg tablet Commonly known as:  CARDURA Your last dose was: Your next dose is: TAKE 1 TABLET BY MOUTH  DAILY 
   
  
  
  
  
finasteride 5 mg tablet Commonly known as:  PROSCAR Your last dose was: Your next dose is: TAKE 1 TABLET BY MOUTH  DAILY 
   
  
  
  
  
fluticasone propionate 50 mcg/actuation nasal spray Commonly known as:  Breanna Princess Your last dose was: Your next dose is:   
 
  
 ADMINISTER 1 Spray IN Both Nostrils two (2) times a day. glipiZIDE SR 5 mg CR tablet Commonly known as:  GLUCOTROL XL Your last dose was: Your next dose is: TAKE 1 TABLET BY MOUTH  DAILY 
   
  
  
  
  
glucose blood VI test strips strip Commonly known as:  True Metrix Glucose Test Strip Your last dose was: Your next dose is:   
 
  
 Check BS BID  Dx: DM  E11.21 
   
  
  
  
  
hydroCHLOROthiazide 12.5 mg tablet Commonly known as:  HYDRODIURIL Your last dose was: Your next dose is: TAKE 1 TABLET BY MOUTH  DAILY 
   
  
  
  
  
magnesium 250 mg Tab Your last dose was: Your next dose is: Take 1 Tab by mouth daily. 250 mg 
  
  
  
  
  
menthol-zinc oxide 0.44-20.6 % Oint Commonly known as:  Achilles Quant Your last dose was: Your next dose is:   
 
  
 Apply to bilateral buttocks TID  Indications: skin irritation OTHER Your last dose was: Your next dose is:   
 
  
 Hearing evaluation for possible changes in hearing 
   
  
  
  
  
sertraline 100 mg tablet Commonly known as:  ZOLOFT Your last dose was: Your next dose is: TAKE 1 TABLET BY MOUTH  DAILY 
   
  
  
  
  
sodium chloride 0.65 % nasal squeeze bottle Commonly known as:  OCEAN Your last dose was: Your next dose is:   
 
  
 0.05 mL by Both Nostrils route as needed for Congestion. 1 Stuart SUPER B COMPLEX PO Your last dose was: Your next dose is: Take  by mouth. temazepam 15 mg capsule Commonly known as:  RESTORIL Your last dose was: Your next dose is: TAKE 1 CAPSULE BY MOUTH AT BEDTIME AS NEEDED FOR SLEEP. tiZANidine 2 mg tablet Commonly known as:  Rollins Perks Your last dose was: Your next dose is: Take 1 Tab by mouth three (3) times daily as needed for Pain. 2 mg STOP taking these medications   
amLODIPine 10 mg tablet Commonly known as:  NORVASC 
  
  
aspirin 325 mg tablet Commonly known as:  ASPIRIN Replaced by:  aspirin 81 mg chewable tablet 
  
  
labetaloL 100 mg tablet Commonly known as:  NORMODYNE 
  
  
traZODone 50 mg tablet Commonly known as:  Roxi Pencil Where to Get Your Medications These medications were sent to [address] [facility], [address] [facility] [address] [facility] [address], [place] [address] Phone:  [phone] · doxazosin 4 mg tablet · finasteride 5 mg tablet · glipiZIDE SR 5 mg CR tablet · hydroCHLOROthiazide 12.5 mg tablet · metFORMIN  mg tablet These medications were sent to [address] [place] [address], [address] [place] [address] Phone:  [phone] · aspirin 81 mg chewable tablet · atorvastatin 40 mg tablet · cephALEXin 250 mg capsule · guaiFENesin 1,200 mg Ta12 ER tablet · losartan 50 mg tablet · metoprolol tartrate 50 mg tablet HPI: 
82 y.o.   male with PMHx of AS with history of rheumatic fever 1944, CAD with remote history of CAB (LIMA-LAD, SVG-OM, SVG-RPDA), PVD with stent to bilateral ARON, right SFA PTA and left subclavian stent, HTN, HLD, DM, HFpEF, GERD, BPH, Depression, that is [facility] by Dr. Shar Gutiérrez for interventional evaluation of  Aortic stenosis.   
  
He notes that he has been following with Dr. Randolph Nagy for his AS which he has known about for at least a year. He lives with his wife in the independent living apartments of [facility] and had been exercising regularly at the gym there until [facility]. Since that time, however, his activity level has decreased significantly- \"Because there is nothing to do\". He denies orthopnea but states that he sleeps in a recliner for comfort purposes. Denies PND. His only real activity is grocery shopping. His son and daughter-in-law do the driving. He had just finished shopping on 10/18 and was pushing his cart out of the store when he had a syncopal episode. He lost consciousness and states he woke up on the ground. He notes that he hit his head but it has never caused him pain. He notes fatigue but, again, has not been very active. He notes shortness of breath with exertion only. Denies palpitations or chest pain. He has had several UTIs with treatment over the last few months and currently is being treated with Rocephin through 10/25. Hospital Course: Yris Sinha was admitted with syncope and found to have severe AS needing intervention. He was kept as an inpatient for medical optimization and preoperative workup. He was then taken to the OR on 10/28/20 for a transcatheter aortic valve replacement with a 24 mm Medtronic Evolut R via the left transfemoral approach. Postoperatively his recovery was complicated by complete heart block with need for resuscitation and received a permanent pacemaker by Dr. Antonio Gavin. During his admission, he worked with Cardiac Rehab, Physical Therapy and Occupational Therapy to increase his activity and to learn more about postoperative care. PT/OT made recommendations for home PT/OT and case management assisted in making these arrangements.  He was felt stable for discharge 11/04/20 with the following assessment and plan: 
 
1. Aortic stenosis, severe and symptomatic (paradoxical LFLG) s/p TAVR: ASA 81 mg only for AC. Echo completed and reviewed by Dr. Lissett Lloyd.   
  
2.  CAD with Hx of CABG: On ASA, Statin, BB 
  
3. HTN: Cont lopressor, losartan, lasix. Goal SPB < 160 
  
4. HLD: On Statin 
  
5. HFpEF: BP management 
  
6. Syncope: Prior to admission. ? AS related but post TAVR with CHB now s/p PPM. BICA of <50%.  
  
7. PAD: On ASA, Statin.  
  
8. DM: On Metformin, Glipizide at home. Increase Lantus to 25 units qhs. A1C of 7.1. BS ACHS, SSI Per orders  
  
9. MAT/Atrial fibrillation/Asystole: Intermittent Atrial fibrillation. Currently in NSR/Paced. Currently on ASA 81. No OAC historically. s/p PPM. Cont BB.    
  
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 Continue to monitor 
  
11. UTI: On vancomycin completed 10/28 and ceftriaxone (completed). MRSA and enterobacter on culture. ID signed off. 
  
12. BPH: On Proscar, cardura.  
  
13. Depression: on Zoloft 
  
14. Lethargy/debility:  OOB as much as possible. PT/OT.  
  
15. Acute post op resp failure, pulm edema: keep O2 sats >92%. On lasix 40 mg BID --Weight trending down. CXR doesn't show overt edema or plerual effusions. IS as able, cont mucinex to help clear secretions. ELLA may contribute some overnight.   
  
16. Hypokalemia: increased scheduled repletion. Monitor  
  
Dispo: PT/OT/Cardiac Rehab. Case Management for discharge planning. Rec  PT/OT. Plan to discharge today 
  
 
 
Referral to outpatient cardiac rehab made. Discharge Vital Signs:  
Visit Vitals BP (!) 150/51 (BP 1 Location: Left arm, BP Patient Position: At rest) Pulse 72 Temp 97.6 °F (36.4 °C) Resp 18 Ht 6' 3\" (1.905 m) Wt 186 lb 11.7 oz (84.7 kg) SpO2 96% BMI 23.34 kg/m² Labs:  
Recent Labs 11/04/20 
5351 11/04/20 
0429  11/03/20 
9149 WBC  --  7.9  --  8.5 HGB  --  8.8*  --  9.0* HCT  --  26.8*  --  28.0*  
 PLT  --  123*  --  113* NA  --  141  --  141 K  --  3.9  --  3.7 BUN  --  32*  --  32* CREA  --  0.95  --  0.84 GLU  --  149*  --  124* GLUCPOC 156*  --    < >  --   
INR  --   --   --  1.2*  
 < > = values in this interval not displayed. Diagnostics: CXR Results  (Last 48 hours) 11/03/20 0801  XR CHEST PORT Final result Impression:  IMPRESSION: There is slight left basilar atelectasis. Right lung is clear Narrative:  EXAM:  XR CHEST PORT INDICATION:  s/p TAVR, oxygen requirement COMPARISON:  10/31/2020 FINDINGS: A portable AP radiograph of the chest was obtained at 747 hours. Pacer  
leads are unchanged. Patient is status post TAVR. Deboraha Buys There is no pneumothorax. There is slight atelectasis left base. Deboraha Buys Heart size is stable. .  There is slight  
scoliosis thoracic spine. Patient Instructions/Follow Up Care: 
Discharge instructions were reviewed with the patient and family present. Questions were also answered at this time. Prescriptions and medications were reviewed. The patient has a follow up appointment with the Nurse Practitioner or [de-identified] Assistant on 11/9/20 at 11am by telephone and with Dr. Irasema Arnold on 12/2/20 at 11am in the clinic. The patient was also instructed to follow up with his primary care physician as needed. The patient and family were encouraged to call with any questions or concerns. Signed: 
Jessica Bone NP 
11/4/2020 
10:31 AM  
 
Saw patient, agree with above - ok for discharge Risk of morbidity and mortality - high Medical decision making - high complexity

## 2020-11-04 NOTE — PROGRESS NOTES
Cardiac Surgery Care Coordinator-  Met with Santino Murphy, reviewed plan of care and discharge instructions. Encouraged continued use of the incentive spirometer. Santino Murphy is able to pull 1500cc with good effort. Reviewed the importance of daily temp and weight monitoring, discussed groin site care and reviewed signs and symptoms of infection. Red reminder bracelet on right wrist, reviewed purpose of the bracelet and when to call the MD. Encouraged Santino Murphy to verbalize and emotional support given. Santino Murphy is without questions or concerns at this time.  Rustam Zhang RN

## 2020-11-05 NOTE — PROGRESS NOTES
No transition of care outreach indicated due to patient being managed by [facility] Cardiothoracic Surgery Team for 30 days.

## 2020-11-05 NOTE — TELEPHONE ENCOUNTER
Spoke with the [facility]. The patient was drinking a regular soda during his BS check. He has restarted his home Metformin and Glipizide. I called the patient and discussed diet-avoid sugar and carbohydrates today. Increase water consumption. Recheck pre-prandial BS this afternoon. Physical Therapy is scheduled to see him this afternoon and will help with this.  will schedule the patient for visits tomorrow and Saturday to help him check on this. I discussed that we will likely need to schedule him back to see his PCP sooner rather than later to address BS.

## 2020-11-05 NOTE — TELEPHONE ENCOUNTER
Cardiac Surgery Discharge - Follow up call placed to [facility]. Reviewed plan of care after discharge and encouraged [facility] to verbalize. He stated the home health nurse is at his house. [facility] is without questions or concerns.  Stephania Nixon RN

## 2020-11-06 NOTE — TELEPHONE ENCOUNTER
The patient cannot get up off the couch or his bed by himself and his wife is too weak to help him. They would like to be admitted to rehab. I have spoken to [facility], our case management team and the patient's son. I have given the son numbers for the liasons at [address] [place] [address] and [facility]. He will reach out to the [facility] and request a home assessment for admit. Updated the [facility] about the plan.

## 2020-11-07 NOTE — ED TRIAGE NOTES
Pt arrives with son who states patient has been too weak to get up. Pt has home health nurse who consulted with cardiologist and referred into ED for evaluation. Pt baseline walks with a cane. Pt has not been able to ambulate due to weakness. Pt was recently discharged from here and had a pacemaker placed and valve replacement. Pt denies chest pain and SOB.

## 2020-11-08 PROBLEM — R53.1 GENERALIZED WEAKNESS: Status: ACTIVE | Noted: 2020-01-01

## 2020-11-08 NOTE — ED NOTES
Bel Goodwin RN and Jamilah Wang RN to bedside to straight catheterize patient. Patient tolerated well in no acute distress. Pt placed in clean brief and repositioned in bed for comfort. RN will continue to monitor.

## 2020-11-08 NOTE — H&P
History & Physical 
 
Primary Care Provider: Sushila Spaulding DO Source of Information: Patient History of Presenting Illness:  
Harper Blevins is a 82 y.o. male who presents with generalized weakness 82 WM h/o DM, HTN, AS s/p TAVR and pacemaker 2 weeks ago. Pt was discharged to home ( independent living of [facility]) on 11/4 . He was sending to ER today CC of diarrhea and generalized weakness. Per note prophylactic antibiotics keflex after pacemaker. He developed diarrhea at home, loose stools, 3-4 times day. Since he has had diarrhea. His family has reported concerns that he is weak but pt denies feeling any focal weakness. He says he hasn't been able to work out or work with physical therapy due to covid. Pt said so far no more diarrhea today. Jose Guadalupe Bai visited him today and said his condition is worsening to the pont where he can no longer stand and walk on his own. Denies fevers. Denies N/V, abdominal pain Review of Systems: 
General: HPI. No changes of weight, appetite ok HEENT: no headache, no vision changes, no nose discharge, no hearing changes RES: no wheezing, mild cough, no sob CVS: no cp, no palpitation. Muscular: no joint swelling, no muscle pain, no leg swelling Skin: no rash, no itching GI: HPI  
: no dysuria, no hematuria Hemo: no gum bleeding, no petechial  
Neuro: no sensation changes, no focal weakness , HPI Endo: no polydipsia Psych: denied depression Past Medical History:  
Diagnosis Date  BPH (benign prostatic hyperplasia)  CAD (coronary artery disease)  Diabetes ([address]  Hearing loss  Hypercholesterolemia  Hypertension  Murmur  Recurrent depression ([address] 8/22/2019  Third degree AV block ([address] 10/30/2020 Past Surgical History:  
Procedure Laterality Date  CARDIAC SURG PROCEDURE UNLIST  2006 by-pass  HX CHOLECYSTECTOMY  [place] [facility] NEW/RPLCMT PRM PM W/TRANSV ELTRD ATRIAL&VENT  10/30/2020  [place] NEW/RPLCMT PRM PM W/TRANSV ELTRD ATRIAL&VENT N/A 10/30/2020 INSERT PPM DUAL performed by Saige Muir MD [place] [address], [facility] [address] [facility] Prior to Admission medications Medication Sig Start Date End Date Taking? Authorizing Provider  
losartan (COZAAR) 50 mg tablet Take 1 Tab by mouth daily for 60 days. 11/5/20 1/4/21  Jose Juan Bullard NP  
atorvastatin (LIPITOR) 40 mg tablet Take 1 Tab by mouth nightly for 60 days. 11/4/20 1/3/21  Jose Juan Bullard NP  
aspirin 81 mg chewable tablet Take 1 Tab by mouth daily. 11/5/20 11/5/21  Jose Juan Bullard NP  
acetaminophen (TYLENOL) 325 mg tablet Take 2 Tabs by mouth every four (4) hours as needed for Pain. 11/4/20   Jose Juan Bullard NP  
guaiFENesin ER (MUCINEX) 1,200 mg Ta12 ER tablet Take 1 Tab by mouth every twelve (12) hours for 14 days. 11/4/20 11/18/20  Jose Juan Bullard NP  
metoprolol tartrate (LOPRESSOR) 50 mg tablet Take 1 Tab by mouth two (2) times a day for 60 days. 11/4/20 1/3/21  Jose Juan Bullard NP  
senna-docusate (PERICOLACE) 8.6-50 mg per tablet Take 1 Tab by mouth daily as needed for Constipation.  11/4/20   Jose Juan Bullard NP  
doxazosin (CARDURA) 4 mg tablet TAKE 1 TABLET BY MOUTH  DAILY 10/20/20   David Dick NP  
hydroCHLOROthiazide (HYDRODIURIL) 12.5 mg tablet TAKE 1 TABLET BY MOUTH  DAILY 10/20/20   David Dick NP  
finasteride (PROSCAR) 5 mg tablet TAKE 1 TABLET BY MOUTH  DAILY 10/20/20   Micah Stein NP  
metFORMIN ER (GLUCOPHAGE XR) 500 mg tablet TAKE 1 TABLET BY MOUTH  TWICE DAILY WITH MEALS 10/20/20   Joanna Dick NP  
glipiZIDE SR (GLUCOTROL XL) 5 mg CR tablet TAKE 1 TABLET BY MOUTH  DAILY 10/20/20   David Dick NP  
sertraline (ZOLOFT) 100 mg tablet TAKE 1 TABLET BY MOUTH  DAILY 9/2/20   Malathi Hong NP  
temazepam (RESTORIL) 15 mg capsule TAKE 1 CAPSULE BY MOUTH AT BEDTIME AS NEEDED FOR SLEEP. 7/6/20   Nemesio Hunt, DO  
fluticasone propionate (FLONASE) 50 mcg/actuation nasal spray ADMINISTER 1 Spray IN Both Nostrils two (2) times a day. 6/8/20   Ayan Escobar NP  
menthol-zinc oxide (CALMOSEPTINE) 0.44-20.6 % oint Apply to bilateral buttocks TID  Indications: skin irritation 6/4/20   Yazdanism Alamin, DO  
OTHER Hearing evaluation for possible changes in hearing 1/20/20   Nemesio Hunt, DO  
tiZANidine (ZANAFLEX) 2 mg tablet Take 1 Tab by mouth three (3) times daily as needed for Pain. 7/11/19   Yazdanism Alamin, DO  
glucose blood VI test strips (TRUE METRIX GLUCOSE TEST STRIP) strip Check BS BID  Dx: DM  E11.21 7/12/18   Temple Alamin, DO  
cholecalciferol (VITAMIN D3) 1,000 unit cap Take 1 Cap by mouth daily. 3/29/18   Temple Alamin, DO  
magnesium 250 mg tab Take 1 Tab by mouth daily. 3/29/18   Yazdanism Alamin, DO  
sodium chloride (OCEAN) 0.65 % nasal squeeze bottle 0.05 mL by Both Nostrils route as needed for Congestion. 3/29/18   Nemesio Hunt DO  
FERROUS FUMARATE/VIT BCOMP,C (SUPER B COMPLEX PO) Take  by mouth. Provider, Historical  
 
Allergies Allergen Reactions  Procaine Other (comments) Pt stated he passed out from procaine and was told not to let anyone use it on him again Family History Problem Relation Age of Onset  Cancer Mother  Cancer Father SOCIAL HISTORY: 
Patient resides: 
Independently x Assisted Living SNF With family care x Smoking history:  
None x Former Chronic Alcohol history:  
None x Social   
Chronic Ambulates:  
Independently   
w/cane x  
w/walker   
w/wc CODE STATUS: 
DNR Full x Other Objective:  
 
Physical Exam:  
 
Visit Vitals BP (!) 153/51 Pulse 76 Temp 98 °F (36.7 °C) Resp 20 SpO2 96% O2 Device: Room air General:  Alert, cooperative, no distress, appears stated age. Ox3 Head:  Normocephalic, without obvious abnormality, atraumatic. Eyes:  Conjunctivae/corneas clear. PERRL, EOMs intact. Nose: Nares normal. Septum midline. Mucosa normal. No drainage or sinus tenderness. Throat: Lips, mucosa, and tongue normal. Teeth and gums normal.  
Neck: Supple, symmetrical, trachea midline, no adenopathy, thyroid: no enlargement/tenderness/nodules, no carotid bruit and no JVD. Back:   Symmetric, no curvature. ROM normal. No CVA tenderness. Lungs:   Clear to auscultation bilaterally. Chest wall:  No tenderness or deformity. Heart:  Regular rate and rhythm, S1, S2 normal, no murmur, click, rub or gallop. Abdomen:   Soft, non-tender. Bowel sounds normal. No masses,  No organomegaly. Extremities: Extremities normal, atraumatic, no cyanosis or edema. Pulses: 2+ and symmetric all extremities. Skin: Skin dry Neurologic: CNII-XII intact. No focal weakness Data Review:  
 
Recent Days: 
Recent Labs 11/07/20 
1126 WBC 9.2 HGB 9.1*  
HCT 28.2*  
* Recent Labs 11/08/20 
1125 11/07/20 
1126  139  
K 3.4* 3.3*  
 103 CO2 30 32 * 217* BUN 40* 40* CREA 1.09 1.13  
CA 8.5 8.4* ALB 2.9* 2.9* ALT 16 16 No results for input(s): PH, PCO2, PO2, HCO3, FIO2 in the last 72 hours. 24 Hour Results: 
Recent Results (from the past 24 hour(s)) METABOLIC PANEL, COMPREHENSIVE Collection Time: 11/08/20 11:25 AM  
Result Value Ref Range Sodium 138 136 - 145 mmol/L Potassium 3.4 (L) 3.5 - 5.1 mmol/L Chloride 102 97 - 108 mmol/L  
 CO2 30 21 - 32 mmol/L Anion gap 6 5 - 15 mmol/L Glucose 221 (H) 65 - 100 mg/dL BUN 40 (H) 6 - 20 MG/DL Creatinine 1.09 0.70 - 1.30 MG/DL  
 BUN/Creatinine ratio 37 (H) 12 - 20 GFR est AA >60 >60 ml/min/1.73m2 GFR est non-AA >60 >60 ml/min/1.73m2 Calcium 8.5 8.5 - 10.1 MG/DL Bilirubin, total 0.6 0.2 - 1.0 MG/DL  
 ALT (SGPT) 16 12 - 78 U/L  
 AST (SGOT) 17 15 - 37 U/L Alk.  phosphatase 133 (H) 45 - 117 U/L  
 Protein, total 6.3 (L) 6.4 - 8.2 g/dL Albumin 2.9 (L) 3.5 - 5.0 g/dL Globulin 3.4 2.0 - 4.0 g/dL A-G Ratio 0.9 (L) 1.1 - 2.2 SAMPLES BEING HELD Collection Time: 11/08/20 11:26 AM  
Result Value Ref Range SAMPLES BEING HELD RED,FAVIAN   
 COMMENT Add-on orders for these samples will be processed based on acceptable specimen integrity and analyte stability, which may vary by analyte. Imaging: Xr Chest [facility] Result Date: 11/8/2020 IMPRESSION: Stable mild left basilar opacity. Clear right lung. Assessment:  
 
Active Problems: 
  Generalized weakness (11/8/2020) Plan: 1. Generalized weakness: will order head CT to r/o CVA. Pending UA. Could due to acute deconditioning after his recent hospitalization. Baseline can walk with a cane, has unsteady gait issue. PT/OT eval.  
2. Diarrhea: improving today, check c-diff if still having diarrhea 3. S/P TAVR and pacemaker: will consult CS. 4. DM: continue glipizide, SSI. Hold mefromin for now 5. HTN: continue losartan, metoprolol. Hold HTCZ due to concerning of dehydartion. IVF overnight and checking orthostatic vitals. Will adjust 
6. Hypokalemia: po kcl 7. BPH: continue proscar and doxazosin Son Robin called and updated, patient and son would like to go to acute rehab, will check covid 19 for rehab placement screen. No need droplet isolation for now. Signed By: Mortimer Cline, MD   
 November 8, 2020

## 2020-11-08 NOTE — ED PROVIDER NOTES
82-year-old male with a history of BPH, CAD, aortic stenosis status post TAVR, complete heart block status post pacer, diabetes, hypercholesterolemia, and hypertension was doing well when he was recently hospitalized for his TAVR and pacer placement, but since going home he has started to deteriorate. He was started on antibiotic before he left the hospital and now he says he has had diarrhea, though it sounds more like loose stool. He is not eating or drinking much and his son is concerned he is dehydrated. Home health has noticed he is getting more and more weak and today he was not able and able to walk on his own. Home health thought he would need placement for inpatient rehab. Past Medical History:  
Diagnosis Date  BPH (benign prostatic hyperplasia)  CAD (coronary artery disease)  Diabetes ([facility]  Hearing loss  Hypercholesterolemia  Hypertension  Murmur  Recurrent depression ([facility] 8/22/2019  Third degree AV block ([facility] 10/30/2020 Past Surgical History:  
Procedure Laterality Date  CARDIAC SURG PROCEDURE UNLIST  2006 by-pass  HX CHOLECYSTECTOMY  [place] INS NEW/RPLCMT PRM PM W/TRANSV ELTRD ATRIAL&VENT  10/30/2020  [place] NEW/RPLCMT PRM PM W/TRANSV ELTRD ATRIAL&VENT N/A 10/30/2020 INSERT PPM DUAL performed by Amy Lynn MD at [address], [facility] [address] [facility] Family History:  
Problem Relation Age of Onset  Cancer Mother  Cancer Father Social History Socioeconomic History  Marital status:  Spouse name: Not on file  Number of children: Not on file  Years of education: Not on file  Highest education level: Not on file Occupational History  Not on file Social Needs  Financial resource strain: Not on file  Food insecurity Worry: Not on file Inability: Not on file  Transportation needs Medical: Not on file Non-medical: Not on file Tobacco Use  
  Smoking status: Former Smoker Types: Cigarettes Last attempt to quit: 1990 Years since quittin.1  Smokeless tobacco: Never Used Substance and Sexual Activity  Alcohol use: No  
 Drug use: No  
 Sexual activity: Not on file Comment:  has 3 children Lifestyle  Physical activity Days per week: Not on file Minutes per session: Not on file  Stress: Not on file Relationships  Social connections Talks on phone: Not on file Gets together: Not on file Attends Alevism service: Not on file Active member of club or organization: Not on file Attends meetings of clubs or organizations: Not on file Relationship status: Not on file  Intimate partner violence Fear of current or ex partner: Not on file Emotionally abused: Not on file Physically abused: Not on file Forced sexual activity: Not on file Other Topics Concern  Not on file Social History Narrative  Not on file ALLERGIES: Procaine Review of Systems Constitutional: Negative for fever. HENT: Negative for trouble swallowing. Eyes: Negative for visual disturbance. Respiratory: Positive for cough and shortness of breath. Cardiovascular: Positive for leg swelling. Negative for chest pain. Gastrointestinal: Negative for abdominal pain. Genitourinary: Negative for difficulty urinating. Musculoskeletal: Negative for gait problem. Skin: Negative for rash. Neurological: Negative for headaches. Hematological: Does not bruise/bleed easily. Psychiatric/Behavioral: Negative for sleep disturbance. Vitals:  
 20 1230 20 1300 20 1330 20 1400 BP: (!) 142/42 (!) 139/45 (!) 153/47 (!) 153/51 Pulse: 61 61 63 76 Resp: 20 25 21 20 Temp:      
SpO2: 96% 95% 96% 96% Physical Exam 
Constitutional:   
   Appearance: Normal appearance. HENT:  
   Head: Normocephalic.   
   Nose: Nose normal.  
 Mouth/Throat:  
   Mouth: Mucous membranes are moist.  
Eyes:  
   Extraocular Movements: Extraocular movements intact. Conjunctiva/sclera: Conjunctivae normal.  
Cardiovascular:  
   Rate and Rhythm: Normal rate. Pulmonary:  
   Effort: Pulmonary effort is normal. No respiratory distress. Abdominal:  
   General: Abdomen is flat. Musculoskeletal: Normal range of motion. Skin: 
   Findings: No rash. Comments: Groin site did not appear infected Same for the site in the left chest wall where the pacer was placed Neurological:  
   General: No focal deficit present. Mental Status: He is alert. Psychiatric:     
   Behavior: Behavior normal.  
 
  
 
MDM Number of Diagnoses or Management Options Ambulatory dysfunction: Anemia, unspecified type:  
Chronic heart failure with preserved ejection fraction ([address]): Fatigue, unspecified type: S/P TAVR (transcatheter aortic valve replacement): Thrombocytopenia [facility]):  
Diagnosis management comments: Perfect Serve Consult for Admission 2:34 PM 
 
ED Room Number: ER06/06 Patient Name and age:  Sidra May 82 y.o.  male Working Diagnosis: Anemia, unspecified type  (primary encounter diagnosis) Fatigue, unspecified type S/P TAVR (transcatheter aortic valve replacement) Chronic heart failure with preserved ejection fraction (HCC) Thrombocytopenia ([address] Ambulatory dysfunction COVID-19 Suspicion:  no 
Sepsis present:  no  Reassessment needed: no 
Code Status:  Full Code Readmission: yes Isolation Requirements:  no 
Recommended Level of Care:  med/surg Department:[facility] Adult ED - [phone] Other: Recently discharged for TAVR and pacer placement. Has been on antibiotics and reported some loose stool/diarrhea. Home health says his condition is worsening to the point where he can no longer stand and walk on his own. He will need placement in a rehab facility. Procedures

## 2020-11-08 NOTE — ED NOTES
Bedside and Verbal shift change report given to Darien Carrillo RN (oncoming nurse) by Hero Roman RN (offgoing nurse). Report included the following information SBAR, ED Summary, MAR and Recent Results.

## 2020-11-08 NOTE — ED TRIAGE NOTES
Triage: Pt arrives from [facility] with CC of diarrhea. Pt had a pacemaker placed last week and was placed on prophylactic antibiotics. Since he has had diarrhea. His family has reported concerns that he is weak but pt denies feeling weak. He says he hasn't been able to work out or work with physical therapy due to covid. Denies fevers. Denies N/V, abdominal pain.

## 2020-11-09 NOTE — ACP (ADVANCE CARE PLANNING)
Advance Care Planning Advance Care Planning Activator (Inpatient) Conversation Note Date of ACP Conversation: 11/09/20 Conversation Conducted with:   Patient with Decision Making Capacity ACP Activator: Reece Ruelas RN 
 
*When Decision Maker makes decisions on behalf of the incapacitated patient: Decision Maker is asked to consider and make decisions based on patient values, known preferences, or best interests. Health Care Decision Maker: 
 
Current Designated Health Care Decision Maker:   Primary Decision Maker: Saundra Lees - [phone] Secondary Decision Maker: Ellie Ayon - [phone] (If there is a valid [place] named in the [facility] Makers\" box in the ACP activity, but it is not visible above, be sure to open that field and then select the health care decision maker relationship (ie \"primary\") in the blank space to the right of the name.) Validate  this information as still accurate & up-to-date; edit [place] field as needed.) Note: Assess and validate information in current ACP documents, as indicated. If no Decision Maker listed above or available through scanned documents, then: 
 
If no Authorized Decision Maker has previously been identified, then patient chooses [place]: \"Who would yo u like to name as your primary health care decision-maker? \" Name: [place]  Relationship: wife Phone number: [phone] \"Can this person be reached easily? \" Joe Chang \"Who would you like to name as your back-up decision maker? \" Name: Ashkan Christensen  Relationship: son Phone number: [phone] \"Can this person be reached easily? \" Joe Chang Note: If the relationship of these Decision-Makers to the patient does NOT follow your state's Next of Kin hierarchy, recommend that patient complete ACP document that meets state-specific requirements to allow them to act on the patient's behalf when appropriate. Care Preferences Ventilation: \"If you were in your present state of health and suddenly became very ill and were unable to breathe on your own, what would your preference be about the use of a ventilator (breathing machine) if it were available to you? \" If patient would desire the use of a ventilator (breathing machine), answer \"yes\", if not \"no\":yes \"If your health worsens and it becomes clear that your chance of recovery is unlikely, what would your preference be about the use of a ventilator (breathing machine) if it were available to you? \" Would the patient desire the use of a ventilator (breathing machine)? YES Resuscitation \"CPR works best to restart the heart when there is a sudden event, like a heart attack, in someone who is otherwise healthy. Unfortunately, CPR does not typically restart the heart for people who have serious health conditions or who are very sick. \" \"In the event your heart stopped as a result of an underlying serious health condition, would you want attempts to be made to restart your heart (answer \"yes\" for attempt to resuscitate) or would you prefer a natural death (answer \"no\" for do not attempt to resuscitate)? \" yes [x] Yes  [] No   Educated Patient / Itz Merlos regarding differences between Advance Directives and portable DNR orders. Length of ACP Conversation in minutes:  5 Conversation Outcomes: 
[x] ACP discussion completed 
[] Existing advance directive reviewed with patient; no changes to patient's previously recorded wishes 
 
 [] New Advance Directive completed 
 [] Portable Do Not Resuscitate prepared for Provider review and signature 
[] POLST/POST/MOLST/MOST prepared for Provider review and signature Follow-up plan:   
[] Schedule follow-up conversation to continue planning 
[] Referred individual to Provider for additional questions/concerns [] Advised patient/agent/surrogate to review completed ACP document and update if needed with changes in condition, patient preferences or care setting  
 
[] This note routed to one or more involved healthcare providers

## 2020-11-09 NOTE — ED NOTES
Patient noted to have urine occurrence. Hygiene care administered and patient placed in clean brief.

## 2020-11-09 NOTE — PROGRESS NOTES
Readmission Assessment Number of days since last admission?: 1-7 days Previous disposition: Home with Home Health Who is being interviewed?: Patient, Caregiver Did you visit your Primary Care Physician after you left the hospital, before you returned this time?: No(only home for 3 days) Did you see a specialist, such as Cardiac, Pulmonary, Orthopedic Physician, etc. after you left the hospital?: No 
Who advised the patient to return to the hospital?: Self-referral, Home Health Staff Does the patient report anything that got in the way of taking their medications?: No 
Reason for Readmission:   Admitted from [facility] with increased weakness. He is post TAVR discharged from here on 11/04. Home with [facility]. RUR Score/Risk Level:   Observation PCP: First and Last name: Vick Heck 
 Name of Practice:  
 Are you a current patient: Yes/No: YES Approximate date of last visit: 10/2020 Can you participate in a virtual visit with your PCP: NO Is a Care Conference indicated:  
NO 
 
Did you attend your follow up appointment (s): If not, why not: 
Was only home for 3 days Resources/supports as identified by patient/family:   Lives with wife [place]. Son Flako Sullivan is also involved in care. Top Challenges facing patient (as identified by patient/family and CM): Finances/Medication cost?    NO Transportation   Son provides Support system or lack thereof? Wife [place] and son Flako Sullivan. Was also followd by [facility] home health Living arrangements? Apartment in independent living at [facility] Self-care/ADLs/Cognition? Post op TAVR needs some assist with daily self care. Ambulates with a cane Current Advanced Directive/Advance Care Plan:  Does not have wife ofe FAISAL [facility] Plan for utilizing home health: Will need resumption of home health orders if discharged home once medically stable Transition of Care Plan:    Based on readmission, the patient's previous Plan of Care 
 has been evaluated and/or modified. The current Transition of Care Plan is: Once medically stable may likely discharge home with family support and home health. Care Management Interventions PCP Verified by CM: Yes(10/2020) Mode of Transport at Discharge: (family car) Transition of Care Consult (CM Consult): Home Health [address]: Yes Discharge Durable Medical Equipment: No 
Physical Therapy Consult: Yes Occupational Therapy Consult: Yes Speech Therapy Consult: No 
Current Support Network: Lives with Spouse, Family Lives [place] Confirm Follow Up Transport: Family The Patient and/or Patient Representative was Provided with a Choice of Provider and Agrees with the Discharge Plan?: (wife [place]) [employer] Information Provided?: No 
Observation notice provided in writing to patient and/or caregiver as well as verbal explanation of the policy. Patients who are in outpatient status also receive the Observation notice. Ella Fine RN CRM Ext [phone] Update-consult noted for possible acute rehab.  Awaiting PT/OT evaluation to sent referral.

## 2020-11-09 NOTE — ROUTINE PROCESS
TRANSFER - OUT REPORT: 
 
Verbal report given to Siena Maguire RN(name) on Paulett Collet  being transferred to (unit) for routine progression of care Report consisted of patients Situation, Background, Assessment and  
Recommendations(SBAR). Information from the following report(s) SBAR, ED Summary, [facility] and Recent Results was reviewed with the receiving nurse. Lines:  
Peripheral IV 11/08/20 Right Forearm (Active) Site Assessment Clean, dry, & intact 11/08/20 1120 Phlebitis Assessment 0 11/08/20 1120 Infiltration Assessment 0 11/08/20 1120 Dressing Status Clean, dry, & intact 11/08/20 1120 Opportunity for questions and clarification was provided. Patient transported with: 
Transportation

## 2020-11-09 NOTE — PROGRESS NOTES
Clinical Update: Cardiac Surgery Consult Consult orders received. Full note to follow. Awaiting negative COVID test prior to full consult. Aortic stenosis, severe and symptomatic (paradoxical LFLG) s/p TAVR on 10/28/20 with Dr. Tatiana Zimmerman. Lissett Lloyd: ASA 81 mg only for [facility]. Repeat Echo when negative COVID test received. Discussed with Dr. Jaclyn Jaquez

## 2020-11-09 NOTE — PROGRESS NOTES
Problem: Mobility Impaired (Adult and Pediatric) Goal: *Acute Goals and Plan of Care (Insert Text) Description: FUNCTIONAL STATUS PRIOR TO ADMISSION: Patient was modified independent using a single point cane for functional mobility prior to surgery 2 weeks ago. Pt has had difficulty since. HOME SUPPORT PRIOR TO ADMISSION: The patient lived with wife but did not require assist. 
 
Physical Therapy Goals Initiated 11/9/2020 1. Patient will move from supine to sit and sit to supine , scoot up and down, and roll side to side in bed with modified independence within 7 day(s). 2.  Patient will transfer from bed to chair and chair to bed with modified independence using the least restrictive device within 7 day(s). 3.  Patient will perform sit to stand with modified independence within 7 day(s). 4.  Patient will ambulate with modified independence for 150 feet with the least restrictive device within 7 day(s). Outcome: Progressing Towards Goal 
  
 
PHYSICAL THERAPY EVALUATION Patient: Danitza Ridley (64 y.o. male) Date: 11/9/2020 Primary Diagnosis: Generalized weakness [R53.1] Precautions: fall ASSESSMENT Based on the objective data described below, the patient presents with newly enforced PPM precautions, generalized weakness, difficulty standing from lower surfaces,  risk for falls and decline in functional status. Pt recently discharged from [facility] post PPM and TAVR and reported increased difficulty managing at home. This date, pt was SBA for supine to sit, performed sitting with supervision and able to perform standing with min A. Pt highly fearful of falling however able to take sidesteps without LOB and min A. Recommend patient return to home vs Rehab depending on progress. Current Level of Function Impacting Discharge (mobility/balance): min a Functional Outcome Measure:   The patient scored Total: 30/100 on the Barthel Index which is indicative of low impaired ability to care for basic self needs/dependency on others. Other factors to consider for discharge:  
  
Patient will benefit from skilled therapy intervention to address the above noted impairments. PLAN : 
Recommendations and Planned Interventions: bed mobility training, transfer training, gait training, therapeutic exercises, neuromuscular re-education, patient and family training/education and therapeutic activities Frequency/Duration: Patient will be followed by physical therapy:  5 times a week to address goals. Recommendation for discharge: (in order for the patient to meet his/her long term goals) Therapy up to 5 days/week in SNF setting or intensive home health therapy program 
 
This discharge recommendation: 
Has been made in collaboration with the attending provider and/or case management IF patient discharges home will need the following DME: rolling walker SUBJECTIVE:  
Patient stated I feel okay. I couldn't do anything at home.  OBJECTIVE DATA SUMMARY:  
HISTORY:   
Past Medical History:  
Diagnosis Date  BPH (benign prostatic hyperplasia)  CAD (coronary artery disease)  Diabetes ([address]  Hearing loss  Hypercholesterolemia  Hypertension  Murmur  Recurrent depression [address] [address] 8/22/2019  Third degree AV block ([address] 10/30/2020 Past Surgical History:  
Procedure Laterality Date  CARDIAC SURG PROCEDURE UNLIST  2006 by-pass  HX CHOLECYSTECTOMY  WA INS NEW/RPLCMT PRM PM W/TRANSV ELTRD ATRIAL&VENT  10/30/2020  WA INS NEW/RPLCMT PRM PM W/TRANSV ELTRD ATRIAL&VENT N/A 10/30/2020 INSERT PPM DUAL performed by Nunu Sarah MD at [address] CATH LAB Personal factors and/or comorbidities impacting plan of care:  
 
Home Situation Home Environment: (P) Independent living # Steps to Enter: (P) 0 One/Two Story Residence: (P) One story Living Alone: (P) No 
 Support Systems: (P) Spouse/Significant Other/Partner Patient Expects to be Discharged to[de-identified] (P) Independent living facility Current DME Used/Available at Home: (P) david Saucedo, Walker, rollator EXAMINATION/PRESENTATION/DECISION MAKING:  
Critical Behavior: 
  
  
  
  
Hearing: 
  
Skin:  intact Edema: none Range Of Motion: 
AROM: Generally decreased, functional 
  
  
  
PROM: Generally decreased, functional 
  
  
  
Strength:   
Strength: Generally decreased, functional 
  
  
  
  
  
  
Tone & Sensation:  
Tone: Normal 
  
  
  
  
  
  
  
  
   
Coordination: 
Coordination: Within functional limits Vision:  
  
Functional Mobility: 
Bed Mobility: 
Rolling: Supervision Supine to Sit: Minimum assistance Sit to Supine: Supervision Scooting: Supervision Transfers: 
Sit to Stand: Moderate assistance Stand to Sit: Contact guard assistance Balance:  
Sitting: Intact Standing: Impaired; With support Standing - Static: Fair;Constant support Standing - Dynamic : Fair;Constant support Ambulation/Gait Training: 
Distance (ft): 3 Feet (ft) Ambulation - Level of Assistance: Minimal assistance Gait Description (WDL): Exceptions to [facility] Gait Abnormalities: Shuffling gait Base of Support: Widened Speed/Zaida: Shuffled Step Length: Right shortened;Left shortened Functional Measure: 
Barthel Index: 
 
Bathin Bladder: 0 Bowels: 10 
Groomin Dressin Feeding: 10 Mobility: 0 Stairs: 0 Toilet Use: 0 Transfer (Bed to Chair and Back): 5 Total: 30/100 The Barthel ADL Index: Guidelines 1. The index should be used as a record of what a patient does, not as a record of what a patient could do. 2. The main aim is to establish degree of independence from any help, physical or verbal, however minor and for whatever reason. 3. The need for supervision renders the patient not independent. 4. A patient's performance should be established using the best available evidence. Asking the patient, friends/relatives and nurses are the usual sources, but direct observation and common sense are also important. However direct testing is not needed. 5. Usually the patient's performance over the preceding 24-48 hours is important, but occasionally longer periods will be relevant. 6. Middle categories imply that the patient supplies over 50 per cent of the effort. 7. Use of aids to be independent is allowed. Marianna Dacosta., Barthel, D.W. ([id number]). Functional evaluation: the Barthel Index. [address] [facility] (14)2. [employer] KEISHA Cagle, Chet Hughes., Rosario Puente., [place], [address] (1999). Measuring the change indisability after inpatient rehabilitation; comparison of the responsiveness of the Barthel Index and Functional Jackson Measure. Journal of Neurology, Neurosurgery, and Psychiatry, 66(4), 373-946. Hannah Goodman, N.J.A, VALENTINO Gutiérrez, & Xochitl Acosta MCAROL. (2004.) Assessment of post-stroke quality of life in cost-effectiveness studies: The usefulness of the Barthel Index and the EuroQoL-5D. [facility], 13, 035-51 Physical Therapy Evaluation Charge Determination History Examination Presentation Decision-Making HIGH Complexity :3+ comorbidities / personal factors will impact the outcome/ POC  HIGH Complexity : 4+ Standardized tests and measures addressing body structure, function, activity limitation and / or participation in recreation  LOW Complexity : Stable, uncomplicated  Other outcome measures barthel  HIGH Based on the above components, the patient evaluation is determined to be of the following complexity level: LOW Pain Rating: 
none Activity Tolerance:  
Fair and requires rest breaks After treatment patient left in no apparent distress:  
Sitting in chair and Call bell within reach COMMUNICATION/EDUCATION:  
 The patients plan of care was discussed with: Registered nurse and Case management. Fall prevention education was provided and the patient/caregiver indicated understanding. and Patient/family have participated as able in goal setting and plan of care. Thank you for this referral. 
Zachary Quintana, PT Time Calculation: 28 mins

## 2020-11-09 NOTE — PROGRESS NOTES
Hospitalist Progress Note Camilla Vaca MD 
Answering service: [phone] OR [phone] from in house phone Date of Service:  2020 NAME:  Dale Heck :  1935 MRN:  [id number] Admission Summary:  
Dale Hcek is a 82 y.o. male who presents with generalized weakness  
  
82 WM h/o DM, HTN, AS s/p TAVR and pacemaker 2 weeks ago. Pt was discharged to home ( independent living of [facility]) on  . He was sending to ER today CC of diarrhea and generalized weakness. Per note prophylactic antibiotics keflex after pacemaker. He developed diarrhea at home, loose stools, 3-4 times day. Since he has had diarrhea. His family has reported concerns that he is weak but pt denies feeling any focal weakness. He says he hasn't been able to work out or work with physical therapy due to covid. Pt said so far no more diarrhea today. Jose Guadalupe Bai visited him today and said his condition is worsening to the pont where he can no longer stand and walk on his own. Denies fevers. Denies N/V, abdominal pain Interval history / Subjective:  
  
Patient seen and examined this morning. Patient stated he came with c/o weakness. No diarrhea since this morning. No N/V/Chest pain, or abdominal pain. Right leg swelling noted. Assessment & Plan: # Generalized weakness Could be from acute deconditioning after his recent hospitalization. At baseline he walks with a cane - CT head No acute intracranial hemorrhage, mass or infarct. - UA negative  
- PT/OT # Diarrhea - C diff negative  
- symptomatic management # S/P TAVR and pacemaker: CS consulted. Patient recently discharged and not on anticoagulation. # DM: continue glipizide, SSI. Hold mefromin for now # HTN: continue losartan, metoprolol. Hold HTCZ due to concerning of dehydartion. IVF overnight and checking orthostatic vitals. Will adjust 
# Hypokalemia: po kcl # BPH: continue proscar and doxazosin # Rt leg swelling: doppler negative for DVT # Anemia DVT ppx: SCDs Code: Full Hospital Problems  Date Reviewed: 10/18/2020 Codes Class Noted POA Generalized weakness ICD-10-CM: R53.1 ICD-9-CM: 780.79  11/8/2020 Unknown Review of Systems:  
Pertinent positive mentioned in interval hx/HPI. Other systems reviewed and negative. Vital Signs:  
 Last 24hrs VS reviewed since prior progress note. Most recent are: 
Visit Vitals BP (!) 151/38 (BP 1 Location: Right arm, BP Patient Position: At rest;Supine) Pulse 60 Temp 98.1 °F (36.7 °C) Resp 22 Wt 84.4 kg (186 lb 1.6 oz) SpO2 97% BMI 23.26 kg/m² No intake or output data in the 24 hours ending 11/09/20 1509 Physical Examination:  
 
 
     
Constitutional:  No acute distress, cooperative, pleasant, chronically sick looking ENT:  Oral mucous dry Resp:  CTA bilaterally. No wheezing/rhonchi/rales. No accessory muscle use CV:  Regular rhythm, normal rate, no murmurs, gallops, rubs GI:  Soft, non distended, non tender. normoactive bowel sounds, no hepatosplenomegaly Musculoskeletal:  +2 pitting edema on right leg Neurologic:  Moves all extremities. AAOx3, CN II-XII reviewed Data Review:  
 I personally reviewed labs and imaging Labs:  
 
Recent Labs 11/09/20 
0504 11/07/20 
1126 WBC 10.8 9.2 HGB 8.9* 9.1*  
HCT 27.7* 28.2*  
* 125* Recent Labs 11/09/20 
0504 11/08/20 
1125 11/07/20 
1126  138 139  
K 4.3 3.4* 3.3*  
 102 103 CO2 26 30 32 BUN 29* 40* 40* CREA 0.77 1.09 1.13  
* 221* 217* CA 8.2* 8.5 8.4* MG 2.3  --   --   
PHOS 2.5*  --   --   
 
Recent Labs 11/09/20 
0504 11/08/20 
1125 11/07/20 
1126 ALT 16 16 16 * 133* 130* TBILI 0.5 0.6 0.6 TP 6.1* 6.3* 6.7 ALB 2.6* 2.9* 2.9*  
GLOB 3.5 3.4 3.8 No results for input(s): INR, PTP, APTT, INREXT in the last 72 hours. No results for input(s): FE, TIBC, PSAT, FERR in the last 72 hours. Lab Results Component Value Date/Time Folate 20.1 10/18/2020 09:48 PM  
  
No results for input(s): PH, PCO2, PO2 in the last 72 hours. No results for input(s): CPK, CKNDX, TROIQ in the last 72 hours. No lab exists for component: CPKMB Lab Results Component Value Date/Time Cholesterol, total 114 02/26/2020 03:19 PM  
 HDL Cholesterol 40 02/26/2020 03:19 PM  
 LDL, calculated 54 02/26/2020 03:19 PM  
 Triglyceride 101 02/26/2020 03:19 PM  
 
Lab Results Component Value Date/Time Glucose (POC) 186 (H) 11/09/2020 11:40 AM  
 Glucose (POC) 126 (H) 11/09/2020 08:25 AM  
 Glucose (POC) 143 (H) 11/08/2020 10:17 PM  
 Glucose (POC) 245 (H) 11/04/2020 11:46 AM  
 Glucose (POC) 156 (H) 11/04/2020 06:54 AM  
 
Lab Results Component Value Date/Time Color YELLOW/STRAW 11/08/2020 04:11 PM  
 Appearance CLEAR 11/08/2020 04:11 PM  
 Specific gravity 1.019 11/08/2020 04:11 PM  
 pH (UA) 5.0 11/08/2020 04:11 PM  
 Protein TRACE (A) 11/08/2020 04:11 PM  
 Glucose Negative 11/08/2020 04:11 PM  
 Ketone Negative 11/08/2020 04:11 PM  
 Bilirubin Negative 11/08/2020 04:11 PM  
 Urobilinogen 0.2 11/08/2020 04:11 PM  
 Nitrites Negative 11/08/2020 04:11 PM  
 Leukocyte Esterase Negative 11/08/2020 04:11 PM  
 Epithelial cells FEW 11/08/2020 04:11 PM  
 Bacteria Negative 11/08/2020 04:11 PM  
 WBC 0-4 11/08/2020 04:11 PM  
 RBC 0-5 11/08/2020 04:11 PM  
 
 
 
Medications Reviewed:  
 
Current Facility-Administered Medications Medication Dose Route Frequency  aspirin chewable tablet 81 mg  81 mg Oral DAILY  atorvastatin (LIPITOR) tablet 40 mg  40 mg Oral QHS  cholecalciferol (VITAMIN D3) (1000 Units /25 mcg) tablet 1 Tab  1,000 Units Oral DAILY  doxazosin (CARDURA) tablet 1 mg  1 mg Oral DAILY  finasteride (PROSCAR) tablet 5 mg  5 mg Oral DAILY  glipiZIDE SR (GLUCOTROL XL) tablet 5 mg  5 mg Oral ACB  guaiFENesin ER (MUCINEX) tablet 1,200 mg  1,200 mg Oral Q12H  
 losartan (COZAAR) tablet 50 mg  50 mg Oral DAILY  magnesium oxide (MAG-OX) tablet 400 mg  400 mg Oral DAILY  metoprolol tartrate (LOPRESSOR) tablet 50 mg  50 mg Oral BID  sertraline (ZOLOFT) tablet 100 mg  100 mg Oral DAILY  tiZANidine (ZANAFLEX) tablet 2 mg  2 mg Oral TID PRN  
 glucose chewable tablet 16 g  4 Tab Oral PRN  
 glucagon (GLUCAGEN) injection 1 mg  1 mg IntraMUSCular PRN  
 dextrose 10% infusion 0-250 mL  0-250 mL IntraVENous PRN  
 insulin lispro (HUMALOG) injection   SubCUTAneous AC&HS  sodium chloride (NS) flush 5-40 mL  5-40 mL IntraVENous Q8H  
 sodium chloride (NS) flush 5-40 mL  5-40 mL IntraVENous PRN  
 acetaminophen (TYLENOL) tablet 650 mg  650 mg Oral Q6H PRN Or  
 acetaminophen (TYLENOL) suppository 650 mg  650 mg Rectal Q6H PRN  promethazine (PHENERGAN) tablet 12.5 mg  12.5 mg Oral Q6H PRN Or  
 ondansetron (ZOFRAN) injection 4 mg  4 mg IntraVENous Q6H PRN  
 
______________________________________________________________________ EXPECTED LENGTH OF STAY: - - - 
ACTUAL LENGTH OF STAY:          0 Gloria Garay MD  
Patient has given Verbal permission to discuss medical care with  
persons present in the room and and also with contact as listed on face sheet. Please note that this dictation was completed with [employer], the computer voice recognition software. Quite often unanticipated grammatical, syntax, homophones, and other interpretive errors are inadvertently transcribed by the computer software. Please disregard these errors. Please excuse any errors that have escaped final proofreading. Thank you.

## 2020-11-09 NOTE — PROGRESS NOTES
Problem: Risk for Spread of Infection Goal: Prevent transmission of infectious organism to others Description: Prevent the transmission of infectious organisms to other patients, staff members, and visitors. Outcome: Progressing Towards Goal 
  
Problem: Falls - Risk of 
Goal: *Absence of Falls Description: Document Lori Patel Fall Risk and appropriate interventions in the flowsheet. Outcome: Progressing Towards Goal 
Note: Fall Risk Interventions: 
  
 
  
 
  
 
Elimination Interventions: Call light in reach, Patient to call for help with toileting needs, Toilet paper/wipes in reach, Toileting schedule/hourly rounds Problem: Patient Education: Go to Patient Education Activity Goal: Patient/Family Education Outcome: Progressing Towards Goal 
  
Problem: Diarrhea (Adult and Pediatrics) Goal: *Absence of diarrhea Outcome: Progressing Towards Goal 
 
Staff wore appropriate PPE for enteric. Contact, and airborne precautions. Last 3 Recorded Weights in this Encounter 11/09/20 0400 Weight: 79.2 kg (174 lb 9.6 oz) TRANSFER - IN REPORT: 
 
Verbal report received from(Cailin ) on Arley Tesfaye  being received from ED(unit) for routine progression of care Report consisted of patients Situation, Background, Assessment and  
Recommendations(SBAR). Information from the following report(s) SBAR, Kardex, ED Summary, Intake/Output, MAR, Accordion, Recent Results, Med Rec Status, Cardiac Rhythm NSR, Alarm Parameters , and Quality Measures was reviewed with the receiving nurse. Opportunity for questions and clarification was provided. Assessment completed upon patients arrival to unit and care assumed. Bedside and Verbal shift change report given to Jeison Ayon (oncoming nurse) by Ap Roque (offgoing nurse).  Report included the following information SBAR, Kardex, ED Summary, Procedure Summary, Intake/Output, MAR, Accordion, Recent Results, Med Rec Status, Cardiac Rhythm NSR, Alarm Parameters , and Quality Measures. Primary Nurse Yuli Ruff and Delon Hauser RN performed a dual skin assessment on this patient Impairment noted- see wound doc flow sheet Justin score is 23 Pt has red scrotum, tear on left buttocks and on the right between buttocks and scrotum in crease.

## 2020-11-09 NOTE — PROGRESS NOTES
Problem: Self Care Deficits Care Plan (Adult) Goal: *Acute Goals and Plan of Care (Insert Text) Description:  
FUNCTIONAL STATUS PRIOR TO ADMISSION: Patient was modified independent using a single point cane for functional mobility. Pt lives in [address] [place] [address] Road at [facility] and reports mod I for ADLs. Pt recently discharged from [facility] s/p TAVR and PPM. Pt was receiving [facility] services prior but reports increased weakness, unable to get OOB, toileting in briefs and waiting for [facility] to come to change him. HOME SUPPORT: The patient lived with wife. Occupational Therapy Goals Initiated 11/9/2020 1. Patient will perform ADLs at sink with least restrictive DME with supervision/set-up within 7 day(s). 2.  Patient will perform upper body dressing and lower body dressing with supervision/set-up within 7 day(s). 3.  Patient will perform bathing with minimal assistance/contact guard assist within 7 day(s). 4.  Patient will perform toilet transfers using least restrictive DME with supervision/set-up within 7 day(s). 5.  Patient will perform all aspects of toileting with supervision/set-up within 7 day(s). Outcome: Progressing Towards Goal 
 
OCCUPATIONAL THERAPY EVALUATION Patient: Mane Linton (61 y.o. male) Date: 11/9/2020 Primary Diagnosis: Generalized weakness [R53.1] Precautions:   (PPM precautions L UE) ASSESSMENT Based on the objective data described below, the patient presents with newly enforced PPM precautions, generalized weakness, difficulty standing from lower surfaces,  risk for falls and decline in functional status. Pt recently discharged from [facility] post PPM and TAVR and reported increased difficulty managing at home. This date, pt was SBA for supine to sit, but needed up to mod A x 1 to stand from elevated bed height. Improved mobility with use of RW. Throughout session, pt required intermittent verbal cues for PPM precautions and remains below his ADL baseline.  Pt also presents as a fall risk. If pt discharges home, he will need to be able to stand independently and transfer to the bathroom with the least restrictive device (prior to admission, pt was just toileting in his briefs). If pt continues to remain weak and needing A with transfers, recommend IPR. Current Level of Function Impacting Discharge (ADLs/self-care): mod A x 1 to stand from elevated bed height, impaired balance, cues for PPM precautions during transfers and bed mobility Functional Outcome Measure: The patient scored Total: 30/100 on the Barthel Index outcome measure which is indicative of 70% impaired ability to care for basic self needs/dependency on others; inferred 100% dependency on others for instrumental ADLs. Other factors to consider for discharge: from home, increased weakness Patient will benefit from skilled therapy intervention to address the above noted impairments. PLAN : 
Recommendations and Planned Interventions: self care training, functional mobility training, therapeutic exercise, balance training, therapeutic activities, endurance activities, patient education, and home safety training Frequency/Duration: Patient will be followed by occupational therapy 5 times a week to address goals. Recommendation for discharge: (in order for the patient to meet his/her long term goals) To be determined: if able to stand independently and transfer to bathroom with least restrictive DME (RW vs SPC), can go home with HHOT/PT. If not, recommend IPR This discharge recommendation: 
Has not yet been discussed the attending provider and/or case management IF patient discharges home will need the following DME: RW? SUBJECTIVE:  
Patient stated I was just going in my briefs and waiting for the nurses to come change me.  OBJECTIVE DATA SUMMARY:  
HISTORY:  
Past Medical History:  
Diagnosis Date  
 BPH (benign prostatic hyperplasia) CAD (coronary artery disease) Diabetes ([facility] Hearing loss Hypercholesterolemia Hypertension Murmur Recurrent depression ([facility] 8/22/2019 [facility] 10/30/2020 Past Surgical History:  
Procedure Laterality Date CARDIAC SURG PROCEDURE UNLIST  2006 by-pass HX CHOLECYSTECTOMY    
 NM INS NEW/RPLCMT PRM PM W/TRANSV ELTRD ATRIAL&VENT  10/30/2020 NM INS NEW/RPLCMT PRM PM W/TRANSV ELTRD ATRIAL&VENT N/A 10/30/2020 INSERT PPM DUAL performed by Esteban Foster MD at [place] [address], [facility] CATH LAB Expanded or extensive additional review of patient history:  
 
Home Situation Home Environment: Independent living # Steps to Enter: 0 One/Two Story Residence: One story Living Alone: No 
Support Systems: Spouse/Significant Other/Partner Patient Expects to be Discharged to[de-identified] Independent living facility Current DME Used/Available at Home: [place] [employer], straight, Nila Karvonen, rollator Tub or Shower Type: Shower Hand dominance: Right EXAMINATION OF PERFORMANCE DEFICITS: 
Cognitive/Behavioral Status: 
Neurologic State: Alert;Eyes open spontaneously Orientation Level: Oriented X4 Cognition: Follows commands Perception: Appears intact Perseveration: No perseveration noted Safety/Judgement: Awareness of environment Skin: intact- noted 7 red bumps to medial R UE 
 
Edema: none Hearing: 
  
 
Vision/Perceptual:   
    
    
    
  
    
Acuity: Within Defined Limits Range of Motion: 
 
AROM: Generally decreased, functional 
PROM: Generally decreased, functional 
  
  
  
  
  
  
 
Strength: 
 
Strength: Generally decreased, functional 
  
  
  
  
 
Coordination: 
Coordination: Within functional limits Tone & Sensation: 
 
Tone: Normal 
  
  
  
  
  
  
  
 
Balance: 
Sitting: Intact Standing: Impaired; With support Standing - Static: Fair;Constant support Standing - Dynamic : Fair;Constant support Functional Mobility and Transfers for ADLs: 
 Bed Mobility: 
Rolling: Supervision Supine to Sit: supervision and cues for L UE PPM 
Sit to Supine: Supervision Scooting: Supervision Transfers: 
Sit to Stand: Moderate assistance Stand to Sit: Contact guard assistance Bathroom Mobility: Moderate assistance ADL Assessment: 
Feeding: Independent Oral Facial Hygiene/Grooming: Setup(EOB) Bathing: Minimum assistance Upper Body Dressing: Setup Lower Body Dressing: Minimum assistance Toileting: Moderate assistance ADL Intervention and task modifications: 
  
 
  
 
  
 
  
 
  
 
  
 
  
 
Cognitive Retraining Safety/Judgement: Awareness of environment Functional Measure: 
Barthel Index: 
 
Bathin Bladder: 0 Bowels: 10 
Groomin Dressin Feeding: 10 Mobility: 0 Stairs: 0 Toilet Use: 0 Transfer (Bed to Chair and Back): 5 Total: 30/100 The Barthel ADL Index: Guidelines 1. The index should be used as a record of what a patient does, not as a record of what a patient could do. 2. The main aim is to establish degree of independence from any help, physical or verbal, however minor and for whatever reason. 3. The need for supervision renders the patient not independent. 4. A patient's performance should be established using the best available evidence. Asking the patient, friends/relatives and nurses are the usual sources, but direct observation and common sense are also important. However direct testing is not needed. 5. Usually the patient's performance over the preceding 24-48 hours is important, but occasionally longer periods will be relevant. 6. Middle categories imply that the patient supplies over 50 per cent of the effort. 7. Use of aids to be independent is allowed. Frank Carmona., Barthel, D.W. ([id number]). Functional evaluation: the Barthel Index. [facility] (14)2.  
KEISHA Leong, Inocencio Justin., Toma Burgess., Mary Fernandez. (1999). Measuring the change indisability after inpatient rehabilitation; comparison of the responsiveness of the Barthel Index and Functional [place] Measure. Journal of Neurology, Neurosurgery, and Psychiatry, 66(4), 182-276. JUNIOR Godwin, VALENTINO Gutiérrez, & Rekha Wilcox M.A. (2004.) Assessment of post-stroke quality of life in cost-effectiveness studies: The usefulness of the Barthel Index and the EuroQoL-5D. [facility], 63, 219-83 Occupational Therapy Evaluation Charge Determination History Examination Decision-Making MEDIUM Complexity : Expanded review of history including physical, cognitive and psychosocial  history  MEDIUM Complexity : 3-5 performance deficits relating to physical, cognitive , or psychosocial skils that result in activity limitations and / or participation restrictions MEDIUM Complexity : Patient may present with comorbidities that affect occupational performnce. Miniml to moderate modification of tasks or assistance (eg, physical or verbal ) with assesment(s) is necessary to enable patient to complete evaluation Based on the above components, the patient evaluation is determined to be of the following complexity level: MEDIUM Pain Rating: 
none Activity Tolerance:  
Good After treatment patient left in no apparent distress:   
Supine in bed and Call bell within reach COMMUNICATION/EDUCATION:  
The patients plan of care was discussed with: Physical therapist and Registered nurse. Patient/family have participated as able in goal setting and plan of care. This patients plan of care is appropriate for delegation to [facility]. Thank you for this referral. 
Yessica Hampton, OT Time Calculation: 15 mins

## 2020-11-10 NOTE — PROGRESS NOTES
Vitals:  
  11/10/20 1453 11/10/20 1458 11/10/20 1500 11/10/20 1520 BP: 149/48 (!) 145/53 (!) 110/47 (!) 149/57 139/69 BP 1 Location: Right arm Right arm Right arm Right arm Right arm BP Patient Position: Supine, mod modified Sitting Standing Sitting Sitting Comment: after supine for BM clean up Pulse: 64 68 72 68 70 Resp: 23 22 19 21 18 Temp:       
SpO2: on room air 95% 94% 97% 93% 96% Full note to follow.  Mae Perez, PT

## 2020-11-10 NOTE — HOME CARE
Please note that this patient was open to [facility] - INPATIENT services at the time of hospital admission. If services will be needed at discharge, please order to resume them. Thank you, Lina Donovan RN, [phone]

## 2020-11-10 NOTE — PROGRESS NOTES
Problem: Risk for Spread of Infection Goal: Prevent transmission of infectious organism to others Description: Prevent the transmission of infectious organisms to other patients, staff members, and visitors. Outcome: Progressing Towards Goal 
  
Problem: Falls - Risk of 
Goal: *Absence of Falls Description: Document Juana Canales Fall Risk and appropriate interventions in the flowsheet. Outcome: Progressing Towards Goal 
Note: Fall Risk Interventions: 
Mobility Interventions: Patient to call before getting OOB, PT Consult for mobility concerns, PT Consult for assist device competence, Strengthening exercises (ROM-active/passive), Utilize walker, cane, or other assistive device Medication Interventions: Assess postural VS orthostatic hypotension, Patient to call before getting OOB, Evaluate medications/consider consulting pharmacy Elimination Interventions: Call light in reach, Patient to call for help with toileting needs, Stay With Me (per policy), Toilet paper/wipes in reach Problem: Diarrhea (Adult and Pediatrics) Goal: *Absence of diarrhea Outcome: Progressing Towards Goal 
Pt did not have any loose stools tonight. Last 3 Recorded Weights in this Encounter 11/09/20 0400 11/09/20 0912 11/10/20 6184 Weight: 79.2 kg (174 lb 9.6 oz) 84.4 kg (186 lb 1.6 oz) 80.4 kg (177 lb 3.2 oz) Weight 3 pounds different from first weight yesterday. 2nd weight not accurate. Weight variance noted. Bedside and Verbal shift change report given to Lore Houston (oncoming nurse) by Alexander (offgoing nurse). Report included the following information SBAR, Kardex, ED Summary, Intake/Output, MAR, Recent Results, Med Rec Status, Cardiac Rhythm Paced and Quality Measures.

## 2020-11-10 NOTE — PROGRESS NOTES
Problem: Mobility Impaired (Adult and Pediatric) Goal: *Acute Goals and Plan of Care (Insert Text) Description: FUNCTIONAL STATUS PRIOR TO ADMISSION: Patient was modified independent using a single point cane for functional mobility prior to surgery 2 weeks ago. Pt has had difficulty since. HOME SUPPORT PRIOR TO ADMISSION: The patient lived with wife but did not require assist. 
 
Physical Therapy Goals Initiated 11/9/2020 1. Patient will move from supine to sit and sit to supine , scoot up and down, and roll side to side in bed with modified independence within 7 day(s). 2.  Patient will transfer from bed to chair and chair to bed with modified independence using the least restrictive device within 7 day(s). 3.  Patient will perform sit to stand with modified independence within 7 day(s). 4.  Patient will ambulate with modified independence for 150 feet with the least restrictive device within 7 day(s). Outcome: Progressing Towards Goal 
 PHYSICAL THERAPY TREATMENT Patient: Sidra Johns (75 y.o. male) Date: 11/10/2020 Diagnosis: Generalized weakness [R53.1] Generalized weakness [R53.1] <principal problem not specified> Precautions: Fall, Skin, Contact(pacer) Chart, physical therapy assessment, plan of care and goals were reviewed. ASSESSMENT Patient continues with skilled PT services and is slowly progressing towards goals. Pt did experience an orthostatic drop in BP after sit to stand but was not symptomatic. Once he got to standing noted that he was soiled in a BM (pt stated that he was unaware). When given a choice for clean up position (standing vs supine) he chose supine. Post clean up he again sat at the EOB and VSS. He was able to stand and step over to the chair and remained up to the chair post session. Sit to stand is challenging for pt given he is still with pacer precautions so unable to push with his LUE and he is quite tall.  Once in standing he was stable for the most part with walker support. He remains far from his baseline of mod I using a cane for amb. He remains a high fall risk at this time. Anticipate a need for rehab, inpatient vs within a SNF. As he presents today, would expect that rehab within a SNF would be a better fit, working towards being able to tolerate 3 hours of therapy per day. Current Level of Function Impacting Discharge (mobility/balance): up to mod assist provided and impaired standing balance requiring use of a walker. 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  
    11/10/20 1453 11/10/20 1458 11/10/20 1500 11/10/20 1520 BP: 149/48 (!) 145/53 (!) 110/47 (!) 149/57 139/69 BP 1 Location: Right arm Right arm Right arm Right arm Right arm BP Patient Position: Supine, bed modified Sitting Standing Sitting post standing Sitting Comment: after supine for BM clean up Pulse: 64 68 72 68 70 Resp: 23 22 19 21 18 Temp:            
SpO2: on room air 95% 94% 97% 93% 96% PLAN : 
Patient continues to benefit from skilled intervention to address the above impairments. Continue treatment per established plan of care. to address goals. Recommendation for discharge: (in order for the patient to meet his/her long term goals) Therapy up to 5 days/week in SNF setting and working towards being able to tolerate 3 hours of therapy per day. This discharge recommendation: A follow-up discussion with the attending provider and/or case management is planned IF patient discharges home will need the following DME: to be determined (TBD), none if rehab. SUBJECTIVE:  
Patient stated Kim Stephenson can try.  OBJECTIVE DATA SUMMARY:  
Chart checked, pt cleared by nursing Critical Behavior: 
Neurologic State: Alert Orientation Level: Oriented X4 Cognition: Follows commands Safety/Judgement: Awareness of environment Functional Mobility Training: 
Bed Mobility: 
Rolling: Supervision Supine to Sit: Minimum assistance Transfers: 
Sit to Stand: Moderate assistance Stand to Sit: Minimum assistance Balance: 
Sitting: Intact; Without support Standing: Impaired Standing - Static: Constant support; Fair 
Standing - Dynamic : Constant support; Jony Alan Ambulation/Gait Training: 
Distance (ft): 3 Feet (ft) Assistive Device: Gait belt;Walker, rolling Ambulation - Level of Assistance: Minimal assistance Gait Abnormalities: Decreased step clearance Base of Support: Widened Speed/Zaida: Slow;Pace decreased (<100 feet/min) Step Length: Left shortened;Right shortened Stairs: Therapeutic Exercises: Ankle pumps Pain Rating: 
None rated Activity Tolerance:  
Fair and requires rest breaks After treatment patient left in no apparent distress:  
Sitting in chair, Call bell within reach, and LEs elevated COMMUNICATION/COLLABORATION:  
The patients plan of care was discussed with: Occupational therapist, Registered nurse, and Case management. Angela Parks Time Calculation: 49 mins

## 2020-11-10 NOTE — PROGRESS NOTES
1949: Pt's second covid test resulted negative, NP Sofi notified. Problem: Diabetes Self-Management Goal: *Monitoring blood glucose, interpreting and using results Description: Identify recommended blood glucose targets  and personal targets. Outcome: Progressing Towards Goal 
Note: Pt on ACHS checks, required minimal coverage. Problem: Falls - Risk of 
Goal: *Absence of Falls Description: Document Donald Going Fall Risk and appropriate interventions in the flowsheet. Outcome: Progressing Towards Goal 
Note: Fall Risk Interventions: 
Mobility Interventions: Bed/chair exit alarm, Communicate number of staff needed for ambulation/transfer, OT consult for ADLs, PT Consult for mobility concerns, Patient to call before getting OOB, PT Consult for assist device competence, Strengthening exercises (ROM-active/passive), Utilize walker, cane, or other assistive device Medication Interventions: Assess postural VS orthostatic hypotension, Patient to call before getting OOB, Teach patient to arise slowly, Bed/chair exit alarm, Evaluate medications/consider consulting pharmacy Elimination Interventions: Call light in reach, Elevated toilet seat, Bed/chair exit alarm, Patient to call for help with toileting needs, Stay With Me (per policy), Toilet paper/wipes in reach, Toileting schedule/hourly rounds Problem: Diarrhea (Adult and Pediatrics) Goal: *Absence of diarrhea Outcome: Progressing Towards Goal 
Note: Pt did not have bowel movement throughout shift. Bedside shift change report given to Mariana Cassidy RN (oncoming nurse) by Magan Escamilla RN (offgoing nurse). Report included the following information SBAR, Kardex, ED Summary, Procedure Summary, Intake/Output, MAR, Recent Results, and Cardiac Rhythm Paced .

## 2020-11-10 NOTE — PROGRESS NOTES
Problem: Diabetes Self-Management Goal: *Disease process and treatment process Description: Define diabetes and identify own type of diabetes; list 3 options for treating diabetes. Outcome: Progressing Towards Goal 
Goal: *Developing strategies to promote health/change behavior Description: Define the ABC's of diabetes; identify appropriate screenings, schedule and personal plan for screenings. Outcome: Progressing Towards Goal 
  
Problem: Falls - Risk of 
Goal: *Absence of Falls Description: Document Luis Click Fall Risk and appropriate interventions in the flowsheet. Outcome: Progressing Towards Goal 
Note: Fall Risk Interventions: 
Mobility Interventions: Communicate number of staff needed for ambulation/transfer, PT Consult for mobility concerns Medication Interventions: Bed/chair exit alarm, Evaluate medications/consider consulting pharmacy Elimination Interventions: Bed/chair exit alarm, Call light in reach Bedside shift change report given to Cee Corral RN (oncoming nurse) by Susie Vazquez RN (offgoing nurse). Report included the following information SBAR, Kardex, ED Summary, Recent Results and Cardiac Rhythm NSR.

## 2020-11-10 NOTE — PROGRESS NOTES
Problem: Self Care Deficits Care Plan (Adult) Goal: *Acute Goals and Plan of Care (Insert Text) Description:  
FUNCTIONAL STATUS PRIOR TO ADMISSION: Patient was modified independent using a single point cane for functional mobility. Pt lives in [address] [place] [address] at [facility] and reports mod I for ADLs. Pt recently discharged from [facility] s/p TAVR and PPM. Pt was receiving [facility] services prior but reports increased weakness, unable to get OOB, toileting in briefs and waiting for [facility] to come to change him. HOME SUPPORT: The patient lived with wife. Occupational Therapy Goals Initiated 11/9/2020 1. Patient will perform ADLs at sink with least restrictive DME with supervision/set-up within 7 day(s). 2.  Patient will perform upper body dressing and lower body dressing with supervision/set-up within 7 day(s). 3.  Patient will perform bathing with minimal assistance/contact guard assist within 7 day(s). 4.  Patient will perform toilet transfers using least restrictive DME with supervision/set-up within 7 day(s). 5.  Patient will perform all aspects of toileting with supervision/set-up within 7 day(s). Outcome: Progressing Towards Goal 
 OCCUPATIONAL THERAPY TREATMENT Patient: Cammy Roman (85 y.o. male) Date: 11/10/2020 Diagnosis: Generalized weakness [R53.1] Generalized weakness [R53.1] <principal problem not specified> Precautions: (PPM precautions L UE) Chart, occupational therapy assessment, plan of care, and goals were reviewed. ASSESSMENT Patient continues with skilled OT services and is progressing towards goals. Patient limited by orthostatic hypotension with standing trial with BP decreasing during trial with patient asymptomatic, see vitals below, currently mod A x1 for sit to stand with 3 attempts made to stand with S to RW, total A for hygiene for BM/brief change, recommend SNF level rehab due to fair endurance and activity tolerance this date. Wife is unable to assist at home physically or push a wheelchair if needed per patient. Vitals:  
 11/10/20 1252 11/10/20 1253 11/10/20 1257 11/10/20 1315 BP: (!) 99/31 (!) 88/35 (!) 152/55 (!) 152/55 BP 1 Location: Left arm Left arm Left arm Left arm BP Patient Position: Standing Standing At rest;Supine At rest  
Pulse:    60 Resp:    16 Temp:    98.3 °F (36.8 °C) SpO2:    97% Weight:      
 
 
 
Current Level of Function Impacting Discharge (ADLs): orthostatic hypotension with standing=low activity tolerance, total A for toileting this date, mod A for sit to stand from OEB Other factors to consider for discharge: wife unable to assist at discharge physically PLAN : 
Patient continues to benefit from skilled intervention to address the above impairments. Continue treatment per established plan of care. to address goals. Recommend with staff: EOB ADLs, BSC toileting Recommend next OT session: continue POC, standing trial with BP check, BSC/toileting/bathroom trial 
 
Recommendation for discharge: (in order for the patient to meet his/her long term goals) Therapy up to 5 days/week in SNF setting This discharge recommendation: A follow-up discussion with the attending provider and/or case management is planned IF patient discharges home will need the following DME: AE: long handled bathing, AE: long handled dressing, and bedside commode SUBJECTIVE:  
Patient stated I don't think I could tolerate intensive therapy.  re: patient asking difference between Inpatient rehab and SNF level rehab OBJECTIVE DATA SUMMARY:  
Cognitive/Behavioral Status: 
Neurologic State: Alert Orientation Level: Oriented X4 Cognition: Follows commands Functional Mobility and Transfers for ADLs: 
Bed Mobility: 
Rolling: Supervision Supine to Sit: Contact guard assistance Transfers: 
Sit to Stand: Moderate assistance;Assist x1;Additional time Balance: 
Sitting: Intact Standing: Impaired; With support Standing - Static: Constant support; Delories Hermanns ADL Intervention: 
Feeding Container Management: Set-up Patient instructed and indicated understanding the benefits of maintaining activity tolerance, functional mobility, and independence with self care tasks during acute stay  to ensure safe return home and to baseline. Encouraged patient to increase frequency and duration OOB, be out of bed for all meals, perform daily ADLs (as approved by RN/MD regarding bathing etc), and performing functional mobility to/from bathroom. Lower Body Dressing Assistance Protective Undergarmet: Total assistance (dependent) Socks: Set-up Leg Crossed Method Used: Yes Position Performed: Supine Cues: Don;Visual/perceptual training/retraining Toileting Bowel Hygiene: Total assistance (dependent) Therapeutic Exercises:  
 
 
Pain: 
None noted Activity Tolerance:  
Fair, requires rest breaks, and signs and symptoms of orthostatic hypotension After treatment patient left in no apparent distress:  
Supine in bed and Call bell within reach COMMUNICATION/COLLABORATION:  
The patients plan of care was discussed with: Physical therapist, Registered nurse, and Case management. Ramiro Timmons OT Time Calculation: 39 mins

## 2020-11-10 NOTE — PROGRESS NOTES
Chart checked, pt cleared by nursing but pt just started to eat his lunch. PT will defer, follow and see as able and appropriate.  Thank you, Yahaira Blandon, PT

## 2020-11-10 NOTE — PROGRESS NOTES
Occupational Therapy Seen for OT treatment, limited due to orthostatic BP. Vitals:  
 11/10/20 1245 11/10/20 1252 11/10/20 1253 11/10/20 1257 BP: 91/77 (!) 99/31 (!) 88/35 (!) 152/55 BP 1 Location: Left arm Left arm Left arm Left arm BP Patient Position: Sitting Standing Standing At rest;Supine Pulse:      
Resp:      
Temp:      
SpO2:      
Weight:      
 
Formal note to follow, recommend SNF rehab. Juan David Dennis MS OTR/L

## 2020-11-10 NOTE — PROGRESS NOTES
Transition Plan of Care RUR-27%-High 
Consult noted for rehab. Spoke with patient and son to get choices. They would like to send referral to [facility]. sent via [employer]. Ardeth Boeck, RN CRM Ext [phone]

## 2020-11-10 NOTE — PROGRESS NOTES
Hospitalist Progress Note Sintia Lucero MD 
Answering service: [phone] OR [phone] from in house phone Date of Service:  11/10/2020 NAME:  Ingrid Gruber :  1935 MRN:  [id number] Admission Summary:  
Ingrid Gruber is a 82 y.o. male who presents with generalized weakness  
  
82 WM h/o DM, HTN, AS s/p TAVR and pacemaker 2 weeks ago. Pt was discharged to home ( independent living of [facility]) on  . He was sending to ER today CC of diarrhea and generalized weakness. Per note prophylactic antibiotics keflex after pacemaker. He developed diarrhea at home, loose stools, 3-4 times day. Since he has had diarrhea. His family has reported concerns that he is weak but pt denies feeling any focal weakness. He says he hasn't been able to work out or work with physical therapy due to covid. Pt said so far no more diarrhea today. Jose Guadalupe Bai visited him today and said his condition is worsening to the pont where he can no longer stand and walk on his own. Denies fevers. Denies N/V, abdominal pain Interval history / Subjective:  
  
Patient seen and examined this morning. Feeling better. No diarrhea. Utilization review coordinator suggested recommendation to change to inpatient. No overnight events or concerns. Blood pressure elevated. Agreeable with blood pressure medication adjustment. Addition of amlodipine. Assessment & Plan: # Generalized weakness Could be from acute deconditioning after his recent hospitalization. At baseline he walks with a cane - CT head No acute intracranial hemorrhage, mass or infarct. - UA negative  
- PT/OT: Recommend SNF versus IPR # Diarrhea, better - C diff negative  
- symptomatic management # S/P TAVR and pacemaker: CS consulted. Patient recently discharged and not on anticoagulation.   D/W with Dr. Liyah Love. 
-Echocardiogram requested per cardiothoracic surgery NP. 
 
 # DM: continue glipizide, SSI. Hold mefromin for now # HTN: continue losartan, metoprolol. Hold HTCZ due to concerning of dehydartion. IVF overnight and checking orthostatic vitals. Add amlodipine, monitor closely. # Hypokalemia: po kcl # BPH: continue proscar and doxazosin # Rt leg swelling: doppler negative for DVT # Anemia DVT ppx: SCDs Code: Full Hospital Problems  Date Reviewed: 10/18/2020 Codes Class Noted POA Generalized weakness ICD-10-CM: R53.1 ICD-9-CM: 780.79  11/8/2020 Unknown Review of Systems:  
Pertinent positive mentioned in interval hx/HPI. Other systems reviewed and negative. Vital Signs:  
 Last 24hrs VS reviewed since prior progress note. Most recent are: 
Visit Vitals /69 (BP 1 Location: Right arm, BP Patient Position: Sitting) Pulse 70 Temp 98.3 °F (36.8 °C) Resp 18 Wt 80.4 kg (177 lb 3.2 oz) SpO2 96% BMI 22.15 kg/m² Intake/Output Summary (Last 24 hours) at 11/10/2020 1758 Last data filed at 11/10/2020 1500 Gross per 24 hour Intake  Output 900 ml Net -900 ml Physical Examination:  
 
 
     
Constitutional:  No acute distress, cooperative, pleasant, appears stable ENT:  Oral mucous dry Resp:  CTA bilaterally. No wheezing/rhonchi/rales. No accessory muscle use CV:  Regular rhythm, normal rate, no murmurs, gallops, rubs GI:  Soft, non distended, non tender. normoactive bowel sounds, no hepatosplenomegaly Musculoskeletal:  +2 pitting edema on right leg Neurologic:  Moves all extremities. AAOx3, CN II-XII reviewed Data Review:  
 I personally reviewed labs and imaging Ct Head Wo Cont Result Date: 11/8/2020 IMPRESSION: No acute intracranial hemorrhage, mass or infarct. Xr Chest [facility] Result Date: 11/8/2020 IMPRESSION: Stable mild left basilar opacity. Clear right lung. Labs:  
 
Recent Labs 11/09/20 
2382 WBC 10.8 HGB 8.9* HCT 27.7*  
 * Recent Labs 11/09/20 
0504 11/08/20 
1125  138  
K 4.3 3.4*  
 102 CO2 26 30 BUN 29* 40* CREA 0.77 1.09  
* 221* CA 8.2* 8.5 MG 2.3  --   
PHOS 2.5*  --   
 
Recent Labs 11/09/20 
0504 11/08/20 
1125 ALT 16 16 * 133* TBILI 0.5 0.6 TP 6.1* 6.3* ALB 2.6* 2.9*  
GLOB 3.5 3.4 No results for input(s): INR, PTP, APTT, INREXT, INREXT in the last 72 hours. No results for input(s): FE, TIBC, PSAT, FERR in the last 72 hours. Lab Results Component Value Date/Time Folate 20.1 10/18/2020 09:48 PM  
  
No results for input(s): PH, PCO2, PO2 in the last 72 hours. No results for input(s): CPK, CKNDX, TROIQ in the last 72 hours. No lab exists for component: CPKMB Lab Results Component Value Date/Time Cholesterol, total 114 02/26/2020 03:19 PM  
 HDL Cholesterol 40 02/26/2020 03:19 PM  
 LDL, calculated 54 02/26/2020 03:19 PM  
 Triglyceride 101 02/26/2020 03:19 PM  
 
Lab Results Component Value Date/Time Glucose (POC) 132 (H) 11/10/2020 11:43 AM  
 Glucose (POC) 126 (H) 11/10/2020 09:01 AM  
 Glucose (POC) 179 (H) 11/09/2020 09:21 PM  
 Glucose (POC) 126 (H) 11/09/2020 04:18 PM  
 Glucose (POC) 186 (H) 11/09/2020 11:40 AM  
 
Lab Results Component Value Date/Time Color YELLOW/STRAW 11/08/2020 04:11 PM  
 Appearance CLEAR 11/08/2020 04:11 PM  
 Specific gravity 1.019 11/08/2020 04:11 PM  
 pH (UA) 5.0 11/08/2020 04:11 PM  
 Protein TRACE (A) 11/08/2020 04:11 PM  
 Glucose Negative 11/08/2020 04:11 PM  
 Ketone Negative 11/08/2020 04:11 PM  
 Bilirubin Negative 11/08/2020 04:11 PM  
 Urobilinogen 0.2 11/08/2020 04:11 PM  
 Nitrites Negative 11/08/2020 04:11 PM  
 Leukocyte Esterase Negative 11/08/2020 04:11 PM  
 Epithelial cells FEW 11/08/2020 04:11 PM  
 Bacteria Negative 11/08/2020 04:11 PM  
 WBC 0-4 11/08/2020 04:11 PM  
 RBC 0-5 11/08/2020 04:11 PM  
 
 
 
Medications Reviewed:  
 
Current Facility-Administered Medications Medication Dose Route Frequency  amLODIPine (NORVASC) tablet 5 mg  5 mg Oral DAILY  miconazole (MICOTIN) 2 % powder   Topical BID  aspirin chewable tablet 81 mg  81 mg Oral DAILY  atorvastatin (LIPITOR) tablet 40 mg  40 mg Oral QHS  cholecalciferol (VITAMIN D3) (1000 Units /25 mcg) tablet 1 Tab  1,000 Units Oral DAILY  doxazosin (CARDURA) tablet 1 mg  1 mg Oral DAILY  finasteride (PROSCAR) tablet 5 mg  5 mg Oral DAILY  glipiZIDE SR (GLUCOTROL XL) tablet 5 mg  5 mg Oral ACB  guaiFENesin ER (MUCINEX) tablet 1,200 mg  1,200 mg Oral Q12H  
 losartan (COZAAR) tablet 50 mg  50 mg Oral DAILY  magnesium oxide (MAG-OX) tablet 400 mg  400 mg Oral DAILY  metoprolol tartrate (LOPRESSOR) tablet 50 mg  50 mg Oral BID  sertraline (ZOLOFT) tablet 100 mg  100 mg Oral DAILY  tiZANidine (ZANAFLEX) tablet 2 mg  2 mg Oral TID PRN  
 glucose chewable tablet 16 g  4 Tab Oral PRN  
 glucagon (GLUCAGEN) injection 1 mg  1 mg IntraMUSCular PRN  
 dextrose 10% infusion 0-250 mL  0-250 mL IntraVENous PRN  
 insulin lispro (HUMALOG) injection   SubCUTAneous AC&HS  sodium chloride (NS) flush 5-40 mL  5-40 mL IntraVENous Q8H  
 sodium chloride (NS) flush 5-40 mL  5-40 mL IntraVENous PRN  
 acetaminophen (TYLENOL) tablet 650 mg  650 mg Oral Q6H PRN Or  
 acetaminophen (TYLENOL) suppository 650 mg  650 mg Rectal Q6H PRN  promethazine (PHENERGAN) tablet 12.5 mg  12.5 mg Oral Q6H PRN Or  
 ondansetron (ZOFRAN) injection 4 mg  4 mg IntraVENous Q6H PRN  
 
______________________________________________________________________ EXPECTED LENGTH OF STAY: 2d 14h ACTUAL LENGTH OF STAY:          0 Shashank Fitzpatrick MD  
Patient has given Verbal permission to discuss medical care with  
persons present in the room and and also with contact as listed on face sheet.   
 
Please note that this dictation was completed with [employer], the computer voice recognition software. Quite often unanticipated grammatical, syntax, homophones, and other interpretive errors are inadvertently transcribed by the computer software. Please disregard these errors. Please excuse any errors that have escaped final proofreading. Thank you.

## 2020-11-10 NOTE — WOUND CARE
WOCN Note:  
 
COVID - New consult placed by RN for groin excoriation. Chart shows: 
Admitted for generalized weakness PMH: DM, HTN, left buttock healed Stage 2 from 9-10-20, elevated cholesterol, CAD, CABG 2017, AS with murmur, BPH, former smoker, cataract, depression, GI reflux, syncope. WBC = 10.8 On = 11-9-20 Admitted from : [facility]: having diarrhea, pacemaker and TAVR: 10-28-20. Assessment:  
Patient is A&O x 3, communicative, incontinent: condom cath and wearing brief. Presently not mobile. Need 1 assist to reposition and 2 assist to lift in the bed. Patient wearing briefs for incontinence. Bed: Versa Care Bed Diet: DM consistant carb. Patient reports no pain at this time. Bilateral heels,  buttocks and sacral skin intact and without redness. Bilateral groins: secondary to incontinence and brief cutting between legs: linear excoriation. Recommendations:   
- twice daily: clean groins with foam soap, rinse and dry well. Apply nystatin powder to groins  followed with Z-Guard Paste Barrier Cream. Remove brief: cutting into groin areas. Minimize layers of linen/pads under patient to optimize support surface. Turn/reposition approximately every 2 hours and offload heels. Manage incontinence / promote continence; Hydraguard Barrier Cream to buttocks and sacrum daily and as needed with incontinence care. Specialty bed: [facility] Discussed above plan with patient, RN:nurse. Irma Valdez 11-11-20: right lateral foot: secondary to shoe rubbing to area: scabbing area: 2.2cm x 1.6cm x 0.0cm . No pain, no discomfort. No erythema. Plan to order: venelx / BPCO cream daily and apply large  band aid. Transition of Care: Plan to follow weekly and as needed 
while admitted to hospital.  
 
Renée MATHIS RN Wound Care Department Office: [phone] Pager: [phone]

## 2020-11-11 NOTE — PROGRESS NOTES
GLORY 
16:45pm- received a call from Octavia Leahy with [facility] and the patient has been declined by the Medical Director due to not meeting a medicare diagnosis for inpatient rehabilitation Yadiralilly Wilkins 17:00pm tried calling his wife [place] and no answer. Spoke with his son Sultana Michaud and updated about [facility] deicision and suggest we try SNF. He agree States \" my mom can not take care of him in this state\". CM will try calling his wife again in the morning and start searching for SNF in the morning. Pedro Guzman RN CM X [phone]

## 2020-11-11 NOTE — CONSULTS
CSS 
 History and Physical 
 
Subjective:  
  
Daniel Murrell is a 82 y.o. male who is well known to Cardiac Surgery Service. He was discharged to home with home health, home PT/OT on 20 following TAVR procedure on 10/28/20 with Dr. Sandi Carson and Dr. Hernán Matias and PPM placement by Dr. Alycia Alcantar. He was discharged on Keflex following PPM as per protocol. While at home, he developed diarrhea and became more and more weak with dyspnea and fatigue. Denies chest pain. He was eventually unable to get off of the couch by himself and his wife was unable to assist him. His son and daughter-in-law had to travel to his home to help him ambulate and change locations in his home. Physical therapy agreed that he was too weak to effectively work with them and needed to be admitted for assessment and reconsideration of short term placement for PT/OT needs. Cardiac Surgery is asked to see him due to his recent TAVR. Cardiac Testing Echo - no significant PVL Past Medical History:  
Diagnosis Date  BPH (benign prostatic hyperplasia)  CAD (coronary artery disease)  Diabetes ([address]  Hearing loss  Hypercholesterolemia  Hypertension  Murmur  Recurrent depression ([address] 2019  Third degree AV block ([address] 10/30/2020 Past Surgical History:  
Procedure Laterality Date  CARDIAC SURG PROCEDURE UNLIST   by-pass  HX CHOLECYSTECTOMY  SC INS NEW/RPLCMT PRM PM W/TRANSV ELTRD ATRIAL&VENT  10/30/2020  SC INS NEW/RPLCMT PRM PM W/TRANSV ELTRD ATRIAL&VENT N/A 10/30/2020 INSERT PPM DUAL performed [place] [address], [facility] [address] CATH LAB Social History Tobacco Use  Smoking status: Former Smoker Types: Cigarettes Last attempt to quit: 1990 Years since quittin.1  Smokeless tobacco: Never Used Substance Use Topics  Alcohol use: No  
  
Family History Problem Relation Age of Onset  Cancer Mother  Cancer Father Prior to Admission medications Medication Sig Start Date End Date Taking? Authorizing Provider  
losartan (COZAAR) 50 mg tablet Take 1 Tab by mouth daily for 60 days. 11/5/20 1/4/21  Libby Nick NP  
atorvastatin (LIPITOR) 40 mg tablet Take 1 Tab by mouth nightly for 60 days. 11/4/20 1/3/21  Libby Nick NP  
aspirin 81 mg chewable tablet Take 1 Tab by mouth daily. 11/5/20 11/5/21  Libby Nick NP  
acetaminophen (TYLENOL) 325 mg tablet Take 2 Tabs by mouth every four (4) hours as needed for Pain. 11/4/20   Libby Nick NP  
guaiFENesin ER (MUCINEX) 1,200 mg Ta12 ER tablet Take 1 Tab by mouth every twelve (12) hours for 14 days. 11/4/20 11/18/20  Libby Nick NP  
metoprolol tartrate (LOPRESSOR) 50 mg tablet Take 1 Tab by mouth two (2) times a day for 60 days. 11/4/20 1/3/21  Libby Nick NP  
senna-docusate (PERICOLACE) 8.6-50 mg per tablet Take 1 Tab by mouth daily as needed for Constipation. 11/4/20   Libby Nick NP  
doxazosin (CARDURA) 4 mg tablet TAKE 1 TABLET BY MOUTH  DAILY 10/20/20   Geovanni Dick NP  
hydroCHLOROthiazide (HYDRODIURIL) 12.5 mg tablet TAKE 1 TABLET BY MOUTH  DAILY 10/20/20   Geovanni Dick NP  
finasteride (PROSCAR) 5 mg tablet TAKE 1 TABLET BY MOUTH  DAILY 10/20/20   Shira Curtis NP  
metFORMIN ER (GLUCOPHAGE XR) 500 mg tablet TAKE 1 TABLET BY MOUTH  TWICE DAILY WITH MEALS 10/20/20   Joanna Dick NP  
glipiZIDE SR (GLUCOTROL XL) 5 mg CR tablet TAKE 1 TABLET BY MOUTH  DAILY 10/20/20   Geovanni Dick NP  
sertraline (ZOLOFT) 100 mg tablet TAKE 1 TABLET BY MOUTH  DAILY 9/2/20   Laurence Loja NP  
temazepam (RESTORIL) 15 mg capsule TAKE 1 CAPSULE BY MOUTH AT BEDTIME AS NEEDED FOR SLEEP. 7/6/20   Jorgito Hunt, DO  
fluticasone propionate (FLONASE) 50 mcg/actuation nasal spray ADMINISTER 1 Spray IN Both Nostrils two (2) times a day.  6/8/20   Shira Curtis NP  
 menthol-zinc oxide (CALMOSEPTINE) 0.44-20.6 % oint Apply to bilateral buttocks TID  Indications: skin irritation 6/4/20   Adrianna Frafrancine, DO  
OTHER Hearing evaluation for possible changes in hearing 1/20/20   Amanda Hunt, DO  
tiZANidine (ZANAFLEX) 2 mg tablet Take 1 Tab by mouth three (3) times daily as needed for Pain. 7/11/19   Adrianna Frafrancine, DO  
glucose blood VI test strips (TRUE METRIX GLUCOSE TEST STRIP) strip Check BS BID  Dx: DM  E11.21 7/12/18   Adrianna Armida, DO  
cholecalciferol (VITAMIN D3) 1,000 unit cap Take 1 Cap by mouth daily. 3/29/18   Adrianna Frafrancine, DO  
magnesium 250 mg tab Take 1 Tab by mouth daily. 3/29/18   Adrianna Frafrancine, DO  
sodium chloride (OCEAN) 0.65 % nasal squeeze bottle 0.05 mL by Both Nostrils route as needed for Congestion. 3/29/18   Amanda Hunt, DO  
FERROUS FUMARATE/VIT BCOMP,C (SUPER B COMPLEX PO) Take  by mouth. Provider, Historical  
   
Allergies Allergen Reactions  Procaine Other (comments) Pt stated he passed out from procaine and was told not to let anyone use it on him again Review of Systems:  
Consititutional: Denies fever or chills. +fatigue, +weakness Eyes:  Denies use of glasses or vision problems(cataracts). ENT:  Denies hearing or swallowing difficulty. CV: Denies CP, claudication, HTN. Resp: Denies dyspnea, productive cough. : Denies dialysis or kidney problems. GI: Denies ulcers, esophageal strictures, liver problems. +diarrhea M/S: Denies joint or bone problems, or implanted artificial hardware. Skin: Denies varicose veins, edema. Neuro: Denies strokes, or TIAs. Psych: Denies anxiety or depression. Endocrine: Denies thyroid problems or diabetes. Heme/Lymphatic: Denies easy bruising or lymphedema. Objective:  
 
VS:  
Visit Vitals BP (!) 117/52 (BP Patient Position: Sitting) Pulse 61 Temp 98 °F (36.7 °C) Resp 16 Wt 200 lb (90.7 kg) SpO2 96% BMI 25.00 kg/m² Physical Exam: General appearance: alert, cooperative, no distress Head: normocephalic, without obvious abnormality; atraumatic Eyes: conjunctivae/corneas clear; EOM's intact. Nose: nares normal; no drainage. Neck: no carotid bruit and no JVD Lungs: clear to auscultation bilaterally Heart: regular rate and rhythm; no murmur Abdomen: soft, non-tender; bowel sounds normal 
Extremities: moves all extremities; + weakness. Skin: Skin color pale; No varicose veins or edema. Neurologic: Grossly normal   
 
Labs:  
Recent Labs 20 
3979  20 
4101 WBC  --   --  10.8 HGB  --   --  8.9* HCT  --   --  27.7*  
PLT  --   --  128* NA  --   --  139 K  --   --  4.3 BUN  --   --  29* CREA  --   --  0.77 GLU  --   --  142* GLUCPOC 159*   < >  --   
 < > = values in this interval not displayed. Diagnostics:  
 
PA and lateral: LLL atelectasis EK20 Sinus rhythm with 1st degree AV block Left ventricular hypertrophy with QRS widening Cannot rule out Septal infarct , age undetermined When compared with ECG of 31-OCT-2020 03:39, Left bundle branch block is no longer present Assessment:  
 
Active Problems: 
  Generalized weakness (2020) Plan:  
 
Treatment Plan: 1. AS s/p TAVR: ASA only for AC. Echo pending. 2. Generalized weakness: PT/OT for DC recommendations. Will likely need IPR/SNF on DC. CM following 3. CAD: On ASA, Statin, BB 
 
4. HTN: On Amlodipine, BB, Losartan 5. HLD: On Statin 6. HFpEF: BP management 7. PAD: On ASA, Statin 8. CHB following TAVR s/p PPM 
 
9. DM: On Glipizide 10. Hx of MAT/Afib: Currently in SR and with PPM in place. Continue BB, ASA. No OAC historically Further cares per primary and consulting teams. Will await echo results. Signed By: Lizette Collins NP 2020 Saw patient, agree with above Risk of morbidity and mortality - high Medical decision making - high complexity 1. AS s/p TAVR: ASA only for AC. 2. Generalized weakness: PT/OT for DC recommendations. Will likely need IPR/SNF on DC. CM following 3. CAD: On ASA, Statin, BB 
 
4. HTN: On Amlodipine, BB, Losartan 5. HLD: On Statin 6. HFpEF: BP management 7. PAD: On ASA, Statin 8. CHB following TAVR s/p PPM 
 
9. DM: On Glipizide 10. Hx of MAT/Afib: Currently in SR and with PPM in place. Continue BB, ASA. No OAC historically

## 2020-11-11 NOTE — PROGRESS NOTES
GLORY: 
 1. Expected to discharge to rehabilitation when medically stable. 2.Primary Decision Maker: Chelsy Wilson - Spouse - [phone] Secondary Decision Maker: Grayson Arias Son - [phone] [facility] response to referral: 
 11/11/2020 10:15 AM (ET) Response: Referral Received Comments: Thanks! Working on this referral now. Yovana West [phone]

## 2020-11-11 NOTE — PROGRESS NOTES
Hospitalist Progress Note Clement Thomas MD 
Answering service: [phone] OR [phone] from in house phone Date of Service:  2020 NAME:  Moises Mercedes :  1935 MRN:  [id number] Admission Summary:  
Moises Mercedes is a 82 y.o. male who presents with generalized weakness  
  
82 WM h/o DM, HTN, AS s/p TAVR and pacemaker 2 weeks ago. Pt was discharged to home ( independent living of [facility]) on  . He was sending to ER today CC of diarrhea and generalized weakness. Per note prophylactic antibiotics keflex after pacemaker. He developed diarrhea at home, loose stools, 3-4 times day. Since he has had diarrhea. His family has reported concerns that he is weak but pt denies feeling any focal weakness. He says he hasn't been able to work out or work with physical therapy due to covid. Pt said so far no more diarrhea today. [facility] visited him today and said his condition is worsening to the pont where he can no longer stand and walk on his own. Denies fevers. Denies N/V, abdominal pain Interval history / Subjective:  
  
Patient seen and examined this morning. Feeling well. BP elevated. Better with uptitration of Amlodipine and Losartan. No other concerns or overnight events. Awaiting response for IPR from [employer]. Assessment & Plan: # Generalized weakness Could be from acute deconditioning after his recent hospitalization. At baseline he walks with a cane - CT head No acute intracranial hemorrhage, mass or infarct. - UA negative  
- PT/OT: Recommend IPR. Request for [facility] sent. # Diarrhea, better - C diff negative  
- symptomatic management # S/P TAVR and pacemaker: CS consulted. Patient recently discharged and not on anticoagulation. D/W with Dr. Damir Flores. 
-Echocardiogram requested per cardiothoracic surgery NP. # DM: continue glipizide, SSI. Hold mefromin for now # HTN: Less adequately controlled. -Uptitrate losartan, and amlodipine (new). -Continue metoprolol.  
-Hold HTCZ due to concerns of dehydartion.  
-Better with IVF overnight and orthostatic vitals on admission.  
-May need some dosage adjustment based on BP monitoring # Hypokalemia: po kcl # BPH: continue proscar and doxazosin # Rt leg swelling: doppler negative for DVT # Anemia DVT ppx: SCDs Code: Full Disposition: [facility], request for IPR sent, awaiting Hospital Problems  Date Reviewed: 10/18/2020 Codes Class Noted POA Generalized weakness ICD-10-CM: R53.1 ICD-9-CM: 780.79  11/8/2020 Unknown Review of Systems:  
Pertinent positive mentioned in interval hx/HPI. Other systems reviewed and negative. Vital Signs:  
 Last 24hrs VS reviewed since prior progress note. Most recent are: 
Visit Vitals BP (!) 117/52 (BP Patient Position: Sitting) Pulse 61 Temp 98 °F (36.7 °C) Resp 16 Wt 90.7 kg (200 lb) SpO2 96% BMI 25.00 kg/m² Intake/Output Summary (Last 24 hours) at 11/11/2020 1639 Last data filed at 11/11/2020 1419 Gross per 24 hour Intake 480 ml Output 550 ml Net -70 ml Physical Examination:  
 
I have examined the patient personally on 11/11/20 and findings are as noted below. Constitutional:  No acute distress, cooperative, pleasant, appears stable ENT:  Oral mucous dry Resp:  CTA bilaterally. No wheezing/rhonchi/rales. No accessory muscle use CV:  Regular rhythm, normal rate, no murmurs, gallops, rubs GI:  Soft, non distended, non tender. normoactive bowel sounds, no hepatosplenomegaly Musculoskeletal:  +2 pitting edema on right leg Neurologic:  Moves all extremities. AAOx3, CN II-XII reviewed Data Review:  
 I personally reviewed labs and imaging Ct Head Wo Cont Result Date: 11/8/2020 IMPRESSION: No acute intracranial hemorrhage, mass or infarct. Xr Chest [place] Result Date: 11/8/2020 IMPRESSION: Stable mild left basilar opacity. Clear right lung. Labs:  
 
Recent Labs 11/09/20 
1090 WBC 10.8 HGB 8.9* HCT 27.7*  
* Recent Labs 11/09/20 
7426   
K 4.3  CO2 26 BUN 29* CREA 0.77 * CA 8.2* MG 2.3 PHOS 2.5* Recent Labs 11/09/20 
6483 ALT 16  
* TBILI 0.5 TP 6.1* ALB 2.6*  
GLOB 3.5 No results for input(s): INR, PTP, APTT, INREXT, INREXT in the last 72 hours. No results for input(s): FE, TIBC, PSAT, FERR in the last 72 hours. Lab Results Component Value Date/Time Folate 20.1 10/18/2020 09:48 PM  
  
No results for input(s): PH, PCO2, PO2 in the last 72 hours. No results for input(s): CPK, CKNDX, TROIQ in the last 72 hours. No lab exists for component: CPKMB Lab Results Component Value Date/Time Cholesterol, total 114 02/26/2020 03:19 PM  
 HDL Cholesterol 40 02/26/2020 03:19 PM  
 LDL, calculated 54 02/26/2020 03:19 PM  
 Triglyceride 101 02/26/2020 03:19 PM  
 
Lab Results Component Value Date/Time Glucose (POC) 152 (H) 11/11/2020 12:12 PM  
 Glucose (POC) 159 (H) 11/11/2020 09:26 AM  
 Glucose (POC) 192 (H) 11/10/2020 10:19 PM  
 Glucose (POC) 185 (H) 11/10/2020 06:12 PM  
 Glucose (POC) 132 (H) 11/10/2020 11:43 AM  
 
Lab Results Component Value Date/Time  Color YELLOW/STRAW 11/08/2020 04:11 PM  
 Appearance CLEAR 11/08/2020 04:11 PM  
 Specific gravity 1.019 11/08/2020 04:11 PM  
 pH (UA) 5.0 11/08/2020 04:11 PM  
 Protein TRACE (A) 11/08/2020 04:11 PM  
 Glucose Negative 11/08/2020 04:11 PM  
 Ketone Negative 11/08/2020 04:11 PM  
 Bilirubin Negative 11/08/2020 04:11 PM  
 Urobilinogen 0.2 11/08/2020 04:11 PM  
 Nitrites Negative 11/08/2020 04:11 PM  
 Leukocyte Esterase Negative 11/08/2020 04:11 PM  
 Epithelial cells FEW 11/08/2020 04:11 PM  
 Bacteria Negative 11/08/2020 04:11 PM  
 WBC 0-4 11/08/2020 04:11 PM  
 RBC 0-5 11/08/2020 04:11 PM  
 
 
 
 Medications Reviewed:  
 
Current Facility-Administered Medications Medication Dose Route Frequency  [START ON 11/12/2020] amLODIPine (NORVASC) tablet 10 mg  10 mg Oral DAILY  balsam peru-castor oiL (VENELEX) ointment   Topical DAILY  metoprolol tartrate (LOPRESSOR) tablet 25 mg  25 mg Oral BID  [START ON 11/12/2020] losartan (COZAAR) tablet 100 mg  100 mg Oral DAILY  miconazole (MICOTIN) 2 % powder   Topical BID  aspirin chewable tablet 81 mg  81 mg Oral DAILY  atorvastatin (LIPITOR) tablet 40 mg  40 mg Oral QHS  cholecalciferol (VITAMIN D3) (1000 Units /25 mcg) tablet 1 Tab  1,000 Units Oral DAILY  doxazosin (CARDURA) tablet 1 mg  1 mg Oral DAILY  finasteride (PROSCAR) tablet 5 mg  5 mg Oral DAILY  glipiZIDE SR (GLUCOTROL XL) tablet 5 mg  5 mg Oral ACB  guaiFENesin ER (MUCINEX) tablet 1,200 mg  1,200 mg Oral Q12H  
 magnesium oxide (MAG-OX) tablet 400 mg  400 mg Oral DAILY  sertraline (ZOLOFT) tablet 100 mg  100 mg Oral DAILY  tiZANidine (ZANAFLEX) tablet 2 mg  2 mg Oral TID PRN  
 glucose chewable tablet 16 g  4 Tab Oral PRN  
 glucagon (GLUCAGEN) injection 1 mg  1 mg IntraMUSCular PRN  
 dextrose 10% infusion 0-250 mL  0-250 mL IntraVENous PRN  
 insulin lispro (HUMALOG) injection   SubCUTAneous AC&HS  sodium chloride (NS) flush 5-40 mL  5-40 mL IntraVENous Q8H  
 sodium chloride (NS) flush 5-40 mL  5-40 mL IntraVENous PRN  
 acetaminophen (TYLENOL) tablet 650 mg  650 mg Oral Q6H PRN Or  
 acetaminophen (TYLENOL) suppository 650 mg  650 mg Rectal Q6H PRN  promethazine (PHENERGAN) tablet 12.5 mg  12.5 mg Oral Q6H PRN Or  
 ondansetron (ZOFRAN) injection 4 mg  4 mg IntraVENous Q6H PRN  
 
______________________________________________________________________ EXPECTED LENGTH OF STAY: 2d 14h ACTUAL LENGTH OF STAY:          1 Author MD Khris  
Patient has given Verbal permission to discuss medical care with persons present in the room and and also with contact as listed on face sheet. Please note that this dictation was completed with [employer], the computer voice recognition software. Quite often unanticipated grammatical, syntax, homophones, and other interpretive errors are inadvertently transcribed by the computer software. Please disregard these errors. Please excuse any errors that have escaped final proofreading. Thank you.

## 2020-11-11 NOTE — PROGRESS NOTES
ASSESSMENT  
  
HPI: Carlyn Caro, a 82y.o. year-old who presents for evaluation of CAD s/p CABG, DM, HTN, Dyslipidemia.  
  
S/p TAVR and pacer for SSS 
feelign weak tired, maybe dehydrated ftt on admission Looks better, still weak, pale 
 needs rehab. Has right foot wound BP was elevated, lower now Anemia, stable Would proceed with wc eval for right foot, new 
  
**Records  from [facility] on 11/8/17 Hx of moderate AS, hx of rheumatic fever 1944 
5/20/2008 3 vessel CABG (SVG to LAD, SVG to OM and PDA) S/p right SFA PTA and left subclavian stent Echo 6/1/17 - LVEF 55-60%, no WMA, grade 1 dd, severe cLVH, MAC extending into the posterior leaflet and mild MR, mild TR, trace PI Lexiscan MPI 5/16/17 - medium sized region of mild lateral ischemia, LVEF 54%, no WMA GAMAL 9/30/08 - placement of 7 x 37 mm EB3 stent in 75% stenotic right common iliac artery 
left common iliac artery stent is patent, right common femoral artery with 80% heavily calcified eccentric disease involving ostium of the SFA and the profunda, right SFA has 25% luminal disease in the mid areas, right anterior tibia has 99% focal proximal lesion, right peroneal artery is widely patent, right posterior tibial artery as 90% concentric lesion distally, left common femoral artery with 85% eccentric, heavily calcified lesion involving the ostium of the profunda, as well as the SFA, left SFA is 100% occluded in the mid area, left anterior tibial artery is 100% occluded in the proximal to distal area Venous duplex 3/24/16 - no DVT, bilateral greater saphenous veins stripped  
  
Assessment/Plan: 1. DM  
2. HTN- better now, lower metoprolol for fatigue 3. Dyslipidemia- on statin, at goal 
4. CAD- CABG c. 2008(LIMA-LAD, SVG-OM, SVG-PDA), s/p PCI(?) 
-cont aspirin,statin, bblocker 5. PAD with right leg stent- cont aspirin, statin  
-s/p SFA PTA and LSC stent 6. HFpEF- stable compensated today, mod cLVH(regression) 
-cont arb as bp allows 7. Severe AS s/p TAVR 10/20 8. Venous insufficiency- s/p vein stripping bilat GSV 9. SSS s/p pacer 10/20 post TAVR 
  
Echo 12/19 with Ef 60%, mod AS MAC mild cLVH, lv dd 1/19 Echo 60-65% gri DD, RVE, LAE, mild MR, mod AS, mild TR, mild PI Lexiscan 5/17 med lteral ischemia, EF 54% Echo 6/17 EF 60% gr1dd, severe cLVH, MAC mild MR, mild TR mild PI 
GAMAL 9/30/08 - placement of 7 x 37 mm EB3 stent in 75% stenotic right common iliac artery, left common iliac artery stent is patent, right common femoral artery with 80% heavily calcified eccentric disease involving ostium of the SFA and the profunda, right SFA has 25% luminal disease in the mid areas, right anterior tibia has 99% focal proximal lesion, right peroneal artery is widely patent, right posterior tibial artery as 90% concentric lesion distally, left common femoral artery with 85% eccentric, heavily calcified lesion involving the ostium of the profunda, as well as the SFA, left SFA is 100% occluded in the mid area, left anterior tibial artery is 100% occluded in the proximal to distal area Soc no tob, quit 40 years ago, no etoh Fhx no early cad We discussed the expected course, resolution and complications of the diagnosis(es) in detail. Medication risks, benefits, costs, interactions, and alternatives were discussed as indicated. I advised him to contact the office if his condition worsens, changes or fails to improve as anticipated. He expressed understanding with the diagnosis(es) and plan HISTORY OF PRESENTING ILLNESS Patience Hallmark is a 82 y.o. male ACTIVE PROBLEM LIST Patient Active Problem List  
 Diagnosis Date Noted  Aortic stenosis 10/18/2020 Priority: 1 - One  Generalized weakness 11/08/2020  S/P TAVR (transcatheter aortic valve replacement) 11/03/2020  Pacemaker 10/30/2020  Third degree AV block ([address] 10/30/2020  (HFpEF) heart failure with preserved ejection fraction ([address] 10/20/2020  Syncope and collapse 10/18/2020  Syncope 10/18/2020  Decubitus ulcer of left buttock, stage 2 ([address] 09/10/2020  Type 2 diabetes with nephropathy ([address] 08/24/2020  Pressure injury of buttock, stage 1 06/04/2020  Incontinence of feces 06/04/2020  On angiotensin receptor blockers (ARB) 01/23/2020  Gastroesophageal reflux disease without esophagitis 01/23/2020  Cough 01/23/2020  Recurrent depression ([address] 08/22/2019  Age-related cataract of both eyes 04/25/2019  Gait instability 06/21/2018  Type 2 diabetes mellitus without complication, without long-term current use of insulin ([address] 09/21/2017  Essential hypertension 09/21/2017  Hypercholesterolemia 09/21/2017  Coronary artery disease involving native coronary artery of native heart without angina pectoris 09/21/2017  S/P CABG (coronary artery bypass graft) 09/21/2017  Severe aortic stenosis 09/21/2017  Aortic systolic murmur on examination 09/21/2017  Benign prostatic hyperplasia with lower urinary tract symptoms 09/21/2017  Insomnia 09/21/2017  Former smoker 09/21/2017  ACP (advance care planning) 09/21/2017 PAST MEDICAL HISTORY Past Medical History:  
Diagnosis Date  BPH (benign prostatic hyperplasia)  CAD (coronary artery disease)  Diabetes ([address]  Hearing loss  Hypercholesterolemia  Hypertension  Murmur  Recurrent depression ([address] 8/22/2019  Third degree AV block ([address] 10/30/2020 PAST SURGICAL HISTORY Past Surgical History:  
Procedure Laterality Date  CARDIAC SURG PROCEDURE UNLIST  2006 by-pass  HX CHOLECYSTECTOMY  NV INS NEW/RPLCMT PRM PM W/TRANSV ELTRD ATRIAL&VENT  10/30/2020  [place] INS NEW/RPLCMT PRM PM W/TRANSV ELTRD ATRIAL&VENT N/A 10/30/2020 INSERT PPM DUAL performed by Hannah Clemente MD at [place] [address], [facility]/s  CATH LAB ALLERGIES Allergies Allergen Reactions  Procaine Other (comments) Pt stated he passed out from procaine and was told not to let anyone use it on him again FAMILY HISTORY Family History Problem Relation Age of Onset  Cancer Mother  Cancer Father   
 negative for cardiac disease SOCIAL HISTORY Social History Socioeconomic History  Marital status:  Spouse name: Not on file  Number of children: Not on file  Years of education: Not on file  Highest education level: Not on file Tobacco Use  Smoking status: Former Smoker Types: Cigarettes Last attempt to quit: 1990 Years since quittin.1  Smokeless tobacco: Never Used Substance and Sexual Activity  Alcohol use: No  
 Drug use: No  
 
 
 
MEDICATIONS Current Facility-Administered Medications Medication Dose Route Frequency  [START ON 2020] amLODIPine (NORVASC) tablet 10 mg  10 mg Oral DAILY  [START ON 2020] losartan (COZAAR) tablet 75 mg  75 mg Oral DAILY  balsam peru-castor oiL (VENELEX) ointment   Topical DAILY  miconazole (MICOTIN) 2 % powder   Topical BID  aspirin chewable tablet 81 mg  81 mg Oral DAILY  atorvastatin (LIPITOR) tablet 40 mg  40 mg Oral QHS  cholecalciferol (VITAMIN D3) (1000 Units /25 mcg) tablet 1 Tab  1,000 Units Oral DAILY  doxazosin (CARDURA) tablet 1 mg  1 mg Oral DAILY  finasteride (PROSCAR) tablet 5 mg  5 mg Oral DAILY  glipiZIDE SR (GLUCOTROL XL) tablet 5 mg  5 mg Oral ACB  guaiFENesin ER (MUCINEX) tablet 1,200 mg  1,200 mg Oral Q12H  
 magnesium oxide (MAG-OX) tablet 400 mg  400 mg Oral DAILY  metoprolol tartrate (LOPRESSOR) tablet 50 mg  50 mg Oral BID  sertraline (ZOLOFT) tablet 100 mg  100 mg Oral DAILY  tiZANidine (ZANAFLEX) tablet 2 mg  2 mg Oral TID PRN  
 glucose chewable tablet 16 g  4 Tab Oral PRN  
 glucagon (GLUCAGEN) injection 1 mg  1 mg IntraMUSCular PRN  
 dextrose 10% infusion 0-250 mL  0-250 mL IntraVENous PRN  
  insulin lispro (HUMALOG) injection   SubCUTAneous AC&HS  sodium chloride (NS) flush 5-40 mL  5-40 mL IntraVENous Q8H  
 sodium chloride (NS) flush 5-40 mL  5-40 mL IntraVENous PRN  
 acetaminophen (TYLENOL) tablet 650 mg  650 mg Oral Q6H PRN Or  
 acetaminophen (TYLENOL) suppository 650 mg  650 mg Rectal Q6H PRN  promethazine (PHENERGAN) tablet 12.5 mg  12.5 mg Oral Q6H PRN Or  
 ondansetron (ZOFRAN) injection 4 mg  4 mg IntraVENous Q6H PRN I have reviewed the nurses notes, vitals, problem list, allergy list, medical history, family, social history and medications. REVIEW OF SYMPTOMS Neg except as per HPI PHYSICAL EXAMINATION Visit Vitals BP (!) 117/52 (BP Patient Position: Sitting) Pulse 61 Temp 98 °F (36.7 °C) Resp 16 Wt 200 lb (90.7 kg) SpO2 96% BMI 25.00 kg/m² 3/6 JANNY 
 lungs cta 
abd NDT ND Neuro non focal, memory impairment Awake alert, conversant DIAGNOSTIC DATA No specialty comments available. LABORATORY DATA Lab Results Component Value Date/Time WBC 10.8 11/09/2020 05:04 AM  
 HGB 8.9 (L) 11/09/2020 05:04 AM  
 HCT 27.7 (L) 11/09/2020 05:04 AM  
 PLATELET 268 (L) 64/40/0428 05:04 AM  
 MCV 86.6 11/09/2020 05:04 AM  
  
Lab Results Component Value Date/Time Sodium 139 11/09/2020 05:04 AM  
 Potassium 4.3 11/09/2020 05:04 AM  
 Chloride 108 11/09/2020 05:04 AM  
 CO2 26 11/09/2020 05:04 AM  
 Anion gap 5 11/09/2020 05:04 AM  
 Glucose 142 (H) 11/09/2020 05:04 AM  
 BUN 29 (H) 11/09/2020 05:04 AM  
 Creatinine 0.77 11/09/2020 05:04 AM  
 BUN/Creatinine ratio 38 (H) 11/09/2020 05:04 AM  
 GFR est AA >60 11/09/2020 05:04 AM  
 GFR est non-AA >60 11/09/2020 05:04 AM  
 Calcium 8.2 (L) 11/09/2020 05:04 AM  
 Bilirubin, total 0.5 11/09/2020 05:04 AM  
 Alk.  phosphatase 127 (H) 11/09/2020 05:04 AM  
 Protein, total 6.1 (L) 11/09/2020 05:04 AM  
 Albumin 2.6 (L) 11/09/2020 05:04 AM  
 Globulin 3.5 11/09/2020 05:04 AM  
 A-G Ratio 0.7 (L) 11/09/2020 05:04 AM  
 ALT (SGPT) 16 11/09/2020 05:04 AM  
  
 
 
 
 FOLLOW-UP  
 
 as scheduled to rehab today Bradley Sanchez MD 
 
[address] [place] [address], [address]       
[phone] / [phone] Fax [facility] 
[address] , [address] [facility] 
[phone] / [phone] Fax

## 2020-11-11 NOTE — PROGRESS NOTES
Problem: Falls - Risk of 
Goal: *Absence of Falls Description: Document Jean Paul Francine Fall Risk and appropriate interventions in the flowsheet. Outcome: Progressing Towards Goal 
Note: Fall Risk Interventions: 
Mobility Interventions: Bed/chair exit alarm, Patient to call before getting OOB Medication Interventions: Bed/chair exit alarm, Patient to call before getting OOB, Teach patient to arise slowly Elimination Interventions: Bed/chair exit alarm, Call light in reach, Patient to call for help with toileting needs, Toilet paper/wipes in reach, Toileting schedule/hourly rounds Problem: Diarrhea (Adult and Pediatrics) Goal: *Absence of diarrhea Outcome: Progressing Towards Goal 
  
Problem: Pressure Injury - Risk of 
Goal: *Prevention of pressure injury Description: Document Justin Scale and appropriate interventions in the flowsheet. Outcome: Progressing Towards Goal 
Note: Pressure Injury Interventions: 
Sensory Interventions: Assess changes in LOC, Keep linens dry and wrinkle-free, Float heels, Minimize linen layers, Sit a 90-degree angle/use footstool if needed, Turn and reposition approx. every two hours (pillows and wedges if needed), Use 30-degree side-lying position, Check visual cues for pain Moisture Interventions: Absorbent underpads, Apply protective barrier, creams and emollients, Check for incontinence Q2 hours and as needed, Internal/External urinary devices, Maintain skin hydration (lotion/cream), Minimize layers, Offer toileting Q_hr, Moisture barrier Activity Interventions: Increase time out of bed, PT/OT evaluation Mobility Interventions: HOB 30 degrees or less, Float heels, PT/OT evaluation, Turn and reposition approx. every two hours(pillow and wedges) Nutrition Interventions: Document food/fluid/supplement intake Friction and Shear Interventions: HOB 30 degrees or less, Apply protective barrier, creams and emollients, Transferring/repositioning devices Pt turned q 2, no falls. TRANSFER - OUT REPORT: 
 
Verbal report given to Liendavina Alisa (name) on Emperatriz Shepherd  being transferred to  (unit) for routine progression of care Report consisted of patients Situation, Background, Assessment and  
Recommendations(SBAR). Information from the following report(s) SBAR, Kardex, ED Summary, Intake/Output, MAR, Recent Results, Med Rec Status, Cardiac Rhythm Paced, Alarm Parameters , and Quality Measures was reviewed with the receiving nurse. Lines:  
Peripheral IV 11/08/20 Right Forearm (Active) Site Assessment Clean, dry, & intact 11/10/20 2020 Phlebitis Assessment 0 11/10/20 2020 Infiltration Assessment 0 11/10/20 2020 Dressing Status Clean, dry, & intact 11/10/20 2020 Dressing Type Transparent;Tape 11/10/20 2020 Hub Color/Line Status Pink;Flushed;Capped 11/10/20 2020 Action Taken Open ports on tubing capped 11/10/20 2020 Alcohol Cap Used Yes 11/10/20 2020 Opportunity for questions and clarification was provided. Patient transported with: 
 Registered Nurse Tech Last 3 Recorded Weights in this Encounter 11/09/20 8530 11/10/20 0328 11/11/20 0215 Weight: 84.4 kg (186 lb 1.6 oz) 80.4 kg (177 lb 3.2 oz) 90.7 kg (200 lb) Last weight pt weighed on 6W with items on bed, not accurate.

## 2020-11-11 NOTE — PROGRESS NOTES
Problem: Diabetes Self-Management Goal: *Disease process and treatment process Description: Define diabetes and identify own type of diabetes; list 3 options for treating diabetes. Outcome: Progressing Towards Goal 
Goal: *Incorporating nutritional management into lifestyle Description: Describe effect of type, amount and timing of food on blood glucose; list 3 methods for planning meals. Outcome: Progressing Towards Goal 
Goal: *Incorporating physical activity into lifestyle Description: State effect of exercise on blood glucose levels. Outcome: Progressing Towards Goal 
Goal: *Developing strategies to promote health/change behavior Description: Define the ABC's of diabetes; identify appropriate screenings, schedule and personal plan for screenings. Outcome: Progressing Towards Goal 
Goal: *Using medications safely Description: State effect of diabetes medications on diabetes; name diabetes medication taking, action and side effects. Outcome: Progressing Towards Goal 
Goal: *Monitoring blood glucose, interpreting and using results Description: Identify recommended blood glucose targets  and personal targets. Outcome: Progressing Towards Goal 
Goal: *Prevention, detection, treatment of acute complications Description: List symptoms of hyper- and hypoglycemia; describe how to treat low blood sugar and actions for lowering  high blood glucose level. Outcome: Progressing Towards Goal 
Goal: *Prevention, detection and treatment of chronic complications Description: Define the natural course of diabetes and describe the relationship of blood glucose levels to long term complications of diabetes. Outcome: Progressing Towards Goal 
Goal: *Developing strategies to address psychosocial issues Description: Describe feelings about living with diabetes; identify support needed and support network Outcome: Progressing Towards Goal 
Goal: *Insulin pump training Outcome: Progressing Towards Goal 
 Goal: *Sick day guidelines Outcome: Progressing Towards Goal 
Goal: *Patient Specific Goal (EDIT GOAL, INSERT TEXT) Outcome: Progressing Towards Goal 
  
Problem: Patient Education: Go to Patient Education Activity Goal: Patient/Family Education Outcome: Progressing Towards Goal 
  
Problem: Risk for Spread of Infection Goal: Prevent transmission of infectious organism to others Description: Prevent the transmission of infectious organisms to other patients, staff members, and visitors. Outcome: Progressing Towards Goal 
  
Problem: Patient Education:  Go to Education Activity Goal: Patient/Family Education Outcome: Progressing Towards Goal 
  
Problem: Falls - Risk of 
Goal: *Absence of Falls Description: Document Ivana Guerra Fall Risk and appropriate interventions in the flowsheet. Outcome: Progressing Towards Goal 
Note: Fall Risk Interventions: 
Mobility Interventions: Bed/chair exit alarm, Patient to call before getting OOB Medication Interventions: Bed/chair exit alarm, Patient to call before getting OOB, Teach patient to arise slowly Elimination Interventions: Bed/chair exit alarm, Call light in reach, Patient to call for help with toileting needs, Toilet paper/wipes in reach, Toileting schedule/hourly rounds Problem: Patient Education: Go to Patient Education Activity Goal: Patient/Family Education Outcome: Progressing Towards Goal 
  
Problem: Diarrhea (Adult and Pediatrics) Goal: *Absence of diarrhea Outcome: Progressing Towards Goal 
Goal: *PALLIATIVE CARE:  Absence of diarrhea Outcome: Progressing Towards Goal 
  
Problem: Patient Education: Go to Patient Education Activity Goal: Patient/Family Education Outcome: Progressing Towards Goal 
  
Problem: Patient Education: Go to Patient Education Activity Goal: Patient/Family Education Outcome: Progressing Towards Goal 
  
Problem: Patient Education: Go to Patient Education Activity Goal: Patient/Family Education Outcome: Progressing Towards Goal 
  
Problem: Pressure Injury - Risk of 
Goal: *Prevention of pressure injury Description: Document Justin Scale and appropriate interventions in the flowsheet. Outcome: Progressing Towards Goal 
  
Problem: Patient Education: Go to Patient Education Activity Goal: Patient/Family Education Outcome: Progressing Towards Goal

## 2020-11-11 NOTE — PROGRESS NOTES
Problem: Mobility Impaired (Adult and Pediatric) Goal: *Acute Goals and Plan of Care (Insert Text) Description: FUNCTIONAL STATUS PRIOR TO ADMISSION: Patient was modified independent using a single point cane for functional mobility prior to surgery 2 weeks ago. Pt has had difficulty since. HOME SUPPORT PRIOR TO ADMISSION: The patient lived with wife but did not require assist. 
 
Physical Therapy Goals Initiated 11/9/2020 1. Patient will move from supine to sit and sit to supine , scoot up and down, and roll side to side in bed with modified independence within 7 day(s). 2.  Patient will transfer from bed to chair and chair to bed with modified independence using the least restrictive device within 7 day(s). 3.  Patient will perform sit to stand with modified independence within 7 day(s). 4.  Patient will ambulate with modified independence for 150 feet with the least restrictive device within 7 day(s). Outcome: Progressing Towards Goal 
 PHYSICAL THERAPY TREATMENT Patient: Polly Baumgarten (72 y.o. male) Date: 11/11/2020 Diagnosis: Generalized weakness [R53.1] Generalized weakness [R53.1] <principal problem not specified> Precautions: Fall, Skin, Contact(pacer) Chart, physical therapy assessment, plan of care and goals were reviewed. ASSESSMENT Patient continues with skilled PT services and is progressing towards goals. Patient with some noted improvement today of mobility. Blood pressure continues to fluctuate but patient without complaints. Continues to require constant verbal cues to not utilize LUE for pushing up. Gait more steady and increased tolerance. Continue to feel will benefit from rehab prior to returning home . Current Level of Function Impacting Discharge (mobility/balance): min to max assist (max from low surfaces) Other factors to consider for discharge: below his baseline;  
    
 
PLAN : 
 Patient continues to benefit from skilled intervention to address the above impairments. Continue treatment per established plan of care. to address goals. Recommendation for discharge: (in order for the patient to meet his/her long term goals) Therapy 3 hours per day 5-7 days per week This discharge recommendation: 
Has been made in collaboration with the attending provider and/or case management IF patient discharges home will need the following DME: to be determined (TBD) if home may need RW SUBJECTIVE:  
Patient stated I think I'm going to another facility today.  OBJECTIVE DATA SUMMARY:  
Critical Behavior: 
Neurologic State: Alert Orientation Level: Oriented X4 Cognition: Follows commands Safety/Judgement: Awareness of environment Functional Mobility Training: 
Bed Mobility: 
  
Supine to Sit: Minimum assistance(cues to not push with LUE) Sit to Supine: Supervision Scooting: Minimum assistance Transfers: 
Sit to Stand: Minimum assistance(max from low surface/commode) Stand to Sit: Contact guard assistance Balance: 
Sitting: Intact Standing: Impaired; With support Standing - Static: Constant support; Fair 
Standing - Dynamic : Fair;Constant support Ambulation/Gait Training: 
Distance (ft): 15 Feet (ft)(x2) 
Assistive Device: Gait belt;Walker, rolling Ambulation - Level of Assistance: Contact guard assistance Gait Abnormalities: Decreased step clearance Base of Support: Widened Speed/Zaida: Slow Step Length: Right shortened;Left shortened Activity Tolerance:  
Fair- see vital with variable BP After treatment patient left in no apparent distress:  
Supine in bed, Heels elevated for pressure relief, Call bell within reach, and Side rails x 3 
 
COMMUNICATION/COLLABORATION:  
The patients plan of care was discussed with: Registered nurse.   
 
Rakel Diaz, PT

## 2020-11-12 NOTE — WOUND CARE
Wound Care Note:  
 
New consult placed by physician request for buttock and right foot Chart shows: 
Admitted for generalized weakness Past Medical History:  
Diagnosis Date  BPH (benign prostatic hyperplasia)  CAD (coronary artery disease)  Diabetes ([facility]  Hearing loss  Hypercholesterolemia  Hypertension  Murmur  Recurrent depression ([facility] 8/22/2019  Third degree AV block ([facility] 10/30/2020 WBC = 10.8 on 11/9/20 Admitted from [facility] Assessment:  
Patient is A&O x 4, communicative, continent with no assistance needed in repositioning. Bed: [facility] Diet: Diabetic consistent carb regular Patient reports no pain. Bilateral heels, left buttocks, and sacral skin intact and without erythema. Palpable DP pulses bilaterally. Generalized edema and blanching erythema to lower legs and feet. 1. POA right 5th metatarsal head plantar aspect with wound measuring 2.5 cm x 2 cm x 0.1 cm, wound bed with 50% eschar and 50% white, moderate serous drainage, wound edges are open, tanisha-wound intact without erythema. Opticell AG and foam dressing applied. Santyl ointment to be ordered. 2.  POA right buttock with area of hyperpigmentation and hypopigmentation, non-blanchable, no open area, tanisha-wound intact. Left open to air. 3.  POA maculopapular erythematous rash with satellite lesions consistent with yeast to bilateral groin and upper posterior thighs. Miconazole antifungal powder has already been ordered. Spoke with Dr. Aaron Brunson, wound care orders obtained. Patient repositioned supine. Heels offloaded on pillows. Recommendations:   
Right foot- Daily cleanse with normal saline, wipe wound bed clean and dry, apply nickel thickness of Santyl ointment and cover. Skin Care & Pressure Prevention: Minimize layers of linen/pads under patient to optimize support surface. Turn/reposition approximately every 2 hours and offload heels. Manage incontinence / promote continence Nourishing Skin Cream to dry skin, minimize use of briefs when able Discussed above plan with patient & Farrah Armstrong RN Transition of Care: Plan to follow as needed while admitted to hospital. 
 
DELFINA De La Torre, RN, [facility], [facility]. 
office [phone] 
pager [phone] or call  to page

## 2020-11-12 NOTE — PROGRESS NOTES
Problem: Mobility Impaired (Adult and Pediatric) Goal: *Acute Goals and Plan of Care (Insert Text) Description: FUNCTIONAL STATUS PRIOR TO ADMISSION: Patient was modified independent using a single point cane for functional mobility prior to surgery 2 weeks ago. Pt has had difficulty since. HOME SUPPORT PRIOR TO ADMISSION: The patient lived with wife but did not require assist. 
 
Physical Therapy Goals Initiated 11/9/2020 1. Patient will move from supine to sit and sit to supine , scoot up and down, and roll side to side in bed with modified independence within 7 day(s). 2.  Patient will transfer from bed to chair and chair to bed with modified independence using the least restrictive device within 7 day(s). 3.  Patient will perform sit to stand with modified independence within 7 day(s). 4.  Patient will ambulate with modified independence for 150 feet with the least restrictive device within 7 day(s). Outcome: Progressing Towards Goal 
 PHYSICAL THERAPY TREATMENT Patient: Jose Juan De La Vega (69 y.o. male) Date: 11/12/2020 Diagnosis: Generalized weakness [R53.1] Generalized weakness [R53.1] <principal problem not specified> Precautions: Fall, Skin, Contact(pacer) Chart, physical therapy assessment, plan of care and goals were reviewed. ASSESSMENT Patient continues with skilled PT services and is progressing towards goals. Patient without complaint and moving well with less cues to not use LUE to assist with mobility. Tolerated increased gait distance today and encouraged to mobilize more with staff. Agreeable to sitting in chair for lunch. Remains below baseline. May want to try single point cane next visit. Johanna Carmichael Current Level of Function Impacting Discharge (mobility/balance): minimal assist to supervision Other factors to consider for discharge: lives with elderly wife who is not able to assist at his current level of mobility PLAN : 
 Patient continues to benefit from skilled intervention to address the above impairments. Continue treatment per established plan of care. to address goals. Recommendation for discharge: (in order for the patient to meet his/her long term goals) Therapy up to 5 days/week in SNF setting This discharge recommendation: 
Has been made in collaboration with the attending provider and/or case management IF patient discharges home will need the following DME: patient owns DME required for discharge but may need RW pending discharge placement SUBJECTIVE:  
Patient stated I haven't been up since I saw you yesterday.  OBJECTIVE DATA SUMMARY:  
Critical Behavior: 
Neurologic State: Alert Orientation Level: Oriented X4 Cognition: Appropriate decision making Safety/Judgement: Awareness of environment Functional Mobility Training: 
Bed Mobility: 
  
Supine to Sit: Supervision(to right) Sit to Supine: Supervision Scooting: Additional time;Stand-by assistance Transfers: 
Sit to Stand: Contact guard assistance; Other (comment)(elevated bed height) Stand to Sit: Contact guard assistance Balance: 
Sitting: Intact Standing: Impaired; With support Standing - Static: Good Standing - Dynamic : Good Ambulation/Gait Training: 
Distance (ft): 40 Feet (ft) Assistive Device: Gait belt;Walker, rolling Ambulation - Level of Assistance: Contact guard assistance Gait Abnormalities: Decreased step clearance Base of Support: Widened Speed/Zaida: Slow Step Length: Right shortened;Left shortened Activity Tolerance:  
Fair; BP low 124/45 at rest 
 
After treatment patient left in no apparent distress:  
Sitting in chair, Call bell within reach, and Caregiver / family present COMMUNICATION/COLLABORATION:  
The patients plan of care was discussed with: Registered nurse and Case management. Linda Chaparro, PT Time Calculation: 1430 mins

## 2020-11-12 NOTE — PROGRESS NOTES
Hospitalist Progress Note Lizette Street MD 
Answering service: [phone] from in house phone Date of Service:  2020 NAME:  Cammy Roman :  1935 MRN:  [id number] Admission Summary:  
Cammy Roman is a 82 y.o. male who presents with generalized weakness  
  
82 WM h/o DM, HTN, AS s/p TAVR and pacemaker 2 weeks ago. Pt was discharged to home ( independent living of [facility]) on  . He was sending to ER today CC of diarrhea and generalized weakness. Per note prophylactic antibiotics keflex after pacemaker. He developed diarrhea at home, loose stools, 3-4 times day. Since he has had diarrhea. His family has reported concerns that he is weak but pt denies feeling any focal weakness. He says he hasn't been able to work out or work with physical therapy due to covid. Pt said so far no more diarrhea today. [facility] visited him today and said his condition is worsening to the pont where he can no longer stand and walk on his own. Denies fevers. Denies N/V, abdominal pain Interval history / Subjective:  
  
Patient seen and examined this morning. Amlodipine dose adjusted. Waiting for placement Echo pending Assessment & Plan: # Generalized weakness Could be from acute deconditioning after his recent hospitalization. At baseline he walks with a cane - CT head No acute intracranial hemorrhage, mass or infarct. - UA negative  
- PT/OT: Recommend IPR. Request for [facility] sent. # Diarrhea, better - C diff negative  
- symptomatic management # S/P TAVR and pacemaker: CS consulted. Patient recently discharged and not on anticoagulation. D/W with Dr. Fernando Graves. 
-Echocardiogram requested per cardiothoracic surgery NP. # DM: continue glipizide, SSI. Hold mefromin for now # HTN: Less adequately controlled. -Uptitrate losartan, and amlodipine cut down s borderline BP today -Continue metoprolol.  
-Hold HTCZ due to concerns of dehydartion.  
-Better with IVF overnight and orthostatic vitals on admission.  
-May need some dosage adjustment based on BP monitoring # Hypokalemia: po kcl # BPH: continue proscar and doxazosin # Rt leg swelling: doppler negative for DVT # Anemia DVT ppx: SCDs Code: Full Disposition: [facility], request for IPR sent, awaiting Hospital Problems  Date Reviewed: 10/18/2020 Codes Class Noted POA Generalized weakness ICD-10-CM: R53.1 ICD-9-CM: 780.79  11/8/2020 Unknown Review of Systems:  
Pertinent positive mentioned in interval hx/HPI. Other systems reviewed and negative. Vital Signs:  
 Last 24hrs VS reviewed since prior progress note. Most recent are: 
Visit Vitals BP (!) 125/38 (BP Patient Position: Sitting) Pulse 68 Temp 98 °F (36.7 °C) Resp 18 Wt 90.7 kg (200 lb) SpO2 95% BMI 25.00 kg/m² Intake/Output Summary (Last 24 hours) at 11/12/2020 1439 Last data filed at 11/11/2020 1816 Gross per 24 hour Intake 240 ml Output 100 ml Net 140 ml Physical Examination:  
 
I have examined the patient personally on 11/12/20 and findings are as noted below. Constitutional:  No acute distress, cooperative, pleasant, appears stable ENT:  Oral mucous dry Resp:  CTA bilaterally. No wheezing/rhonchi/rales. No accessory muscle use CV:  Regular rhythm, normal rate, no murmurs, gallops, rubs GI:  Soft, non distended, non tender. normoactive bowel sounds, no hepatosplenomegaly Musculoskeletal:   edema on right leg Neurologic:  Moves all extremities. AAOx3, CN II-XII reviewed Data Review:  
 I personally reviewed labs and imaging Ct Head Wo Cont Result Date: 11/8/2020 IMPRESSION: No acute intracranial hemorrhage, mass or infarct. Xr Chest [facility] Result Date: 11/8/2020 IMPRESSION: Stable mild left basilar opacity. Clear right lung. Labs: No results for input(s): WBC, HGB, HCT, PLT, HGBEXT, HCTEXT, PLTEXT, HGBEXT, HCTEXT, PLTEXT in the last 72 hours. No results for input(s): NA, K, CL, CO2, BUN, CREA, GLU, CA, MG, PHOS, URICA in the last 72 hours. No results for input(s): ALT, AP, TBIL, TBILI, TP, ALB, GLOB, GGT, AML, LPSE in the last 72 hours. No lab exists for component: SGOT, GPT, AMYP, HLPSE No results for input(s): INR, PTP, APTT, INREXT, INREXT in the last 72 hours. No results for input(s): FE, TIBC, PSAT, FERR in the last 72 hours. Lab Results Component Value Date/Time Folate 20.1 10/18/2020 09:48 PM  
  
No results for input(s): PH, PCO2, PO2 in the last 72 hours. No results for input(s): CPK, CKNDX, TROIQ in the last 72 hours. No lab exists for component: CPKMB Lab Results Component Value Date/Time Cholesterol, total 114 02/26/2020 03:19 PM  
 HDL Cholesterol 40 02/26/2020 03:19 PM  
 LDL, calculated 54 02/26/2020 03:19 PM  
 Triglyceride 101 02/26/2020 03:19 PM  
 
Lab Results Component Value Date/Time Glucose (POC) 143 (H) 11/12/2020 11:38 AM  
 Glucose (POC) 134 (H) 11/12/2020 08:24 AM  
 Glucose (POC) 177 (H) 11/11/2020 10:20 PM  
 Glucose (POC) 194 (H) 11/11/2020 05:12 PM  
 Glucose (POC) 152 (H) 11/11/2020 12:12 PM  
 
Lab Results Component Value Date/Time Color YELLOW/STRAW 11/08/2020 04:11 PM  
 Appearance CLEAR 11/08/2020 04:11 PM  
 Specific gravity 1.019 11/08/2020 04:11 PM  
 pH (UA) 5.0 11/08/2020 04:11 PM  
 Protein TRACE (A) 11/08/2020 04:11 PM  
 Glucose Negative 11/08/2020 04:11 PM  
 Ketone Negative 11/08/2020 04:11 PM  
 Bilirubin Negative 11/08/2020 04:11 PM  
 Urobilinogen 0.2 11/08/2020 04:11 PM  
 Nitrites Negative 11/08/2020 04:11 PM  
 Leukocyte Esterase Negative 11/08/2020 04:11 PM  
 Epithelial cells FEW 11/08/2020 04:11 PM  
 Bacteria Negative 11/08/2020 04:11 PM  
 WBC 0-4 11/08/2020 04:11 PM  
 RBC 0-5 11/08/2020 04:11 PM  
 
 
 
Medications Reviewed: Current Facility-Administered Medications Medication Dose Route Frequency  amLODIPine (NORVASC) tablet 10 mg  10 mg Oral DAILY  balsam peru-castor oiL (VENELEX) ointment   Topical DAILY  metoprolol tartrate (LOPRESSOR) tablet 25 mg  25 mg Oral BID  losartan (COZAAR) tablet 100 mg  100 mg Oral DAILY  miconazole (MICOTIN) 2 % powder   Topical BID  aspirin chewable tablet 81 mg  81 mg Oral DAILY  atorvastatin (LIPITOR) tablet 40 mg  40 mg Oral QHS  cholecalciferol (VITAMIN D3) (1000 Units /25 mcg) tablet 1 Tab  1,000 Units Oral DAILY  doxazosin (CARDURA) tablet 1 mg  1 mg Oral DAILY  finasteride (PROSCAR) tablet 5 mg  5 mg Oral DAILY  glipiZIDE SR (GLUCOTROL XL) tablet 5 mg  5 mg Oral ACB  guaiFENesin ER (MUCINEX) tablet 1,200 mg  1,200 mg Oral Q12H  
 magnesium oxide (MAG-OX) tablet 400 mg  400 mg Oral DAILY  sertraline (ZOLOFT) tablet 100 mg  100 mg Oral DAILY  tiZANidine (ZANAFLEX) tablet 2 mg  2 mg Oral TID PRN  
 glucose chewable tablet 16 g  4 Tab Oral PRN  
 glucagon (GLUCAGEN) injection 1 mg  1 mg IntraMUSCular PRN  
 dextrose 10% infusion 0-250 mL  0-250 mL IntraVENous PRN  
 insulin lispro (HUMALOG) injection   SubCUTAneous AC&HS  sodium chloride (NS) flush 5-40 mL  5-40 mL IntraVENous Q8H  
 sodium chloride (NS) flush 5-40 mL  5-40 mL IntraVENous PRN  
 acetaminophen (TYLENOL) tablet 650 mg  650 mg Oral Q6H PRN Or  
 acetaminophen (TYLENOL) suppository 650 mg  650 mg Rectal Q6H PRN  promethazine (PHENERGAN) tablet 12.5 mg  12.5 mg Oral Q6H PRN Or  
 ondansetron (ZOFRAN) injection 4 mg  4 mg IntraVENous Q6H PRN  
 
______________________________________________________________________ EXPECTED LENGTH OF STAY: 2d 14h ACTUAL LENGTH OF STAY:          2 Carolina Armas MD  
Patient has given Verbal permission to discuss medical care with  
persons present in the room and and also with contact as listed on face sheet. Please note that this dictation was completed with [employer], the computer voice recognition software. Quite often unanticipated grammatical, syntax, homophones, and other interpretive errors are inadvertently transcribed by the computer software. Please disregard these errors. Please excuse any errors that have escaped final proofreading. Thank you.

## 2020-11-12 NOTE — PROGRESS NOTES
Problem: Diabetes Self-Management Goal: *Disease process and treatment process Description: Define diabetes and identify own type of diabetes; list 3 options for treating diabetes. Outcome: Progressing Towards Goal 
Goal: *Incorporating nutritional management into lifestyle Description: Describe effect of type, amount and timing of food on blood glucose; list 3 methods for planning meals. Outcome: Progressing Towards Goal 
Goal: *Incorporating physical activity into lifestyle Description: State effect of exercise on blood glucose levels. Outcome: Progressing Towards Goal 
Goal: *Developing strategies to promote health/change behavior Description: Define the ABC's of diabetes; identify appropriate screenings, schedule and personal plan for screenings. Outcome: Progressing Towards Goal 
Goal: *Using medications safely Description: State effect of diabetes medications on diabetes; name diabetes medication taking, action and side effects. Outcome: Progressing Towards Goal 
Goal: *Monitoring blood glucose, interpreting and using results Description: Identify recommended blood glucose targets  and personal targets. Outcome: Progressing Towards Goal 
Goal: *Prevention, detection, treatment of acute complications Description: List symptoms of hyper- and hypoglycemia; describe how to treat low blood sugar and actions for lowering  high blood glucose level. Outcome: Progressing Towards Goal 
Goal: *Prevention, detection and treatment of chronic complications Description: Define the natural course of diabetes and describe the relationship of blood glucose levels to long term complications of diabetes. Outcome: Progressing Towards Goal 
Goal: *Developing strategies to address psychosocial issues Description: Describe feelings about living with diabetes; identify support needed and support network Outcome: Progressing Towards Goal 
Goal: *Insulin pump training Outcome: Progressing Towards Goal 
 Goal: *Sick day guidelines Outcome: Progressing Towards Goal 
Goal: *Patient Specific Goal (EDIT GOAL, INSERT TEXT) Outcome: Progressing Towards Goal 
  
Problem: Patient Education: Go to Patient Education Activity Goal: Patient/Family Education Outcome: Progressing Towards Goal 
  
Problem: Risk for Spread of Infection Goal: Prevent transmission of infectious organism to others Description: Prevent the transmission of infectious organisms to other patients, staff members, and visitors. Outcome: Progressing Towards Goal 
  
Problem: Patient Education:  Go to Education Activity Goal: Patient/Family Education Outcome: Progressing Towards Goal 
  
Problem: Falls - Risk of 
Goal: *Absence of Falls Description: Document Charbel Doe Fall Risk and appropriate interventions in the flowsheet. Outcome: Progressing Towards Goal 
Note: Fall Risk Interventions: 
Mobility Interventions: Bed/chair exit alarm, Utilize walker, cane, or other assistive device, PT Consult for mobility concerns, Patient to call before getting OOB, OT consult for ADLs Medication Interventions: Patient to call before getting OOB, Teach patient to arise slowly, Bed/chair exit alarm Elimination Interventions: Bed/chair exit alarm, Call light in reach, Patient to call for help with toileting needs, Toilet paper/wipes in reach, Toileting schedule/hourly rounds, Urinal in reach Problem: Patient Education: Go to Patient Education Activity Goal: Patient/Family Education Outcome: Progressing Towards Goal 
  
Problem: Diarrhea (Adult and Pediatrics) Goal: *Absence of diarrhea Outcome: Progressing Towards Goal 
Goal: *PALLIATIVE CARE:  Absence of diarrhea Outcome: Progressing Towards Goal 
  
Problem: Patient Education: Go to Patient Education Activity Goal: Patient/Family Education Outcome: Progressing Towards Goal 
  
Problem: Patient Education: Go to Patient Education Activity Goal: Patient/Family Education Outcome: Progressing Towards Goal 
  
Problem: Patient Education: Go to Patient Education Activity Goal: Patient/Family Education Outcome: Progressing Towards Goal 
  
Problem: Pressure Injury - Risk of 
Goal: *Prevention of pressure injury Description: Document Justin Scale and appropriate interventions in the flowsheet. Outcome: Progressing Towards Goal 
Note: Pressure Injury Interventions: 
Sensory Interventions: Assess changes in LOC Moisture Interventions: Absorbent underpads, Minimize layers, Moisture barrier, Offer toileting Q_hr, Limit adult briefs Activity Interventions: Increase time out of bed, PT/OT evaluation Mobility Interventions: Float heels, HOB 30 degrees or less, PT/OT evaluation, Turn and reposition approx. every two hours(pillow and wedges) Nutrition Interventions: Document food/fluid/supplement intake Friction and Shear Interventions: Apply protective barrier, creams and emollients, HOB 30 degrees or less, Lift sheet, Minimize layers, Transferring/repositioning devices Problem: Patient Education: Go to Patient Education Activity Goal: Patient/Family Education Outcome: Progressing Towards Goal

## 2020-11-12 NOTE — PROGRESS NOTES
GLORY: 
1. 1. Expected to discharge to  when medically stable. 2.Primary Decision Maker: Author Brien - [phone] 
  Secondary Decision Maker: Dany Soliz - Son - [phone] 
  
 Continuing with discharge placement. Called his wife x 3 but no answer and VM is not set up. Patient stated his son makes decision for him. List of SNF given to patient. Referral sent to [facility] and [address] [place] [address] via [place]. 
 
11:15 am wife called  back and was updated about plans.

## 2020-11-12 NOTE — PROGRESS NOTES
Cardiology progress note Seen at the request of Dr. Guerrero Hoover. Came wit diarrhea and weakness. Feeling better. Doing well from post TAVR standpoint No significant BP issues. Pacemaker --no issues. Minimal pacemaker need given predominantly 1st degree AV block. EF preserved. OK to hydrate if needed. I suspect with better oral intake and resolution of diarrhea, his strength should improve. Continue physical therapy. No major restriction from cardiac standpoint. Had significant PAD  B/l  wchich may contribute to slow wound healing,I may consider revascularization of right LE, as last resort but at this time, manage conservatively WDL

## 2020-11-12 NOTE — PROGRESS NOTES
Problem: Diabetes Self-Management Goal: *Disease process and treatment process Description: Define diabetes and identify own type of diabetes; list 3 options for treating diabetes. Outcome: Progressing Towards Goal 
Goal: *Incorporating nutritional management into lifestyle Description: Describe effect of type, amount and timing of food on blood glucose; list 3 methods for planning meals. Outcome: Progressing Towards Goal 
Goal: *Incorporating physical activity into lifestyle Description: State effect of exercise on blood glucose levels. Outcome: Progressing Towards Goal 
Goal: *Developing strategies to promote health/change behavior Description: Define the ABC's of diabetes; identify appropriate screenings, schedule and personal plan for screenings. Outcome: Progressing Towards Goal 
Goal: *Using medications safely Description: State effect of diabetes medications on diabetes; name diabetes medication taking, action and side effects. Outcome: Progressing Towards Goal 
Goal: *Monitoring blood glucose, interpreting and using results Description: Identify recommended blood glucose targets  and personal targets. Outcome: Progressing Towards Goal 
Goal: *Prevention, detection, treatment of acute complications Description: List symptoms of hyper- and hypoglycemia; describe how to treat low blood sugar and actions for lowering  high blood glucose level. Outcome: Progressing Towards Goal 
Goal: *Prevention, detection and treatment of chronic complications Description: Define the natural course of diabetes and describe the relationship of blood glucose levels to long term complications of diabetes. Outcome: Progressing Towards Goal 
Goal: *Developing strategies to address psychosocial issues Description: Describe feelings about living with diabetes; identify support needed and support network Outcome: Progressing Towards Goal 
Goal: *Insulin pump training Outcome: Progressing Towards Goal 
 Goal: *Sick day guidelines Outcome: Progressing Towards Goal 
Goal: *Patient Specific Goal (EDIT GOAL, INSERT TEXT) Outcome: Progressing Towards Goal 
  
Problem: Patient Education: Go to Patient Education Activity Goal: Patient/Family Education Outcome: Progressing Towards Goal 
  
Problem: Risk for Spread of Infection Goal: Prevent transmission of infectious organism to others Description: Prevent the transmission of infectious organisms to other patients, staff members, and visitors. Outcome: Progressing Towards Goal 
  
Problem: Patient Education:  Go to Education Activity Goal: Patient/Family Education Outcome: Progressing Towards Goal 
  
Problem: Falls - Risk of 
Goal: *Absence of Falls Description: Document Marcy Doherty Fall Risk and appropriate interventions in the flowsheet. Outcome: Progressing Towards Goal 
Note: Fall Risk Interventions: 
Mobility Interventions: Bed/chair exit alarm Medication Interventions: Patient to call before getting OOB Elimination Interventions: Bed/chair exit alarm Problem: Patient Education: Go to Patient Education Activity Goal: Patient/Family Education Outcome: Progressing Towards Goal 
  
Problem: Diarrhea (Adult and Pediatrics) Goal: *Absence of diarrhea Outcome: Progressing Towards Goal 
  
Problem: Patient Education: Go to Patient Education Activity Goal: Patient/Family Education Outcome: Progressing Towards Goal 
  
Problem: Pressure Injury - Risk of 
Goal: *Prevention of pressure injury Description: Document Justin Scale and appropriate interventions in the flowsheet. Outcome: Progressing Towards Goal 
Note: Pressure Injury Interventions: 
Sensory Interventions: Assess changes in LOC Moisture Interventions: Absorbent underpads Activity Interventions: Increase time out of bed Mobility Interventions: Float heels, HOB 30 degrees or less Nutrition Interventions: Document food/fluid/supplement intake Friction and Shear Interventions: Apply protective barrier, creams and emollients Problem: Patient Education: Go to Patient Education Activity Goal: Patient/Family Education Outcome: Progressing Towards Goal

## 2020-11-12 NOTE — TELEPHONE ENCOUNTER
11/12/2020 Cardiac Rehab: Mr. Bonny Carlisle had TAVR on 10/28/2020 and he would like to participate in CRP, but he wants to see how Home Health, PT/OT will go first. Per recent ED note; 11/8/2020: \"Home health says his condition is worsening to the point where he can no longer stand and walk on his own. He will need placement in a rehab facility\". At this time he does not seem to be conditioned enough to handle CRP.  Junior Maldonado

## 2020-11-12 NOTE — PROGRESS NOTES
Problem: Self Care Deficits Care Plan (Adult) Goal: *Acute Goals and Plan of Care (Insert Text) Description:  
FUNCTIONAL STATUS PRIOR TO ADMISSION: Patient was modified independent using a single point cane for functional mobility. Pt lives in [address] at [facility] and reports mod I for ADLs. 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 Pt was receiving [facility] services prior but reports increased weakness, unable to get OOB, toileting in briefs and waiting for [facility] to come to change him. HOME SUPPORT: The patient lived with wife. Occupational Therapy Goals Initiated 11/9/2020 1. Patient will perform ADLs at sink with least restrictive DME with supervision/set-up within 7 day(s). 2.  Patient will perform upper body dressing and lower body dressing with supervision/set-up within 7 day(s). 3.  Patient will perform bathing with minimal assistance/contact guard assist within 7 day(s). 4.  Patient will perform toilet transfers using least restrictive DME with supervision/set-up within 7 day(s). 5.  Patient will perform all aspects of toileting with supervision/set-up within 7 day(s). Outcome: Progressing Towards Goal 
 
OCCUPATIONAL THERAPY TREATMENT Patient: Polly Baumgarten (3 y.o. male) Date: 11/12/2020 Diagnosis: Generalized weakness [R53.1] Generalized weakness [R53.1] <principal problem not specified> Precautions: Fall, Skin, Contact(pacer) Chart, occupational therapy assessment, plan of care, and goals were reviewed. ASSESSMENT Patient continues with skilled OT services and is progressing towards goals. Aaox4, overall mod A for sit<>stand secondary to LUE WB restrictions, min A to supervision for all standing ADLs and bed mobility. Overall the patient was able to  teach back precautions and only needed minimal cues to decrease use of LUE during transfers. Continue to recommend post acute rehab.  Pt is deconditioned but motivated and feel he could tolerate 3 hours of therapy 5 days a week prior to dc home with his family. Current Level of Function Impacting Discharge (ADLs): mod A for sit<>stand, min A for all others Other factors to consider for discharge: wife unable to assist at current level, deconditioned PLAN : 
Patient continues to benefit from skilled intervention to address the above impairments. Continue treatment per established plan of care. to address goals. Recommend with staff: Jennifer Johnson as able, encourage participation in self-care Recommend next OT session: standing grooming, dynamic balance, sit<>stand practice Recommendation for discharge: (in order for the patient to meet his/her long term goals) Therapy up to 5 days/week in SNF setting This discharge recommendation: 
Has been made in collaboration with the attending provider and/or case management IF patient discharges home will need the following DME: TBD post rehab SUBJECTIVE:  
Patient stated I am just really tired from that.  OBJECTIVE DATA SUMMARY:  
Cognitive/Behavioral Status: 
Neurologic State: Alert Orientation Level: Oriented X4 Cognition: Appropriate decision making Perception: Appears intact Perseveration: No perseveration noted Functional Mobility and Transfers for ADLs: 
Bed Mobility: 
Rolling: Supervision Supine to Sit: Supervision Sit to Supine: Supervision Scooting: Additional time;Stand-by assistance Transfers: 
Sit to Stand: Moderate assistance Functional Transfers Bathroom Mobility: Minimum assistance Toilet Transfer : Minimum assistance Tub Transfer: Moderate assistance Bed to Chair: Minimum assistance Balance: 
Sitting: Intact Standing: Impaired Standing - Static: Good Standing - Dynamic : Good ADL Intervention: 
Progressed from seated to standing and bathroom mobility with min A once up but Mod A to sit<>stand from chair height Toileting min A for hygiene and mod A for toilet transfer, couldn't tolerate standing at sink for washing hands due to fatigue Pain: 
None reported Activity Tolerance:  
Good After treatment patient left in no apparent distress:  
Supine in bed, Call bell within reach, and RN notifed COMMUNICATION/COLLABORATION:  
The patients plan of care was discussed with: Physical therapist and Registered nurse. Paty Belling Time Calculation: 40 mins

## 2020-11-13 NOTE — PROGRESS NOTES
Problem: Diabetes Self-Management Goal: *Disease process and treatment process Description: Define diabetes and identify own type of diabetes; list 3 options for treating diabetes. Outcome: Progressing Towards Goal 
Goal: *Incorporating nutritional management into lifestyle Description: Describe effect of type, amount and timing of food on blood glucose; list 3 methods for planning meals. Outcome: Progressing Towards Goal 
Goal: *Incorporating physical activity into lifestyle Description: State effect of exercise on blood glucose levels. Outcome: Progressing Towards Goal 
Goal: *Developing strategies to promote health/change behavior Description: Define the ABC's of diabetes; identify appropriate screenings, schedule and personal plan for screenings. Outcome: Progressing Towards Goal 
Goal: *Using medications safely Description: State effect of diabetes medications on diabetes; name diabetes medication taking, action and side effects. Outcome: Progressing Towards Goal 
Goal: *Monitoring blood glucose, interpreting and using results Description: Identify recommended blood glucose targets  and personal targets. Outcome: Progressing Towards Goal 
Goal: *Prevention, detection, treatment of acute complications Description: List symptoms of hyper- and hypoglycemia; describe how to treat low blood sugar and actions for lowering  high blood glucose level. Outcome: Progressing Towards Goal 
Goal: *Prevention, detection and treatment of chronic complications Description: Define the natural course of diabetes and describe the relationship of blood glucose levels to long term complications of diabetes. Outcome: Progressing Towards Goal 
Goal: *Developing strategies to address psychosocial issues Description: Describe feelings about living with diabetes; identify support needed and support network Outcome: Progressing Towards Goal 
Goal: *Insulin pump training Outcome: Progressing Towards Goal 
 Goal: *Sick day guidelines Outcome: Progressing Towards Goal 
Goal: *Patient Specific Goal (EDIT GOAL, INSERT TEXT) Outcome: Progressing Towards Goal 
  
Problem: Patient Education: Go to Patient Education Activity Goal: Patient/Family Education Outcome: Progressing Towards Goal 
  
Problem: Risk for Spread of Infection Goal: Prevent transmission of infectious organism to others Description: Prevent the transmission of infectious organisms to other patients, staff members, and visitors. Outcome: Progressing Towards Goal 
  
Problem: Patient Education:  Go to Education Activity Goal: Patient/Family Education Outcome: Progressing Towards Goal 
  
Problem: Falls - Risk of 
Goal: *Absence of Falls Description: Document Juana Canales Fall Risk and appropriate interventions in the flowsheet. Outcome: Progressing Towards Goal 
Note: Fall Risk Interventions: 
Mobility Interventions: Bed/chair exit alarm, Patient to call before getting OOB Medication Interventions: Patient to call before getting OOB, Teach patient to arise slowly, Bed/chair exit alarm Elimination Interventions: Bed/chair exit alarm, Call light in reach, Toilet paper/wipes in reach, Toileting schedule/hourly rounds, Urinal in reach, Patient to call for help with toileting needs Problem: Patient Education: Go to Patient Education Activity Goal: Patient/Family Education Outcome: Progressing Towards Goal 
  
Problem: Diarrhea (Adult and Pediatrics) Goal: *Absence of diarrhea Outcome: Progressing Towards Goal 
Goal: *PALLIATIVE CARE:  Absence of diarrhea Outcome: Progressing Towards Goal 
  
Problem: Patient Education: Go to Patient Education Activity Goal: Patient/Family Education Outcome: Progressing Towards Goal 
  
Problem: Patient Education: Go to Patient Education Activity Goal: Patient/Family Education Outcome: Progressing Towards Goal 
  
Problem: Patient Education: Go to Patient Education Activity Goal: Patient/Family Education Outcome: Progressing Towards Goal 
  
Problem: Pressure Injury - Risk of 
Goal: *Prevention of pressure injury Description: Document Justin Scale and appropriate interventions in the flowsheet. Outcome: Progressing Towards Goal 
  
Problem: Patient Education: Go to Patient Education Activity Goal: Patient/Family Education Outcome: Progressing Towards Goal

## 2020-11-13 NOTE — DISCHARGE SUMMARY
Inpatient hospitalist discharge summary Brief Overview PATIENT ID: Qamar Hamilton MRN: [id number] YOB: 1935 Admitting Provider: Jazmín Caban MD 
 
Discharging Provider: Marshal Brink MD  
To contact this individual call [phone] and ask the  to page. If unavailable ask to be transferred the Adult Hospitalist Department. PCP at discharge: Cici Alex, [address] Admission date: 11/8/2020  Date of Discharge: 11/13/20 Chief complaint:  
Chief Complaint Patient presents with  Diarrhea Patient Active Problem List  
Diagnosis Code  Type 2 diabetes mellitus without complication, without long-term current use of insulin ([facility]) E11.9  
 Essential hypertension I10  
 Hypercholesterolemia E78.00  Coronary artery disease involving native coronary artery of native heart without angina pectoris I25.10  
 S/P CABG (coronary artery bypass graft) Z95.1  Severe aortic stenosis I35.0  Aortic systolic murmur on examination I35.8  Benign prostatic hyperplasia with lower urinary tract symptoms N40.1  Insomnia G47.00 [facility] Former smoker C52.825  ACP (advance care planning) Z71.89  
 Gait instability R26.81  
 Age-related cataract of both eyes H25.9  Recurrent depression ([address] F33.9  On angiotensin receptor blockers (ARB) G23.644  Gastroesophageal reflux disease without esophagitis K21.9  Cough R05  Pressure injury of buttock, stage 1 L89.301  Incontinence of feces R15.9  Type 2 diabetes with nephropathy ([facility]) E11.21  
 Decubitus ulcer of left buttock, stage 2 ([address] D73.736  Syncope and collapse R55  Syncope R55  
 (HFpEF) heart failure with preserved ejection fraction (HCC) I50.30  Aortic stenosis I35.0  
 Pacemaker Z95.0  Third degree AV block (HCC) I44.2  
 S/P TAVR (transcatheter aortic valve replacement) Z95.2  Generalized weakness R53.1 Discharge diagnosis, hospital course/plan: As per initial admission summary Dre Mishra a 82 y. o. male who presents with generalized weakness  
  
85 WM h/o DM, HTN, AS s/p TAVR and pacemaker 2 weeks ago. Pt was discharged to home ( independent living of [facility]) on 11/4 . [facility] was sending to ER today CC of diarrhea and generalized weakness. Per note prophylactic antibiotics keflex after pacemaker. He developed diarrhea at home, loose stools, 3-4 times day.  Since he has had diarrhea. His family has reported concerns that he is weak but pt denies feeling any focal weakness. He says he hasn't been able to work out or work with physical therapy due to covid. Pt said so far no more diarrhea today. Jose Guadalupe Bai visited him today and said his condition is worsening to the pont where he can no longer stand and walk on his own. Denies fevers. Denies N/V, abdominal pain # Generalized weakness ,  
Could be from acute deconditioning after his recent hospitalization. At baseline he walks with a cane - CT head No acute intracranial hemorrhage, mass or infarct. - UA negative  
- PT/OT: Recommend IPR. Accepted  By facility . Will be discharged today  
  
# Diarrhea, resolved - C diff negative  
- symptomatic management  
  
# S/P TAVR and pacemaker: CS consulted. Patient recently discharged and not on anticoagulation. D/W with Dr. Chet Pittman previously CT sugery ok discharging patient. Follow up with ct surgery with repeat echo in a month  
  
# DM: continue glipizide, home meds  
  
# HTN:  
-Uptitrate losartan, and amlodipine  
-Continue metoprolol.  
-Hold HTCZ due to concerns of dehydartion.  
-Better with IVF overnight and orthostatic vitals on admission. Also need to follow up with cardiology  
  
# Hypokalemia: resolved  
  
# BPH: continue proscar and doxazosin  
  
# Rt leg swelling: doppler negative for DVT 
  
# Anemia Stable On the date of discharge, diagnostic face to face encounter was performed. Patient was hemodynamically stable, offering no new complaints. Denies any shortness of breath at rest, no fevers or chills, no diarrhea or constipation. Patient is agreeable for discharge. Patient understood and verbalized the understanding of the discharge plan. Patient was advised to seek medical help/ care or return to ED, if symptoms recur, worsen or new symptoms develop. Discharge Disposition: LWBS after Triage Discharge activity: 
Activity as tolerated Code status at discharge: 
Full Code Outpatient follow up: 
Please follow up with your PCP in one week In addition follow up with cardiology and cardiothoracic surgery Future appointments- 
Future Appointments Date Time Provider Lowell Serena 12/2/2020 11:00 AM Ana Ponce MD [facility] BS AMB  
12/2/2020  2:30 PM ECHO LAB 1 [facility]  
12/23/2020 11:00 AM MD GEOVANI Plunkett  AMB  
2/11/2021  9:30 AM Sushila Spaulding DO [facility] BS AMB Follow-up Information Follow up With Specialties Details Why Contact Info Sushila Spaulding DO Internal Medicine In 1 week  [address] [facility] [place] [address] 
[phone] Sherman Valdivia MD Cardiology In 1 week  [address] [facility] [address] [facility] 
[phone] Samy Heck MD Cardiothoracic Surgery In 1 week  [address] [facility] [address] [facility] [place] [facility] 
[phone] Operative procedures performed: 
 
 
Consults: IP CONSULT TO CARDIAC SURGERY Procedures: * No surgery found * Diet:  DIET DIABETIC CONSISTENT CARB Pertinent test results: 
Xr Chest Pa Lat Result Date: 10/27/2020 Exam:  2 view chest Indication: Preop heart surgery. COMPARISON: 10/19/2020 PA and lateral views demonstrate patient status post median sternotomy. Heart size is normal. There is mitral annulus calcification. The lungs are clear acute process there is mild scarring at the left lung base. There are degenerative changes of the thoracic spine. IMPRESSION: 1. No acute process Ct Head Wo Cont Result Date: 11/8/2020 INDICATION: weakness Exam: Noncontrast CT of the brain is performed with 5 mm collimation. CT dose reduction was achieved with the use of the standardized protocol tailored for this examination and automatic exposure control for dose modulation. Adaptive statistical iterative reconstruction (ASIR) was utilized. Direct comparison is made to prior CT dated October 18, 2020. FINDINGS: There is mild, age-appropriate diffuse cortical atrophy. There is no acute intracranial hemorrhage, mass, mass effect or herniation. Ventricular system is normal. The gray-white matter differentiation is well-preserved. The mastoid air cells are well pneumatized. The visualized paranasal sinuses are normal.  
 
IMPRESSION: No acute intracranial hemorrhage, mass or infarct. Ct Head Wo Cont Result Date: 10/18/2020 INDICATION: Critical aortic stenosis/syncope/head injury Exam: Noncontrast CT of the brain is performed with 5 mm collimation. CT dose reduction was achieved with the use of the standardized protocol tailored for this examination and automatic exposure control for dose modulation. Adaptive statistical iterative reconstruction (ASIR) was utilized. FINDINGS: There is no acute intracranial hemorrhage, mass, mass effect or herniation. Ventricular system is normal. The gray-white matter differentiation is well-preserved. The mastoid air cells are well pneumatized. The visualized paranasal sinuses are normal.  
 
IMPRESSION: No acute intracranial hemorrhage, mass or infarct. Cta Chest W Or W Wo Cont Result Date: 10/24/2020 *PRELIMINARY REPORT* Study performed for preoperative evaluation. Atherosclerotic vascular disease.  Mild dilatation ascending thoracic aorta as seen on recent CT chest. No abdominal aortic aneurysm or dissection. No acute process Preliminary report was provided by Dr. James Wilkins, the on-call radiologist, at [address] [place] [address] Rd Final report to follow. *END PRELIMINARY REPORT* INDICATION:  Preop evaluation for aortic valve replacement EXAM:  CT angiography chest, abdomen, and pelvis WITH  CONTRAST COMPARISON:  CT chest 10/20/2020 TECHNIQUE:  Thin collimation axial images were obtained through the chest, abdomen, and pelvis with IV contrast administration during the arterial phase. Multiplanar and 3-D reconstructions were obtained. Oral contrast was not administered. CT dose reduction was achieved through use of a standardized protocol tailored for this examination and automatic exposure control for dose modulation. FINDINGS: Arteriogram: Thoracic aorta: Coarse aortic annular and valvular calcifications. Aneurysmal dilatation of the ascending aorta measuring 4.1 cm. Thoracic aorta is patent with mild atherosclerosis but no significant stenosis. Great vessel origins are patent with moderate atherosclerosis. Proximal left subclavian artery stent is noted. Abdominal aorta: Abdominal aorta is patent and normal in caliber with mild to moderate atherosclerosis. No significant abdominal aortic stenosis. Celiac artery is patent with moderate to severe ostial stenosis. Superior mesenteric artery is patent with severe proximal atherosclerosis. Inferior mesenteric artery is patent with likely ostial stenosis. Bilateral renal arteries are patent with mild to moderate right and moderate left proximal atherosclerosis Right common iliac artery is patent with sequelae of proximal endovascular stent placement and minimal luminal diameter of 6 mm. Left common iliac artery is patent with sequelae of endovascular stent placement and minimal luminal diameter of 6 mm. Bilateral internal iliac arteries are patent  and normal in caliber with moderate atherosclerosis. Right external iliac artery is patent with minimal luminal diameter of 8 mm. Left external iliac artery is patent with no significant stenosis and sequelae of proximal endovascular stent placement. Minimal luminal diameter of the left external iliac artery of approximately 7 mm. Bilateral common femoral arteries are patent with mild atherosclerosis and minimal luminal diameter of 8 mm. Moderate atherosclerosis in the distal left superficial femoral artery. Chest: LUNGS: No focal mass or consolidation. Bilateral calcified granulomas. Minimal left basilar subsegmental atelectasis. LYMPH NODES: No thoracic lymphadenopathy by CT criteria. Calcified bilateral hilar and mediastinal lymph nodes. PLEURAL FLUID: No pleural effusion. PERICARDIAL FLUID: No pericardial effusion. THYROID: Subcentimeter low-density nodules OTHER: Coarse mitral annular calcifications Abdomen: Arterial phase of imaging limits evaluation of the solid abdominal organs. LIVER: Nonspecific arterially enhancing lesion in segment 8 measuring 8 mm (series 3, image 84). No other focal liver abnormality. No biliary ductal dilatation. GALLBLADDER: Surgically absent SPLEEN: Small calcified granuloma PANCREAS: No mass or ductal dilatation. ADRENALS: Right is normal. Indeterminant 15 mm left adrenal nodule KIDNEYS/URETERS: Symmetric nephrograms with a couple low-density lesions too small to characterize and indeterminate left interpolar intermediate density lesion measuring 1 cm (series 3, image 130). No hydronephrosis PERITONEUM: No abdominal lymphadenopathy or ascites. COLON: No dilatation or wall thickening. APPENDIX: Unremarkable. SMALL BOWEL: No dilatation or wall thickening. STOMACH: Unremarkable. Pelvis:  PELVIS: No pelvic lymphadenopathy or free fluid.  Mild circumferential bladder wall thickening with small bladder diverticula BONES: Moderate degenerative changes in the lumbar spine ADDITIONAL COMMENTS: N/A  
 
 Impression: Minimal aneurysmal dilatation of the ascending aorta. No significant aortic stenosis. No significant iliac artery stenosis. Moderate to severe mesenteric arterial atherosclerosis. Nonspecific subcentimeter arterially enhancing lesion in the right hepatic lobe. Indeterminate 1 cm left renal lesion. Indeterminate 15 mm left adrenal nodule. Circumferential bladder wall thickening with small bladder diverticula Cta Chest W Or W Wo Cont Result Date: 10/20/2020 INDICATION:  Concern for pulmonary embolism, tachycardia EXAM:  CTA Chest with contrast for Pulmonary Embolus COMPARISON:  None TECHNIQUE:  Following the uneventful intravenous administration of Iodinated contrast, thin helical axial images were obtained through the chest. 3D image postprocessing was performed. Coronal and sagittal reformatted images were obtained. CT dose reduction was achieved through use of a standardized protocol tailored for this examination and automatic exposure control for dose modulation. FINDINGS: No evidence for acute pulmonary embolism. Minimal aneurysmal dilatation of the ascending aorta measuring 4.1 cm. Left subclavian artery origin stent is noted Subcentimeter low-density thyroid nodules measuring up to 8 mm. No pleural or pericardial effusion. No thoracic lymphadenopathy. The central airways are patent. Lingular and dependent bilateral lower lobe lung opacities favor subsegmental atelectasis and/or scarring. Right middle lobe calcified granuloma. Indeterminate 15 mm left adrenal nodule. Other smaller left adrenal nodule favors benign lipid rich adenoma Mild to moderate degenerative changes in the spine without evidence for acute abnormality IMPRESSION: No evidence for acute pulmonary embolism. Minimal aneurysmal dilatation of the ascending aorta. Bibasilar lung opacities favor subsegmental atelectasis. Indeterminate 15 mm left adrenal nodule. Subcentimeter low density thyroid nodules. Cta Abd Pelv W Wo Cont Result Date: 10/24/2020 *PRELIMINARY REPORT* Study performed for preoperative evaluation. Atherosclerotic vascular disease. Mild dilatation ascending thoracic aorta as seen on recent CT chest. No abdominal aortic aneurysm or dissection. No acute process Preliminary report was provided by Dr. Viola Estrada, the on-call radiologist, at [address] [place] [address] Rd Final report to follow. *END PRELIMINARY REPORT* INDICATION:  Preop evaluation for aortic valve replacement EXAM:  CT angiography chest, abdomen, and pelvis WITH  CONTRAST COMPARISON:  CT chest 10/20/2020 TECHNIQUE:  Thin collimation axial images were obtained through the chest, abdomen, and pelvis with IV contrast administration during the arterial phase. Multiplanar and 3-D reconstructions were obtained. Oral contrast was not administered. CT dose reduction was achieved through use of a standardized protocol tailored for this examination and automatic exposure control for dose modulation. FINDINGS: Arteriogram: Thoracic aorta: Coarse aortic annular and valvular calcifications. Aneurysmal dilatation of the ascending aorta measuring 4.1 cm. Thoracic aorta is patent with mild atherosclerosis but no significant stenosis. Great vessel origins are patent with moderate atherosclerosis. Proximal left subclavian artery stent is noted. Abdominal aorta: Abdominal aorta is patent and normal in caliber with mild to moderate atherosclerosis. No significant abdominal aortic stenosis. Celiac artery is patent with moderate to severe ostial stenosis. Superior mesenteric artery is patent with severe proximal atherosclerosis. Inferior mesenteric artery is patent with likely ostial stenosis.  Bilateral renal arteries are patent with mild to moderate right and moderate left proximal atherosclerosis Right common iliac artery is patent with sequelae of proximal endovascular stent placement and minimal luminal diameter of 6 mm. Left common iliac artery is patent with sequelae of endovascular stent placement and minimal luminal diameter of 6 mm. Bilateral internal iliac arteries are patent  and normal in caliber with moderate atherosclerosis. Right external iliac artery is patent with minimal luminal diameter of 8 mm. Left external iliac artery is patent with no significant stenosis and sequelae of proximal endovascular stent placement. Minimal luminal diameter of the left external iliac artery of approximately 7 mm. Bilateral common femoral arteries are patent with mild atherosclerosis and minimal luminal diameter of 8 mm. Moderate atherosclerosis in the distal left superficial femoral artery. Chest: LUNGS: No focal mass or consolidation. Bilateral calcified granulomas. Minimal left basilar subsegmental atelectasis. LYMPH NODES: No thoracic lymphadenopathy by CT criteria. Calcified bilateral hilar and mediastinal lymph nodes. PLEURAL FLUID: No pleural effusion. PERICARDIAL FLUID: No pericardial effusion. THYROID: Subcentimeter low-density nodules OTHER: Coarse mitral annular calcifications Abdomen: Arterial phase of imaging limits evaluation of the solid abdominal organs. LIVER: Nonspecific arterially enhancing lesion in segment 8 measuring 8 mm (series 3, image 84). No other focal liver abnormality. No biliary ductal dilatation. GALLBLADDER: Surgically absent SPLEEN: Small calcified granuloma PANCREAS: No mass or ductal dilatation. ADRENALS: Right is normal. Indeterminant 15 mm left adrenal nodule KIDNEYS/URETERS: Symmetric nephrograms with a couple low-density lesions too small to characterize and indeterminate left interpolar intermediate density lesion measuring 1 cm (series 3, image 130). No hydronephrosis PERITONEUM: No abdominal lymphadenopathy or ascites. COLON: No dilatation or wall thickening. APPENDIX: Unremarkable. SMALL BOWEL: No dilatation or wall thickening. STOMACH: Unremarkable.  Pelvis:  PELVIS: No pelvic lymphadenopathy or free fluid. Mild circumferential bladder wall thickening with small bladder diverticula BONES: Moderate degenerative changes in the lumbar spine ADDITIONAL COMMENTS: N/A Impression: Minimal aneurysmal dilatation of the ascending aorta. No significant aortic stenosis. No significant iliac artery stenosis. Moderate to severe mesenteric arterial atherosclerosis. Nonspecific subcentimeter arterially enhancing lesion in the right hepatic lobe. Indeterminate 1 cm left renal lesion. Indeterminate 15 mm left adrenal nodule. Circumferential bladder wall thickening with small bladder diverticula Us Retroperitoneum Comp Result Date: 10/23/2020 INDICATION:  UTI EXAM:  RENAL ULTRASOUND COMPARISON:  None TECHNIQUE: Routine ultrasound images of the kidneys and retroperitoneum were obtained. FINDINGS: Right kidney: 12.1 cm in length. Normal echogenicity. No nephrolithiasis or hydronephrosis. Left kidney:  11.3 cm in length. Normal echogenicity. No nephrolithiasis or hydronephrosis. Abdominal aorta/common iliac arteries/IVC:  Visualized portions are normal.  Urinary Bladder: Incompletely distended. Mild prostate enlargement. Other:  N/A. IMPRESSION: No significant abnormality or acute process. Xr Chest [facility] Result Date: 11/8/2020 EXAM:  XR CHEST PORT INDICATION: Weakness and dizziness COMPARISON: 11/3/2020 TECHNIQUE: Portable AP upright chest view at 1257 hours FINDINGS: The left chest ICD and wires are stable. Sternal wires are present. Cardiac monitoring wires overlie the thorax. A prosthetic aortic valve is again noted. The cardiomediastinal contours are stable. The pulmonary vasculature is within normal limits. There is stable mild left basilar opacity. The right lung and pleural spaces are clear. There is no pneumothorax. The bones and upper abdomen are stable. IMPRESSION: Stable mild left basilar opacity. Clear right lung. Xr Chest [facility] Result Date: 11/3/2020 EXAM:  XR CHEST PORT INDICATION:  s/p TAVR, oxygen requirement COMPARISON:  10/31/2020 FINDINGS: A portable AP radiograph of the chest was obtained at 747 hours. Pacer leads are unchanged. Patient is status post TAVR. Blossom Kerwin There is no pneumothorax. There is slight atelectasis left base. Blossom Long Heart size is stable. .  There is slight scoliosis thoracic spine. IMPRESSION: There is slight left basilar atelectasis. Right lung is clear Xr Chest [facility] Result Date: 10/31/2020 Clinical indication: Postop heart. Portable AP semiupright view of the chest obtained, comparison October 30. Mild increase in vascular congestion is stable. IMPRESSION: No change. Xr Chest [facility] Result Date: 10/30/2020 INDICATION:  dyspnea EXAM: Portable chest 1929 . Comparison earlier same day. FINDINGS: There is no significant change in appearance the lungs. Hazy opacification at the left base unchanged. Cardiomediastinal silhouette is unchanged. No pneumothorax. Left chest AICD unchanged. IMPRESSION: 1. No interval change Xr Chest [facility] Result Date: 10/30/2020 EXAM: Portable CXR. 1216 hours. COMPARISON: 0448 hours  INDICATION: Pacemaker placement. FINDINGS: There is no pneumothorax. Pacemaker has been placed via the left subclavian vein. Temporary pacemaker lead has been removed with stable right IJ sheath. The lungs show unchanged nonspecific left base haziness. Heart is normal in size. There is no pulmonary edema. There is no previous median sternotomy. TAVR is again noted. IMPRESSION: No pneumothorax. Xr Chest [facility] Result Date: 10/30/2020 EXAM: Portable CXR. 0448 hours. COMPARISON: 10/29/2020. INDICATION: postop heart FINDINGS: There is interval placement of a right IJ temporary unipolar pacemaker. TAVR remains. The lungs show no acute finding. Heart is normal in size. There is no overt pulmonary edema. There is no pneumothorax. IMPRESSION: No acute finding or significant cardiopulmonary change. Xr Chest [facility] Result Date: 10/29/2020 PORTABLE CHEST RADIOGRAPH/S: 10/29/2020 5:26 AM INDICATION: Postop heart. COMPARISON: 10/28/2020, 10/27/2020. TECHNIQUE: Portable frontal semiupright radiograph/s of the chest. FINDINGS: The lungs are hypoinflated. Left basilar atelectasis and pulmonary vascular crowding are associated. The left hemidiaphragm is eventrated or elevated. The central airways are patent. No pneumothorax and no large pleural effusion. Post CABG and transcatheter aortic valve replacement. Mitral annular calcifications are moderate. IMPRESSION: No acute disease. Shallow volumes and atelectasis. Xr Chest [facility] Result Date: 10/28/2020 INDICATION:  postop heart EXAM: Portable chest 1247 hours. Comparison 10/27/2020 FINDINGS: Endotracheal tube overlies the trachea at the level of the clavicles. There is a left neck central venous catheter. This appears to be a sheath with a secondary catheter projecting over the right atrium. Cardiac silhouette is not enlarged. Patient is post median sternotomy. Patient is post aortic stent grafting. Small left pleural effusion. Mild interstitial prominence unchanged with no pneumothorax. IMPRESSION: 1. Lines and tubes as detailed above small left pleural effusion Xr Chest [facility] Result Date: 10/19/2020 Indication: Abnormal breath sounds Comparison: None Portable exam of the chest obtained at 1524 demonstrates normal heart size. The patient is status post median sternotomy. Blunting of the left costophrenic angle is noted which may represent a small left effusion or chronic pleural thickening. Minimal left basilar atelectasis is noted. Right lung is clear. The osseous structures are unremarkable. Impression: Potential small left effusion versus chronic pleural thickening. Minimal left basilar atelectasis. Nc Xr [employer] Service Result Date: 10/29/2020 Fluoroscopy was utilized. IMPRESSION:  FLUOROSCOPY WAS USED. Fluoro Dose:  3687 mGy. vk 
 
 
Recent Results (from the past 168 hour(s)) CBC WITH AUTOMATED DIFF Collection Time: 11/07/20 11:26 AM  
Result Value Ref Range WBC 9.2 4.1 - 11.1 K/uL  
 RBC 3.24 (L) 4.10 - 5.70 M/uL HGB 9.1 (L) 12.1 - 17.0 g/dL HCT 28.2 (L) 36.6 - 50.3 % MCV 87.0 80.0 - 99.0 FL  
 MCH 28.1 26.0 - 34.0 PG  
 MCHC 32.3 30.0 - 36.5 g/dL  
 RDW 14.7 (H) 11.5 - 14.5 % PLATELET 114 (L) 056 - 400 K/uL MPV 12.8 8.9 - 12.9 FL  
 NRBC 0.0 0  WBC ABSOLUTE NRBC 0.00 0.00 - 0.01 K/uL NEUTROPHILS 77 (H) 32 - 75 % LYMPHOCYTES 12 12 - 49 % MONOCYTES 8 5 - 13 % EOSINOPHILS 2 0 - 7 % BASOPHILS 0 0 - 1 % IMMATURE GRANULOCYTES 1 (H) 0.0 - 0.5 % ABS. NEUTROPHILS 7.1 1.8 - 8.0 K/UL  
 ABS. LYMPHOCYTES 1.1 0.8 - 3.5 K/UL  
 ABS. MONOCYTES 0.8 0.0 - 1.0 K/UL  
 ABS. EOSINOPHILS 0.2 0.0 - 0.4 K/UL  
 ABS. BASOPHILS 0.0 0.0 - 0.1 K/UL  
 ABS. IMM. GRANS. 0.1 (H) 0.00 - 0.04 K/UL  
 DF AUTOMATED METABOLIC PANEL, COMPREHENSIVE Collection Time: 11/07/20 11:26 AM  
Result Value Ref Range Sodium 139 136 - 145 mmol/L Potassium 3.3 (L) 3.5 - 5.1 mmol/L Chloride 103 97 - 108 mmol/L  
 CO2 32 21 - 32 mmol/L Anion gap 4 (L) 5 - 15 mmol/L Glucose 217 (H) 65 - 100 mg/dL BUN 40 (H) 6 - 20 MG/DL Creatinine 1.13 0.70 - 1.30 MG/DL  
 BUN/Creatinine ratio 35 (H) 12 - 20 GFR est AA >60 >60 ml/min/1.73m2 GFR est non-AA >60 >60 ml/min/1.73m2 Calcium 8.4 (L) 8.5 - 10.1 MG/DL Bilirubin, total 0.6 0.2 - 1.0 MG/DL  
 ALT (SGPT) 16 12 - 78 U/L  
 AST (SGOT) 17 15 - 37 U/L Alk. phosphatase 130 (H) 45 - 117 U/L Protein, total 6.7 6.4 - 8.2 g/dL Albumin 2.9 (L) 3.5 - 5.0 g/dL Globulin 3.8 2.0 - 4.0 g/dL A-G Ratio 0.8 (L) 1.1 - 2.2 METABOLIC PANEL, COMPREHENSIVE Collection Time: 11/08/20 11:25 AM  
Result Value Ref Range Sodium 138 136 - 145 mmol/L  Potassium 3.4 (L) 3.5 - 5.1 mmol/L  
 Chloride 102 97 - 108 mmol/L  
 CO2 30 21 - 32 mmol/L Anion gap 6 5 - 15 mmol/L Glucose 221 (H) 65 - 100 mg/dL BUN 40 (H) 6 - 20 MG/DL Creatinine 1.09 0.70 - 1.30 MG/DL  
 BUN/Creatinine ratio 37 (H) 12 - 20 GFR est AA >60 >60 ml/min/1.73m2 GFR est non-AA >60 >60 ml/min/1.73m2 Calcium 8.5 8.5 - 10.1 MG/DL Bilirubin, total 0.6 0.2 - 1.0 MG/DL  
 ALT (SGPT) 16 12 - 78 U/L  
 AST (SGOT) 17 15 - 37 U/L Alk. phosphatase 133 (H) 45 - 117 U/L Protein, total 6.3 (L) 6.4 - 8.2 g/dL Albumin 2.9 (L) 3.5 - 5.0 g/dL Globulin 3.4 2.0 - 4.0 g/dL A-G Ratio 0.9 (L) 1.1 - 2.2 SAMPLES BEING HELD Collection Time: 11/08/20 11:26 AM  
Result Value Ref Range SAMPLES BEING HELD RED,FAVIAN   
 COMMENT Add-on orders for these samples will be processed based on acceptable specimen integrity and analyte stability, which may vary by analyte. URINALYSIS W/MICROSCOPIC Collection Time: 11/08/20  4:11 PM  
Result Value Ref Range Color YELLOW/STRAW Appearance CLEAR CLEAR Specific gravity 1.019 1.003 - 1.030    
 pH (UA) 5.0 5.0 - 8.0 Protein TRACE (A) NEG mg/dL Glucose Negative NEG mg/dL Ketone Negative NEG mg/dL Bilirubin Negative NEG Blood Negative NEG Urobilinogen 0.2 0.2 - 1.0 EU/dL Nitrites Negative NEG Leukocyte Esterase Negative NEG    
 WBC 0-4 0 - 4 /hpf  
 RBC 0-5 0 - 5 /hpf Epithelial cells FEW FEW /lpf Bacteria Negative NEG /hpf Yeast PRESENT (A) NEG URINE CULTURE HOLD SAMPLE Collection Time: 11/08/20  4:11 PM  
 Specimen: Serum Result Value Ref Range Urine culture hold Urine on hold in Microbiology dept for 2 days. If unpreserved urine is submitted, it cannot be used for addtional testing after 24 hours, recollection will be required. SARS-COV-2 Collection Time: 11/08/20  4:12 PM  
Result Value Ref Range  Specimen source Nasopharyngeal    
 Specimen source Nasopharyngeal    
 COVID-19 rapid test Not detected NOTD Specimen type NP Swab GLUCOSE, POC Collection Time: 11/08/20 10:17 PM  
Result Value Ref Range Glucose (POC) 143 (H) 65 - 100 mg/dL Performed by Michelle Frank SARS-COV-2 Collection Time: 11/08/20 11:49 PM  
Result Value Ref Range Specimen source Nasopharyngeal    
 SARS-CoV-2 Not detected NOTD Specimen source NP SWAB Specimen type NP Swab Health status Symptomatic Testing CBC WITH AUTOMATED DIFF Collection Time: 11/09/20  5:04 AM  
Result Value Ref Range WBC 10.8 4.1 - 11.1 K/uL  
 RBC 3.20 (L) 4.10 - 5.70 M/uL HGB 8.9 (L) 12.1 - 17.0 g/dL HCT 27.7 (L) 36.6 - 50.3 % MCV 86.6 80.0 - 99.0 FL  
 MCH 27.8 26.0 - 34.0 PG  
 MCHC 32.1 30.0 - 36.5 g/dL  
 RDW 14.6 (H) 11.5 - 14.5 % PLATELET 729 (L) 774 - 400 K/uL NRBC 0.0 0  WBC ABSOLUTE NRBC 0.00 0.00 - 0.01 K/uL NEUTROPHILS 81 (H) 32 - 75 % LYMPHOCYTES 9 (L) 12 - 49 % MONOCYTES 8 5 - 13 % EOSINOPHILS 2 0 - 7 % BASOPHILS 0 0 - 1 % IMMATURE GRANULOCYTES 1 (H) 0.0 - 0.5 % ABS. NEUTROPHILS 8.7 (H) 1.8 - 8.0 K/UL  
 ABS. LYMPHOCYTES 1.0 0.8 - 3.5 K/UL  
 ABS. MONOCYTES 0.8 0.0 - 1.0 K/UL  
 ABS. EOSINOPHILS 0.2 0.0 - 0.4 K/UL  
 ABS. BASOPHILS 0.0 0.0 - 0.1 K/UL  
 ABS. IMM. GRANS. 0.1 (H) 0.00 - 0.04 K/UL  
 DF AUTOMATED METABOLIC PANEL, COMPREHENSIVE Collection Time: 11/09/20  5:04 AM  
Result Value Ref Range Sodium 139 136 - 145 mmol/L Potassium 4.3 3.5 - 5.1 mmol/L Chloride 108 97 - 108 mmol/L  
 CO2 26 21 - 32 mmol/L Anion gap 5 5 - 15 mmol/L Glucose 142 (H) 65 - 100 mg/dL BUN 29 (H) 6 - 20 MG/DL Creatinine 0.77 0.70 - 1.30 MG/DL  
 BUN/Creatinine ratio 38 (H) 12 - 20 GFR est AA >60 >60 ml/min/1.73m2 GFR est non-AA >60 >60 ml/min/1.73m2 Calcium 8.2 (L) 8.5 - 10.1 MG/DL  Bilirubin, total 0.5 0.2 - 1.0 MG/DL  
 ALT (SGPT) 16 12 - 78 U/L  
 AST (SGOT) 20 15 - 37 U/L  
 Alk. phosphatase 127 (H) 45 - 117 U/L Protein, total 6.1 (L) 6.4 - 8.2 g/dL Albumin 2.6 (L) 3.5 - 5.0 g/dL Globulin 3.5 2.0 - 4.0 g/dL A-G Ratio 0.7 (L) 1.1 - 2.2 MAGNESIUM Collection Time: 11/09/20  5:04 AM  
Result Value Ref Range Magnesium 2.3 1.6 - 2.4 mg/dL PHOSPHORUS Collection Time: 11/09/20  5:04 AM  
Result Value Ref Range Phosphorus 2.5 (L) 2.6 - 4.7 MG/DL  
C. DIFFICILE AG & TOXIN A/B Collection Time: 11/09/20  5:05 AM  
Result Value Ref Range GDH ANTIGEN Negative NEG    
 C. difficile toxin Negative NEG INTERPRETATION NEGATIVE FOR TOXIGENIC C. DIFFICILE NTXCD    
GLUCOSE, POC Collection Time: 11/09/20  8:25 AM  
Result Value Ref Range Glucose (POC) 126 (H) 65 - 100 mg/dL Performed by Gilbert Hutchison (CON) GLUCOSE, POC Collection Time: 11/09/20 11:40 AM  
Result Value Ref Range Glucose (POC) 186 (H) 65 - 100 mg/dL Performed by Gilbert Hutchison (CON) GLUCOSE, POC Collection Time: 11/09/20  4:18 PM  
Result Value Ref Range Glucose (POC) 126 (H) 65 - 100 mg/dL Performed by Igor Thomas RN   
GLUCOSE, POC Collection Time: 11/09/20  9:21 PM  
Result Value Ref Range Glucose (POC) 179 (H) 65 - 100 mg/dL Performed by Joao Perry GLUCOSE, POC Collection Time: 11/10/20  9:01 AM  
Result Value Ref Range Glucose (POC) 126 (H) 65 - 100 mg/dL Performed by Aimee León, POC Collection Time: 11/10/20 11:43 AM  
Result Value Ref Range Glucose (POC) 132 (H) 65 - 100 mg/dL Performed by Ayesha FUENTES   
GLUCOSE, POC Collection Time: 11/10/20  6:12 PM  
Result Value Ref Range Glucose (POC) 185 (H) 65 - 100 mg/dL Performed by Aimee León, POC Collection Time: 11/10/20 10:19 PM  
Result Value Ref Range Glucose (POC) 192 (H) 65 - 100 mg/dL Performed by Cande Ching GLUCOSE, POC Collection Time: 11/11/20  9:26 AM  
Result Value Ref Range Glucose (POC) 159 (H) 65 - 100 mg/dL Performed by Clyde Wang (PCT) GLUCOSE, POC Collection Time: 11/11/20 12:12 PM  
Result Value Ref Range Glucose (POC) 152 (H) 65 - 100 mg/dL Performed by Clyde Wang (PCT) GLUCOSE, POC Collection Time: 11/11/20  5:12 PM  
Result Value Ref Range Glucose (POC) 194 (H) 65 - 100 mg/dL Performed by Bryant Perez (CON) GLUCOSE, POC Collection Time: 11/11/20 10:20 PM  
Result Value Ref Range Glucose (POC) 177 (H) 65 - 100 mg/dL Performed by Evelio Clifford CULTURE, BLOOD, PAIRED Collection Time: 11/11/20 10:51 PM  
 Specimen: Blood Result Value Ref Range Special Requests: NO SPECIAL REQUESTS Culture result: NO GROWTH AFTER 22 HOURS    
GLUCOSE, POC Collection Time: 11/12/20  8:24 AM  
Result Value Ref Range Glucose (POC) 134 (H) 65 - 100 mg/dL Performed by [facility] GLUCOSE, POC Collection Time: 11/12/20 11:38 AM  
Result Value Ref Range Glucose (POC) 143 (H) 65 - 100 mg/dL Performed by [facility] GLUCOSE, POC Collection Time: 11/12/20  4:32 PM  
Result Value Ref Range Glucose (POC) 178 (H) 65 - 100 mg/dL Performed by Jose Juan Mcfadden ECHO ADULT COMPLETE Collection Time: 11/12/20  4:34 PM  
Result Value Ref Range LV Mass .9 88 - 224 g LV Mass AL Index 86.5 49 - 115 g/m2 Left Atrium Minor Axis 2.26 cm  
 LA Vol Index 96.44 16 - 28 ml/m2 LA Vol Index 87.49 16 - 28 ml/m2 LA Vol Index 80.87 16 - 28 ml/m2 OLIVIER/BSA Pk Imtiaz 1.3 cm2/m2 OLIVIER/BSA VTI 1.6 cm2/m2 IVSd 1.16 (A) 0.6 - 1.0 cm LVIDd 4.70 4.2 - 5.9 cm LVIDs 2.95 cm  
 LVOT d 2.65 cm  
 LVPWd 1.06 (A) 0.6 - 1.0 cm  
 LVOT Peak Gradient 4.11 mmHg LVOT .8 mL  
 LVOT Peak Velocity 101.42 cm/s LVOT VTI 27.82 cm Left Atrium Major Axis 4.97 cm  
 LA Volume 211.63 18 - 58 mL  
 LA Area 4C 43.38 cm2 LA Vol 2C 191.98 (A) 18 - 58 mL  
 LA Vol 4C 177.45 (A) 18 - 58 mL Aortic Valve Area by Continuity of Peak Velocity 2.77 cm2 Aortic Valve Area by Continuity of VTI 3.49 cm2  
 AV R PG 34.49 mmHg Aortic Regurgitant Pressure Half-time 368.87 ms  
 AR Max Imtiaz 285.78 cm/s AoV PG 16.37 mmHg Aortic Valve Systolic Mean Gradient 9.69 mmHg Aortic Valve Systolic Peak Velocity 441.66 cm/s AoV VTI 44.03 cm  
 MVA VTI 2.58 cm2 MV Peak Gradient 14.89 mmHg MV Mean Gradient 4.43 mmHg Mitral Valve Pressure Half-time 133.76 ms Mitral Valve Max Velocity 192.88 cm/s Mitral Valve Annulus Velocity Time Integral 59.58 cm  
 MVA (PHT) 1.64 cm2 Pulmonic Valve Systolic Peak Instantaneous Gradient 4.79 mmHg Pulmonic Regurgitant End Max Velocity 109.42 cm/s Tapse 1.31 (A) 1.5 - 2.0 cm Triscuspid Valve Regurgitation Peak Gradient 34.47 mmHg  
 TR Max Velocity 293.57 cm/s Ao Root D 3.57 cm GLUCOSE, POC Collection Time: 11/12/20  9:10 PM  
Result Value Ref Range Glucose (POC) 176 (H) 65 - 100 mg/dL Performed by joiz GLUCOSE, POC Collection Time: 11/13/20  7:32 AM  
Result Value Ref Range Glucose (POC) 127 (H) 65 - 100 mg/dL Performed by joiz GLUCOSE, POC Collection Time: 11/13/20 11:43 AM  
Result Value Ref Range Glucose (POC) 204 (H) 65 - 100 mg/dL Performed by Gwendolyn Love Physical Exam on Discharge: 
 
Discharge condition: fair Vital signs:  
Patient Vitals for the past 12 hrs: 
 Temp Pulse Resp BP SpO2  
11/13/20 1154 98.3 °F (36.8 °C) 68 20 (!) 131/49 98 % 11/13/20 0904  73  (!) 144/49   
11/13/20 0759 98 °F (36.7 °C) 67 20 (!) 122/49 96 % 11/13/20 0434 97.9 °F (36.6 °C) 66 18 (!) 120/40 99 % Visit Vitals BP (!) 131/49 (BP 1 Location: Left arm, BP Patient Position: Sitting) Pulse 68 Temp 98.3 °F (36.8 °C) Resp 20 Ht 6' 3\" (1.905 m) Wt 90.7 kg (200 lb) SpO2 98% BMI 25.00 kg/m² Constitutional:  No acute distress, cooperative, pleasant, appears stable ENT:  Oral mucous dry Resp:  CTA bilaterally. No wheezing/rhonchi/rales. No accessory muscle use CV:  Regular rhythm, normal rate, no murmurs, gallops, rubs GI:  Soft, non distended, non tender. normoactive bowel sounds, no hepatosplenomegaly Musculoskeletal:   edema on right leg Neurologic:  Moves all extremities. AAOx3, CN II-XII reviewed Current Discharge Medication List  
  
START taking these medications Details  
amLODIPine (NORVASC) 5 mg tablet Take 1 Tab by mouth daily. Qty: 30 Tab, Refills: 0 CONTINUE these medications which have CHANGED Details  
losartan (COZAAR) 100 mg tablet Take 1 Tab by mouth daily. Qty: 30 Tab, Refills: 0 CONTINUE these medications which have NOT CHANGED Details  
atorvastatin (LIPITOR) 40 mg tablet Take 1 Tab by mouth nightly for 60 days. Qty: 30 Tab, Refills: 1  
  
aspirin 81 mg chewable tablet Take 1 Tab by mouth daily. Qty: 30 Tab, Refills: 11  
  
acetaminophen (TYLENOL) 325 mg tablet Take 2 Tabs by mouth every four (4) hours as needed for Pain. Qty:    
  
guaiFENesin ER (MUCINEX) 1,200 mg Ta12 ER tablet Take 1 Tab by mouth every twelve (12) hours for 14 days. Qty: 28 Tab, Refills: 0  
  
metoprolol tartrate (LOPRESSOR) 50 mg tablet Take 1 Tab by mouth two (2) times a day for 60 days. Qty: 60 Tab, Refills: 1  
  
senna-docusate (PERICOLACE) 8.6-50 mg per tablet Take 1 Tab by mouth daily as needed for Constipation. doxazosin (CARDURA) 4 mg tablet TAKE 1 TABLET BY MOUTH  DAILY Qty: 90 Tab, Refills: 3 Comments: Requesting 1 year supply  
  
finasteride (PROSCAR) 5 mg tablet TAKE 1 TABLET BY MOUTH  DAILY Qty: 90 Tab, Refills: 3 Comments: Requesting 1 year supply  
  
metFORMIN ER (GLUCOPHAGE XR) 500 mg tablet TAKE 1 TABLET BY MOUTH  TWICE DAILY WITH MEALS Qty: 180 Tab, Refills: 3 Comments: Requesting 1 year supply  
  
glipiZIDE SR (GLUCOTROL XL) 5 mg CR tablet TAKE 1 TABLET BY MOUTH  DAILY Qty: 90 Tab, Refills: 3 Comments: Requesting 1 year supply  
  
sertraline (ZOLOFT) 100 mg tablet TAKE 1 TABLET BY MOUTH  DAILY Qty: 90 Tab, Refills: 1 Associated Diagnoses: Recurrent depression (HCC)  
  
temazepam (RESTORIL) 15 mg capsule TAKE 1 CAPSULE BY MOUTH AT BEDTIME AS NEEDED FOR SLEEP. Qty: 90 Cap, Refills: 1 Comments: Not to exceed 6 additional fills before 09/26/2018 Associated Diagnoses: Insomnia, unspecified type  
  
fluticasone propionate (FLONASE) 50 mcg/actuation nasal spray ADMINISTER 1 Spray IN Both Nostrils two (2) times a day. Qty: 16 g, Refills: 0  
  
menthol-zinc oxide (CALMOSEPTINE) 0.44-20.6 % oint Apply to bilateral buttocks TID  Indications: skin irritation 
Qty: 113 g, Refills: 5 OTHER Hearing evaluation for possible changes in hearing 
Qty: 1 Each, Refills: 0 Associated Diagnoses: Hearing loss, unspecified hearing loss type, unspecified laterality  
  
tiZANidine (ZANAFLEX) 2 mg tablet Take 1 Tab by mouth three (3) times daily as needed for Pain. Qty: 20 Tab, Refills: 0  
  
glucose blood VI test strips (TRUE METRIX GLUCOSE TEST STRIP) strip Check BS BID  Dx: DM  E11.21 Qty: 100 Strip, Refills: 11  
  
magnesium 250 mg tab Take 1 Tab by mouth daily. Qty: 30 Tab, Refills: 2  
  
sodium chloride (OCEAN) 0.65 % nasal squeeze bottle 0.05 mL by Both Nostrils route as needed for Congestion. Qty: 30 mL, Refills: 2 FERROUS FUMARATE/VIT BCOMP,C (SUPER B COMPLEX PO) Take  by mouth. STOP taking these medications  
  
 hydroCHLOROthiazide (HYDRODIURIL) 12.5 mg tablet Comments:  
Reason for Stopping: Total time spent on discharge planning, counseling and co-ordination of care:  
35 minutes Chavez Alfred MD 
11/13/20 
2:39 PM

## 2020-11-13 NOTE — PROGRESS NOTES
GLORY: 
  1. Expected to discharge to  when medically stable. 
  2.Primary Decision Maker: Porsha Cousin - Spouse -    [phone] Secondary Decision Maker: Orion Venegas - Son - [phone] Per Jaja Bolanos with [facility] the patient has been accepted. Will need a rapid Covid test and results faxed to [phone]. Dr. German Harder updated. Spoke with his son Iris Jang and he will have to bring his mother to the hospital. 
 
Spoke with Loren Gant with [facility] and they have accepted the referral with a  time of 17:00pm. Transport to [facility].  Await covid results.

## 2020-11-13 NOTE — PROGRESS NOTES
Report given to Brandon Sullivan RN at [facility] for transfer of care to facility for Rehab. Reviewed medications. VS, plan of care

## 2020-11-13 NOTE — PROGRESS NOTES
GLORY: 
 1. Expected to discharge to  when medically stable. 
  2.Primary Decision Maker: Carmel Vasquez - Spouse - [phone] 
  Secondary Decision Maker: Nirmala Porter - Son - [phone] No response in CCLINKs from SNF referrals. Spoke with Ortiz Loera with [facility] and she did not receive it. Referral faxed to [phone]. No response from [facility]. Spoke with Bryce Callahan and they did not receive it. Referral faxed to [phone].

## 2020-11-13 NOTE — PROGRESS NOTES
Problem: Diabetes Self-Management Goal: *Disease process and treatment process Description: Define diabetes and identify own type of diabetes; list 3 options for treating diabetes. Outcome: Progressing Towards Goal 
Goal: *Incorporating physical activity into lifestyle Description: State effect of exercise on blood glucose levels. Outcome: Progressing Towards Goal 
Goal: *Monitoring blood glucose, interpreting and using results Description: Identify recommended blood glucose targets  and personal targets. Outcome: Progressing Towards Goal 
Goal: *Prevention, detection, treatment of acute complications Description: List symptoms of hyper- and hypoglycemia; describe how to treat low blood sugar and actions for lowering  high blood glucose level. Outcome: Progressing Towards Goal 
  
Problem: Patient Education: Go to Patient Education Activity Goal: Patient/Family Education Outcome: Progressing Towards Goal 
  
Problem: Falls - Risk of 
Goal: *Absence of Falls Description: Document Veatrice Marker Fall Risk and appropriate interventions in the flowsheet. Outcome: Progressing Towards Goal 
Note: Fall Risk Interventions: 
Mobility Interventions: Communicate number of staff needed for ambulation/transfer, Bed/chair exit alarm, Patient to call before getting OOB, PT Consult for mobility concerns, Utilize walker, cane, or other assistive device, PT Consult for assist device competence Medication Interventions: Evaluate medications/consider consulting pharmacy, Patient to call before getting OOB, Teach patient to arise slowly Elimination Interventions: Call light in reach, Toileting schedule/hourly rounds, Patient to call for help with toileting needs Problem: Patient Education: Go to Patient Education Activity Goal: Patient/Family Education Outcome: Progressing Towards Goal

## 2020-11-13 NOTE — PROGRESS NOTES
GLORY: 
 
Updated about transportation-  time is now 18:35pm. 
Family and nurse Migue updated. 15:40pm-spoke with Nikki Qunitero with [facility] Accepting Physician- [facility] Report telephone number [phone]. Please Fax Covid report [phone]

## 2020-11-16 NOTE — PROGRESS NOTES
No transition of care outreach indicated due to patient being managed by [facility] Cardiothoracic Surgery Team for 30 days.

## 2020-11-19 NOTE — PROGRESS NOTES
Community Care Team Documentation for Patient in [facility]  Subsequent Follow up     Patient remains at [facility] ([facility]). See previous [facility] Team notes. Spoke with SNF team.  PT and OT x's 5. Patient is supervision for bed mobility. Mod assist sit to stand. Mod assist for standing. Mod assist for dressing and toileting. Disposition :TBD    Medications were not reconciled and general patient assessment was not completed during this skilled nursing facility outreach.      Sejal VALERAN, RN

## 2020-11-23 NOTE — PROCEDURES
[address] PULMONARY FUNCTION TEST Name:  Tari Powell 
MR#:  [id number] :  1935 ACCOUNT #:  [de-identified] DATE OF SERVICE:  10/22/2020 REQUESTING PHYSICIAN:  Rhoda Dodge NP 
 
DIAGNOSIS:  Shortness of breath. ATS criteria for reproducibility and acceptability was met. SPIROMETRY:  FEV1/FVC ratio is 70, which is normal.  FEV1 is 1.49 L which is 42% predicted, which is reduced. FVC is 2.14 L which is 43% predicted, which is also reduced. Flow volume loop is suggestive of obstruction. INTERPRETATION:  Restrictive spirometry. Obstruction not ruled out. Based on flow volume loop, likely severe obstructive airway disease with FEV1 of 42% predicted. Clinical correlation advised. Renetta Huff MD MA/[id number]_[email or web address]_I/ 
D:  2020 19:21 
T:  2020 2:06 JOB #:  [id number] CC:  Rhoda Dodge NP

## 2020-11-24 NOTE — PROGRESS NOTES
Community Care Team Documentation for Patient in [facility]  Subsequent Follow up     Patient remains at [facility] ([facility]). See previous [facility] Team notes. Spoke with SNF team.  OT and PT x's 5- 250 ft with a walker contact guard. Min assist for sit to stand. Supervision for bed mobility. Disposition plan TBD. Medications were not reconciled and general patient assessment was not completed during this skilled nursing facility outreach.      Tristan MATHIS, R

## 2020-11-24 NOTE — LETTER
11/27/20 Patient: Pranay Gage YOB: 1935 Date of Visit: 11/24/2020 Clarice Le DO 
[address] [phone] VIA In Basket Dear Clarice Le DO, Thank you for referring Mr. Kelsey Cleary to [address] for evaluation. My notes for this consultation are attached. If you have questions, please do not hesitate to call me. I look forward to following your patient along with you.  
 
 
Sincerely, 
 
Reji Cavanaugh MD

## 2020-11-27 NOTE — PROGRESS NOTES
[place]: Freada Soulier 
[phone] Cardiology valvular heart disease progress note Requesting/referring provider: Dr. Fallon Heard, Dr. Gilda Landeros Reason for Consult: Valvular heart disease HPI: Aparna Flower, a 82y.o. year-old who presents for evaluation of syncope. He has significant history of coronary artery disease with remote coronary artery bypass grafting with LIMA to LAD, vein graft to OM, vein graft to RPDA. [place] last stress test in 2017 in [place] showed minimal ischemia and he reports no angina and this has been managed medically. He is currently in an assisted care living but has good cognition and relatively functional.  He also has known history of aortic valve disease in the form of aortic stenosis as well as mitral valve disease in the form of predominantly mitral annular calcification. Aortic stenosis has been under surveillance with Dr. Gilda Landeros. In that hospitalization he underwent transcatheter aortic valve replacement with 34 mm evolute transcatheter prosthesis. He also had evidence of AV block and underwent pacemaker placement during the hospitalization. Unfortunately due to antibiotics given for pacemaker placement he developed diarrhea and had a rehospitalization about 2 weeks ago. He is now recovered from that. He is at a rehab facility where he is trying to ambulate more. He has not been able to use his left upper extremity due to recent pacemaker placement and would like to perform more activity have once restrictions are lifted. Investigations personally reviewed by me: 
ECG October 2020: First ECG: Sinus rhythm, first-degree AV block, nonspecific ST-T changes;  
second ECG multifocal atrial tachycardia, high degree AVblock, nonspecific ST-T changes Echocardiogram October 2020: Normal LV function, likely severe aortic stenosis with peak transaortic velocity of 3.8 to 3.9 m/s, mean gradient of 35 mmHg, calculated aortic valve area by continued equation of 0.8 cm² by mean gradient. Visibly the valve looks extremely calcified with significant restriction of motion. There is moderate to severe mitral annular calcification extending inferior to the mitral valve annulus along the posterior left ventricular wall. Transmitral gradients are also increased at 6mmHg mean gradient consistent with possible mild mitral stenosis. Leaflets were difficult to visualize but do not appear typical for rheumatic mitral stenosis despite history of rheumatic fever as a kid. No significant pulmonary hypertension is noted. Left atrium is significantly enlarged. ECG 5 PM October 28 2020: QRS down 202 ms, MS interval down to about 300 to 350 ms, essentially similar to what it was on ECG on 19 October. Echocardiogram November 2020 post TAVR: Normal LV function, mild PVL, mild functional mitral stenosis, appropriate gradients across aortic transcatheter prosthesis. EOA was not properly calculated. Assessment/Plan: 1. Severe symptomatic calcific senile aortic stenosis s/p TAVR with 24 mm evolute R prosthesis 2. Hypertension 3. CKD 4. History of remote smoking 5. Mitral annular calcification with possible mild mitral stenosis 6. Multifocal atrial tachycardia/atrial fibrillation 7. Coronary disease status post coronary bypass grafting remotely with LIMA to LAD, vein graft to OM, vein graft to PDA 8. Preserved LV systolic function 9. Recent syncope of uncertain etiology possibly contributed by severe aortic stenosis in the setting of atrial arrhythmia and anti HTN meds. 10.  History of peripheral arterial disease with prior bilateral common iliac stents, known bilateral common femoral calcification extending in the distal common femoral into bifurcation with bilateral SFA and infrapopliteal disease. Likely bilateral femoral endarterectomies have been performed in the past as well. 11. Tachy masood syndrome history of AV block and syncope. Now status post dual-chamber pacemaker 12. Severe left subclavian artery stenosis s/p recent angioplasty to reduce 80% stenosis down to about 50%. Left arm blood pressures may be unreliable Mr. Rosemary Rinaldi seems to be gradually improving after undergoing prolonged hospitalization for aortic valve replacement with a transcatheter prosthesis. He also underwent dual-chamber pacemaker placement. He is having minimal requirement of pacemaker at this point of time. He does not report of any new cardiopulmonary symptoms. Specifically he denies any chest discomfort or shortness of breath. Groin access sites have healed very well. No further diarrhea is reported. He is off all antibiotics. Since he is completed 4 weeks since his pacemaker placement, he can proceed with no significant restriction on weightbearing using his left hand. This will help him perform more activity using a walker. He is on optimal medical therapy for coronary artery disease, hypertension, diabetes and hyperlipidemia. From a transcatheter valve standpoint, he should continue aspirin alone. He is post op 1 month echocardiogram demonstrates adequate valve function with trivial to mild paravalvular leak. He has underlying mild functional mitral stenosis from mitral annular calcification. This does not require any intervention at this point of time given that it is asymptomatic. I would see Mr. Katie Altamirano at his 1 year post TAVR follow-up. Rest of his cardiac issues will be managed by Dr. Hammad Jason. [x]    High complexity decision making was performed []    Patient is at high-risk of decompensation with multiple organ involvement He  has a past medical history of BPH (benign prostatic hyperplasia), CAD (coronary artery disease), Diabetes ([facility] [address] Hearing loss, Hypercholesterolemia, Hypertension, Murmur, Recurrent depression ([facility] [address] (2019), and Third degree AV block ([facility] [address] (10/30/2020). He also has no past medical history of Anemia, Arthritis, Asthma, Autoimmune disease ([facility] [address] Calculus of kidney, Cancer ([facility] [address] Chronic kidney disease, Chronic obstructive pulmonary disease ([facility] [address] Chronic pain, Congestive heart failure ([facility] [address] GERD (gastroesophageal reflux disease), Headache, Liver disease, Psychotic disorder ([facility] [address] PUD (peptic ulcer disease), Seizures ([facility] [address] Stroke ([facility] Thromboembolus ([facility] Thyroid disease, or Trauma. Review of system:Patient reports no dyspnea/PND/Orthpnea/CP. He reports no cough/fever/focal neurological deficits/abdominal pain. All other systems negative except as above. Family History Problem Relation Age of Onset  Cancer Mother  Cancer Father Social History Socioeconomic History  Marital status:  Spouse name: Not on file  Number of children: Not on file  Years of education: Not on file  Highest education level: Not on file Tobacco Use  Smoking status: Former Smoker Types: Cigarettes Last attempt to quit: 1990 Years since quittin.2  Smokeless tobacco: Never Used Substance and Sexual Activity  Alcohol use: No  
 Drug use: No  
  
PE Vitals:  
 20 1508 BP: 120/60 Pulse: 67 Resp: 18 SpO2: 95% Weight: 190 lb (86.2 kg) Height: 6' 3\" (1.905 m) Body mass index is 23.75 kg/m². General:    Alert, cooperative, no distress. Psychiatric:    Normal Mood and affect Eye/ENT:      Pupils equal, No asymmetry, Conjunctival pink. Able to hear voice at normal amplitude Lungs: Visibly symmetric chest expansion, No palpable tenderness. Clear to auscultation bilaterally. Heart[de-identified]    Regular rate and rhythm, S1, [id number], mid to late peaking mid systolic murmur, click, rub or gallop. No JVD, Normal palpable peripheral pulses. No cyanosis Abdomen:     Soft, non-tender. Bowel sounds normal. No masses,  No   
  organomegaly. Extremities:   Extremities normal, atraumatic, no edema. Neurologic:   CN II-XII grossly intact. No gross focal deficits Recent Labs: 
Lab Results Component Value Date/Time Cholesterol, total 114 2020 03:19 PM  
 HDL Cholesterol 40 2020 03:19 PM  
 LDL, calculated 54 2020 03:19 PM  
 Triglyceride 101 2020 03:19 PM  
 
Lab Results Component Value Date/Time Creatinine 0.77 2020 05:04 AM  
 
Lab Results Component Value Date/Time BUN 29 (H) 2020 05:04 AM  
 
Lab Results Component Value Date/Time Potassium 4.3 2020 05:04 AM  
 
Lab Results Component Value Date/Time Hemoglobin A1c 7.1 (H) 10/18/2020 03:27 PM  
 
Lab Results Component Value Date/Time HGB 8.9 (L) 2020 05:04 AM  
 
Lab Results Component Value Date/Time PLATELET 767 (L) 8121 05:04 AM  
 
 
Reviewed: 
Past Medical History:  
Diagnosis Date  BPH (benign prostatic hyperplasia)  CAD (coronary artery disease)  Diabetes ([address]  Hearing loss  Hypercholesterolemia  Hypertension  Murmur  Recurrent depression ([facility] [address] 2019  Third degree AV block ([facility] [address] 10/30/2020 Social History Tobacco Use Smoking Status Former Smoker  Types: Cigarettes  Last attempt to quit: 1990  Years since quittin.2 Smokeless Tobacco Never Used Social History Substance and Sexual Activity Alcohol Use No  
 
Allergies Allergen Reactions  Procaine Other (comments) Pt stated he passed out from procaine and was told not to let anyone use it on him again Family History Problem Relation Age of Onset  Cancer Mother  Cancer Father Current Outpatient Medications Medication Sig  
 losartan (COZAAR) 100 mg tablet Take 1 Tab by mouth daily.  amLODIPine (NORVASC) 5 mg tablet Take 1 Tab by mouth daily.  atorvastatin (LIPITOR) 40 mg tablet Take 1 Tab by mouth nightly for 60 days.  aspirin 81 mg chewable tablet Take 1 Tab by mouth daily.  acetaminophen (TYLENOL) 325 mg tablet Take 2 Tabs by mouth every four (4) hours as needed for Pain.  metoprolol tartrate (LOPRESSOR) 50 mg tablet Take 1 Tab by mouth two (2) times a day for 60 days.  senna-docusate (PERICOLACE) 8.6-50 mg per tablet Take 1 Tab by mouth daily as needed for Constipation.  doxazosin (CARDURA) 4 mg tablet TAKE 1 TABLET BY MOUTH  DAILY (Patient taking differently: Take 4 mg by mouth.)  finasteride (PROSCAR) 5 mg tablet TAKE 1 TABLET BY MOUTH  DAILY (Patient taking differently: Take 5 mg by mouth.)  metFORMIN ER (GLUCOPHAGE XR) 500 mg tablet TAKE 1 TABLET BY MOUTH  TWICE DAILY WITH MEALS  glipiZIDE SR (GLUCOTROL XL) 5 mg CR tablet TAKE 1 TABLET BY MOUTH  DAILY (Patient taking differently: Take 5 mg by mouth.)  sertraline (ZOLOFT) 100 mg tablet TAKE 1 TABLET BY MOUTH  DAILY  temazepam (RESTORIL) 15 mg capsule TAKE 1 CAPSULE BY MOUTH AT BEDTIME AS NEEDED FOR SLEEP.  fluticasone propionate (FLONASE) 50 mcg/actuation nasal spray ADMINISTER 1 Spray IN Both Nostrils two (2) times a day.  menthol-zinc oxide (CALMOSEPTINE) 0.44-20.6 % oint Apply to bilateral buttocks TID  Indications: skin irritation  OTHER Hearing evaluation for possible changes in hearing  tiZANidine (ZANAFLEX) 2 mg tablet Take 1 Tab by mouth three (3) times daily as needed for Pain.  glucose blood VI test strips (TRUE METRIX GLUCOSE TEST STRIP) strip Check BS BID  Dx: DM  E11.21  
 cholecalciferol (VITAMIN D3) 1,000 unit cap Take 1 Cap by mouth daily.  magnesium 250 mg tab Take 1 Tab by mouth daily.  sodium chloride (OCEAN) 0.65 % nasal squeeze bottle 0.05 mL by Both Nostrils route as needed for Congestion.  FERROUS FUMARATE/VIT BCOMP,C (SUPER B COMPLEX PO) Take  by mouth. No current facility-administered medications for this visit. Igor Del Cid MD11/27/20 There are other unrelated non-urgent complaints, but due to the busy schedule and the amount of time I've already spent with him, time does not permit me to address these routine issues at today's visit. I've requested another appointment to review these additional issues. ATTENTION:  
This medical record was transcribed using an electronic medical records/speech recognition system. Although proofread, it may and can contain electronic, spelling and other errors. Corrections may be executed at a later time. Please feel free to contact us for any clarifications as needed. [facility] heart and V[facility] [address], [address]

## 2020-12-01 NOTE — PROGRESS NOTES
Community Care Team Documentation for Patient in [facility] Subsequent Follow up Patient remains at [facility] ([facility]). See previous [facility] Team notes. Spoke with SNF team.  OT and PT x's 5- 250 ft with a walker contact guard. Contact guard for sit to stand. Supervision for bed mobility. Disposition plan TBD. Medications were not reconciled and general patient assessment was not completed during this skilled nursing facility outreach.   
 
Wesley VALERAN, RN

## 2020-12-02 NOTE — PROGRESS NOTES
Patient: Paulett Collet   Age: 82 y.o. Patient Care Team:  Jeff Allen DO as PCP - General (Internal Medicine)  Jeff Allen DO as PCP - [facility]  Vickye Spatz, MD as Physician (Cardiology)  Aretha Alejandra MD (Cardiothoracic Surgery)  Milagros Pollard MD (Cardiology)    PCP: Jeff Allen DO    Cardiologist: Dr. Dona Lundborg    Diagnosis/Reason for Consultation: The primary encounter diagnosis was Nonrheumatic aortic valve stenosis. A diagnosis of S/P TAVR (transcatheter aortic valve replacement) was also pertinent to this visit. Problem List:   Patient Active Problem List   Diagnosis Code    Type 2 diabetes mellitus without complication, without long-term current use of insulin ([facility]) E11.9    Essential hypertension I10    Hypercholesterolemia E78.00    Coronary artery disease involving native coronary artery of native heart without angina pectoris I25.10    S/P CABG (coronary artery bypass graft) Z95.1    Severe aortic stenosis I35.0    Aortic systolic murmur on examination I35.8    Benign prostatic hyperplasia with lower urinary tract symptoms N40.1    Insomnia G47.00    Former smoker Z87.891    ACP (advance care planning) Z71.89    Gait instability R26.81    Age-related cataract of both eyes H25.9    Recurrent depression ([address] F33.9    On angiotensin receptor blockers (ARB) Z79.899    Gastroesophageal reflux disease without esophagitis K21.9    Cough R05    Pressure injury of buttock, stage 1 L89.301    Incontinence of feces R15.9    Type 2 diabetes with nephropathy ([facility]) E11.21    Decubitus ulcer of left buttock, stage 2 ([address] Y65.217    Syncope and collapse R55    Syncope R55    (HFpEF) heart failure with preserved ejection fraction (HCC) I50.30    Aortic stenosis I35.0    Pacemaker Z95.0    Third degree AV block (HCC) I44.2    S/P TAVR (transcatheter aortic valve replacement) Z95.2    Generalized weakness R53.1         HPI: 82 y.o. male  S/p TAVR with a Evolut R via L Femoral Artery approach on 10/28/20 for severe and symptomatic Aortic Stenosis. Postoperative complication of CHB requiring PPM placement on 10/30/20. Minerva Conde He/She was discharged to Home with Home Health on 11/4/20. However, after going home, the patient became more weak and was readmitted to the hospital to assist in getting placement at a facility. He is currently at [facility] rehab and continues with his Physical and Occupational Therapy. He will be there at least another 2 weeks per Pito's assessment. He is here today for their One Month Post Op Appointment. He is accompanied by his Daughter-in-law who aids in giving information. He states that he has seen improvement in his symptoms since his TAVR procedure. He feels that his shortness of breath and fatigue have improved. He is still weak and continues his PT/OT to increase his strength and endurance. NYHA Classification: IIIA   Class I (Mild): No limitation of physical activity. Ordinary physical activity does not cause undue fatigue, palpitation, or dyspnea. Class II (Mild): Slight limitation of physical activity. Comfortable at rest, but ordinary physical activity results in fatigue, palpitation, or dyspnea. Class III (Moderate): Marked limitation of physical activity. Comfortable at rest, but less than ordinary activity causes fatigue, palpitation, or dyspnea   Class IV (Severe): Unable to carry out any physical activity without discomfort. Symptoms  of cardiac insufficiency at rest.  If any physical activity is undertaken, discomfort is increased.     Angina Classification: 0   Class 0: No symptoms   Class 1: Angina with strenuous exercise   Class 2: Angina with moderate exercise   Class 3: Angina with mild exertion   Walking 1-2 level blocks at normal pace; Climbing 1 flight of stairs at normal pace  Class 4: Angina at any level of physical exertion       Past Medical History:   Diagnosis Date    BPH (benign prostatic hyperplasia)     CAD (coronary artery disease)     Diabetes ([facility]     Hearing loss     Hypercholesterolemia     Hypertension     Murmur     Recurrent depression ([facility] 2019    Third degree AV block ([facility] 10/30/2020       Last Dental Visit:  unknown   Any dental pain/concerns: none    Past Surgical History:   Procedure Laterality Date    CARDIAC SURG PROCEDURE UNLIST  2006    by-pass    HX CHOLECYSTECTOMY      CT INS NEW/RPLCMT PRM PM W/TRANSV ELTRD ATRIAL&VENT  10/30/2020         CT INS NEW/RPLCMT PRM PM W/TRANSV ELTRD ATRIAL&VENT N/A 10/30/2020    INSERT PPM DUAL performed by Michael Herr MD at [address] CATH LAB      Social History     Tobacco Use    Smoking status: Former Smoker     Types: Cigarettes     Last attempt to quit: 1990     Years since quittin.2    Smokeless tobacco: Never Used   Substance Use Topics    Alcohol use: No      Family History   Problem Relation Age of Onset    Cancer Mother     Cancer Father      Prior to Admission medications    Medication Sig Start Date End Date Taking? Authorizing Provider   furosemide (Lasix) 20 mg tablet Take 20 mg by mouth daily. Yes Provider, Historical   losartan (COZAAR) 100 mg tablet Take 1 Tab by mouth daily. 20  Yes Christopher Wills MD   amLODIPine (NORVASC) 5 mg tablet Take 1 Tab by mouth daily. 20  Yes Christopher Wills MD   atorvastatin (LIPITOR) 40 mg tablet Take 1 Tab by mouth nightly for 60 days. 11/4/20 1/3/21 Yes Anil Brady NP   aspirin 81 mg chewable tablet Take 1 Tab by mouth daily. 20 Yes Anil Brady NP   acetaminophen (TYLENOL) 325 mg tablet Take 2 Tabs by mouth every four (4) hours as needed for Pain. 20  Yes Anil Brady NP   metoprolol tartrate (LOPRESSOR) 50 mg tablet Take 1 Tab by mouth two (2) times a day for 60 days.  11/4/20 1/3/21 Yes Anil Brady NP   senna-docusate (PERICOLACE) 8.6-50 mg per tablet Take 1 Tab by mouth daily as needed for Constipation. 11/4/20  Yes Jero Main NP   doxazosin (CARDURA) 4 mg tablet TAKE 1 TABLET BY MOUTH  DAILY  Patient taking differently: Take 4 mg by mouth. 10/20/20  Yes Mine Dick NP   finasteride (PROSCAR) 5 mg tablet TAKE 1 TABLET BY MOUTH  DAILY  Patient taking differently: Take 5 mg by mouth. 10/20/20  Yes Mine Dick NP   metFORMIN ER (GLUCOPHAGE XR) 500 mg tablet TAKE 1 TABLET BY MOUTH  TWICE DAILY WITH MEALS 10/20/20  Yes Joanna Dick NP   glipiZIDE SR (GLUCOTROL XL) 5 mg CR tablet TAKE 1 TABLET BY MOUTH  DAILY  Patient taking differently: Take 5 mg by mouth. 10/20/20  Yes Mine Dick NP   sertraline (ZOLOFT) 100 mg tablet TAKE 1 TABLET BY MOUTH  DAILY 9/2/20  Yes Ishan ChristinHUMBERTO rubi   fluticasone propionate (FLONASE) 50 mcg/actuation nasal spray ADMINISTER 1 Spray IN Both Nostrils two (2) times a day. 6/8/20  Yes Mine Dick NP   menthol-zinc oxide (CALMOSEPTINE) 0.44-20.6 % oint Apply to bilateral buttocks TID  Indications: skin irritation 6/4/20  Yes Jorgito Hunt DO   tiZANidine (ZANAFLEX) 2 mg tablet Take 1 Tab by mouth three (3) times daily as needed for Pain. 7/11/19  Yes Rusty Ibanez DO   glucose blood VI test strips (TRUE METRIX GLUCOSE TEST STRIP) strip Check BS BID  Dx: DM  E11.21 7/12/18  Yes Jorgito Hunt DO   cholecalciferol (VITAMIN D3) 1,000 unit cap Take 1 Cap by mouth daily. 3/29/18  Yes Jorgito Hunt DO   magnesium 250 mg tab Take 1 Tab by mouth daily. 3/29/18  Yes Jorgito Hunt DO   sodium chloride (OCEAN) 0.65 % nasal squeeze bottle 0.05 mL by Both Nostrils route as needed for Congestion. 3/29/18  Yes Jorgito Hunt DO   FERROUS FUMARATE/VIT BCOMP,C (SUPER B COMPLEX PO) Take  by mouth.    Yes Provider, Historical   temazepam (RESTORIL) 15 mg capsule TAKE 1 CAPSULE BY MOUTH AT BEDTIME AS NEEDED FOR SLEEP. 7/6/20   [place] Nett, DO   OTHER Hearing evaluation for possible changes in hearing 1/20/20   [place] Nett, DO       Allergies Allergen Reactions    Procaine Other (comments)     Pt stated he passed out from procaine and was told not to let anyone use it on him again       Current Medications:   Current Outpatient Medications   Medication Sig Dispense Refill    furosemide (Lasix) 20 mg tablet Take 20 mg by mouth daily.  losartan (COZAAR) 100 mg tablet Take 1 Tab by mouth daily. 30 Tab 0    amLODIPine (NORVASC) 5 mg tablet Take 1 Tab by mouth daily. 30 Tab 0    atorvastatin (LIPITOR) 40 mg tablet Take 1 Tab by mouth nightly for 60 days. 30 Tab 1    aspirin 81 mg chewable tablet Take 1 Tab by mouth daily. 30 Tab 11    acetaminophen (TYLENOL) 325 mg tablet Take 2 Tabs by mouth every four (4) hours as needed for Pain.  metoprolol tartrate (LOPRESSOR) 50 mg tablet Take 1 Tab by mouth two (2) times a day for 60 days. 60 Tab 1    senna-docusate (PERICOLACE) 8.6-50 mg per tablet Take 1 Tab by mouth daily as needed for Constipation.  doxazosin (CARDURA) 4 mg tablet TAKE 1 TABLET BY MOUTH  DAILY (Patient taking differently: Take 4 mg by mouth.) 90 Tab 3    finasteride (PROSCAR) 5 mg tablet TAKE 1 TABLET BY MOUTH  DAILY (Patient taking differently: Take 5 mg by mouth.) 90 Tab 3    metFORMIN ER (GLUCOPHAGE XR) 500 mg tablet TAKE 1 TABLET BY MOUTH  TWICE DAILY WITH MEALS 180 Tab 3    glipiZIDE SR (GLUCOTROL XL) 5 mg CR tablet TAKE 1 TABLET BY MOUTH  DAILY (Patient taking differently: Take 5 mg by mouth.) 90 Tab 3    sertraline (ZOLOFT) 100 mg tablet TAKE 1 TABLET BY MOUTH  DAILY 90 Tab 1    fluticasone propionate (FLONASE) 50 mcg/actuation nasal spray ADMINISTER 1 Spray IN Both Nostrils two (2) times a day. 16 g 0    menthol-zinc oxide (CALMOSEPTINE) 0.44-20.6 % oint Apply to bilateral buttocks TID  Indications: skin irritation 113 g 5    tiZANidine (ZANAFLEX) 2 mg tablet Take 1 Tab by mouth three (3) times daily as needed for Pain.  20 Tab 0    glucose blood VI test strips (TRUE METRIX GLUCOSE TEST STRIP) strip Check BS BID  Dx: DM  E11.21 100 Strip 11    cholecalciferol (VITAMIN D3) 1,000 unit cap Take 1 Cap by mouth daily. 30 Cap 2    magnesium 250 mg tab Take 1 Tab by mouth daily. 30 Tab 2    sodium chloride (OCEAN) 0.65 % nasal squeeze bottle 0.05 mL by Both Nostrils route as needed for Congestion. 30 mL 2    FERROUS FUMARATE/VIT BCOMP,C (SUPER B COMPLEX PO) Take  by mouth.  temazepam (RESTORIL) 15 mg capsule TAKE 1 CAPSULE BY MOUTH AT BEDTIME AS NEEDED FOR SLEEP. 90 Cap 1    OTHER Hearing evaluation for possible changes in hearing 1 Each 0       Vitals: Blood pressure (!) 100/56, pulse 66, resp. rate 16, SpO2 97 %. Allergies: is allergic to procaine. Review of Systems: Pertinent Positives per HPI   [x]Total of 13 systems reviewed as follows:  Constitutional: Negative fever, negative chills, +fatigue  Eyes:   Negative for amauroses fugax  ENT:   Negative sore throat,oral absecess  Endocrine Negative for thyroid replacement Rx; goiter; +DM  Respiratory:  Negative chronic cough,sputum production, +shortness of breath on exertion  Cards:   Negative for palpitations, varicosities, claudication, +LE edema  GI:   Negative for dysphagia, bleeding, nausea, vomiting, diarrhea, and abdominal pain  Genitourinary: Negative for frequency, dysuria, BPH   Integument:  Negative for rash and pruritus  Hematologic:  Negative for easy bruising; bleeding dyscarsia  Musculoskel: + for muscle weakness inhibiting ambulation  Neurological:  Negative for stroke, TIA, syncope, dizziness  Behavl/Psych: Negative for feelings of anxiety, depression     Cardiovascular Testing:     TTE:  Completed today-final report pending    Physical Exam:  General: Well nourished well groomed male appearing stated age accompanied by his daughter-in-law  Neuro: A&OX3. MONZON. PERRL. Arrives in a wheelchair  Head:Normocephalic. Atraumatic. Symmetrical  Neck: Trachea Midline  Resp: CTA B.  No Adv BS/cough/sputum/tachypnea with seated conversation  CV: S1S2 RRR. No murmur. No JVD/carotid bruits. Pink/warm/dry extremities. Trace LE peripheral edema  GI:Benign ab. Soft. NT/ND. Active BS  : Voids  Integ: No obvious s/s of infection or breakdown  Musculo/Skeletal: FROM in all major joints. fair muscle tone    Clinic Evaluation:   KCCQ-12: scanned into EMR    5 meter gait: Not completed-patient in a wheelchair      Assessment/Plan:   1. Aortic stenosis, severe and symptomatic (paradoxical LFLG) s/p TAVR: ASA 81 mg only for AC. 1 john Echo completed today and final report pending.       2.  CAD with Hx of CABG: On ASA, Statin, BB     3. HTN: Cont lopressor, losartan, lasix.  Goal SPB < 160     4. HLD: On Statin     5. HFpEF: BP management     6. Hx of Syncope:  now s/p PPM. BICA of <50%.      7. PAD: On ASA, Statin.      8. DM: On Metformin, Glipizide. A1C of 7.1.      9. MAT/Atrial fibrillation/Asystole: Intermittent Atrial fibrillation. Currently in NSR/Paced. Currently on ASA 81. No OAC historically. s/p PPM. Cont BB.        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 Continue to monitor     11. BPH: On Proscar, cardura.      12. Depression: on Zoloft     13. Lethargy/debility:  OOB as much as possible. PT/OT.            SBE prophylax reviewed.    F/U with primary cardiologist Dr. Jorge Alberto Gr

## 2020-12-02 NOTE — PROGRESS NOTES
Saw patient. History and films reviewed. Patient seen by PA/NP and agree with above.    Findings/Conditions: S/P TAVR  Plan of Care: still at rehab; slow progress    Risk of morbidity and mortality - high  Medical decision making - high complexity

## 2020-12-08 NOTE — TELEPHONE ENCOUNTER
Returned daughter's call, 2 pt identifiers used    Advised she is not on patient's [employer]. She is wanting the results of his echo. This was ordered by CTS. Patient is in [facility] rehab, requesting he is moved to another facility. I advised we could not initiate a transfer. If she felt like he needed emergent care then she would need to call 911.

## 2020-12-08 NOTE — TELEPHONE ENCOUNTER
Patient's daughter-in-law calling to speak to Dr. Katelin Aguero nurse. States patient is in a rehab center after heart procedure and is not doing well. States she would like advice on what to do next.     Phone: [phone]

## 2020-12-09 NOTE — TELEPHONE ENCOUNTER
Woodrow Rosario NP w/ [facility] is calling to make Dr. Chet Pittman aware that the patient went into moderate CHF and is requesting a return call. Please call [facility] at [phone].  Thanks

## 2020-12-09 NOTE — TELEPHONE ENCOUNTER
I spoke with Demarcus Bain at [facility] who states that the patient was complaining of worsening shortness of breath and required up to 4 L of oxygen per NC. He was restarted on Lasix by [facility] when I saw him in the clinic. However, they now have increased his lasix to 40 mg daily and the patient is improving. Oxygen has been weaned down to 2 L and he states that he is feeling much better. Continues to work with therapy for strength training. I have sent [facility] a copy of the most recent (1 month) echo. Echo showing appropriate AV gradients with mild-mod PVL. Dr. Brayan Tesfaye, Ayanna Norman. No need to call Demarcus Bain back unless you have additional questions.    Thanks, Mickey Whitley

## 2020-12-16 NOTE — PROGRESS NOTES
Community Care Team Documentation for Patient in [facility] Subsequent Follow up Patient remains at [facility] ([facility]). See previous [facility] Team notes. Spoke with SNF team.  OT and PT x's 5- Unimitated distances with walker contact guard. Contact guard to Mod assist for sit to stand. Supervision for bed mobility. Issues with shortness of breath. Medications were not reconciled and general patient assessment was not completed during this skilled nursing facility outreach.   
 
Bipin VALERAN, RN

## 2020-12-23 NOTE — PROGRESS NOTES
Weakness fatigue FTT hard to get up, cant stand on his own. PT coming out. Just got out of rehab two days ago. Needs home PT OT continue nursing care Only difficult situation for his wife and his family given his care needs and ongoing fatigue. He is debilitated from multiple hospitalizations failure to thrive weight loss etc. 
Blood pressure does look good today No significant edema work on weight stabilization increased exercise etc. 
 
  
  
 
 
HPI: Sumi Gaspar, a 82y.o. year-old who presents for evaluation of syncope. He has significant history of coronary artery disease with remote coronary artery bypass grafting with LIMA to LAD, vein graft to OM, vein graft to RPDA. [place] stress test in 2017 in [place] showed minimal ischemia and he reports no angina and this has been managed medically. He is currently in an assisted care living but has good cognition and relatively functional.  He also has known history of aortic valve disease in the form of aortic stenosis as well as mitral valve disease in the form of predominantly mitral annular calcification. Aortic stenosis has been under surveillance. In that hospitalization he underwent transcatheter aortic valve replacement with 34 mm evolute transcatheter prosthesis. He also had evidence of AV block and underwent pacemaker placement during the hospitalization. Unfortunately due to antibiotics given for pacemaker placement he developed diarrhea and had a rehospitalization  . He is now recovered from that. Just released from rehab and unable to stand on his own still extremely fatigued Investigations personally reviewed by me: 
ECG October 2020: First ECG: Sinus rhythm, first-degree AV block, nonspecific ST-T changes;  
second ECG multifocal atrial tachycardia, high degree AVblock, nonspecific ST-T changes Echocardiogram October 2020: Normal LV function, likely severe aortic stenosis with peak transaortic velocity of 3.8 to 3.9 m/s, mean gradient of 35 mmHg, calculated aortic valve area by continued equation of 0.8 cm² by mean gradient. Visibly the valve looks extremely calcified with significant restriction of motion. There is moderate to severe mitral annular calcification extending inferior to the mitral valve annulus along the posterior left ventricular wall. Transmitral gradients are also increased at 6mmHg mean gradient consistent with possible mild mitral stenosis. Leaflets were difficult to visualize but do not appear typical for rheumatic mitral stenosis despite history of rheumatic fever as a kid. No significant pulmonary hypertension is noted. Left atrium is significantly enlarged. ECG 5 PM October 28 2020: QRS down 202 ms, RI interval down to about 300 to 350 ms, essentially similar to what it was on ECG on 19 October. Echocardiogram November 2020 post TAVR: Normal LV function, mild PVL, mild functional mitral stenosis, appropriate gradients across aortic transcatheter prosthesis. EOA was not properly calculated. Assessment/Plan: 1. Severe symptomatic calcific senile aortic stenosis s/p TAVR with 24 mm evolute R prosthesis 2. Hypertension 3. CKD 4. History of remote smoking 5. Mitral annular calcification with possible mild mitral stenosis 6. Multifocal atrial tachycardia/atrial fibrillation 7. Coronary disease status post coronary bypass grafting remotely with LIMA to LAD, vein graft to OM, vein graft to PDA 8. Preserved LV systolic function 9. Recent syncope of uncertain etiology possibly contributed by severe aortic stenosis in the setting of atrial arrhythmia and anti HTN meds. 10.  History of peripheral arterial disease with prior bilateral common iliac stents, known bilateral common femoral calcification extending in the distal common femoral into bifurcation with bilateral SFA and infrapopliteal disease. Likely bilateral femoral endarterectomies have been performed in the past as well. 11. Tachy masood syndrome history of AV block and syncope. Now status post dual-chamber pacemaker 12. Severe left subclavian artery stenosis s/p recent angioplasty to reduce 80% stenosis down to about 50%. Left arm blood pressures may be unreliable Mr. Terrell Whiting seems to be gradually improving after undergoing prolonged hospitalization for aortic valve replacement with a transcatheter prosthesis. He also underwent dual-chamber pacemaker placement. He is having minimal requirement of pacemaker at this point of time. He does not report of any new cardiopulmonary symptoms. Specifically he denies any chest discomfort or shortness of breath. Groin access sites have healed very well. No further diarrhea is reported. He is off all antibiotics. Since he is completed 4 weeks since his pacemaker placement, he can proceed with no significant restriction on weightbearing using his left hand. This will help him perform more activity using a walker. He is on optimal medical therapy for coronary artery disease, hypertension, diabetes and hyperlipidemia. From a transcatheter valve standpoint, he should continue aspirin alone. He is post op 1 month echocardiogram demonstrates adequate valve function with trivial to mild paravalvular leak. He has underlying mild functional mitral stenosis from mitral annular calcification. This does not require any intervention at this point of time given that it is asymptomatic. [x]    High complexity decision making was performed 
[]    Patient is at high-risk of decompensation with multiple organ involvement He  has a past medical history of BPH (benign prostatic hyperplasia), CAD (coronary artery disease), Diabetes ([facility] Hearing loss, Hypercholesterolemia, Hypertension, Murmur, Recurrent depression ([facility] (2019), and Third degree AV block ([facility] (10/30/2020). He also has no past medical history of Anemia, Arthritis, Asthma, Autoimmune disease ([facility] Calculus of kidney, Cancer ([facility] Chronic kidney disease, Chronic obstructive pulmonary disease ([facility] Chronic pain, Congestive heart failure ([facility] GERD (gastroesophageal reflux disease), Headache, Liver disease, Psychotic disorder ([facility] PUD (peptic ulcer disease), Seizures ([facility] Stroke ([facility] Thromboembolus ([facility] Thyroid disease, or Trauma. Review of system:Patient reports no dyspnea/PND/Orthpnea/CP. He reports no cough/fever/focal neurological deficits/abdominal pain. All other systems negative except as above. Family History Problem Relation Age of Onset  Cancer Mother  Cancer Father Social History Socioeconomic History  Marital status:  Spouse name: Not on file  Number of children: Not on file  Years of education: Not on file  Highest education level: Not on file Tobacco Use  Smoking status: Former Smoker Types: Cigarettes Quit date: 1990 Years since quittin.3  Smokeless tobacco: Never Used Substance and Sexual Activity  Alcohol use: No  
 Drug use: No  
  
PE Vitals:  
 20 1030 BP: 120/60 Pulse: 88 Resp: 13 SpO2: 97% Weight: 180 lb (81.6 kg) Height: 6' 3\" (1.905 m) Body mass index is 22.5 kg/m². General:    Alert, cooperative, no distress. Psychiatric:    Normal Mood and affect Eye/ENT:      Pupils equal, No asymmetry, Conjunctival pink. Able to hear voice at normal amplitude Lungs:      Visibly symmetric chest expansion, No palpable tenderness. Clear to auscultation bilaterally. Heart[de-identified]    Regular rate and rhythm, S1, P2oixwgank, mid to late peaking mid systolic murmur, click, rub or gallop. No JVD, Normal palpable peripheral pulses. No cyanosis Abdomen:     Soft, non-tender. Bowel sounds normal. No masses,  No   
  organomegaly. Extremities:   Extremities normal, atraumatic, no edema. Neurologic:   CN II-XII grossly intact. No gross focal deficits Recent Labs: 
Lab Results Component Value Date/Time Cholesterol, total 114 2020 03:19 PM  
 HDL Cholesterol 40 2020 03:19 PM  
 LDL, calculated 54 2020 03:19 PM  
 Triglyceride 101 2020 03:19 PM  
 
Lab Results Component Value Date/Time Creatinine 0.77 2020 05:04 AM  
 
Lab Results Component Value Date/Time BUN 29 (H) 2020 05:04 AM  
 
Lab Results Component Value Date/Time Potassium 4.3 2020 05:04 AM  
 
Lab Results Component Value Date/Time Hemoglobin A1c 7.1 (H) 10/18/2020 03:27 PM  
 
Lab Results Component Value Date/Time HGB 8.9 (L) 2020 05:04 AM  
 
Lab Results Component Value Date/Time PLATELET 310 (L)  05:04 AM  
 
 
Reviewed: 
Past Medical History:  
Diagnosis Date  BPH (benign prostatic hyperplasia)  CAD (coronary artery disease)  Diabetes ([address]  Hearing loss  Hypercholesterolemia  Hypertension  Murmur  Recurrent depression ([facility] 2019  Third degree AV block ([facility] 10/30/2020 Social History Tobacco Use Smoking Status Former Smoker  Types: Cigarettes  Quit date: 1990  Years since quittin.3 Smokeless Tobacco Never Used Social History Substance and Sexual Activity Alcohol Use No  
 
Allergies Allergen Reactions  Procaine Other (comments) Pt stated he passed out from procaine and was told not to let anyone use it on him again Family History Problem Relation Age of Onset  Cancer Mother  Cancer Father Current Outpatient Medications Medication Sig  temazepam (RESTORIL) 15 mg capsule TAKE 1 CAPSULE BY MOUTH AT BEDTIME AS NEEDED FOR SLEEP.  furosemide (Lasix) 20 mg tablet Take 20 mg by mouth daily.  losartan (COZAAR) 100 mg tablet Take 1 Tab by mouth daily.  amLODIPine (NORVASC) 5 mg tablet Take 1 Tab by mouth daily.  atorvastatin (LIPITOR) 40 mg tablet Take 1 Tab by mouth nightly for 60 days.  aspirin 81 mg chewable tablet Take 1 Tab by mouth daily.  acetaminophen (TYLENOL) 325 mg tablet Take 2 Tabs by mouth every four (4) hours as needed for Pain.  metoprolol tartrate (LOPRESSOR) 50 mg tablet Take 1 Tab by mouth two (2) times a day for 60 days.  senna-docusate (PERICOLACE) 8.6-50 mg per tablet Take 1 Tab by mouth daily as needed for Constipation.  doxazosin (CARDURA) 4 mg tablet TAKE 1 TABLET BY MOUTH  DAILY (Patient taking differently: Take 4 mg by mouth.)  finasteride (PROSCAR) 5 mg tablet TAKE 1 TABLET BY MOUTH  DAILY (Patient taking differently: Take 5 mg by mouth.)  metFORMIN ER (GLUCOPHAGE XR) 500 mg tablet TAKE 1 TABLET BY MOUTH  TWICE DAILY WITH MEALS  glipiZIDE SR (GLUCOTROL XL) 5 mg CR tablet TAKE 1 TABLET BY MOUTH  DAILY (Patient taking differently: Take 5 mg by mouth.)  sertraline (ZOLOFT) 100 mg tablet TAKE 1 TABLET BY MOUTH  DAILY  fluticasone propionate (FLONASE) 50 mcg/actuation nasal spray ADMINISTER 1 Spray IN Both Nostrils two (2) times a day.  menthol-zinc oxide (CALMOSEPTINE) 0.44-20.6 % oint Apply to bilateral buttocks TID  Indications: skin irritation  OTHER Hearing evaluation for possible changes in hearing  tiZANidine (ZANAFLEX) 2 mg tablet Take 1 Tab by mouth three (3) times daily as needed for Pain.  glucose blood VI test strips (TRUE METRIX GLUCOSE TEST STRIP) strip Check BS BID  Dx: DM  E11.21  
 cholecalciferol (VITAMIN D3) 1,000 unit cap Take 1 Cap by mouth daily.  magnesium 250 mg tab Take 1 Tab by mouth daily.  sodium chloride (OCEAN) 0.65 % nasal squeeze bottle 0.05 mL by Both Nostrils route as needed for Congestion.  FERROUS FUMARATE/VIT BCOMP,C (SUPER B COMPLEX PO) Take  by mouth. No current facility-administered medications for this visit. [facility] [address]

## 2020-12-23 NOTE — LETTER
12/23/2020 11:18 AM 
 
Mr. Polly Baumgarten [place] [id number] [place] [id number] To whom it may concern: Mr Nikhil Oh is currently under the care of [address] I recommend a lift chair for Mr. Nikhil Oh due to his proximal muscle weakness. Please do not hesitate to contact me with questions. Sincerely, Olaf Braun MD

## 2020-12-27 PROBLEM — I50.33 ACUTE ON CHRONIC DIASTOLIC (CONGESTIVE) HEART FAILURE (HCC): Status: ACTIVE | Noted: 2020-01-01

## 2020-12-28 PROBLEM — E11.628 TYPE 2 DIABETES MELLITUS WITH RIGHT DIABETIC FOOT INFECTION (HCC): Status: ACTIVE | Noted: 2020-01-01

## 2020-12-28 PROBLEM — L08.9 TYPE 2 DIABETES MELLITUS WITH RIGHT DIABETIC FOOT INFECTION (HCC): Status: ACTIVE | Noted: 2020-01-01

## 2020-12-28 NOTE — ED NOTES
Pt O2 sat at 88% on RA. RN placed pt on 3L nasal cannula. MD ivett Jones paged for admissions orders.

## 2020-12-28 NOTE — ED NOTES
Pt presents to ED via EMS from with SOB and cough for the past few days. Per EMS pt afebrile and 97% on RA arrives on 1L nasal cannula for comfort per request of patient.

## 2020-12-28 NOTE — ED NOTES
Verbal shift change report given to Linnette RN (oncoming nurse) by India Ritter RN (offgoing nurse). Report included the following information SBAR, Kardex, ED Summary, [facility] and Recent Results.

## 2020-12-28 NOTE — ED NOTES
RN provided incontinence care for pt. Skin breakdown at coccyx observed with open areas. Red/purple blanchable coloration noted to left buttock. Barrier cream applied and patient placed on waffle pillow. Dr. Rodriguez Aguilera made aware. Pt's heels offloaded with pillows

## 2020-12-28 NOTE — TELEPHONE ENCOUNTER
Verified patient with two types of identifiers. Received a call from [facility] [place] [address], requesting to review patient's current medications for admission. Reviewed Dr. Cristina Bueno last office note as well as current medications with proper dosage. Pharmacist verbalized understanding and will call with any other questions.

## 2020-12-28 NOTE — PROGRESS NOTES
Hospitalist Progress Note Sandra Troncoso MD 
Answering service: [phone] from in house phone Date of Service:  2020 NAME:  Regina Del Cid :  1935 MRN:  [id number] Admission Summary: As per initial admission summary This is an 26-year-old man with a past medical history significant for hypertension; chronic diastolic congestive heart failure; dyslipidemia; third degree heart block, status post pacemaker insertion; coronary artery disease, status post CABG; benign prostatic hyperplasia; type 2 diabetes; aortic stenosis, status post transcatheter aortic valve replacement TAVR, who was in his usual state of health until a few days ago when the patient developed shortness of breath which is progressive and getting worse. The shortness of breath is worse when the patient is trying to lie down flat and also with activities such as walking. The shortness of breath is associated with cough which is productive of yellowish sputum. The patient was brought to the emergency room because of worsening symptoms. The patient was recently admitted to this hospital from 2020 to 2020. The patient was admitted for evaluation of generalized weakness. Following that evaluation, he was discharged to rehab. The patient has been home for about a week from rehab. The patient was found to have right diabetic foot ulcer on arrival at the emergency room. He was referred to the Hospitalist Service for evaluation for admission. The patient stated that he has been taking these medications as prescribed including his diuretic. Interval history / Subjective:  
  Patient seen for Follow up of COVID Patient seen and examined by the bedside, Labs, images and notes reviewed Patient with COVID infection. Started on remdesevir by ID. Talked to son KEHINDE marsh. He will update me if family and patients wants convalescent plasma. Addendum: D/w cardiology. Will stop heparin drip and start lovenox BID covid protocol Assessment & Plan: 1. Acute on chronic diastolic congestive heart failure. Cardiology consulted Recommended to continue Lasix 20 mg daily 58 chest does not show any new embolism minimal patchy groundglass opacity in the right upper lobe may represent pulmonary edema as per CTA chest 
Elevated troponin likely due to Covid Cardiology following COVID positive On remdesivir started by the infectious disease On 3 L of oxygen 2. Hypertension. We will resume preadmission medication and monitor the patient's blood pressure closely. 3.  Dyslipidemia. We will continue with Lipitor. 4.  Third degree heart block. The patient is status post pacemaker insertion. We will continue to monitor. 5.  Coronary artery disease, status post coronary artery bypass grafting. We will continue with cardiac medications. Cardiology consulted 6. Right diabetic foot infection. on cefepime and vancomycin. Wound Care consult Podiatry consult will also be requested to assist in further evaluation and treatment. ID following CT ordered to rule out osteomyelitis We will check ultrasound of the lower extremity for DVT. Pending 7. Sacral decubitus ulcer present on admission. Wound Care consult will be requested for local wound care [place] [facility] 8.  Suspected bacterial pneumonia. On broad-spectrum antibiotics 10. Benign prostatic hyperplasia. This is without urinary obstruction. We will continue with preadmission medication. 11.  Type 2 diabetes with hyperglycemia. The patient will be started on sliding scale with insulin coverage. 12.  Anemia. This is most likely due to chronic disease. 13.  Thrombocytopenia. The patient is asymptomatic. We will monitor the patient's platelet count. 14.  Elevated lactic acid level. The patient is not toxic, afebrile. We will repeat lactic acid level. 15.  Aortic stenosis, status post TAVR. We will continue to monitor. Consult to follow-up with cardiology as an outpatient 16. Other Issues:  Code status, the patient is a full code. We will place the patient on heparin for DVT prophylaxis. Code status: full code DVT prophylaxis: heparin Care Plan discussed with: Patient/Family Disposition: D Hospital Problems  Date Reviewed: 12/28/2020 Codes Class Noted POA Type 2 diabetes mellitus with right diabetic foot infection ([facility] [address] ICD-10-CM: E11.628, L08.9 ICD-9-CM: 250.80, 686.9  12/28/2020 Unknown * (Principal) Acute on chronic diastolic (congestive) heart failure (HCC) ICD-10-CM: I50.33 ICD-9-CM: 428.33, 428.0  12/27/2020 Yes Review of Systems: A comprehensive review of systems was negative except for that written in the HPI. Vital Signs:  
 Last 24hrs VS reviewed since prior progress note. Most recent are: 
Visit Vitals /63 Pulse (!) 106 Temp 97.7 °F (36.5 °C) Resp 27 Ht 6' 2\" (1.88 m) Wt 81.4 kg (179 lb 8 oz) SpO2 93% BMI 23.05 kg/m² Intake/Output Summary (Last 24 hours) at 12/28/2020 1824 Last data filed at 12/28/2020 1219 Gross per 24 hour Intake 220 ml Output 600 ml Net -380 ml Physical Examination:  
I had a face to face encounter with this patient and independently examined them on December 28, 2020 as outlined below: 
     
Constitutional:  No acute distress, cooperative, pleasant ENT:  Oral mucous moist, oropharynx benign. Neck supple, Resp:  CTA bilaterally. No wheezing/rhonchi/rales. No accessory muscle use CV:  Regular rhythm, normal rate, no murmurs, gallops, rubs GI:  Soft, non distended, non tender. normoactive bowel sounds, no hepatosplenomegaly Musculoskeletal:  No edema, warm, 2+ pulses throughout Neurologic:  Moves all extremities. AAOx3, CN II-XII reviewed Psych:  Good insight, Not anxious nor agitated. . 
  
 
Data Review:  
 Review and/or order of clinical lab test 
Review and/or order of tests in the radiology section of CPT Review and/or order of tests in the medicine section of CPT Labs:  
 
Recent Labs  
  12/28/20 
0459 12/27/20 
2200 WBC 6.2 5.6 HGB 10.9* 10.2* HCT 35.6* 32.2*  
* 110* Recent Labs  
  12/28/20 
0459 12/27/20 
2200  139  
K 3.9 3.6  105 CO2 33* 31 BUN 22* 23* CREA 0.87 0.99 * 287* CA 8.0* 8.0*  
MG  --  1.7 PHOS 3.3  --   
 
Recent Labs  
  12/28/20 
0459 12/27/20 
2200 ALT 27 25 AP 88 82 TBILI 0.4 0.3 TP 6.4 6.0* ALB 2.7* 2.4*  
GLOB 3.7 3.6 Recent Labs  
  12/28/20 
1504 12/28/20 
0370 APTT 127.0* 41.5* Recent Labs  
  12/28/20 
0459 FERR 128 Lab Results Component Value Date/Time Folate 20.5 12/28/2020 04:59 AM  
  
No results for input(s): PH, PCO2, PO2 in the last 72 hours. Recent Labs  
  12/28/20 
1508 12/28/20 
0459 TROIQ 0.92* 0.93* Lab Results Component Value Date/Time Cholesterol, total 114 02/26/2020 03:19 PM  
 HDL Cholesterol 40 02/26/2020 03:19 PM  
 LDL, calculated 54 02/26/2020 03:19 PM  
 Triglyceride 101 02/26/2020 03:19 PM  
 
Lab Results Component Value Date/Time Glucose (POC) 129 (H) 12/28/2020 04:49 PM  
 Glucose (POC) 141 (H) 12/28/2020 11:26 AM  
 Glucose (POC) 190 (H) 12/28/2020 08:40 AM  
 Glucose (POC) 140 (H) 11/13/2020 04:37 PM  
 Glucose (POC) 204 (H) 11/13/2020 11:43 AM  
 
Lab Results Component Value Date/Time  Color YELLOW/STRAW 12/28/2020 06:02 AM  
 Appearance CLOUDY (A) 12/28/2020 06:02 AM  
 Specific gravity 1.022 12/28/2020 06:02 AM  
 pH (UA) 5.0 12/28/2020 06:02 AM  
 Protein 300 (A) 12/28/2020 06:02 AM  
 Glucose Negative 12/28/2020 06:02 AM  
 Ketone Negative 12/28/2020 06:02 AM  
 Bilirubin Negative 12/28/2020 06:02 AM  
 Urobilinogen 0.2 12/28/2020 06:02 AM  
 Nitrites Negative 12/28/2020 06:02 AM  
 Leukocyte Esterase TRACE (A) 12/28/2020 06:02 AM  
 Epithelial cells MODERATE (A) 12/28/2020 06:02 AM  
 Bacteria 1+ (A) 12/28/2020 06:02 AM  
 WBC 20-50 12/28/2020 06:02 AM  
 RBC >100 (H) 12/28/2020 06:02 AM  
 
 
 
Medications Reviewed:  
 
Current Facility-Administered Medications Medication Dose Route Frequency  amLODIPine (NORVASC) tablet 5 mg  5 mg Oral DAILY  aspirin chewable tablet 81 mg  81 mg Oral DAILY  atorvastatin (LIPITOR) tablet 40 mg  40 mg Oral QHS  doxazosin (CARDURA) tablet 4 mg  4 mg Oral QHS  finasteride (PROSCAR) tablet 5 mg  5 mg Oral DAILY  fluticasone propionate (FLONASE) 50 mcg/actuation nasal spray 1 Spray  1 Spray Both Nostrils DAILY  losartan (COZAAR) tablet 100 mg  100 mg Oral DAILY  metoprolol tartrate (LOPRESSOR) tablet 50 mg  50 mg Oral BID  sertraline (ZOLOFT) tablet 100 mg  100 mg Oral QPM  
 temazepam (RESTORIL) capsule 15 mg  15 mg Oral QHS PRN  
 sodium chloride (OCEAN) 0.65 % nasal squeeze bottle 1 Spray  1 Spray Both Nostrils PRN  
 sodium chloride (NS) flush 5-40 mL  5-40 mL IntraVENous Q8H  
 sodium chloride (NS) flush 5-40 mL  5-40 mL IntraVENous PRN  polyethylene glycol (MIRALAX) packet 17 g  17 g Oral DAILY PRN  prochlorperazine (COMPAZINE) with saline injection 5 mg  5 mg IntraVENous Q6H PRN  
 cefepime (MAXIPIME) 2 g in 0.9% sodium chloride (MBP/ADV) 100 mL MBP  2 g IntraVENous Q12H  
 insulin lispro (HUMALOG) injection   SubCUTAneous AC&HS  
 glucose chewable tablet 16 g  4 Tab Oral PRN  
 dextrose (D50W) injection syrg 12.5-25 g  12.5-25 g IntraVENous PRN  
 glucagon (GLUCAGEN) injection 1 mg  1 mg IntraMUSCular PRN  
 heparin (porcine) injection 4,000 Units  4,000 Units IntraVENous ONCE  
 heparin 25,000 units in D5W 250 ml infusion  12-25 Units/kg/hr IntraVENous TITRATE  vancomycin - pharmacy to dose   Other Rx Dosing/Monitoring  [START ON 12/29/2020] vancomycin (VANCOCIN) 1250 mg in  ml infusion  1,250 mg IntraVENous Q16H  
 zinc oxide-cod liver oil (DESITIN) 40 % paste   Topical Q12H  
 acetaminophen (TYLENOL) tablet 650 mg  650 mg Oral Q6H PRN Or  
 acetaminophen (TYLENOL) suppository 650 mg  650 mg Rectal Q6H PRN  
 guaiFENesin-dextromethorphan (ROBITUSSIN DM) 100-10 mg/5 mL syrup 5 mL  5 mL Oral Q4H PRN  
 dexAMETHasone (DECADRON) tablet 6 mg  6 mg Oral DAILY  [START ON 12/29/2020] cholecalciferol (VITAMIN D3) (1000 Units /25 mcg) tablet 2 Tab  2,000 Units Oral DAILY  remdesivir 200 mg in 0.9% sodium chloride 250 mL IVPB  200 mg IntraVENous ONCE Followed by  
Chicho Colby ON 12/29/2020] remdesivir 100 mg in 0.9% sodium chloride 250 mL IVPB  100 mg IntraVENous Q24H  
 [START ON 12/29/2020] furosemide (LASIX) injection 20 mg  20 mg IntraVENous DAILY Current Outpatient Medications Medication Sig  temazepam (RESTORIL) 15 mg capsule TAKE 1 CAPSULE BY MOUTH AT BEDTIME AS NEEDED FOR SLEEP.  furosemide (Lasix) 20 mg tablet Take 20 mg by mouth daily.  losartan (COZAAR) 100 mg tablet Take 1 Tab by mouth daily.  amLODIPine (NORVASC) 5 mg tablet Take 1 Tab by mouth daily.  atorvastatin (LIPITOR) 40 mg tablet Take 1 Tab by mouth nightly for 60 days.  aspirin 81 mg chewable tablet Take 1 Tab by mouth daily.  acetaminophen (TYLENOL) 325 mg tablet Take 2 Tabs by mouth every four (4) hours as needed for Pain.  metoprolol tartrate (LOPRESSOR) 50 mg tablet Take 1 Tab by mouth two (2) times a day for 60 days.  senna-docusate (PERICOLACE) 8.6-50 mg per tablet Take 1 Tab by mouth daily as needed for Constipation.  doxazosin (CARDURA) 4 mg tablet TAKE 1 TABLET BY MOUTH  DAILY (Patient taking differently: Take 4 mg by mouth.)  finasteride (PROSCAR) 5 mg tablet TAKE 1 TABLET BY MOUTH  DAILY (Patient taking differently: Take 5 mg by mouth.)  metFORMIN ER (GLUCOPHAGE XR) 500 mg tablet TAKE 1 TABLET BY MOUTH  TWICE DAILY WITH MEALS  glipiZIDE SR (GLUCOTROL XL) 5 mg CR tablet TAKE 1 TABLET BY MOUTH  DAILY (Patient taking differently: Take 5 mg by mouth.)  sertraline (ZOLOFT) 100 mg tablet TAKE 1 TABLET BY MOUTH  DAILY  fluticasone propionate (FLONASE) 50 mcg/actuation nasal spray ADMINISTER 1 Spray IN Both Nostrils two (2) times a day.  menthol-zinc oxide (CALMOSEPTINE) 0.44-20.6 % oint Apply to bilateral buttocks TID  Indications: skin irritation  OTHER Hearing evaluation for possible changes in hearing  tiZANidine (ZANAFLEX) 2 mg tablet Take 1 Tab by mouth three (3) times daily as needed for Pain.  glucose blood VI test strips (TRUE METRIX GLUCOSE TEST STRIP) strip Check BS BID  Dx: DM  E11.21  
 cholecalciferol (VITAMIN D3) 1,000 unit cap Take 1 Cap by mouth daily.  magnesium 250 mg tab Take 1 Tab by mouth daily.  sodium chloride (OCEAN) 0.65 % nasal squeeze bottle 0.05 mL by Both Nostrils route as needed for Congestion.  FERROUS FUMARATE/VIT BCOMP,C (SUPER B COMPLEX PO) Take  by mouth.  
 
______________________________________________________________________ EXPECTED LENGTH OF STAY: - - - 
ACTUAL LENGTH OF STAY:          1 Bethany Hays MD

## 2020-12-28 NOTE — ED PROVIDER NOTES
HPI .  Patient has a history of BPH, hypertension, diabetes, hyperlipidemia, third-degree heart block, pacemaker, CABG, and cholecystectomy. Patient was admitted several months ago. He says he was in the hospital about a month and has been in rehab for a month and then home with his wife for about a week. He has had an ulcer on the bottom of his right foot distal laterally. He apparently has some chronic congestion and cough. He was checked for Covid twice about a month ago and several weeks ago. He says he was negative. Patient presents with increased cough and shortness of breath. Past Medical History:  
Diagnosis Date  BPH (benign prostatic hyperplasia)  CAD (coronary artery disease)  Diabetes ([facility] [address]  Hearing loss  Hypercholesterolemia  Hypertension  Murmur  Recurrent depression ([facility] [address] 2019  Third degree AV block ([facility] [address] 10/30/2020 Past Surgical History:  
Procedure Laterality Date  CARDIAC SURG PROCEDURE UNLIST  2006 by-pass  HX CHOLECYSTECTOMY  SC INS NEW/RPLCMT PRM PM W/TRANSV ELTRD ATRIAL&VENT  10/30/2020  SC INS NEW/RPLCMT PRM PM W/TRANSV ELTRD ATRIAL&VENT N/A 10/30/2020 INSERT PPM DUAL performed by Jonathan Pascual MD at [facility] [address] CATH LAB Family History:  
Problem Relation Age of Onset  Cancer Mother  Cancer Father Social History Socioeconomic History  Marital status:  Spouse name: Not on file  Number of children: Not on file  Years of education: Not on file  Highest education level: Not on file Occupational History  Not on file Social Needs  Financial resource strain: Not on file  Food insecurity Worry: Not on file Inability: Not on file  Transportation needs Medical: Not on file Non-medical: Not on file Tobacco Use  Smoking status: Former Smoker Types: Cigarettes Quit date: 1990 Years since quittin.3  Smokeless tobacco: Never Used Substance and Sexual Activity  Alcohol use: No  
 Drug use: No  
 Sexual activity: Not on file Comment:  has 3 children Lifestyle  Physical activity Days per week: Not on file Minutes per session: Not on file  Stress: Not on file Relationships  Social connections Talks on phone: Not on file Gets together: Not on file Attends Confucianist service: Not on file Active member of club or organization: Not on file Attends meetings of clubs or organizations: Not on file Relationship status: Not on file  Intimate partner violence Fear of current or ex partner: Not on file Emotionally abused: Not on file Physically abused: Not on file Forced sexual activity: Not on file Other Topics Concern  Not on file Social History Narrative  Not on file ALLERGIES: Procaine Review of Systems All other systems reviewed and are negative. There were no vitals filed for this visit. Physical Exam 
Vitals signs and nursing note reviewed. Constitutional:   
   Appearance: He is well-developed. HENT:  
   Head: Normocephalic and atraumatic. Eyes:  
   Pupils: Pupils are equal, round, and reactive to light. Neck: Musculoskeletal: Normal range of motion and neck supple. Cardiovascular:  
   Rate and Rhythm: Normal rate. Rhythm irregular. Heart sounds: Normal heart sounds. No murmur. No friction rub. No gallop. Comments: Pacemaker; sternotomy scar Pulmonary:  
   Effort: Pulmonary effort is normal. No respiratory distress. Breath sounds: No wheezing or rales. Comments: Scattered inspiratory and expiratory rhonchi; respiratory rate; 30-40 Abdominal:  
   Palpations: Abdomen is soft. Tenderness: There is no abdominal tenderness. There is no rebound. Musculoskeletal: Normal range of motion. General: No tenderness. Comments: Right greater than left lower extremity edema; healing ulcer relatively large bottom of right foot Skin: 
   Findings: No erythema. Neurological:  
   Mental Status: He is alert. Cranial Nerves: No cranial nerve deficit. Comments: Motor; symmetric Psychiatric:     
   Behavior: Behavior normal.  
 
  
 
MDM Procedures ED EKG interpretation: 
Rhythm: normal sinus rhythm, paced and PVC's; and irregular. Rate (approx.): 85; Axis: normal; P wave: normal; QRS interval: prolonged; ST/T wave: non-specific changes; in  Lead: ; Other findings: . This EKG was interpreted by Ene Castillo MD,ED Provider. 10:31 PM 
 
 
 
   
Perfect Serve Consult for Admission 11:30 PM 
 
ED Room Number: GZ76/18 Patient Name and age:  oLve Bates 82 y.o.  male Working Diagnosis:  
1. SOB (shortness of breath) 2. Tachypnea 3. Pulmonary infiltrate 4. Elevated lactic acid level COVID-19 Suspicion:  yes Sepsis present:  no  Reassessment needed: yes Code Status:  Full Code Readmission: no 
Isolation Requirements:  yes Recommended Level of Care:  telemetry Department:[facility] Adult ED - [phone] Other: 2 months of hospitalization and rehab; home a week; increasingly short of breath with respiratory rate 35 at present; Dr Desou[facility] ER physician is taking over this case

## 2020-12-28 NOTE — PROGRESS NOTES
[facility] Progress Note Genaro Jordan DO 
  [phone] Office [phone]  Fax 
 
2020 Assessment & Plan:  
1. COVID-19 pneumonia. Requiring oxygen. Discussed remdesivir with patient and he is agreeable to start this drug. Dexamethasone started today by the hospitalist.  Jaylen Lilly of care per primary team 
2. Diabetic foot infection. CT scan negative for osteomyelitis however wound probes to bone of the right fifth metatarsal head which is concerning for osteomyelitis. Culture taken by podiatry today. Continue empiric vancomycin and cefepime. Will discuss nuclear medicine scan with radiology 3. Third-degree heart block with history of pacemaker 4. Diabetes mellitus 5. Aortic stenosis status post TAVR Subjective: No complaints. Agreeable to remdesivir therapy Objective:  
 
Vitals:  
Visit Vitals BP (!) 107/59 (BP 1 Location: Right arm, BP Patient Position: At rest) Pulse 94 Temp 97.7 °F (36.5 °C) Resp 25 Ht 6' 2\" (1.88 m) Wt 179 lb 8 oz (81.4 kg) SpO2 94% BMI 23.05 kg/m² Tmax:  Temp (24hrs), Av.8 °F (36.6 °C), Min:97.6 °F (36.4 °C), Max:98.3 °F (36.8 °C) Exam:  
Patient is intubated:  no 
 
Physical Examination:  
General:  Alert, cooperative, no distress Head:  Normocephalic, atraumatic. Eyes:  Conjunctivae clear Neck: Supple Lungs:   No distress. Chest wall:    
Heart:  Regular rate and rhythm Abdomen:   Soft, non-tender, non-distended Extremities: Moves all. Skin:  No rash. Wound at base of the right fifth metatarsal.  No periwound erythema or purulence noted Neurologic: CNII-XII intact. Normal strength Labs:     
 
No lab exists for component: ITNL Recent Labs  
  20 
1508 20 
0459 TROIQ 0.92* 0.93* Recent Labs  
  20 
0459 20 
2200  139  
K 3.9 3.6  105 CO2 33* 31 BUN 22* 23* CREA 0.87 0.99 * 287* PHOS 3.3  --   
MG  --  1.7 ALB 2.7* 2.4* WBC 6.2 5.6 HGB 10.9* 10.2* HCT 35.6* 32.2*  
* 110* Recent Labs  
  12/28/20 
1504 12/28/20 
3428 APTT 127.0* 41.5* Needs: urine analysis, urine sodium, protein and creatinine Lab Results Component Value Date/Time Creatinine, urine 121.7 08/13/2020 11:20 AM  
 
 
 
Cultures: No results found for: SDES Lab Results Component Value Date/Time Culture result: NO GROWTH AFTER 4 HOURS 12/27/2020 10:00 PM  
 Culture result: NO GROWTH 5 DAYS 11/11/2020 10:51 PM  
 Culture result: NO GROWTH 5 DAYS 10/23/2020 06:35 PM  
 
 
Radiology:  
 
Medications Current Facility-Administered Medications Medication Dose Route Frequency Last Admin  amLODIPine (NORVASC) tablet 5 mg  5 mg Oral DAILY 5 mg at 12/28/20 0841  
 aspirin chewable tablet 81 mg  81 mg Oral DAILY 81 mg at 12/28/20 0841  
 atorvastatin (LIPITOR) tablet 40 mg  40 mg Oral QHS  doxazosin (CARDURA) tablet 4 mg  4 mg Oral QHS  finasteride (PROSCAR) tablet 5 mg  5 mg Oral DAILY Stopped at 12/28/20 0900  furosemide (LASIX) injection 40 mg  40 mg IntraVENous DAILY 40 mg at 12/28/20 0503  fluticasone propionate (FLONASE) 50 mcg/actuation nasal spray 1 Spray  1 Spray Both Nostrils DAILY Stopped at 12/28/20 0900  losartan (COZAAR) tablet 100 mg  100 mg Oral DAILY Stopped at 12/28/20 0900  
 metoprolol tartrate (LOPRESSOR) tablet 50 mg  50 mg Oral BID 50 mg at 12/28/20 4641  sertraline (ZOLOFT) tablet 100 mg  100 mg Oral QPM    
 temazepam (RESTORIL) capsule 15 mg  15 mg Oral QHS PRN    
 sodium chloride (OCEAN) 0.65 % nasal squeeze bottle 1 Spray  1 Spray Both Nostrils PRN    
 sodium chloride (NS) flush 5-40 mL  5-40 mL IntraVENous Q8H 10 mL at 12/28/20 3480  sodium chloride (NS) flush 5-40 mL  5-40 mL IntraVENous PRN  polyethylene glycol (MIRALAX) packet 17 g  17 g Oral DAILY PRN    
  prochlorperazine (COMPAZINE) with saline injection 5 mg  5 mg IntraVENous Q6H PRN    
 cefepime (MAXIPIME) 2 g in 0.9% sodium chloride (MBP/ADV) 100 mL MBP  2 g IntraVENous Q12H Stopped at 12/28/20 4696  insulin lispro (HUMALOG) injection   SubCUTAneous AC&HS Stopped at 12/28/20 1630  
 glucose chewable tablet 16 g  4 Tab Oral PRN    
 dextrose (D50W) injection syrg 12.5-25 g  12.5-25 g IntraVENous PRN    
 glucagon (GLUCAGEN) injection 1 mg  1 mg IntraMUSCular PRN    
 heparin (porcine) injection 4,000 Units  4,000 Units IntraVENous ONCE    
 heparin 25,000 units in D5W 250 ml infusion  12-25 Units/kg/hr IntraVENous TITRATE Stopped at 12/28/20 1613  
 vancomycin - pharmacy to dose   Other Rx Dosing/Monitoring  [START ON 12/29/2020] vancomycin (VANCOCIN) 1250 mg in  ml infusion  1,250 mg IntraVENous Q16H    
 zinc oxide-cod liver oil (DESITIN) 40 % paste   Topical Q12H    
 acetaminophen (TYLENOL) tablet 650 mg  650 mg Oral Q6H PRN Or  
 acetaminophen (TYLENOL) suppository 650 mg  650 mg Rectal Q6H PRN    
 guaiFENesin-dextromethorphan (ROBITUSSIN DM) 100-10 mg/5 mL syrup 5 mL  5 mL Oral Q4H PRN    
 dexAMETHasone (DECADRON) tablet 6 mg  6 mg Oral DAILY  [START ON 12/29/2020] cholecalciferol (VITAMIN D3) (1000 Units /25 mcg) tablet 2 Tab  2,000 Units Oral DAILY  remdesivir 200 mg in 0.9% sodium chloride 250 mL IVPB  200 mg IntraVENous ONCE Followed by  
Adalgisa Johnston ON 12/29/2020] remdesivir 100 mg in 0.9% sodium chloride 250 mL IVPB  100 mg IntraVENous Q24H Current Outpatient Medications Medication Sig Dispense  temazepam (RESTORIL) 15 mg capsule TAKE 1 CAPSULE BY MOUTH AT BEDTIME AS NEEDED FOR SLEEP. 30 Cap  furosemide (Lasix) 20 mg tablet Take 20 mg by mouth daily.  losartan (COZAAR) 100 mg tablet Take 1 Tab by mouth daily. 30 Tab  amLODIPine (NORVASC) 5 mg tablet Take 1 Tab by mouth daily. 30 Tab  atorvastatin (LIPITOR) 40 mg tablet Take 1 Tab by mouth nightly for 60 days. 30 Tab  aspirin 81 mg chewable tablet Take 1 Tab by mouth daily. 30 Tab  acetaminophen (TYLENOL) 325 mg tablet Take 2 Tabs by mouth every four (4) hours as needed for Pain.  metoprolol tartrate (LOPRESSOR) 50 mg tablet Take 1 Tab by mouth two (2) times a day for 60 days. 60 Tab  senna-docusate (PERICOLACE) 8.6-50 mg per tablet Take 1 Tab by mouth daily as needed for Constipation.  doxazosin (CARDURA) 4 mg tablet TAKE 1 TABLET BY MOUTH  DAILY (Patient taking differently: Take 4 mg by mouth.) 90 Tab  finasteride (PROSCAR) 5 mg tablet TAKE 1 TABLET BY MOUTH  DAILY (Patient taking differently: Take 5 mg by mouth.) 90 Tab  metFORMIN ER (GLUCOPHAGE XR) 500 mg tablet TAKE 1 TABLET BY MOUTH  TWICE DAILY WITH MEALS 180 Tab  glipiZIDE SR (GLUCOTROL XL) 5 mg CR tablet TAKE 1 TABLET BY MOUTH  DAILY (Patient taking differently: Take 5 mg by mouth.) 90 Tab  sertraline (ZOLOFT) 100 mg tablet TAKE 1 TABLET BY MOUTH  DAILY 90 Tab  fluticasone propionate (FLONASE) 50 mcg/actuation nasal spray ADMINISTER 1 Spray IN Both Nostrils two (2) times a day. 16 g  
 menthol-zinc oxide (CALMOSEPTINE) 0.44-20.6 % oint Apply to bilateral buttocks TID  Indications: skin irritation 113 g  
 OTHER Hearing evaluation for possible changes in hearing 1 Each  tiZANidine (ZANAFLEX) 2 mg tablet Take 1 Tab by mouth three (3) times daily as needed for Pain. 20 Tab  glucose blood VI test strips (TRUE METRIX GLUCOSE TEST STRIP) strip Check BS BID  Dx: DM  E11.21 100 Strip  cholecalciferol (VITAMIN D3) 1,000 unit cap Take 1 Cap by mouth daily. 30 Cap  magnesium 250 mg tab Take 1 Tab by mouth daily. 30 Tab  sodium chloride (OCEAN) 0.65 % nasal squeeze bottle 0.05 mL by Both Nostrils route as needed for Congestion. 30 mL  FERROUS FUMARATE/VIT BCOMP,C (SUPER B COMPLEX PO) Take  by mouth.    
 
 
 
 
Case discussed with: 
 
 
 Britney Reyna, DO

## 2020-12-28 NOTE — WOUND CARE
Wound Care Note:  
 
New consult placed by physician request for sacral and foot wounds Patient on Droplet Plus Precautions for COVID-19 positive. PPE:  N95, face shield, gown and gloves. Chart shows: 
Admitted for acute on chronic diastolic heart failure Past Medical History:  
Diagnosis Date  Acute on chronic diastolic (congestive) heart failure ([facility] 12/27/2020  BPH (benign prostatic hyperplasia)  CAD (coronary artery disease)  Diabetes ([facility]  Hearing loss  Hypercholesterolemia  Hypertension  Murmur  Recurrent depression ([facility] 8/22/2019  Third degree AV block ([facility] 10/30/2020 WBC = 6.2 on 12/28/20 Admitted from home Assessment:  
Patient is groggy, opens eyes, slow to respond but answers questions, incontinent with some assistance needed in repositioning. Bed: ED Stretcher Patient wearing briefs for incontinence and has a condom catheter in place. Diet: Diabetic consistent carb regular Patient reports no pain. Bilateral heels skin intact and without erythema. Sacral skin intact with light erythema that is blanchable. 1. POA right 5th metatarsal head, plantar aspect with diabetic foot ulcer measuring 2.5 cm x 2.3 cm x 0.2 cm, there is a small area that is 0.2 cm deep, wound bed is pink/red, small serous drainage, wound edges are open, tanisha-wound intact without erythema. Xeroform gauze, 4 x 4 and roll gauze applied. 2.  POA bilateral upper buttocks with moisture associated skin damage, right buttock with approximately 3 linear lines that are approximately 0.2 cm deep, wound beds are pink, non-blanchable, wound edges are open, tanisha-wound with moisture damage. Linear lines could be from scratching. Desitin to be ordered. 3. POA bilateral lower legs and right dorsal foot with linear lines that appear to be excoriation, crusted over, no drainage. Lower legs are dry, flaky. Nourishing Skin Cream to be ordered. Spoke with Dr. Radhika Jernigan, wound care orders obtained. Will defer right foot to podiatry. Patient repositioned supine. Heels offloaded on pillow. Recommendations:   
Bilateral buttocks- Every 12 hours and as needed cleanse soiled cream with soap and water, blot dry, apply thin coat of Desitin. Bilateral lower legs- Every 12 hours apply Nourishing Skin Cream (purple tube). Right 5th metatarsal- Defer to podiatry. My recommendation: Every other day cleanse with normal saline, wipe wound bed clean and dry, apply a piece of [employer] and cover. Skin Care & Pressure Prevention: 
Minimize layers of linen/pads under patient to optimize support surface. Turn/reposition approximately every 2 hours and offload heels. Manage incontinence / promote continence Nourishing Skin Cream to dry skin, minimize use of briefs when able Discussed above plan with patient & Linnette RN Transition of Care: Plan to follow as needed while admitted to hospital. 
 
DELFINA Robbins, RN, [facility], [facility]. 
office [phone] 
pager [phone] or call  to linnette

## 2020-12-28 NOTE — CONSULTS
[place]: Alan Noyolas 
[phone] Cardiology valvular heart disease progress note Requesting/referring provider: Dr. Amaya Jamison, Dr. Nancy Childs, Dr. Catarino Lee Reason for Consult: Valvular heart disease HPI: Love Bates, a 82y.o. year-old who presents for evaluation of dyspnea that has been progressive over past few days, associated with cough with sputum and occasionally worse laying flat. He has significant history of coronary artery disease with remote coronary artery bypass grafting with LIMA to LAD, vein graft to OM, vein graft to RPDA. Has preserved EF and about 2 months ago, he underwent transcatheter aortic valve replacement with 34 mm evolute transcatheter prosthesis. He also had evidence of AV block and underwent pacemaker placement during the hospitalization. Unfortunately due to antibiotics given for pacemaker placement he developed diarrhea and had a rehospitalization about 8weeks ago. He is now recovered from that. After spending a few weeks in rehab, he was discharged 5 weeks ago and was doing well until last week when he developed dyspnea. He has not recovered his overall strength since being in the hospital in November. Investigations reviewed by me: 
ECG October 2020: First ECG: Sinus rhythm, first-degree AV block, nonspecific ST-T changes;  
second ECG multifocal atrial tachycardia, high degree AVblock, nonspecific ST-T changes Echocardiogram October 2020: Normal LV function, likely severe aortic stenosis with peak transaortic velocity of 3.8 to 3.9 m/s, mean gradient of 35 mmHg, calculated aortic valve area by continued equation of 0.8 cm² by mean gradient. Visibly the valve looks extremely calcified with significant restriction of motion. There is moderate to severe mitral annular calcification extending inferior to the mitral valve annulus along the posterior left ventricular wall. Transmitral gradients are also increased at 6mmHg mean gradient consistent with possible mild mitral stenosis. Leaflets were difficult to visualize but do not appear typical for rheumatic mitral stenosis despite history of rheumatic fever as a kid. No significant pulmonary hypertension is noted. Left atrium is significantly enlarged. ECG 5 PM October 28 2020: QRS down 202 ms, NJ interval down to about 300 to 350 ms, essentially similar to what it was on ECG on 19 October. Echocardiogram November 2020 post TAVR: Normal LV function, mild PVL, mild functional mitral stenosis, appropriate gradients across aortic transcatheter prosthesis. EOA was not properly calculated. CTA: Upper lobe GGO --pneumonia vs edema with b/l effusions small. COVID 19 rapid  test positive Assessment/Plan: 1. Severe symptomatic calcific senile aortic stenosis s/p TAVR with 34 mm evolute R prosthesis 2. Hypertension 3. CKD 4. History of remote smoking 5. Mitral annular calcification with possible mild mitral stenosis 6. Multifocal atrial tachycardia/atrial fibrillation 7. Coronary disease status post coronary bypass grafting remotely with LIMA to LAD, vein graft to OM, vein graft to PDA 8. Preserved LV systolic function 9. Recent syncope of uncertain etiology possibly contributed by severe aortic stenosis in the setting of atrial arrhythmia and anti HTN meds. 10.  History of peripheral arterial disease with prior bilateral common iliac stents, known bilateral common femoral calcification extending in the distal common femoral into bifurcation with bilateral SFA and infrapopliteal disease. Likely bilateral femoral endarterectomies have been performed in the past as well. 11. Tachy masood syndrome history of AV block and syncope. Now status post dual-chamber pacemaker 12. Severe left subclavian artery stenosis s/p recent angioplasty to reduce 80% stenosis down to about 50%. Left arm blood pressures may be unreliable 13. Possible COVID 19 infection 14. Sacral and foot ulcer--non healing Mr. Santosh Moraes is admitted to the hospital for increasing dyspnea. His LV function is normal and his aortic valve disease has already been addressed with TAVR with good results, no residual stenosis and mild PVL. ON last echo, he him mild diastolic dysfunction at best. While he does have evidence b/l effusions, his overall pictures is less suggestive of CHF and symptoms are more likely from COVID 19 infection. Maintenance 20 mg daily Lasix as diuretics should be sufficient. May continue home anti HTN is BP allows. No additional intervention from cardiac standpoint. HE appears to be in AF currently--previously mostly in MAT. However due to fall risk, anemia, thrombocytopenia, not the best candidate for [address]. [x]    High complexity decision making was performed 
[]    Patient is at high-risk of decompensation with multiple organ involvement He  has a past medical history of Acute on chronic diastolic (congestive) heart failure ([facility] [address] (2020), BPH (benign prostatic hyperplasia), CAD (coronary artery disease), Diabetes ([facility] Hearing loss, Hypercholesterolemia, Hypertension, Murmur, Recurrent depression ([facility] (2019), and Third degree [facility] block ([facility] (10/30/2020). He also has no past medical history of Anemia, Arthritis, Asthma, Autoimmune disease ([facility] Calculus of kidney, Cancer ([facility] Chronic kidney disease, Chronic obstructive pulmonary disease ([facility] [facility] Chronic pain, GERD (gastroesophageal reflux disease), Headache, Liver disease, Psychotic disorder ([facility] PUD (peptic ulcer disease), Seizures ([facility] Stroke ([facility] Thromboembolus ([facility] Thyroid disease, or Trauma. Review of system:Patient reports no dyspnea/PND/Orthpnea/CP. He reports no cough/fever/focal neurological deficits/abdominal pain. All other systems negative except as above. Family History Problem Relation Age of Onset  Cancer Mother  Cancer Father Social History Socioeconomic History  Marital status:  Spouse name: Not on file  Number of children: Not on file  Years of education: Not on file  Highest education level: Not on file Tobacco Use  Smoking status: Former Smoker Types: Cigarettes Quit date: 1990 Years since quittin.3  Smokeless tobacco: Never Used Substance and Sexual Activity  Alcohol use: No  
 Drug use: No  
  
PE Vitals:  
 20 0841 20 0930 20 1000 20 1030 BP: (!) 153/59 (!) 144/85 (!) 120/58 121/60 Pulse: (!) 108 (!) 107 99 87 Resp:  (!) 34 28 30 Temp:      
SpO2:  99% 98% 98% Weight:      
Height:      
 Body mass index is 23.05 kg/m². General:    Alert, cooperative, no distress. Psychiatric:    Normal Mood and affect Eye/ENT:      Pupils equal, No asymmetry, Conjunctival pink. Able to hear voice at normal amplitude Lungs: Visibly symmetric chest expansion, No palpable tenderness. Clear to auscultation bilaterally. Heart[de-identified]    Regular rate and rhythm, S1, [id number], mid to late peaking mid systolic murmur, click, rub or gallop. No JVD, Normal palpable peripheral pulses. No cyanosis Abdomen:     Soft, non-tender. Bowel sounds normal. No masses,  No   
  organomegaly. Extremities:   Extremities normal, atraumatic, no edema. Neurologic:   CN II-XII grossly intact. No gross focal deficits Recent Labs: 
Lab Results Component Value Date/Time Cholesterol, total 114 2020 03:19 PM  
 HDL Cholesterol 40 2020 03:19 PM  
 LDL, calculated 54 2020 03:19 PM  
 Triglyceride 101 2020 03:19 PM  
 
Lab Results Component Value Date/Time Creatinine 0.87 2020 04:59 AM  
 
Lab Results Component Value Date/Time BUN 22 (H) 2020 04:59 AM  
 
Lab Results Component Value Date/Time Potassium 3.9 2020 04:59 AM  
 
Lab Results Component Value Date/Time Hemoglobin A1c 7.1 (H) 10/18/2020 03:27 PM  
 
Lab Results Component Value Date/Time HGB 10.9 (L) 2020 04:59 AM  
 
Lab Results Component Value Date/Time PLATELET 532 (L)  04:59 AM  
 
 
Reviewed: 
Past Medical History:  
Diagnosis Date  Acute on chronic diastolic (congestive) heart failure ([address] 2020  BPH (benign prostatic hyperplasia)  CAD (coronary artery disease)  Diabetes ([address]  Hearing loss  Hypercholesterolemia  Hypertension  Murmur  Recurrent depression ([address] 2019  Third degree AV block ([address] 10/30/2020 Social History Tobacco Use Smoking Status Former Smoker  Types: Cigarettes  Quit date: 1990  Years since quittin.3 Smokeless Tobacco Never Used Social History Substance and Sexual Activity Alcohol Use No  
 
Allergies Allergen Reactions  Procaine Other (comments) Pt stated he passed out from procaine and was told not to let anyone use it on him again Family History Problem Relation Age of Onset  Cancer Mother  Cancer Father Current Facility-Administered Medications Medication Dose Route Frequency  amLODIPine (NORVASC) tablet 5 mg  5 mg Oral DAILY  aspirin chewable tablet 81 mg  81 mg Oral DAILY  atorvastatin (LIPITOR) tablet 40 mg  40 mg Oral QHS  doxazosin (CARDURA) tablet 4 mg  4 mg Oral QHS  finasteride (PROSCAR) tablet 5 mg  5 mg Oral DAILY  furosemide (LASIX) injection 40 mg  40 mg IntraVENous DAILY  fluticasone propionate (FLONASE) 50 mcg/actuation nasal spray 1 Spray  1 Spray Both Nostrils DAILY  losartan (COZAAR) tablet 100 mg  100 mg Oral DAILY  metoprolol tartrate (LOPRESSOR) tablet 50 mg  50 mg Oral BID  sertraline (ZOLOFT) tablet 100 mg  100 mg Oral QPM  
 temazepam (RESTORIL) capsule 15 mg  15 mg Oral QHS PRN  tiZANidine (ZANAFLEX) tablet 2 mg  2 mg Oral TID PRN  
 sodium chloride (OCEAN) 0.65 % nasal squeeze bottle 1 Spray  1 Spray Both Nostrils PRN  
 sodium chloride (NS) flush 5-40 mL  5-40 mL IntraVENous Q8H  
 sodium chloride (NS) flush 5-40 mL  5-40 mL IntraVENous PRN  
 acetaminophen (TYLENOL) tablet 650 mg  650 mg Oral Q6H PRN Or  
 acetaminophen (TYLENOL) suppository 650 mg  650 mg Rectal Q6H PRN  polyethylene glycol (MIRALAX) packet 17 g  17 g Oral DAILY PRN  prochlorperazine (COMPAZINE) with saline injection 5 mg  5 mg IntraVENous Q6H PRN  
 cefepime (MAXIPIME) 2 g in 0.9% sodium chloride (MBP/ADV) 100 mL MBP  2 g IntraVENous Q12H  
 vancomycin (VANCOCIN) 750 mg in 0.9% sodium chloride 250 mL (VIAL-MATE)  750 mg IntraVENous Q12H  
 insulin lispro (HUMALOG) injection   SubCUTAneous AC&HS  
 glucose chewable tablet 16 g  4 Tab Oral PRN  
 dextrose (D50W) injection syrg 12.5-25 g  12.5-25 g IntraVENous PRN  
 glucagon (GLUCAGEN) injection 1 mg  1 mg IntraMUSCular PRN  
  VANCOMYCIN INFORMATION NOTE   Other PRN  
 heparin (porcine) injection 4,000 Units  4,000 Units IntraVENous ONCE  
 heparin 25,000 units in D5W 250 ml infusion  12-25 Units/kg/hr IntraVENous TITRATE Current Outpatient Medications Medication Sig  temazepam (RESTORIL) 15 mg capsule TAKE 1 CAPSULE BY MOUTH AT BEDTIME AS NEEDED FOR SLEEP.  furosemide (Lasix) 20 mg tablet Take 20 mg by mouth daily.  losartan (COZAAR) 100 mg tablet Take 1 Tab by mouth daily.  amLODIPine (NORVASC) 5 mg tablet Take 1 Tab by mouth daily.  atorvastatin (LIPITOR) 40 mg tablet Take 1 Tab by mouth nightly for 60 days.  aspirin 81 mg chewable tablet Take 1 Tab by mouth daily.  acetaminophen (TYLENOL) 325 mg tablet Take 2 Tabs by mouth every four (4) hours as needed for Pain.  metoprolol tartrate (LOPRESSOR) 50 mg tablet Take 1 Tab by mouth two (2) times a day for 60 days.  senna-docusate (PERICOLACE) 8.6-50 mg per tablet Take 1 Tab by mouth daily as needed for Constipation.  doxazosin (CARDURA) 4 mg tablet TAKE 1 TABLET BY MOUTH  DAILY (Patient taking differently: Take 4 mg by mouth.)  finasteride (PROSCAR) 5 mg tablet TAKE 1 TABLET BY MOUTH  DAILY (Patient taking differently: Take 5 mg by mouth.)  metFORMIN ER (GLUCOPHAGE XR) 500 mg tablet TAKE 1 TABLET BY MOUTH  TWICE DAILY WITH MEALS  glipiZIDE SR (GLUCOTROL XL) 5 mg CR tablet TAKE 1 TABLET BY MOUTH  DAILY (Patient taking differently: Take 5 mg by mouth.)  sertraline (ZOLOFT) 100 mg tablet TAKE 1 TABLET BY MOUTH  DAILY  fluticasone propionate (FLONASE) 50 mcg/actuation nasal spray ADMINISTER 1 Spray IN Both Nostrils two (2) times a day.  menthol-zinc oxide (CALMOSEPTINE) 0.44-20.6 % oint Apply to bilateral buttocks TID  Indications: skin irritation  OTHER Hearing evaluation for possible changes in hearing  tiZANidine (ZANAFLEX) 2 mg tablet Take 1 Tab by mouth three (3) times daily as needed for Pain.  glucose blood VI test strips (TRUE METRIX GLUCOSE TEST STRIP) strip Check BS BID  Dx: DM  E11.21  
 cholecalciferol (VITAMIN D3) 1,000 unit cap Take 1 Cap by mouth daily.  magnesium 250 mg tab Take 1 Tab by mouth daily.  sodium chloride (OCEAN) 0.65 % nasal squeeze bottle 0.05 mL by Both Nostrils route as needed for Congestion.  FERROUS FUMARATE/VIT BCOMP,C (SUPER B COMPLEX PO) Take  by mouth. Carissa Warner MD12/28/20 ATTENTION:  
This medical record was transcribed using an electronic medical records/speech recognition system. Although proofread, it may and can contain electronic, spelling and other errors. Corrections may be executed at a later time. Please feel free to contact us for any clarifications as needed. [facility] [facility] [place] [address], [address]

## 2020-12-28 NOTE — H&P
[address] HISTORY AND PHYSICAL Name:  Smooth Winn 
MR#:  [id number] :  1935 ACCOUNT #:  [de-identified] ADMIT DATE:  2020 The patient was seen, evaluated, and admitted by me on 2020. PRIMARY CARE PHYSICIAN:  Flavia Bueno DO 
 
SOURCE OF INFORMATION:  The patient and review of ED and old medical records. CHIEF COMPLAINT:  Shortness of breath. HISTORY OF PRESENT ILLNESS:  This is an 82-year-old man with a past medical history significant for hypertension; chronic diastolic congestive heart failure; dyslipidemia; third degree heart block, status post pacemaker insertion; coronary artery disease, status post CABG; benign prostatic hyperplasia; type 2 diabetes; aortic stenosis, status post transcatheter aortic valve replacement TAVR, who was in his usual state of health until a few days ago when the patient developed shortness of breath which is progressive and getting worse. The shortness of breath is worse when the patient is trying to lie down flat and also with activities such as walking. The shortness of breath is associated with cough which is productive of yellowish sputum. The patient was brought to the emergency room because of worsening symptoms. The patient was recently admitted to this hospital from 2020 to 2020. The patient was admitted for evaluation of generalized weakness. Following that evaluation, he was discharged to rehab. The patient has been home for about a week from rehab. The patient was found to have right diabetic foot ulcer on arrival at the emergency room. He was referred to the Hospitalist Service for evaluation for admission. The patient stated that he has been taking these medications as prescribed including his diuretic. PAST MEDICAL HISTORY:  Chronic diastolic congestive heart failure; hypertension; dyslipidemia; third degree heart block, status post pacemaker insertion; coronary artery disease, status post CABG; benign prostatic hyperplasia; type 2 diabetes; aortic stenosis, status post TAVR. ALLERGIES:  THE PATIENT IS ALLERGIC TO PROCAINE. CURRENT MEDICATIONS:  Tylenol 650 mg every 4 hours as needed for pain, Norvasc 5 mg daily, aspirin 81 mg daily, Lipitor 40 mg daily at bedtime, Cardura 4 mg daily, Proscar 5 mg daily, Flonase 50 mcg 1 spray into each nostril twice daily, Lasix 20 mg daily, Glucotrol 5 mg daily, losartan 100 mg daily, magnesium 250 mg daily, Glucophage 500 mg twice daily, Lopressor 50 mg twice daily, Zoloft 100 mg daily, Restoril 15 mg daily at bedtime as needed for sleep, Zanaflex 2 mg 3 times daily as needed for muscle spasm. FAMILY HISTORY:  This was reviewed. His mother and father had cancer, the type of cancer is not known. PAST SURGICAL HISTORY:  This is significant for CABG, cholecystectomy, pacemaker insertion, TAVR. SOCIAL HISTORY:  The patient is a former smoker, quit tobacco abuse in 09/1990. No history of alcohol abuse. REVIEW OF SYSTEMS: 
HEAD, EYES, EARS, NOSE, AND THROAT:  No headache, no dizziness, no blurring of vision, no photophobia. RESPIRATORY SYSTEM:  This is positive for shortness of breath and cough. No hemoptysis. CARDIOVASCULAR SYSTEM:  This is positive for orthopnea. No chest pain, no palpitation. GASTROINTESTINAL SYSTEM:  No nausea and vomiting. No diarrhea. No constipation. GENITOURINARY SYSTEM:  No dysuria, no urgency, and no frequency. All other systems are reviewed and they are negative. PHYSICAL EXAMINATION: 
GENERAL APPEARANCE:  The patient appeared ill, in moderate distress. VITAL SIGNS:  On arrival at the emergency room, temperature 98.3, pulse 96, respiratory rate 35, blood pressure 162/56, oxygen saturation 96% on room air. HEENT:  Head:  Normocephalic, atraumatic. Eyes:  Normal eye movement. No redness, no drainage, no discharge. Ears:  Normal external ears with no obvious drainage. Nose:  No deformity and no drainage. Mouth and Throat:  No visible oral lesion. NECK:  Neck is supple. Mild JVD. No thyromegaly. CHEST:  Bilateral basilar crackles and few expiratory wheezing. HEART:  Normal S1 and S2, regular. No clinically appreciable murmur. ABDOMEN:  Soft, nontender. Normal bowel sounds. CNS:  Alert, oriented x3. No gross focal neurological deficit. EXTREMITIES:  Edema 2+. Pulses 2+ bilaterally. MUSCULOSKELETAL SYSTEM:  No obvious joint deformity and swelling. SKIN:  Sacral decubitus ulcer, a right foot ulcer on the lateral aspect, left second toe wound with intact dressing. These findings were present on admission. PSYCHIATRY:  Normal mood and affect. LYMPHATIC SYSTEM:  No cervical lymphadenopathy. DIAGNOSTIC DATA:  EKG shows sinus rhythm, left ventricular hypertrophy, and nonspecific ST-and T-wave abnormalities. Chest x-ray shows left lower lobe atelectasis. LABORATORY DATA:  Hematology:  WBC 5.6, hemoglobin at 10.3, hematocrit 32.2, platelets 015. Magnesium 1.7. Chemistry:  Sodium 139, potassium 3.6, chloride 105, CO2 of 31, glucose 287, BUN 23, creatinine 0.99, calcium 8.0, total bilirubin 0.3, ALT 25, AST 31, alkaline phosphatase 32, total protein 6.0, albumin level 2.4, globulin at 3.6. Lactic acid level 2.2. COVID-19 virus rapid test positive. ASSESSMENT: 
1. Acute on chronic diastolic congestive heart failure. 2.  Hypertension. 3.  Dyslipidemia. 4.  Third degree heart block, status post pacemaker insertion. 5.  Coronary artery disease, status post coronary artery bypass grafting. 6.  Right diabetic foot infection. 7.  Sacral decubitus ulcer. 8.  Suspected bacterial pneumonia. 9.  COVID-19 virus infection. 10.  Benign prostatic hyperplasia. 11.  Type 2 diabetes with hyperglycemia. 12.  Anemia. 13.  Thrombocytopenia. 14.  Elevated lactic acid level. 15.  Aortic stenosis, status post transcatheter aortic valve replacement. PLAN: 
1. Acute on chronic diastolic congestive heart failure. We will admit the patient for further evaluation and treatment. We will start the patient on Lasix. This is the most likely cause of the patient's shortness of breath. We will check serial cardiac markers to rule out acute myocardial infarction as another possible cause of shortness of breath. We will obtain a CTA of the chest to rule out pulmonary embolism as another possible cause of shortness of breath. The patient will be placed on supplemental oxygen. 2.  Hypertension. We will resume preadmission medication and monitor the patient's blood pressure closely. 3.  Dyslipidemia. We will continue with Lipitor. 4.  Third degree heart block. The patient is status post pacemaker insertion. We will continue to monitor. 5.  Coronary artery disease, status post coronary artery bypass grafting. We will continue with cardiac medications. We will check serial cardiac markers as stated above. Cardiology consult will be requested to assist in further evaluation and treatment of the acute-on-chronic diastolic congestive heart failure. 6.  Right diabetic foot infection. We will start the patient on cefepime and vancomycin. Wound Care consult will be requested for local care. Podiatry consult will also be requested to assist in further evaluation and treatment. We will check ultrasound of the lower extremity for DVT. 7.  Sacral decubitus ulcer present on admission. Wound Care consult will be requested for local wound care. 8.  Suspected bacterial pneumonia. The patient will be started on antibiotics as stated above. We will await the result of CT scan of the chest and for further evaluation of the suspected bacterial pneumonia. 9. COVID-19 virus infection. The patient tested positive for COVID-19 virus infection in the emergency room. We will continue supportive treatment. Infectious Disease consult will be requested to assist in further evaluation and treatment. 10.  Benign prostatic hyperplasia. This is without urinary obstruction. We will continue with preadmission medication. 11.  Type 2 diabetes with hyperglycemia. The patient will be started on sliding scale with insulin coverage. We will check hemoglobin A1c level if one has not been checked recently. We will hold oral agents till the time of discharge from the hospital. 
12.  Anemia. This is most likely due to chronic disease. We will carry out anemia workup including checking a stool guaiac to rule out occult GI bleed. 13.  Thrombocytopenia. The patient is asymptomatic. We will monitor the patient's platelet count. 14.  Elevated lactic acid level. The patient is not toxic, afebrile. We will repeat lactic acid level. 15.  Aortic stenosis, status post TAVR. We will continue to monitor. We will await result of echocardiogram and further recommendation from the cardiologist. 
16.  Other Issues:  Code status, the patient is a full code. We will place the patient on heparin for DVT prophylaxis. If there is a further drop in the patient's platelet count, we will discontinue heparin and request SCD for DVT prophylaxis. FUNCTIONAL STATUS PRIOR TO ADMISSION:  The patient came from home. The patient is ambulatory with a walker. COVID PRECAUTION:  The patient was wearing a face mask. I was wearing a face mask and gloves for this patient's encounter. The patient subsequently tested positive for COVID-19 virus infection in the emergency room. Addendum: Patient started on heparin drip for suspected NSTEMI until when patient is seen by cardiologist  
 
MD ERICA Hillman/[email or web address]/ 
D:  12/28/2020 5:51 
T:  12/28/2020 7:01 
JOB #:  [id number] CC:  Davina Melgar, DO

## 2020-12-28 NOTE — PROGRESS NOTES
Day #1 of Vancomycin Indication:  SSTI 
-podiatry consulted 
-LE Doppler study ordered, (-)ve on 2020 Current regimen:  vanc 750mg q12h Abx regimen:  cefepime/vanc ID Following ?: NO 
Concomitant nephrotoxic drugs (requires more frequent monitoring): Loop diuretics and Contrast agents Frequency of BMP?: daily Recent Labs  
  20 
0459 20 
2200 WBC 6.2 5.6 CREA 0.87 0.99  
BUN 22* 23* Est CrCl: 70-75 ml/min; UO: 1x unmeasured urine output Temp (24hrs), Av.9 °F (36.6 °C), Min:97.6 °F (36.4 °C), Max:98.3 °F (36.8 °C) Cultures:  
 blood: in process Goal trough = 10 - 15 mcg/mL Recent trough history (date/time/level/dose/action taken): 
N/a Plan: Change to vanc 1.25g q16h based on weight and renal function. Estephania Salmon, [address]

## 2020-12-28 NOTE — NURSE NAVIGATOR
Chart reviewed by Heart Failure Nurse Navigator. Heart Failure database completed. EF:  55/60% ACEi/ARB/ARNi: Cozaar BB: Not indicated Aldosterone Antagonist: Not indicated Obstructive Sleep Apnea Screening: N/1* STOP-BANG score: 
 Referred to Sleep Medicine: CRT Not indicated NYHA Functional Class requested via provider message on [employer] Heart Failure Teach Back in Patient Education. Heart Failure Avoiding Triggers on Discharge Instructions. Cardiologist: CAV Post discharge follow up phone call to be made within 48-72 hours of discharge.

## 2020-12-28 NOTE — PROGRESS NOTES
Evaluate patient eligibility for pharmacy to order remdesivir. If patient does not meet the below criteria and provider still wishes to start remdesivir, please inform the provider they will need to consult ID for their guidance on remdesivir initiation. Inclusion criteria (all answers must be YES) Proven COVID-19 (documented positive test result): YES Requiring supplemental oxygen therapy: NO Oxygen Therapy O2 Sat (%): 94 % (12/28/20 1500) Pulse via Oximetry: 87 beats per minute (12/28/20 1500) O2 Device: Nasal cannula (12/28/20 1500) O2 Flow Rate (L/min): 3 l/min (12/28/20 1500) Exclusion criteria (all answers must be NO) Requiring invasive mechanical ventilation (intubated): NO New requirement for noninvasive mechanical ventilation (BiPAP, CPAP): NO  
Requiring > 1 vasopressor at the initiation of remdesivir: NO  
> 14 days from symptom onset: NO  
> 10 days from hospital admission: NO Already received a course of remdesivir (at [facility] or outside hospital): NO  
CrCl < 30 mL/min: NO  
LFTs > 5x ULN, if labs available: NO  
Lab Results Component Value Date/Time AST (SGOT) 38 (H) 12/28/2020 04:59 AM  
 ALT (SGPT) 27 12/28/2020 04:59 AM  
 Alk. phosphatase 88 12/28/2020 04:59 AM  
 Bilirubin, total 0.4 12/28/2020 04:59 AM  
  
Pregnant or breastfeeding: NO Less than 25years old: NO If patient meets all eligibility criteria, please place order for remdesivir 200 mg IV x 1, followed by 100 mg IV daily x 4. CMP ordered? YES  if no, place order for CMP daily x 5 days per protocol Keep Ivent open and monitor patient daily for remdesivir-related adverse effects. If patient's LFTs > 5x ULN or CrCl < 30 mL/min, contact the provider to recommend discontinuation of remdesivir. Tootie Hernandez, [address]

## 2020-12-28 NOTE — PROGRESS NOTES
Received report from [facility], Heparin Drip not started, ptt not drawn, increased HR and RR noted on monitor, pt SOB/LARA on 3L NC- Dr. Brady Brink paged and made aware, states he will come assess patient. Condom Cath on/intact. Excoriation noted to sacrum, dressings noted to bilat feet- CDI.  Dione Siu RN made aware of patient status.

## 2020-12-28 NOTE — CONSULTS
Please refer to dictated consult. Patient relates right foot wound has been present for 2 months and is unsure how it started, but that is is very painful. Notes it has not been previously examined by a physician. Positive probe to bone of 5th met head of right foot ulcer. CT ordered as a result to rule out osteomyelitis. Patient unable to have MRI due to history of pacemaker and 3 phase bone scan due to elevated BUN. Wound culture obtained of right foot ulcer. Additionally. X-ray ordered of left foot as well. IV abx per ID. Dressings per wound care. Will continue to follow. Thank you for the consult.

## 2020-12-29 NOTE — PROGRESS NOTES
Comprehensive Nutrition Assessment Type and Reason for Visit: Initial, Wound Nutrition Recommendations/Plan: 1. Consistent CHO 2400 kcal diet, low Na+ diet 2. Added ONS Ensure High Protein to help meet increased protein needs 3. If not drinking above, add MVI Nutrition Assessment:    
82 y.o. male who has a past medical history of Acute on chronic diastolic CHF, CAD s/p CABG 2006, DM type 2, Hearing loss, Hypercholesterolemia, HTN, and third degree AV block s/p PM insertion, aortic stenosis, s/p TAVR 10/2020. Admitted with Acute on chronic diastolic CHF, cards following. Noted COVID+, started on remdesivir by ID, on 3 L O2. Noted BG uncontrolled, suspect d/t steroids. HbA1C 7.1% in Oct 2020 indicating fairly good control prior to then. Several recent admissions, 10/18-11/4 had TAVR 10/30/20. Again admitted mid-Nov with c/o diarrhea, C. Diff negative. Appears ongoing wt loss since Oct admission, although difficult to assess as may be fluid related. However, currently with 2+ bilat LE pitting edema, so wt likely up from actual BW. Overall, down ~11 lb (5.7% BW) in past 1.5-2 months which may or may be significant. Still with loose BM, ?ongoing diarrhea. Minimal PO intake documentation. Pt did not answer phone upon attempts to call. RN reports no issues, pt eating well. RD will add Ensure High Protein TID with increased protein needs for wound healing and COVID. Follow-up with acceptance, wt hx, GI status, etc.   
 
 
Malnutrition Assessment: 
Malnutrition Status:  Insufficient data Context:  Acute illness Nutritionally Significant Medications:  
Lipitor, cefepime, Vit D3, Decadron, Lasix, correctional scale, Cozaar, remdesivir, Vancomycin, Zincate, Vit C (to start) PRN: Miralax, Compazine Estimated Daily Nutrient Needs: 
Energy (kcal): 2104-0704(30-33 kcal/kg) Protein (g): 120-135(1.5-1.6 gm/kg or ~20%) Fluid (ml/day): 2000(CHF) Nutrition Related Findings: Edema: 2+ pitting of bilat LE Last BM: 12/28 - loose Wounds:   
Multiple, Diabetic ulcer Per WOCN 12/28: 
1. POA right 5th metatarsal head, plantar aspect with diabetic foot ulcer  
2. POA bilateral upper buttocks with moisture associated skin damage. 3.  POA bilateral lower legs and right dorsal foot with linear lines that appear to be excoriation, crusted over, no drainage 
  
 
Current Nutrition Therapies: DIET DIABETIC CONSISTENT CARB Regular Meal intake:  
Patient Vitals for the past 168 hrs: 
 % Diet Eaten 12/28/20 1219 75 % Anthropometric Measures: 
· Height:  6' 2\" (188 cm) · Current Body Wt:  81.7 kg (180 lb 1.9 oz) · Admission Body Wt:  179 lb 7.3 oz(81.4 kg - standing scale) · Usual Body Wt:  88.5 kg (195 lb)(195-200 lb) · Ideal Body Wt:  190 lbs:  94.8 % · BMI Category:  Normal weight (BMI 22.0-24.9) age over 72 Wt Readings from Last 20 Encounters:  
12/28/20 81.7 kg (180 lb 3.2 oz) 12/28/20 81.4 kg (179 lb 8 oz) - standing scale, admission 12/23/20 81.6 kg (180 lb) 12/02/20 86 kg (189 lb 9.5 oz)  
11/24/20 86.2 kg (190 lb)  
11/12/20 90.7 kg (200 lb) 11/05/20 86.6 kg (191 lb) 11/04/20 84.7 kg (186 lb 11.7 oz) 11/02/20 86.4 kg (190 lb 7.6 oz) - standing scale 10/21/20 87.7 kg (193 lb 5.5 oz) - bed 10/18/20 93.2 kg (205 lb 7.5 oz) - bed, admission 10/15/20 89.4 kg (197 lb)  
09/10/20 88.9 kg (196 lb) 08/13/20 88 kg (194 lb) 06/05/20 88 kg (194 lb) 06/04/20 88.1 kg (194 lb 4.8 oz) 02/06/20 88.2 kg (194 lb 6.4 oz) 01/23/20 88.3 kg (194 lb 9.6 oz) 01/16/20 87.5 kg (193 lb) 01/09/20 89.4 kg (197 lb 3.2 oz) 01/02/20 90.2 kg (198 lb 12.8 oz) 12/30/19 91.1 kg (200 lb 12.8 oz) 12/23/19 89.7 kg (197 lb 12.8 oz) 12/04/19 88.9 kg (196 lb) Nutrition Diagnosis:  
· Increased nutrient needs related to acute injury/trauma, impaired respiratory function, increased demand for energy/nutrients as evidenced by (COVID, wound healing) Nutrition Interventions:  
Food and/or Nutrient Delivery: Modify current diet, Start oral nutrition supplement Nutrition Education and Counseling: No recommendations at this time Coordination of Nutrition Care: Continue to monitor while inpatient, Interdisciplinary rounds Goals: 
PO >50% of meals with 1-2 ONS daily over next 5-7 days; stabilize wt loss Nutrition Monitoring and Evaluation:  
Behavioral-Environmental Outcomes: None identified Food/Nutrient Intake Outcomes: Food and nutrient intake, Supplement intake Physical Signs/Symptoms Outcomes: Biochemical data, GI status, Fluid status or edema, Skin, Weight, Hemodynamic status Discharge Planning: Too soon to determine Recent Labs  
  12/29/20 
0040 12/28/20 0459 12/27/20 2200 * 217* 287* BUN 24* 22* 23* CREA 0.90 0.87 0.99  142 139  
K 3.8 3.9 3.6  105 105 CO2 28 33* 31  
CA 7.8* 8.0* 8.0*  
PHOS  --  3.3  --   
MG  --   --  1.7 Recent Labs  
  12/29/20 
0040 12/28/20 0459 12/27/20 
2200 ALT 24 27 25 AP 80 88 82 TBILI 0.3 0.4 0.3 TP 6.0* 6.4 6.0* ALB 2.2* 2.7* 2.4*  
GLOB 3.8 3.7 3.6 Recent Labs  
  12/28/20 0459 12/27/20 2200 LAC 1.2 2.2* Recent Labs  
  12/28/20 0459 12/27/20 2200 WBC 6.2 5.6 HGB 10.9* 10.2* HCT 35.6* 32.2*  
* 110* No results for input(s): PREALB in the last 72 hours. No results for input(s): TRIGL in the last 72 hours. Recent Labs  
  12/29/20 
1617 12/29/20 
1121 12/29/20 
5460 12/29/20 
0026 12/28/20 
1649 12/28/20 
1126 12/28/20 
0840 GLUCPOC 269* 278* 214* 277* 129* 141* 190* Lab Results Component Value Date/Time Hemoglobin A1c 7.1 (H) 10/18/2020 03:27 PM  
 Hemoglobin A1c 7.0 (H) 02/26/2020 03:19 PM  
 Hemoglobin A1c 6.6 (H) 04/30/2019 01:46 PM  
 Hemoglobin A1c (POC) 8.1 11/02/2017 03:27 PM  
 
 
Iron Profile Iron Date Value Ref Range Status 12/28/2020 29 (L) 35 - 150 ug/dL Final  
 09/12/2017 130 38 - 169 ug/dL Final  
 
TIBC Date Value Ref Range Status 09/12/2017 286 250 - 450 ug/dL Final  
 
Iron % saturation Date Value Ref Range Status 09/12/2017 45 15 - 55 % Final  
 
 
Ferritin Date Value Ref Range Status 12/29/2020 153 26 - 388 NG/ML Final  
12/28/2020 128 26 - 388 NG/ML Final  
09/12/2017 74 30 - 400 ng/mL Final  
 
 
B Vitamins Folate Date Value Ref Range Status 12/28/2020 20.5 5.0 - 21.0 ng/mL Final  
10/18/2020 20.1 5.0 - 21.0 ng/mL Final  
 
Vitamin B12 Date Value Ref Range Status 12/28/2020 390 193 - 986 pg/mL Final  
10/18/2020 443 193 - 986 pg/mL Final  
 
 
Vitamin D 25-Hydroxy Date Value Ref Range Status 12/29/2020 43.8 30 - 100 ng/mL Final  
 
VITAMIN D, 25-HYDROXY Date Value Ref Range Status 09/12/2017 57.9 30.0 - 100.0 ng/mL Final  
 
 
 
Ashley Brooks RD Available via [email or web address] Or Pager# [phone]

## 2020-12-29 NOTE — ROUTINE PROCESS
TRANSFER - OUT REPORT: 
 
Verbal report given to [facility] (name) on Austin Varma  being transferred to [address] [place] [address] (unit) for routine progression of care Report consisted of patients Situation, Background, Assessment and  
Recommendations(SBAR). Information from the following report(s) SBAR, ED Summary, MAR, Recent Results and Cardiac Rhythm afib was reviewed with the receiving nurse. Lines:  
Peripheral IV 12/27/20 Right Forearm (Active) Site Assessment Clean, dry, & intact 12/28/20 1219 Phlebitis Assessment 0 12/28/20 1219 Infiltration Assessment 0 12/28/20 1219 Dressing Status Clean, dry, & intact 12/28/20 1219 Dressing Type Transparent 12/28/20 1219 Hub Color/Line Status Pink; Infusing 12/28/20 1219 Action Taken Open ports on tubing capped 12/28/20 1219 Alcohol Cap Used Yes 12/28/20 1219 Peripheral IV 12/28/20 Left Forearm (Active) Site Assessment Clean, dry, & intact 12/28/20 1219 Phlebitis Assessment 0 12/28/20 1219 Infiltration Assessment 0 12/28/20 1219 Dressing Status Clean, dry, & intact 12/28/20 1219 Dressing Type Transparent 12/28/20 1219 Hub Color/Line Status Pink; Infusing 12/28/20 1219 Action Taken Open ports on tubing capped 12/28/20 1219 Alcohol Cap Used Yes 12/28/20 1219 Opportunity for questions and clarification was provided. Patient transported with: 
 Monitor

## 2020-12-29 NOTE — PROGRESS NOTES
GLORY: 
 
RUR 35% Patient lives at [address] [facility] with his wife, [place] [phone]. Contact: Geeta Bryant, Son [phone] Disposition: TBD Open to [facility]. If HH is needed they would prefer [facility]. If rehab is needed, preference is [facility]. 
 
Care Management Interventions PCP Verified by CM: Yes Mode of Transport at Discharge: (TBD) Transition of Care Consult (CM Consult): ([address] [place] [address]) MyChart Signup: No 
Discharge Durable Medical Equipment: No 
Physical Therapy Consult: Yes Occupational Therapy Consult: Yes Speech Therapy Consult: No 
Current Support Network: Other(Lives with spouse at [address] [place] [address]) Confirm Follow Up Transport: Family Discharge Location Discharge Placement: (TBD) Reason for Admission:   SOB 
            
RUR Score: 35% PCP: First and Last name: Doc Short 
 Name of Practice: 
 Are you a current patient: Yes/No:Yes Approximate date of last visit:Unknown Can you do a virtual visit with your PCP:  
         Unknown Resources/supports as identified by patient/family:   Patient lives in [address] [facility] [facility]. His son, Geeta Bryant provides any support needed. Top Challenges facing patient (as identified by patient/family and CM): Finances/Medication cost?  None Transportation? Son provides Support system or lack thereof? Living arrangements? See above Self-care/ADLs/Cognition? Needs some assistance. Current Advanced Directive/Advance Care Plan:   
             Not on file Plan for utilizing home health:     
      Open to [facility] Transition of Care Plan: 
 
CM spoke with patient's son, Geeta Bryant to introduce CM role, verify demographics and begin discharge planning. Patient lives at [address] [place] [address] with his wife. He has had several hospitalizations lately and following his last hospitalization he went to [facility] for rehab. Patient's son said he would prefer to choose a different rehab if needed following this stay. Patient uses a walker and a cane. He gets all of his prescriptions filled at [facility]. Insurance verified: 
 
Medicare A&B Transportation TBD depending on disposition. Chris Rowan RN/CRM

## 2020-12-29 NOTE — PROGRESS NOTES
Verbal shift change report given to Stanley Carrel, RN (oncoming nurse) by Adam Bravo RN (offgoing nurse). Report included the following information SBAR, Kardex, ED Summary, Intake/Output, MAR, Accordion, Recent Results, Med Rec Status and Cardiac Rhythm Afib. .  
 
Patient Vitals for the past 12 hrs: 
 Temp Pulse Resp BP SpO2  
12/29/20 0715 97.5 °F (36.4 °C) 69 18 (!) 118/57 98 % 12/29/20 0500 97.7 °F (36.5 °C) 66 22 (!) 142/53 98 % 12/28/20 2354  96  122/69   
12/28/20 2201 97.3 °F (36.3 °C) (!) 107 21 133/75   
 
 
 
2300- RN notified NP of elevated troponins from previous labs done in ED. Pt asymptomatic. NP ordered RN to continue with Q6 troponins.  
 
 
RN attempted to call [address] in order to obtain patient's PTA med list.

## 2020-12-29 NOTE — CONSULTS
[address] Name:  Sally Dempsey 
MR#:  [id number] :  1935 ACCOUNT #:  [de-identified] DATE OF SERVICE:  2020 INPATIENT CONSULTATION 
 
REFERRING PHYSICIAN:  Leia Falcon MD 
 
HISTORY OF PRESENT ILLNESS:  The patient is an 82-year-old male who [facility] We are consulted for bilateral foot ulcerations, particularly an ulceration of the right foot. The patient relates that the right foot ulceration is painful. He relates that it started approximately two months ago, but that he is unsure of how exactly it started. He notes that it has been worsening in pain and that it had previously not been evaluated by a physician. He notes that there has not been any particular wound care. He does know that he is diabetic and that the pain has been worsening in the past several days. He does currently have upper respiratory symptoms, which he is relating mostly to his current symptoms of the COVID-19 virus at this time. He denies any nausea or vomiting at this time as well. He does note that he does have a wound of the left third digit, but that it does not cause any issues to him at this time. He is not able to provide any other further history of his foot wounds at this time. ACTIVE PROBLEM LIST: 
Patient Active Problem List  
Diagnosis Code  Type 2 diabetes mellitus without complication, without long-term current use of insulin ([facility]) E11.9  
 Essential hypertension I10  
 Hypercholesterolemia E78.00  Coronary artery disease involving native coronary artery of native heart without angina pectoris I25.10  
 S/P CABG (coronary artery bypass graft) Z95.1  Severe aortic stenosis I35.0  Aortic systolic murmur on examination I35.8  Benign prostatic hyperplasia with lower urinary tract symptoms N40.1  Insomnia G47.00 [employer] Former smoker W96.441  ACP (advance care planning) Z71.89  
  Gait instability R26.81  
 Age-related cataract of both eyes H25.9  Recurrent depression ([facility] F33.9  On angiotensin receptor blockers (ARB) R53.940  Gastroesophageal reflux disease without esophagitis K21.9  Cough R05  Pressure injury of buttock, stage 1 L89.301  Incontinence of feces R15.9  Type 2 diabetes with nephropathy (HCC) E11.21  
 Decubitus ulcer of left buttock, stage 2 ([address] Y57.759  Syncope and collapse R55  Syncope R55  
 (HFpEF) heart failure with preserved ejection fraction (HCC) I50.30  Aortic stenosis I35.0  
 Pacemaker Z95.0  Third degree AV block (HCC) I44.2  
 S/P TAVR (transcatheter aortic valve replacement) Z95.2  Generalized weakness R53.1  Acute on chronic diastolic (congestive) heart failure ([facility]) I50.33  Type 2 diabetes mellitus with right diabetic foot infection ([facility]) E11.628, L08.9 PAST MEDICAL HISTORY: 
Past Medical History:  
Diagnosis Date  Acute on chronic diastolic (congestive) heart failure ([facility] [address] 12/27/2020  BPH (benign prostatic hyperplasia)  CAD (coronary artery disease)  Diabetes ([facility] [address]  Hearing loss  Hypercholesterolemia  Hypertension  Murmur  Recurrent depression ([facility] [address] 8/22/2019  Third degree AV block ([facility] [facility] 10/30/2020 PAST SURGICAL HISTORY: 
Past Surgical History:  
Procedure Laterality Date  CARDIAC SURG PROCEDURE UNLIST  2006 by-pass  HX CHOLECYSTECTOMY  WA INS NEW/RPLCMT PRM PM W/TRANSV ELTRD ATRIAL&VENT  10/30/2020  [place] INS NEW/RPLCMT PRM PM W/TRANSV ELTRD ATRIAL&VENT N/A 10/30/2020 INSERT PPM DUAL performed by Radha Bass MD at [address] CATH LAB FAMILY HISTORY: 
Family History Problem Relation Age of Onset  Cancer Mother  Cancer Father SOCIAL HISTORY: 
Social History Socioeconomic History  Marital status:  Spouse name: Not on file  Number of children: Not on file  Years of education: Not on file  Highest education level: Not on file Occupational History  Not on file Social Needs  Financial resource strain: Not on file  Food insecurity Worry: Not on file Inability: Not on file  Transportation needs Medical: Not on file Non-medical: Not on file Tobacco Use  Smoking status: Former Smoker Types: Cigarettes Quit date: 1990 Years since quittin.3  Smokeless tobacco: Never Used Substance and Sexual Activity  Alcohol use: No  
 Drug use: No  
 Sexual activity: Not on file Comment:  has 3 children Lifestyle  Physical activity Days per week: Not on file Minutes per session: Not on file  Stress: Not on file Relationships  Social connections Talks on phone: Not on file Gets together: Not on file Attends [employer] service: Not on file Active member of club or organization: Not on file Attends meetings of clubs or organizations: Not on file Relationship status: Not on file  Intimate partner violence Fear of current or ex partner: Not on file Emotionally abused: Not on file Physically abused: Not on file Forced sexual activity: Not on file Other Topics Concern  Not on file Social History Narrative  Not on file MEDICATION LIST: 
No current facility-administered medications on file prior to encounter. Current Outpatient Medications on File Prior to Encounter Medication Sig Dispense Refill  temazepam (RESTORIL) 15 mg capsule TAKE 1 CAPSULE BY MOUTH AT BEDTIME AS NEEDED FOR SLEEP. 30 Cap 0  
 furosemide (Lasix) 20 mg tablet Take 20 mg by mouth daily.  losartan (COZAAR) 100 mg tablet Take 1 Tab by mouth daily. 30 Tab 0  
 amLODIPine (NORVASC) 5 mg tablet Take 1 Tab by mouth daily. 30 Tab 0  
 atorvastatin (LIPITOR) 40 mg tablet Take 1 Tab by mouth nightly for 60 days. 30 Tab 1  
 aspirin 81 mg chewable tablet Take 1 Tab by mouth daily.  30 Tab 11  
  acetaminophen (TYLENOL) 325 mg tablet Take 2 Tabs by mouth every four (4) hours as needed for Pain.  metoprolol tartrate (LOPRESSOR) 50 mg tablet Take 1 Tab by mouth two (2) times a day for 60 days. 60 Tab 1  
 senna-docusate (PERICOLACE) 8.6-50 mg per tablet Take 1 Tab by mouth daily as needed for Constipation.  doxazosin (CARDURA) 4 mg tablet TAKE 1 TABLET BY MOUTH  DAILY (Patient taking differently: Take 4 mg by mouth.) 90 Tab 3  
 finasteride (PROSCAR) 5 mg tablet TAKE 1 TABLET BY MOUTH  DAILY (Patient taking differently: Take 5 mg by mouth.) 90 Tab 3  
 metFORMIN ER (GLUCOPHAGE XR) 500 mg tablet TAKE 1 TABLET BY MOUTH  TWICE DAILY WITH MEALS 180 Tab 3  
 glipiZIDE SR (GLUCOTROL XL) 5 mg CR tablet TAKE 1 TABLET BY MOUTH  DAILY (Patient taking differently: Take 5 mg by mouth.) 90 Tab 3  
 sertraline (ZOLOFT) 100 mg tablet TAKE 1 TABLET BY MOUTH  DAILY 90 Tab 1  
 fluticasone propionate (FLONASE) 50 mcg/actuation nasal spray ADMINISTER 1 Spray IN Both Nostrils two (2) times a day. 16 g 0  
 menthol-zinc oxide (CALMOSEPTINE) 0.44-20.6 % oint Apply to bilateral buttocks TID  Indications: skin irritation 113 g 5  
 OTHER Hearing evaluation for possible changes in hearing 1 Each 0  
 tiZANidine (ZANAFLEX) 2 mg tablet Take 1 Tab by mouth three (3) times daily as needed for Pain. 20 Tab 0  
 glucose blood VI test strips (TRUE METRIX GLUCOSE TEST STRIP) strip Check BS BID  Dx: DM  E11.21 100 Strip 11  
 cholecalciferol (VITAMIN D3) 1,000 unit cap Take 1 Cap by mouth daily. 30 Cap 2  
 magnesium 250 mg tab Take 1 Tab by mouth daily. 30 Tab 2  
 sodium chloride (OCEAN) 0.65 % nasal squeeze bottle 0.05 mL by Both Nostrils route as needed for Congestion. 30 mL 2  
 FERROUS FUMARATE/VIT BCOMP,C (SUPER B COMPLEX PO) Take  by mouth. ALLERGIES: 
Allergies Allergen Reactions  Procaine Other (comments) Pt stated he passed out from procaine and was told not to let anyone use it on him again REVIEW OF SYSTEMS:  A complete review of systems was performed with all pertinent findings noted above. PHYSICAL EXAMINATION: 
GENERAL:  The patient is alert and oriented to person, time, and place. VITAL SIGNS:  Currently, the patient's blood pressure 107/59, temperature 97.7, O2 sat 94, pulse 94, and respiration rate 25. HEENT:  Head normocephalic, atraumatic. RESPIRATORY:  The patient is currently on oxygen but otherwise does not have any problems breathing. ABDOMEN:  Soft, nontender. VASCULAR:  The patient does have palpable pulses of both the dorsalis pedis and posterior tibial pulses along with capillary refill times of less than 3 seconds of all digits of both the lower extremities. To the right lower extremity, there is significant +4 pitting edema of the right lower extremity and no significant edema to the left lower extremity. NEUROLOGICAL:  Significant lack of protective sensation noted to both lower extremities. MUSCULOSKELETAL:  No gross obvious osseous abnormalities of the lower extremities. DERMATOLOGICAL:  To the left third toe, there is a small superficial wound to the toe with superficial breakdown of skin that appears to be crusted over. To the right plantar fifth metatarsal head, there is an ulceration measuring 2.5 x 2.3 x 0.2 cm with a small area that does probe further deep to the capsule of the fifth metatarsal head. There is no cristofer purulence, but there is some serous drainage and the area is very erythematous and warm to touch as well as the entire leg is erythematous and warm to touch. LABORATORY DATA:  All pertinent laboratory results reviewed. IMAGING:  All pertinent images reviewed. IMPRESSION:  Diabetic foot infection of the right foot fifth metatarsophalangeal joint area and superficial breakdown of the left third toe. PLAN:  The patient was seen and assessed at bedside earlier today due to the significant probing of the right foot wound as well as the chronicity of the wound. Decision was made that advanced imaging of the patient's right foot was needed; however, due to the patient's history of a pacemaker as well as his cardiac function and as well as his increased BUN, he was not currently a candidate for an MRI or a three-phase bone scan, and hence, at this time, only a CT without contrast of the right foot could be ordered. A wound culture was done earlier today of the right foot wound. I will monitor the patient for improvement tomorrow. X-rays of both feet are currently pending. I will round on the patient tomorrow again. We will continue dressings as recommended by Wound Care with Optifoam Ag to the right fifth metatarsal ulceration. The patient does have an Infectious Disease consult pending for both the right diabetic foot infection as well as his COVID-19 infection. Thank you for this consult. AMY Teretha Kawasaki, DPM [email or web address] 
D:  12/28/2020 17:27 
T:  12/28/2020 19:50 
JOB #:  [id number]

## 2020-12-29 NOTE — PROGRESS NOTES
Progress Note Patient: Hansa Guzman MRN: [id number]  SSN: [id number] YOB: 1935  Age: 82 y.o. Sex: male Admit Date: 12/27/2020 Procedure:    
 
Subjective:  
 
Patient seen resting quiet and comfortably and no apparent distress. No issues overnight. Notes occasional pain to right foot rated as 7-8 only when direct pressure applied to area. Objective:  
 
Visit Vitals BP (!) 149/49 (BP 1 Location: Right arm, BP Patient Position: At rest) Pulse 60 Temp 97.6 °F (36.4 °C) Resp 26 Ht 6' 2\" (1.88 m) Wt 82.1 kg (181 lb 1.6 oz) SpO2 95% BMI 23.25 kg/m² Physical Exam: 
Continued edema to RLE, but decreased erythema noted. Ulceration of the right plantar 5th MPJ measures 2.5 x 2.3 x 0.2 cm with a small area that does probe further deep to the capsule of the fifth metatarsal head. Decreased drainage noted compared to yesterday. No purulence noted. Slight tenderness with palpation. Labs/Radiology Review: images and reports reviewed Assessment:  
 
Hospital Problems  Date Reviewed: 12/28/2020 Codes Class Noted POA Type 2 diabetes mellitus with right diabetic foot infection ([facility] ICD-10-CM: E11.628, L08.9 ICD-9-CM: 250.80, 686.9  12/28/2020 Unknown * (Principal) Acute on chronic diastolic (congestive) heart failure (HCC) ICD-10-CM: I50.33 ICD-9-CM: 428.33, 428.0  12/27/2020 Yes Plan/Recommendations/Medical Decision Making:  
-CT and x-ray show no sign of x-ray at this time 
-Three phase bone scan pending at this time. Per nursing, scan cannot be done in radiology until patient is COVID negative.  
-Will continue local wound care at this time as there is no active purulence draining from the wound at this time, and no signs indicating urgent surgical intervention at this time. 
-Dressing applied at bedside. 
-Wound improving with decreased drainaged, but continued edema. 
-Prevalon boots ordered 
-Continue to follow

## 2020-12-29 NOTE — PROGRESS NOTES
Hospitalist Progress Note Elizabeth Childs MD 
Answering service: [phone] from in house phone Date of Service:  2020 NAME:  Zurdo Russ :  1935 MRN:  [id number] Admission Summary: As per initial admission summary This is an 29-year-old man with a past medical history significant for hypertension; chronic diastolic congestive heart failure; dyslipidemia; third degree heart block, status post pacemaker insertion; coronary artery disease, status post CABG; benign prostatic hyperplasia; type 2 diabetes; aortic stenosis, status post transcatheter aortic valve replacement TAVR, who was in his usual state of health until a few days ago when the patient developed shortness of breath which is progressive and getting worse. The shortness of breath is worse when the patient is trying to lie down flat and also with activities such as walking. The shortness of breath is associated with cough which is productive of yellowish sputum. The patient was brought to the emergency room because of worsening symptoms. The patient was recently admitted to this hospital from 2020 to 2020. The patient was admitted for evaluation of generalized weakness. Following that evaluation, he was discharged to rehab. The patient has been home for about a week from rehab. The patient was found to have right diabetic foot ulcer on arrival at the emergency room. He was referred to the Hospitalist Service for evaluation for admission. The patient stated that he has been taking these medications as prescribed including his diuretic. Interval history / Subjective:  
  Patient seen for Follow up of COVID Patient seen and examined by the bedside, Labs, images and notes reviewed Patient with COVID infection. Started on remdesevir by ID. Talked to son KEHINDE marsh. He will update me if family and patients wants convalescent plasma. D/w cardiology. Will stop heparin drip and start lovenox BID covid protocol Patient wants to be full code for now Getting remdesevir Nuclear bone scan ordered, OM work up Assessment & Plan: 1. Acute on chronic diastolic congestive heart failure. Cardiology consulted Recommended to continue Lasix 20 mg daily CT chest does not show any new embolism minimal patchy groundglass opacity in the right upper lobe may represent pulmonary edema as per CTA chest 
Elevated troponin likely due to Covid Cardiology following COVID positive On remdesivir started by the infectious disease On 2 L of oxygen 2. Hypertension. On metoprolol, losartan, amlodipine 3. Dyslipidemia. We will continue with Lipitor. 4.  Third degree heart block. The patient is status post pacemaker insertion. We will continue to monitor. 5.  Coronary artery disease, status post coronary artery bypass grafting. We will continue with cardiac medications. Cardiology consulted, recommended Maintenance 20 mg daily Lasix as diuretics 6. Right diabetic foot infection. on cefepime and vancomycin. Wound Care consult Podiatry consult will also be requested to assist in further evaluation and treatment. ID following CT ordered to rule out osteomyelitis  
ultrasound of the lower extremity for DVT. Negative Bone scan ordered 7. Sacral decubitus ulcer present on admission. Wound Care consult will be requested for local wound care Meet [place] 8.  Suspected bacterial pneumonia. On broad-spectrum antibiotics 10. Benign prostatic hyperplasia. This is without urinary obstruction. We will continue with preadmission medication. 11.  Type 2 diabetes with hyperglycemia. The patient will be started on sliding scale with insulin coverage. 12.  Anemia. This is most likely due to chronic disease. 13.  Thrombocytopenia. The patient is asymptomatic. We will monitor the patient's platelet count. 14.  Elevated lactic acid level. resolved 15. Aortic stenosis, status post TAVR. We will continue to monitor. follow-up with cardiology as an outpatient 16. Other Issues:  Code status, the patient is a full code. We will place the patient on heparin for DVT prophylaxis. Code status: full code DVT prophylaxis: heparin Care Plan discussed with: Patient/Family Disposition: TBD PT/ OT consulted Hospital Problems  Date Reviewed: 12/28/2020 Codes Class Noted POA Type 2 diabetes mellitus with right diabetic foot infection ([facility] [address] ICD-10-CM: E11.628, L08.9 ICD-9-CM: 250.80, 686.9  12/28/2020 Unknown * (Principal) Acute on chronic diastolic (congestive) heart failure (HCC) ICD-10-CM: I50.33 ICD-9-CM: 428.33, 428.0  12/27/2020 Yes Review of Systems: A comprehensive review of systems was negative except for that written in the HPI. Vital Signs:  
 Last 24hrs VS reviewed since prior progress note. Most recent are: 
Visit Vitals BP (!) 147/51 (BP 1 Location: Right arm, BP Patient Position: At rest) Pulse 63 Temp 97.5 °F (36.4 °C) Resp 25 Ht 6' 2\" (1.88 m) Wt 81.7 kg (180 lb 3.2 oz) SpO2 94% BMI 23.14 kg/m² Intake/Output Summary (Last 24 hours) at 12/29/2020 1351 Last data filed at 12/28/2020 2200 Gross per 24 hour Intake 350 ml Output 900 ml Net -550 ml Physical Examination:  
I had a face to face encounter with this patient and independently examined them on December 29, 2020 as outlined below: 
     
Constitutional:  No acute distress, cooperative, pleasant ENT:  Oral mucous moist, oropharynx benign. Neck supple, Resp:  CTA bilaterally. No wheezing/rhonchi/rales. No accessory muscle use CV:  Regular rhythm, normal rate, no murmurs, gallops, rubs GI:  Soft, non distended, non tender. normoactive bowel sounds, no hepatosplenomegaly Musculoskeletal:  No edema, warm, 2+ pulses throughout Neurologic:  Moves all extremities. AAOx3, CN II-XII reviewed Psych:  Good insight, Not anxious nor agitated. . 
  
 
Data Review:  
 Review and/or order of clinical lab test 
Review and/or order of tests in the radiology section of CPT Review and/or order of tests in the medicine section of CPT Labs:  
 
Recent Labs  
  12/28/20 
0459 12/27/20 
2200 WBC 6.2 5.6 HGB 10.9* 10.2* HCT 35.6* 32.2*  
* 110* Recent Labs  
  12/29/20 
0040 12/28/20 
0459 12/27/20 
2200  142 139  
K 3.8 3.9 3.6  105 105 CO2 28 33* 31 BUN 24* 22* 23* CREA 0.90 0.87 0.99 * 217* 287* CA 7.8* 8.0* 8.0*  
MG  --   --  1.7 PHOS  --  3.3  --   
 
Recent Labs  
  12/29/20 
0040 12/28/20 
0459 12/27/20 
2200 ALT 24 27 25 AP 80 88 82 TBILI 0.3 0.4 0.3 TP 6.0* 6.4 6.0* ALB 2.2* 2.7* 2.4*  
GLOB 3.8 3.7 3.6 Recent Labs  
  12/29/20 
0041 12/28/20 
1839 12/28/20 
1504 APTT 48.3* 52.1* 127.0* Recent Labs  
  12/29/20 
0040 12/28/20 
0459 FERR 153 128 Lab Results Component Value Date/Time Folate 20.5 12/28/2020 04:59 AM  
  
No results for input(s): PH, PCO2, PO2 in the last 72 hours. Recent Labs  
  12/29/20 
0525 12/28/20 
2330 12/28/20 
1508 TROIQ 0.60* 0.57* 0.92* Lab Results Component Value Date/Time Cholesterol, total 114 02/26/2020 03:19 PM  
 HDL Cholesterol 40 02/26/2020 03:19 PM  
 LDL, calculated 54 02/26/2020 03:19 PM  
 Triglyceride 101 02/26/2020 03:19 PM  
 
Lab Results Component Value Date/Time Glucose (POC) 278 (H) 12/29/2020 11:21 AM  
 Glucose (POC) 214 (H) 12/29/2020 08:37 AM  
 Glucose (POC) 277 (H) 12/29/2020 12:26 AM  
 Glucose (POC) 129 (H) 12/28/2020 04:49 PM  
 Glucose (POC) 141 (H) 12/28/2020 11:26 AM  
 
Lab Results Component Value Date/Time  Color YELLOW/STRAW 12/28/2020 06:02 AM  
 Appearance CLOUDY (A) 12/28/2020 06:02 AM  
 Specific gravity 1.022 12/28/2020 06:02 AM  
 pH (UA) 5.0 12/28/2020 06:02 AM  
 Protein 300 (A) 12/28/2020 06:02 AM  
 Glucose Negative 12/28/2020 06:02 AM  
 Ketone Negative 12/28/2020 06:02 AM  
 Bilirubin Negative 12/28/2020 06:02 AM  
 Urobilinogen 0.2 12/28/2020 06:02 AM  
 Nitrites Negative 12/28/2020 06:02 AM  
 Leukocyte Esterase TRACE (A) 12/28/2020 06:02 AM  
 Epithelial cells MODERATE (A) 12/28/2020 06:02 AM  
 Bacteria 1+ (A) 12/28/2020 06:02 AM  
 WBC 20-50 12/28/2020 06:02 AM  
 RBC >100 (H) 12/28/2020 06:02 AM  
 
 
 
Medications Reviewed:  
 
Current Facility-Administered Medications  
Medication Dose Route Frequency  
• zinc sulfate (ZINCATE) 220 (50) mg capsule 1 Cap  1 Cap Oral DAILY  
• ascorbic acid (vitamin C) (VITAMIN C) tablet 500 mg  500 mg Oral BID  
• amLODIPine (NORVASC) tablet 5 mg  5 mg Oral DAILY  
• aspirin chewable tablet 81 mg  81 mg Oral DAILY  
• atorvastatin (LIPITOR) tablet 40 mg  40 mg Oral QHS  
• doxazosin (CARDURA) tablet 4 mg  4 mg Oral QHS  
• finasteride (PROSCAR) tablet 5 mg  5 mg Oral DAILY  
• fluticasone propionate (FLONASE) 50 mcg/actuation nasal spray 1 Spray  1 Spray Both Nostrils DAILY  
• losartan (COZAAR) tablet 100 mg  100 mg Oral DAILY  
• metoprolol tartrate (LOPRESSOR) tablet 50 mg  50 mg Oral BID  
• sertraline (ZOLOFT) tablet 100 mg  100 mg Oral QPM  
• temazepam (RESTORIL) capsule 15 mg  15 mg Oral QHS PRN  
• sodium chloride (OCEAN) 0.65 % nasal squeeze bottle 1 Spray  1 Spray Both Nostrils PRN  
• sodium chloride (NS) flush 5-40 mL  5-40 mL IntraVENous Q8H  
• sodium chloride (NS) flush 5-40 mL  5-40 mL IntraVENous PRN  
• polyethylene glycol (MIRALAX) packet 17 g  17 g Oral DAILY PRN  
• prochlorperazine (COMPAZINE) with saline injection 5 mg  5 mg IntraVENous Q6H PRN  
• cefepime (MAXIPIME) 2 g in 0.9% sodium chloride (MBP/ADV) 100 mL MBP  2 g IntraVENous Q12H  
  insulin lispro (HUMALOG) injection   SubCUTAneous AC&HS  
 glucose chewable tablet 16 g  4 Tab Oral PRN  
 dextrose (D50W) injection syrg 12.5-25 g  12.5-25 g IntraVENous PRN  
 glucagon (GLUCAGEN) injection 1 mg  1 mg IntraMUSCular PRN  
 vancomycin - pharmacy to dose   Other Rx Dosing/Monitoring  vancomycin (VANCOCIN) 1250 mg in  ml infusion  1,250 mg IntraVENous Q16H  
 zinc oxide-cod liver oil (DESITIN) 40 % paste   Topical Q12H  
 acetaminophen (TYLENOL) tablet 650 mg  650 mg Oral Q6H PRN Or  
 acetaminophen (TYLENOL) suppository 650 mg  650 mg Rectal Q6H PRN  
 guaiFENesin-dextromethorphan (ROBITUSSIN DM) 100-10 mg/5 mL syrup 5 mL  5 mL Oral Q4H PRN  
 dexAMETHasone (DECADRON) tablet 6 mg  6 mg Oral DAILY  cholecalciferol (VITAMIN D3) (1000 Units /25 mcg) tablet 2 Tab  2,000 Units Oral DAILY  remdesivir 100 mg in 0.9% sodium chloride 250 mL IVPB  100 mg IntraVENous Q24H  
 furosemide (LASIX) injection 20 mg  20 mg IntraVENous DAILY  enoxaparin (LOVENOX) injection 30 mg  30 mg SubCUTAneous Q12H  
 
______________________________________________________________________ EXPECTED LENGTH OF STAY: 4d 2h 
ACTUAL LENGTH OF STAY:          2 Kacey Vincent MD

## 2020-12-29 NOTE — ACP (ADVANCE CARE PLANNING)
Advance Care Planning Advance Care Planning (ACP) Physician/NP/PA Conversation Date of Conversation: 12/27/2020 Conducted with: Patient with Decision Making Capacity Healthcare Decision Maker:  
  Primary Decision Maker: Jorge Alberto Zheng - Spouse - [phone] Secondary Decision Maker: Alex Velasquez Son - [phone] I talked to the patient myself in detail. Care Preferences: Hospitalization: \"If your health worsens and it becomes clear that your chance of recovery is unlikely, what would be your preference regarding hospitalization? \" The patient would prefer hospitalization. Ventilation: \"If you were unable to breathe on your own and your chance of recovery was unlikely, what would be your preference about the use of a ventilator (breathing machine) if it was available to you? \" The patient would desire the use of a ventilator. Resuscitation: \"In the event your heart stopped as a result of an underlying serious health condition, would you want attempts to be made to restart your heart, or would you prefer a natural death? \" Yes, attempt to resuscitate. I discussed in detail with patient regarding code status. Discussed regarding chest compressions, intubation, shock and life saving medication use. Patient told me clearly that he wants full code for now. In case he changes his mind he will let medical team know. Conversation Outcomes / Follow-Up Plan:  
 
Reviewed DNR/DNI and patient elects Full Code (Attempt Resuscitation) Length of Voluntary ACP Conversation in minutes:  16 minutes [address] MD

## 2020-12-30 NOTE — PROGRESS NOTES
Problem: Self Care Deficits Care Plan (Adult) Goal: *Acute Goals and Plan of Care (Insert Text) Description:  
FUNCTIONAL STATUS PRIOR TO ADMISSION: Patient was modified independent using a single point cane for functional mobility. Patient reports steady decline and multiple hospital/rehab admissions since beginning of November. Patient has all needed DME. HOME SUPPORT: The patient lived with wife but did not require assist. Reports wife cannot physically assist.  
 
Occupational Therapy Goals Initiated 12/30/2020 1. Patient will perform grooming standing for 5 minutes with VSS with minimal assistance/contact guard assist within 7 day(s). 2.  Patient will perform bathing sitting unsupported with VSS with minimal assistance/contact guard assist within 7 day(s). 3.  Patient will perform lower body dressing with minimal assistance/contact guard assist within 7 day(s). 4.  Patient will perform toilet transfers with minimal assistance/contact guard assist within 7 day(s). 5.  Patient will perform all aspects of toileting with minimal assistance/contact guard assist within 7 day(s). 6.  Patient will participate in upper extremity therapeutic exercise/activities with supervision/set-up for 5 minutes within 7 day(s). 7.  Patient will utilize energy conservation techniques during functional activities with verbal cues within 7 day(s). Outcome: Progressing Towards Goal 
 
OCCUPATIONAL THERAPY EVALUATION Patient: Roland Pierce (89 y.o. male) Date: 12/30/2020 Primary Diagnosis: Acute on chronic diastolic (congestive) heart failure ([facility] [address] [I50.33] Precautions: droplet plus,  Fall ASSESSMENT Based on the objective data described below, the patient presents with limited ADL performance s/p admission for acute on chronic CHF. Noted patient COVID+. Patient ADLs limited by impaired balance, generalized weakness, decreased functional activity tolerance, and limited lower body access. At baseline, patient lives in [address] [place] [address] with his wife but d/t recent hospitalizations and rehab stays, has had significant decline in function. Patient reports wife cannot physically assist.  
 
Today, patient received in chair and reporting he is fatigued and would like to get back to bed. Patient setup to wash face in chair and min A to change gown. Patient with min A x2 using RW to stand and transfer to EOB (additional assist 2* fatigue and low surface as patient is 6'2\"). Patient able to return to supine with CGA and was left with call bell in reach, RN aware and all needs met. Will continue to follow, patient well below baseline and would benefit from short IPR stay to maximize safety and functional IND. Current Level of Function Impacting Discharge (ADLs/self-care): up to mod A for ADLs Functional Outcome Measure: The patient scored Total: 35/100 on the Barthel Index outcome measure which is indicative of 65% impaired ability to care for basic self needs/dependency on others; inferred 100% dependency on others for instrumental ADLs. Other factors to consider for discharge: was mod I at baseline, wife cannot assist, COVID+ Patient will benefit from skilled therapy intervention to address the above noted impairments. PLAN : 
Recommendations and Planned Interventions: self care training, functional mobility training, therapeutic exercise, balance training, therapeutic activities, endurance activities, patient education, home safety training, and family training/education Frequency/Duration: Patient will be followed by occupational therapy 5 times a week to address goals. Recommendation for discharge: (in order for the patient to meet his/her long term goals) Therapy 3 hours per day 5-7 days per week - if patient cannot go to [facility], will require SNF rehab This discharge recommendation: 
Has not yet been discussed the attending provider and/or case management IF patient discharges home will need the following DME: patient owns DME required for discharge SUBJECTIVE:  
Patient stated Just sitting here is making me tired.  OBJECTIVE DATA SUMMARY:  
HISTORY:  
Past Medical History:  
Diagnosis Date Acute on chronic diastolic (congestive) heart failure ([facility] [address] 12/27/2020 BPH (benign prostatic hyperplasia) CAD (coronary artery disease) Diabetes ([facility] [address] Hearing loss Hypercholesterolemia Hypertension Murmur Recurrent depression ([facility] [address] 8/22/2019 Third degree AV block ([facility] [address] 10/30/2020 Past Surgical History:  
Procedure Laterality Date CARDIAC SURG PROCEDURE UNLIST  2006 by-pass HX CHOLECYSTECTOMY    
 [place] INS NEW/RPLCMT PRM PM W/TRANSV ELTRD ATRIAL&VENT  10/30/2020 [place] INS NEW/RPLCMT PRM PM W/TRANSV ELTRD ATRIAL&VENT N/A 10/30/2020 INSERT PPM DUAL [place] [address] CATH LAB Expanded or extensive additional review of patient history:  
 
Home Situation Home Environment: Independent living One/Two Story Residence: One story Living Alone: No 
Support Systems: Spouse/Significant Other/Partner Patient Expects to be Discharged to[de-identified] Rehabilitation facility Current DME Used/Available at Home: Cane, straight, Shower chair, Grab bars, Jeananne Garre, rolling, Walker, rollator Tub or Shower Type: Other (comment)(cutout tub) Hand dominance: Right EXAMINATION OF PERFORMANCE DEFICITS: 
Cognitive/Behavioral Status: 
Neurologic State: Alert Orientation Level: Oriented X4 Cognition: Appropriate for age attention/concentration; Follows commands Perception: Appears intact Perseveration: No perseveration noted Safety/Judgement: Awareness of environment; Fall prevention;Good awareness of safety precautions; Insight into deficits Skin: Appears grossly intact Edema: none noted in BUEs Hearing: 
  
 
Vision/Perceptual:   
Tracking: Able to track stimulus in all quadrants w/o difficulty Diplopia: No   
Acuity: Within Defined Limits Range of Motion: In [address] [place] [address] [address] AROM: Generally decreased, functional 
  
  
  
  
  
  
  
 
Strength: In [address] [place] [address] Road Strength: Generally decreased, functional 
  
  
  
  
 
Coordination: 
Coordination: Within functional limits Fine Motor Skills-Upper: Left Intact; Right Intact Gross Motor Skills-Upper: Left Intact; Right Intact Tone & Sensation: In [address] [address] Tone: Normal 
Sensation: Intact Balance: 
Sitting: Intact Standing: Impaired; With support Standing - Static: Constant support;Good Standing - Dynamic : Constant support; [facility] Functional Mobility and Transfers for ADLs: 
Bed Mobility: 
Rolling: Modified independent(with guard rail) Supine to Sit: Minimum assistance;Assist x1 Sit to Supine: Contact guard assistance Scooting: Stand-by assistance Transfers: 
Sit to Stand: Minimum assistance; Additional time;Assist x2 Stand to Sit: Minimum assistance; Additional time;Assist x2 Bed to Chair: Contact guard assistance; Additional time;Assist x2 Assistive Device : Gait Belt;Walker, rolling ADL Assessment: 
Feeding: Independent Oral Facial Hygiene/Grooming: Setup Bathing: Moderate assistance Upper Body Dressing: Setup Lower Body Dressing: Moderate assistance Toileting: Moderate assistance ADL Intervention and task modifications: 
Feeding Feeding Assistance: Independent Container Management: Independent Cutting Food: Independent Utensil Management: Independent Food to Mouth: Independent Drink to Mouth: Independent Grooming Position Performed: Seated in chair Washing Face: Set-up Upper Body Dressing Assistance Hospital Gown: Minimum  assistance(2* lines and to tie behind head) Cues: Physical assistance;Verbal cues provided Toileting Bladder Hygiene: Total assistance (dependent)(catheter) Cognitive Retraining Safety/Judgement: Awareness of environment; Fall prevention;Good awareness of safety precautions; Insight into deficits Functional Measure: 
Barthel Index: 
 
Bathin Bladder: 0 Bowels: 10 
Groomin Dressin Feeding: 10 Mobility: 0 Stairs: 0 Toilet Use: 5 Transfer (Bed to Chair and Back): 5 Total: 35/100 The Barthel ADL Index: Guidelines 1. The index should be used as a record of what a patient does, not as a record of what a patient could do. 2. The main aim is to establish degree of independence from any help, physical or verbal, however minor and for whatever reason. 3. The need for supervision renders the patient not independent. 4. A patient's performance should be established using the best available evidence. Asking the patient, friends/relatives and nurses are the usual sources, but direct observation and common sense are also important. However direct testing is not needed. 5. Usually the patient's performance over the preceding 24-48 hours is important, but occasionally longer periods will be relevant. 6. Middle categories imply that the patient supplies over 50 per cent of the effort. 7. Use of aids to be independent is allowed. Frank Mann., Barthel, D.W. (6136). Functional evaluation: the Barthel Index. [address] [facility] (14)2. KEISHA Chakraborty, Dipak Mosqueda., Estefanía Singh., Roberts, [address] [place] [address] Ave (). Measuring the change indisability after inpatient rehabilitation; comparison of the responsiveness of the Barthel Index and Functional [place] Measure. Journal of Neurology, Neurosurgery, and Psychiatry, 66(4), 981-088. Van [place], N.J.A, VALENTINO Moreno, & ALBERTA Zhou (2004.) Assessment of post-stroke quality of life in cost-effectiveness studies: The usefulness of the Barthel Index and the EuroQoL-5D. Quality of Life Research, 13, 427-50  
 
 
Occupational Therapy Evaluation Charge Determination  
History Examination Decision-Making  
LOW Complexity : Brief history review  LOW Complexity : 1-3 performance deficits relating to physical, cognitive , or psychosocial skils that result in activity limitations and / or participation restrictions  MEDIUM Complexity : Patient may present with comorbidities that affect occupational performnce. Miniml to moderate modification of tasks or assistance (eg, physical or verbal ) with assesment(s) is necessary to enable patient to complete evaluation   
  
Based on the above components, the patient evaluation is determined to be of the following complexity level: LOW  
Pain Rating: 
Reporting no pain 
 
Activity Tolerance:  
Fair and requires rest breaks 
 
After treatment patient left in no apparent distress:   
Supine in bed, Call bell within reach, Side rails x 3, and RN aware 
 
COMMUNICATION/EDUCATION:  
The patient’s plan of care was discussed with: Physical therapist and Registered nurse.  
 
Home safety education was provided and the patient/caregiver indicated understanding. and Patient/family have participated as able in goal setting and plan of care. 
 
This patient’s plan of care is appropriate for delegation to [facility]. 
 
Thank you for this referral. 
Radha Castillo, MAGDALENE 
Time Calculation: 31 mins

## 2020-12-30 NOTE — PROGRESS NOTES
Problem: Mobility Impaired (Adult and Pediatric) Goal: *Acute Goals and Plan of Care (Insert Text) Description: FUNCTIONAL STATUS PRIOR TO ADMISSION: Patient was modified independent using a single point cane for functional mobility up until 10/2020. Per pt - after month stay in hosp for cardiac issues - pt went to rehab - had been at home x 1 wk with wife - using RW. HOME SUPPORT PRIOR TO ADMISSION: The patient lived with wife in [address] [place] [address] - was receiving home health after discharge from rehab center. Per pt wife is not able to physically assist patient. Physical Therapy Goals Initiated 12/30/2020 1. Patient will move from supine to sit and sit to supine , scoot up and down, and roll side to side in bed with independence within 7 day(s). 2.  Patient will transfer from bed to chair and chair to bed with modified independence using the least restrictive device within 7 day(s). 3.  Patient will perform sit to stand with modified independence within 7 day(s). 4.  Patient will ambulate with modified independence for > 100 feet with the least restrictive device within 7 day(s). PHYSICAL THERAPY EVALUATION Patient: Colleen Mae (89 y.o. male) Date: 12/30/2020 Primary Diagnosis: Acute on chronic diastolic (congestive) heart failure ([facility] [address] [I50.33] Precautions:   Fall, Other (comment)(Droplet Plus) ASSESSMENT Based on the objective data described below, the patient presented to ER on 12/27 with acute on chronic diastolic CHF, also tested positive for COVID. Patient remains on room air, mild SOB noted with activity, pt with wet cough - most likely due to CHF. Pt also with recent hx of long hosp stay (10/2020 - received new pacemaker)  followed by rehab stay. Pt presenting today with generalized weakness, decline in functional mobility, decreased activity tolerance and impaired balance/gait. Pt also with right 5th metatarsal head, plantar aspect diabetic foot ulcer - present for over 2 months - podiatry following. Given pt was safe and independent up until 10/2020 - would recommend inpatient rehab prior to discharge home to independent living with elderly wife. Current Level of Function Impacting Discharge (mobility/balance): Min assist to CGA for bed mobility and sit to stand transfers; CGA or amb with RW; decreased activity tolerance - amb 5 feet Functional Outcome Measure: The patient scored 15/28 on the Tinetti outcome measure which is indicative of high fall risk. Other factors to consider for discharge: Lives with elderly wife unable to assist, high fall risk, remains well below PLOF before 10/2020 Patient will benefit from skilled therapy intervention to address the above noted impairments. PLAN : 
Recommendations and Planned Interventions: bed mobility training, transfer training, gait training, therapeutic exercises, patient and family training/education, and therapeutic activities Frequency/Duration: Patient will be followed by physical therapy:  5 times a week to address goals. Recommendation for discharge: (in order for the patient to meet his/her long term goals) Therapy 3 hours per day 5-7 days per week - if not possible, Therapy 5 days a wk in SNF This discharge recommendation: 
Has not yet been discussed the attending provider and/or case management IF patient discharges home will need the following DME: patient owns DME required for discharge SUBJECTIVE:  
Patient stated I just never got my strength back after being in hosp 10/2020.  OBJECTIVE DATA SUMMARY:  
HISTORY:   
Past Medical History:  
Diagnosis Date  Acute on chronic diastolic (congestive) heart failure ([facility] [address] 12/27/2020  BPH (benign prostatic hyperplasia)  CAD (coronary artery disease)  Diabetes ([facility] [address]  Hearing loss  Hypercholesterolemia  Hypertension  Murmur  Recurrent depression ([facility] [address] 8/22/2019  Third degree AV block ([facility] [address] 10/30/2020 Past Surgical History:  
Procedure Laterality Date  CARDIAC SURG PROCEDURE UNLIST  2006 by-pass  HX CHOLECYSTECTOMY  [place] INS NEW/RPLCMT PRM PM W/TRANSV ELTRD ATRIAL&VENT  10/30/2020  [place] NEW/RPLCMT PRM PM W/TRANSV ELTRD ATRIAL&VENT N/A 10/30/2020 INSERT PPM DUAL performed by Nikolay [facility] CATH LAB Personal factors and/or comorbidities impacting plan of care: as above Home Situation Home Environment: Independent living([facility]) Living Alone: No 
Support Systems: Spouse/Significant Other/Partner Patient Expects to be Discharged to[de-identified] Rehabilitation facility Current DME Used/Available at Home: Cane, straight(in rehab was using RW) EXAMINATION/PRESENTATION/DECISION MAKING:  
Critical Behavior: 
Neurologic State: Alert Orientation Level: Oriented X4 Cognition: Appropriate decision making, Appropriate for age attention/concentration, Appropriate safety awareness Safety/Judgement: Awareness of environment, Good awareness of safety precautions, Insight into deficits Hearing: [employer] [place] - hearing aides Skin:  Right foot with dressing in place Range Of Motion: 
AROM: Generally decreased, functional 
  
  
  
  
  
  
  
Strength:   
Strength: Generally decreased, functional 
  
  
  
  
  
  
Tone & Sensation:  
Tone: Normal 
  
  
  
  
Sensation: Intact Coordination: 
Coordination: Within functional limits Functional Mobility: 
Bed Mobility: 
Rolling: Modified independent(with guard rail) Supine to Sit: Minimum assistance;Assist x1 Scooting: Stand-by assistance Transfers: 
Sit to Stand: Contact guard assistance(cues to push up from bed) Stand to Sit: Contact guard assistance(verbal cues to reach back to chair) Balance:  
Sitting: Intact Standing: Impaired; With support Standing - Static: Constant support;Good Standing - Dynamic : Good;Constant support Ambulation/Gait Training: 
Distance (ft): 5 Feet (ft) Assistive Device: Walker, rolling Ambulation - Level of Assistance: Contact guard assistance Gait Abnormalities: Decreased step clearance Base of Support: Center of gravity altered;Shift to left Speed/Zaida: Pace decreased (<100 feet/min) Step Length: Right shortened;Left shortened Functional Measure: 
Tinetti test: 
 
Sitting Balance: 1 Arises: 1 Attempts to Rise: 1 Immediate Standing Balance: 1 Standing Balance: 1 Nudged: 1 Eyes Closed: 1 Turn 360 Degrees - Continuous/Discontinuous: 0 Turn 360 Degrees - Steady/Unsteady: 0 Sitting Down: 1 Balance Score: 8 Balance total score Indication of Gait: 0 
R Step Length/Height: 1 L Step Length/Height: 1 
R Foot Clearance: 1 L Foot Clearance: 1 Step Symmetry: 1 Step Continuity: 1 Path: 1 Trunk: 0 Walking Time: 0 Gait Score: 7 Gait total score Total Score: 15/28 Overall total score Tinetti Tool Score Risk of Falls 
<19 = High Fall Risk 19-24 = Moderate Fall Risk 25-28 = Low Fall Risk Tinetti ME. Performance-Oriented Assessment of Mobility Problems in Elderly Patients. [place] 66; [id number]. (Scoring Description: PT Bulletin Feb. 10, 1993) Older adults: Felix Leos et al, 2009; n = [address] [employer] elderly evaluated with ABC, JUAN, ADL, and IADL) · Mean JUAN score for males aged 69-68 years = 26.21(3.40) · Mean JUAN score for females age 69-68 years = 25.16(4.30) · Mean JUAN score for males over 80 years = 23.29(6.02) · Mean JUAN score for females over 80 years = 17.20(8.32) Physical Therapy Evaluation Charge Determination History Examination Presentation Decision-Making LOW Complexity : Zero comorbidities / personal factors that will impact the outcome / POC LOW Complexity : 1-2 Standardized tests and measures addressing body structure, function, activity limitation and / or participation in recreation  LOW Complexity : Stable, uncomplicated  LOW Complexity : FOTO score of  Based on the above components, the patient evaluation is determined to be of the following complexity level: LOW Pain Rating: 
Pt denied pain during session. Pt does state + pain in right foot intermittently. Activity Tolerance:  
Fair, SpO2 stable on RA, and requires rest breaks After treatment patient left in no apparent distress:  
Sitting in chair and Call bell within reach COMMUNICATION/EDUCATION:  
The patients plan of care was discussed with: Registered nurse. Fall prevention education was provided and the patient/caregiver indicated understanding., Patient/family have participated as able in goal setting and plan of care. , and Patient/family agree to work toward stated goals and plan of care. Thank you for this referral. 
Alycia Hernandez, PT Time Calculation: 30 mins

## 2020-12-30 NOTE — PROGRESS NOTES
[facility] [place]: Kasandra Yoon 
[phone] Cardiology valvular heart disease progress note Requesting/referring provider: Dr. Zena Root, Dr. Diony Cardona, Dr. Alise Dotson Reason for Consult: Valvular heart disease HPI: Gin English, a 82y.o. year-old who presents for evaluation of dyspnea that has been progressive over past few days, associated with cough with sputum and occasionally worse laying flat. He has significant history of coronary artery disease with remote coronary artery bypass grafting with LIMA to LAD, vein graft to OM, vein graft to RPDA. Has preserved EF and about 2 months ago, he underwent transcatheter aortic valve replacement with 34 mm evolute transcatheter prosthesis. He also had evidence of AV block and underwent pacemaker placement during the hospitalization. Unfortunately due to antibiotics given for pacemaker placement he developed diarrhea and had a rehospitalization about 8weeks ago. He is now recovered from that. After spending a few weeks in rehab, he was discharged 5 weeks ago and was doing well until last week when he developed dyspnea. He has not recovered his overall strength since being in the hospital in November. Investigations reviewed by me: 
ECG October 2020: First ECG: Sinus rhythm, first-degree AV block, nonspecific ST-T changes;  
second ECG multifocal atrial tachycardia, high degree AVblock, nonspecific ST-T changes Echocardiogram October 2020: Normal LV function, likely severe aortic stenosis with peak transaortic velocity of 3.8 to 3.9 m/s, mean gradient of 35 mmHg, calculated aortic valve area by continued equation of 0.8 cm² by mean gradient. Visibly the valve looks extremely calcified with significant restriction of motion. There is moderate to severe mitral annular calcification extending inferior to the mitral valve annulus along the posterior left ventricular wall. Transmitral gradients are also increased at 6mmHg mean gradient consistent with possible mild mitral stenosis. Leaflets were difficult to visualize but do not appear typical for rheumatic mitral stenosis despite history of rheumatic fever as a kid. No significant pulmonary hypertension is noted. Left atrium is significantly enlarged. ECG 5 PM October 28 2020: QRS down 202 ms, OH interval down to about 300 to 350 ms, essentially similar to what it was on ECG on 19 October. Echocardiogram November 2020 post TAVR: Normal LV function, mild PVL, mild functional mitral stenosis, appropriate gradients across aortic transcatheter prosthesis. EOA was not properly calculated. CTA: Upper lobe GGO --pneumonia vs edema with b/l effusions small. COVID 19 rapid  test positive Assessment/Plan: 1. Severe symptomatic calcific senile aortic stenosis s/p TAVR with 34 mm evolute R prosthesis 2. Hypertension 3. CKD 4. History of remote smoking 5. Mitral annular calcification with possible mild mitral stenosis 6. Multifocal atrial tachycardia/atrial fibrillation 7. Coronary disease status post coronary bypass grafting remotely with LIMA to LAD, vein graft to OM, vein graft to PDA 8. Preserved LV systolic function 9. Recent syncope of uncertain etiology possibly contributed by severe aortic stenosis in the setting of atrial arrhythmia and anti HTN meds. 10.  History of peripheral arterial disease with prior bilateral common iliac stents, known bilateral common femoral calcification extending in the distal common femoral into bifurcation with bilateral SFA and infrapopliteal disease. Likely bilateral femoral endarterectomies have been performed in the past as well. 11. Tachy masood syndrome history of AV block and syncope. Now status post dual-chamber pacemaker 12. Severe left subclavian artery stenosis s/p recent angioplasty to reduce 80% stenosis down to about 50%. Left arm blood pressures may be unreliable 13. Possible COVID 19 infection 14. Sacral and foot ulcer--non healing Mr. Andrew Williamson is admitted to the hospital for increasing dyspnea. His LV function is normal and his aortic valve disease has already been addressed with TAVR with good results, no residual stenosis and mild PVL. ON last echo, he him mild diastolic dysfunction at best. While he does have evidence b/l effusions, his overall pictures is less suggestive of CHF and symptoms are more likely from COVID 19 infection. Will increase amlodipine from 5 to 10 mg because of elevated blood pressures. We will also switch from IV to oral maintenance Lasix at a dose of 40 mg daily. Foot wound being addressed by podiatry. No additional intervention from cardiac standpoint. HE appears to be in AF currently--previously mostly in MAT. However due to fall risk, anemia, thrombocytopenia, not the best candidate for [address]. Will consider discussion regarding oral anticoagulation during clinic follow-up with Dr. Irma Huggins. After discharge. Not unreasonable to treat his foot infection as possible osteomyelitis as bacteremia is likely to cause issues with valve infection. [x]    High complexity decision making was performed 
[]    Patient is at high-risk of decompensation with multiple organ involvement He  has a past medical history of Acute on chronic diastolic (congestive) heart failure ([facility] [address] (2020), BPH (benign prostatic hyperplasia), CAD (coronary artery disease), Diabetes ([facility] [facility] Hearing loss, Hypercholesterolemia, Hypertension, Murmur, Recurrent depression ([facility] (2019), and [facility] (10/30/2020). He also has no past medical history of Anemia, Arthritis, Asthma, Autoimmune disease ([facility] Calculus of kidney, Cancer ([facility] Chronic kidney disease, Chronic obstructive pulmonary disease ([facility] Chronic pain, GERD (gastroesophageal reflux disease), Headache, Liver disease, Psychotic disorder ([facility] PUD (peptic ulcer disease), Seizures ([facility] Stroke ([facility] Thromboembolus ([facility] Thyroid disease, or Trauma. Review of system:Patient reports no dyspnea/PND/Orthpnea/CP. He reports no cough/fever/focal neurological deficits/abdominal pain. All other systems negative except as above. Family History Problem Relation Age of Onset  Cancer Mother  Cancer Father Social History Socioeconomic History  Marital status:  Spouse name: Not on file  Number of children: Not on file  Years of education: Not on file  Highest education level: Not on file Tobacco Use  Smoking status: Former Smoker Types: Cigarettes Quit date: 1990 Years since quittin.3  Smokeless tobacco: Never Used Substance and Sexual Activity  Alcohol use: No  
 Drug use: No  
  
PE Vitals:  
 20 2339 20 0420 20 9283 20 1050 BP: (!) 152/50 (!) 145/49 (!) 150/53 (!) 173/58 Pulse: 62 74 72 76 Resp:  Temp: 98 °F (36.7 °C) 98 °F (36.7 °C) 97.5 °F (36.4 °C) 97.6 °F (36.4 °C) SpO2: 94% 92% 95% 94% Weight:      
Height:      
 Body mass index is 23.25 kg/m². General:    Alert, cooperative, no distress. Psychiatric:    Normal Mood and affect Eye/ENT:      Pupils equal, No asymmetry, Conjunctival pink. Able to hear voice at normal amplitude  
Lungs:      Visibly symmetric chest expansion, No palpable tenderness. Clear to auscultation bilaterally.   
Heart::    Regular rate and rhythm, S1, [id number], mid to late peaking mid systolic murmur, click, rub or gallop.No JVD, Normal palpable peripheral pulses. No cyanosis  
Abdomen:     Soft, non-tender. Bowel sounds normal. No masses,  No   
  organomegaly.  
Extremities:   Extremities normal, atraumatic, no edema.  
Neurologic:   CN II-XII grossly intact. No gross focal deficits 
  
 
 
 
Recent Labs: 
Lab Results  
Component Value Date/Time  
 Cholesterol, total 114 2020 03:19 PM  
 HDL Cholesterol 40 2020 03:19 PM  
 LDL, calculated 54 2020 03:19 PM  
 Triglyceride 101 2020 03:19 PM  
 
Lab Results  
Component Value Date/Time  
 Creatinine 0.89 2020 04:32 AM  
 
Lab Results  
Component Value Date/Time  
 BUN 28 (H) 2020 04:32 AM  
 
Lab Results  
Component Value Date/Time  
 Potassium 3.9 2020 04:32 AM  
 
Lab Results  
Component Value Date/Time  
 Hemoglobin A1c 7.1 (H) 10/18/2020 03:27 PM  
 
Lab Results  
Component Value Date/Time  
 HGB 9.9 (L) 2020 04:32 AM  
 
Lab Results  
Component Value Date/Time  
 PLATELET 103 (L) 2020 04:32 AM  
 
 
Reviewed: 
Past Medical History:  
Diagnosis Date  
• Acute on chronic diastolic (congestive) heart failure (HCC) 2020  
• BPH (benign prostatic hyperplasia)   
• CAD (coronary artery disease)   
• Diabetes (HCC)   
• Hearing loss   
• Hypercholesterolemia   
• Hypertension   
• Murmur   
• Recurrent depression (HCC) 2019  
• Third degree AV block ([facility]) 10/30/2020  
 
Social History  
 
Tobacco Use  
Smoking Status Former Smoker  
• Types: Cigarettes  
• Quit date: 1990  
• Years since quittin.3  
Smokeless Tobacco Never Used  
 
Social History  
 
Substance and Sexual Activity  
 Alcohol Use No  
 
Allergies Allergen Reactions  Procaine Other (comments) Pt stated he passed out from procaine and was told not to let anyone use it on him again Family History Problem Relation Age of Onset  Cancer Mother  Cancer Father Current Facility-Administered Medications Medication Dose Route Frequency  enoxaparin (LOVENOX) injection 40 mg  40 mg SubCUTAneous Q12H  
 insulin glargine (LANTUS) injection 10 Units  10 Units SubCUTAneous DAILY  zinc sulfate (ZINCATE) 220 (50) mg capsule 1 Cap  1 Cap Oral DAILY  ascorbic acid (vitamin C) (VITAMIN C) tablet 500 mg  500 mg Oral BID  amLODIPine (NORVASC) tablet 5 mg  5 mg Oral DAILY  aspirin chewable tablet 81 mg  81 mg Oral DAILY  atorvastatin (LIPITOR) tablet 40 mg  40 mg Oral QHS  doxazosin (CARDURA) tablet 4 mg  4 mg Oral QHS  finasteride (PROSCAR) tablet 5 mg  5 mg Oral DAILY  fluticasone propionate (FLONASE) 50 mcg/actuation nasal spray 1 Spray  1 Spray Both Nostrils DAILY  losartan (COZAAR) tablet 100 mg  100 mg Oral DAILY  metoprolol tartrate (LOPRESSOR) tablet 50 mg  50 mg Oral BID  sertraline (ZOLOFT) tablet 100 mg  100 mg Oral QPM  
 temazepam (RESTORIL) capsule 15 mg  15 mg Oral QHS PRN  
 sodium chloride (OCEAN) 0.65 % nasal squeeze bottle 1 Spray  1 Spray Both Nostrils PRN  
 sodium chloride (NS) flush 5-40 mL  5-40 mL IntraVENous Q8H  
 sodium chloride (NS) flush 5-40 mL  5-40 mL IntraVENous PRN  polyethylene glycol (MIRALAX) packet 17 g  17 g Oral DAILY PRN  prochlorperazine (COMPAZINE) with saline injection 5 mg  5 mg IntraVENous Q6H PRN  
 cefepime (MAXIPIME) 2 g in 0.9% sodium chloride (MBP/ADV) 100 mL MBP  2 g IntraVENous Q12H  
 insulin lispro (HUMALOG) injection   SubCUTAneous AC&HS  
 glucose chewable tablet 16 g  4 Tab Oral PRN  
 dextrose (D50W) injection syrg 12.5-25 g  12.5-25 g IntraVENous PRN  
  glucagon (GLUCAGEN) injection 1 mg  1 mg IntraMUSCular PRN  
 vancomycin - pharmacy to dose   Other Rx Dosing/Monitoring  vancomycin (VANCOCIN) 1250 mg in  ml infusion  1,250 mg IntraVENous Q16H  
 zinc oxide-cod liver oil (DESITIN) 40 % paste   Topical Q12H  
 acetaminophen (TYLENOL) tablet 650 mg  650 mg Oral Q6H PRN Or  
 acetaminophen (TYLENOL) suppository 650 mg  650 mg Rectal Q6H PRN  
 guaiFENesin-dextromethorphan (ROBITUSSIN DM) 100-10 mg/5 mL syrup 5 mL  5 mL Oral Q4H PRN  
 dexAMETHasone (DECADRON) tablet 6 mg  6 mg Oral DAILY  cholecalciferol (VITAMIN D3) (1000 Units /25 mcg) tablet 2 Tab  2,000 Units Oral DAILY  remdesivir 100 mg in 0.9% sodium chloride 250 mL IVPB  100 mg IntraVENous Q24H  
 furosemide (LASIX) injection 20 mg  20 mg IntraVENous DAILY Tori Ospina MD12/30/20 ATTENTION:  
This medical record was transcribed using an electronic medical records/speech recognition system. Although proofread, it may and can contain electronic, spelling and other errors. Corrections may be executed at a later time. Please feel free to contact us for any clarifications as needed. [facility] and [facility] [address], [address]

## 2020-12-30 NOTE — PROGRESS NOTES
Bedside shift change report given to Sandra (oncoming nurse) by Zuri David (offgoing nurse). Report included the following information SBAR, Kardex, MAR, Recent Results, Med Rec Status and Cardiac Rhythm sinus masood.

## 2020-12-30 NOTE — PROGRESS NOTES
Verbal shift change report given to Dorie Newell RN (oncoming nurse) by Ricardo Momin RN (offgoing nurse). Report included the following information SBAR, Kardex, ED Summary, [facility] and Recent Results.

## 2020-12-30 NOTE — PROGRESS NOTES
Hospitalist Progress Note Roseline Monge MD 
Answering service: [phone] from in house phone Date of Service:  2020 NAME:  Delta Lefort :  1935 MRN:  [id number] Admission Summary: As per initial admission summary This is an 79-year-old man with a past medical history significant for hypertension; chronic diastolic congestive heart failure; dyslipidemia; third degree heart block, status post pacemaker insertion; coronary artery disease, status post CABG; benign prostatic hyperplasia; type 2 diabetes; aortic stenosis, status post transcatheter aortic valve replacement TAVR, who was in his usual state of health until a few days ago when the patient developed shortness of breath which is progressive and getting worse. The shortness of breath is worse when the patient is trying to lie down flat and also with activities such as walking. The shortness of breath is associated with cough which is productive of yellowish sputum. The patient was brought to the emergency room because of worsening symptoms. The patient was recently admitted to this hospital from 2020 to 2020. The patient was admitted for evaluation of generalized weakness. Following that evaluation, he was discharged to rehab. The patient has been home for about a week from rehab. The patient was found to have right diabetic foot ulcer on arrival at the emergency room. He was referred to the Hospitalist Service for evaluation for admission. The patient stated that he has been taking these medications as prescribed including his diuretic. Interval history / Subjective:  
  Patient seen for Follow up of COVID Patient seen and examined by the bedside, Labs, images and notes reviewed Patient with COVID infection. Started on remdesevir by ID. Previously Talked to son in detail. He will update me if family and patients wants convalescent plasma. D/w cardiology. Will stop heparin drip and start lovenox BID covid protocol Patient wants to be full code for now Getting remdesevir, last dose 1/1 12/30 Nuclear bone scan ordered,  Not done due to COVID status Patient with no complaints On room air. Talked to patient daughter and updated him regarding patient clinical condition . He was very appreciative . Assessment & Plan: 1. Acute on chronic diastolic congestive heart failure. Cardiology consulted Recommended to continue Lasix 40 mg daily, increased from 20 CT chest does not show any new embolism minimal patchy groundglass opacity in the right upper lobe may represent pulmonary edema as per CTA chest 
Elevated troponin likely due to Covid Cardiology following. Need to follow up with cardiology as outpatient COVID positive On remdesivir started by the infectious disease On room air 2. Hypertension. On metoprolol, losartan, amlodipine 3. Dyslipidemia. We will continue with Lipitor. 4.  Third degree heart block. The patient is status post pacemaker insertion. We will continue to monitor. Cardiology following 5. Coronary artery disease, status post coronary artery bypass grafting. We will continue with cardiac medications. Cardiology consulted, recommended Maintenance 40 mg daily Lasix as diuretics 6. Right diabetic foot infection. Suspicion of osteomyelitis  
on cefepime and vancomycin. Wound Care consult Podiatry consult will also be requested to assist in further evaluation and treatment. ID following CT did not show any evidence of osteomyelitis  
ultrasound of the lower extremity for DVT. Negative Bone scan ordered was not done due to COVID Patient may need 6 weeks of antibiotics for OM. Final recommendations as per ID. Also need podiatry input 7.  Sacral decubitus ulcer present on admission. Wound Care consult Lisseth Mcgovern 8. Suspected bacterial pneumonia. On broad-spectrum antibiotics 10. Benign prostatic hyperplasia. This is without urinary obstruction. We will continue with preadmission medication. 11.  Type 2 diabetes with hyperglycemia. The patient will be started on sliding scale with insulin coverage. 12.  Anemia. This is most likely due to chronic disease. 13.  Thrombocytopenia. The patient is asymptomatic. We will monitor the patient's platelet count. 14.  Elevated lactic acid level. resolved 15. Aortic stenosis, status post TAVR. We will continue to monitor. follow-up with cardiology as an outpatient 16. Other Issues:  Code status, the patient is a full code. We will place the patient on heparin for DVT prophylaxis. Code status: full code DVT prophylaxis: heparin Care Plan discussed with: Patient/Family Disposition: TBD PT/ OT consulted Hospital Problems  Date Reviewed: 12/28/2020 Codes Class Noted POA Type 2 diabetes mellitus with right diabetic foot infection ([facility] [address] ICD-10-CM: E11.628, L08.9 ICD-9-CM: 250.80, 686.9  12/28/2020 Unknown * (Principal) Acute on chronic diastolic (congestive) heart failure (HCC) ICD-10-CM: I50.33 ICD-9-CM: 428.33, 428.0  12/27/2020 Yes Review of Systems: A comprehensive review of systems was negative except for that written in the HPI. Vital Signs:  
 Last 24hrs VS reviewed since prior progress note. Most recent are: 
Visit Vitals BP (!) 173/58 (BP 1 Location: Right arm, BP Patient Position: At rest) Pulse 76 Temp 97.6 °F (36.4 °C) Resp 25 Ht 6' 2\" (1.88 m) Wt 82.1 kg (181 lb 1.6 oz) SpO2 94% BMI 23.25 kg/m² Intake/Output Summary (Last 24 hours) at 12/30/2020 1434 Last data filed at 12/29/2020 9240 Gross per 24 hour Intake 950 ml Output 450 ml Net 500 ml Physical Examination: I had a face to face encounter with this patient and independently examined them on December 30, 2020 as outlined below: 
     
Constitutional:  No acute distress, cooperative, pleasant ENT:  Oral mucous moist, oropharynx benign. Neck supple, Resp:  CTA bilaterally. No wheezing/rhonchi/rales. No accessory muscle use CV:  Regular rhythm, normal rate, no murmurs, gallops, rubs GI:  Soft, non distended, non tender. normoactive bowel sounds, no hepatosplenomegaly Musculoskeletal:  No edema, warm, 2+ pulses throughout. Dressing right foot. Neurologic:  Moves all extremities. AAOx3, CN II-XII reviewed Psych:  Good insight, Not anxious nor agitated. . 
  
 
Data Review:  
 Review and/or order of clinical lab test 
Review and/or order of tests in the radiology section of CPT Review and/or order of tests in the medicine section of CPT Labs:  
 
Recent Labs 12/30/20 
5613 12/28/20 
3180 WBC 4.8 6.2 HGB 9.9* 10.9* HCT 31.7* 35.6*  
* 112* Recent Labs 12/30/20 
0432 12/29/20 
0040 12/28/20 
0459 12/27/20 
2200  141 142 139  
K 3.9 3.8 3.9 3.6  106 105 105 CO2 32 28 33* 31 BUN 28* 24* 22* 23* CREA 0.89 0.90 0.87 0.99 * 291* 217* 287* CA 8.1* 7.8* 8.0* 8.0*  
MG  --   --   --  1.7 PHOS  --   --  3.3  --   
 
Recent Labs 12/30/20 
2392 12/29/20 
0040 12/28/20 
0459 ALT 24 24 27 AP 77 80 88 TBILI 0.3 0.3 0.4 TP 5.6* 6.0* 6.4 ALB 2.4* 2.2* 2.7*  
GLOB 3.2 3.8 3.7 Recent Labs  
  12/29/20 
0041 12/28/20 
1839 12/28/20 
1504 APTT 48.3* 52.1* 127.0* Recent Labs  
  12/29/20 
0040 12/28/20 
0459 FERR 153 128 Lab Results Component Value Date/Time Folate 20.5 12/28/2020 04:59 AM  
  
No results for input(s): PH, PCO2, PO2 in the last 72 hours. Recent Labs 12/30/20 
4956 12/29/20 
1428 12/29/20 
0525 TROIQ 0.43* 0.43* 0.60* Lab Results Component Value Date/Time Cholesterol, total 114 02/26/2020 03:19 PM  
 HDL Cholesterol 40 02/26/2020 03:19 PM  
 LDL, calculated 54 02/26/2020 03:19 PM  
 Triglyceride 101 02/26/2020 03:19 PM  
 
Lab Results Component Value Date/Time Glucose (POC) 324 (H) 12/30/2020 11:39 AM  
 Glucose (POC) 218 (H) 12/30/2020 08:35 AM  
 Glucose (POC) 314 (H) 12/29/2020 10:03 PM  
 Glucose (POC) 269 (H) 12/29/2020 04:17 PM  
 Glucose (POC) 278 (H) 12/29/2020 11:21 AM  
 
Lab Results Component Value Date/Time Color YELLOW/STRAW 12/28/2020 06:02 AM  
 Appearance CLOUDY (A) 12/28/2020 06:02 AM  
 Specific gravity 1.022 12/28/2020 06:02 AM  
 pH (UA) 5.0 12/28/2020 06:02 AM  
 Protein 300 (A) 12/28/2020 06:02 AM  
 Glucose Negative 12/28/2020 06:02 AM  
 Ketone Negative 12/28/2020 06:02 AM  
 Bilirubin Negative 12/28/2020 06:02 AM  
 Urobilinogen 0.2 12/28/2020 06:02 AM  
 Nitrites Negative 12/28/2020 06:02 AM  
 Leukocyte Esterase TRACE (A) 12/28/2020 06:02 AM  
 Epithelial cells MODERATE (A) 12/28/2020 06:02 AM  
 Bacteria 1+ (A) 12/28/2020 06:02 AM  
 WBC 20-50 12/28/2020 06:02 AM  
 RBC >100 (H) 12/28/2020 06:02 AM  
 
 
 
Medications Reviewed:  
 
Current Facility-Administered Medications Medication Dose Route Frequency  enoxaparin (LOVENOX) injection 40 mg  40 mg SubCUTAneous Q12H  
 insulin glargine (LANTUS) injection 10 Units  10 Units SubCUTAneous DAILY  [START ON 12/31/2020] amLODIPine (NORVASC) tablet 10 mg  10 mg Oral DAILY  [START ON 12/31/2020] furosemide (LASIX) tablet 40 mg  40 mg Oral DAILY  zinc sulfate (ZINCATE) 220 (50) mg capsule 1 Cap  1 Cap Oral DAILY  ascorbic acid (vitamin C) (VITAMIN C) tablet 500 mg  500 mg Oral BID  aspirin chewable tablet 81 mg  81 mg Oral DAILY  atorvastatin (LIPITOR) tablet 40 mg  40 mg Oral QHS  doxazosin (CARDURA) tablet 4 mg  4 mg Oral QHS  finasteride (PROSCAR) tablet 5 mg  5 mg Oral DAILY  fluticasone propionate (FLONASE) 50 mcg/actuation nasal spray 1 Spray  1 Spray Both Nostrils DAILY  losartan (COZAAR) tablet 100 mg  100 mg Oral DAILY  metoprolol tartrate (LOPRESSOR) tablet 50 mg  50 mg Oral BID  sertraline (ZOLOFT) tablet 100 mg  100 mg Oral QPM  
 temazepam (RESTORIL) capsule 15 mg  15 mg Oral QHS PRN  
 sodium chloride (OCEAN) 0.65 % nasal squeeze bottle 1 Spray  1 Spray Both Nostrils PRN  
 sodium chloride (NS) flush 5-40 mL  5-40 mL IntraVENous Q8H  
 sodium chloride (NS) flush 5-40 mL  5-40 mL IntraVENous PRN  polyethylene glycol (MIRALAX) packet 17 g  17 g Oral DAILY PRN  prochlorperazine (COMPAZINE) with saline injection 5 mg  5 mg IntraVENous Q6H PRN  
 insulin lispro (HUMALOG) injection   SubCUTAneous AC&HS  
 glucose chewable tablet 16 g  4 Tab Oral PRN  
 dextrose (D50W) injection syrg 12.5-25 g  12.5-25 g IntraVENous PRN  
 glucagon (GLUCAGEN) injection 1 mg  1 mg IntraMUSCular PRN  
 vancomycin - pharmacy to dose   Other Rx Dosing/Monitoring  vancomycin (VANCOCIN) 1250 mg in  ml infusion  1,250 mg IntraVENous Q16H  
 zinc oxide-cod liver oil (DESITIN) 40 % paste   Topical Q12H  
 acetaminophen (TYLENOL) tablet 650 mg  650 mg Oral Q6H PRN Or  
 acetaminophen (TYLENOL) suppository 650 mg  650 mg Rectal Q6H PRN  
 guaiFENesin-dextromethorphan (ROBITUSSIN DM) 100-10 mg/5 mL syrup 5 mL  5 mL Oral Q4H PRN  
 dexAMETHasone (DECADRON) tablet 6 mg  6 mg Oral DAILY  cholecalciferol (VITAMIN D3) (1000 Units /25 mcg) tablet 2 Tab  2,000 Units Oral DAILY  remdesivir 100 mg in 0.9% sodium chloride 250 mL IVPB  100 mg IntraVENous Q24H  
 
______________________________________________________________________ EXPECTED LENGTH OF STAY: 4d 2h 
ACTUAL LENGTH OF STAY:          3 Mell Mines, MD

## 2020-12-30 NOTE — PROGRESS NOTES
[facility] [place]: Karsno Stone 
[phone] Cardiology valvular heart disease progress note Requesting/referring provider: Dr. Kevin Anders, Dr. Mi Villeda, Dr. Kim Noonan Reason for Consult: Valvular heart disease HPI: Austin Varma, a 82y.o. year-old who presents for evaluation of dyspnea that has been progressive over past few days, associated with cough with sputum and occasionally worse laying flat. He has significant history of coronary artery disease with remote coronary artery bypass grafting with LIMA to LAD, vein graft to OM, vein graft to RPDA. Has preserved EF and about 2 months ago, he underwent transcatheter aortic valve replacement with 34 mm evolute transcatheter prosthesis. He also had evidence of AV block and underwent pacemaker placement during the hospitalization. Unfortunately due to antibiotics given for pacemaker placement he developed diarrhea and had a rehospitalization about 8weeks ago. He is now recovered from that. After spending a few weeks in rehab, he was discharged 5 weeks ago and was doing well until last week when he developed dyspnea. He has not recovered his overall strength since being in the hospital in November. Investigations reviewed by me: 
ECG October 2020: First ECG: Sinus rhythm, first-degree AV block, nonspecific ST-T changes;  
second ECG multifocal atrial tachycardia, high degree AVblock, nonspecific ST-T changes Echocardiogram October 2020: Normal LV function, likely severe aortic stenosis with peak transaortic velocity of 3.8 to 3.9 m/s, mean gradient of 35 mmHg, calculated aortic valve area by continued equation of 0.8 cm² by mean gradient. Visibly the valve looks extremely calcified with significant restriction of motion. There is moderate to severe mitral annular calcification extending inferior to the mitral valve annulus along the posterior left ventricular wall. Transmitral gradients are also increased at 6mmHg mean gradient consistent with possible mild mitral stenosis. Leaflets were difficult to visualize but do not appear typical for rheumatic mitral stenosis despite history of rheumatic fever as a kid. No significant pulmonary hypertension is noted. Left atrium is significantly enlarged. ECG 5 PM October 28 2020: QRS down 202 ms, PA interval down to about 300 to 350 ms, essentially similar to what it was on ECG on 19 October. Echocardiogram November 2020 post TAVR: Normal LV function, mild PVL, mild functional mitral stenosis, appropriate gradients across aortic transcatheter prosthesis. EOA was not properly calculated. CTA: Upper lobe GGO --pneumonia vs edema with b/l effusions small. COVID 19 rapid  test positive Assessment/Plan: 1. Severe symptomatic calcific senile aortic stenosis s/p TAVR with 34 mm evolute R prosthesis 2. Hypertension 3. CKD 4. History of remote smoking 5. Mitral annular calcification with possible mild mitral stenosis 6. Multifocal atrial tachycardia/atrial fibrillation 7. Coronary disease status post coronary bypass grafting remotely with LIMA to LAD, vein graft to OM, vein graft to PDA 8. Preserved LV systolic function 9. Recent syncope of uncertain etiology possibly contributed by severe aortic stenosis in the setting of atrial arrhythmia and anti HTN meds. 10.  History of peripheral arterial disease with prior bilateral common iliac stents, known bilateral common femoral calcification extending in the distal common femoral into bifurcation with bilateral SFA and infrapopliteal disease. Likely bilateral femoral endarterectomies have been performed in the past as well. 11. Tachy masood syndrome history of AV block and syncope. Now status post dual-chamber pacemaker 12. Severe left subclavian artery stenosis s/p recent angioplasty to reduce 80% stenosis down to about 50%. Left arm blood pressures may be unreliable 13. Possible COVID 19 infection 14. Sacral and foot ulcer--non healing Mr. Claudia Castro is admitted to the hospital for increasing dyspnea. His LV function is normal and his aortic valve disease has already been addressed with TAVR with good results, no residual stenosis and mild PVL. ON last echo, he him mild diastolic dysfunction at best. While he does have evidence b/l effusions, his overall pictures is less suggestive of CHF and symptoms are more likely from COVID 19 infection. Maintenance 20 mg daily Lasix as diuretics should be sufficient. May continue home anti HTN is BP allows. No additional intervention from cardiac standpoint. HE appears to be in AF currently--previously mostly in MAT. However due to fall risk, anemia, thrombocytopenia, not the best candidate for [address]. [x]    High complexity decision making was performed 
[]    Patient is at high-risk of decompensation with multiple organ involvement He  has a past medical history of Acute on chronic diastolic (congestive) heart failure ([facility] (2020), BPH (benign prostatic hyperplasia), CAD (coronary artery disease), Diabetes ([facility] Hearing loss, Hypercholesterolemia, Hypertension, Murmur, Recurrent depression ([facility] (2019), and [facility] degree [facility]10[facility]). He also has no past medical history of Anemia, Arthritis, Asthma, Autoimmune disease ([facility] Calculus of kidney, Cancer ([facility] Chronic kidney disease, Chronic obstructive pulmonary disease [facility] Chronic pain, GERD (gastroesophageal reflux disease), Headache, Liver disease, Psychotic disorder ([facility] PUD (peptic ulcer disease), Seizures ([facility] Stroke ([facility] Thromboembolus ([facility] Thyroid disease, or Trauma. Review of system:Patient reports no dyspnea/PND/Orthpnea/CP. He reports no cough/fever/focal neurological deficits/abdominal pain. All other systems negative except as above. Family History Problem Relation Age of Onset  Cancer Mother  Cancer Father Social History Socioeconomic History  Marital status:  Spouse name: Not on file  Number of children: Not on file  Years of education: Not on file  Highest education level: Not on file Tobacco Use  Smoking status: Former Smoker Types: Cigarettes Quit date: 1990 Years since quittin.3  Smokeless tobacco: Never Used Substance and Sexual Activity  Alcohol use: No  
 Drug use: No  
  
PE Vitals:  
 20 2339 20 0420 20 4584 20 1050 BP: (!) 152/50 (!) 145/49 (!) 150/53 (!) 173/58 Pulse: 62 74 72 76 Resp:  Temp: 98 °F (36.7 °C) 98 °F (36.7 °C) 97.5 °F (36.4 °C) 97.6 °F (36.4 °C) SpO2: 94% 92% 95% 94% Weight:      
Height:      
 Body mass index is 23.25 kg/m². General:    Alert, cooperative, no distress. Psychiatric:    Normal Mood and affect Eye/ENT:      Pupils equal, No asymmetry, Conjunctival pink. Able to hear voice at normal amplitude Lungs:      Visibly symmetric chest expansion, No palpable tenderness. Clear to auscultation bilaterally. Heart[de-identified]    Regular rate and rhythm, S1, [id number], mid to late peaking mid systolic murmur, click, rub or gallop. No JVD, Normal palpable peripheral pulses. No cyanosis Abdomen:     Soft, non-tender. Bowel sounds normal. No masses,  No   
  organomegaly. Extremities:   Extremities normal, atraumatic, no edema. Neurologic:   CN II-XII grossly intact. No gross focal deficits Recent Labs: 
Lab Results Component Value Date/Time Cholesterol, total 114 2020 03:19 PM  
 HDL Cholesterol 40 2020 03:19 PM  
 LDL, calculated 54 2020 03:19 PM  
 Triglyceride 101 2020 03:19 PM  
 
Lab Results Component Value Date/Time Creatinine 0.89 2020 04:32 AM  
 
Lab Results Component Value Date/Time BUN 28 (H) 2020 04:32 AM  
 
Lab Results Component Value Date/Time Potassium 3.9 2020 04:32 AM  
 
Lab Results Component Value Date/Time Hemoglobin A1c 7.1 (H) 10/18/2020 03:27 PM  
 
Lab Results Component Value Date/Time HGB 9.9 (L) 2020 04:32 AM  
 
Lab Results Component Value Date/Time PLATELET 618 (L)  04:32 AM  
 
 
Reviewed: 
Past Medical History:  
Diagnosis Date  Acute on chronic diastolic (congestive) heart failure ([address] 2020  BPH (benign prostatic hyperplasia)  CAD (coronary artery disease)  Diabetes ([address]  Hearing loss  Hypercholesterolemia  Hypertension  Murmur  Recurrent depression ([address] 2019  Third degree AV block ([address] 10/30/2020 Social History Tobacco Use Smoking Status Former Smoker  Types: Cigarettes  Quit date: 1990  Years since quittin.3 Smokeless Tobacco Never Used Social History Substance and Sexual Activity Alcohol Use No  
 
Allergies Allergen Reactions  Procaine Other (comments) Pt stated he passed out from procaine and was told not to let anyone use it on him again Family History Problem Relation Age of Onset  Cancer Mother  Cancer Father Current Facility-Administered Medications Medication Dose Route Frequency  enoxaparin (LOVENOX) injection 40 mg  40 mg SubCUTAneous Q12H  
 insulin glargine (LANTUS) injection 10 Units  10 Units SubCUTAneous DAILY  [START ON 12/31/2020] amLODIPine (NORVASC) tablet 10 mg  10 mg Oral DAILY  [START ON 12/31/2020] furosemide (LASIX) tablet 40 mg  40 mg Oral DAILY  zinc sulfate (ZINCATE) 220 (50) mg capsule 1 Cap  1 Cap Oral DAILY  ascorbic acid (vitamin C) (VITAMIN C) tablet 500 mg  500 mg Oral BID  aspirin chewable tablet 81 mg  81 mg Oral DAILY  atorvastatin (LIPITOR) tablet 40 mg  40 mg Oral QHS  doxazosin (CARDURA) tablet 4 mg  4 mg Oral QHS  finasteride (PROSCAR) tablet 5 mg  5 mg Oral DAILY  fluticasone propionate (FLONASE) 50 mcg/actuation nasal spray 1 Spray  1 Spray Both Nostrils DAILY  losartan (COZAAR) tablet 100 mg  100 mg Oral DAILY  metoprolol tartrate (LOPRESSOR) tablet 50 mg  50 mg Oral BID  sertraline (ZOLOFT) tablet 100 mg  100 mg Oral QPM  
 temazepam (RESTORIL) capsule 15 mg  15 mg Oral QHS PRN  
 sodium chloride (OCEAN) 0.65 % nasal squeeze bottle 1 Spray  1 Spray Both Nostrils PRN  
 sodium chloride (NS) flush 5-40 mL  5-40 mL IntraVENous Q8H  
 sodium chloride (NS) flush 5-40 mL  5-40 mL IntraVENous PRN  polyethylene glycol (MIRALAX) packet 17 g  17 g Oral DAILY PRN  prochlorperazine (COMPAZINE) with saline injection 5 mg  5 mg IntraVENous Q6H PRN  
 cefepime (MAXIPIME) 2 g in 0.9% sodium chloride (MBP/ADV) 100 mL MBP  2 g IntraVENous Q12H  
 insulin lispro (HUMALOG) injection   SubCUTAneous AC&HS  
 glucose chewable tablet 16 g  4 Tab Oral PRN  
  dextrose (D50W) injection syrg 12.5-25 g  12.5-25 g IntraVENous PRN  
 glucagon (GLUCAGEN) injection 1 mg  1 mg IntraMUSCular PRN  
 vancomycin - pharmacy to dose   Other Rx Dosing/Monitoring  vancomycin (VANCOCIN) 1250 mg in  ml infusion  1,250 mg IntraVENous Q16H  
 zinc oxide-cod liver oil (DESITIN) 40 % paste   Topical Q12H  
 acetaminophen (TYLENOL) tablet 650 mg  650 mg Oral Q6H PRN Or  
 acetaminophen (TYLENOL) suppository 650 mg  650 mg Rectal Q6H PRN  
 guaiFENesin-dextromethorphan (ROBITUSSIN DM) 100-10 mg/5 mL syrup 5 mL  5 mL Oral Q4H PRN  
 dexAMETHasone (DECADRON) tablet 6 mg  6 mg Oral DAILY  cholecalciferol (VITAMIN D3) (1000 Units /25 mcg) tablet 2 Tab  2,000 Units Oral DAILY  remdesivir 100 mg in 0.9% sodium chloride 250 mL IVPB  100 mg IntraVENous Q24H Minh Gibbs MD12/30/20 ATTENTION:  
This medical record was transcribed using an electronic medical records/speech recognition system. Although proofread, it may and can contain electronic, spelling and other errors. Corrections may be executed at a later time. Please feel free to contact us for any clarifications as needed. [facility] [address], [address]

## 2020-12-30 NOTE — PROGRESS NOTES
[facility] Progress Note Ena Talamantes DO 
  [phone] Office [phone]  Fax 
 
2020 Assessment & Plan:  
1. COVID-19 pneumonia. Off oxygen. Continue remdesivir and dexamethasone. Continued management per primary team 
2. Diabetic foot infection involving right fifth metatarsal head. Culture growing MRSA. Wound probes to bone raising concern for osteomyelitis. CT scan negative however this is not the best study to rule out OM. Patient reportedly cannot get a three-phase bone scan until he is Covid negative. Another option to rule out OM would be to see if patient's pacemaker TAVR is MRI compatible however if he cannot get an MRI if he is Covid positive this also is not helpful in the current situation. Option 3 would be empiric 6-week course of IV antibiotics for presumed osteomyelitis. Will await further input from hospitalist and podiatry teams. Narrow antibiotics to vancomycin 3. Third-degree heart block with history of pacemaker 4. Diabetes mellitus 5. Aortic stenosis status post TAVR Subjective: No complaints. Agreeable to MRI if indicated Objective:  
 
Vitals:  
Visit Vitals BP (!) 173/58 (BP 1 Location: Right arm, BP Patient Position: At rest) Pulse 76 Temp 97.6 °F (36.4 °C) Resp 25 Ht 6' 2\" (1.88 m) Wt 181 lb 1.6 oz (82.1 kg) SpO2 94% BMI 23.25 kg/m² Tmax:  Temp (24hrs), Av.8 °F (36.6 °C), Min:97.5 °F (36.4 °C), Max:98 °F (36.7 °C) Exam:  
Patient is intubated:  no 
 
Physical Examination:  
General:  Alert, cooperative, no distress Head:  Normocephalic, atraumatic. Eyes:  Conjunctivae clear Neck: Supple Lungs:   No distress. Chest wall:    
Heart:    
Abdomen:    Nondistended Extremities: Moves all. Skin:  No rash. Wound at base of the right fifth metatarsal.  No periwound erythema or purulence noted. Serous drainage on bandage Neurologic: CNII-XII intact. Normal strength Labs:     
 
No lab exists for component: ITNL Recent Labs 12/30/20 
2724 12/29/20 
1428 12/29/20 
0525 TROIQ 0.43* 0.43* 0.60* Recent Labs 12/30/20 
0432 12/29/20 
0040 12/28/20 
0459 12/27/20 
2200  141 142 139  
K 3.9 3.8 3.9 3.6  106 105 105 CO2 32 28 33* 31 BUN 28* 24* 22* 23* CREA 0.89 0.90 0.87 0.99 * 291* 217* 287* PHOS  --   --  3.3  --   
MG  --   --   --  1.7 ALB 2.4* 2.2* 2.7* 2.4* WBC 4.8  --  6.2 5.6 HGB 9.9*  --  10.9* 10.2* HCT 31.7*  --  35.6* 32.2*  
*  --  112* 110* Recent Labs  
  12/29/20 
0041 12/28/20 
1839 12/28/20 
1504 APTT 48.3* 52.1* 127.0* Needs: urine analysis, urine sodium, protein and creatinine Lab Results Component Value Date/Time Creatinine, urine 121.7 08/13/2020 11:20 AM  
 
 
 
Cultures: No results found for: SDES Lab Results Component Value Date/Time Culture result: (A) 12/28/2020 03:04 PM  
  LIGHT * METHICILLIN RESISTANT STAPHYLOCOCCUS AUREUS * Culture result: NO GROWTH 2 DAYS 12/27/2020 10:00 PM  
 Culture result: NO GROWTH 5 DAYS 11/11/2020 10:51 PM  
 
 
Radiology:  
 
Medications Current Facility-Administered Medications Medication Dose Route Frequency Last Admin  enoxaparin (LOVENOX) injection 40 mg  40 mg SubCUTAneous Q12H    
 insulin glargine (LANTUS) injection 10 Units  10 Units SubCUTAneous DAILY 10 Units at 12/30/20 1205  [START ON 12/31/2020] amLODIPine (NORVASC) tablet 10 mg  10 mg Oral DAILY  [START ON 12/31/2020] furosemide (LASIX) tablet 40 mg  40 mg Oral DAILY  zinc sulfate (ZINCATE) 220 (50) mg capsule 1 Cap  1 Cap Oral DAILY 1 Cap at 12/30/20 0849  
 ascorbic acid (vitamin C) (VITAMIN C) tablet 500 mg  500 mg Oral  mg at 12/30/20 2086  aspirin chewable tablet 81 mg  81 mg Oral DAILY 81 mg at 12/30/20 0878  atorvastatin (LIPITOR) tablet 40 mg  40 mg Oral QHS 40 mg at 12/29/20 2137  
 doxazosin (CARDURA) tablet 4 mg  4 mg Oral QHS 4 mg at 12/29/20 2137  
 finasteride (PROSCAR) tablet 5 mg  5 mg Oral DAILY 5 mg at 12/30/20 8797  fluticasone propionate (FLONASE) 50 mcg/actuation nasal spray 1 Spray  1 Spray Both Nostrils DAILY 1 [place] at 12/30/20 0850  losartan (COZAAR) tablet 100 mg  100 mg Oral DAILY 100 mg at 12/30/20 0849  
 metoprolol tartrate (LOPRESSOR) tablet 50 mg  50 mg Oral BID 50 mg at 12/30/20 6058  sertraline (ZOLOFT) tablet 100 mg  100 mg Oral  mg at 12/29/20 1705  temazepam (RESTORIL) capsule 15 mg  15 mg Oral QHS PRN 15 mg at 12/30/20 0451  sodium chloride (OCEAN) 0.65 % nasal squeeze bottle 1 Spray  1 Spray Both Nostrils PRN    
 sodium chloride (NS) flush 5-40 mL  5-40 mL IntraVENous Q8H Stopped at 12/30/20 1304  sodium chloride (NS) flush 5-40 mL  5-40 mL IntraVENous PRN  polyethylene glycol (MIRALAX) packet 17 g  17 g Oral DAILY PRN  prochlorperazine (COMPAZINE) with saline injection 5 mg  5 mg IntraVENous Q6H PRN    
 cefepime (MAXIPIME) 2 g in 0.9% sodium chloride (MBP/ADV) 100 mL MBP  2 g IntraVENous Q12H 2 g at 12/30/20 0412  
 insulin lispro (HUMALOG) injection   SubCUTAneous AC&HS 7 Units at 12/30/20 1205  
 glucose chewable tablet 16 g  4 Tab Oral PRN    
 dextrose (D50W) injection syrg 12.5-25 g  12.5-25 g IntraVENous PRN    
 glucagon (GLUCAGEN) injection 1 mg  1 mg IntraMUSCular PRN    
 vancomycin - pharmacy to dose   Other Rx Dosing/Monitoring  vancomycin (VANCOCIN) 1250 mg in  ml infusion  1,250 mg IntraVENous Q16H 1,250 mg at 12/30/20 0906  zinc oxide-cod liver oil (DESITIN) 40 % paste   Topical Q12H Given at 12/30/20 0850  acetaminophen (TYLENOL) tablet 650 mg  650 mg Oral Q6H  mg at 12/29/20 0550 Or  acetaminophen (TYLENOL) suppository 650 mg  650 mg Rectal Q6H PRN    
  guaiFENesin-dextromethorphan (ROBITUSSIN DM) 100-10 mg/5 mL syrup 5 mL  5 mL Oral Q4H PRN    
 dexAMETHasone (DECADRON) tablet 6 mg  6 mg Oral DAILY 6 mg at 12/30/20 5008  cholecalciferol (VITAMIN D3) (1000 Units /25 mcg) tablet 2 Tab  2,000 Units Oral DAILY 2 Tab at 12/30/20 8627  remdesivir 100 mg in 0.9% sodium chloride 250 mL IVPB  100 mg IntraVENous Q24H 100 mg at 12/29/20 6769 Case discussed with: 
 
 
Jaun Byrd DO

## 2020-12-31 NOTE — PROGRESS NOTES
Progress Note Patient: Nazario Meyers MRN: [id number]  SSN: [id number] YOB: 1935  Age: 82 y.o. Sex: male Admit Date: 12/27/2020 Procedure:    
 
Subjective:  
 
Patient seen resting quiet and comfortably and no apparent distress. No issues overnight. Still notes occasional pain to foot only when direct pressure applied to area. Objective:  
 
Visit Vitals BP (!) 149/53 Pulse 60 Temp 98.5 °F (36.9 °C) Resp 27 Ht 6' 2\" (1.88 m) Wt 82.4 kg (181 lb 10.5 oz) SpO2 90% BMI 23.32 kg/m² Physical Exam: 
Decreased edema to RLE. Erythema only noted to the immediate periwound are of the ulceration. [place] Ulceration of the right plantar 5th MPJ measures 2.5 x 2.3 x 0.2 cm with a small area that does probe further deep to the capsule of the fifth metatarsal head. No significant drainage noted today. Slight tenderness with palpation. Labs/Radiology Review: images and reports reviewed Assessment:  
 
Hospital Problems  Date Reviewed: 12/28/2020 Codes Class Noted POA Type 2 diabetes mellitus with right [facility] foot infection [facility] ICD-10-CM: E11.628, L08.9 ICD-9-CM: 250.80, 686.9  12/28/2020 Unknown * (Principal) Acute on chronic diastolic (congestive) heart failure (HCC) ICD-10-CM: I50.33 ICD-9-CM: 428.33, 428.0  12/27/2020 Yes Plan/Recommendations/Medical Decision Making: -Agree with plan for MRI as further imaging to rule out osteomyelitis, as would be ideal to avoid 6 weeks of IV antibiotics for presumed osteomyelitis if possible. Additionally, if OM found on MRI could discuss debridement of wound/bone. Made calls to both MRI and nuclear medicine departments. Confirmed that 3 phase bone scan not an option with the nuclear medicine department at this time. MRI noted that it was possibility if cardiology provides the proper documentation for his pacemaker and its leads, as it does appear that the patient's pacemaker TAVR is MRI compatible. Additionally, it will need to be approved by radiology. Order entered for MRI. Discussed with patient and Dr. Peggy Cobb. -Dressing applied at bedside. 
-Wound improving with decreased drainage 
-Prevalon boots ordered 
-Continue to follow

## 2020-12-31 NOTE — PROGRESS NOTES
Hospitalist Progress Note Nighat Robles MD 
Answering service: [phone] OR [phone] from in house phone Date of Service:  2020 NAME:  Viv Alejandre :  1935 MRN:  [id number] Admission Summary: As per initial admission summary Patient presented to the emergency room with progressive shortness of breath and productive cough. Medical history is significant for hypertension; chronic diastolic congestive heart failure; dyslipidemia; third degree heart block, status post pacemaker insertion; coronary artery disease, status post CABG; benign prostatic hyperplasia; type 2 diabetes; aortic stenosis, status post transcatheter aortic valve replacement TAVR. Patient was recently admitted to this hospital from 2020 to 2020. Interval history / Subjective:  
  Patient seen for Follow up of COVID, diabetic foot infection Assuming care of patient today. Patient seen lying in bed comfortably. He did not offer any complaints. He specifically denied shortness of breath, chest pain, chest tightness, fever or chills. Her podiatrist Dr. Galdino Frank who spoke with MRI said they may be able to perform the MRI test if the pacemaker is said to be MRI compatible but we need to speak with the radiologist.  We will also need cardiologist tell us about the nature of the pacemaker. The earliest this may happen probably is on Monday Assessment & Plan:  
 
Acute on chronic diastolic congestive heart failure. CT chest showed patchy groundglass opacity in the right upper lobe may represent pulmonary edema. Cardiology consulted Recommended to continue Lasix 40 mg daily, increased from 20 COVID-19 pneumonia suspected superimposed bacterial pneumonia. On broad-spectrum antibiotics On dexamethasone and remdesivir started by the infectious disease Maintain droplet plus isolation. Right diabetic foot infection,suspicion of osteomyelitis --on cefepime and vancomycin. CT of the foot negative for osteomyelitis but it is not the best study to diagnose osteomyelitis, ideally would have liked either three-phase bone scan or MRI both of which could not be done due to patient's Covid status. -ID following. He may need to be empirically treated with IV antibiotics for 6 weeks. Podiatry discussed with radiology who suggested they may be able to deviate so long as the pacemaker is MRI compatible. --Wound Care consult  
--Podiatry consulted 
--CT did not show any evidence of osteomyelitis --ultrasound of the lower extremity for DVT. Negative Hypertension. On metoprolol, losartan, amlodipine Dyslipidemia. We will continue with Lipitor. Third degree heart block. The patient is status post pacemaker insertion. Coronary artery disease, status post coronary artery bypass grafting. We will continue with cardiac medications. Sacral decubitus ulcer present on admission. Wound Care consult Gabriel Quezada Hypertension, chronic and uncontrolled: 
--Continue amlodipine 10 mg, losartan 100 mg, Lopressor 50 mg twice daily Benign prostatic hyperplasia urinary retention. Type 2 diabetes with hyperglycemia 
-Accu-Cheks, Humalog sliding scale. Anemia, anemia of chronic disease. Thrombocytopenia. The patient is asymptomatic. Monitor Elevated lactic acid level due to infection. resolved Aortic stenosis, status post TAVR. Code status: full code DVT prophylaxis: heparin Care Plan discussed with: Patient/Family Disposition: TBD PT/ OT consulted Hospital Problems  Date Reviewed: 12/28/2020 Codes Class Noted POA Type 2 diabetes mellitus with right diabetic foot infection [facility] ICD-10-CM: E11.628, L08.9 ICD-9-CM: 250.80, 686.9  12/28/2020 Unknown  * (Principal) Acute on chronic diastolic (congestive) heart failure (HCC) ICD-10-CM: I50.33 
 ICD-9-CM: 428.33, 428.0  12/27/2020 Yes Review of Systems: A comprehensive review of systems was negative except for that written in the HPI. Vital Signs:  
 Last 24hrs VS reviewed since prior progress note. Most recent are: 
Visit Vitals BP (!) 149/53 Pulse 60 Temp 98.5 °F (36.9 °C) Resp 27 Ht 6' 2\" (1.88 m) Wt 82.4 kg (181 lb 10.5 oz) SpO2 90% BMI 23.32 kg/m² Intake/Output Summary (Last 24 hours) at 12/31/2020 1358 Last data filed at 12/31/2020 1200 Gross per 24 hour Intake 1154.84 ml Output 1450 ml Net -295.16 ml Physical Examination:  
I had a face to face encounter with this patient and independently examined them on December 31, 2020 as outlined below: 
     
Constitutional:  No acute distress, cooperative, pleasant ENT:  Oral mucous moist, oropharynx benign. Neck supple, Resp:  CTA bilaterally. No wheezing/rhonchi/rales. No accessory muscle use CV:  Regular rhythm, normal rate, no murmurs, gallops, rubs GI:  Soft, non distended, non tender. normoactive bowel sounds, no hepatosplenomegaly Musculoskeletal:  No edema, warm, 2+ pulses throughout. Dressing right foot. Neurologic:  Moves all extremities. AAOx3, CN II-XII reviewed Psych:  Good insight, Not anxious nor agitated. . 
  
 
Data Review:  
 Review and/or order of clinical lab test 
Review and/or order of tests in the radiology section of CPT Review and/or order of tests in the medicine section of CPT Labs:  
 
Recent Labs 12/30/20 
8501 WBC 4.8 HGB 9.9*  
HCT 31.7* * Recent Labs 12/31/20 
0670 12/30/20 
0432 12/29/20 
0040  142 141  
K 3.3* 3.9 3.8  108 106 CO2 32 32 28 BUN 31* 28* 24* CREA 0.92 0.89 0.90 * 243* 291* CA 8.0* 8.1* 7.8* Recent Labs 12/31/20 
5428 12/30/20 
0432 12/29/20 
0040 ALT 26 24 24 AP 64 77 80 TBILI 0.3 0.3 0.3 TP 5.3* 5.6* 6.0* ALB 2.0* 2.4* 2.2*  
GLOB 3.3 3.2 3.8 Recent Labs 12/31/20 
0711 12/29/20 
0041 12/28/20 
1839 APTT 45.2* 48.3* 52.1* Recent Labs  
  12/29/20 
0040 FERR 153 Lab Results Component Value Date/Time Folate 20.5 12/28/2020 04:59 AM  
  
No results for input(s): PH, PCO2, PO2 in the last 72 hours. Recent Labs 12/30/20 
1136 12/29/20 
1428 12/29/20 
0525 TROIQ 0.43* 0.43* 0.60* Lab Results Component Value Date/Time Cholesterol, total 114 02/26/2020 03:19 PM  
 HDL Cholesterol 40 02/26/2020 03:19 PM  
 LDL, calculated 54 02/26/2020 03:19 PM  
 Triglyceride 101 02/26/2020 03:19 PM  
 
Lab Results Component Value Date/Time Glucose (POC) 226 (H) 12/31/2020 11:59 AM  
 Glucose (POC) 232 (H) 12/31/2020 08:40 AM  
 Glucose (POC) 352 (H) 12/30/2020 09:16 PM  
 Glucose (POC) 235 (H) 12/30/2020 04:44 PM  
 Glucose (POC) 324 (H) 12/30/2020 11:39 AM  
 
Lab Results Component Value Date/Time Color YELLOW/STRAW 12/28/2020 06:02 AM  
 Appearance CLOUDY (A) 12/28/2020 06:02 AM  
 Specific gravity 1.022 12/28/2020 06:02 AM  
 pH (UA) 5.0 12/28/2020 06:02 AM  
 Protein 300 (A) 12/28/2020 06:02 AM  
 Glucose Negative 12/28/2020 06:02 AM  
 Ketone Negative 12/28/2020 06:02 AM  
 Bilirubin Negative 12/28/2020 06:02 AM  
 Urobilinogen 0.2 12/28/2020 06:02 AM  
 Nitrites Negative 12/28/2020 06:02 AM  
 Leukocyte Esterase TRACE (A) 12/28/2020 06:02 AM  
 Epithelial cells MODERATE (A) 12/28/2020 06:02 AM  
 Bacteria 1+ (A) 12/28/2020 06:02 AM  
 WBC 20-50 12/28/2020 06:02 AM  
 RBC >100 (H) 12/28/2020 06:02 AM  
 
 
 
Medications Reviewed:  
 
Current Facility-Administered Medications Medication Dose Route Frequency  Vancomycin, Trough - Please draw IMMEDIATELY PRIOR to starting 1700 dose on Thursday, 12/31/2020. Other ONCE  
 enoxaparin (LOVENOX) injection 40 mg  40 mg SubCUTAneous Q12H  
 insulin glargine (LANTUS) injection 10 Units  10 Units SubCUTAneous DAILY  amLODIPine (NORVASC) tablet 10 mg  10 mg Oral DAILY  furosemide (LASIX) tablet 40 mg  40 mg Oral DAILY  zinc sulfate (ZINCATE) 220 (50) mg capsule 1 Cap  1 Cap Oral DAILY  ascorbic acid (vitamin C) (VITAMIN C) tablet 500 mg  500 mg Oral BID  aspirin chewable tablet 81 mg  81 mg Oral DAILY  atorvastatin (LIPITOR) tablet 40 mg  40 mg Oral QHS  doxazosin (CARDURA) tablet 4 mg  4 mg Oral QHS  finasteride (PROSCAR) tablet 5 mg  5 mg Oral DAILY  fluticasone propionate (FLONASE) 50 mcg/actuation nasal spray 1 Spray  1 Spray Both Nostrils DAILY  losartan (COZAAR) tablet 100 mg  100 mg Oral DAILY  metoprolol tartrate (LOPRESSOR) tablet 50 mg  50 mg Oral BID  sertraline (ZOLOFT) tablet 100 mg  100 mg Oral QPM  
 temazepam (RESTORIL) capsule 15 mg  15 mg Oral QHS PRN  
 sodium chloride (OCEAN) 0.65 % nasal squeeze bottle 1 Spray  1 Spray Both Nostrils PRN  
 sodium chloride (NS) flush 5-40 mL  5-40 mL IntraVENous Q8H  
 sodium chloride (NS) flush 5-40 mL  5-40 mL IntraVENous PRN  polyethylene glycol (MIRALAX) packet 17 g  17 g Oral DAILY PRN  prochlorperazine (COMPAZINE) with saline injection 5 mg  5 mg IntraVENous Q6H PRN  
 insulin lispro (HUMALOG) injection   SubCUTAneous AC&HS  
 glucose chewable tablet 16 g  4 Tab Oral PRN  
 dextrose (D50W) injection syrg 12.5-25 g  12.5-25 g IntraVENous PRN  
 glucagon (GLUCAGEN) injection 1 mg  1 mg IntraMUSCular PRN  
 vancomycin - pharmacy to dose   Other Rx Dosing/Monitoring  vancomycin (VANCOCIN) 1250 mg in  ml infusion  1,250 mg IntraVENous Q16H  
 zinc oxide-cod liver oil (DESITIN) 40 % paste   Topical Q12H  
 acetaminophen (TYLENOL) tablet 650 mg  650 mg Oral Q6H PRN Or  
 acetaminophen (TYLENOL) suppository 650 mg  650 mg Rectal Q6H PRN  
 guaiFENesin-dextromethorphan (ROBITUSSIN DM) 100-10 mg/5 mL syrup 5 mL  5 mL Oral Q4H PRN  
 dexAMETHasone (DECADRON) tablet 6 mg  6 mg Oral DAILY  cholecalciferol (VITAMIN D3) (1000 Units /25 mcg) tablet 2 Tab  2,000 Units Oral DAILY  remdesivir 100 mg in 0.9% sodium chloride 250 mL IVPB  100 mg IntraVENous Q24H  
 
______________________________________________________________________ EXPECTED LENGTH OF STAY: 4d 2h 
ACTUAL LENGTH OF STAY:          4 Elizabet Rowell MD

## 2020-12-31 NOTE — PROGRESS NOTES
Bedside and Verbal shift change report given to [place] (oncoming nurse) by Abby Garcia (offgoing nurse). Report included the following information SBAR, Kardex, Intake/Output, MAR, Accordion and Recent Results. Problem: Pressure Injury - Risk of 
Goal: *Prevention of pressure injury Description: Document Justin Scale and appropriate interventions in the flowsheet. Outcome: Progressing Towards Goal 
Note: Pressure Injury Interventions: 
Sensory Interventions: Assess changes in LOC, Assess need for specialty bed, Check visual cues for pain, Discuss PT/OT consult with provider, Float heels, Keep linens dry and wrinkle-free, Maintain/enhance activity level, Minimize linen layers, Monitor skin under medical devices, Pressure redistribution bed/mattress (bed type), Turn and reposition approx. every two hours (pillows and wedges if needed) Moisture Interventions: Absorbent underpads, Apply protective barrier, creams and emollients, Assess need for specialty bed, Check for incontinence Q2 hours and as needed, Internal/External urinary devices, Limit adult briefs, Maintain skin hydration (lotion/cream), Minimize layers, Moisture barrier, Offer toileting Q_hr Activity Interventions: Assess need for specialty bed, Chair cushion, Increase time out of bed, Pressure redistribution bed/mattress(bed type), PT/OT evaluation Mobility Interventions: Assess need for specialty bed, Float heels, HOB 30 degrees or less, Pressure redistribution bed/mattress (bed type), PT/OT evaluation, Turn and reposition approx. every two hours(pillow and wedges) Nutrition Interventions: Document food/fluid/supplement intake, Discuss nutritional consult with provider, Offer support with meals,snacks and hydration Friction and Shear Interventions: Apply protective barrier, creams and emollients, Foam dressings/transparent film/skin sealants, HOB 30 degrees or less, Lift sheet, Lift team/patient mobility team, Minimize layers Problem: Patient Education: Go to Patient Education Activity Goal: Patient/Family Education Outcome: Progressing Towards Goal 
  
Problem: Falls - Risk of 
Goal: *Absence of Falls Description: Document Kellie Bare Fall Risk and appropriate interventions in the flowsheet. Outcome: Progressing Towards Goal 
Note: Fall Risk Interventions: 
Mobility Interventions: Assess mobility with egress test, Bed/chair exit alarm, Communicate number of staff needed for ambulation/transfer, OT consult for ADLs, Patient to call before getting OOB, PT Consult for mobility concerns, PT Consult for assist device competence, Strengthening exercises (ROM-active/passive), Utilize walker, cane, or other assistive device, Utilize gait belt for transfers/ambulation Medication Interventions: Bed/chair exit alarm, Evaluate medications/consider consulting pharmacy, Patient to call before getting OOB, Teach patient to arise slowly, Utilize gait belt for transfers/ambulation Elimination Interventions: Bed/chair exit alarm, Call light in reach, Patient to call for help with toileting needs, Stay With Me (per policy), Toilet paper/wipes in reach, Toileting schedule/hourly rounds Problem: Patient Education: Go to Patient Education Activity Goal: Patient/Family Education Outcome: Progressing Towards Goal 
  
Problem: Nutrition Deficit Goal: *Optimize nutritional status Outcome: Progressing Towards Goal 
  
Problem: Risk for Spread of Infection Goal: Prevent transmission of infectious organism to others Description: Prevent the transmission of infectious organisms to other patients, staff members, and visitors.  
Outcome: Progressing Towards Goal 
  
 Problem: Patient Education:  Go to Education Activity 
Goal: Patient/Family Education 
Outcome: Progressing Towards Goal

## 2020-12-31 NOTE — PROGRESS NOTES
Bedside shift change report given to Na (oncoming nurse) by Remberto Spaulding (offgoing nurse). Report included the following information SBAR, Kardex, MAR, Recent Results, Med Rec Status and Cardiac Rhythm paced.

## 2020-12-31 NOTE — PROGRESS NOTES
Verbal shift change report given to Williams Langston, RN (oncoming nurse) by Leandro Mcardle, RN (offgoing nurse). Report included the following information SBAR, Kardex, ED Summary, [facility] and Recent Results.

## 2020-12-31 NOTE — PROGRESS NOTES
MRI safety note: 
 
Mr. Claudia Castro has a [employer] device, model # [id number] with RA lead model [id number], RV lead model #7906-81 placed by Dr. Nayely Hawkins on 10/30/2020. This system is MR-conditional to 3T. MRI for patients with cardiac rhythm management devices requires a set process, based on  policy. It is not possible to complete immediately but will be performed as soon as is possible, when all necessary steps are completed. Device settings order form was faxed to Dr. Annemarie Perales office. When this signed form is returned, and when MR screening sheet is completed and if pt is otherwise safe to proceed with MRI, I will contact [employer] rep and coordinate a time for pt's MRI. The earliest this MRI could possibly take place is Monday, January 4 but could be later in the week based on when the order form is returned and when a rep is available. MRI RN's will continue to follow up with Dr. Annemarie Perales office until order form is returned.

## 2020-12-31 NOTE — PROGRESS NOTES
Transition of Care Plan RUR 34% COVID 19 Positive  12/28/30 Disposition   TBD pending medical and therapy progress. Therapy recommending IPR  If [address]  Is choice Home Health  Open to [facility] but family prefers [facility] if patient returns directly home Transportation   [facility] phone [phone] Contact Wife  [place]  [phone] Son Traci [phone] CM review chart, cm assessment, and therapy evaluations/recommedations Patient lives at [address] [facility] with his wife [place]. He has utilized [facility] in the past  [facility] and [facility] (open to [facility]) Therapy recommending IPR-- As patient is COVID 19 positive  [facility] is accepting COVID patients. [facility] not accepting COVID patients-- CM left message for patient's son, as he is the main contact,  to discuss IPR placement when patient is medically stable [facility] choice is [facility] and the facility is accepting COVID patients. CM to follow up on transition of care plan.

## 2021-01-01 ENCOUNTER — APPOINTMENT (OUTPATIENT)
Dept: ULTRASOUND IMAGING | Age: 86
DRG: 177 | End: 2021-01-01
Attending: INTERNAL MEDICINE
Payer: MEDICARE

## 2021-01-01 ENCOUNTER — DOCUMENTATION ONLY (OUTPATIENT)
Dept: CARDIOLOGY CLINIC | Age: 86
End: 2021-01-01

## 2021-01-01 ENCOUNTER — APPOINTMENT (OUTPATIENT)
Dept: GENERAL RADIOLOGY | Age: 86
DRG: 177 | End: 2021-01-01
Attending: EMERGENCY MEDICINE
Payer: MEDICARE

## 2021-01-01 ENCOUNTER — HOSPITAL ENCOUNTER (OUTPATIENT)
Dept: INTERVENTIONAL RADIOLOGY/VASCULAR | Age: 86
Discharge: HOME | DRG: 177 | End: 2021-02-08
Attending: STUDENT IN AN ORGANIZED HEALTH CARE EDUCATION/TRAINING PROGRAM
Payer: MEDICARE

## 2021-01-01 ENCOUNTER — APPOINTMENT (OUTPATIENT)
Dept: GENERAL RADIOLOGY | Age: 86
DRG: 177 | End: 2021-01-01
Attending: INTERNAL MEDICINE
Payer: MEDICARE

## 2021-01-01 ENCOUNTER — PATIENT OUTREACH (OUTPATIENT)
Dept: CASE MANAGEMENT | Age: 86
End: 2021-01-01

## 2021-01-01 ENCOUNTER — APPOINTMENT (OUTPATIENT)
Dept: CT IMAGING | Age: 86
DRG: 177 | End: 2021-01-01
Attending: EMERGENCY MEDICINE
Payer: MEDICARE

## 2021-01-01 ENCOUNTER — TELEPHONE (OUTPATIENT)
Dept: INTERNAL MEDICINE CLINIC | Age: 86
End: 2021-01-01

## 2021-01-01 ENCOUNTER — HOSPITAL ENCOUNTER (INPATIENT)
Age: 86
LOS: 10 days | Discharge: HOME | DRG: 177 | End: 2021-02-15
Attending: EMERGENCY MEDICINE | Admitting: HOSPITALIST
Payer: MEDICARE

## 2021-01-01 ENCOUNTER — OFFICE VISIT (OUTPATIENT)
Dept: INTERNAL MEDICINE CLINIC | Facility: CLINIC | Age: 86
End: 2021-01-01
Payer: MEDICARE

## 2021-01-01 ENCOUNTER — TELEPHONE (OUTPATIENT)
Dept: CARDIOLOGY CLINIC | Age: 86
End: 2021-01-01

## 2021-01-01 ENCOUNTER — APPOINTMENT (OUTPATIENT)
Dept: MRI IMAGING | Age: 86
DRG: 177 | End: 2021-01-01
Attending: HOSPITALIST
Payer: MEDICARE

## 2021-01-01 ENCOUNTER — APPOINTMENT (OUTPATIENT)
Dept: INTERVENTIONAL RADIOLOGY/VASCULAR | Age: 86
DRG: 177 | End: 2021-01-01
Attending: INTERNAL MEDICINE
Payer: MEDICARE

## 2021-01-01 ENCOUNTER — HOSPICE ADMISSION (OUTPATIENT)
Dept: HOSPICE | Facility: HOSPICE | Age: 86
End: 2021-01-01

## 2021-01-01 ENCOUNTER — TRANSCRIBE ORDER (OUTPATIENT)
Dept: SCHEDULING | Age: 86
End: 2021-01-01

## 2021-01-01 ENCOUNTER — HOSPITAL ENCOUNTER (OUTPATIENT)
Dept: GENERAL RADIOLOGY | Age: 86
Discharge: HOME | End: 2021-01-19
Attending: PHYSICAL MEDICINE & REHABILITATION
Payer: COMMERCIAL

## 2021-01-01 VITALS
OXYGEN SATURATION: 94 % | RESPIRATION RATE: 27 BRPM | HEIGHT: 74 IN | DIASTOLIC BLOOD PRESSURE: 68 MMHG | TEMPERATURE: 97.6 F | WEIGHT: 181.6 LBS | BODY MASS INDEX: 23.31 KG/M2 | HEART RATE: 102 BPM | SYSTOLIC BLOOD PRESSURE: 146 MMHG

## 2021-01-01 VITALS
HEART RATE: 68 BPM | BODY MASS INDEX: 23.02 KG/M2 | DIASTOLIC BLOOD PRESSURE: 60 MMHG | OXYGEN SATURATION: 98 % | HEIGHT: 74 IN | RESPIRATION RATE: 16 BRPM | SYSTOLIC BLOOD PRESSURE: 102 MMHG

## 2021-01-01 VITALS
BODY MASS INDEX: 23.01 KG/M2 | WEIGHT: 179.3 LBS | DIASTOLIC BLOOD PRESSURE: 64 MMHG | OXYGEN SATURATION: 95 % | RESPIRATION RATE: 17 BRPM | SYSTOLIC BLOOD PRESSURE: 167 MMHG | HEART RATE: 67 BPM | TEMPERATURE: 98.6 F | HEIGHT: 74 IN

## 2021-01-01 DIAGNOSIS — I50.32 CHRONIC HEART FAILURE WITH PRESERVED EJECTION FRACTION (HCC): ICD-10-CM

## 2021-01-01 DIAGNOSIS — Z66 DNR (DO NOT RESUSCITATE): ICD-10-CM

## 2021-01-01 DIAGNOSIS — R13.10 DYSPHAGIA: Primary | ICD-10-CM

## 2021-01-01 DIAGNOSIS — Z95.1 S/P CABG (CORONARY ARTERY BYPASS GRAFT): ICD-10-CM

## 2021-01-01 DIAGNOSIS — Z95.0 PACEMAKER: ICD-10-CM

## 2021-01-01 DIAGNOSIS — Z71.89 GOALS OF CARE, COUNSELING/DISCUSSION: ICD-10-CM

## 2021-01-01 DIAGNOSIS — I50.32 CHRONIC HEART FAILURE WITH PRESERVED EJECTION FRACTION (HCC): Primary | ICD-10-CM

## 2021-01-01 DIAGNOSIS — R13.10 DYSPHAGIA: ICD-10-CM

## 2021-01-01 DIAGNOSIS — J69.0 ASPIRATION PNEUMONIA OF LOWER LOBE, UNSPECIFIED ASPIRATION PNEUMONIA TYPE, UNSPECIFIED LATERALITY (HCC): ICD-10-CM

## 2021-01-01 DIAGNOSIS — R53.81 DEBILITY: ICD-10-CM

## 2021-01-01 DIAGNOSIS — T17.800A ASPIRATION INTO LOWER RESPIRATORY TRACT, INITIAL ENCOUNTER: ICD-10-CM

## 2021-01-01 DIAGNOSIS — J96.01 ACUTE RESPIRATORY FAILURE WITH HYPOXIA (HCC): Primary | ICD-10-CM

## 2021-01-01 DIAGNOSIS — E46 PROTEIN MALNUTRITION (HCC): ICD-10-CM

## 2021-01-01 DIAGNOSIS — Z95.2 S/P TAVR (TRANSCATHETER AORTIC VALVE REPLACEMENT): ICD-10-CM

## 2021-01-01 DIAGNOSIS — Z09 HOSPITAL DISCHARGE FOLLOW-UP: ICD-10-CM

## 2021-01-01 DIAGNOSIS — R05.8 RESPIRATORY TRACT CONGESTION WITH COUGH: ICD-10-CM

## 2021-01-01 DIAGNOSIS — E11.9 TYPE 2 DIABETES MELLITUS WITHOUT COMPLICATION, WITHOUT LONG-TERM CURRENT USE OF INSULIN (HCC): ICD-10-CM

## 2021-01-01 DIAGNOSIS — J69.0 ASPIRATION PNEUMONIA OF LOWER LOBE, UNSPECIFIED ASPIRATION PNEUMONIA TYPE, UNSPECIFIED LATERALITY (HCC): Primary | ICD-10-CM

## 2021-01-01 DIAGNOSIS — I48.91 ATRIAL FIBRILLATION WITH RVR (HCC): ICD-10-CM

## 2021-01-01 DIAGNOSIS — I10 ESSENTIAL HYPERTENSION: ICD-10-CM

## 2021-01-01 DIAGNOSIS — R26.81 GAIT INSTABILITY: ICD-10-CM

## 2021-01-01 DIAGNOSIS — R63.30 FEEDING DIFFICULTY: ICD-10-CM

## 2021-01-01 LAB
ALBUMIN FLD-MCNC: 1.1 G/DL
ALBUMIN SERPL-MCNC: 2 G/DL (ref 3.5–5)
ALBUMIN SERPL-MCNC: 2.2 G/DL (ref 3.5–5)
ALBUMIN/GLOB SERPL: 0.5 {RATIO} (ref 1.1–2.2)
ALBUMIN/GLOB SERPL: 0.5 {RATIO} (ref 1.1–2.2)
ALBUMIN/GLOB SERPL: 0.6 {RATIO} (ref 1.1–2.2)
ALBUMIN/GLOB SERPL: 0.6 {RATIO} (ref 1.1–2.2)
ALP SERPL-CCNC: 74 U/L (ref 45–117)
ALP SERPL-CCNC: 75 U/L (ref 45–117)
ALP SERPL-CCNC: 76 U/L (ref 45–117)
ALP SERPL-CCNC: 89 U/L (ref 45–117)
ALT SERPL-CCNC: 19 U/L (ref 12–78)
ALT SERPL-CCNC: 24 U/L (ref 12–78)
ALT SERPL-CCNC: 34 U/L (ref 12–78)
ALT SERPL-CCNC: 43 U/L (ref 12–78)
ANION GAP SERPL CALC-SCNC: 0 MMOL/L (ref 5–15)
ANION GAP SERPL CALC-SCNC: 11 MMOL/L (ref 5–15)
ANION GAP SERPL CALC-SCNC: 12 MMOL/L (ref 5–15)
ANION GAP SERPL CALC-SCNC: 2 MMOL/L (ref 5–15)
ANION GAP SERPL CALC-SCNC: 2 MMOL/L (ref 5–15)
ANION GAP SERPL CALC-SCNC: 3 MMOL/L (ref 5–15)
ANION GAP SERPL CALC-SCNC: 4 MMOL/L (ref 5–15)
ANION GAP SERPL CALC-SCNC: 5 MMOL/L (ref 5–15)
APPEARANCE FLD: ABNORMAL
APTT PPP: 35 SEC (ref 22.1–31)
APTT PPP: 39.1 SEC (ref 22.1–31)
APTT PPP: 39.7 SEC (ref 22.1–31)
APTT PPP: 43.9 SEC (ref 22.1–31)
APTT PPP: 43.9 SEC (ref 22.1–31)
APTT PPP: 44.1 SEC (ref 22.1–31)
AST SERPL-CCNC: 20 U/L (ref 15–37)
AST SERPL-CCNC: 22 U/L (ref 15–37)
AST SERPL-CCNC: 34 U/L (ref 15–37)
AST SERPL-CCNC: 35 U/L (ref 15–37)
ATRIAL RATE: 131 BPM
ATRIAL RATE: 182 BPM
BACTERIA SPEC CULT: NORMAL
BASOPHILS # BLD: 0 K/UL (ref 0–0.1)
BASOPHILS NFR BLD: 0 % (ref 0–1)
BILIRUB SERPL-MCNC: 0.3 MG/DL (ref 0.2–1)
BILIRUB SERPL-MCNC: 0.3 MG/DL (ref 0.2–1)
BILIRUB SERPL-MCNC: 0.4 MG/DL (ref 0.2–1)
BILIRUB SERPL-MCNC: 0.6 MG/DL (ref 0.2–1)
BNP SERPL-MCNC: 7474 PG/ML
BNP SERPL-MCNC: 9265 PG/ML
BNP SERPL-MCNC: ABNORMAL PG/ML
BODY FLD TYPE: NORMAL
BUN SERPL-MCNC: 11 MG/DL (ref 6–20)
BUN SERPL-MCNC: 13 MG/DL (ref 6–20)
BUN SERPL-MCNC: 18 MG/DL (ref 6–20)
BUN SERPL-MCNC: 20 MG/DL (ref 6–20)
BUN SERPL-MCNC: 25 MG/DL (ref 6–20)
BUN SERPL-MCNC: 29 MG/DL (ref 6–20)
BUN SERPL-MCNC: 31 MG/DL (ref 6–20)
BUN SERPL-MCNC: 37 MG/DL (ref 6–20)
BUN SERPL-MCNC: 38 MG/DL (ref 6–20)
BUN SERPL-MCNC: 39 MG/DL (ref 6–20)
BUN/CREAT SERPL: 16 (ref 12–20)
BUN/CREAT SERPL: 19 (ref 12–20)
BUN/CREAT SERPL: 19 (ref 12–20)
BUN/CREAT SERPL: 23 (ref 12–20)
BUN/CREAT SERPL: 26 (ref 12–20)
BUN/CREAT SERPL: 35 (ref 12–20)
BUN/CREAT SERPL: 37 (ref 12–20)
BUN/CREAT SERPL: 41 (ref 12–20)
BUN/CREAT SERPL: 41 (ref 12–20)
BUN/CREAT SERPL: 48 (ref 12–20)
CALCIUM SERPL-MCNC: 8 MG/DL (ref 8.5–10.1)
CALCIUM SERPL-MCNC: 8.1 MG/DL (ref 8.5–10.1)
CALCIUM SERPL-MCNC: 8.2 MG/DL (ref 8.5–10.1)
CALCIUM SERPL-MCNC: 8.3 MG/DL (ref 8.5–10.1)
CALCIUM SERPL-MCNC: 8.3 MG/DL (ref 8.5–10.1)
CALCIUM SERPL-MCNC: 8.4 MG/DL (ref 8.5–10.1)
CALCIUM SERPL-MCNC: 8.4 MG/DL (ref 8.5–10.1)
CALCIUM SERPL-MCNC: 8.5 MG/DL (ref 8.5–10.1)
CALCIUM SERPL-MCNC: 8.6 MG/DL (ref 8.5–10.1)
CALCIUM SERPL-MCNC: 8.7 MG/DL (ref 8.5–10.1)
CALCULATED P AXIS, ECG09: 0 DEGREES
CALCULATED R AXIS, ECG10: -22 DEGREES
CALCULATED R AXIS, ECG10: -35 DEGREES
CALCULATED T AXIS, ECG11: 155 DEGREES
CALCULATED T AXIS, ECG11: 178 DEGREES
CHLORIDE SERPL-SCNC: 100 MMOL/L (ref 97–108)
CHLORIDE SERPL-SCNC: 101 MMOL/L (ref 97–108)
CHLORIDE SERPL-SCNC: 103 MMOL/L (ref 97–108)
CHLORIDE SERPL-SCNC: 104 MMOL/L (ref 97–108)
CHLORIDE SERPL-SCNC: 105 MMOL/L (ref 97–108)
CHLORIDE SERPL-SCNC: 105 MMOL/L (ref 97–108)
CHLORIDE SERPL-SCNC: 106 MMOL/L (ref 97–108)
CHLORIDE SERPL-SCNC: 107 MMOL/L (ref 97–108)
CHLORIDE SERPL-SCNC: 114 MMOL/L (ref 97–108)
CHLORIDE SERPL-SCNC: 117 MMOL/L (ref 97–108)
CK SERPL-CCNC: 40 U/L (ref 39–308)
CO2 SERPL-SCNC: 23 MMOL/L (ref 21–32)
CO2 SERPL-SCNC: 24 MMOL/L (ref 21–32)
CO2 SERPL-SCNC: 28 MMOL/L (ref 21–32)
CO2 SERPL-SCNC: 32 MMOL/L (ref 21–32)
CO2 SERPL-SCNC: 32 MMOL/L (ref 21–32)
CO2 SERPL-SCNC: 33 MMOL/L (ref 21–32)
CO2 SERPL-SCNC: 34 MMOL/L (ref 21–32)
CO2 SERPL-SCNC: 34 MMOL/L (ref 21–32)
CO2 SERPL-SCNC: 35 MMOL/L (ref 21–32)
CO2 SERPL-SCNC: 36 MMOL/L (ref 21–32)
COLOR FLD: YELLOW
COMMENT, HOLDF: NORMAL
COVID-19 RAPID TEST, COVR: DETECTED
CREAT SERPL-MCNC: 0.69 MG/DL (ref 0.7–1.3)
CREAT SERPL-MCNC: 0.7 MG/DL (ref 0.7–1.3)
CREAT SERPL-MCNC: 0.76 MG/DL (ref 0.7–1.3)
CREAT SERPL-MCNC: 0.8 MG/DL (ref 0.7–1.3)
CREAT SERPL-MCNC: 0.84 MG/DL (ref 0.7–1.3)
CREAT SERPL-MCNC: 0.86 MG/DL (ref 0.7–1.3)
CREAT SERPL-MCNC: 0.91 MG/DL (ref 0.7–1.3)
CREAT SERPL-MCNC: 0.95 MG/DL (ref 0.7–1.3)
CREAT SERPL-MCNC: 0.96 MG/DL (ref 0.7–1.3)
CREAT SERPL-MCNC: 1.05 MG/DL (ref 0.7–1.3)
CRP SERPL-MCNC: 0.9 MG/DL (ref 0–0.6)
D DIMER PPP FEU-MCNC: 1.04 MG/L FEU (ref 0–0.65)
D DIMER PPP FEU-MCNC: 1.23 MG/L FEU (ref 0–0.65)
D DIMER PPP FEU-MCNC: 1.52 MG/L FEU (ref 0–0.65)
D DIMER PPP FEU-MCNC: 1.58 MG/L FEU (ref 0–0.65)
D DIMER PPP FEU-MCNC: 1.61 MG/L FEU (ref 0–0.65)
D DIMER PPP FEU-MCNC: 1.76 MG/L FEU (ref 0–0.65)
D DIMER PPP FEU-MCNC: 1.76 MG/L FEU (ref 0–0.65)
D DIMER PPP FEU-MCNC: 1.83 MG/L FEU (ref 0–0.65)
DATE LAST DOSE: ABNORMAL
DATE LAST DOSE: ABNORMAL
DIAGNOSIS, 93000: NORMAL
DIAGNOSIS, 93000: NORMAL
DIFFERENTIAL METHOD BLD: ABNORMAL
EOSINOPHIL # BLD: 0 K/UL (ref 0–0.4)
EOSINOPHIL # BLD: 0.1 K/UL (ref 0–0.4)
EOSINOPHIL NFR BLD: 0 % (ref 0–7)
EOSINOPHIL NFR BLD: 1 % (ref 0–7)
ERYTHROCYTE [DISTWIDTH] IN BLOOD BY AUTOMATED COUNT: 14.1 % (ref 11.5–14.5)
ERYTHROCYTE [DISTWIDTH] IN BLOOD BY AUTOMATED COUNT: 14.3 % (ref 11.5–14.5)
ERYTHROCYTE [DISTWIDTH] IN BLOOD BY AUTOMATED COUNT: 14.4 % (ref 11.5–14.5)
ERYTHROCYTE [DISTWIDTH] IN BLOOD BY AUTOMATED COUNT: 14.5 % (ref 11.5–14.5)
ERYTHROCYTE [DISTWIDTH] IN BLOOD BY AUTOMATED COUNT: 14.5 % (ref 11.5–14.5)
ERYTHROCYTE [DISTWIDTH] IN BLOOD BY AUTOMATED COUNT: 14.7 % (ref 11.5–14.5)
ERYTHROCYTE [DISTWIDTH] IN BLOOD BY AUTOMATED COUNT: 14.7 % (ref 11.5–14.5)
ERYTHROCYTE [DISTWIDTH] IN BLOOD BY AUTOMATED COUNT: 14.9 % (ref 11.5–14.5)
ERYTHROCYTE [DISTWIDTH] IN BLOOD BY AUTOMATED COUNT: 15.7 % (ref 11.5–14.5)
ERYTHROCYTE [DISTWIDTH] IN BLOOD BY AUTOMATED COUNT: 15.9 % (ref 11.5–14.5)
ERYTHROCYTE [DISTWIDTH] IN BLOOD BY AUTOMATED COUNT: 15.9 % (ref 11.5–14.5)
ERYTHROCYTE [DISTWIDTH] IN BLOOD BY AUTOMATED COUNT: 16.5 % (ref 11.5–14.5)
FIBRINOGEN PPP-MCNC: 286 MG/DL (ref 200–475)
FIBRINOGEN PPP-MCNC: 288 MG/DL (ref 200–475)
FIBRINOGEN PPP-MCNC: 306 MG/DL (ref 200–475)
FIBRINOGEN PPP-MCNC: 308 MG/DL (ref 200–475)
FIBRINOGEN PPP-MCNC: 318 MG/DL (ref 200–475)
FIBRINOGEN PPP-MCNC: 405 MG/DL (ref 200–475)
FIBRINOGEN PPP-MCNC: 414 MG/DL (ref 200–475)
FIBRINOGEN PPP-MCNC: 423 MG/DL (ref 200–475)
GLOBULIN SER CALC-MCNC: 3.5 G/DL (ref 2–4)
GLOBULIN SER CALC-MCNC: 3.6 G/DL (ref 2–4)
GLOBULIN SER CALC-MCNC: 3.7 G/DL (ref 2–4)
GLOBULIN SER CALC-MCNC: 4.3 G/DL (ref 2–4)
GLUCOSE BLD STRIP.AUTO-MCNC: 106 MG/DL (ref 65–100)
GLUCOSE BLD STRIP.AUTO-MCNC: 115 MG/DL (ref 65–100)
GLUCOSE BLD STRIP.AUTO-MCNC: 116 MG/DL (ref 65–100)
GLUCOSE BLD STRIP.AUTO-MCNC: 121 MG/DL (ref 65–100)
GLUCOSE BLD STRIP.AUTO-MCNC: 130 MG/DL (ref 65–100)
GLUCOSE BLD STRIP.AUTO-MCNC: 130 MG/DL (ref 65–100)
GLUCOSE BLD STRIP.AUTO-MCNC: 133 MG/DL (ref 65–100)
GLUCOSE BLD STRIP.AUTO-MCNC: 138 MG/DL (ref 65–100)
GLUCOSE BLD STRIP.AUTO-MCNC: 138 MG/DL (ref 65–100)
GLUCOSE BLD STRIP.AUTO-MCNC: 140 MG/DL (ref 65–100)
GLUCOSE BLD STRIP.AUTO-MCNC: 141 MG/DL (ref 65–100)
GLUCOSE BLD STRIP.AUTO-MCNC: 142 MG/DL (ref 65–100)
GLUCOSE BLD STRIP.AUTO-MCNC: 146 MG/DL (ref 65–100)
GLUCOSE BLD STRIP.AUTO-MCNC: 154 MG/DL (ref 65–100)
GLUCOSE BLD STRIP.AUTO-MCNC: 158 MG/DL (ref 65–100)
GLUCOSE BLD STRIP.AUTO-MCNC: 163 MG/DL (ref 65–100)
GLUCOSE BLD STRIP.AUTO-MCNC: 164 MG/DL (ref 65–100)
GLUCOSE BLD STRIP.AUTO-MCNC: 165 MG/DL (ref 65–100)
GLUCOSE BLD STRIP.AUTO-MCNC: 167 MG/DL (ref 65–100)
GLUCOSE BLD STRIP.AUTO-MCNC: 168 MG/DL (ref 65–100)
GLUCOSE BLD STRIP.AUTO-MCNC: 169 MG/DL (ref 65–100)
GLUCOSE BLD STRIP.AUTO-MCNC: 169 MG/DL (ref 65–100)
GLUCOSE BLD STRIP.AUTO-MCNC: 171 MG/DL (ref 65–100)
GLUCOSE BLD STRIP.AUTO-MCNC: 171 MG/DL (ref 65–100)
GLUCOSE BLD STRIP.AUTO-MCNC: 173 MG/DL (ref 65–100)
GLUCOSE BLD STRIP.AUTO-MCNC: 176 MG/DL (ref 65–100)
GLUCOSE BLD STRIP.AUTO-MCNC: 178 MG/DL (ref 65–100)
GLUCOSE BLD STRIP.AUTO-MCNC: 181 MG/DL (ref 65–100)
GLUCOSE BLD STRIP.AUTO-MCNC: 181 MG/DL (ref 65–100)
GLUCOSE BLD STRIP.AUTO-MCNC: 188 MG/DL (ref 65–100)
GLUCOSE BLD STRIP.AUTO-MCNC: 191 MG/DL (ref 65–100)
GLUCOSE BLD STRIP.AUTO-MCNC: 196 MG/DL (ref 65–100)
GLUCOSE BLD STRIP.AUTO-MCNC: 197 MG/DL (ref 65–100)
GLUCOSE BLD STRIP.AUTO-MCNC: 197 MG/DL (ref 65–100)
GLUCOSE BLD STRIP.AUTO-MCNC: 198 MG/DL (ref 65–100)
GLUCOSE BLD STRIP.AUTO-MCNC: 199 MG/DL (ref 65–100)
GLUCOSE BLD STRIP.AUTO-MCNC: 200 MG/DL (ref 65–100)
GLUCOSE BLD STRIP.AUTO-MCNC: 203 MG/DL (ref 65–100)
GLUCOSE BLD STRIP.AUTO-MCNC: 203 MG/DL (ref 65–100)
GLUCOSE BLD STRIP.AUTO-MCNC: 204 MG/DL (ref 65–100)
GLUCOSE BLD STRIP.AUTO-MCNC: 208 MG/DL (ref 65–100)
GLUCOSE BLD STRIP.AUTO-MCNC: 212 MG/DL (ref 65–100)
GLUCOSE BLD STRIP.AUTO-MCNC: 214 MG/DL (ref 65–100)
GLUCOSE BLD STRIP.AUTO-MCNC: 222 MG/DL (ref 65–100)
GLUCOSE BLD STRIP.AUTO-MCNC: 228 MG/DL (ref 65–100)
GLUCOSE BLD STRIP.AUTO-MCNC: 230 MG/DL (ref 65–100)
GLUCOSE BLD STRIP.AUTO-MCNC: 234 MG/DL (ref 65–100)
GLUCOSE BLD STRIP.AUTO-MCNC: 235 MG/DL (ref 65–100)
GLUCOSE BLD STRIP.AUTO-MCNC: 236 MG/DL (ref 65–100)
GLUCOSE BLD STRIP.AUTO-MCNC: 241 MG/DL (ref 65–100)
GLUCOSE BLD STRIP.AUTO-MCNC: 262 MG/DL (ref 65–100)
GLUCOSE BLD STRIP.AUTO-MCNC: 264 MG/DL (ref 65–100)
GLUCOSE BLD STRIP.AUTO-MCNC: 272 MG/DL (ref 65–100)
GLUCOSE BLD STRIP.AUTO-MCNC: 295 MG/DL (ref 65–100)
GLUCOSE BLD STRIP.AUTO-MCNC: 297 MG/DL (ref 65–100)
GLUCOSE BLD STRIP.AUTO-MCNC: 306 MG/DL (ref 65–100)
GLUCOSE BLD STRIP.AUTO-MCNC: 312 MG/DL (ref 65–100)
GLUCOSE BLD STRIP.AUTO-MCNC: 318 MG/DL (ref 65–100)
GLUCOSE BLD STRIP.AUTO-MCNC: 324 MG/DL (ref 65–100)
GLUCOSE BLD STRIP.AUTO-MCNC: 330 MG/DL (ref 65–100)
GLUCOSE BLD STRIP.AUTO-MCNC: 338 MG/DL (ref 65–100)
GLUCOSE BLD STRIP.AUTO-MCNC: 346 MG/DL (ref 65–100)
GLUCOSE BLD STRIP.AUTO-MCNC: 370 MG/DL (ref 65–100)
GLUCOSE BLD STRIP.AUTO-MCNC: 390 MG/DL (ref 65–100)
GLUCOSE BLD STRIP.AUTO-MCNC: 435 MG/DL (ref 65–100)
GLUCOSE BLD STRIP.AUTO-MCNC: 79 MG/DL (ref 65–100)
GLUCOSE BLD STRIP.AUTO-MCNC: 82 MG/DL (ref 65–100)
GLUCOSE BLD STRIP.AUTO-MCNC: 88 MG/DL (ref 65–100)
GLUCOSE BLD STRIP.AUTO-MCNC: 90 MG/DL (ref 65–100)
GLUCOSE BLD STRIP.AUTO-MCNC: 94 MG/DL (ref 65–100)
GLUCOSE BLD STRIP.AUTO-MCNC: 95 MG/DL (ref 65–100)
GLUCOSE SERPL-MCNC: 109 MG/DL (ref 65–100)
GLUCOSE SERPL-MCNC: 139 MG/DL (ref 65–100)
GLUCOSE SERPL-MCNC: 177 MG/DL (ref 65–100)
GLUCOSE SERPL-MCNC: 195 MG/DL (ref 65–100)
GLUCOSE SERPL-MCNC: 233 MG/DL (ref 65–100)
GLUCOSE SERPL-MCNC: 239 MG/DL (ref 65–100)
GLUCOSE SERPL-MCNC: 253 MG/DL (ref 65–100)
GLUCOSE SERPL-MCNC: 269 MG/DL (ref 65–100)
GLUCOSE SERPL-MCNC: 368 MG/DL (ref 65–100)
GLUCOSE SERPL-MCNC: 92 MG/DL (ref 65–100)
GRAM STN SPEC: NORMAL
GRAM STN SPEC: NORMAL
HCT VFR BLD AUTO: 27.4 % (ref 36.6–50.3)
HCT VFR BLD AUTO: 28.3 % (ref 36.6–50.3)
HCT VFR BLD AUTO: 30.2 % (ref 36.6–50.3)
HCT VFR BLD AUTO: 30.2 % (ref 36.6–50.3)
HCT VFR BLD AUTO: 30.7 % (ref 36.6–50.3)
HCT VFR BLD AUTO: 30.7 % (ref 36.6–50.3)
HCT VFR BLD AUTO: 31.1 % (ref 36.6–50.3)
HCT VFR BLD AUTO: 31.2 % (ref 36.6–50.3)
HCT VFR BLD AUTO: 31.2 % (ref 36.6–50.3)
HCT VFR BLD AUTO: 31.8 % (ref 36.6–50.3)
HCT VFR BLD AUTO: 32.1 % (ref 36.6–50.3)
HCT VFR BLD AUTO: 34.7 % (ref 36.6–50.3)
HEALTH STATUS, XMCV2T: ABNORMAL
HGB BLD-MCNC: 10 G/DL (ref 12.1–17)
HGB BLD-MCNC: 10 G/DL (ref 12.1–17)
HGB BLD-MCNC: 10.1 G/DL (ref 12.1–17)
HGB BLD-MCNC: 10.3 G/DL (ref 12.1–17)
HGB BLD-MCNC: 11.2 G/DL (ref 12.1–17)
HGB BLD-MCNC: 8.5 G/DL (ref 12.1–17)
HGB BLD-MCNC: 8.9 G/DL (ref 12.1–17)
HGB BLD-MCNC: 9.7 G/DL (ref 12.1–17)
HGB BLD-MCNC: 9.7 G/DL (ref 12.1–17)
HGB BLD-MCNC: 9.8 G/DL (ref 12.1–17)
HGB BLD-MCNC: 9.8 G/DL (ref 12.1–17)
HGB BLD-MCNC: 9.9 G/DL (ref 12.1–17)
IMM GRANULOCYTES # BLD AUTO: 0 K/UL (ref 0–0.04)
IMM GRANULOCYTES # BLD AUTO: 0.1 K/UL (ref 0–0.04)
IMM GRANULOCYTES # BLD AUTO: 0.2 K/UL (ref 0–0.04)
IMM GRANULOCYTES NFR BLD AUTO: 0 % (ref 0–0.5)
IMM GRANULOCYTES NFR BLD AUTO: 1 % (ref 0–0.5)
LACTATE BLD-SCNC: 1.29 MMOL/L (ref 0.4–2)
LACTATE SERPL-SCNC: 2.1 MMOL/L (ref 0.4–2)
LDH FLD L TO P-CCNC: 130 U/L
LYMPHOCYTES # BLD: 0.6 K/UL (ref 0.8–3.5)
LYMPHOCYTES # BLD: 0.7 K/UL (ref 0.8–3.5)
LYMPHOCYTES # BLD: 0.9 K/UL (ref 0.8–3.5)
LYMPHOCYTES # BLD: 1 K/UL (ref 0.8–3.5)
LYMPHOCYTES # BLD: 1.2 K/UL (ref 0.8–3.5)
LYMPHOCYTES # BLD: 1.5 K/UL (ref 0.8–3.5)
LYMPHOCYTES NFR BLD: 10 % (ref 12–49)
LYMPHOCYTES NFR BLD: 11 % (ref 12–49)
LYMPHOCYTES NFR BLD: 12 % (ref 12–49)
LYMPHOCYTES NFR BLD: 17 % (ref 12–49)
LYMPHOCYTES NFR BLD: 18 % (ref 12–49)
LYMPHOCYTES NFR BLD: 5 % (ref 12–49)
LYMPHOCYTES NFR BLD: 6 % (ref 12–49)
LYMPHOCYTES NFR BLD: 9 % (ref 12–49)
LYMPHOCYTES NFR FLD: 28 %
MAGNESIUM SERPL-MCNC: 1.4 MG/DL (ref 1.6–2.4)
MAGNESIUM SERPL-MCNC: 1.7 MG/DL (ref 1.6–2.4)
MAGNESIUM SERPL-MCNC: 1.8 MG/DL (ref 1.6–2.4)
MAGNESIUM SERPL-MCNC: 2.4 MG/DL (ref 1.6–2.4)
MCH RBC QN AUTO: 26.8 PG (ref 26–34)
MCH RBC QN AUTO: 26.8 PG (ref 26–34)
MCH RBC QN AUTO: 27 PG (ref 26–34)
MCH RBC QN AUTO: 27 PG (ref 26–34)
MCH RBC QN AUTO: 27.3 PG (ref 26–34)
MCH RBC QN AUTO: 27.5 PG (ref 26–34)
MCH RBC QN AUTO: 27.7 PG (ref 26–34)
MCHC RBC AUTO-ENTMCNC: 30.8 G/DL (ref 30–36.5)
MCHC RBC AUTO-ENTMCNC: 31 G/DL (ref 30–36.5)
MCHC RBC AUTO-ENTMCNC: 31.4 G/DL (ref 30–36.5)
MCHC RBC AUTO-ENTMCNC: 31.4 G/DL (ref 30–36.5)
MCHC RBC AUTO-ENTMCNC: 32.1 G/DL (ref 30–36.5)
MCHC RBC AUTO-ENTMCNC: 32.2 G/DL (ref 30–36.5)
MCHC RBC AUTO-ENTMCNC: 32.2 G/DL (ref 30–36.5)
MCHC RBC AUTO-ENTMCNC: 32.3 G/DL (ref 30–36.5)
MCHC RBC AUTO-ENTMCNC: 32.4 G/DL (ref 30–36.5)
MCHC RBC AUTO-ENTMCNC: 32.6 G/DL (ref 30–36.5)
MCV RBC AUTO: 84.1 FL (ref 80–99)
MCV RBC AUTO: 84.3 FL (ref 80–99)
MCV RBC AUTO: 84.3 FL (ref 80–99)
MCV RBC AUTO: 84.4 FL (ref 80–99)
MCV RBC AUTO: 85.1 FL (ref 80–99)
MCV RBC AUTO: 85.2 FL (ref 80–99)
MCV RBC AUTO: 85.4 FL (ref 80–99)
MCV RBC AUTO: 85.8 FL (ref 80–99)
MCV RBC AUTO: 85.8 FL (ref 80–99)
MCV RBC AUTO: 86 FL (ref 80–99)
MCV RBC AUTO: 86.4 FL (ref 80–99)
MCV RBC AUTO: 88.6 FL (ref 80–99)
MESOTHL CELL NFR FLD: 2 %
MONOCYTES # BLD: 0.4 K/UL (ref 0–1)
MONOCYTES # BLD: 0.5 K/UL (ref 0–1)
MONOCYTES # BLD: 0.6 K/UL (ref 0–1)
MONOCYTES # BLD: 0.6 K/UL (ref 0–1)
MONOCYTES # BLD: 0.7 K/UL (ref 0–1)
MONOCYTES # BLD: 0.8 K/UL (ref 0–1)
MONOCYTES # BLD: 1.1 K/UL (ref 0–1)
MONOCYTES # BLD: 1.2 K/UL (ref 0–1)
MONOCYTES NFR BLD: 11 % (ref 5–13)
MONOCYTES NFR BLD: 12 % (ref 5–13)
MONOCYTES NFR BLD: 5 % (ref 5–13)
MONOCYTES NFR BLD: 6 % (ref 5–13)
MONOCYTES NFR BLD: 6 % (ref 5–13)
MONOCYTES NFR BLD: 7 % (ref 5–13)
MONOCYTES NFR BLD: 9 % (ref 5–13)
MONOCYTES NFR BLD: 9 % (ref 5–13)
MONOS+MACROS NFR FLD: 21 %
NEUTROPHILS NFR FLD: 49 %
NEUTS SEG # BLD: 13.2 K/UL (ref 1.8–8)
NEUTS SEG # BLD: 13.3 K/UL (ref 1.8–8)
NEUTS SEG # BLD: 13.8 K/UL (ref 1.8–8)
NEUTS SEG # BLD: 3.4 K/UL (ref 1.8–8)
NEUTS SEG # BLD: 4.9 K/UL (ref 1.8–8)
NEUTS SEG # BLD: 5.8 K/UL (ref 1.8–8)
NEUTS SEG # BLD: 6 K/UL (ref 1.8–8)
NEUTS SEG # BLD: 6 K/UL (ref 1.8–8)
NEUTS SEG # BLD: 6.2 K/UL (ref 1.8–8)
NEUTS SEG # BLD: 6.3 K/UL (ref 1.8–8)
NEUTS SEG # BLD: 6.7 K/UL (ref 1.8–8)
NEUTS SEG # BLD: 7.1 K/UL (ref 1.8–8)
NEUTS SEG NFR BLD: 69 % (ref 32–75)
NEUTS SEG NFR BLD: 70 % (ref 32–75)
NEUTS SEG NFR BLD: 79 % (ref 32–75)
NEUTS SEG NFR BLD: 80 % (ref 32–75)
NEUTS SEG NFR BLD: 82 % (ref 32–75)
NEUTS SEG NFR BLD: 83 % (ref 32–75)
NEUTS SEG NFR BLD: 83 % (ref 32–75)
NEUTS SEG NFR BLD: 85 % (ref 32–75)
NEUTS SEG NFR BLD: 86 % (ref 32–75)
NEUTS SEG NFR BLD: 89 % (ref 32–75)
NRBC # BLD: 0 K/UL (ref 0–0.01)
NRBC BLD-RTO: 0 PER 100 WBC
NUC CELL # FLD: 2269 /CU MM
P-R INTERVAL, ECG05: 122 MS
PH FLD: 7.2 [PH]
PLATELET # BLD AUTO: 103 K/UL (ref 150–400)
PLATELET # BLD AUTO: 105 K/UL (ref 150–400)
PLATELET # BLD AUTO: 107 K/UL (ref 150–400)
PLATELET # BLD AUTO: 111 K/UL (ref 150–400)
PLATELET # BLD AUTO: 112 K/UL (ref 150–400)
PLATELET # BLD AUTO: 113 K/UL (ref 150–400)
PLATELET # BLD AUTO: 119 K/UL (ref 150–400)
PLATELET # BLD AUTO: 131 K/UL (ref 150–400)
PLATELET # BLD AUTO: 140 K/UL (ref 150–400)
PLATELET # BLD AUTO: 148 K/UL (ref 150–400)
PLATELET # BLD AUTO: 158 K/UL (ref 150–400)
PLATELET # BLD AUTO: 96 K/UL (ref 150–400)
PLATELET COMMENTS,PCOM: ABNORMAL
PMV BLD AUTO: 12.4 FL (ref 8.9–12.9)
PMV BLD AUTO: 12.6 FL (ref 8.9–12.9)
PMV BLD AUTO: 12.7 FL (ref 8.9–12.9)
PMV BLD AUTO: 13 FL (ref 8.9–12.9)
POTASSIUM SERPL-SCNC: 2.8 MMOL/L (ref 3.5–5.1)
POTASSIUM SERPL-SCNC: 3.1 MMOL/L (ref 3.5–5.1)
POTASSIUM SERPL-SCNC: 3.2 MMOL/L (ref 3.5–5.1)
POTASSIUM SERPL-SCNC: 3.2 MMOL/L (ref 3.5–5.1)
POTASSIUM SERPL-SCNC: 3.3 MMOL/L (ref 3.5–5.1)
POTASSIUM SERPL-SCNC: 3.4 MMOL/L (ref 3.5–5.1)
POTASSIUM SERPL-SCNC: 3.5 MMOL/L (ref 3.5–5.1)
POTASSIUM SERPL-SCNC: 3.6 MMOL/L (ref 3.5–5.1)
POTASSIUM SERPL-SCNC: 3.6 MMOL/L (ref 3.5–5.1)
POTASSIUM SERPL-SCNC: 3.7 MMOL/L (ref 3.5–5.1)
POTASSIUM SERPL-SCNC: 3.8 MMOL/L (ref 3.5–5.1)
PROT FLD-MCNC: 2.3 G/DL
PROT SERPL-MCNC: 5.7 G/DL (ref 6.4–8.2)
PROT SERPL-MCNC: 5.7 G/DL (ref 6.4–8.2)
PROT SERPL-MCNC: 5.8 G/DL (ref 6.4–8.2)
PROT SERPL-MCNC: 6.5 G/DL (ref 6.4–8.2)
Q-T INTERVAL, ECG07: 242 MS
Q-T INTERVAL, ECG07: 336 MS
QRS DURATION, ECG06: 102 MS
QRS DURATION, ECG06: 98 MS
QTC CALCULATION (BEZET), ECG08: 391 MS
QTC CALCULATION (BEZET), ECG08: 497 MS
RBC # BLD AUTO: 3.17 M/UL (ref 4.1–5.7)
RBC # BLD AUTO: 3.32 M/UL (ref 4.1–5.7)
RBC # BLD AUTO: 3.55 M/UL (ref 4.1–5.7)
RBC # BLD AUTO: 3.58 M/UL (ref 4.1–5.7)
RBC # BLD AUTO: 3.59 M/UL (ref 4.1–5.7)
RBC # BLD AUTO: 3.59 M/UL (ref 4.1–5.7)
RBC # BLD AUTO: 3.63 M/UL (ref 4.1–5.7)
RBC # BLD AUTO: 3.64 M/UL (ref 4.1–5.7)
RBC # BLD AUTO: 3.64 M/UL (ref 4.1–5.7)
RBC # BLD AUTO: 3.7 M/UL (ref 4.1–5.7)
RBC # BLD AUTO: 3.74 M/UL (ref 4.1–5.7)
RBC # BLD AUTO: 4.11 M/UL (ref 4.1–5.7)
RBC # FLD: >100 /CU MM
RBC MORPH BLD: ABNORMAL
REPORTED DOSE,DOSE: ABNORMAL UNITS
REPORTED DOSE,DOSE: ABNORMAL UNITS
REPORTED DOSE/TIME,TMG: ABNORMAL
REPORTED DOSE/TIME,TMG: ABNORMAL
SAMPLES BEING HELD,HOLD: NORMAL
SARS-COV-2, COV2: DETECTED
SARS-COV-2, COV2: NORMAL
SERVICE CMNT-IMP: ABNORMAL
SERVICE CMNT-IMP: NORMAL
SODIUM SERPL-SCNC: 137 MMOL/L (ref 136–145)
SODIUM SERPL-SCNC: 139 MMOL/L (ref 136–145)
SODIUM SERPL-SCNC: 139 MMOL/L (ref 136–145)
SODIUM SERPL-SCNC: 141 MMOL/L (ref 136–145)
SODIUM SERPL-SCNC: 142 MMOL/L (ref 136–145)
SODIUM SERPL-SCNC: 144 MMOL/L (ref 136–145)
SODIUM SERPL-SCNC: 147 MMOL/L (ref 136–145)
SODIUM SERPL-SCNC: 149 MMOL/L (ref 136–145)
SOURCE, COVRS: ABNORMAL
SOURCE, COVRS: ABNORMAL
SPECIMEN SOURCE FLD: ABNORMAL
SPECIMEN SOURCE FLD: NORMAL
SPECIMEN SOURCE, FCOV2M: ABNORMAL
SPECIMEN TYPE, XMCV1T: ABNORMAL
THERAPEUTIC RANGE,PTTT: ABNORMAL SECS (ref 58–77)
TROPONIN I BLD-MCNC: 0.1 NG/ML (ref 0–0.08)
TROPONIN I SERPL-MCNC: 0.09 NG/ML
TROPONIN I SERPL-MCNC: 0.14 NG/ML
TROPONIN I SERPL-MCNC: 0.18 NG/ML
TROPONIN I SERPL-MCNC: 0.18 NG/ML
TSH SERPL DL<=0.05 MIU/L-ACNC: 2.33 UIU/ML (ref 0.36–3.74)
VANCOMYCIN TROUGH SERPL-MCNC: 15.4 UG/ML (ref 5–10)
VANCOMYCIN TROUGH SERPL-MCNC: 17.2 UG/ML (ref 5–10)
VENTRICULAR RATE, ECG03: 132 BPM
VENTRICULAR RATE, ECG03: 157 BPM
WBC # BLD AUTO: 14.7 K/UL (ref 4.1–11.1)
WBC # BLD AUTO: 15.7 K/UL (ref 4.1–11.1)
WBC # BLD AUTO: 16.6 K/UL (ref 4.1–11.1)
WBC # BLD AUTO: 5.1 K/UL (ref 4.1–11.1)
WBC # BLD AUTO: 7 K/UL (ref 4.1–11.1)
WBC # BLD AUTO: 7.1 K/UL (ref 4.1–11.1)
WBC # BLD AUTO: 7.3 K/UL (ref 4.1–11.1)
WBC # BLD AUTO: 7.5 K/UL (ref 4.1–11.1)
WBC # BLD AUTO: 7.6 K/UL (ref 4.1–11.1)
WBC # BLD AUTO: 8 K/UL (ref 4.1–11.1)
WBC # BLD AUTO: 8.1 K/UL (ref 4.1–11.1)
WBC # BLD AUTO: 8.8 K/UL (ref 4.1–11.1)

## 2021-01-01 PROCEDURE — 74011250636 HC RX REV CODE- 250/636: Performed by: INTERNAL MEDICINE

## 2021-01-01 PROCEDURE — 85384 FIBRINOGEN ACTIVITY: CPT

## 2021-01-01 PROCEDURE — 1100F PTFALLS ASSESS-DOCD GE2>/YR: CPT | Performed by: INTERNAL MEDICINE

## 2021-01-01 PROCEDURE — 74011000258 HC RX REV CODE- 258: Performed by: HOSPITALIST

## 2021-01-01 PROCEDURE — 74011636637 HC RX REV CODE- 636/637: Performed by: HOSPITALIST

## 2021-01-01 PROCEDURE — 36415 COLL VENOUS BLD VENIPUNCTURE: CPT

## 2021-01-01 PROCEDURE — 74011250637 HC RX REV CODE- 250/637: Performed by: INTERNAL MEDICINE

## 2021-01-01 PROCEDURE — 74011000250 HC RX REV CODE- 250: Performed by: INTERNAL MEDICINE

## 2021-01-01 PROCEDURE — 99356 PR PROLONGED SVC I/P OR OBS SETTING 1ST HOUR: CPT | Performed by: INTERNAL MEDICINE

## 2021-01-01 PROCEDURE — 74011250637 HC RX REV CODE- 250/637: Performed by: HOSPITALIST

## 2021-01-01 PROCEDURE — 77010033678 HC OXYGEN DAILY

## 2021-01-01 PROCEDURE — 74011250636 HC RX REV CODE- 250/636: Performed by: NURSE PRACTITIONER

## 2021-01-01 PROCEDURE — 82962 GLUCOSE BLOOD TEST: CPT

## 2021-01-01 PROCEDURE — 85379 FIBRIN DEGRADATION QUANT: CPT

## 2021-01-01 PROCEDURE — 85025 COMPLETE CBC W/AUTO DIFF WBC: CPT

## 2021-01-01 PROCEDURE — 96376 TX/PRO/DX INJ SAME DRUG ADON: CPT

## 2021-01-01 PROCEDURE — 80048 BASIC METABOLIC PNL TOTAL CA: CPT

## 2021-01-01 PROCEDURE — 85730 THROMBOPLASTIN TIME PARTIAL: CPT

## 2021-01-01 PROCEDURE — 74011000636 HC RX REV CODE- 636: Performed by: EMERGENCY MEDICINE

## 2021-01-01 PROCEDURE — 74011636637 HC RX REV CODE- 636/637: Performed by: INTERNAL MEDICINE

## 2021-01-01 PROCEDURE — G8427 DOCREV CUR MEDS BY ELIG CLIN: HCPCS | Performed by: INTERNAL MEDICINE

## 2021-01-01 PROCEDURE — 97162 PT EVAL MOD COMPLEX 30 MIN: CPT

## 2021-01-01 PROCEDURE — 84484 ASSAY OF TROPONIN QUANT: CPT

## 2021-01-01 PROCEDURE — 74230 X-RAY XM SWLNG FUNCJ C+: CPT

## 2021-01-01 PROCEDURE — 87205 SMEAR GRAM STAIN: CPT

## 2021-01-01 PROCEDURE — 65660000000 HC RM CCU STEPDOWN

## 2021-01-01 PROCEDURE — 80053 COMPREHEN METABOLIC PANEL: CPT

## 2021-01-01 PROCEDURE — 71045 X-RAY EXAM CHEST 1 VIEW: CPT

## 2021-01-01 PROCEDURE — 99233 SBSQ HOSP IP/OBS HIGH 50: CPT | Performed by: INTERNAL MEDICINE

## 2021-01-01 PROCEDURE — 74011000250 HC RX REV CODE- 250: Performed by: HOSPITALIST

## 2021-01-01 PROCEDURE — 83605 ASSAY OF LACTIC ACID: CPT

## 2021-01-01 PROCEDURE — 74011000250 HC RX REV CODE- 250: Performed by: EMERGENCY MEDICINE

## 2021-01-01 PROCEDURE — 83735 ASSAY OF MAGNESIUM: CPT

## 2021-01-01 PROCEDURE — 83615 LACTATE (LD) (LDH) ENZYME: CPT

## 2021-01-01 PROCEDURE — 83880 ASSAY OF NATRIURETIC PEPTIDE: CPT

## 2021-01-01 PROCEDURE — 74011250636 HC RX REV CODE- 250/636: Performed by: HOSPITALIST

## 2021-01-01 PROCEDURE — 74011636637 HC RX REV CODE- 636/637: Performed by: NURSE PRACTITIONER

## 2021-01-01 PROCEDURE — 83986 ASSAY PH BODY FLUID NOS: CPT

## 2021-01-01 PROCEDURE — 65270000029 HC RM PRIVATE

## 2021-01-01 PROCEDURE — 92610 EVALUATE SWALLOWING FUNCTION: CPT

## 2021-01-01 PROCEDURE — 94640 AIRWAY INHALATION TREATMENT: CPT

## 2021-01-01 PROCEDURE — 87040 BLOOD CULTURE FOR BACTERIA: CPT

## 2021-01-01 PROCEDURE — 97530 THERAPEUTIC ACTIVITIES: CPT

## 2021-01-01 PROCEDURE — C1894 INTRO/SHEATH, NON-LASER: HCPCS

## 2021-01-01 PROCEDURE — 32999 UNLISTED PX LUNGS & PLEURA: CPT

## 2021-01-01 PROCEDURE — APPSS45 APP SPLIT SHARED TIME 31-45 MINUTES: Performed by: NURSE PRACTITIONER

## 2021-01-01 PROCEDURE — 84443 ASSAY THYROID STIM HORMONE: CPT

## 2021-01-01 PROCEDURE — 74011000258 HC RX REV CODE- 258: Performed by: INTERNAL MEDICINE

## 2021-01-01 PROCEDURE — C1769 GUIDE WIRE: HCPCS

## 2021-01-01 PROCEDURE — 1111F DSCHRG MED/CURRENT MED MERGE: CPT | Performed by: INTERNAL MEDICINE

## 2021-01-01 PROCEDURE — 80202 ASSAY OF VANCOMYCIN: CPT

## 2021-01-01 PROCEDURE — 97165 OT EVAL LOW COMPLEX 30 MIN: CPT

## 2021-01-01 PROCEDURE — 77030011229 HC DIL VESL COON COOK -A

## 2021-01-01 PROCEDURE — 0W9B3ZZ DRAINAGE OF LEFT PLEURAL CAVITY, PERCUTANEOUS APPROACH: ICD-10-PCS | Performed by: STUDENT IN AN ORGANIZED HEALTH CARE EDUCATION/TRAINING PROGRAM

## 2021-01-01 PROCEDURE — 99232 SBSQ HOSP IP/OBS MODERATE 35: CPT | Performed by: INTERNAL MEDICINE

## 2021-01-01 PROCEDURE — 92611 MOTION FLUOROSCOPY/SWALLOW: CPT

## 2021-01-01 PROCEDURE — 99215 OFFICE O/P EST HI 40 MIN: CPT | Performed by: INTERNAL MEDICINE

## 2021-01-01 PROCEDURE — G9717 DOC PT DX DEP/BP F/U NT REQ: HCPCS | Performed by: INTERNAL MEDICINE

## 2021-01-01 PROCEDURE — 36600 WITHDRAWAL OF ARTERIAL BLOOD: CPT

## 2021-01-01 PROCEDURE — 99222 1ST HOSP IP/OBS MODERATE 55: CPT | Performed by: INTERNAL MEDICINE

## 2021-01-01 PROCEDURE — 97116 GAIT TRAINING THERAPY: CPT

## 2021-01-01 PROCEDURE — 74011000250 HC RX REV CODE- 250: Performed by: STUDENT IN AN ORGANIZED HEALTH CARE EDUCATION/TRAINING PROGRAM

## 2021-01-01 PROCEDURE — 71275 CT ANGIOGRAPHY CHEST: CPT

## 2021-01-01 PROCEDURE — 74011000250 HC RX REV CODE- 250: Performed by: NURSE PRACTITIONER

## 2021-01-01 PROCEDURE — 74011000258 HC RX REV CODE- 258: Performed by: EMERGENCY MEDICINE

## 2021-01-01 PROCEDURE — 3288F FALL RISK ASSESSMENT DOCD: CPT | Performed by: INTERNAL MEDICINE

## 2021-01-01 PROCEDURE — 97535 SELF CARE MNGMENT TRAINING: CPT

## 2021-01-01 PROCEDURE — 73718 MRI LOWER EXTREMITY W/O DYE: CPT

## 2021-01-01 PROCEDURE — G8754 DIAS BP LESS 90: HCPCS | Performed by: INTERNAL MEDICINE

## 2021-01-01 PROCEDURE — 86140 C-REACTIVE PROTEIN: CPT

## 2021-01-01 PROCEDURE — 84157 ASSAY OF PROTEIN OTHER: CPT

## 2021-01-01 PROCEDURE — G8752 SYS BP LESS 140: HCPCS | Performed by: INTERNAL MEDICINE

## 2021-01-01 PROCEDURE — 2709999900 HC NON-CHARGEABLE SUPPLY

## 2021-01-01 PROCEDURE — 94660 CPAP INITIATION&MGMT: CPT

## 2021-01-01 PROCEDURE — 96365 THER/PROPH/DIAG IV INF INIT: CPT

## 2021-01-01 PROCEDURE — 32557 INSERT CATH PLEURA W/ IMAGE: CPT

## 2021-01-01 PROCEDURE — 5A09457 ASSISTANCE WITH RESPIRATORY VENTILATION, 24-96 CONSECUTIVE HOURS, CONTINUOUS POSITIVE AIRWAY PRESSURE: ICD-10-PCS | Performed by: INTERNAL MEDICINE

## 2021-01-01 PROCEDURE — P9047 ALBUMIN (HUMAN), 25%, 50ML: HCPCS | Performed by: INTERNAL MEDICINE

## 2021-01-01 PROCEDURE — 93005 ELECTROCARDIOGRAM TRACING: CPT

## 2021-01-01 PROCEDURE — 74011250636 HC RX REV CODE- 250/636: Performed by: EMERGENCY MEDICINE

## 2021-01-01 PROCEDURE — 82550 ASSAY OF CK (CPK): CPT

## 2021-01-01 PROCEDURE — C1729 CATH, DRAINAGE: HCPCS

## 2021-01-01 PROCEDURE — 82042 OTHER SOURCE ALBUMIN QUAN EA: CPT

## 2021-01-01 PROCEDURE — 94762 N-INVAS EAR/PLS OXIMTRY CONT: CPT

## 2021-01-01 PROCEDURE — 89050 BODY FLUID CELL COUNT: CPT

## 2021-01-01 PROCEDURE — 87102 FUNGUS ISOLATION CULTURE: CPT

## 2021-01-01 PROCEDURE — 74011250637 HC RX REV CODE- 250/637: Performed by: NURSE PRACTITIONER

## 2021-01-01 PROCEDURE — 76604 US EXAM CHEST: CPT

## 2021-01-01 PROCEDURE — 99223 1ST HOSP IP/OBS HIGH 75: CPT | Performed by: INTERNAL MEDICINE

## 2021-01-01 PROCEDURE — 88305 TISSUE EXAM BY PATHOLOGIST: CPT

## 2021-01-01 PROCEDURE — 87635 SARS-COV-2 COVID-19 AMP PRB: CPT

## 2021-01-01 PROCEDURE — G8536 NO DOC ELDER MAL SCRN: HCPCS | Performed by: INTERNAL MEDICINE

## 2021-01-01 PROCEDURE — G8420 CALC BMI NORM PARAMETERS: HCPCS | Performed by: INTERNAL MEDICINE

## 2021-01-01 PROCEDURE — 88112 CYTOPATH CELL ENHANCE TECH: CPT

## 2021-01-01 PROCEDURE — 84132 ASSAY OF SERUM POTASSIUM: CPT

## 2021-01-01 PROCEDURE — APPNB15 APP NON BILLABLE TIME 0-15 MINS: Performed by: NURSE PRACTITIONER

## 2021-01-01 PROCEDURE — 99285 EMERGENCY DEPT VISIT HI MDM: CPT

## 2021-01-01 PROCEDURE — 96366 THER/PROPH/DIAG IV INF ADDON: CPT

## 2021-01-01 RX ORDER — ACETAMINOPHEN 325 MG/1
650 TABLET ORAL
Status: DISCONTINUED | OUTPATIENT
Start: 2021-01-01 | End: 2021-01-01 | Stop reason: HOSPADM

## 2021-01-01 RX ORDER — ALBUTEROL SULFATE 90 UG/1
2 AEROSOL, METERED RESPIRATORY (INHALATION)
Status: DISCONTINUED | OUTPATIENT
Start: 2021-01-01 | End: 2021-01-01 | Stop reason: HOSPADM

## 2021-01-01 RX ORDER — ACETAMINOPHEN 500 MG
1000 TABLET ORAL
Status: DISCONTINUED | OUTPATIENT
Start: 2021-01-01 | End: 2021-01-01

## 2021-01-01 RX ORDER — SODIUM CHLORIDE 0.9 % (FLUSH) 0.9 %
5-40 SYRINGE (ML) INJECTION AS NEEDED
Status: DISCONTINUED | OUTPATIENT
Start: 2021-01-01 | End: 2021-01-01 | Stop reason: HOSPADM

## 2021-01-01 RX ORDER — ATORVASTATIN CALCIUM 40 MG/1
40 TABLET, FILM COATED ORAL
Qty: 30 TAB | Refills: 1 | Status: SHIPPED
Start: 2021-01-01 | End: 2021-03-09

## 2021-01-01 RX ORDER — ALBUTEROL SULFATE 90 UG/1
2 AEROSOL, METERED RESPIRATORY (INHALATION)
Qty: 1 INHALER | Refills: 0 | Status: SHIPPED
Start: 2021-01-01

## 2021-01-01 RX ORDER — DOXYCYCLINE HYCLATE 100 MG
100 TABLET ORAL EVERY 12 HOURS
Status: DISCONTINUED | OUTPATIENT
Start: 2021-01-01 | End: 2021-01-01 | Stop reason: HOSPADM

## 2021-01-01 RX ORDER — BALSAM PERU/CASTOR OIL
OINTMENT (GRAM) TOPICAL 3 TIMES DAILY
Status: DISCONTINUED | OUTPATIENT
Start: 2021-01-01 | End: 2021-01-01 | Stop reason: HOSPADM

## 2021-01-01 RX ORDER — SODIUM CHLORIDE 9 MG/ML
50 INJECTION, SOLUTION INTRAVENOUS CONTINUOUS
Status: DISCONTINUED | OUTPATIENT
Start: 2021-01-01 | End: 2021-01-01

## 2021-01-01 RX ORDER — DEXTROSE, SODIUM CHLORIDE, AND POTASSIUM CHLORIDE 5; .45; .3 G/100ML; G/100ML; G/100ML
INJECTION INTRAVENOUS CONTINUOUS
Status: DISCONTINUED | OUTPATIENT
Start: 2021-01-01 | End: 2021-01-01

## 2021-01-01 RX ORDER — DEXTROSE 50 % IN WATER (D50W) INTRAVENOUS SYRINGE
12.5-25 AS NEEDED
Status: DISCONTINUED | OUTPATIENT
Start: 2021-01-01 | End: 2021-01-01 | Stop reason: HOSPADM

## 2021-01-01 RX ORDER — AMOXICILLIN AND CLAVULANATE POTASSIUM 875; 125 MG/1; MG/1
1 TABLET, FILM COATED ORAL EVERY 12 HOURS
COMMUNITY
Start: 2021-01-01 | End: 2021-01-01

## 2021-01-01 RX ORDER — DILTIAZEM HYDROCHLORIDE 5 MG/ML
10 INJECTION INTRAVENOUS
Status: COMPLETED | OUTPATIENT
Start: 2021-01-01 | End: 2021-01-01

## 2021-01-01 RX ORDER — FUROSEMIDE 40 MG/1
40 TABLET ORAL DAILY
Qty: 30 TAB | Refills: 1 | Status: SHIPPED | OUTPATIENT
Start: 2021-01-01

## 2021-01-01 RX ORDER — FUROSEMIDE 40 MG/1
40 TABLET ORAL DAILY
Status: DISCONTINUED | OUTPATIENT
Start: 2021-01-01 | End: 2021-01-01 | Stop reason: HOSPADM

## 2021-01-01 RX ORDER — METRONIDAZOLE 500 MG/100ML
500 INJECTION, SOLUTION INTRAVENOUS EVERY 12 HOURS
Status: DISCONTINUED | OUTPATIENT
Start: 2021-01-01 | End: 2021-01-01

## 2021-01-01 RX ORDER — ALBUMIN HUMAN 250 G/1000ML
25 SOLUTION INTRAVENOUS ONCE
Status: COMPLETED | OUTPATIENT
Start: 2021-01-01 | End: 2021-01-01

## 2021-01-01 RX ORDER — DIGOXIN 0.25 MG/ML
125 INJECTION INTRAMUSCULAR; INTRAVENOUS
Status: DISCONTINUED | OUTPATIENT
Start: 2021-01-01 | End: 2021-01-01

## 2021-01-01 RX ORDER — SODIUM CHLORIDE 0.9 % (FLUSH) 0.9 %
5-40 SYRINGE (ML) INJECTION EVERY 8 HOURS
Status: DISCONTINUED | OUTPATIENT
Start: 2021-01-01 | End: 2021-01-01 | Stop reason: HOSPADM

## 2021-01-01 RX ORDER — ALBUMIN HUMAN 250 G/1000ML
12.5 SOLUTION INTRAVENOUS ONCE
Status: DISCONTINUED | OUTPATIENT
Start: 2021-01-01 | End: 2021-01-01

## 2021-01-01 RX ORDER — LIDOCAINE HYDROCHLORIDE 20 MG/ML
20 INJECTION, SOLUTION INFILTRATION; PERINEURAL ONCE
Status: COMPLETED | OUTPATIENT
Start: 2021-01-01 | End: 2021-01-01

## 2021-01-01 RX ORDER — CETIRIZINE HYDROCHLORIDE, PSEUDOEPHEDRINE HYDROCHLORIDE 5; 120 MG/1; MG/1
TABLET, FILM COATED, EXTENDED RELEASE ORAL
Qty: 30 TAB | Refills: 0 | Status: SHIPPED | OUTPATIENT
Start: 2021-01-01

## 2021-01-01 RX ORDER — METOPROLOL TARTRATE 25 MG/1
25 TABLET, FILM COATED ORAL EVERY 12 HOURS
Status: DISCONTINUED | OUTPATIENT
Start: 2021-01-01 | End: 2021-01-01 | Stop reason: HOSPADM

## 2021-01-01 RX ORDER — ENOXAPARIN SODIUM 100 MG/ML
30 INJECTION SUBCUTANEOUS EVERY 12 HOURS
Status: DISCONTINUED | OUTPATIENT
Start: 2021-01-01 | End: 2021-01-01 | Stop reason: HOSPADM

## 2021-01-01 RX ORDER — METOPROLOL TARTRATE 50 MG/1
50 TABLET ORAL 2 TIMES DAILY
Qty: 60 TAB | Refills: 1 | Status: SHIPPED
Start: 2021-01-01 | End: 2021-03-09

## 2021-01-01 RX ORDER — FUROSEMIDE 40 MG/1
TABLET ORAL
Qty: 30 TAB | Refills: 0 | Status: ON HOLD
Start: 2021-01-01 | End: 2021-01-01 | Stop reason: SDUPTHER

## 2021-01-01 RX ORDER — INSULIN LISPRO 100 [IU]/ML
6 INJECTION, SOLUTION INTRAVENOUS; SUBCUTANEOUS ONCE
Status: COMPLETED | OUTPATIENT
Start: 2021-01-01 | End: 2021-01-01

## 2021-01-01 RX ORDER — GUAIFENESIN 100 MG/5ML
81 LIQUID (ML) ORAL DAILY
Status: DISCONTINUED | OUTPATIENT
Start: 2021-01-01 | End: 2021-01-01 | Stop reason: HOSPADM

## 2021-01-01 RX ORDER — POLYETHYLENE GLYCOL 3350 17 G/17G
17 POWDER, FOR SOLUTION ORAL DAILY PRN
Status: DISCONTINUED | OUTPATIENT
Start: 2021-01-01 | End: 2021-01-01 | Stop reason: HOSPADM

## 2021-01-01 RX ORDER — ONDANSETRON 2 MG/ML
4 INJECTION INTRAMUSCULAR; INTRAVENOUS
Status: DISCONTINUED | OUTPATIENT
Start: 2021-01-01 | End: 2021-01-01 | Stop reason: HOSPADM

## 2021-01-01 RX ORDER — POTASSIUM CHLORIDE 7.45 MG/ML
10 INJECTION INTRAVENOUS
Status: COMPLETED | OUTPATIENT
Start: 2021-01-01 | End: 2021-01-01

## 2021-01-01 RX ORDER — BUDESONIDE AND FORMOTEROL FUMARATE DIHYDRATE 160; 4.5 UG/1; UG/1
2 AEROSOL RESPIRATORY (INHALATION)
Status: DISCONTINUED | OUTPATIENT
Start: 2021-01-01 | End: 2021-01-01 | Stop reason: HOSPADM

## 2021-01-01 RX ORDER — IPRATROPIUM BROMIDE AND ALBUTEROL SULFATE 2.5; .5 MG/3ML; MG/3ML
3 SOLUTION RESPIRATORY (INHALATION)
Status: DISCONTINUED | OUTPATIENT
Start: 2021-01-01 | End: 2021-01-01 | Stop reason: ALTCHOICE

## 2021-01-01 RX ORDER — BUDESONIDE AND FORMOTEROL FUMARATE DIHYDRATE 160; 4.5 UG/1; UG/1
2 AEROSOL RESPIRATORY (INHALATION)
Status: DISCONTINUED | OUTPATIENT
Start: 2021-01-01 | End: 2021-01-01 | Stop reason: ALTCHOICE

## 2021-01-01 RX ORDER — MAGNESIUM SULFATE 100 %
4 CRYSTALS MISCELLANEOUS AS NEEDED
Status: DISCONTINUED | OUTPATIENT
Start: 2021-01-01 | End: 2021-01-01 | Stop reason: HOSPADM

## 2021-01-01 RX ORDER — ALBUTEROL SULFATE 90 UG/1
1 AEROSOL, METERED RESPIRATORY (INHALATION)
Status: DISCONTINUED | OUTPATIENT
Start: 2021-01-01 | End: 2021-01-01

## 2021-01-01 RX ORDER — VANCOMYCIN/0.9 % SOD CHLORIDE 1.5G/250ML
1500 PLASTIC BAG, INJECTION (ML) INTRAVENOUS
Status: COMPLETED | OUTPATIENT
Start: 2021-01-01 | End: 2021-01-01

## 2021-01-01 RX ORDER — INSULIN LISPRO 100 [IU]/ML
INJECTION, SOLUTION INTRAVENOUS; SUBCUTANEOUS
Status: DISCONTINUED | OUTPATIENT
Start: 2021-01-01 | End: 2021-01-01 | Stop reason: HOSPADM

## 2021-01-01 RX ORDER — ACETAMINOPHEN 650 MG/1
650 SUPPOSITORY RECTAL
Status: DISCONTINUED | OUTPATIENT
Start: 2021-01-01 | End: 2021-01-01 | Stop reason: HOSPADM

## 2021-01-01 RX ORDER — LANOLIN ALCOHOL/MO/W.PET/CERES
6 CREAM (GRAM) TOPICAL
Status: DISCONTINUED | OUTPATIENT
Start: 2021-01-01 | End: 2021-01-01 | Stop reason: HOSPADM

## 2021-01-01 RX ORDER — HYDRALAZINE HYDROCHLORIDE 25 MG/1
75 TABLET, FILM COATED ORAL 3 TIMES DAILY
COMMUNITY
End: 2021-01-01

## 2021-01-01 RX ORDER — INSULIN LISPRO 100 [IU]/ML
4 INJECTION, SOLUTION INTRAVENOUS; SUBCUTANEOUS ONCE
Status: COMPLETED | OUTPATIENT
Start: 2021-01-01 | End: 2021-01-01

## 2021-01-01 RX ORDER — PROMETHAZINE HYDROCHLORIDE 25 MG/1
12.5 TABLET ORAL
Status: DISCONTINUED | OUTPATIENT
Start: 2021-01-01 | End: 2021-01-01 | Stop reason: HOSPADM

## 2021-01-01 RX ORDER — DOXAZOSIN 2 MG/1
4 TABLET ORAL ONCE
Status: COMPLETED | OUTPATIENT
Start: 2021-01-01 | End: 2021-01-01

## 2021-01-01 RX ORDER — MAGNESIUM SULFATE HEPTAHYDRATE 40 MG/ML
2 INJECTION, SOLUTION INTRAVENOUS ONCE
Status: COMPLETED | OUTPATIENT
Start: 2021-01-01 | End: 2021-01-01

## 2021-01-01 RX ORDER — IPRATROPIUM BROMIDE AND ALBUTEROL SULFATE 2.5; .5 MG/3ML; MG/3ML
3 SOLUTION RESPIRATORY (INHALATION)
Status: DISCONTINUED | OUTPATIENT
Start: 2021-01-01 | End: 2021-01-01

## 2021-01-01 RX ORDER — GLYCOPYRROLATE 0.2 MG/ML
0.2 INJECTION INTRAMUSCULAR; INTRAVENOUS
Status: DISCONTINUED | OUTPATIENT
Start: 2021-01-01 | End: 2021-01-01 | Stop reason: HOSPADM

## 2021-01-01 RX ORDER — BALSAM PERU/CASTOR OIL
OINTMENT (GRAM) TOPICAL 2 TIMES DAILY
Status: DISCONTINUED | OUTPATIENT
Start: 2021-01-01 | End: 2021-01-01

## 2021-01-01 RX ORDER — ENOXAPARIN SODIUM 100 MG/ML
40 INJECTION SUBCUTANEOUS DAILY
Status: DISCONTINUED | OUTPATIENT
Start: 2021-01-01 | End: 2021-01-01

## 2021-01-01 RX ORDER — ASPIRIN 300 MG/1
300 SUPPOSITORY RECTAL DAILY
Status: DISCONTINUED | OUTPATIENT
Start: 2021-01-01 | End: 2021-01-01

## 2021-01-01 RX ORDER — FUROSEMIDE 10 MG/ML
40 INJECTION INTRAMUSCULAR; INTRAVENOUS DAILY
Status: DISCONTINUED | OUTPATIENT
Start: 2021-01-01 | End: 2021-01-01

## 2021-01-01 RX ORDER — POTASSIUM CHLORIDE 20 MEQ/1
TABLET, EXTENDED RELEASE ORAL
Qty: 30 TAB | Refills: 0 | Status: SHIPPED | OUTPATIENT
Start: 2021-01-01

## 2021-01-01 RX ORDER — HYDROMORPHONE HYDROCHLORIDE 1 MG/ML
0.5 INJECTION, SOLUTION INTRAMUSCULAR; INTRAVENOUS; SUBCUTANEOUS
Status: DISCONTINUED | OUTPATIENT
Start: 2021-01-01 | End: 2021-01-01 | Stop reason: HOSPADM

## 2021-01-01 RX ORDER — POTASSIUM CHLORIDE 20 MEQ/1
20 TABLET, EXTENDED RELEASE ORAL 2 TIMES DAILY
Status: DISCONTINUED | OUTPATIENT
Start: 2021-01-01 | End: 2021-01-01

## 2021-01-01 RX ORDER — HEPARIN SODIUM 200 [USP'U]/100ML
400 INJECTION, SOLUTION INTRAVENOUS ONCE
Status: DISCONTINUED | OUTPATIENT
Start: 2021-01-01 | End: 2021-01-01 | Stop reason: HOSPADM

## 2021-01-01 RX ORDER — AMLODIPINE BESYLATE 10 MG/1
10 TABLET ORAL DAILY
Qty: 30 TAB | Refills: 0 | Status: SHIPPED
Start: 2021-01-01 | End: 2021-01-01

## 2021-01-01 RX ORDER — POTASSIUM CHLORIDE 1500 MG/1
20 TABLET, FILM COATED, EXTENDED RELEASE ORAL DAILY
Qty: 30 TAB | Refills: 0 | Status: SHIPPED
Start: 2021-01-01 | End: 2021-01-01 | Stop reason: SDUPTHER

## 2021-01-01 RX ORDER — METOPROLOL TARTRATE 5 MG/5ML
5 INJECTION INTRAVENOUS EVERY 6 HOURS
Status: DISCONTINUED | OUTPATIENT
Start: 2021-01-01 | End: 2021-01-01

## 2021-01-01 RX ORDER — DILTIAZEM HYDROCHLORIDE 5 MG/ML
10 INJECTION INTRAVENOUS ONCE
Status: COMPLETED | OUTPATIENT
Start: 2021-01-01 | End: 2021-01-01

## 2021-01-01 RX ADMIN — OXYCODONE HYDROCHLORIDE AND ACETAMINOPHEN 500 MG: 500 TABLET ORAL at 09:20

## 2021-01-01 RX ADMIN — Medication: at 08:12

## 2021-01-01 RX ADMIN — HYDROMORPHONE HYDROCHLORIDE 0.5 MG: 1 INJECTION, SOLUTION INTRAMUSCULAR; INTRAVENOUS; SUBCUTANEOUS at 21:45

## 2021-01-01 RX ADMIN — HYDROMORPHONE HYDROCHLORIDE 0.5 MG: 1 INJECTION, SOLUTION INTRAMUSCULAR; INTRAVENOUS; SUBCUTANEOUS at 04:39

## 2021-01-01 RX ADMIN — Medication 10 ML: at 06:04

## 2021-01-01 RX ADMIN — TEMAZEPAM 15 MG: 15 CAPSULE ORAL at 23:20

## 2021-01-01 RX ADMIN — LIDOCAINE HYDROCHLORIDE 200 MG: 20 INJECTION, SOLUTION INFILTRATION; PERINEURAL at 17:15

## 2021-01-01 RX ADMIN — Medication: at 08:53

## 2021-01-01 RX ADMIN — OXYCODONE HYDROCHLORIDE AND ACETAMINOPHEN 500 MG: 500 TABLET ORAL at 09:25

## 2021-01-01 RX ADMIN — Medication 10 ML: at 15:18

## 2021-01-01 RX ADMIN — Medication 10 ML: at 06:30

## 2021-01-01 RX ADMIN — ZINC SULFATE 220 MG (50 MG) CAPSULE 1 CAPSULE: CAPSULE at 09:10

## 2021-01-01 RX ADMIN — IPRATROPIUM BROMIDE AND ALBUTEROL 1 PUFF: 20; 100 SPRAY, METERED RESPIRATORY (INHALATION) at 19:51

## 2021-01-01 RX ADMIN — Medication 10 ML: at 04:47

## 2021-01-01 RX ADMIN — FUROSEMIDE 40 MG: 40 TABLET ORAL at 09:57

## 2021-01-01 RX ADMIN — IPRATROPIUM BROMIDE AND ALBUTEROL SULFATE 3 ML: .5; 3 SOLUTION RESPIRATORY (INHALATION) at 15:26

## 2021-01-01 RX ADMIN — ASPIRIN 81 MG: 81 TABLET, CHEWABLE ORAL at 09:19

## 2021-01-01 RX ADMIN — LOSARTAN POTASSIUM 100 MG: 50 TABLET, FILM COATED ORAL at 09:58

## 2021-01-01 RX ADMIN — VANCOMYCIN HYDROCHLORIDE 1000 MG: 1 INJECTION, POWDER, LYOPHILIZED, FOR SOLUTION INTRAVENOUS at 11:04

## 2021-01-01 RX ADMIN — Medication 2 TABLET: at 08:55

## 2021-01-01 RX ADMIN — SERTRALINE HYDROCHLORIDE 100 MG: 50 TABLET ORAL at 17:29

## 2021-01-01 RX ADMIN — Medication 10 ML: at 18:30

## 2021-01-01 RX ADMIN — Medication 10 ML: at 05:00

## 2021-01-01 RX ADMIN — DOXAZOSIN 4 MG: 2 TABLET ORAL at 23:05

## 2021-01-01 RX ADMIN — AMLODIPINE BESYLATE 10 MG: 5 TABLET ORAL at 10:02

## 2021-01-01 RX ADMIN — METOPROLOL TARTRATE 5 MG: 5 INJECTION INTRAVENOUS at 18:29

## 2021-01-01 RX ADMIN — DOXYCYCLINE HYCLATE 100 MG: 100 TABLET, COATED ORAL at 13:17

## 2021-01-01 RX ADMIN — PIPERACILLIN AND TAZOBACTAM 3.38 G: 3; .375 INJECTION, POWDER, LYOPHILIZED, FOR SOLUTION INTRAVENOUS at 17:28

## 2021-01-01 RX ADMIN — Medication: at 06:10

## 2021-01-01 RX ADMIN — Medication: at 22:43

## 2021-01-01 RX ADMIN — FINASTERIDE 5 MG: 5 TABLET, FILM COATED ORAL at 09:20

## 2021-01-01 RX ADMIN — ZINC SULFATE 220 MG (50 MG) CAPSULE 1 CAPSULE: CAPSULE at 09:20

## 2021-01-01 RX ADMIN — BUDESONIDE AND FORMOTEROL FUMARATE DIHYDRATE 2 PUFF: 160; 4.5 AEROSOL RESPIRATORY (INHALATION) at 20:27

## 2021-01-01 RX ADMIN — BUDESONIDE AND FORMOTEROL FUMARATE DIHYDRATE 2 PUFF: 160; 4.5 AEROSOL RESPIRATORY (INHALATION) at 19:46

## 2021-01-01 RX ADMIN — OXYCODONE HYDROCHLORIDE AND ACETAMINOPHEN 500 MG: 500 TABLET ORAL at 17:09

## 2021-01-01 RX ADMIN — Medication: at 14:28

## 2021-01-01 RX ADMIN — REMDESIVIR 100 MG: 5 INJECTION INTRAVENOUS at 15:31

## 2021-01-01 RX ADMIN — INSULIN GLARGINE 10 UNITS: 100 INJECTION, SOLUTION SUBCUTANEOUS at 08:45

## 2021-01-01 RX ADMIN — INSULIN LISPRO 2 UNITS: 100 INJECTION, SOLUTION INTRAVENOUS; SUBCUTANEOUS at 13:04

## 2021-01-01 RX ADMIN — Medication: at 18:02

## 2021-01-01 RX ADMIN — IPRATROPIUM BROMIDE AND ALBUTEROL SULFATE 3 ML: .5; 3 SOLUTION RESPIRATORY (INHALATION) at 20:20

## 2021-01-01 RX ADMIN — AMLODIPINE BESYLATE 10 MG: 5 TABLET ORAL at 09:20

## 2021-01-01 RX ADMIN — INSULIN LISPRO 2 UNITS: 100 INJECTION, SOLUTION INTRAVENOUS; SUBCUTANEOUS at 09:17

## 2021-01-01 RX ADMIN — HYDROMORPHONE HYDROCHLORIDE 0.5 MG: 1 INJECTION, SOLUTION INTRAMUSCULAR; INTRAVENOUS; SUBCUTANEOUS at 14:15

## 2021-01-01 RX ADMIN — FUROSEMIDE 40 MG: 10 INJECTION, SOLUTION INTRAMUSCULAR; INTRAVENOUS at 20:19

## 2021-01-01 RX ADMIN — Medication: at 16:20

## 2021-01-01 RX ADMIN — IPRATROPIUM BROMIDE AND ALBUTEROL 1 PUFF: 20; 100 SPRAY, METERED RESPIRATORY (INHALATION) at 23:00

## 2021-01-01 RX ADMIN — Medication 10 ML: at 21:27

## 2021-01-01 RX ADMIN — VANCOMYCIN HYDROCHLORIDE 750 MG: 750 INJECTION, POWDER, LYOPHILIZED, FOR SOLUTION INTRAVENOUS at 05:00

## 2021-01-01 RX ADMIN — INSULIN LISPRO 7 UNITS: 100 INJECTION, SOLUTION INTRAVENOUS; SUBCUTANEOUS at 17:19

## 2021-01-01 RX ADMIN — TEMAZEPAM 15 MG: 15 CAPSULE ORAL at 22:29

## 2021-01-01 RX ADMIN — METOPROLOL TARTRATE 50 MG: 50 TABLET, FILM COATED ORAL at 09:25

## 2021-01-01 RX ADMIN — POTASSIUM BICARBONATE 20 MEQ: 782 TABLET, EFFERVESCENT ORAL at 09:35

## 2021-01-01 RX ADMIN — Medication 10 ML: at 06:59

## 2021-01-01 RX ADMIN — Medication 10 ML: at 17:43

## 2021-01-01 RX ADMIN — Medication 10 ML: at 18:48

## 2021-01-01 RX ADMIN — DOXAZOSIN 4 MG: 2 TABLET ORAL at 22:41

## 2021-01-01 RX ADMIN — ASPIRIN 81 MG: 81 TABLET, CHEWABLE ORAL at 08:11

## 2021-01-01 RX ADMIN — ENOXAPARIN SODIUM 30 MG: 30 INJECTION SUBCUTANEOUS at 09:20

## 2021-01-01 RX ADMIN — ENOXAPARIN SODIUM 40 MG: 40 INJECTION SUBCUTANEOUS at 09:09

## 2021-01-01 RX ADMIN — FLUTICASONE PROPIONATE 1 SPRAY: 50 SPRAY, METERED NASAL at 08:46

## 2021-01-01 RX ADMIN — METOPROLOL TARTRATE 5 MG: 5 INJECTION INTRAVENOUS at 06:25

## 2021-01-01 RX ADMIN — METRONIDAZOLE 500 MG: 500 INJECTION, SOLUTION INTRAVENOUS at 09:49

## 2021-01-01 RX ADMIN — BUDESONIDE AND FORMOTEROL FUMARATE DIHYDRATE 2 PUFF: 160; 4.5 AEROSOL RESPIRATORY (INHALATION) at 07:46

## 2021-01-01 RX ADMIN — Medication: at 09:21

## 2021-01-01 RX ADMIN — Medication: at 09:30

## 2021-01-01 RX ADMIN — INSULIN LISPRO 2 UNITS: 100 INJECTION, SOLUTION INTRAVENOUS; SUBCUTANEOUS at 08:11

## 2021-01-01 RX ADMIN — INSULIN LISPRO 2 UNITS: 100 INJECTION, SOLUTION INTRAVENOUS; SUBCUTANEOUS at 18:56

## 2021-01-01 RX ADMIN — ENOXAPARIN SODIUM 40 MG: 40 INJECTION SUBCUTANEOUS at 21:27

## 2021-01-01 RX ADMIN — POTASSIUM BICARBONATE 20 MEQ: 782 TABLET, EFFERVESCENT ORAL at 17:28

## 2021-01-01 RX ADMIN — ENOXAPARIN SODIUM 30 MG: 30 INJECTION SUBCUTANEOUS at 23:31

## 2021-01-01 RX ADMIN — METOPROLOL TARTRATE 5 MG: 5 INJECTION INTRAVENOUS at 12:27

## 2021-01-01 RX ADMIN — Medication: at 22:00

## 2021-01-01 RX ADMIN — Medication 10 ML: at 13:22

## 2021-01-01 RX ADMIN — Medication: at 09:51

## 2021-01-01 RX ADMIN — ENOXAPARIN SODIUM 30 MG: 30 INJECTION SUBCUTANEOUS at 08:52

## 2021-01-01 RX ADMIN — ENOXAPARIN SODIUM 40 MG: 40 INJECTION SUBCUTANEOUS at 23:05

## 2021-01-01 RX ADMIN — DOXYCYCLINE HYCLATE 100 MG: 100 TABLET, COATED ORAL at 22:49

## 2021-01-01 RX ADMIN — ASPIRIN 300 MG: 300 SUPPOSITORY RECTAL at 08:12

## 2021-01-01 RX ADMIN — Medication: at 09:10

## 2021-01-01 RX ADMIN — ENOXAPARIN SODIUM 40 MG: 40 INJECTION SUBCUTANEOUS at 20:32

## 2021-01-01 RX ADMIN — Medication: at 08:46

## 2021-01-01 RX ADMIN — ASPIRIN 81 MG: 81 TABLET, CHEWABLE ORAL at 09:46

## 2021-01-01 RX ADMIN — ASPIRIN 81 MG: 81 TABLET, CHEWABLE ORAL at 08:55

## 2021-01-01 RX ADMIN — METOPROLOL TARTRATE 25 MG: 25 TABLET, FILM COATED ORAL at 21:59

## 2021-01-01 RX ADMIN — SODIUM CHLORIDE 50 ML/HR: 9 INJECTION, SOLUTION INTRAVENOUS at 08:10

## 2021-01-01 RX ADMIN — INSULIN GLARGINE 10 UNITS: 100 INJECTION, SOLUTION SUBCUTANEOUS at 09:45

## 2021-01-01 RX ADMIN — LOSARTAN POTASSIUM 100 MG: 50 TABLET, FILM COATED ORAL at 08:45

## 2021-01-01 RX ADMIN — INSULIN LISPRO 3 UNITS: 100 INJECTION, SOLUTION INTRAVENOUS; SUBCUTANEOUS at 23:06

## 2021-01-01 RX ADMIN — Medication 2 TABLET: at 09:20

## 2021-01-01 RX ADMIN — ASPIRIN 81 MG: 81 TABLET, CHEWABLE ORAL at 08:45

## 2021-01-01 RX ADMIN — POTASSIUM CHLORIDE 10 MEQ: 10 INJECTION, SOLUTION INTRAVENOUS at 08:30

## 2021-01-01 RX ADMIN — Medication 10 ML: at 07:31

## 2021-01-01 RX ADMIN — PIPERACILLIN AND TAZOBACTAM 3.38 G: 3; .375 INJECTION, POWDER, LYOPHILIZED, FOR SOLUTION INTRAVENOUS at 02:26

## 2021-01-01 RX ADMIN — ENOXAPARIN SODIUM 30 MG: 30 INJECTION SUBCUTANEOUS at 09:28

## 2021-01-01 RX ADMIN — DEXTROSE MONOHYDRATE 7.5 MG/HR: 50 INJECTION, SOLUTION INTRAVENOUS at 17:03

## 2021-01-01 RX ADMIN — Medication 10 ML: at 23:24

## 2021-01-01 RX ADMIN — TEMAZEPAM 15 MG: 15 CAPSULE ORAL at 21:26

## 2021-01-01 RX ADMIN — Medication 2 TABLET: at 09:25

## 2021-01-01 RX ADMIN — OXYCODONE HYDROCHLORIDE AND ACETAMINOPHEN 500 MG: 500 TABLET ORAL at 08:44

## 2021-01-01 RX ADMIN — ZINC SULFATE 220 MG (50 MG) CAPSULE 1 CAPSULE: CAPSULE at 09:58

## 2021-01-01 RX ADMIN — INSULIN LISPRO 2 UNITS: 100 INJECTION, SOLUTION INTRAVENOUS; SUBCUTANEOUS at 07:42

## 2021-01-01 RX ADMIN — Medication 2 TABLET: at 09:10

## 2021-01-01 RX ADMIN — POTASSIUM BICARBONATE 20 MEQ: 782 TABLET, EFFERVESCENT ORAL at 09:28

## 2021-01-01 RX ADMIN — ENOXAPARIN SODIUM 40 MG: 40 INJECTION SUBCUTANEOUS at 09:40

## 2021-01-01 RX ADMIN — PIPERACILLIN AND TAZOBACTAM 3.38 G: 3; .375 INJECTION, POWDER, LYOPHILIZED, FOR SOLUTION INTRAVENOUS at 01:25

## 2021-01-01 RX ADMIN — METOPROLOL TARTRATE 50 MG: 50 TABLET, FILM COATED ORAL at 09:46

## 2021-01-01 RX ADMIN — DOXAZOSIN 4 MG: 2 TABLET ORAL at 21:15

## 2021-01-01 RX ADMIN — ALBUTEROL SULFATE 1 PUFF: 90 AEROSOL, METERED RESPIRATORY (INHALATION) at 20:59

## 2021-01-01 RX ADMIN — AMIODARONE HYDROCHLORIDE 150 MG: 1.5 INJECTION, SOLUTION INTRAVENOUS at 09:15

## 2021-01-01 RX ADMIN — Medication 10 ML: at 12:19

## 2021-01-01 RX ADMIN — SERTRALINE HYDROCHLORIDE 100 MG: 50 TABLET ORAL at 18:55

## 2021-01-01 RX ADMIN — Medication: at 09:37

## 2021-01-01 RX ADMIN — Medication 10 ML: at 21:20

## 2021-01-01 RX ADMIN — LOSARTAN POTASSIUM 100 MG: 50 TABLET, FILM COATED ORAL at 09:20

## 2021-01-01 RX ADMIN — OXYCODONE HYDROCHLORIDE AND ACETAMINOPHEN 500 MG: 500 TABLET ORAL at 17:18

## 2021-01-01 RX ADMIN — ASPIRIN 300 MG: 300 SUPPOSITORY RECTAL at 09:13

## 2021-01-01 RX ADMIN — Medication: at 18:00

## 2021-01-01 RX ADMIN — Medication: at 21:18

## 2021-01-01 RX ADMIN — LOSARTAN POTASSIUM 100 MG: 50 TABLET, FILM COATED ORAL at 08:55

## 2021-01-01 RX ADMIN — AMIODARONE HYDROCHLORIDE 1 MG/MIN: 50 INJECTION, SOLUTION INTRAVENOUS at 09:07

## 2021-01-01 RX ADMIN — Medication 10 ML: at 08:29

## 2021-01-01 RX ADMIN — METOPROLOL TARTRATE 50 MG: 50 TABLET, FILM COATED ORAL at 09:20

## 2021-01-01 RX ADMIN — BUDESONIDE AND FORMOTEROL FUMARATE DIHYDRATE 2 PUFF: 160; 4.5 AEROSOL RESPIRATORY (INHALATION) at 21:19

## 2021-01-01 RX ADMIN — IPRATROPIUM BROMIDE AND ALBUTEROL SULFATE 3 ML: .5; 3 SOLUTION RESPIRATORY (INHALATION) at 07:33

## 2021-01-01 RX ADMIN — DEXTROSE MONOHYDRATE 15 MG/HR: 50 INJECTION, SOLUTION INTRAVENOUS at 23:56

## 2021-01-01 RX ADMIN — DOXAZOSIN 4 MG: 2 TABLET ORAL at 22:23

## 2021-01-01 RX ADMIN — FUROSEMIDE 40 MG: 10 INJECTION, SOLUTION INTRAMUSCULAR; INTRAVENOUS at 09:46

## 2021-01-01 RX ADMIN — SODIUM CHLORIDE 50 ML/HR: 9 INJECTION, SOLUTION INTRAVENOUS at 00:00

## 2021-01-01 RX ADMIN — IPRATROPIUM BROMIDE AND ALBUTEROL 1 PUFF: 20; 100 SPRAY, METERED RESPIRATORY (INHALATION) at 03:20

## 2021-01-01 RX ADMIN — Medication 10 ML: at 22:21

## 2021-01-01 RX ADMIN — Medication 10 ML: at 18:17

## 2021-01-01 RX ADMIN — ACETAMINOPHEN 650 MG: 325 TABLET ORAL at 18:20

## 2021-01-01 RX ADMIN — ENOXAPARIN SODIUM 40 MG: 40 INJECTION SUBCUTANEOUS at 09:47

## 2021-01-01 RX ADMIN — HUMAN INSULIN 12 UNITS: 100 INJECTION, SUSPENSION SUBCUTANEOUS at 07:35

## 2021-01-01 RX ADMIN — OXYCODONE HYDROCHLORIDE AND ACETAMINOPHEN 500 MG: 500 TABLET ORAL at 17:29

## 2021-01-01 RX ADMIN — BUDESONIDE AND FORMOTEROL FUMARATE DIHYDRATE 2 PUFF: 160; 4.5 AEROSOL RESPIRATORY (INHALATION) at 08:05

## 2021-01-01 RX ADMIN — ZINC SULFATE 220 MG (50 MG) CAPSULE 1 CAPSULE: CAPSULE at 09:46

## 2021-01-01 RX ADMIN — FUROSEMIDE 40 MG: 40 TABLET ORAL at 09:19

## 2021-01-01 RX ADMIN — Medication: at 15:05

## 2021-01-01 RX ADMIN — INSULIN LISPRO 2 UNITS: 100 INJECTION, SOLUTION INTRAVENOUS; SUBCUTANEOUS at 16:31

## 2021-01-01 RX ADMIN — BUDESONIDE AND FORMOTEROL FUMARATE DIHYDRATE 2 PUFF: 160; 4.5 AEROSOL RESPIRATORY (INHALATION) at 09:00

## 2021-01-01 RX ADMIN — SODIUM CHLORIDE 50 ML/HR: 9 INJECTION, SOLUTION INTRAVENOUS at 06:09

## 2021-01-01 RX ADMIN — BUDESONIDE AND FORMOTEROL FUMARATE DIHYDRATE 2 PUFF: 160; 4.5 AEROSOL RESPIRATORY (INHALATION) at 07:51

## 2021-01-01 RX ADMIN — HYDROMORPHONE HYDROCHLORIDE 0.5 MG: 1 INJECTION, SOLUTION INTRAMUSCULAR; INTRAVENOUS; SUBCUTANEOUS at 02:45

## 2021-01-01 RX ADMIN — INSULIN LISPRO 3 UNITS: 100 INJECTION, SOLUTION INTRAVENOUS; SUBCUTANEOUS at 23:32

## 2021-01-01 RX ADMIN — IPRATROPIUM BROMIDE AND ALBUTEROL 1 PUFF: 20; 100 SPRAY, METERED RESPIRATORY (INHALATION) at 08:00

## 2021-01-01 RX ADMIN — POTASSIUM CHLORIDE, DEXTROSE MONOHYDRATE AND SODIUM CHLORIDE: 300; 5; 450 INJECTION, SOLUTION INTRAVENOUS at 05:49

## 2021-01-01 RX ADMIN — Medication 2 TABLET: at 09:57

## 2021-01-01 RX ADMIN — AMLODIPINE BESYLATE 10 MG: 5 TABLET ORAL at 09:10

## 2021-01-01 RX ADMIN — METOPROLOL TARTRATE 5 MG: 5 INJECTION INTRAVENOUS at 12:31

## 2021-01-01 RX ADMIN — OXYCODONE HYDROCHLORIDE AND ACETAMINOPHEN 500 MG: 500 TABLET ORAL at 09:57

## 2021-01-01 RX ADMIN — PIPERACILLIN AND TAZOBACTAM 3.38 G: 3; .375 INJECTION, POWDER, LYOPHILIZED, FOR SOLUTION INTRAVENOUS at 00:00

## 2021-01-01 RX ADMIN — FLUTICASONE PROPIONATE 1 SPRAY: 50 SPRAY, METERED NASAL at 09:11

## 2021-01-01 RX ADMIN — AMLODIPINE BESYLATE 10 MG: 5 TABLET ORAL at 09:19

## 2021-01-01 RX ADMIN — DILTIAZEM HYDROCHLORIDE 10 MG: 5 INJECTION INTRAVENOUS at 20:01

## 2021-01-01 RX ADMIN — METOPROLOL TARTRATE 50 MG: 50 TABLET, FILM COATED ORAL at 09:10

## 2021-01-01 RX ADMIN — INSULIN LISPRO 2 UNITS: 100 INJECTION, SOLUTION INTRAVENOUS; SUBCUTANEOUS at 17:30

## 2021-01-01 RX ADMIN — INSULIN LISPRO 3 UNITS: 100 INJECTION, SOLUTION INTRAVENOUS; SUBCUTANEOUS at 17:09

## 2021-01-01 RX ADMIN — Medication 10 ML: at 21:45

## 2021-01-01 RX ADMIN — INSULIN LISPRO 3 UNITS: 100 INJECTION, SOLUTION INTRAVENOUS; SUBCUTANEOUS at 12:30

## 2021-01-01 RX ADMIN — VANCOMYCIN HYDROCHLORIDE 1000 MG: 1 INJECTION, POWDER, LYOPHILIZED, FOR SOLUTION INTRAVENOUS at 23:49

## 2021-01-01 RX ADMIN — HUMAN INSULIN 12 UNITS: 100 INJECTION, SUSPENSION SUBCUTANEOUS at 19:31

## 2021-01-01 RX ADMIN — HYDROMORPHONE HYDROCHLORIDE 0.5 MG: 1 INJECTION, SOLUTION INTRAMUSCULAR; INTRAVENOUS; SUBCUTANEOUS at 22:44

## 2021-01-01 RX ADMIN — SERTRALINE HYDROCHLORIDE 100 MG: 50 TABLET ORAL at 17:18

## 2021-01-01 RX ADMIN — Medication 10 ML: at 05:52

## 2021-01-01 RX ADMIN — AMIODARONE HYDROCHLORIDE 150 MG: 1.5 INJECTION, SOLUTION INTRAVENOUS at 17:04

## 2021-01-01 RX ADMIN — DEXTROSE MONOHYDRATE 5 MG/HR: 50 INJECTION, SOLUTION INTRAVENOUS at 16:54

## 2021-01-01 RX ADMIN — Medication 10 ML: at 16:45

## 2021-01-01 RX ADMIN — FINASTERIDE 5 MG: 5 TABLET, FILM COATED ORAL at 09:24

## 2021-01-01 RX ADMIN — Medication: at 16:24

## 2021-01-01 RX ADMIN — LOSARTAN POTASSIUM 100 MG: 50 TABLET, FILM COATED ORAL at 09:25

## 2021-01-01 RX ADMIN — Medication: at 23:32

## 2021-01-01 RX ADMIN — OXYCODONE HYDROCHLORIDE AND ACETAMINOPHEN 500 MG: 500 TABLET ORAL at 09:10

## 2021-01-01 RX ADMIN — INSULIN LISPRO 2 UNITS: 100 INJECTION, SOLUTION INTRAVENOUS; SUBCUTANEOUS at 08:29

## 2021-01-01 RX ADMIN — HYDROMORPHONE HYDROCHLORIDE 0.5 MG: 1 INJECTION, SOLUTION INTRAMUSCULAR; INTRAVENOUS; SUBCUTANEOUS at 21:20

## 2021-01-01 RX ADMIN — HUMAN INSULIN 12 UNITS: 100 INJECTION, SUSPENSION SUBCUTANEOUS at 07:41

## 2021-01-01 RX ADMIN — HUMAN INSULIN 12 UNITS: 100 INJECTION, SUSPENSION SUBCUTANEOUS at 09:09

## 2021-01-01 RX ADMIN — ALBUTEROL SULFATE 1 PUFF: 90 AEROSOL, METERED RESPIRATORY (INHALATION) at 01:48

## 2021-01-01 RX ADMIN — Medication: at 16:19

## 2021-01-01 RX ADMIN — Medication: at 13:02

## 2021-01-01 RX ADMIN — POTASSIUM BICARBONATE 20 MEQ: 782 TABLET, EFFERVESCENT ORAL at 18:29

## 2021-01-01 RX ADMIN — VANCOMYCIN HYDROCHLORIDE 1000 MG: 1 INJECTION, POWDER, LYOPHILIZED, FOR SOLUTION INTRAVENOUS at 06:59

## 2021-01-01 RX ADMIN — PIPERACILLIN AND TAZOBACTAM 3.38 G: 3; .375 INJECTION, POWDER, LYOPHILIZED, FOR SOLUTION INTRAVENOUS at 00:29

## 2021-01-01 RX ADMIN — IOPAMIDOL 100 ML: 755 INJECTION, SOLUTION INTRAVENOUS at 21:46

## 2021-01-01 RX ADMIN — Medication 10 ML: at 17:18

## 2021-01-01 RX ADMIN — PIPERACILLIN AND TAZOBACTAM 3.38 G: 3; .375 INJECTION, POWDER, LYOPHILIZED, FOR SOLUTION INTRAVENOUS at 02:09

## 2021-01-01 RX ADMIN — INSULIN LISPRO 3 UNITS: 100 INJECTION, SOLUTION INTRAVENOUS; SUBCUTANEOUS at 12:27

## 2021-01-01 RX ADMIN — PIPERACILLIN AND TAZOBACTAM 3.38 G: 3; .375 INJECTION, POWDER, LYOPHILIZED, FOR SOLUTION INTRAVENOUS at 09:03

## 2021-01-01 RX ADMIN — INSULIN LISPRO 4 UNITS: 100 INJECTION, SOLUTION INTRAVENOUS; SUBCUTANEOUS at 22:23

## 2021-01-01 RX ADMIN — DILTIAZEM HYDROCHLORIDE 10 MG: 5 INJECTION INTRAVENOUS at 16:31

## 2021-01-01 RX ADMIN — GUAIFENESIN AND DEXTROMETHORPHAN 5 ML: 100; 10 SYRUP ORAL at 22:30

## 2021-01-01 RX ADMIN — POTASSIUM CHLORIDE 40 MEQ: 750 TABLET, FILM COATED, EXTENDED RELEASE ORAL at 09:46

## 2021-01-01 RX ADMIN — Medication 10 ML: at 06:51

## 2021-01-01 RX ADMIN — AMLODIPINE BESYLATE 10 MG: 5 TABLET ORAL at 09:45

## 2021-01-01 RX ADMIN — ENOXAPARIN SODIUM 30 MG: 30 INJECTION SUBCUTANEOUS at 21:57

## 2021-01-01 RX ADMIN — METOPROLOL TARTRATE 25 MG: 25 TABLET, FILM COATED ORAL at 22:42

## 2021-01-01 RX ADMIN — PIPERACILLIN AND TAZOBACTAM 3.38 G: 3; .375 INJECTION, POWDER, LYOPHILIZED, FOR SOLUTION INTRAVENOUS at 10:29

## 2021-01-01 RX ADMIN — METOPROLOL TARTRATE 50 MG: 50 TABLET, FILM COATED ORAL at 17:29

## 2021-01-01 RX ADMIN — OXYCODONE HYDROCHLORIDE AND ACETAMINOPHEN 500 MG: 500 TABLET ORAL at 09:46

## 2021-01-01 RX ADMIN — FINASTERIDE 5 MG: 5 TABLET, FILM COATED ORAL at 08:55

## 2021-01-01 RX ADMIN — VANCOMYCIN HYDROCHLORIDE 750 MG: 750 INJECTION, POWDER, LYOPHILIZED, FOR SOLUTION INTRAVENOUS at 05:51

## 2021-01-01 RX ADMIN — TEMAZEPAM 15 MG: 15 CAPSULE ORAL at 23:07

## 2021-01-01 RX ADMIN — ALBUTEROL SULFATE 2 PUFF: 90 AEROSOL, METERED RESPIRATORY (INHALATION) at 08:42

## 2021-01-01 RX ADMIN — SERTRALINE HYDROCHLORIDE 100 MG: 50 TABLET ORAL at 16:51

## 2021-01-01 RX ADMIN — ASPIRIN 81 MG: 81 TABLET, CHEWABLE ORAL at 09:25

## 2021-01-01 RX ADMIN — FINASTERIDE 5 MG: 5 TABLET, FILM COATED ORAL at 09:56

## 2021-01-01 RX ADMIN — ENOXAPARIN SODIUM 40 MG: 40 INJECTION SUBCUTANEOUS at 21:18

## 2021-01-01 RX ADMIN — FUROSEMIDE 40 MG: 40 TABLET ORAL at 09:10

## 2021-01-01 RX ADMIN — ZINC SULFATE 220 MG (50 MG) CAPSULE 1 CAPSULE: CAPSULE at 08:45

## 2021-01-01 RX ADMIN — ATORVASTATIN CALCIUM 40 MG: 40 TABLET, FILM COATED ORAL at 21:18

## 2021-01-01 RX ADMIN — ATORVASTATIN CALCIUM 40 MG: 40 TABLET, FILM COATED ORAL at 23:05

## 2021-01-01 RX ADMIN — MAGNESIUM SULFATE HEPTAHYDRATE 2 G: 40 INJECTION, SOLUTION INTRAVENOUS at 11:53

## 2021-01-01 RX ADMIN — Medication: at 22:20

## 2021-01-01 RX ADMIN — ATORVASTATIN CALCIUM 40 MG: 40 TABLET, FILM COATED ORAL at 22:23

## 2021-01-01 RX ADMIN — METOPROLOL TARTRATE 50 MG: 50 TABLET, FILM COATED ORAL at 09:58

## 2021-01-01 RX ADMIN — FLUTICASONE PROPIONATE 1 SPRAY: 50 SPRAY, METERED NASAL at 08:53

## 2021-01-01 RX ADMIN — AMIODARONE HYDROCHLORIDE 0.5 MG/MIN: 50 INJECTION, SOLUTION INTRAVENOUS at 13:18

## 2021-01-01 RX ADMIN — ENOXAPARIN SODIUM 40 MG: 40 INJECTION SUBCUTANEOUS at 08:08

## 2021-01-01 RX ADMIN — VANCOMYCIN HYDROCHLORIDE 1000 MG: 1 INJECTION, POWDER, LYOPHILIZED, FOR SOLUTION INTRAVENOUS at 18:40

## 2021-01-01 RX ADMIN — INSULIN LISPRO 2 UNITS: 100 INJECTION, SOLUTION INTRAVENOUS; SUBCUTANEOUS at 08:43

## 2021-01-01 RX ADMIN — Medication: at 22:51

## 2021-01-01 RX ADMIN — DEXTROSE MONOHYDRATE 12.5 MG/HR: 50 INJECTION, SOLUTION INTRAVENOUS at 06:53

## 2021-01-01 RX ADMIN — TEMAZEPAM 15 MG: 15 CAPSULE ORAL at 22:37

## 2021-01-01 RX ADMIN — Medication: at 18:29

## 2021-01-01 RX ADMIN — INSULIN LISPRO 2 UNITS: 100 INJECTION, SOLUTION INTRAVENOUS; SUBCUTANEOUS at 22:44

## 2021-01-01 RX ADMIN — LOSARTAN POTASSIUM 100 MG: 50 TABLET, FILM COATED ORAL at 09:10

## 2021-01-01 RX ADMIN — DILTIAZEM HYDROCHLORIDE 10 MG: 5 INJECTION INTRAVENOUS at 08:53

## 2021-01-01 RX ADMIN — SERTRALINE HYDROCHLORIDE 100 MG: 50 TABLET ORAL at 18:45

## 2021-01-01 RX ADMIN — POTASSIUM BICARBONATE 20 MEQ: 782 TABLET, EFFERVESCENT ORAL at 19:08

## 2021-01-01 RX ADMIN — AMLODIPINE BESYLATE 10 MG: 5 TABLET ORAL at 09:24

## 2021-01-01 RX ADMIN — TEMAZEPAM 15 MG: 15 CAPSULE ORAL at 22:49

## 2021-01-01 RX ADMIN — DEXTROSE MONOHYDRATE 12.5 MG/HR: 50 INJECTION, SOLUTION INTRAVENOUS at 18:58

## 2021-01-01 RX ADMIN — FUROSEMIDE 40 MG: 40 TABLET ORAL at 09:25

## 2021-01-01 RX ADMIN — METOPROLOL TARTRATE 5 MG: 5 INJECTION INTRAVENOUS at 05:48

## 2021-01-01 RX ADMIN — IPRATROPIUM BROMIDE AND ALBUTEROL 1 PUFF: 20; 100 SPRAY, METERED RESPIRATORY (INHALATION) at 19:35

## 2021-01-01 RX ADMIN — ACETAMINOPHEN 650 MG: 325 TABLET ORAL at 12:00

## 2021-01-01 RX ADMIN — Medication 10 ML: at 14:15

## 2021-01-01 RX ADMIN — PIPERACILLIN AND TAZOBACTAM 3.38 G: 3; .375 INJECTION, POWDER, LYOPHILIZED, FOR SOLUTION INTRAVENOUS at 09:28

## 2021-01-01 RX ADMIN — Medication: at 22:02

## 2021-01-01 RX ADMIN — INSULIN LISPRO 2 UNITS: 100 INJECTION, SOLUTION INTRAVENOUS; SUBCUTANEOUS at 12:14

## 2021-01-01 RX ADMIN — METOPROLOL TARTRATE 5 MG: 5 INJECTION INTRAVENOUS at 19:08

## 2021-01-01 RX ADMIN — ENOXAPARIN SODIUM 30 MG: 30 INJECTION SUBCUTANEOUS at 08:12

## 2021-01-01 RX ADMIN — FLUTICASONE PROPIONATE 1 SPRAY: 50 SPRAY, METERED NASAL at 09:20

## 2021-01-01 RX ADMIN — Medication: at 08:51

## 2021-01-01 RX ADMIN — HYDROMORPHONE HYDROCHLORIDE 0.5 MG: 1 INJECTION, SOLUTION INTRAMUSCULAR; INTRAVENOUS; SUBCUTANEOUS at 21:46

## 2021-01-01 RX ADMIN — ATORVASTATIN CALCIUM 40 MG: 40 TABLET, FILM COATED ORAL at 22:49

## 2021-01-01 RX ADMIN — ZINC SULFATE 220 MG (50 MG) CAPSULE 1 CAPSULE: CAPSULE at 08:55

## 2021-01-01 RX ADMIN — SODIUM CHLORIDE 500 ML: 900 INJECTION, SOLUTION INTRAVENOUS at 10:00

## 2021-01-01 RX ADMIN — DOXYCYCLINE HYCLATE 100 MG: 100 TABLET, COATED ORAL at 09:19

## 2021-01-01 RX ADMIN — VANCOMYCIN HYDROCHLORIDE 1000 MG: 1 INJECTION, POWDER, LYOPHILIZED, FOR SOLUTION INTRAVENOUS at 12:18

## 2021-01-01 RX ADMIN — ASPIRIN 300 MG: 300 SUPPOSITORY RECTAL at 08:08

## 2021-01-01 RX ADMIN — Medication: at 14:17

## 2021-01-01 RX ADMIN — DEXTROSE MONOHYDRATE 10 MG/HR: 50 INJECTION, SOLUTION INTRAVENOUS at 17:41

## 2021-01-01 RX ADMIN — METOPROLOL TARTRATE 5 MG: 5 INJECTION INTRAVENOUS at 23:32

## 2021-01-01 RX ADMIN — Medication: at 21:28

## 2021-01-01 RX ADMIN — INSULIN LISPRO 5 UNITS: 100 INJECTION, SOLUTION INTRAVENOUS; SUBCUTANEOUS at 09:27

## 2021-01-01 RX ADMIN — FLUTICASONE PROPIONATE 1 SPRAY: 50 SPRAY, METERED NASAL at 09:51

## 2021-01-01 RX ADMIN — FINASTERIDE 5 MG: 5 TABLET, FILM COATED ORAL at 08:45

## 2021-01-01 RX ADMIN — AMIODARONE HYDROCHLORIDE 1 MG/MIN: 50 INJECTION, SOLUTION INTRAVENOUS at 16:16

## 2021-01-01 RX ADMIN — PIPERACILLIN AND TAZOBACTAM 3.38 G: 3; .375 INJECTION, POWDER, LYOPHILIZED, FOR SOLUTION INTRAVENOUS at 09:40

## 2021-01-01 RX ADMIN — OXYCODONE HYDROCHLORIDE AND ACETAMINOPHEN 500 MG: 500 TABLET ORAL at 18:44

## 2021-01-01 RX ADMIN — Medication 10 ML: at 23:06

## 2021-01-01 RX ADMIN — SERTRALINE HYDROCHLORIDE 100 MG: 50 TABLET ORAL at 17:08

## 2021-01-01 RX ADMIN — Medication 10 ML: at 23:34

## 2021-01-01 RX ADMIN — OXYCODONE HYDROCHLORIDE AND ACETAMINOPHEN 500 MG: 500 TABLET ORAL at 18:55

## 2021-01-01 RX ADMIN — FINASTERIDE 5 MG: 5 TABLET, FILM COATED ORAL at 09:46

## 2021-01-01 RX ADMIN — DOXAZOSIN 4 MG: 2 TABLET ORAL at 00:39

## 2021-01-01 RX ADMIN — HUMAN INSULIN 12 UNITS: 100 INJECTION, SUSPENSION SUBCUTANEOUS at 08:28

## 2021-01-01 RX ADMIN — INSULIN LISPRO 4 UNITS: 100 INJECTION, SOLUTION INTRAVENOUS; SUBCUTANEOUS at 19:30

## 2021-01-01 RX ADMIN — IPRATROPIUM BROMIDE AND ALBUTEROL SULFATE 3 ML: .5; 3 SOLUTION RESPIRATORY (INHALATION) at 23:45

## 2021-01-01 RX ADMIN — INSULIN LISPRO 2 UNITS: 100 INJECTION, SOLUTION INTRAVENOUS; SUBCUTANEOUS at 12:01

## 2021-01-01 RX ADMIN — Medication 10 ML: at 22:44

## 2021-01-01 RX ADMIN — PIPERACILLIN AND TAZOBACTAM 3.38 G: 3; .375 INJECTION, POWDER, LYOPHILIZED, FOR SOLUTION INTRAVENOUS at 10:31

## 2021-01-01 RX ADMIN — INSULIN LISPRO 2 UNITS: 100 INJECTION, SOLUTION INTRAVENOUS; SUBCUTANEOUS at 08:55

## 2021-01-01 RX ADMIN — ASPIRIN 81 MG: 81 TABLET, CHEWABLE ORAL at 08:51

## 2021-01-01 RX ADMIN — POTASSIUM CHLORIDE, DEXTROSE MONOHYDRATE AND SODIUM CHLORIDE: 300; 5; 450 INJECTION, SOLUTION INTRAVENOUS at 14:52

## 2021-01-01 RX ADMIN — CASTOR OIL AND BALSAM, PERU: 788; 87 OINTMENT TOPICAL at 08:06

## 2021-01-01 RX ADMIN — METOPROLOL TARTRATE 25 MG: 25 TABLET, FILM COATED ORAL at 08:11

## 2021-01-01 RX ADMIN — FINASTERIDE 5 MG: 5 TABLET, FILM COATED ORAL at 09:10

## 2021-01-01 RX ADMIN — INSULIN LISPRO 2 UNITS: 100 INJECTION, SOLUTION INTRAVENOUS; SUBCUTANEOUS at 09:28

## 2021-01-01 RX ADMIN — ENOXAPARIN SODIUM 40 MG: 40 INJECTION SUBCUTANEOUS at 09:25

## 2021-01-01 RX ADMIN — Medication 10 ML: at 12:09

## 2021-01-01 RX ADMIN — INSULIN LISPRO 3 UNITS: 100 INJECTION, SOLUTION INTRAVENOUS; SUBCUTANEOUS at 09:39

## 2021-01-01 RX ADMIN — Medication 10 ML: at 22:00

## 2021-01-01 RX ADMIN — INSULIN LISPRO 2 UNITS: 100 INJECTION, SOLUTION INTRAVENOUS; SUBCUTANEOUS at 11:10

## 2021-01-01 RX ADMIN — Medication 10 ML: at 21:16

## 2021-01-01 RX ADMIN — Medication: at 04:48

## 2021-01-01 RX ADMIN — OXYCODONE HYDROCHLORIDE AND ACETAMINOPHEN 500 MG: 500 TABLET ORAL at 16:52

## 2021-01-01 RX ADMIN — HUMAN INSULIN 12 UNITS: 100 INJECTION, SUSPENSION SUBCUTANEOUS at 07:32

## 2021-01-01 RX ADMIN — FINASTERIDE 5 MG: 5 TABLET, FILM COATED ORAL at 09:19

## 2021-01-01 RX ADMIN — HUMAN INSULIN 12 UNITS: 100 INJECTION, SUSPENSION SUBCUTANEOUS at 17:09

## 2021-01-01 RX ADMIN — METOPROLOL TARTRATE 5 MG: 5 INJECTION INTRAVENOUS at 00:08

## 2021-01-01 RX ADMIN — INSULIN LISPRO 3 UNITS: 100 INJECTION, SOLUTION INTRAVENOUS; SUBCUTANEOUS at 11:45

## 2021-01-01 RX ADMIN — POTASSIUM CHLORIDE, DEXTROSE MONOHYDRATE AND SODIUM CHLORIDE: 300; 5; 450 INJECTION, SOLUTION INTRAVENOUS at 04:47

## 2021-01-01 RX ADMIN — VANCOMYCIN HYDROCHLORIDE 750 MG: 750 INJECTION, POWDER, LYOPHILIZED, FOR SOLUTION INTRAVENOUS at 12:14

## 2021-01-01 RX ADMIN — ENOXAPARIN SODIUM 40 MG: 40 INJECTION SUBCUTANEOUS at 08:44

## 2021-01-01 RX ADMIN — POTASSIUM CHLORIDE 10 MEQ: 10 INJECTION, SOLUTION INTRAVENOUS at 12:41

## 2021-01-01 RX ADMIN — Medication 2 TABLET: at 08:44

## 2021-01-01 RX ADMIN — Medication: at 06:30

## 2021-01-01 RX ADMIN — INSULIN LISPRO 3 UNITS: 100 INJECTION, SOLUTION INTRAVENOUS; SUBCUTANEOUS at 17:29

## 2021-01-01 RX ADMIN — ARFORMOTEROL TARTRATE: 15 SOLUTION RESPIRATORY (INHALATION) at 07:33

## 2021-01-01 RX ADMIN — ENOXAPARIN SODIUM 40 MG: 40 INJECTION SUBCUTANEOUS at 08:33

## 2021-01-01 RX ADMIN — Medication 10 ML: at 14:23

## 2021-01-01 RX ADMIN — IPRATROPIUM BROMIDE AND ALBUTEROL SULFATE 3 ML: .5; 3 SOLUTION RESPIRATORY (INHALATION) at 03:46

## 2021-01-01 RX ADMIN — VANCOMYCIN HYDROCHLORIDE 1000 MG: 1 INJECTION, POWDER, LYOPHILIZED, FOR SOLUTION INTRAVENOUS at 00:35

## 2021-01-01 RX ADMIN — POTASSIUM CHLORIDE 10 MEQ: 10 INJECTION, SOLUTION INTRAVENOUS at 13:51

## 2021-01-01 RX ADMIN — ENOXAPARIN SODIUM 30 MG: 30 INJECTION SUBCUTANEOUS at 08:19

## 2021-01-01 RX ADMIN — INSULIN LISPRO 5 UNITS: 100 INJECTION, SOLUTION INTRAVENOUS; SUBCUTANEOUS at 12:19

## 2021-01-01 RX ADMIN — INSULIN LISPRO 6 UNITS: 100 INJECTION, SOLUTION INTRAVENOUS; SUBCUTANEOUS at 22:41

## 2021-01-01 RX ADMIN — Medication: at 21:43

## 2021-01-01 RX ADMIN — Medication: at 13:05

## 2021-01-01 RX ADMIN — METOPROLOL TARTRATE 50 MG: 50 TABLET, FILM COATED ORAL at 08:45

## 2021-01-01 RX ADMIN — ZINC SULFATE 220 MG (50 MG) CAPSULE 1 CAPSULE: CAPSULE at 09:25

## 2021-01-01 RX ADMIN — DEXAMETHASONE 6 MG: 4 TABLET ORAL at 08:55

## 2021-01-01 RX ADMIN — Medication 10 ML: at 12:14

## 2021-01-01 RX ADMIN — Medication: at 18:35

## 2021-01-01 RX ADMIN — AMIODARONE HYDROCHLORIDE 0.5 MG/MIN: 50 INJECTION, SOLUTION INTRAVENOUS at 01:19

## 2021-01-01 RX ADMIN — PIPERACILLIN AND TAZOBACTAM 3.38 G: 3; .375 INJECTION, POWDER, LYOPHILIZED, FOR SOLUTION INTRAVENOUS at 02:02

## 2021-01-01 RX ADMIN — LOSARTAN POTASSIUM 100 MG: 50 TABLET, FILM COATED ORAL at 09:45

## 2021-01-01 RX ADMIN — ENOXAPARIN SODIUM 40 MG: 40 INJECTION SUBCUTANEOUS at 08:46

## 2021-01-01 RX ADMIN — Medication: at 17:41

## 2021-01-01 RX ADMIN — CASTOR OIL AND BALSAM, PERU: 788; 87 OINTMENT TOPICAL at 08:50

## 2021-01-01 RX ADMIN — HYDROMORPHONE HYDROCHLORIDE 0.5 MG: 1 INJECTION, SOLUTION INTRAMUSCULAR; INTRAVENOUS; SUBCUTANEOUS at 04:49

## 2021-01-01 RX ADMIN — ENOXAPARIN SODIUM 30 MG: 30 INJECTION SUBCUTANEOUS at 21:45

## 2021-01-01 RX ADMIN — METRONIDAZOLE 500 MG: 500 INJECTION, SOLUTION INTRAVENOUS at 00:00

## 2021-01-01 RX ADMIN — AMIODARONE HYDROCHLORIDE 1 MG/MIN: 50 INJECTION, SOLUTION INTRAVENOUS at 17:20

## 2021-01-01 RX ADMIN — POTASSIUM CHLORIDE 10 MEQ: 10 INJECTION, SOLUTION INTRAVENOUS at 09:30

## 2021-01-01 RX ADMIN — Medication: at 08:27

## 2021-01-01 RX ADMIN — Medication: at 21:15

## 2021-01-01 RX ADMIN — INSULIN LISPRO 3 UNITS: 100 INJECTION, SOLUTION INTRAVENOUS; SUBCUTANEOUS at 00:00

## 2021-01-01 RX ADMIN — CASTOR OIL AND BALSAM, PERU: 788; 87 OINTMENT TOPICAL at 17:34

## 2021-01-01 RX ADMIN — METOPROLOL TARTRATE 25 MG: 25 TABLET, FILM COATED ORAL at 08:52

## 2021-01-01 RX ADMIN — VANCOMYCIN HYDROCHLORIDE 1500 MG: 10 INJECTION, POWDER, LYOPHILIZED, FOR SOLUTION INTRAVENOUS at 18:56

## 2021-01-01 RX ADMIN — FUROSEMIDE 40 MG: 10 INJECTION, SOLUTION INTRAMUSCULAR; INTRAVENOUS at 08:44

## 2021-01-01 RX ADMIN — METOPROLOL TARTRATE 50 MG: 50 TABLET, FILM COATED ORAL at 08:55

## 2021-01-01 RX ADMIN — FLUTICASONE PROPIONATE 1 SPRAY: 50 SPRAY, METERED NASAL at 09:26

## 2021-01-01 RX ADMIN — HYDROMORPHONE HYDROCHLORIDE 0.5 MG: 1 INJECTION, SOLUTION INTRAMUSCULAR; INTRAVENOUS; SUBCUTANEOUS at 21:58

## 2021-01-01 RX ADMIN — ASPIRIN 81 MG: 81 TABLET, CHEWABLE ORAL at 09:20

## 2021-01-01 RX ADMIN — INSULIN LISPRO 7 UNITS: 100 INJECTION, SOLUTION INTRAVENOUS; SUBCUTANEOUS at 07:06

## 2021-01-01 RX ADMIN — Medication 10 ML: at 22:49

## 2021-01-01 RX ADMIN — INSULIN LISPRO 2 UNITS: 100 INJECTION, SOLUTION INTRAVENOUS; SUBCUTANEOUS at 18:34

## 2021-01-01 RX ADMIN — ALBUTEROL SULFATE 2 PUFF: 90 AEROSOL, METERED RESPIRATORY (INHALATION) at 15:43

## 2021-01-01 RX ADMIN — METOPROLOL TARTRATE 50 MG: 50 TABLET, FILM COATED ORAL at 16:52

## 2021-01-01 RX ADMIN — ENOXAPARIN SODIUM 40 MG: 40 INJECTION SUBCUTANEOUS at 08:54

## 2021-01-01 RX ADMIN — INSULIN LISPRO 7 UNITS: 100 INJECTION, SOLUTION INTRAVENOUS; SUBCUTANEOUS at 16:52

## 2021-01-01 RX ADMIN — PIPERACILLIN AND TAZOBACTAM 3.38 G: 3; .375 INJECTION, POWDER, LYOPHILIZED, FOR SOLUTION INTRAVENOUS at 15:21

## 2021-01-01 RX ADMIN — Medication 10 ML: at 21:42

## 2021-01-01 RX ADMIN — DOXAZOSIN 4 MG: 2 TABLET ORAL at 21:18

## 2021-01-01 RX ADMIN — POTASSIUM CHLORIDE 10 MEQ: 10 INJECTION, SOLUTION INTRAVENOUS at 10:10

## 2021-01-01 RX ADMIN — BUDESONIDE AND FORMOTEROL FUMARATE DIHYDRATE 2 PUFF: 160; 4.5 AEROSOL RESPIRATORY (INHALATION) at 20:05

## 2021-01-01 RX ADMIN — POTASSIUM CHLORIDE 10 MEQ: 10 INJECTION, SOLUTION INTRAVENOUS at 06:04

## 2021-01-01 RX ADMIN — POTASSIUM CHLORIDE 10 MEQ: 10 INJECTION, SOLUTION INTRAVENOUS at 07:30

## 2021-01-01 RX ADMIN — INSULIN LISPRO 2 UNITS: 100 INJECTION, SOLUTION INTRAVENOUS; SUBCUTANEOUS at 08:45

## 2021-01-01 RX ADMIN — ASPIRIN 300 MG: 300 SUPPOSITORY RECTAL at 09:48

## 2021-01-01 RX ADMIN — IPRATROPIUM BROMIDE AND ALBUTEROL 1 PUFF: 20; 100 SPRAY, METERED RESPIRATORY (INHALATION) at 03:41

## 2021-01-01 RX ADMIN — POTASSIUM BICARBONATE 20 MEQ: 782 TABLET, EFFERVESCENT ORAL at 17:58

## 2021-01-01 RX ADMIN — VANCOMYCIN HYDROCHLORIDE 750 MG: 750 INJECTION, POWDER, LYOPHILIZED, FOR SOLUTION INTRAVENOUS at 06:25

## 2021-01-01 RX ADMIN — Medication: at 20:33

## 2021-01-01 RX ADMIN — Medication 10 ML: at 05:49

## 2021-01-01 RX ADMIN — ASPIRIN 300 MG: 300 SUPPOSITORY RECTAL at 10:30

## 2021-01-01 RX ADMIN — Medication: at 22:21

## 2021-01-01 RX ADMIN — METOPROLOL TARTRATE 5 MG: 5 INJECTION INTRAVENOUS at 12:08

## 2021-01-01 RX ADMIN — Medication: at 09:27

## 2021-01-01 RX ADMIN — PIPERACILLIN AND TAZOBACTAM 3.38 G: 3; .375 INJECTION, POWDER, LYOPHILIZED, FOR SOLUTION INTRAVENOUS at 17:34

## 2021-01-01 RX ADMIN — Medication: at 21:53

## 2021-01-01 RX ADMIN — INSULIN LISPRO 2 UNITS: 100 INJECTION, SOLUTION INTRAVENOUS; SUBCUTANEOUS at 18:17

## 2021-01-01 RX ADMIN — VANCOMYCIN HYDROCHLORIDE 750 MG: 750 INJECTION, POWDER, LYOPHILIZED, FOR SOLUTION INTRAVENOUS at 21:42

## 2021-01-01 RX ADMIN — BUDESONIDE AND FORMOTEROL FUMARATE DIHYDRATE 2 PUFF: 160; 4.5 AEROSOL RESPIRATORY (INHALATION) at 08:04

## 2021-01-01 RX ADMIN — AMIODARONE HYDROCHLORIDE 0.5 MG/MIN: 50 INJECTION, SOLUTION INTRAVENOUS at 04:42

## 2021-01-01 RX ADMIN — GUAIFENESIN AND DEXTROMETHORPHAN 5 ML: 100; 10 SYRUP ORAL at 23:08

## 2021-01-01 RX ADMIN — ENOXAPARIN SODIUM 40 MG: 40 INJECTION SUBCUTANEOUS at 10:02

## 2021-01-01 RX ADMIN — INSULIN LISPRO 2 UNITS: 100 INJECTION, SOLUTION INTRAVENOUS; SUBCUTANEOUS at 21:44

## 2021-01-01 RX ADMIN — ATORVASTATIN CALCIUM 40 MG: 40 TABLET, FILM COATED ORAL at 21:26

## 2021-01-01 RX ADMIN — METOPROLOL TARTRATE 25 MG: 25 TABLET, FILM COATED ORAL at 23:37

## 2021-01-01 RX ADMIN — METOPROLOL TARTRATE 50 MG: 50 TABLET, FILM COATED ORAL at 18:55

## 2021-01-01 RX ADMIN — POTASSIUM BICARBONATE 20 MEQ: 782 TABLET, EFFERVESCENT ORAL at 08:10

## 2021-01-01 RX ADMIN — METOPROLOL TARTRATE 25 MG: 25 TABLET, FILM COATED ORAL at 08:19

## 2021-01-01 RX ADMIN — INSULIN LISPRO 2 UNITS: 100 INJECTION, SOLUTION INTRAVENOUS; SUBCUTANEOUS at 08:31

## 2021-01-01 RX ADMIN — DEXAMETHASONE 6 MG: 4 TABLET ORAL at 09:46

## 2021-01-01 RX ADMIN — HUMAN INSULIN 12 UNITS: 100 INJECTION, SUSPENSION SUBCUTANEOUS at 17:27

## 2021-01-01 RX ADMIN — PIPERACILLIN AND TAZOBACTAM 3.38 G: 3; .375 INJECTION, POWDER, LYOPHILIZED, FOR SOLUTION INTRAVENOUS at 10:20

## 2021-01-01 RX ADMIN — Medication 20 ML: at 21:45

## 2021-01-01 RX ADMIN — Medication 10 ML: at 21:18

## 2021-01-01 RX ADMIN — VANCOMYCIN HYDROCHLORIDE 750 MG: 750 INJECTION, POWDER, LYOPHILIZED, FOR SOLUTION INTRAVENOUS at 21:43

## 2021-01-01 RX ADMIN — ENOXAPARIN SODIUM 30 MG: 30 INJECTION SUBCUTANEOUS at 22:42

## 2021-01-01 RX ADMIN — Medication 10 ML: at 13:17

## 2021-01-01 RX ADMIN — INSULIN LISPRO 3 UNITS: 100 INJECTION, SOLUTION INTRAVENOUS; SUBCUTANEOUS at 12:54

## 2021-01-01 RX ADMIN — INSULIN LISPRO 7 UNITS: 100 INJECTION, SOLUTION INTRAVENOUS; SUBCUTANEOUS at 12:01

## 2021-01-01 RX ADMIN — VANCOMYCIN HYDROCHLORIDE 1000 MG: 1 INJECTION, POWDER, LYOPHILIZED, FOR SOLUTION INTRAVENOUS at 06:47

## 2021-01-01 RX ADMIN — FUROSEMIDE 40 MG: 10 INJECTION, SOLUTION INTRAMUSCULAR; INTRAVENOUS at 08:55

## 2021-01-01 RX ADMIN — ENOXAPARIN SODIUM 40 MG: 40 INJECTION SUBCUTANEOUS at 20:18

## 2021-01-01 RX ADMIN — Medication: at 21:52

## 2021-01-01 RX ADMIN — BUDESONIDE AND FORMOTEROL FUMARATE DIHYDRATE 2 PUFF: 160; 4.5 AEROSOL RESPIRATORY (INHALATION) at 20:02

## 2021-01-01 RX ADMIN — ENOXAPARIN SODIUM 30 MG: 30 INJECTION SUBCUTANEOUS at 09:35

## 2021-01-01 RX ADMIN — POTASSIUM BICARBONATE 20 MEQ: 782 TABLET, EFFERVESCENT ORAL at 08:19

## 2021-01-01 RX ADMIN — ATORVASTATIN CALCIUM 40 MG: 40 TABLET, FILM COATED ORAL at 21:15

## 2021-01-01 RX ADMIN — ATORVASTATIN CALCIUM 40 MG: 40 TABLET, FILM COATED ORAL at 22:40

## 2021-01-01 RX ADMIN — INSULIN LISPRO 6 UNITS: 100 INJECTION, SOLUTION INTRAVENOUS; SUBCUTANEOUS at 23:09

## 2021-01-01 RX ADMIN — HYDROMORPHONE HYDROCHLORIDE 0.5 MG: 1 INJECTION, SOLUTION INTRAMUSCULAR; INTRAVENOUS; SUBCUTANEOUS at 20:41

## 2021-01-01 RX ADMIN — INSULIN LISPRO 2 UNITS: 100 INJECTION, SOLUTION INTRAVENOUS; SUBCUTANEOUS at 08:06

## 2021-01-01 RX ADMIN — Medication: at 05:48

## 2021-01-01 RX ADMIN — POTASSIUM BICARBONATE 20 MEQ: 782 TABLET, EFFERVESCENT ORAL at 17:04

## 2021-01-01 RX ADMIN — ASPIRIN 81 MG: 81 TABLET, CHEWABLE ORAL at 08:19

## 2021-01-01 RX ADMIN — DEXTROSE MONOHYDRATE 2.5 MG/HR: 50 INJECTION, SOLUTION INTRAVENOUS at 20:11

## 2021-01-01 RX ADMIN — Medication 2 TABLET: at 09:46

## 2021-01-01 RX ADMIN — Medication 20 ML: at 02:40

## 2021-01-01 RX ADMIN — BUDESONIDE AND FORMOTEROL FUMARATE DIHYDRATE 2 PUFF: 160; 4.5 AEROSOL RESPIRATORY (INHALATION) at 21:07

## 2021-01-01 RX ADMIN — INSULIN LISPRO 2 UNITS: 100 INJECTION, SOLUTION INTRAVENOUS; SUBCUTANEOUS at 08:52

## 2021-01-01 RX ADMIN — Medication 10 ML: at 14:00

## 2021-01-01 RX ADMIN — METOPROLOL TARTRATE 50 MG: 50 TABLET, FILM COATED ORAL at 17:18

## 2021-01-01 RX ADMIN — INSULIN LISPRO 2 UNITS: 100 INJECTION, SOLUTION INTRAVENOUS; SUBCUTANEOUS at 22:11

## 2021-01-01 RX ADMIN — BUDESONIDE AND FORMOTEROL FUMARATE DIHYDRATE 2 PUFF: 160; 4.5 AEROSOL RESPIRATORY (INHALATION) at 19:42

## 2021-01-01 RX ADMIN — INSULIN LISPRO 7 UNITS: 100 INJECTION, SOLUTION INTRAVENOUS; SUBCUTANEOUS at 17:29

## 2021-01-01 RX ADMIN — Medication 10 ML: at 06:00

## 2021-01-01 RX ADMIN — HUMAN INSULIN 12 UNITS: 100 INJECTION, SUSPENSION SUBCUTANEOUS at 18:55

## 2021-01-01 RX ADMIN — GLYCOPYRROLATE 0.2 MG: 0.2 INJECTION, SOLUTION INTRAMUSCULAR; INTRAVENOUS at 15:05

## 2021-01-01 RX ADMIN — Medication 6 MG: at 23:32

## 2021-01-01 RX ADMIN — INSULIN GLARGINE 10 UNITS: 100 INJECTION, SOLUTION SUBCUTANEOUS at 08:54

## 2021-01-01 RX ADMIN — ASPIRIN 300 MG: 300 SUPPOSITORY RECTAL at 10:31

## 2021-01-01 RX ADMIN — Medication 10 ML: at 22:28

## 2021-01-01 RX ADMIN — DOXAZOSIN 4 MG: 2 TABLET ORAL at 22:49

## 2021-01-01 RX ADMIN — Medication: at 05:13

## 2021-01-01 RX ADMIN — ARFORMOTEROL TARTRATE: 15 SOLUTION RESPIRATORY (INHALATION) at 20:28

## 2021-01-01 RX ADMIN — FLUTICASONE PROPIONATE 1 SPRAY: 50 SPRAY, METERED NASAL at 09:23

## 2021-01-01 RX ADMIN — OXYCODONE HYDROCHLORIDE AND ACETAMINOPHEN 500 MG: 500 TABLET ORAL at 08:55

## 2021-01-01 RX ADMIN — INSULIN LISPRO 4 UNITS: 100 INJECTION, SOLUTION INTRAVENOUS; SUBCUTANEOUS at 20:00

## 2021-01-01 RX ADMIN — ASPIRIN 81 MG: 81 TABLET, CHEWABLE ORAL at 10:01

## 2021-01-01 RX ADMIN — POTASSIUM CHLORIDE 40 MEQ: 750 TABLET, FILM COATED, EXTENDED RELEASE ORAL at 08:45

## 2021-01-01 RX ADMIN — Medication: at 06:00

## 2021-01-01 RX ADMIN — AMLODIPINE BESYLATE 10 MG: 5 TABLET ORAL at 08:44

## 2021-01-01 RX ADMIN — ALBUMIN (HUMAN) 25 G: 0.25 INJECTION, SOLUTION INTRAVENOUS at 09:07

## 2021-01-01 RX ADMIN — ENOXAPARIN SODIUM 40 MG: 40 INJECTION SUBCUTANEOUS at 09:19

## 2021-01-01 RX ADMIN — VANCOMYCIN HYDROCHLORIDE 1000 MG: 1 INJECTION, POWDER, LYOPHILIZED, FOR SOLUTION INTRAVENOUS at 06:04

## 2021-01-01 RX ADMIN — PIPERACILLIN AND TAZOBACTAM 3.38 G: 3; .375 INJECTION, POWDER, LYOPHILIZED, FOR SOLUTION INTRAVENOUS at 17:59

## 2021-01-01 RX ADMIN — Medication 10 ML: at 21:59

## 2021-01-01 RX ADMIN — HUMAN INSULIN 12 UNITS: 100 INJECTION, SUSPENSION SUBCUTANEOUS at 20:32

## 2021-01-01 RX ADMIN — Medication: at 14:00

## 2021-01-01 RX ADMIN — POTASSIUM CHLORIDE 10 MEQ: 10 INJECTION, SOLUTION INTRAVENOUS at 11:20

## 2021-01-01 RX ADMIN — METOPROLOL TARTRATE 50 MG: 50 TABLET, FILM COATED ORAL at 17:09

## 2021-01-01 RX ADMIN — ASPIRIN 81 MG: 81 TABLET, CHEWABLE ORAL at 09:28

## 2021-01-01 RX ADMIN — HYDROMORPHONE HYDROCHLORIDE 0.5 MG: 1 INJECTION, SOLUTION INTRAMUSCULAR; INTRAVENOUS; SUBCUTANEOUS at 04:08

## 2021-01-01 RX ADMIN — FLUTICASONE PROPIONATE 1 SPRAY: 50 SPRAY, METERED NASAL at 21:15

## 2021-01-01 RX ADMIN — PIPERACILLIN AND TAZOBACTAM 3.38 G: 3; .375 INJECTION, POWDER, LYOPHILIZED, FOR SOLUTION INTRAVENOUS at 17:40

## 2021-01-01 RX ADMIN — SODIUM CHLORIDE 50 ML/HR: 9 INJECTION, SOLUTION INTRAVENOUS at 05:00

## 2021-01-01 RX ADMIN — CASTOR OIL AND BALSAM, PERU: 788; 87 OINTMENT TOPICAL at 17:08

## 2021-01-01 RX ADMIN — Medication: at 08:11

## 2021-01-01 RX ADMIN — VANCOMYCIN HYDROCHLORIDE 750 MG: 750 INJECTION, POWDER, LYOPHILIZED, FOR SOLUTION INTRAVENOUS at 12:05

## 2021-01-01 RX ADMIN — PIPERACILLIN AND TAZOBACTAM 3.38 G: 3; .375 INJECTION, POWDER, LYOPHILIZED, FOR SOLUTION INTRAVENOUS at 17:02

## 2021-01-01 RX ADMIN — ENOXAPARIN SODIUM 30 MG: 30 INJECTION SUBCUTANEOUS at 22:49

## 2021-01-01 RX ADMIN — ENOXAPARIN SODIUM 30 MG: 30 INJECTION SUBCUTANEOUS at 21:51

## 2021-01-01 RX ADMIN — ASPIRIN 81 MG: 81 TABLET, CHEWABLE ORAL at 09:10

## 2021-01-01 RX ADMIN — ENOXAPARIN SODIUM 40 MG: 40 INJECTION SUBCUTANEOUS at 21:15

## 2021-01-01 RX ADMIN — INSULIN LISPRO 5 UNITS: 100 INJECTION, SOLUTION INTRAVENOUS; SUBCUTANEOUS at 07:35

## 2021-01-01 RX ADMIN — VANCOMYCIN HYDROCHLORIDE 750 MG: 750 INJECTION, POWDER, LYOPHILIZED, FOR SOLUTION INTRAVENOUS at 12:09

## 2021-01-01 RX ADMIN — ENOXAPARIN SODIUM 30 MG: 30 INJECTION SUBCUTANEOUS at 08:13

## 2021-01-01 RX ADMIN — METOPROLOL TARTRATE 5 MG: 5 INJECTION INTRAVENOUS at 17:28

## 2021-01-01 RX ADMIN — Medication: at 21:21

## 2021-01-01 RX ADMIN — Medication 10 ML: at 05:13

## 2021-01-01 RX ADMIN — METOPROLOL TARTRATE 50 MG: 50 TABLET, FILM COATED ORAL at 18:45

## 2021-01-01 RX ADMIN — ENOXAPARIN SODIUM 30 MG: 30 INJECTION SUBCUTANEOUS at 22:11

## 2021-01-01 RX ADMIN — ACETAMINOPHEN 975 MG: 650 SUPPOSITORY RECTAL at 20:01

## 2021-01-01 RX ADMIN — POTASSIUM CHLORIDE 10 MEQ: 10 INJECTION, SOLUTION INTRAVENOUS at 11:12

## 2021-01-01 RX ADMIN — AMIODARONE HYDROCHLORIDE 0.5 MG/MIN: 50 INJECTION, SOLUTION INTRAVENOUS at 22:39

## 2021-01-01 RX ADMIN — TEMAZEPAM 15 MG: 15 CAPSULE ORAL at 21:15

## 2021-01-01 RX ADMIN — FUROSEMIDE 40 MG: 40 TABLET ORAL at 09:20

## 2021-01-01 RX ADMIN — DEXAMETHASONE 6 MG: 4 TABLET ORAL at 09:24

## 2021-01-01 RX ADMIN — VANCOMYCIN HYDROCHLORIDE 1000 MG: 1 INJECTION, POWDER, LYOPHILIZED, FOR SOLUTION INTRAVENOUS at 17:18

## 2021-01-01 RX ADMIN — INSULIN LISPRO 5 UNITS: 100 INJECTION, SOLUTION INTRAVENOUS; SUBCUTANEOUS at 11:19

## 2021-01-01 RX ADMIN — MAGNESIUM SULFATE HEPTAHYDRATE 2 G: 40 INJECTION, SOLUTION INTRAVENOUS at 08:47

## 2021-01-01 RX ADMIN — INSULIN LISPRO 3 UNITS: 100 INJECTION, SOLUTION INTRAVENOUS; SUBCUTANEOUS at 12:10

## 2021-01-01 RX ADMIN — AMLODIPINE BESYLATE 10 MG: 5 TABLET ORAL at 08:55

## 2021-01-01 RX ADMIN — DEXAMETHASONE 6 MG: 4 TABLET ORAL at 08:44

## 2021-01-01 NOTE — PROGRESS NOTES
TRANSFER - OUT REPORT: 
 
Verbal report given to Moe Latif RN(name) on Gin English  being transferred to (unit) for routine progression of care Report consisted of patients Situation, Background, Assessment and  
Recommendations(SBAR). Information from the following report(s) SBAR, Kardex, Intake/Output, MAR, Accordion and Recent Results was reviewed with the receiving nurse. Lines:  
Peripheral IV 12/28/20 Left Forearm (Active) Site Assessment Clean, dry, & intact 01/01/21 1458 Phlebitis Assessment 0 01/01/21 1458 Infiltration Assessment 0 01/01/21 1458 Dressing Status Clean, dry, & intact 01/01/21 1458 Dressing Type Tape;Transparent 01/01/21 1458 Hub Color/Line Status Pink 01/01/21 1458 Action Taken Open ports on tubing capped 01/01/21 1458 Alcohol Cap Used Yes 01/01/21 1458 Opportunity for questions and clarification was provided. Patient transported with: 
 Monitor Patient-specific medications from Pharmacy Registered Nurse Pt skin tears/wounds on buttock are bleeding- destitin is ordered per wound care, and applied frequently. Pt may need new wound care orders- re consult placed per protocol. Problem: Pressure Injury - Risk of 
Goal: *Prevention of pressure injury Description: Document Justin Scale and appropriate interventions in the flowsheet. Outcome: Progressing Towards Goal 
Note: Pressure Injury Interventions: 
Sensory Interventions: Assess changes in LOC, Assess need for specialty bed, Avoid rigorous massage over bony prominences, Chair cushion, Check visual cues for pain, Discuss PT/OT consult with provider, Float heels, Keep linens dry and wrinkle-free, Maintain/enhance activity level, Minimize linen layers, Monitor skin under medical devices, Turn and reposition approx. every two hours (pillows and wedges if needed) Moisture Interventions: Absorbent underpads, Apply protective barrier, creams and emollients, Assess need for specialty bed, Check for incontinence Q2 hours and as needed, Contain wound drainage, Internal/External urinary devices, Limit adult briefs, Maintain skin hydration (lotion/cream), Minimize layers, Moisture barrier, Offer toileting Q_hr Activity Interventions: Assess need for specialty bed, Chair cushion, Increase time out of bed, PT/OT evaluation Mobility Interventions: Assess need for specialty bed, Chair cushion, Float heels, HOB 30 degrees or less, PT/OT evaluation, Turn and reposition approx. every two hours(pillow and wedges) Nutrition Interventions: Document food/fluid/supplement intake, Discuss nutritional consult with provider, Offer support with meals,snacks and hydration Friction and Shear Interventions: Apply protective barrier, creams and emollients, Foam dressings/transparent film/skin sealants, HOB 30 degrees or less, Minimize layers Problem: Patient Education: Go to Patient Education Activity Goal: Patient/Family Education Outcome: Progressing Towards Goal 
  
Problem: Falls - Risk of 
Goal: *Absence of Falls Description: Document Kun Taylor Fall Risk and appropriate interventions in the flowsheet. Outcome: Progressing Towards Goal 
Note: Fall Risk Interventions: 
Mobility Interventions: Assess mobility with egress test, Communicate number of staff needed for ambulation/transfer, OT consult for ADLs, Patient to call before getting OOB, PT Consult for mobility concerns, PT Consult for assist device competence, Strengthening exercises (ROM-active/passive), Utilize walker, cane, or other assistive device, Utilize gait belt for transfers/ambulation Medication Interventions: Evaluate medications/consider consulting pharmacy, Patient to call before getting OOB, Teach patient to arise slowly, Utilize gait belt for transfers/ambulation Elimination Interventions: Call light in reach, Patient to call for help with toileting needs, Stay With Me (per policy), Toilet paper/wipes in reach, Toileting schedule/hourly rounds, Urinal in reach Problem: Patient Education: Go to Patient Education Activity Goal: Patient/Family Education Outcome: Progressing Towards Goal 
  
Problem: Nutrition Deficit Goal: *Optimize nutritional status Outcome: Progressing Towards Goal 
  
Problem: Risk for Spread of Infection Goal: Prevent transmission of infectious organism to others Description: Prevent the transmission of infectious organisms to other patients, staff members, and visitors. Outcome: Progressing Towards Goal 
  
Problem: Patient Education:  Go to Education Activity Goal: Patient/Family Education Outcome: Progressing Towards Goal

## 2021-01-01 NOTE — PROGRESS NOTES
Bedside shift change report given to Na (oncoming nurse) by Kathy Dimas (offgoing nurse). Report included the following information SBAR, Kardex, ED Summary, MAR, Recent Results and Cardiac Rhythm Paced. Problem: Pressure Injury - Risk of 
Goal: *Prevention of pressure injury Description: Document Justin Scale and appropriate interventions in the flowsheet. Outcome: Progressing Towards Goal 
Note: Pressure Injury Interventions: 
Sensory Interventions: Keep linens dry and wrinkle-free, Minimize linen layers, Check visual cues for pain, Assess need for specialty bed, Turn and reposition approx. every two hours (pillows and wedges if needed), Pressure redistribution bed/mattress (bed type) Moisture Interventions: Absorbent underpads, Apply protective barrier, creams and emollients, Internal/External urinary devices, Moisture barrier, Minimize layers, Maintain skin hydration (lotion/cream) Activity Interventions: Assess need for specialty bed, Pressure redistribution bed/mattress(bed type), Increase time out of bed, PT/OT evaluation Mobility Interventions: Turn and reposition approx. every two hours(pillow and wedges), Pressure redistribution bed/mattress (bed type), Float heels, HOB 30 degrees or less Nutrition Interventions: Document food/fluid/supplement intake Friction and Shear Interventions: Apply protective barrier, creams and emollients, Minimize layers Problem: Falls - Risk of 
Goal: *Absence of Falls Description: Document Earma Ventura Fall Risk and appropriate interventions in the flowsheet. Outcome: Progressing Towards Goal 
Note: Fall Risk Interventions: 
Mobility Interventions: Communicate number of staff needed for ambulation/transfer, Patient to call before getting OOB Medication Interventions: Evaluate medications/consider consulting pharmacy, Patient to call before getting OOB, Teach patient to arise slowly Elimination Interventions: Toileting schedule/hourly rounds, Patient to call for help with toileting needs, Call light in reach Problem: Risk for Spread of Infection Goal: Prevent transmission of infectious organism to others Description: Prevent the transmission of infectious organisms to other patients, staff members, and visitors. Outcome: Progressing Towards Goal 
Note: Pt is currently on droplet plus precaution for COVID-19.

## 2021-01-01 NOTE — PROGRESS NOTES
Pharmacist Note - Vancomycin Dosing Therapy day 4 Indication: Diabetic foot infection; concern for osteo Also COVID+ Current regimen:  1250 mg IV Q 16 hours A Trough Level resulted at 23.8 mcg/mL which was obtained ~16 hrs post-dose. Goal trough: 15 - 20 mcg/mL Plan: Change to 1 gram q18h to start at 6 am tomorrow (patient received half of pm dose tonight before infusion was stopped). Pharmacy will continue to monitor this patient daily for changes in clinical status and renal function.

## 2021-01-01 NOTE — PROGRESS NOTES
Hospitalist Progress Note Chey Santoro MD 
Answering service: [phone] or [phone] from in house phone Date of Service:  2021 NAME:  Jean-Claude Crawley :  1935 MRN:  [id number] Admission Summary: As per initial admission summary Patient presented to the emergency room with progressive shortness of breath and productive cough. Medical history is significant for hypertension; chronic diastolic congestive heart failure; dyslipidemia; third degree heart block, status post pacemaker insertion; coronary artery disease, status post CABG; benign prostatic hyperplasia; type 2 diabetes; aortic stenosis, status post transcatheter aortic valve replacement TAVR. Patient was recently admitted to this hospital from 2020 to 2020. Interval history / Subjective:  
  Patient seen for Follow up of COVID, diabetic foot infection Patient resting comfortably. He is on oxygen via low flow nasal cannula. He offers no complaints. He denied shortness of breath, chest pressure/pain. Assessment & Plan:  
 
Acute on chronic diastolic congestive heart failure. CT chest showed patchy groundglass opacity in the right upper lobe may represent pulmonary edema. Cardiology consulted Chest x-ray on  showed increased pulmonary edema, switch oral Lasix to 40 mg IV twice daily COVID-19 pneumonia suspected superimposed bacterial pneumonia. On broad-spectrum antibiotics On dexamethasone and remdesivir started by the infectious disease Maintain droplet plus isolation. Right diabetic foot infection,suspicion of osteomyelitis --on cefepime and vancomycin. CT of the foot negative for osteomyelitis but it is not the best study to diagnose osteomyelitis, ideally would have liked either three-phase bone scan or MRI both of which could not be done due to patient's Covid status. -ID following. He may need to be empirically treated with IV antibiotics for 6 weeks. Podiatry discussed with radiology who suggested they may be able to deviate so long as the pacemaker is MRI compatible. --Wound Care consult  
--Podiatry consulted 
--CT did not show any evidence of osteomyelitis --ultrasound of the lower extremity for DVT. Negative Hypertension. On metoprolol, losartan, amlodipine Dyslipidemia. We will continue with Lipitor. Third degree heart block. The patient is status post pacemaker insertion. Coronary artery disease, status post coronary artery bypass grafting. We will continue with cardiac medications. Sacral decubitus ulcer present on admission. Wound Care consult Tao Hilt Hypertension, chronic and uncontrolled: 
--Continue amlodipine 10 mg, losartan 100 mg, Lopressor 50 mg twice daily Benign prostatic hyperplasia urinary retention. Type 2 diabetes with hyperglycemia 
-Accu-Cheks, Humalog sliding scale. Anemia, anemia of chronic disease. Thrombocytopenia. The patient is asymptomatic. Monitor Elevated lactic acid level due to infection. resolved Aortic stenosis, status post TAVR. Code status: full code DVT prophylaxis: heparin Care Plan discussed with: Patient/Family Disposition: TBD PT/ OT consulted Hospital Problems  Date Reviewed: 12/28/2020 Codes Class Noted POA Type 2 diabetes mellitus with right diabetic foot infection ([facility] [address] ICD-10-CM: E11.628, L08.9 ICD-9-CM: 250.80, 686.9  12/28/2020 Unknown * (Principal) Acute on chronic diastolic (congestive) heart failure (HCC) ICD-10-CM: I50.33 ICD-9-CM: 428.33, 428.0  12/27/2020 Yes Review of Systems: A comprehensive review of systems was negative except for that written in the HPI. Vital Signs:  
 Last 24hrs VS reviewed since prior progress note. Most recent are: 
Visit Vitals BP (!) 163/56 Pulse 62 Temp 98 °F (36.7 °C) Resp 18 Ht 6' 2\" (1.88 m) Wt 83.6 kg (184 lb 6.4 oz) SpO2 93% BMI 23.68 kg/m² Intake/Output Summary (Last 24 hours) at 1/1/2021 1859 Last data filed at 1/1/2021 1531 Gross per 24 hour Intake 764 ml Output 2925 ml Net -2161 ml Physical Examination:  
I had a face to face encounter with this patient and independently examined them on January 1, 2021 as outlined below: 
     
Constitutional:  No acute distress, cooperative, pleasant ENT:  Oral mucous moist, oropharynx benign. Neck supple, Resp:  CTA bilaterally. No wheezing/rhonchi/rales. No accessory muscle use CV:  Regular rhythm, normal rate, no murmurs, gallops, rubs GI:  Soft, non distended, non tender. normoactive bowel sounds, no hepatosplenomegaly Musculoskeletal:  No edema, warm, 2+ pulses throughout. Dressing right foot. Neurologic:  Moves all extremities. AAOx3, CN II-XII reviewed Psych:  Good insight, Not anxious nor agitated. . 
  
 
Data Review:  
 Review and/or order of clinical lab test 
Review and/or order of tests in the radiology section of CPT Review and/or order of tests in the medicine section of CPT Labs:  
 
Recent Labs 01/01/21 
0400 12/30/20 
0432 WBC 7.5 4.8 HGB 9.9* 9.9*  
HCT 30.7* 31.7* * 103* Recent Labs 01/01/21 
9184 12/31/20 
4154 12/30/20 
9231  143 142  
K 3.6 3.3* 3.9  108 108 CO2 33* 32 32 BUN 29* 31* 28* CREA 0.84 0.92 0.89 * 251* 243* CA 8.1* 8.0* 8.1* Recent Labs 01/01/21 
7053 12/31/20 
8904 12/30/20 
4624 ALT 34 26 24 AP 75 64 77 TBILI 0.4 0.3 0.3 TP 5.7* 5.3* 5.6* ALB 2.2* 2.0* 2.4*  
GLOB 3.5 3.3 3.2 Recent Labs 01/01/21 
6547 12/31/20 
9891 APTT 43.9* 45.2* No results for input(s): FE, TIBC, PSAT, FERR in the last 72 hours. Lab Results Component Value Date/Time Folate 20.5 12/28/2020 04:59 AM  
  
No results for input(s): PH, PCO2, PO2 in the last 72 hours. Recent Labs 12/30/20 
6646 TROIQ 0.43* Lab Results Component Value Date/Time Cholesterol, total 114 02/26/2020 03:19 PM  
 HDL Cholesterol 40 02/26/2020 03:19 PM  
 LDL, calculated 54 02/26/2020 03:19 PM  
 Triglyceride 101 02/26/2020 03:19 PM  
 
Lab Results Component Value Date/Time Glucose (POC) 330 (H) 01/01/2021 05:20 PM  
 Glucose (POC) 235 (H) 01/01/2021 11:31 AM  
 Glucose (POC) 167 (H) 01/01/2021 08:33 AM  
 Glucose (POC) 327 (H) 12/31/2020 09:08 PM  
 Glucose (POC) 290 (H) 12/31/2020 04:38 PM  
 
Lab Results Component Value Date/Time Color YELLOW/STRAW 12/28/2020 06:02 AM  
 Appearance CLOUDY (A) 12/28/2020 06:02 AM  
 Specific gravity 1.022 12/28/2020 06:02 AM  
 pH (UA) 5.0 12/28/2020 06:02 AM  
 Protein 300 (A) 12/28/2020 06:02 AM  
 Glucose Negative 12/28/2020 06:02 AM  
 Ketone Negative 12/28/2020 06:02 AM  
 Bilirubin Negative 12/28/2020 06:02 AM  
 Urobilinogen 0.2 12/28/2020 06:02 AM  
 Nitrites Negative 12/28/2020 06:02 AM  
 Leukocyte Esterase TRACE (A) 12/28/2020 06:02 AM  
 Epithelial cells MODERATE (A) 12/28/2020 06:02 AM  
 Bacteria 1+ (A) 12/28/2020 06:02 AM  
 WBC 20-50 12/28/2020 06:02 AM  
 RBC >100 (H) 12/28/2020 06:02 AM  
 
 
 
Medications Reviewed:  
 
Current Facility-Administered Medications Medication Dose Route Frequency  albuterol (PROVENTIL HFA, VENTOLIN HFA, PROAIR HFA) inhaler 1 Puff  1 Puff Inhalation Q6H RT  
 vancomycin (VANCOCIN) 1,000 mg in 0.9% sodium chloride 250 mL (VIAL-MATE)  1,000 mg IntraVENous Q18H  
 furosemide (LASIX) injection 40 mg  40 mg IntraVENous Q12H  potassium chloride SR (KLOR-CON 10) tablet 40 mEq  40 mEq Oral DAILY  enoxaparin (LOVENOX) injection 40 mg  40 mg SubCUTAneous Q12H  
 insulin glargine (LANTUS) injection 10 Units  10 Units SubCUTAneous DAILY  amLODIPine (NORVASC) tablet 10 mg  10 mg Oral DAILY  zinc sulfate (ZINCATE) 220 (50) mg capsule 1 Cap  1 Cap Oral DAILY  ascorbic acid (vitamin C) (VITAMIN C) tablet 500 mg  500 mg Oral BID  aspirin chewable tablet 81 mg  81 mg Oral DAILY  atorvastatin (LIPITOR) tablet 40 mg  40 mg Oral QHS  doxazosin (CARDURA) tablet 4 mg  4 mg Oral QHS  finasteride (PROSCAR) tablet 5 mg  5 mg Oral DAILY  fluticasone propionate (FLONASE) 50 mcg/actuation nasal spray 1 Spray  1 Spray Both Nostrils DAILY  losartan (COZAAR) tablet 100 mg  100 mg Oral DAILY  metoprolol tartrate (LOPRESSOR) tablet 50 mg  50 mg Oral BID  sertraline (ZOLOFT) tablet 100 mg  100 mg Oral QPM  
 temazepam (RESTORIL) capsule 15 mg  15 mg Oral QHS PRN  
 sodium chloride (OCEAN) 0.65 % nasal squeeze bottle 1 Spray  1 Spray Both Nostrils PRN  
 sodium chloride (NS) flush 5-40 mL  5-40 mL IntraVENous Q8H  
 sodium chloride (NS) flush 5-40 mL  5-40 mL IntraVENous PRN  polyethylene glycol (MIRALAX) packet 17 g  17 g Oral DAILY PRN  prochlorperazine (COMPAZINE) with saline injection 5 mg  5 mg IntraVENous Q6H PRN  
 insulin lispro (HUMALOG) injection   SubCUTAneous AC&HS  
 glucose chewable tablet 16 g  4 Tab Oral PRN  
 dextrose (D50W) injection syrg 12.5-25 g  12.5-25 g IntraVENous PRN  
 glucagon (GLUCAGEN) injection 1 mg  1 mg IntraMUSCular PRN  
 vancomycin - pharmacy to dose   Other Rx Dosing/Monitoring  zinc oxide-cod liver oil (DESITIN) 40 % paste   Topical Q12H  
 acetaminophen (TYLENOL) tablet 650 mg  650 mg Oral Q6H PRN Or  
 acetaminophen (TYLENOL) suppository 650 mg  650 mg Rectal Q6H PRN  
 guaiFENesin-dextromethorphan (ROBITUSSIN DM) 100-10 mg/5 mL syrup 5 mL  5 mL Oral Q4H PRN  
 dexAMETHasone (DECADRON) tablet 6 mg  6 mg Oral DAILY  cholecalciferol (VITAMIN D3) (1000 Units /25 mcg) tablet 2 Tab  2,000 Units Oral DAILY  
 
______________________________________________________________________ EXPECTED LENGTH OF STAY: 4d 2h 
ACTUAL LENGTH OF STAY:          5 Blanca Valverde MD

## 2021-01-02 NOTE — PROGRESS NOTES
Hospitalist Progress Note Jeniffer Llanos MD 
Answering service: [phone] OR [phone] from in house phone Date of Service:  2021 NAME:  Allan Mayberry :  1935 MRN:  [id number] Admission Summary: As per initial admission summary Patient presented to the emergency room with progressive shortness of breath and productive cough. Medical history is significant for hypertension; chronic diastolic congestive heart failure; dyslipidemia; third degree heart block, status post pacemaker insertion; coronary artery disease, status post CABG; benign prostatic hyperplasia; type 2 diabetes; aortic stenosis, status post transcatheter aortic valve replacement TAVR. Patient was recently admitted to this hospital from 2020 to 2020. Interval history / Subjective:  
  Patient seen for Follow up of COVID, diabetic foot infection Patient resting comfortably. On room air. He denied respiratory difficulty, chest pain or shortness of breath. Assessment & Plan:  
 
Acute on chronic diastolic congestive heart failure. CT chest showed patchy groundglass opacity in the right upper lobe may represent pulmonary edema. Cardiology consulted Chest x-ray on  showed increased pulmonary edema Diuresed well, decrease IV Lasix to 40 mg daily COVID-19 pneumonia suspected superimposed bacterial pneumonia. On broad-spectrum antibiotics On dexamethasone and remdesivir started by the infectious disease Maintain droplet plus isolation. Right diabetic foot infection,suspicion of osteomyelitis --on cefepime and vancomycin. CT of the foot negative for osteomyelitis but it is not the best study to diagnose osteomyelitis, ideally would have liked either three-phase bone scan or MRI both of which could not be done due to patient's Covid status. -ID following. He may need to be empirically treated with IV antibiotics for 6 weeks. Podiatry discussed with radiology who suggested they may be able to deviate so long as the pacemaker is MRI compatible. --Wound Care consult  
--Podiatry consulted 
--CT did not show any evidence of osteomyelitis --ultrasound of the lower extremity for DVT. Negative Hypertension. On metoprolol, losartan, amlodipine Dyslipidemia. We will continue with Lipitor. Third degree heart block. The patient is status post pacemaker insertion. Coronary artery disease, status post coronary artery bypass grafting. We will continue with cardiac medications. Sacral decubitus ulcer present on admission. Wound Care consult Tao Hilt Hypertension, chronic and uncontrolled: 
--Continue amlodipine 10 mg, losartan 100 mg, Lopressor 50 mg twice daily Benign prostatic hyperplasia urinary retention. Type 2 diabetes with hyperglycemia 
-Accu-Cheks, Humalog sliding scale. Anemia, anemia of chronic disease. Thrombocytopenia. The patient is asymptomatic. Monitor Elevated lactic acid level due to infection. resolved Aortic stenosis, status post TAVR. Code status: full code DVT prophylaxis: heparin Care Plan discussed with: Patient/Family Disposition: TBD PT/ OT consulted Hospital Problems  Date Reviewed: 12/28/2020 Codes Class Noted POA Type 2 diabetes mellitus with right diabetic foot infection ([facility] [address] ICD-10-CM: E11.628, L08.9 ICD-9-CM: 250.80, 686.9  12/28/2020 Unknown * (Principal) Acute on chronic diastolic (congestive) heart failure (HCC) ICD-10-CM: I50.33 ICD-9-CM: 428.33, 428.0  12/27/2020 Yes Review of Systems: A comprehensive review of systems was negative except for that written in the HPI. Vital Signs:  
 Last 24hrs VS reviewed since prior progress note. Most recent are: 
Visit Vitals BP (!) 122/57 (BP 1 Location: Right arm, BP Patient Position: At rest) Pulse 66 Temp 97.7 °F (36.5 °C) Resp 17 Ht 6' 2\" (1.88 m) Wt 84 kg (185 lb 3 oz) SpO2 94% BMI 23.78 kg/m² Intake/Output Summary (Last 24 hours) at 1/2/2021 1331 Last data filed at 1/2/2021 0109 Gross per 24 hour Intake 514 ml Output 1750 ml Net -1236 ml Physical Examination:  
I had a face to face encounter with this patient and independently examined them on January 2, 2021 as outlined below: 
     
Constitutional:  No acute distress, cooperative, pleasant ENT:  Oral mucous moist, oropharynx benign. Neck supple, Resp:  CTA bilaterally. No wheezing/rhonchi/rales. No accessory muscle use CV:  Regular rhythm, normal rate, no murmurs, gallops, rubs GI:  Soft, non distended, non tender. normoactive bowel sounds, no hepatosplenomegaly Musculoskeletal:  No edema, warm, 2+ pulses throughout. Dressing right foot. Neurologic:  Moves all extremities. AAOx3, CN II-XII reviewed Psych:  Good insight, Not anxious nor agitated. . 
  
 
Data Review:  
 Review and/or order of clinical lab test 
Review and/or order of tests in the radiology section of CPT Review and/or order of tests in the medicine section of CPT Labs:  
 
Recent Labs 01/02/21 
0033 01/01/21 
0400 WBC 7.1 7.5 HGB 10.1* 9.9*  
HCT 31.2* 30.7* * 107* Recent Labs 01/02/21 
9339 01/01/21 
7161 12/31/20 
0805  141 143  
K 3.2* 3.6 3.3*  
 104 108 CO2 34* 33* 32 BUN 39* 29* 31* CREA 1.05 0.84 0.92  
* 195* 251* CA 8.5 8.1* 8.0* Recent Labs 01/02/21 
3237 01/01/21 
9068 12/31/20 
8149 ALT 43 34 26 AP 76 75 64 TBILI 0.3 0.4 0.3 TP 5.8* 5.7* 5.3* ALB 2.2* 2.2* 2.0*  
GLOB 3.6 3.5 3.3 Recent Labs 01/01/21 
5814 12/31/20 
9377 APTT 43.9* 45.2* No results for input(s): FE, TIBC, PSAT, FERR in the last 72 hours. Lab Results Component Value Date/Time Folate 20.5 12/28/2020 04:59 AM  
  
No results for input(s): PH, PCO2, PO2 in the last 72 hours. No results for input(s): CPK, CKNDX, TROIQ in the last 72 hours. No lab exists for component: CPKMB Lab Results Component Value Date/Time Cholesterol, total 114 02/26/2020 03:19 PM  
 HDL Cholesterol 40 02/26/2020 03:19 PM  
 LDL, calculated 54 02/26/2020 03:19 PM  
 Triglyceride 101 02/26/2020 03:19 PM  
 
Lab Results Component Value Date/Time Glucose (POC) 264 (H) 01/02/2021 11:05 AM  
 Glucose (POC) 338 (H) 01/02/2021 06:54 AM  
 Glucose (POC) 435 (H) 01/01/2021 09:52 PM  
 Glucose (POC) 330 (H) 01/01/2021 05:20 PM  
 Glucose (POC) 235 (H) 01/01/2021 11:31 AM  
 
Lab Results Component Value Date/Time Color YELLOW/STRAW 12/28/2020 06:02 AM  
 Appearance CLOUDY (A) 12/28/2020 06:02 AM  
 Specific gravity 1.022 12/28/2020 06:02 AM  
 pH (UA) 5.0 12/28/2020 06:02 AM  
 Protein 300 (A) 12/28/2020 06:02 AM  
 Glucose Negative 12/28/2020 06:02 AM  
 Ketone Negative 12/28/2020 06:02 AM  
 Bilirubin Negative 12/28/2020 06:02 AM  
 Urobilinogen 0.2 12/28/2020 06:02 AM  
 Nitrites Negative 12/28/2020 06:02 AM  
 Leukocyte Esterase TRACE (A) 12/28/2020 06:02 AM  
 Epithelial cells MODERATE (A) 12/28/2020 06:02 AM  
 Bacteria 1+ (A) 12/28/2020 06:02 AM  
 WBC 20-50 12/28/2020 06:02 AM  
 RBC >100 (H) 12/28/2020 06:02 AM  
 
 
 
Medications Reviewed:  
 
Current Facility-Administered Medications Medication Dose Route Frequency  albuterol (PROVENTIL HFA, VENTOLIN HFA, PROAIR HFA) inhaler 1 Puff  1 Puff Inhalation Q6H PRN  
 [START ON 1/3/2021] furosemide (LASIX) injection 40 mg  40 mg IntraVENous DAILY  vancomycin (VANCOCIN) 1,000 mg in 0.9% sodium chloride 250 mL (VIAL-MATE)  1,000 mg IntraVENous Q18H  potassium chloride SR (KLOR-CON 10) tablet 40 mEq  40 mEq Oral DAILY  enoxaparin (LOVENOX) injection 40 mg  40 mg SubCUTAneous Q12H  insulin glargine (LANTUS) injection 10 Units  10 Units SubCUTAneous DAILY  amLODIPine (NORVASC) tablet 10 mg  10 mg Oral DAILY  zinc sulfate (ZINCATE) 220 (50) mg capsule 1 Cap  1 Cap Oral DAILY  ascorbic acid (vitamin C) (VITAMIN C) tablet 500 mg  500 mg Oral BID  aspirin chewable tablet 81 mg  81 mg Oral DAILY  atorvastatin (LIPITOR) tablet 40 mg  40 mg Oral QHS  doxazosin (CARDURA) tablet 4 mg  4 mg Oral QHS  finasteride (PROSCAR) tablet 5 mg  5 mg Oral DAILY  fluticasone propionate (FLONASE) 50 mcg/actuation nasal spray 1 Spray  1 Spray Both Nostrils DAILY  losartan (COZAAR) tablet 100 mg  100 mg Oral DAILY  metoprolol tartrate (LOPRESSOR) tablet 50 mg  50 mg Oral BID  sertraline (ZOLOFT) tablet 100 mg  100 mg Oral QPM  
 temazepam (RESTORIL) capsule 15 mg  15 mg Oral QHS PRN  
 sodium chloride (OCEAN) 0.65 % nasal squeeze bottle 1 Spray  1 Spray Both Nostrils PRN  
 sodium chloride (NS) flush 5-40 mL  5-40 mL IntraVENous Q8H  
 sodium chloride (NS) flush 5-40 mL  5-40 mL IntraVENous PRN  polyethylene glycol (MIRALAX) packet 17 g  17 g Oral DAILY PRN  prochlorperazine (COMPAZINE) with saline injection 5 mg  5 mg IntraVENous Q6H PRN  
 insulin lispro (HUMALOG) injection   SubCUTAneous AC&HS  
 glucose chewable tablet 16 g  4 Tab Oral PRN  
 dextrose (D50W) injection syrg 12.5-25 g  12.5-25 g IntraVENous PRN  
 glucagon (GLUCAGEN) injection 1 mg  1 mg IntraMUSCular PRN  
 vancomycin - pharmacy to dose   Other Rx Dosing/Monitoring  zinc oxide-cod liver oil (DESITIN) 40 % paste   Topical Q12H  
 acetaminophen (TYLENOL) tablet 650 mg  650 mg Oral Q6H PRN Or  
 acetaminophen (TYLENOL) suppository 650 mg  650 mg Rectal Q6H PRN  
 guaiFENesin-dextromethorphan (ROBITUSSIN DM) 100-10 mg/5 mL syrup 5 mL  5 mL Oral Q4H PRN  
 dexAMETHasone (DECADRON) tablet 6 mg  6 mg Oral DAILY  cholecalciferol (VITAMIN D3) (1000 Units /25 mcg) tablet 2 Tab  2,000 Units Oral DAILY  
 
______________________________________________________________________ EXPECTED LENGTH OF STAY: 4d 2h 
ACTUAL LENGTH OF STAY:          6 Melissa Munoz MD

## 2021-01-02 NOTE — PROGRESS NOTES
ADULT PROTOCOL: JET AEROSOL ASSESSMENT Patient  Adele Grande     82 y.o.   male     1/2/2021  1:43 AM 
 
Breath Sounds Pre Procedure:  Breath Sounds Bilateral: Coarse, Diminished Left Breath Sounds: Diminished Right Breath Sounds: Diminished Breath Sounds Post Procedure: Breath Sounds Bilateral: Coarse, Diminished Left Breath Sounds: Diminished Right Breath Sounds: Diminished Breathing pattern: Pre procedure  Breathing Pattern: Tachypneic Post procedure  Breathing Pattern: Regular Cough: Pre procedure  Cough: Congested Post procedure Cough: Congested Heart Rate: Pre procedure Pulse: 62 Post procedure Pulse: 65 Resp Rate: Pre procedure  Respirations: 22 Post procedure   22 Nebulizer Therapy: Current medications Aerosolized Medications: Albuterol Problem List:  
Patient Active Problem List  
Diagnosis Code  Type 2 diabetes mellitus without complication, without long-term current use of insulin ([facility]) E11.9  
 Essential hypertension I10  
 Hypercholesterolemia E78.00  Coronary artery disease involving native coronary artery of native heart without angina pectoris I25.10  
 S/P CABG (coronary artery bypass graft) Z95.1  Severe aortic stenosis I35.0  Aortic systolic murmur on examination I35.8  Benign prostatic hyperplasia with lower urinary tract symptoms N40.1  Insomnia G47.00 [facility] Former smoker T84.553  ACP (advance care planning) Z71.89  
 Gait instability R26.81  
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  
  Decubitus ulcer of left buttock, stage 2 ([address] A96.943  Syncope and collapse R55  Syncope R55  
 (HFpEF) heart failure with preserved ejection fraction (HCC) I50.30  Aortic stenosis I35.0  
 Pacemaker Z95.0  Third degree AV block (HCC) I44.2  
 S/P TAVR (transcatheter aortic valve replacement) Z95.2  Generalized weakness R53.1  Acute on chronic diastolic (congestive) heart failure (HCC) I50.33  Type 2 diabetes mellitus with right diabetic foot infection (HCC) E11.628, L08.9 Patient alert and cooperative to use MDI: Not applicable Home Respiratory Therapy Regimen/Frequency:  NO Medication Device Frequency SEVERITY INDEX: 
 
ITEM 0 1 2 3 4 Score Respiratory Pattern and or Rate Regular 10-19 Regular 20-24  
24-30   
30-34 Severe SOB or  
Greater than 35 1 Breath Sounds Clear Occasional Wheeze Mild Wheezing Moderate Wheezing  wheezing/Absent breath sounds 0 Shortness of Breath None Dyspnea on Exertion Dyspnea at Rest Moderate Shortness of Breath at Rest Severe Shortness of Breath - Limited Speech 1 Total Score:  2 * Scoring Guidelines 0-4 pts:  PRN-BID  
5-7 pts:  BID, TID, QID 
8-9 pts:  TID, QID, Q6 
10-12 pts:  Q4-Q6 * - Guidelines used with clinical judgement. PRN Treatments can be ordered to supplement scheduled treatments. Regardless of score, frequency should not be less than normal home regimen. Recommended Order/Frequency: Q6 PRN Comments:   
 
 
 
Respiratory Therapist: Janis Damon

## 2021-01-02 NOTE — PROGRESS NOTES
Hospitalist Progress Note Blanca Valverde MD 
Answering service: [phone] OR [phone] from in house phone Date of Service:  2021 NAME:  Saskia Watson :  1935 MRN:  [id number] Admission Summary: As per initial admission summary Patient presented to the emergency room with progressive shortness of breath and productive cough. Medical history is significant for hypertension; chronic diastolic congestive heart failure; dyslipidemia; third degree heart block, status post pacemaker insertion; coronary artery disease, status post CABG; benign prostatic hyperplasia; type 2 diabetes; aortic stenosis, status post transcatheter aortic valve replacement TAVR. Patient was recently admitted to this hospital from 2020 to 2020. Interval history / Subjective:  
  Patient seen for Follow up of COVID, diabetic foot infection Patient resting comfortably. On room air. He denied respiratory difficulty, chest pain or shortness of breath. Assessment & Plan:  
 
Acute on chronic diastolic congestive heart failure. CT chest showed patchy groundglass opacity in the right upper lobe may represent pulmonary edema. Cardiology consulted Chest x-ray on  showed increased pulmonary edema Diuresed well, decrease IV Lasix to 40 mg daily COVID-19 pneumonia suspected superimposed bacterial pneumonia. On broad-spectrum antibiotics On dexamethasone and remdesivir started by the infectious disease Maintain droplet plus isolation. Right diabetic foot infection,suspicion of osteomyelitis --on cefepime and vancomycin. CT of the foot negative for osteomyelitis but it is not the best study to diagnose osteomyelitis, ideally would have liked either three-phase bone scan or MRI both of which could not be done due to patient's Covid status. -ID following. He may need to be empirically treated with IV antibiotics for 6 weeks. Podiatry discussed with radiology who suggested they may be able to deviate so long as the pacemaker is MRI compatible. --Wound Care consult  
--Podiatry consulted 
--CT did not show any evidence of osteomyelitis --ultrasound of the lower extremity for DVT. Negative Hypertension. On metoprolol, losartan, amlodipine Dyslipidemia. We will continue with Lipitor. Third degree heart block. The patient is status post pacemaker insertion. Coronary artery disease, status post coronary artery bypass grafting. We will continue with cardiac medications. Sacral decubitus ulcer present on admission. Wound Care consult Emmy Harvey Hypertension, chronic and uncontrolled: 
--Continue amlodipine 10 mg, losartan 100 mg, Lopressor 50 mg twice daily Benign prostatic hyperplasia urinary retention. Type 2 diabetes with hyperglycemia 
-Accu-Cheks, Humalog sliding scale. Anemia, anemia of chronic disease. Thrombocytopenia. The patient is asymptomatic. Monitor Elevated lactic acid level due to infection. resolved Aortic stenosis, status post TAVR. Code status: full code DVT prophylaxis: heparin Care Plan discussed with: Patient/Family Tried to reach family for update on 1/2 
-I called his wife [place] at [phone], no answer, voicemail not set up 
-I called and left a generic VM to his son Robert Shannon at [phone] Disposition: TBD PT/ OT consulted Hospital Problems  Date Reviewed: 12/28/2020 Codes Class Noted POA Type 2 diabetes mellitus with right diabetic foot infection ([facility] [address] ICD-10-CM: E11.628, L08.9 ICD-9-CM: 250.80, 686.9  12/28/2020 Unknown * (Principal) Acute on chronic diastolic (congestive) heart failure (HCC) ICD-10-CM: I50.33 ICD-9-CM: 428.33, 428.0  12/27/2020 Yes Review of Systems: A comprehensive review of systems was negative except for that written in the HPI. Vital Signs:  
 Last 24hrs VS reviewed since prior progress note. Most recent are: 
Visit Vitals BP (!) 122/57 (BP 1 Location: Right arm, BP Patient Position: At rest) Pulse 66 Temp 97.7 °F (36.5 °C) Resp 17 Ht 6' 2\" (1.88 m) Wt 84 kg (185 lb 3 oz) SpO2 94% BMI 23.78 kg/m² Intake/Output Summary (Last 24 hours) at 1/2/2021 1335 Last data filed at 1/2/2021 0109 Gross per 24 hour Intake 514 ml Output 1750 ml Net -1236 ml Physical Examination:  
I had a face to face encounter with this patient and independently examined them on January 2, 2021 as outlined below: 
     
Constitutional:  No acute distress, cooperative, pleasant ENT:  Oral mucous moist, oropharynx benign. Neck supple, Resp:  CTA bilaterally. No wheezing/rhonchi/rales. No accessory muscle use CV:  Regular rhythm, normal rate, no murmurs, gallops, rubs GI:  Soft, non distended, non tender. normoactive bowel sounds, no hepatosplenomegaly Musculoskeletal:  No edema, warm, 2+ pulses throughout. Dressing right foot. Neurologic:  Moves all extremities. AAOx3, CN II-XII reviewed Psych:  Good insight, Not anxious nor agitated. . 
  
 
Data Review:  
 Review and/or order of clinical lab test 
Review and/or order of tests in the radiology section of CPT Review and/or order of tests in the medicine section of CPT Labs:  
 
Recent Labs 01/02/21 
0033 01/01/21 
0400 WBC 7.1 7.5 HGB 10.1* 9.9*  
HCT 31.2* 30.7* * 107* Recent Labs 01/02/21 
6504 01/01/21 
2766 12/31/20 
0401  141 143  
K 3.2* 3.6 3.3*  
 104 108 CO2 34* 33* 32 BUN 39* 29* 31* CREA 1.05 0.84 0.92  
* 195* 251* CA 8.5 8.1* 8.0* Recent Labs 01/02/21 
2538 01/01/21 
8007 12/31/20 
2976 ALT 43 34 26 AP 76 75 64 TBILI 0.3 0.4 0.3 TP 5.8* 5.7* 5.3* ALB 2.2* 2.2* 2.0*  
 GLOB 3.6 3.5 3.3 Recent Labs 01/01/21 
6813 12/31/20 
5841 APTT 43.9* 45.2* No results for input(s): FE, TIBC, PSAT, FERR in the last 72 hours. Lab Results Component Value Date/Time Folate 20.5 12/28/2020 04:59 AM  
  
No results for input(s): PH, PCO2, PO2 in the last 72 hours. No results for input(s): CPK, CKNDX, TROIQ in the last 72 hours. No lab exists for component: CPKMB Lab Results Component Value Date/Time Cholesterol, total 114 02/26/2020 03:19 PM  
 HDL Cholesterol 40 02/26/2020 03:19 PM  
 LDL, calculated 54 02/26/2020 03:19 PM  
 Triglyceride 101 02/26/2020 03:19 PM  
 
Lab Results Component Value Date/Time Glucose (POC) 264 (H) 01/02/2021 11:05 AM  
 Glucose (POC) 338 (H) 01/02/2021 06:54 AM  
 Glucose (POC) 435 (H) 01/01/2021 09:52 PM  
 Glucose (POC) 330 (H) 01/01/2021 05:20 PM  
 Glucose (POC) 235 (H) 01/01/2021 11:31 AM  
 
Lab Results Component Value Date/Time Color YELLOW/STRAW 12/28/2020 06:02 AM  
 Appearance CLOUDY (A) 12/28/2020 06:02 AM  
 Specific gravity 1.022 12/28/2020 06:02 AM  
 pH (UA) 5.0 12/28/2020 06:02 AM  
 Protein 300 (A) 12/28/2020 06:02 AM  
 Glucose Negative 12/28/2020 06:02 AM  
 Ketone Negative 12/28/2020 06:02 AM  
 Bilirubin Negative 12/28/2020 06:02 AM  
 Urobilinogen 0.2 12/28/2020 06:02 AM  
 Nitrites Negative 12/28/2020 06:02 AM  
 Leukocyte Esterase TRACE (A) 12/28/2020 06:02 AM  
 Epithelial cells MODERATE (A) 12/28/2020 06:02 AM  
 Bacteria 1+ (A) 12/28/2020 06:02 AM  
 WBC 20-50 12/28/2020 06:02 AM  
 RBC >100 (H) 12/28/2020 06:02 AM  
 
 
 
Medications Reviewed:  
 
Current Facility-Administered Medications Medication Dose Route Frequency  albuterol (PROVENTIL HFA, VENTOLIN HFA, PROAIR HFA) inhaler 1 Puff  1 Puff Inhalation Q6H PRN  
 [START ON 1/3/2021] furosemide (LASIX) injection 40 mg  40 mg IntraVENous DAILY  vancomycin (VANCOCIN) 1,000 mg in 0.9% sodium chloride 250 mL (VIAL-MATE)  1,000 mg IntraVENous Q18H  potassium chloride SR (KLOR-CON 10) tablet 40 mEq  40 mEq Oral DAILY  enoxaparin (LOVENOX) injection 40 mg  40 mg SubCUTAneous Q12H  
 insulin glargine (LANTUS) injection 10 Units  10 Units SubCUTAneous DAILY  amLODIPine (NORVASC) tablet 10 mg  10 mg Oral DAILY  zinc sulfate (ZINCATE) 220 (50) mg capsule 1 Cap  1 Cap Oral DAILY  ascorbic acid (vitamin C) (VITAMIN C) tablet 500 mg  500 mg Oral BID  aspirin chewable tablet 81 mg  81 mg Oral DAILY  atorvastatin (LIPITOR) tablet 40 mg  40 mg Oral QHS  doxazosin (CARDURA) tablet 4 mg  4 mg Oral QHS  finasteride (PROSCAR) tablet 5 mg  5 mg Oral DAILY  fluticasone propionate (FLONASE) 50 mcg/actuation nasal spray 1 Spray  1 Spray Both Nostrils DAILY  losartan (COZAAR) tablet 100 mg  100 mg Oral DAILY  metoprolol tartrate (LOPRESSOR) tablet 50 mg  50 mg Oral BID  sertraline (ZOLOFT) tablet 100 mg  100 mg Oral QPM  
 temazepam (RESTORIL) capsule 15 mg  15 mg Oral QHS PRN  
 sodium chloride (OCEAN) 0.65 % nasal squeeze bottle 1 Spray  1 Spray Both Nostrils PRN  
 sodium chloride (NS) flush 5-40 mL  5-40 mL IntraVENous Q8H  
 sodium chloride (NS) flush 5-40 mL  5-40 mL IntraVENous PRN  polyethylene glycol (MIRALAX) packet 17 g  17 g Oral DAILY PRN  prochlorperazine (COMPAZINE) with saline injection 5 mg  5 mg IntraVENous Q6H PRN  
 insulin lispro (HUMALOG) injection   SubCUTAneous AC&HS  
 glucose chewable tablet 16 g  4 Tab Oral PRN  
 dextrose (D50W) injection syrg 12.5-25 g  12.5-25 g IntraVENous PRN  
 glucagon (GLUCAGEN) injection 1 mg  1 mg IntraMUSCular PRN  
 vancomycin - pharmacy to dose   Other Rx Dosing/Monitoring  zinc oxide-cod liver oil (DESITIN) 40 % paste   Topical Q12H  
 acetaminophen (TYLENOL) tablet 650 mg  650 mg Oral Q6H PRN  Or  
  acetaminophen (TYLENOL) suppository 650 mg  650 mg Rectal Q6H PRN  
 guaiFENesin-dextromethorphan (ROBITUSSIN DM) 100-10 mg/5 mL syrup 5 mL  5 mL Oral Q4H PRN  
 dexAMETHasone (DECADRON) tablet 6 mg  6 mg Oral DAILY  cholecalciferol (VITAMIN D3) (1000 Units /25 mcg) tablet 2 Tab  2,000 Units Oral DAILY  
 
______________________________________________________________________ EXPECTED LENGTH OF STAY: 4d 2h 
ACTUAL LENGTH OF STAY:          6 Esther Bangura MD

## 2021-01-03 NOTE — PROGRESS NOTES
Hospitalist Progress Note Anjel De Leon MD 
Answering service: [phone] OR [phone] from in house phone Date of Service:  1/3/2021 NAME:  Toni Lara :  1935 MRN:  [id number] Admission Summary: As per initial admission summary Patient presented to the emergency room with progressive shortness of breath and productive cough. Medical history is significant for hypertension; chronic diastolic congestive heart failure; dyslipidemia; third degree heart block, status post pacemaker insertion; coronary artery disease, status post CABG; benign prostatic hyperplasia; type 2 diabetes; aortic stenosis, status post transcatheter aortic valve replacement TAVR. Patient was recently admitted to this hospital from 2020 to 2020. Interval history / Subjective:  
  Patient seen for Follow up of COVID, diabetic foot infection Patient resting comfortably. On room air. No complaints offered. He denied pain, cough or shortness of breath. Assessment & Plan:  
 
Acute on chronic diastolic congestive heart failure. CT chest showed patchy groundglass opacity in the right upper lobe may represent pulmonary edema. Cardiology consulted Chest x-ray on  showed increased pulmonary edema Diuresed well, switch to oral Lasix from a.m. COVID-19 pneumonia suspected superimposed bacterial pneumonia. On broad-spectrum antibiotics On dexamethasone and remdesivir started by the infectious disease Maintain droplet plus isolation. Right diabetic foot infection,suspicion of osteomyelitis --on cefepime and vancomycin. CT of the foot negative for osteomyelitis but it is not the best study to diagnose osteomyelitis, ideally would have liked either three-phase bone scan or MRI both of which could not be done due to patient's Covid status. -ID following. He may need to be empirically treated with IV antibiotics for 6 weeks. Podiatry discussed with radiology who suggested they may be able to deviate so long as the pacemaker is MRI compatible. --Wound Care consult  
--Podiatry consulted 
--CT did not show any evidence of osteomyelitis --ultrasound of the lower extremity for DVT. Negative Hypertension. On metoprolol, losartan, amlodipine Dyslipidemia. We will continue with Lipitor. Third degree heart block. The patient is status post pacemaker insertion. Coronary artery disease, status post coronary artery bypass grafting. We will continue with cardiac medications. Sacral decubitus ulcer present on admission. Wound Care consult Zuri Darrell Hypertension, chronic and uncontrolled: 
--Continue amlodipine 10 mg, losartan 100 mg, Lopressor 50 mg twice daily Benign prostatic hyperplasia urinary retention. 
-Continue Cardura. Type 2 diabetes with hyperglycemia worsened by steroids and acute illness 
-Accu-Cheks, Humalog sliding scale. 
-Switch Lantus to NPH, 12 units twice daily, continue to adjust to achieve target blood sugar of 140180. 
-He is on Metformin and Glucotrol at home, on hold. Anemia, anemia of chronic disease. Thrombocytopenia. The patient is asymptomatic. Monitor Elevated lactic acid level due to infection. resolved Aortic stenosis, status post TAVR. Code status: full code DVT prophylaxis: heparin Care Plan discussed with: Patient/Family Tried to reach family for update on 1/2 
-I called his wife [place] at [phone], no answer, voicemail not set up 
-I called and left a generic VM to his son Stanford Crump at [phone] Disposition: TBD PT/ OT consulted Hospital Problems  Date Reviewed: 12/28/2020 Codes Class Noted POA Type 2 diabetes mellitus with right diabetic foot infection ([facility] [address] ICD-10-CM: E11.628, L08.9 ICD-9-CM: 250.80, 686.9  12/28/2020 Unknown * (Principal) Acute on chronic diastolic (congestive) heart failure (HCC) ICD-10-CM: I50.33 ICD-9-CM: 428.33, 428.0  12/27/2020 Yes Review of Systems: A comprehensive review of systems was negative except for that written in the HPI. Vital Signs:  
 Last 24hrs VS reviewed since prior progress note. Most recent are: 
Visit Vitals BP (!) 167/56 (BP 1 Location: Right arm, BP Patient Position: At rest) Pulse 60 Temp 97.8 °F (36.6 °C) Resp 16 Ht 6' 2\" (1.88 m) Wt 83.9 kg (184 lb 15.5 oz) SpO2 92% BMI 23.75 kg/m² No intake or output data in the 24 hours ending 01/03/21 1805 Physical Examination:  
I had a face to face encounter with this patient and independently examined them on January 3, 2021 as outlined below: 
     
Constitutional:  No acute distress, cooperative, pleasant ENT:  Oral mucous moist, oropharynx benign. Neck supple, Resp:  CTA bilaterally. No wheezing/rhonchi/rales. No accessory muscle use CV:  Pacemaker device on the left chest wall. Regular rhythm, normal rate, no murmurs, gallops, rubs GI:  Soft, non distended, non tender. normoactive bowel sounds, no hepatosplenomegaly Musculoskeletal:  No edema, warm, 2+ pulses throughout. Dressing right foot. Neurologic:  Moves all extremities. AAOx3, CN II-XII reviewed Psych:  Good insight, Not anxious nor agitated. . 
  
 
Data Review:  
 Review and/or order of clinical lab test 
Review and/or order of tests in the radiology section of CPT Review and/or order of tests in the medicine section of CPT Labs:  
 
Recent Labs 01/03/21 
0158 01/02/21 
5355 WBC 8.1 7.1 HGB 9.7* 10.1* HCT 30.2* 31.2*  
* 103* Recent Labs 01/03/21 
0158 01/02/21 
0033 01/01/21 
3853  139 141  
K 3.3* 3.2* 3.6  101 104 CO2 36* 34* 33* BUN 37* 39* 29* CREA 0.91 1.05 0.84 * 368* 195* CA 8.4* 8.5 8.1*  
 
 Recent Labs 01/02/21 
3334 01/01/21 
4340 ALT 43 34 AP 76 75 TBILI 0.3 0.4 TP 5.8* 5.7* ALB 2.2* 2.2*  
GLOB 3.6 3.5 Recent Labs 01/03/21 
0151 01/01/21 
0778 APTT 43.9* 43.9* No results for input(s): FE, TIBC, PSAT, FERR in the last 72 hours. Lab Results Component Value Date/Time Folate 20.5 12/28/2020 04:59 AM  
  
No results for input(s): PH, PCO2, PO2 in the last 72 hours. No results for input(s): CPK, CKNDX, TROIQ in the last 72 hours. No lab exists for component: CPKMB Lab Results Component Value Date/Time Cholesterol, total 114 02/26/2020 03:19 PM  
 HDL Cholesterol 40 02/26/2020 03:19 PM  
 LDL, calculated 54 02/26/2020 03:19 PM  
 Triglyceride 101 02/26/2020 03:19 PM  
 
Lab Results Component Value Date/Time Glucose (POC) 324 (H) 01/03/2021 04:23 PM  
 Glucose (POC) 295 (H) 01/03/2021 11:18 AM  
 Glucose (POC) 169 (H) 01/03/2021 07:52 AM  
 Glucose (POC) 390 (H) 01/02/2021 09:23 PM  
 Glucose (POC) 306 (H) 01/02/2021 04:17 PM  
 
Lab Results Component Value Date/Time Color YELLOW/STRAW 12/28/2020 06:02 AM  
 Appearance CLOUDY (A) 12/28/2020 06:02 AM  
 Specific gravity 1.022 12/28/2020 06:02 AM  
 pH (UA) 5.0 12/28/2020 06:02 AM  
 Protein 300 (A) 12/28/2020 06:02 AM  
 Glucose Negative 12/28/2020 06:02 AM  
 Ketone Negative 12/28/2020 06:02 AM  
 Bilirubin Negative 12/28/2020 06:02 AM  
 Urobilinogen 0.2 12/28/2020 06:02 AM  
 Nitrites Negative 12/28/2020 06:02 AM  
 Leukocyte Esterase TRACE (A) 12/28/2020 06:02 AM  
 Epithelial cells MODERATE (A) 12/28/2020 06:02 AM  
 Bacteria 1+ (A) 12/28/2020 06:02 AM  
 WBC 20-50 12/28/2020 06:02 AM  
 RBC >100 (H) 12/28/2020 06:02 AM  
 
 
 
Medications Reviewed:  
 
Current Facility-Administered Medications Medication Dose Route Frequency  Vancomycin trough at 1200 today   Other ONCE  
 [START ON 1/4/2021] Vancomycin trough 1/4/21 at 0600   Other ONCE  
  albuterol (PROVENTIL HFA, VENTOLIN HFA, PROAIR HFA) inhaler 1 Puff  1 Puff Inhalation Q6H PRN  
 furosemide (LASIX) injection 40 mg  40 mg IntraVENous DAILY  vancomycin (VANCOCIN) 1,000 mg in 0.9% sodium chloride 250 mL (VIAL-MATE)  1,000 mg IntraVENous Q18H  
 enoxaparin (LOVENOX) injection 40 mg  40 mg SubCUTAneous Q12H  
 insulin glargine (LANTUS) injection 10 Units  10 Units SubCUTAneous DAILY  amLODIPine (NORVASC) tablet 10 mg  10 mg Oral DAILY  zinc sulfate (ZINCATE) 220 (50) mg capsule 1 Cap  1 Cap Oral DAILY  ascorbic acid (vitamin C) (VITAMIN C) tablet 500 mg  500 mg Oral BID  aspirin chewable tablet 81 mg  81 mg Oral DAILY  atorvastatin (LIPITOR) tablet 40 mg  40 mg Oral QHS  doxazosin (CARDURA) tablet 4 mg  4 mg Oral QHS  finasteride (PROSCAR) tablet 5 mg  5 mg Oral DAILY  fluticasone propionate (FLONASE) 50 mcg/actuation nasal spray 1 Spray  1 Spray Both Nostrils DAILY  losartan (COZAAR) tablet 100 mg  100 mg Oral DAILY  metoprolol tartrate (LOPRESSOR) tablet 50 mg  50 mg Oral BID  sertraline (ZOLOFT) tablet 100 mg  100 mg Oral QPM  
 temazepam (RESTORIL) capsule 15 mg  15 mg Oral QHS PRN  
 sodium chloride (OCEAN) 0.65 % nasal squeeze bottle 1 Spray  1 Spray Both Nostrils PRN  
 sodium chloride (NS) flush 5-40 mL  5-40 mL IntraVENous Q8H  
 sodium chloride (NS) flush 5-40 mL  5-40 mL IntraVENous PRN  polyethylene glycol (MIRALAX) packet 17 g  17 g Oral DAILY PRN  prochlorperazine (COMPAZINE) with saline injection 5 mg  5 mg IntraVENous Q6H PRN  
 insulin lispro (HUMALOG) injection   SubCUTAneous AC&HS  
 glucose chewable tablet 16 g  4 Tab Oral PRN  
 dextrose (D50W) injection syrg 12.5-25 g  12.5-25 g IntraVENous PRN  
 glucagon (GLUCAGEN) injection 1 mg  1 mg IntraMUSCular PRN  
 vancomycin - pharmacy to dose   Other Rx Dosing/Monitoring  zinc oxide-cod liver oil (DESITIN) 40 % paste   Topical Q12H  acetaminophen (TYLENOL) tablet 650 mg  650 mg Oral Q6H PRN Or  
 acetaminophen (TYLENOL) suppository 650 mg  650 mg Rectal Q6H PRN  
 guaiFENesin-dextromethorphan (ROBITUSSIN DM) 100-10 mg/5 mL syrup 5 mL  5 mL Oral Q4H PRN  
 dexAMETHasone (DECADRON) tablet 6 mg  6 mg Oral DAILY  cholecalciferol (VITAMIN D3) (1000 Units /25 mcg) tablet 2 Tab  2,000 Units Oral DAILY  
 
______________________________________________________________________ EXPECTED LENGTH OF STAY: 4d 2h 
ACTUAL LENGTH OF STAY:          7 Grzegorz Marx MD

## 2021-01-04 NOTE — WOUND CARE
Wound Care Note:  
 
Re-consulted by nurse request for pt buttock is bleeding - desitin is only thing ordered - please reassess and readvise- thanks Patient on Droplet Plus Precautions for COVID-19 positive. PPE:  N95, face shield, gown and gloves. Chart shows: 
Admitted for acute on chronic diastolic heart failure Past Medical History:  
Diagnosis Date  [facility] on chronic [facility]est[facility] 12/27/2020  BPH (benign prostatic hyperplasia)  CAD (coronary artery disease)  Diabetes ([facility]  Hearing loss  Hypercholesterolemia  Hypertension  Murmur  [facility] 8/22/2019  Third degree AV block ([facility] 10/30/2020 WBC = 8.0 on 1/4/20 Admitted from home Assessment:  
Patient is A&O x 4, communicative, incontinent with some assistance needed in repositioning. Bed: [facility] Patient wearing briefs for incontinence. Diet: Diabetic with options consistent carb 2400 kcal; regular; 2 gm NA Patient reports no pain. Bilateral heels skin intact and without erythema. 1. POA bilateral buttocks look much better, no bleeding noted, some blanchable erythema. Continue with Desitin. 2.  POA right foot wound not assessed; dressing changed already today and podiatry following. Patient repositioned supine. Heels offloaded on pillow. Recommendations:   
Continue with current wound care. Bilateral buttocks- Every 12 hours and as needed cleanse soiled cream with soap and water, blot dry, apply thin coat of Desitin. 
  
Bilateral lower legs- Every 12 hours apply Nourishing Skin Cream (purple tube). 
  
Right 5th metatarsal- Every other day cleanse with normal saline, wipe wound bed clean and dry, apply a piece of [employer]oam [employer] and cover. Skin Care & Pressure Prevention: 
Minimize layers of linen/pads under patient to optimize support surface. Turn/reposition approximately every 2 hours and offload heels. Manage incontinence / promote continence Nourishing Skin Cream to dry skin, minimize use of briefs when able Discussed above plan with patient & Hardy Sanderson RN Transition of Care: Plan to follow as needed while admitted to hospital. 
 
SOTO OjedaN, RN, [facility], [facility]. 
office [phone] 
pager [phone] or call  to page

## 2021-01-04 NOTE — PROGRESS NOTES
Infectious Disease Progress Note Subjective:  
[facility] Jadyn seen Hard of hearing Denied pain Has had cough per RN Seen in the room wt RN and PT Denied fever, chills diarrhea,  
tolerating antibiotics ROS: 10 point ROS obtained and pertinent positives include above. All others negative. Objective: 
 
Vitals:  
Patient Vitals for the past 24 hrs: 
 Temp Pulse Resp BP SpO2  
01/04/21 0927 97.7 °F (36.5 °C) 65 18 (!) 175/65 93 % 01/04/21 0330 97.9 °F (36.6 °C) 60 18 (!) 149/67 92 % 01/03/21 2131 97.4 °F (36.3 °C) 60 16 (!) 179/68 93 % 01/03/21 1442 97.8 °F (36.6 °C) 60 16 (!) 167/56 92 % Physical Exam: 
Gen: No apparent distress HEENT:  no scleral icterus, no thrush,  
CV NO LE edema Lungs: non labored breathing Abdomen: soft, non tender, non distended, Genitourinary:  No nguyễn Skin: no rash on visualized areas Psych: non tearful MSK + R foot plantar wound, no surrounding erythema  , no discharge seen Medications: 
 
Current Facility-Administered Medications:  
  albuterol (PROVENTIL HFA, VENTOLIN HFA, PROAIR HFA) inhaler 2 Puff, 2 Puff, Inhalation, Q6H PRN, JANET Lim MD 
  furosemide (LASIX) tablet 40 mg, 40 mg, Oral, DAILY, CORNELIUS Lim MD, 40 mg at 01/04/21 0925 
  insulin NPH (NOVOLIN N, HUMULIN N) injection 12 Units, 12 Units, SubCUTAneous, ACB&D, LAURA Lim MD, 12 Units at 01/04/21 0735 
  vancomycin (VANCOCIN) 1,000 mg in 0.9% sodium chloride 250 mL (VIAL-MATE), 1,000 mg, IntraVENous, Q18H, Ayo Bolaños MD, 1,000 mg at 01/04/21 7401   enoxaparin (LOVENOX) injection 40 mg, 40 mg, SubCUTAneous, Q12H, Ayo Meneses MD, 40 mg at 01/04/21 0925 
  amLODIPine (NORVASC) tablet 10 mg, 10 mg, Oral, DAILY, Karyn Martinez MD, 10 mg at 01/04/21 9501   zinc sulfate (ZINCATE) 220 (50) mg capsule 1 Cap, 1 Cap, Oral, DAILY, Chata Waterman MD, 1 Cap at 01/04/21 1051   ascorbic acid (vitamin C) (VITAMIN C) tablet 500 mg, 500 mg, Oral, BID, Sreedhar Childress MD, 500 mg at 01/04/21 4734   aspirin chewable tablet 81 mg, 81 mg, Oral, DAILY, Malachi Ley MD, 81 mg at 01/04/21 0925 
  atorvastatin (LIPITOR) tablet 40 mg, 40 mg, Oral, QHS, Malachi Ley MD, 40 mg at 01/03/21 2223   doxazosin (CARDURA) tablet 4 mg, 4 mg, Oral, QHS, Malachi Ley MD, 4 mg at 01/03/21 2223   finasteride (PROSCAR) tablet 5 mg, 5 mg, Oral, DAILY, Malachi Ley MD, 5 mg at 01/04/21 7630   fluticasone propionate (FLONASE) 50 mcg/actuation nasal spray 1 Spray, 1 Spray, Both Nostrils, DAILY, Malachi Ley MD, 1 Spray at 01/04/21 0926 
  losartan (COZAAR) tablet 100 mg, 100 mg, Oral, DAILY, Malachi Ley MD, 100 mg at 01/04/21 0925 
  metoprolol tartrate (LOPRESSOR) tablet 50 mg, 50 mg, Oral, BID, Malachi Ley MD, 50 mg at 01/04/21 6517   sertraline (ZOLOFT) tablet 100 mg, 100 mg, Oral, QPM, Malachi Ley MD, 100 mg at 01/03/21 1651   temazepam (RESTORIL) capsule 15 mg, 15 mg, Oral, QHS PRN, Malachi Ley MD, 15 mg at 01/03/21 2229   sodium chloride (OCEAN) 0.65 % nasal squeeze bottle 1 Spray, 1 Spray, Both Nostrils, PRN, Malachi Ley MD 
  sodium chloride (NS) flush 5-40 mL, 5-40 mL, IntraVENous, Q8H, Malachi Ley MD, 10 mL at 01/04/21 8827   sodium chloride (NS) flush 5-40 mL, 5-40 mL, IntraVENous, PRN, Malachi Ley MD 
  polyethylene glycol (MIRALAX) packet 17 g, 17 g, Oral, DAILY PRN, Malachi Ley MD 
  prochlorperazine (COMPAZINE) with saline injection 5 mg, 5 mg, IntraVENous, Q6H PRN, Malachi Ley MD 
  insulin lispro (HUMALOG) injection, , SubCUTAneous, AC&HS, Malachi Ley MD, 7 Units at 01/04/21 1201 
  glucose chewable tablet 16 g, 4 Tab, Oral, PRN, Malachi Ley MD 
  dextrose (D50W) injection syrg 12.5-25 g, 12.5-25 g, IntraVENous, PRN, Kezia Valladares MD 
   glucagon (GLUCAGEN) injection 1 mg, 1 mg, IntraMUSCular, PRN, Malachi Davis MD 
  vancomycin - pharmacy to dose, , Other, Rx Dosing/Monitoring, Neto Rodriguez MD 
  zinc oxide-cod liver oil (DESITIN) 40 % paste, , Topical, Q12H, Neto Rodriguez MD, Given at 01/04/21 3443   acetaminophen (TYLENOL) tablet 650 mg, 650 mg, Oral, Q6H PRN, 650 mg at 12/29/20 0550 **OR** acetaminophen (TYLENOL) suppository 650 mg, 650 mg, Rectal, Q6H PRN, Ayo Henderson MD 
  guaiFENesin-dextromethorphan (ROBITUSSIN DM) 100-10 mg/5 mL syrup 5 mL, 5 mL, Oral, Q4H PRN, Ayo Meneses MD, 5 mL at 01/03/21 2230 
  dexAMETHasone (DECADRON) tablet 6 mg, 6 mg, Oral, DAILY, Neto Rodriguez MD, 6 mg at 01/04/21 3490   cholecalciferol (VITAMIN D3) (1000 Units /25 mcg) tablet 2 Tab, 2,000 Units, Oral, DAILY, Neto Rodriguez MD, 2 Tab at 01/04/21 4248 Labs: 
Recent Results (from the past 24 hour(s)) GLUCOSE, POC Collection Time: 01/03/21  4:23 PM  
Result Value Ref Range Glucose (POC) 324 (H) 65 - 100 mg/dL Performed by Cheryl Cao GLUCOSE, POC Collection Time: 01/03/21  9:35 PM  
Result Value Ref Range Glucose (POC) 346 (H) 65 - 100 mg/dL Performed by Burgess Wilson  PCT   
PTT Collection Time: 01/04/21  6:10 AM  
Result Value Ref Range aPTT 35.0 (H) 22.1 - 31.0 sec  
 aPTT, therapeutic range     58.0 - 77.0 SECS FIBRINOGEN Collection Time: 01/04/21  6:10 AM  
Result Value Ref Range Fibrinogen 286 200 - 475 mg/dL D DIMER Collection Time: 01/04/21  6:10 AM  
Result Value Ref Range D-dimer 1.76 (H) 0.00 - 0.65 mg/L FEU NT-PRO BNP Collection Time: 01/04/21  6:10 AM  
Result Value Ref Range NT pro-BNP 11,352 (H) <450 PG/ML  
CBC WITH AUTOMATED DIFF Collection Time: 01/04/21  6:10 AM  
Result Value Ref Range WBC 8.0 4.1 - 11.1 K/uL  
 RBC 3.74 (L) 4.10 - 5.70 M/uL  
 HGB 10.3 (L) 12.1 - 17.0 g/dL HCT 32.1 (L) 36.6 - 50.3 %  MCV 85.8 80.0 - 99.0 FL  
 MCH 27.5 26.0 - 34.0 PG  
 MCHC 32.1 30.0 - 36.5 g/dL  
 RDW 14.4 11.5 - 14.5 % PLATELET 046 (L) 936 - 400 K/uL NRBC 0.0 0  WBC ABSOLUTE NRBC 0.00 0.00 - 0.01 K/uL NEUTROPHILS 79 (H) 32 - 75 % LYMPHOCYTES 12 12 - 49 % MONOCYTES 7 5 - 13 % EOSINOPHILS 1 0 - 7 % BASOPHILS 0 0 - 1 % IMMATURE GRANULOCYTES 1 (H) 0.0 - 0.5 % ABS. NEUTROPHILS 6.3 1.8 - 8.0 K/UL  
 ABS. LYMPHOCYTES 1.0 0.8 - 3.5 K/UL  
 ABS. MONOCYTES 0.6 0.0 - 1.0 K/UL  
 ABS. EOSINOPHILS 0.1 0.0 - 0.4 K/UL  
 ABS. BASOPHILS 0.0 0.0 - 0.1 K/UL  
 ABS. IMM. GRANS. 0.1 (H) 0.00 - 0.04 K/UL  
 DF AUTOMATED    
VANCOMYCIN, TROUGH Collection Time: 01/04/21  6:10 AM  
Result Value Ref Range Vancomycin,trough 17.2 (H) 5.0 - 10.0 ug/mL Reported dose date NOT PROVIDED Reported dose time: NOT PROVIDED Reported dose: NOT PROVIDED UNITS  
GLUCOSE, POC Collection Time: 01/04/21  7:18 AM  
Result Value Ref Range Glucose (POC) 200 (H) 65 - 100 mg/dL Performed by Shelley Bains GLUCOSE, POC Collection Time: 01/04/21 11:21 AM  
Result Value Ref Range Glucose (POC) 318 (H) 65 - 100 mg/dL Performed by Alicia Garsia Micro:  
  Blood: 12/27/20 Component Value Flag Ref Range Units Status Special Requests:      Final  
NO SPECIAL REQUESTS Culture result: NO GROWTH 5 DAYS Wound 12/28/20 Foot; Wound   
    
Component Value Flag Ref Range Units Status Special Requests:      Final  
NO SPECIAL REQUESTS   
GRAM STAIN RARE WBCS SEEN      Final  
GRAM STAIN      Final  
OCCASIONAL GRAM POSITIVE COCCI IN PAIRS Culture result: Abnormal       Final  
LIGHT * METHICILLIN RESISTANT STAPHYLOCOCCUS AUREUS * Susceptibility Staphylococcus aureus Methcillin Resistant Antibiotic Interpretation Value Method Comment Daptomycin ($$$$$) Susceptible 0.5 ug/mL EVER Erythromycin ($$$$) Resistant >=8 ug/mL EVER Gentamicin ($) Susceptible <=0.5 ug/mL EVER   
 Linezolid ($$$$$) Susceptible 2 ug/mL EVER Oxacillin Resistant >=4 ug/mL EVER Rifampin ($$$$) Susceptible <=0.5 ug/mL EVER Rifampin is not to be used for mono-therapy. Tetracycline Susceptible 2 ug/mL EVER Trimeth/Sulfa Susceptible <=10 ug/mL EVER Vancomycin ($) Susceptible <=0.5 ug/mL EVER Ciprofloxacin ($) Resistant >=8 ug/mL EVER Levofloxacin ($) Resistant >=8 ug/mL EVER Doxycycline ($$) Susceptible 1 ug/mL EVER Imaging: CT LE 
IMPRESSION IMPRESSION:  
  
1. Soft tissue wound plantar to the fifth metatarsal head. No evidence of a 
drainable fluid collection. Significant diffuse subcutaneous edema is 
nonspecific. 2. No CT evidence of osteomyelitis. Assessment / Plan Bienvenido Schmidt is a 82y.o. year old male with history of CAD, CABG, AV replacement , pacer , with R foot wound and cx + for MRSA CT without evidence of osteomyelitis and plans for MRI if safe and can obtain with cardiac device Blood cx negative 1) R foot wound with workup in progress to assess for osteomyelitis Diabetic foot wound I was not able to appreciate/probe to bone on my exam today If Podiatry feels that he has exposed bone,  would have to assume there is MRSA seeding to bone in which case treatment is usually 6-8 weeks IV antibiotics With his age, and risk for nephrotoxicity and CDI, will need to look at goals of care conversation about long term antibiotics in that scenario. Additionally, chronic osteomyelitis, if this is what he has, does not usually get cured with antibiotics alone Currently awaiting further imaging MRI if safe to do so On Vancomycin , renally dose for trough goal of 15 
 
 
 
 
2) Sars COV 2 S/P remdesivir On decadron-- if respiratory wise  Improved and on RA, would recommend stopping steroids ( rather than finishing a full 10 day course) and steroids can worsen infection risk from foot standpoint  
supportive care Anticoagulation on lovenox plans per primary team  
 
 
3) CAD, 4) CABG,  
5) AV replacement ,  
6) pacer for heart block 7) ) DM 
A1C 8 2017 Thank you for the opportunity to participate in the care of this patient. Please contact with questions or concerns.    
 
Darren Cox DO  
1:44 PM

## 2021-01-04 NOTE — PROGRESS NOTES
[facility] [place]: Tamara King 
[phone] Cardiology valvular heart disease progress note Requesting/referring provider: Dr. Elsa Moreno, Dr. August Simms, Dr. Kusum Roa Reason for Consult: Valvular heart disease Subjective: NO new complaints Assessment/Plan: 1. Severe symptomatic calcific senile aortic stenosis s/p TAVR with 34 mm evolute R prosthesis 2. Hypertension 3. CKD 4. History of remote smoking 5. Mitral annular calcification with possible mild mitral stenosis 6. Multifocal atrial tachycardia/atrial fibrillation 7. Coronary disease status post coronary bypass grafting remotely with LIMA to LAD, vein graft to OM, vein graft to PDA 8. Preserved LV systolic function 9. Recent syncope of uncertain etiology possibly contributed by severe aortic stenosis in the setting of atrial arrhythmia and anti HTN meds. 10.  History of peripheral arterial disease with prior bilateral common iliac stents, known bilateral common femoral calcification extending in the distal common femoral into bifurcation with bilateral SFA and infrapopliteal disease. Likely bilateral femoral endarterectomies have been performed in the past as well. 11. Tachy masood syndrome history of AV block and syncope. Now status post dual-chamber pacemaker 12. Severe left subclavian artery stenosis s/p recent angioplasty to reduce 80% stenosis down to about 50%. Left arm blood pressures may be unreliable 13. Possible COVID 19 infection 14. Sacral and foot ulcer--non healing Mr. Jose D Crisostomo is admitted to the hospital for increasing dyspnea. His LV function is normal and his aortic valve disease has already been addressed with TAVR with good results, no residual stenosis and mild PVL. ON last echo, he him mild diastolic dysfunction at best. While he does have evidence b/l effusions, his overall pictures is less suggestive of CHF and symptoms are more likely from COVID 19 infection. Will increase amlodipine from 5 to 10 mg because of elevated blood pressures. Foot wound being addressed by podiatry. No additional intervention from cardiac standpoint. HE appears to be in AF currently--previously mostly in MAT. However due to fall risk, anemia, thrombocytopenia, not the best candidate for [address] [place] [address] Road. Will consider discussion regarding oral anticoagulation during clinic follow-up with Dr. Marino Kehr after discharge. Planned to undergo an MRI for evaluation of foot infection/osteomyelitis. If in doubt consider treatment as osteomyelitis with extended antibiotics given prior valve and pacemaker. [x]    High complexity decision making was performed 
[]    Patient is at high-risk of decompensation with multiple organ involvement Investigations reviewed by me: 
ECG October 2020: First ECG: Sinus rhythm, first-degree AV block, nonspecific ST-T changes;  
second ECG multifocal atrial tachycardia, high degree AVblock, nonspecific ST-T changes Echocardiogram October 2020: Normal LV function, likely severe aortic stenosis with peak transaortic velocity of 3.8 to 3.9 m/s, mean gradient of 35 mmHg, calculated aortic valve area by continued equation of 0.8 cm² by mean gradient. Visibly the valve looks extremely calcified with significant restriction of motion. There is moderate to severe mitral annular calcification extending inferior to the mitral valve annulus along the posterior left ventricular wall. Transmitral gradients are also increased at 6mmHg mean gradient consistent with possible mild mitral stenosis. Leaflets were difficult to visualize but do not appear typical for rheumatic mitral stenosis despite history of rheumatic fever as a kid. No significant pulmonary hypertension is noted. Left atrium is significantly enlarged. ECG 5 PM October 28 2020: QRS down 202 ms, PA interval down to about 300 to 350 ms, essentially similar to what it was on ECG on 19 October. Echocardiogram November 2020 post TAVR: Normal LV function, mild PVL, mild functional mitral stenosis, appropriate gradients across aortic transcatheter prosthesis. EOA was not properly calculated. CTA: Upper lobe GGO --pneumonia vs edema with b/l effusions small. COVID 19 rapid  test positive HPI: Adele Grande, a 82y.o. year-old who presents for evaluation of dyspnea that has been progressive over past few days, associated with cough with sputum and occasionally worse laying flat. He has significant history of coronary artery disease with remote coronary artery bypass grafting with LIMA to LAD, vein graft to OM, vein graft to RPDA. Has preserved EF and about 2 months ago, he underwent transcatheter aortic valve replacement with 34 mm evolute transcatheter prosthesis.  He also had evidence of AV block and underwent pacemaker placement during the hospitalization.  Unfortunately due to antibiotics given for pacemaker placement he developed diarrhea and had a rehospitalization about 8weeks ago.  He is now recovered from that.   
 
After spending a few weeks in rehab, he was discharged 5 weeks ago and was doing well until last week when he developed dyspnea. He has not recovered his overall strength since being in the hospital in November. 
 
 
He  has a past medical history of Acute on chronic diastolic (congestive) heart failure (HCC) (12/27/2020), BPH (benign prostatic hyperplasia), CAD (coronary artery disease), Diabetes (HCC), Hearing loss, Hypercholesterolemia, Hypertension, Murmur, Recurrent depression (HCC) (8/22/2019), and Third degree AV block (HCC) (10/30/2020). He also has no past medical history of Anemia, Arthritis, Asthma, Autoimmune disease (HCC), Calculus of kidney, Cancer (HCC), Chronic kidney disease, Chronic obstructive pulmonary disease (HCC), Chronic pain, GERD (gastroesophageal reflux disease), Headache, Liver disease, Psychotic disorder (HCC), PUD (peptic ulcer disease), Seizures (HCC), Stroke (HCC), Thromboembolus (HCC), Thyroid disease, or Trauma. 
Review of system:Patient reports no dyspnea/PND/Orthpnea/CP. He reports no cough/fever/focal neurological deficits/abdominal pain.All other systems negative except as above.  
Family History  
Problem Relation Age of Onset  
• Cancer Mother   
• Cancer Father   
  
Social History  
 
Socioeconomic History  
• Marital status:   
  Spouse name: Not on file  
• Number of children: Not on file  
  Years of education: Not on file  Highest education level: Not on file Tobacco Use  Smoking status: Former Smoker Types: Cigarettes Quit date: 1990 Years since quittin.3  Smokeless tobacco: Never Used Substance and Sexual Activity  Alcohol use: No  
 Drug use: No  
  
PE Vitals:  
 21 2131 21 0330 21 0515 21 4363 BP: (!) 179/68 (!) 149/67  (!) 175/65 Pulse: 60 60  65 Resp: 16 18  18 Temp: 97.4 °F (36.3 °C) 97.9 °F (36.6 °C)  97.7 °F (36.5 °C) SpO2: 93% 92%  93% Weight:   177 lb (80.3 kg) Height:      
 Body mass index is 22.73 kg/m². Not examined as COVID 19 positive Recent Labs: 
Lab Results Component Value Date/Time Cholesterol, total 114 2020 03:19 PM  
 HDL Cholesterol 40 2020 03:19 PM  
 LDL, calculated 54 2020 03:19 PM  
 Triglyceride 101 2020 03:19 PM  
 
Lab Results Component Value Date/Time Creatinine 0.91 2021 01:58 AM  
 
Lab Results Component Value Date/Time BUN 37 (H) 2021 01:58 AM  
 
Lab Results Component Value Date/Time Potassium 3.3 (L) 2021 01:58 AM  
 
Lab Results Component Value Date/Time Hemoglobin A1c 7.1 (H) 10/18/2020 03:27 PM  
 
Lab Results Component Value Date/Time HGB 10.3 (L) 2021 06:10 AM  
 
Lab Results Component Value Date/Time PLATELET 723 (L)  06:10 AM  
 
 
Reviewed: 
Past Medical History:  
Diagnosis Date  Acute on chronic diastolic (congestive) heart failure ([address] 2020  BPH (benign prostatic hyperplasia)  CAD (coronary artery disease)  Diabetes [facility]  Hearing loss  Hypercholesterolemia  Hypertension  Murmur  Recurrent depression ([facility] 2019  Third degree AV block ([facility] 10/30/2020 Social History Tobacco Use Smoking Status Former Smoker  Types: Cigarettes  Quit date: 1990  Years since quittin.3 Smokeless Tobacco Never Used Social History  
 
Substance and Sexual Activity  
Alcohol Use No  
 
Allergies  
Allergen Reactions  
• Procaine Other (comments)  
  Pt stated he passed out from procaine and was told not to let anyone use it on him again  
 
Family History  
Problem Relation Age of Onset  
• Cancer Mother   
• Cancer Father   
  
 
Current Facility-Administered Medications  
Medication Dose Route Frequency  
• albuterol (PROVENTIL HFA, VENTOLIN HFA, PROAIR HFA) inhaler 2 Puff  2 Puff Inhalation Q6H PRN  
• furosemide (LASIX) tablet 40 mg  40 mg Oral DAILY  
• insulin NPH (NOVOLIN N, HUMULIN N) injection 12 Units  12 Units SubCUTAneous ACB&D  
• vancomycin (VANCOCIN) 1,000 mg in 0.9% sodium chloride 250 mL (VIAL-MATE)  1,000 mg IntraVENous Q18H  
• enoxaparin (LOVENOX) injection 40 mg  40 mg SubCUTAneous Q12H  
• amLODIPine (NORVASC) tablet 10 mg  10 mg Oral DAILY  
• zinc sulfate (ZINCATE) 220 (50) mg capsule 1 Cap  1 Cap Oral DAILY  
• ascorbic acid (vitamin C) (VITAMIN C) tablet 500 mg  500 mg Oral BID  
• aspirin chewable tablet 81 mg  81 mg Oral DAILY  
• atorvastatin (LIPITOR) tablet 40 mg  40 mg Oral QHS  
• doxazosin (CARDURA) tablet 4 mg  4 mg Oral QHS  
• finasteride (PROSCAR) tablet 5 mg  5 mg Oral DAILY  
• fluticasone propionate (FLONASE) 50 mcg/actuation nasal spray 1 Spray  1 Spray Both Nostrils DAILY  
• losartan (COZAAR) tablet 100 mg  100 mg Oral DAILY  
• metoprolol tartrate (LOPRESSOR) tablet 50 mg  50 mg Oral BID  
• sertraline (ZOLOFT) tablet 100 mg  100 mg Oral QPM  
• temazepam (RESTORIL) capsule 15 mg  15 mg Oral QHS PRN  
• sodium chloride (OCEAN) 0.65 % nasal squeeze bottle 1 Spray  1 Spray Both Nostrils PRN  
• sodium chloride (NS) flush 5-40 mL  5-40 mL IntraVENous Q8H  
• sodium chloride (NS) flush 5-40 mL  5-40 mL IntraVENous PRN  
• polyethylene glycol (MIRALAX) packet 17 g  17 g Oral DAILY PRN  
• prochlorperazine (COMPAZINE) with saline injection 5 mg  5 mg IntraVENous Q6H PRN  
  insulin lispro (HUMALOG) injection   SubCUTAneous AC&HS  
 glucose chewable tablet 16 g  4 Tab Oral PRN  
 dextrose (D50W) injection syrg 12.5-25 g  12.5-25 g IntraVENous PRN  
 glucagon (GLUCAGEN) injection 1 mg  1 mg IntraMUSCular PRN  
 vancomycin - pharmacy to dose   Other Rx Dosing/Monitoring  zinc oxide-cod liver oil (DESITIN) 40 % paste   Topical Q12H  
 acetaminophen (TYLENOL) tablet 650 mg  650 mg Oral Q6H PRN Or  
 acetaminophen (TYLENOL) suppository 650 mg  650 mg Rectal Q6H PRN  
 guaiFENesin-dextromethorphan (ROBITUSSIN DM) 100-10 mg/5 mL syrup 5 mL  5 mL Oral Q4H PRN  
 dexAMETHasone (DECADRON) tablet 6 mg  6 mg Oral DAILY  cholecalciferol (VITAMIN D3) (1000 Units /25 mcg) tablet 2 Tab  2,000 Units Oral DAILY General Oscar MD01/04/21 ATTENTION:  
This medical record was transcribed using an electronic medical records/speech recognition system. Although proofread, it may and can contain electronic, spelling and other errors. Corrections may be executed at a later time. Please feel free to contact us for any clarifications as needed. [employer] [facility] [address], [address]

## 2021-01-04 NOTE — PROGRESS NOTES
Pharmacist Note - Vancomycin Dosing Therapy day 8 Indication: Diabetic foot infection rt 5th metatarsal head; concern for osteo  
-COVID+ Current regimen: 1000 mg IV Q18H A Trough Level resulted at 17.2 mcg/mL which was obtained 18 hrs post-dose. Goal trough: 15 - 20 mcg/mL Plan: Continue current regimen. Pharmacy will continue to monitor this patient daily for changes in clinical status and renal function.

## 2021-01-04 NOTE — PROGRESS NOTES
Pacemaker is MRI compatible and the MRI department can go ahead and do the MRI  I called the dept but no one answers   Dr González Costa asked me to address and I have

## 2021-01-04 NOTE — PROGRESS NOTES
Faxed MRI documentation to [facility] at fax number [phone]. Fax confirmation received.

## 2021-01-04 NOTE — PROGRESS NOTES
Problem: Mobility Impaired (Adult and Pediatric) 
Goal: *Acute Goals and Plan of Care (Insert Text) 
Description: FUNCTIONAL STATUS PRIOR TO ADMISSION: Patient was modified independent using a single point cane for functional mobility up until recently.  Per pt - after month stay in hosp for cardiac issues - pt went to rehab - had been at home x 1 wk with wife - using RW.  
 
HOME SUPPORT PRIOR TO ADMISSION: The patient lived with wife in Independent Living - [facility] - was receiving home health after discharge from rehab center. 
 
Physical Therapy Goals 
Initiated 12/30/2020 
1.  Patient will move from supine to sit and sit to supine , scoot up and down, and roll side to side in bed with independence within 7 day(s).   
2.  Patient will transfer from bed to chair and chair to bed with modified independence using the least restrictive device within 7 day(s). 
3.  Patient will perform sit to stand with modified independence within 7 day(s). 
4.  Patient will ambulate with modified independence for > 100 feet with the least restrictive device within 7 day(s).  
 
Outcome: Not Met 
  
PHYSICAL THERAPY TREATMENT 
Patient: Akash Venegas (85 y.o. male) 
Date: 1/4/2021 
Diagnosis: Acute on chronic diastolic (congestive) heart failure (HCC) [I50.33] Acute on chronic diastolic (congestive) heart failure (HCC) 
    
Precautions: Fall 
Chart, physical therapy assessment, plan of care and goals were reviewed. 
 
ASSESSMENT 
 Patient continues with skilled PT services and is slowly progressing towards goals. Patient required greater assist for all mobility today. Of note, patient endorses being in the bed since initial assessment last week? ? Today, he required moderate assist x1 for supine to sit and sit to stand. On the first two attempts at standing, patient was not able to achieve full upright stance due to weakness. On the third attempt, he achieved full upright stance with the walker and completed with greater ease (min assist) as compared with the two earlier attempts. He is able to walk from bed to the chair with the walker with slow mildly unsteady gait. Patient remained sitting up in the chair with RN and MD in the room. Patient is functioning far below his baseline of independent with a cane and will benefit from inpatient rehab prior to returning home. If unable to place in rehab, then SNF with therapy at least 5 days/ wk. Plan for next session: Work to increase gait distance (limited today by 2 MD visits and RN assisting with bowel clean up). Current Level of Function Impacting Discharge (mobility/balance): Up to moderate assist 
 
Other factors to consider for discharge: PLOF indep, lives with wife who is not able to assist 
    
 
PLAN : 
Patient continues to benefit from skilled intervention to address the above impairments. Continue treatment per established plan of care. to address goals. Recommendation for discharge: (in order for the patient to meet his/her long term goals) Therapy 3 hours per day 5-7 days per week This discharge recommendation: 
Has not yet been discussed the attending provider and/or case management IF patient discharges home will need the following DME: to be determined (TBD) SUBJECTIVE:  
Patient stated No. OBJECTIVE DATA SUMMARY:  
Critical Behavior: 
Neurologic State: Alert Orientation Level: Oriented X4 Cognition: Follows commands Safety/Judgement: Awareness of environment, Fall prevention, Good awareness of safety precautions, Insight into deficits Functional Mobility Training: 
Bed Mobility: 
Rolling: Minimum assistance Supine to Sit: Moderate assistance;Assist x1 Transfers: 
Sit to Stand: Moderate assistance Stand to Sit: Minimum assistance Balance: 
Sitting: Intact; Without support Standing: Impaired; With support Standing - Static: Constant support Standing - Dynamic : Constant support Ambulation/Gait Training: 
Distance (ft): 5 Feet (ft) Assistive Device: Walker, rolling Ambulation - Level of Assistance: Contact guard assistance Gait Abnormalities: Decreased step clearance Speed/Zaida: Pace decreased (<100 feet/min) Step Length: Right shortened;Left shortened Pain Rating: 
None voiced Activity Tolerance:  
Fair After treatment patient left in no apparent distress:  
Sitting in chair and Call bell within reach COMMUNICATION/COLLABORATION:  
The patients plan of care was discussed with: Registered nurse. Vasiliy Livingston, PT Time Calculation: 27 mins

## 2021-01-05 NOTE — PROGRESS NOTES
Admission Medication Reconciliation: 
 
Information obtained from:  chart review, rx query RxQuery data available¹:  YES Comments/Recommendations: Updated PTA meds/reviewed patient's allergies. 1)  Unable to get in contact with patient. 2)  Medication changes (since last review): Added 
- none Adjusted 
- none Removed 
- tizanidine 2 mg TID prn ¹RxQuery pharmacy benefit data reflects medications filled and processed through the patient's insurance, however  
this data does NOT capture whether the medication was picked up or is currently being taken by the patient. Allergies:  Procaine Significant PMH/Disease States:  
Past Medical History:  
Diagnosis Date Acute on chronic diastolic (congestive) heart failure ([facility] 12/27/2020 BPH (benign prostatic hyperplasia) CAD (coronary artery disease) Diabetes ([facility] Hearing loss Hypercholesterolemia Hypertension Murmur Recurrent depression ([facility] 8/22/2019 Third degree AV block ([facility] 10/30/2020 Chief Complaint for this Admission: Chief Complaint Patient presents with Shortness of Breath Cough Prior to Admission Medications:  
Prior to Admission Medications Prescriptions Last Dose Informant Taking? FERROUS FUMARATE/VIT BCOMP,C (SUPER B COMPLEX PO)   Yes Sig: Take 1 Tab by mouth daily. OTHER   No  
Sig: Hearing evaluation for possible changes in hearing  
acetaminophen (TYLENOL) 325 mg tablet   Yes Sig: Take 2 Tabs by mouth every four (4) hours as needed for Pain. amLODIPine (Norvasc) 10 mg tablet   Yes Sig: Take 10 mg by mouth daily. aspirin 81 mg chewable tablet   Yes Sig: Take 81 mg by mouth daily. atorvastatin (LIPITOR) 40 mg tablet   Yes Sig: Take 1 Tab by mouth nightly for 60 days. cholecalciferol (VITAMIN D3) 1,000 unit cap   Yes Sig: Take 1 Cap by mouth daily. doxazosin (CARDURA) 4 mg tablet   Yes Sig: TAKE 1 TABLET BY MOUTH  DAILY finasteride (PROSCAR) 5 mg tablet   Yes Sig: TAKE 1 TABLET BY MOUTH  DAILY  
fluticasone propionate (FLONASE) 50 mcg/actuation nasal spray   Yes Sig: ADMINISTER 1 Spray IN Both Nostrils two (2) times a day. furosemide (Lasix) 20 mg tablet   Yes Sig: Take 20 mg by mouth daily. glipiZIDE SR (GLUCOTROL XL) 5 mg CR tablet   Yes Sig: TAKE 1 TABLET BY MOUTH  DAILY losartan (COZAAR) 50 mg tablet   Yes Sig: Take 50 mg by mouth daily. magnesium 250 mg tab   Yes Sig: Take 1 Tab by mouth daily. menthol-zinc oxide (CALMOSEPTINE) 0.44-20.6 % oint   Yes Sig: Apply to bilateral buttocks TID  Indications: skin irritation  
metFORMIN ER (GLUCOPHAGE XR) 500 mg tablet   Yes Sig: TAKE 1 TABLET BY MOUTH  TWICE DAILY WITH MEALS  
metoprolol tartrate (LOPRESSOR) 50 mg tablet   Yes Sig: Take 1 Tab by mouth two (2) times a day for 60 days. senna-docusate (PERICOLACE) 8.6-50 mg per tablet   Yes Sig: Take 1 Tab by mouth daily as needed for Constipation. sertraline (ZOLOFT) 100 mg tablet   Yes Sig: TAKE 1 TABLET BY MOUTH  DAILY  
sodium chloride (OCEAN) 0.65 % nasal squeeze bottle   Yes Si.05 mL by Both Nostrils route as needed for Congestion. temazepam (RESTORIL) 15 mg capsule   Yes Sig: TAKE 1 CAPSULE BY MOUTH AT BEDTIME AS NEEDED FOR SLEEP. Facility-Administered Medications: None Please contact the main inpatient pharmacy with any questions or concerns at [phone] and we will direct you to the clinical pharmacist covering this patient's care while in-house. Mirian Shipley

## 2021-01-05 NOTE — TELEPHONE ENCOUNTER
Faxed MRI form to [address] [place] [address] MRI form at fax number [phone]. Fax confirmation received.

## 2021-01-05 NOTE — PROGRESS NOTES
Infectious Disease Progress Note Subjective:  
Nazario Meyers seen Has some cough, denied dyspnea Denied pain ROS: 10 point ROS obtained and pertinent positives include above. All others negative. Objective: 
 
Vitals:  
Patient Vitals for the past 24 hrs: 
 Temp Pulse Resp BP SpO2  
01/05/21 0903 97.6 °F (36.4 °C) 65 19 (!) 162/63 93 % 01/05/21 0321 97.4 °F (36.3 °C) 60 18 (!) 173/61 93 % 01/04/21 2021 97.6 °F (36.4 °C) 63 18 (!) 157/61 93 % 01/04/21 1457 98.1 °F (36.7 °C) 61 19 128/62 96 % Physical Exam: 
Gen: No apparent distress HEENT:  no scleral icterus, no thrush,  
CV NO LE edema Lungs: non labored breathing Abdomen: soft, non tender, non distended, Genitourinary:  No nguyễn Skin: no rash on visualized areas Psych: non tearful MSK + R foot plantar wound, no surrounding erythema  , no discharge seen Medications: 
 
Current Facility-Administered Medications:  
  albuterol (PROVENTIL HFA, VENTOLIN HFA, PROAIR HFA) inhaler 2 Puff, 2 Puff, Inhalation, Q6H PRN, LENO Lim MD 
  furosemide (LASIX) tablet 40 mg, 40 mg, Oral, DAILY, ORLANDO Lim MD, 40 mg at 01/05/21 7752   insulin NPH (NOVOLIN N, HUMULIN N) injection 12 Units, 12 Units, SubCUTAneous, ACB&D, Lisa Lim MD, 12 Units at 01/05/21 0741 
  vancomycin (VANCOCIN) 1,000 mg in 0.9% sodium chloride 250 mL (VIAL-MATE), 1,000 mg, IntraVENous, Q18H, Ayo Barnes MD, 1,000 mg at 01/04/21 2349   enoxaparin (LOVENOX) injection 40 mg, 40 mg, SubCUTAneous, Q12H, Ayo Meneses MD, 40 mg at 01/05/21 1002   amLODIPine (NORVASC) tablet 10 mg, 10 mg, Oral, DAILY, Nisha Tiwari MD, 10 mg at 01/05/21 1002   zinc sulfate (ZINCATE) 220 (50) mg capsule 1 Cap, 1 Cap, Oral, DAILY, Ness Mckeon MD, 1 Cap at 01/05/21 1331   ascorbic acid (vitamin C) (VITAMIN C) tablet 500 mg, 500 mg, Oral, BID, Ness Mckeon MD, 500 mg at 01/05/21 6777   aspirin chewable tablet 81 mg, 81 mg, Oral, DAILY, Malachi Ley MD, 81 mg at 01/05/21 1001   atorvastatin (LIPITOR) tablet 40 mg, 40 mg, Oral, QHS, Malachi Ley MD, 40 mg at 01/04/21 2118   doxazosin (CARDURA) tablet 4 mg, 4 mg, Oral, QHS, Malachi Ley MD, 4 mg at 01/04/21 2118   finasteride (PROSCAR) tablet 5 mg, 5 mg, Oral, DAILY, Malachi Ley MD, 5 mg at 01/05/21 0835   fluticasone propionate (FLONASE) 50 mcg/actuation nasal spray 1 Spray, 1 Spray, Both Nostrils, DAILY, Malachi Ley MD, 1 Spray at 01/04/21 0926 
  losartan (COZAAR) tablet 100 mg, 100 mg, Oral, DAILY, Malachi Ley MD, 100 mg at 01/05/21 4331   metoprolol tartrate (LOPRESSOR) tablet 50 mg, 50 mg, Oral, BID, Malachi Ley MD, 50 mg at 01/05/21 8784   sertraline (ZOLOFT) tablet 100 mg, 100 mg, Oral, QPM, Malachi Ley MD, 100 mg at 01/04/21 0456   temazepam (RESTORIL) capsule 15 mg, 15 mg, Oral, QHS PRN, Malachi Ley MD, 15 mg at 01/04/21 2237   sodium chloride (OCEAN) 0.65 % nasal squeeze bottle 1 Spray, 1 Spray, Both Nostrils, PRN, Malachi Ley MD 
  sodium chloride (NS) flush 5-40 mL, 5-40 mL, IntraVENous, Q8H, Malachi Ley MD, 10 mL at 01/04/21 2118   sodium chloride (NS) flush 5-40 mL, 5-40 mL, IntraVENous, PRN, Malachi Ley MD 
  polyethylene glycol (MIRALAX) packet 17 g, 17 g, Oral, DAILY PRN, Malachi Ley MD 
  prochlorperazine (COMPAZINE) with saline injection 5 mg, 5 mg, IntraVENous, Q6H PRN, Malachi Ley MD 
  insulin lispro (HUMALOG) injection, , SubCUTAneous, AC&HS, Malachi Ley MD, 2 Units at 01/05/21 0742 
  glucose chewable tablet 16 g, 4 Tab, Oral, PRN, Malachi Ley MD 
  dextrose (D50W) injection syrg 12.5-25 g, 12.5-25 g, IntraVENous, PRN, Malachi Ley MD 
  glucagon (GLUCAGEN) injection 1 mg, 1 mg, IntraMUSCular, PRN, Abby Remy MD 
  vancomycin - pharmacy to dose, , Other, Rx Dosing/Monitoring, Zay Weaver MD 
 •  zinc oxide-cod liver oil (DESITIN) 40 % paste, , Topical, Q12H, Ayo Meneses MD, Given at 01/04/21 2118 
•  acetaminophen (TYLENOL) tablet 650 mg, 650 mg, Oral, Q6H PRN, 650 mg at 12/29/20 0550 **OR** acetaminophen (TYLENOL) suppository 650 mg, 650 mg, Rectal, Q6H PRN, Ayo Meneses MD 
•  guaiFENesin-dextromethorphan (ROBITUSSIN DM) 100-10 mg/5 mL syrup 5 mL, 5 mL, Oral, Q4H PRN, Ayo Meneses MD, 5 mL at 01/03/21 2230 
•  cholecalciferol (VITAMIN D3) (1000 Units /25 mcg) tablet 2 Tab, 2,000 Units, Oral, DAILY, Ayo Meneses MD, 2 Tab at 01/05/21 0957 
 
 
Labs: 
Recent Results (from the past 24 hour(s))  
GLUCOSE, POC  
 Collection Time: 01/04/21  4:49 PM  
Result Value Ref Range  
 Glucose (POC) 196 (H) 65 - 100 mg/dL  
 Performed by Lito Terry   
GLUCOSE, POC  
 Collection Time: 01/04/21 10:00 PM  
Result Value Ref Range  
 Glucose (POC) 154 (H) 65 - 100 mg/dL  
 Performed by Raleigh Joseph (PCT)   
NT-PRO BNP  
 Collection Time: 01/05/21  2:04 AM  
Result Value Ref Range  
 NT pro-BNP 9,265 (H) <450 PG/ML  
METABOLIC PANEL, BASIC  
 Collection Time: 01/05/21  2:04 AM  
Result Value Ref Range  
 Sodium 141 136 - 145 mmol/L  
 Potassium 3.7 3.5 - 5.1 mmol/L  
 Chloride 105 97 - 108 mmol/L  
 CO2 34 (H) 21 - 32 mmol/L  
 Anion gap 2 (L) 5 - 15 mmol/L  
 Glucose 239 (H) 65 - 100 mg/dL  
 BUN 38 (H) 6 - 20 MG/DL  
 Creatinine 0.80 0.70 - 1.30 MG/DL  
 BUN/Creatinine ratio 48 (H) 12 - 20    
 GFR est AA >60 >60 ml/min/1.73m2  
 GFR est non-AA >60 >60 ml/min/1.73m2  
 Calcium 8.7 8.5 - 10.1 MG/DL  
CBC WITH AUTOMATED DIFF  
 Collection Time: 01/05/21  2:04 AM  
Result Value Ref Range  
 WBC 7.3 4.1 - 11.1 K/uL  
 RBC 3.64 (L) 4.10 - 5.70 M/uL  
 HGB 10.0 (L) 12.1 - 17.0 g/dL  
 HCT 30.7 (L) 36.6 - 50.3 %  
 MCV 84.3 80.0 - 99.0 FL  
 MCH 27.5 26.0 - 34.0 PG  
 MCHC 32.6 30.0 - 36.5 g/dL  
 RDW 14.5 11.5 - 14.5 %  
 PLATELET 111 (L) 150 - 400 K/uL  
 NRBC 0.0 0  WBC  
 ABSOLUTE NRBC 0.00 0.00 - 0.01 K/uL NEUTROPHILS 80 (H) 32 - 75 % LYMPHOCYTES 10 (L) 12 - 49 % MONOCYTES 9 5 - 13 % EOSINOPHILS 0 0 - 7 % BASOPHILS 0 0 - 1 % IMMATURE GRANULOCYTES 1 (H) 0.0 - 0.5 % ABS. NEUTROPHILS 5.8 1.8 - 8.0 K/UL  
 ABS. LYMPHOCYTES 0.7 (L) 0.8 - 3.5 K/UL  
 ABS. MONOCYTES 0.7 0.0 - 1.0 K/UL  
 ABS. EOSINOPHILS 0.0 0.0 - 0.4 K/UL  
 ABS. BASOPHILS 0.0 0.0 - 0.1 K/UL  
 ABS. IMM. GRANS. 0.1 (H) 0.00 - 0.04 K/UL  
 DF SMEAR SCANNED    
 PLATELET COMMENTS Large Platelets RBC COMMENTS ANISOCYTOSIS 
1+ MAGNESIUM Collection Time: 01/05/21  2:04 AM  
Result Value Ref Range Magnesium 1.8 1.6 - 2.4 mg/dL PTT Collection Time: 01/05/21  2:12 AM  
Result Value Ref Range aPTT 39.7 (H) 22.1 - 31.0 sec  
 aPTT, therapeutic range     58.0 - 77.0 SECS FIBRINOGEN Collection Time: 01/05/21  2:12 AM  
Result Value Ref Range Fibrinogen 308 200 - 475 mg/dL D DIMER Collection Time: 01/05/21  2:12 AM  
Result Value Ref Range D-dimer 1.52 (H) 0.00 - 0.65 mg/L FEU  
GLUCOSE, POC Collection Time: 01/05/21  6:45 AM  
Result Value Ref Range Glucose (POC) 176 (H) 65 - 100 mg/dL Performed by Jayne Childress GLUCOSE, POC Collection Time: 01/05/21 11:03 AM  
Result Value Ref Range Glucose (POC) 130 (H) 65 - 100 mg/dL Performed by Karina Schmidt Micro:  
  Blood: 12/27/20 Component Value Flag Ref Range Units Status Special Requests:      Final  
NO SPECIAL REQUESTS Culture result: NO GROWTH 5 DAYS Wound 12/28/20 Foot; Wound   
    
Component Value Flag Ref Range Units Status Special Requests:      Final  
NO SPECIAL REQUESTS   
GRAM STAIN RARE WBCS SEEN      Final  
GRAM STAIN      Final  
OCCASIONAL GRAM POSITIVE COCCI IN PAIRS Culture result: Abnormal       Final  
LIGHT * METHICILLIN RESISTANT STAPHYLOCOCCUS AUREUS * Susceptibility Staphylococcus aureus Methcillin Resistant Antibiotic Interpretation Value Method Comment Daptomycin ($$$$$) Susceptible 0.5 ug/mL EVER Erythromycin ($$$$) Resistant >=8 ug/mL EVER Gentamicin ($) Susceptible <=0.5 ug/mL EVER Linezolid ($$$$$) Susceptible 2 ug/mL EVER Oxacillin Resistant >=4 ug/mL EVER Rifampin ($$$$) Susceptible <=0.5 ug/mL EVER Rifampin is not to be used for mono-therapy. Tetracycline Susceptible 2 ug/mL EVER Trimeth/Sulfa Susceptible <=10 ug/mL EVER Vancomycin ($) Susceptible <=0.5 ug/mL EVER Ciprofloxacin ($) Resistant >=8 ug/mL EVER Levofloxacin ($) Resistant >=8 ug/mL EVER Doxycycline ($$) Susceptible 1 ug/mL EVER Imaging: CT LE 
IMPRESSION IMPRESSION:  
  
1. Soft tissue wound plantar to the fifth metatarsal head. No evidence of a 
drainable fluid collection. Significant diffuse subcutaneous edema is 
nonspecific. 2. No CT evidence of osteomyelitis. Assessment / Plan Natasha York is a 82y.o. year old male with history of CAD, CABG, AV replacement , pacer , with R foot wound and cx + for MRSA CT without evidence of osteomyelitis and plans for MRI if safe and can obtain with cardiac device Blood cx negative 1) R foot plantar wound, diabetic foot infection, MRSA on cultures,  with workup in progress to assess for osteomyelitis 2) Sars COV 2  S/P remdesivir and steroids ( stopped now) 3) CAD, 4) CABG,  
5) AV replacement ,  
6) pacer for heart block 7) ) DM A1C 8 2017 Recommendations: 
 
I was not able to appreciate/probe to bone on my exam yesterday. After discussion with patient's son, I am not certain exactly how long Mr Carina Genao has had the plantar wound. If Podiatry feels that he has exposed bone,  would have to assume there is MRSA seeding to bone in which case treatment is usually 6-8 weeks IV antibiotics With his age, and risk for nephrotoxicity and CDI, I  spoke to his son today in detail about this. Would recommend short course of 2 weeks if there is no deep infection, bone involvement on MRI or exposed bone from wound. If there is exposed bone or findings of osteo, the treatment is usually 6 weeks based on tolerance. Pending MRI On Vancomycin , renally dose for trough goal of 15 Thank you for the opportunity to participate in the care of this patient. Please contact with questions or concerns.    
 
[address] [facility],   
12:32 PM

## 2021-01-05 NOTE — PROGRESS NOTES
MRI update: 
 
Device settings order form received back from Dr. Micah Grant. [employer] is available to be in MRI dept tomorrow, Jan 6 at 12 noon. Left message for pt's RN to let her know.

## 2021-01-05 NOTE — PROGRESS NOTES
1/5/2021 2:57 AM 
[phone] Hospital or Facility: [facility], [facility] From: Abdulaziz Jean Baptiste RE: Jamie Nash 1935 RM: [id number] Hello, I received notice from [facility] of patient having 13 beats of VTACH at 0011 and 5 beats of VTACH at [phone]. Need Callback: NO CALLBACK [employer] Plan:  
 
· BMP with magnesium · Continue to monitor UPDATE 0645: no more ectopy reported. Labs reviewed: electrolytes OK. Mg+ pending. Recent Results (from the past 12 hour(s)) GLUCOSE, POC Collection Time: 01/04/21 10:00 PM  
Result Value Ref Range Glucose (POC) 154 (H) 65 - 100 mg/dL Performed by René Tompkins (PCT) NT-PRO BNP Collection Time: 01/05/21  2:04 AM  
Result Value Ref Range NT pro-BNP 9,265 (H) <487 PG/ML  
METABOLIC PANEL, BASIC Collection Time: 01/05/21  2:04 AM  
Result Value Ref Range Sodium 141 136 - 145 mmol/L Potassium 3.7 3.5 - 5.1 mmol/L Chloride 105 97 - 108 mmol/L  
 CO2 34 (H) 21 - 32 mmol/L Anion gap 2 (L) 5 - 15 mmol/L Glucose 239 (H) 65 - 100 mg/dL BUN 38 (H) 6 - 20 MG/DL Creatinine 0.80 0.70 - 1.30 MG/DL  
 BUN/Creatinine ratio 48 (H) 12 - 20 GFR est AA >60 >60 ml/min/1.73m2 GFR est non-AA >60 >60 ml/min/1.73m2 Calcium 8.7 8.5 - 10.1 MG/DL  
CBC WITH AUTOMATED DIFF Collection Time: 01/05/21  2:04 AM  
Result Value Ref Range WBC 7.3 4.1 - 11.1 K/uL  
 RBC 3.64 (L) 4.10 - 5.70 M/uL  
 HGB 10.0 (L) 12.1 - 17.0 g/dL HCT 30.7 (L) 36.6 - 50.3 % MCV 84.3 80.0 - 99.0 FL  
 MCH 27.5 26.0 - 34.0 PG  
 MCHC 32.6 30.0 - 36.5 g/dL  
 RDW 14.5 11.5 - 14.5 % PLATELET 340 (L) 946 - 400 K/uL NRBC 0.0 0  WBC ABSOLUTE NRBC 0.00 0.00 - 0.01 K/uL NEUTROPHILS 80 (H) 32 - 75 % LYMPHOCYTES 10 (L) 12 - 49 % MONOCYTES 9 5 - 13 % EOSINOPHILS 0 0 - 7 % BASOPHILS 0 0 - 1 % IMMATURE GRANULOCYTES 1 (H) 0.0 - 0.5 % ABS. NEUTROPHILS 5.8 1.8 - 8.0 K/UL  
 ABS. LYMPHOCYTES 0.7 (L) 0.8 - 3.5 K/UL  
 ABS. MONOCYTES 0.7 0.0 - 1.0 K/UL ABS. EOSINOPHILS 0.0 0.0 - 0.4 K/UL  
 ABS. BASOPHILS 0.0 0.0 - 0.1 K/UL  
 ABS. IMM. GRANS. 0.1 (H) 0.00 - 0.04 K/UL  
 DF SMEAR SCANNED    
 PLATELET COMMENTS Large Platelets RBC COMMENTS ANISOCYTOSIS 
1+ PTT Collection Time: 01/05/21  2:12 AM  
Result Value Ref Range aPTT 39.7 (H) 22.1 - 31.0 sec  
 aPTT, therapeutic range     58.0 - 77.0 SECS FIBRINOGEN Collection Time: 01/05/21  2:12 AM  
Result Value Ref Range Fibrinogen 308 200 - 475 mg/dL D DIMER Collection Time: 01/05/21  2:12 AM  
Result Value Ref Range D-dimer 1.52 (H) 0.00 - 0.65 mg/L FEU  
GLUCOSE, POC Collection Time: 01/05/21  6:45 AM  
Result Value Ref Range Glucose (POC) 176 (H) 65 - 100 mg/dL Performed by Tahir Louie

## 2021-01-05 NOTE — PROGRESS NOTES
Hospitalist Progress Note Derek Doe MD 
Answering service: [phone] OR [phone] from in house phone Date of Service:  2021 NAME:  Austin Varma :  1935 MRN:  [id number] Admission Summary: As per initial admission summary Patient presented to the emergency room with progressive shortness of breath and productive cough. Medical history is significant for hypertension; chronic diastolic congestive heart failure; dyslipidemia; third degree heart block, status post pacemaker insertion; coronary artery disease, status post CABG; benign prostatic hyperplasia; type 2 diabetes; aortic stenosis, status post transcatheter aortic valve replacement TAVR. Patient was recently admitted to this hospital from 2020 to 2020. Interval history / Subjective:  
  Patient seen for Follow up of COVID, diabetic foot infection Patient resting comfortably. On room air. Called and updated his wife in patients room I also called his son Rachel Tavares and updated him of plans for MRI tomorrow to determine duration of antibiotics and further treatment decisions. Assessment & Plan:  
 
Acute on chronic diastolic congestive heart failure. CT chest showed patchy groundglass opacity in the right upper lobe may represent pulmonary edema. Cardiology consulted Chest x-ray on  showed increased pulmonary edema On oral lasix. COVID-19 pneumonia suspected superimposed bacterial pneumonia. On broad-spectrum antibiotics Completed dexamethasone and remdesivir. Maintain droplet plus isolation. Right diabetic foot infection,suspicion of osteomyelitis --on cefepime and vancomycin. ID and podiatry following. 
- CT of the foot negative for osteomyelitis but it is not the best study to diagnose osteomyelitis 
-Foot mri scheduled pending arrival of [employer] resp. Pacemaker mri compatible per cards. --ultrasound of the lower extremity for DVT. Negative Hypertension. --Continue amlodipine 10 mg, losartan 100 mg, Lopressor 50 mg twice daily Dyslipidemia. On atorvastatin. Third degree heart block. The patient is status post pacemaker insertion. Coronary artery disease, status post coronary artery bypass grafting:continue with cardiac medications. Sacral decubitus ulcer present on admission. Wound Care consult Benign prostatic hyperplasia 
-Continue Cardura. Type 2 diabetes with hyperglycemia worsened by steroids and acute illness 
-Accu-Cheks, Humalog sliding scale. 
-Switch Lantus to NPH, 12 units twice daily, continue to adjust to achieve target blood sugar of 140180. 
-He is on Metformin and Glucotrol at home, on hold. Anemia, anemia of chronic disease. Thrombocytopenia. The patient is asymptomatic. Monitor Elevated lactic acid level due to infection. resolved Aortic stenosis, status post TAVR. Code status: full code DVT prophylaxis: heparin Care Plan discussed with: Patient/Family Updated pts wife and his son on 1/5. Disposition: IPR,CM send referral.Pt may be discharge as early as Thursday. Hospital Problems  Date Reviewed: 12/28/2020 Codes Class Noted POA Type 2 diabetes mellitus with right diabetic foot infection ([facility] [address] ICD-10-CM: E11.628, L08.9 ICD-9-CM: 250.80, 686.9  12/28/2020 Unknown * (Principal) Acute on chronic diastolic (congestive) heart failure (HCC) ICD-10-CM: I50.33 ICD-9-CM: 428.33, 428.0  12/27/2020 Yes Review of Systems: A comprehensive review of systems was negative except for that written in the HPI. Physical Examination:  
I had a face to face encounter with this patient and independently examined them on January 5, 2021 as outlined below: 
     
Constitutional:  No acute distress, cooperative, pleasant ENT:  Oral mucous moist, oropharynx benign.  Neck supple,   
 Resp: CTA bilaterally. No wheezing/rhonchi/rales. No accessory muscle use CV:  Pacemaker device on the left chest wall. Regular rhythm, normal rate, no murmurs, gallops, rubs GI:  Soft, non distended, non tender. normoactive bowel sounds, no hepatosplenomegaly Musculoskeletal:  No edema, warm, 2+ pulses throughout. Dressing right foot. Neurologic:  Moves all extremities. AAOx3, CN II-XII reviewed. Hard of hearing. Psych:  Good insight, Not anxious nor agitated. . 
  
 
Data Review:  
 Review and/or order of clinical lab test 
Review and/or order of tests in the radiology section of CPT Review and/or order of tests in the medicine section of CPT Labs:  
 
Recent Labs 01/05/21 
0183 01/04/21 
4161 WBC 7.3 8.0 HGB 10.0* 10.3* HCT 30.7* 32.1*  
* 113* Recent Labs 01/05/21 
7279 01/03/21 
0158  141  
K 3.7 3.3*  
 105 CO2 34* 36* BUN 38* 37* CREA 0.80 0.91  
* 269* CA 8.7 8.4* MG 1.8  -- No results for input(s): ALT, AP, TBIL, TBILI, TP, ALB, GLOB, GGT, AML, LPSE in the last 72 hours. No lab exists for component: SGOT, GPT, AMYP, HLPSE Recent Labs 01/05/21 
0503 01/04/21 
2908 01/03/21 
0151 APTT 39.7* 35.0* 43.9* No results for input(s): FE, TIBC, PSAT, FERR in the last 72 hours. Lab Results Component Value Date/Time Folate 20.5 12/28/2020 04:59 AM  
  
No results for input(s): PH, PCO2, PO2 in the last 72 hours. No results for input(s): CPK, CKNDX, TROIQ in the last 72 hours. No lab exists for component: CPKMB Lab Results Component Value Date/Time Cholesterol, total 114 02/26/2020 03:19 PM  
 HDL Cholesterol 40 02/26/2020 03:19 PM  
 LDL, calculated 54 02/26/2020 03:19 PM  
 Triglyceride 101 02/26/2020 03:19 PM  
 
Lab Results Component Value Date/Time  Glucose (POC) 130 (H) 01/05/2021 11:03 AM  
 Glucose (POC) 176 (H) 01/05/2021 06:45 AM  
 Glucose (POC) 154 (H) 01/04/2021 10:00 PM  
 Glucose (POC) 196 (H) 01/04/2021 04:49 PM  
 Glucose (POC) 318 (H) 01/04/2021 11:21 AM  
 
Lab Results Component Value Date/Time Color YELLOW/STRAW 12/28/2020 06:02 AM  
 Appearance CLOUDY (A) 12/28/2020 06:02 AM  
 Specific gravity 1.022 12/28/2020 06:02 AM  
 pH (UA) 5.0 12/28/2020 06:02 AM  
 Protein 300 (A) 12/28/2020 06:02 AM  
 Glucose Negative 12/28/2020 06:02 AM  
 Ketone Negative 12/28/2020 06:02 AM  
 Bilirubin Negative 12/28/2020 06:02 AM  
 Urobilinogen 0.2 12/28/2020 06:02 AM  
 Nitrites Negative 12/28/2020 06:02 AM  
 Leukocyte Esterase TRACE (A) 12/28/2020 06:02 AM  
 Epithelial cells MODERATE (A) 12/28/2020 06:02 AM  
 Bacteria 1+ (A) 12/28/2020 06:02 AM  
 WBC 20-50 12/28/2020 06:02 AM  
 RBC >100 (H) 12/28/2020 06:02 AM  
 
 
 
Medications Reviewed:  
 
Current Facility-Administered Medications Medication Dose Route Frequency  albuterol (PROVENTIL HFA, VENTOLIN HFA, PROAIR HFA) inhaler 2 Puff  2 Puff Inhalation Q6H PRN  
 furosemide (LASIX) tablet 40 mg  40 mg Oral DAILY  insulin NPH (NOVOLIN N, HUMULIN N) injection 12 Units  12 Units SubCUTAneous ACB&D  
 vancomycin (VANCOCIN) 1,000 mg in 0.9% sodium chloride 250 mL (VIAL-MATE)  1,000 mg IntraVENous Q18H  
 enoxaparin (LOVENOX) injection 40 mg  40 mg SubCUTAneous Q12H  
 amLODIPine (NORVASC) tablet 10 mg  10 mg Oral DAILY  zinc sulfate (ZINCATE) 220 (50) mg capsule 1 Cap  1 Cap Oral DAILY  ascorbic acid (vitamin C) (VITAMIN C) tablet 500 mg  500 mg Oral BID  aspirin chewable tablet 81 mg  81 mg Oral DAILY  atorvastatin (LIPITOR) tablet 40 mg  40 mg Oral QHS  doxazosin (CARDURA) tablet 4 mg  4 mg Oral QHS  finasteride (PROSCAR) tablet 5 mg  5 mg Oral DAILY  fluticasone propionate (FLONASE) 50 mcg/actuation nasal spray 1 Spray  1 Spray Both Nostrils DAILY  losartan (COZAAR) tablet 100 mg  100 mg Oral DAILY  metoprolol tartrate (LOPRESSOR) tablet 50 mg  50 mg Oral BID  
  sertraline (ZOLOFT) tablet 100 mg  100 mg Oral QPM  
 temazepam (RESTORIL) capsule 15 mg  15 mg Oral QHS PRN  
 sodium chloride (OCEAN) 0.65 % nasal squeeze bottle 1 Spray  1 Spray Both Nostrils PRN  
 sodium chloride (NS) flush 5-40 mL  5-40 mL IntraVENous Q8H  
 sodium chloride (NS) flush 5-40 mL  5-40 mL IntraVENous PRN  polyethylene glycol (MIRALAX) packet 17 g  17 g Oral DAILY PRN  prochlorperazine (COMPAZINE) with saline injection 5 mg  5 mg IntraVENous Q6H PRN  
 insulin lispro (HUMALOG) injection   SubCUTAneous AC&HS  
 glucose chewable tablet 16 g  4 Tab Oral PRN  
 dextrose (D50W) injection syrg 12.5-25 g  12.5-25 g IntraVENous PRN  
 glucagon (GLUCAGEN) injection 1 mg  1 mg IntraMUSCular PRN  
 vancomycin - pharmacy to dose   Other Rx Dosing/Monitoring  zinc oxide-cod liver oil (DESITIN) 40 % paste   Topical Q12H  
 acetaminophen (TYLENOL) tablet 650 mg  650 mg Oral Q6H PRN Or  
 acetaminophen (TYLENOL) suppository 650 mg  650 mg Rectal Q6H PRN  
 guaiFENesin-dextromethorphan (ROBITUSSIN DM) 100-10 mg/5 mL syrup 5 mL  5 mL Oral Q4H PRN  cholecalciferol (VITAMIN D3) (1000 Units /25 mcg) tablet 2 Tab  2,000 Units Oral DAILY  
 
______________________________________________________________________ EXPECTED LENGTH OF STAY: 4d 2h 
ACTUAL LENGTH OF STAY:          9 Ja Brown MD

## 2021-01-05 NOTE — PROGRESS NOTES
Hospitalist Progress Note Derek Doe MD 
Answering service: [phone] OR [phone] from in house phone Date of Service:  2021 NAME:  Austin Varma :  1935 MRN:  [id number] Admission Summary: As per initial admission summary Patient presented to the emergency room with progressive shortness of breath and productive cough. Medical history is significant for hypertension; chronic diastolic congestive heart failure; dyslipidemia; third degree heart block, status post pacemaker insertion; coronary artery disease, status post CABG; benign prostatic hyperplasia; type 2 diabetes; aortic stenosis, status post transcatheter aortic valve replacement TAVR. Patient was recently admitted to this hospital from 2020 to 2020. Interval history / Subjective:  
  Patient seen for Follow up of COVID, diabetic foot infection Patient resting comfortably. On room air. Nurse at bedside giving skin care Discussed with radiology and cardiology pertaining MRI in relation to covid+ and pacemaker compatability Assessment & Plan:  
 
Acute on chronic diastolic congestive heart failure. CT chest showed patchy groundglass opacity in the right upper lobe may represent pulmonary edema. Cardiology consulted Chest x-ray on  showed increased pulmonary edema On lasix. COVID-19 pneumonia suspected superimposed bacterial pneumonia. On broad-spectrum antibiotics On dexamethasone and remdesivir started by the infectious disease Maintain droplet plus isolation. Right diabetic foot infection,suspicion of osteomyelitis --on cefepime and vancomycin. CT of the foot negative for osteomyelitis but it is not the best study to diagnose osteomyelitis, ideally would have liked either three-phase bone scan or MRI both of which could not be done due to patient's Covid status. -ID following. -Foot mri pending. Pacemaker mri compatible per cards. --Wound Care consult  
--Podiatry consulted 
--CT did not show any evidence of osteomyelitis --ultrasound of the lower extremity for DVT. Negative Hypertension. On metoprolol, losartan, amlodipine Dyslipidemia. We will continue with Lipitor. Third degree heart block. The patient is status post pacemaker insertion. Coronary artery disease, status post coronary artery bypass grafting. We will continue with cardiac medications. Sacral decubitus ulcer present on admission. Wound Care consult Meet Lynn Hypertension, chronic and uncontrolled: 
--Continue amlodipine 10 mg, losartan 100 mg, Lopressor 50 mg twice daily Benign prostatic hyperplasia urinary retention. 
-Continue Cardura. Type 2 diabetes with hyperglycemia worsened by steroids and acute illness 
-Accu-Cheks, Humalog sliding scale. 
-Switch Lantus to NPH, 12 units twice daily, continue to adjust to achieve target blood sugar of 140180. 
-He is on Metformin and Glucotrol at home, on hold. Anemia, anemia of chronic disease. Thrombocytopenia. The patient is asymptomatic. Monitor Elevated lactic acid level due to infection. resolved Aortic stenosis, status post TAVR. Code status: full code DVT prophylaxis: heparin Care Plan discussed with: Patient/Family Tried to reach family for update on 1/2 
-I called his wife [place] at [phone], no answer, voicemail not set up 
-I called and left a generic VM to his son Eduardo Mac at [phone] Disposition: TBD PT/ OT consulted Hospital Problems  Date Reviewed: 12/28/2020 Codes Class Noted POA Type 2 diabetes mellitus with right diabetic foot infection ([facility] [address] ICD-10-CM: E11.628, L08.9 ICD-9-CM: 250.80, 686.9  12/28/2020 Unknown * (Principal) Acute on chronic diastolic (congestive) heart failure (HCC) ICD-10-CM: I50.33 ICD-9-CM: 428.33, 428.0  12/27/2020 Yes Review of Systems: A comprehensive review of systems was negative except for that written in the HPI. Physical Examination:  
I had a face to face encounter with this patient and independently examined them on January 4, 2021 as outlined below: 
     
Constitutional:  No acute distress, cooperative, pleasant ENT:  Oral mucous moist, oropharynx benign. Neck supple, Resp:  CTA bilaterally. No wheezing/rhonchi/rales. No accessory muscle use CV:  Pacemaker device on the left chest wall. Regular rhythm, normal rate, no murmurs, gallops, rubs GI:  Soft, non distended, non tender. normoactive bowel sounds, no hepatosplenomegaly Musculoskeletal:  No edema, warm, 2+ pulses throughout. Dressing right foot. Neurologic:  Moves all extremities. AAOx3, CN II-XII reviewed Psych:  Good insight, Not anxious nor agitated. . 
  
 
Data Review:  
 Review and/or order of clinical lab test 
Review and/or order of tests in the radiology section of CPT Review and/or order of tests in the medicine section of CPT Labs:  
 
Recent Labs 01/04/21 
4421 01/03/21 
0158 WBC 8.0 8.1 HGB 10.3* 9.7* HCT 32.1* 30.2* * 105* Recent Labs 01/03/21 
0158 01/02/21 
6295  139  
K 3.3* 3.2*  
 101 CO2 36* 34* BUN 37* 39* CREA 0.91 1.05  
* 368* CA 8.4* 8.5 Recent Labs 01/02/21 
0033 ALT 43 AP 76 TBILI 0.3 TP 5.8* ALB 2.2*  
GLOB 3.6 Recent Labs 01/04/21 
3560 01/03/21 
0151 APTT 35.0* 43.9* No results for input(s): FE, TIBC, PSAT, FERR in the last 72 hours. Lab Results Component Value Date/Time Folate 20.5 12/28/2020 04:59 AM  
  
No results for input(s): PH, PCO2, PO2 in the last 72 hours. No results for input(s): CPK, CKNDX, TROIQ in the last 72 hours. No lab exists for component: CPKMB Lab Results Component Value Date/Time  Cholesterol, total 114 02/26/2020 03:19 PM  
 HDL Cholesterol 40 02/26/2020 03:19 PM  
 LDL, calculated 54 02/26/2020 03:19 PM  
 Triglyceride 101 02/26/2020 03:19 PM  
 
Lab Results Component Value Date/Time Glucose (POC) 154 (H) 01/04/2021 10:00 PM  
 Glucose (POC) 196 (H) 01/04/2021 04:49 PM  
 Glucose (POC) 318 (H) 01/04/2021 11:21 AM  
 Glucose (POC) 200 (H) 01/04/2021 07:18 AM  
 Glucose (POC) 346 (H) 01/03/2021 09:35 PM  
 
Lab Results Component Value Date/Time Color YELLOW/STRAW 12/28/2020 06:02 AM  
 Appearance CLOUDY (A) 12/28/2020 06:02 AM  
 Specific gravity 1.022 12/28/2020 06:02 AM  
 pH (UA) 5.0 12/28/2020 06:02 AM  
 Protein 300 (A) 12/28/2020 06:02 AM  
 Glucose Negative 12/28/2020 06:02 AM  
 Ketone Negative 12/28/2020 06:02 AM  
 Bilirubin Negative 12/28/2020 06:02 AM  
 Urobilinogen 0.2 12/28/2020 06:02 AM  
 Nitrites Negative 12/28/2020 06:02 AM  
 Leukocyte Esterase TRACE (A) 12/28/2020 06:02 AM  
 Epithelial cells MODERATE (A) 12/28/2020 06:02 AM  
 Bacteria 1+ (A) 12/28/2020 06:02 AM  
 WBC 20-50 12/28/2020 06:02 AM  
 RBC >100 (H) 12/28/2020 06:02 AM  
 
 
 
Medications Reviewed:  
 
Current Facility-Administered Medications Medication Dose Route Frequency  albuterol (PROVENTIL HFA, VENTOLIN HFA, PROAIR HFA) inhaler 2 Puff  2 Puff Inhalation Q6H PRN  
 furosemide (LASIX) tablet 40 mg  40 mg Oral DAILY  insulin NPH (NOVOLIN N, HUMULIN N) injection 12 Units  12 Units SubCUTAneous ACB&D  
 vancomycin (VANCOCIN) 1,000 mg in 0.9% sodium chloride 250 mL (VIAL-MATE)  1,000 mg IntraVENous Q18H  
 enoxaparin (LOVENOX) injection 40 mg  40 mg SubCUTAneous Q12H  
 amLODIPine (NORVASC) tablet 10 mg  10 mg Oral DAILY  zinc sulfate (ZINCATE) 220 (50) mg capsule 1 Cap  1 Cap Oral DAILY  ascorbic acid (vitamin C) (VITAMIN C) tablet 500 mg  500 mg Oral BID  aspirin chewable tablet 81 mg  81 mg Oral DAILY  atorvastatin (LIPITOR) tablet 40 mg  40 mg Oral QHS  doxazosin (CARDURA) tablet 4 mg  4 mg Oral QHS  finasteride (PROSCAR) tablet 5 mg  5 mg Oral DAILY  fluticasone propionate (FLONASE) 50 mcg/actuation nasal spray 1 Spray  1 Spray Both Nostrils DAILY  losartan (COZAAR) tablet 100 mg  100 mg Oral DAILY  metoprolol tartrate (LOPRESSOR) tablet 50 mg  50 mg Oral BID  sertraline (ZOLOFT) tablet 100 mg  100 mg Oral QPM  
 temazepam (RESTORIL) capsule 15 mg  15 mg Oral QHS PRN  
 sodium chloride (OCEAN) 0.65 % nasal squeeze bottle 1 Spray  1 Spray Both Nostrils PRN  
 sodium chloride (NS) flush 5-40 mL  5-40 mL IntraVENous Q8H  
 sodium chloride (NS) flush 5-40 mL  5-40 mL IntraVENous PRN  polyethylene glycol (MIRALAX) packet 17 g  17 g Oral DAILY PRN  prochlorperazine (COMPAZINE) with saline injection 5 mg  5 mg IntraVENous Q6H PRN  
 insulin lispro (HUMALOG) injection   SubCUTAneous AC&HS  
 glucose chewable tablet 16 g  4 Tab Oral PRN  
 dextrose (D50W) injection syrg 12.5-25 g  12.5-25 g IntraVENous PRN  
 glucagon (GLUCAGEN) injection 1 mg  1 mg IntraMUSCular PRN  
 vancomycin - pharmacy to dose   Other Rx Dosing/Monitoring  zinc oxide-cod liver oil (DESITIN) 40 % paste   Topical Q12H  
 acetaminophen (TYLENOL) tablet 650 mg  650 mg Oral Q6H PRN Or  
 acetaminophen (TYLENOL) suppository 650 mg  650 mg Rectal Q6H PRN  
 guaiFENesin-dextromethorphan (ROBITUSSIN DM) 100-10 mg/5 mL syrup 5 mL  5 mL Oral Q4H PRN  
 dexAMETHasone (DECADRON) tablet 6 mg  6 mg Oral DAILY  cholecalciferol (VITAMIN D3) (1000 Units /25 mcg) tablet 2 Tab  2,000 Units Oral DAILY  
 
______________________________________________________________________ EXPECTED LENGTH OF STAY: 4d 2h 
ACTUAL LENGTH OF STAY:          8 Eliseo Perdue MD

## 2021-01-05 NOTE — PROGRESS NOTES
Physical Therapy Checked back for therapy session this afternoon. Received patient in bed sleeping. Patient declined to participate in therapy stating he is tired, does not feel like sitting up in the chair. Patient acknowledged he has not been OOB and in the chair today. Educated patient in the benefit of sitting up and participating in therapy. Patient once again declined though agreeable to have therapy check back tomorrow.   Will defer today per patient request.

## 2021-01-05 NOTE — PROGRESS NOTES
Physical Therapy Attempted to see patient but just received his hot lunch. Declined getting into chair for lunch.   Will attempt to follow up later this pm. 
Rishi Barriga, PT

## 2021-01-05 NOTE — PROGRESS NOTES
MRI update: We have not yet received cardiology order form from Dr. Jeffy Pozo, which I have faxed to his office multiple times beginning 12/31/20, fax confirmation received. Dr. Radha Evans office did fax us his progress note from yesterday found in this EMR. To be clear,  policy ([employer]) requires department to have form signed by pt's EP cardiologist before MRI. We do NOT need cardiologist to tell us whether device is MRI compatible or not, this information is available from [employer] and MRI department researches this on every patient. Will coordinate MRI with [employer] rep when signed form is received back from Dr. Jeffy Pozo as requested. See my note from 12/31.

## 2021-01-06 NOTE — PROGRESS NOTES
GLORY:  Accepted to [facility] (needs to be pending 10 days from covid positive) and foot MRI needs resulted. Called Candelaria Hsu [facility] liason, phone [phone] to confirm above. She confirmed that pt can be accepted to [facility] tomorrow 1/7/2021 if medically stable, will update MD. 
 
Pt to have foot MRI today, need to determine duration of IV antibiotic treatment plan. Pt not medically stable for hospital discharge.  
 
ESTELA Hunt

## 2021-01-06 NOTE — PROGRESS NOTES
Sent the following [employer] message at 1915 to Ubaldo Silverman NP I missed giving this pt Humalog and NPH at dinner. His BS = [phone] at 1634. I rechecked his BS at [address] and BS = 370, in which he has already eaten dinner. The sliding scale order states to call MD over 350. Also, I tried paging Dr. Judit Shoemaker at [phone] and he never called me back. Please advise. Aaron Beckwith NP read my note. Barbie Brizuela he responded: Give 4 units lispro now with his scheduled nph, night shift should do bedtime check at midnight

## 2021-01-06 NOTE — PROGRESS NOTES
Hospitalist Progress Note Jeniffer Llanos MD 
Answering service: [phone] OR [phone] from in house phone Date of Service:  2021 NAME:  Allan Mayberry :  1935 MRN:  [id number] Admission Summary: As per initial admission summary Patient presented to the emergency room with progressive shortness of breath and productive cough. Medical history is significant for hypertension; chronic diastolic congestive heart failure; dyslipidemia; third degree heart block, status post pacemaker insertion; coronary artery disease, status post CABG; benign prostatic hyperplasia; type 2 diabetes; aortic stenosis, status post transcatheter aortic valve replacement TAVR. Patient was recently admitted to this hospital from 2020 to 2020. Interval history / Subjective:  
  Patient seen for Follow up of COVID, diabetic foot infection Patient resting comfortably. On room air. I saw patient earlier before he went for MRI. He denied any complaints. Assessment & Plan:  
 
Acute on chronic diastolic congestive heart failure. CT chest showed patchy groundglass opacity in the right upper lobe may represent pulmonary edema. Cardiology consulted Chest x-ray on  showed increased pulmonary edema On oral lasix. COVID-19 pneumonia suspected superimposed bacterial pneumonia. On broad-spectrum antibiotics Completed dexamethasone and remdesivir. Maintain droplet plus isolation. Right diabetic foot infection,suspicion of osteomyelitis --on cefepime and vancomycin. ID and podiatry following. 
- CT of the foot negative for osteomyelitis but it is not the best study to diagnose osteomyelitis 
-Foot mri today. --ultrasound of the lower extremity for DVT. Negative Hypertension. --Continue amlodipine 10 mg, losartan 100 mg, Lopressor 50 mg twice daily Dyslipidemia. On atorvastatin. Third degree heart block. The patient is status post pacemaker insertion. Coronary artery disease, status post coronary artery bypass grafting:continue with cardiac medications. Sacral decubitus ulcer present on admission. Wound Care consult Benign prostatic hyperplasia 
-Continue Cardura. Type 2 diabetes with hyperglycemia worsened by steroids and acute illness 
-Accu-Cheks, Humalog sliding scale. 
-Switch Lantus to NPH, 12 units twice daily, continue to adjust to achieve target blood sugar of 140180. 
-He is on Metformin and Glucotrol at home, on hold. Anemia, anemia of chronic disease. Thrombocytopenia. The patient is asymptomatic. Monitor Elevated lactic acid level due to infection. resolved Aortic stenosis, status post TAVR. Code status: full code DVT prophylaxis: heparin Care Plan discussed with: Patient/Family Updated pts wife and his son on 1/5. Disposition: IPR,CM send referral.Pt may be discharge as early as Thursday. Hospital Problems  Date Reviewed: 12/28/2020 Codes Class Noted POA Type 2 diabetes mellitus with right diabetic foot infection ([facility] [address] [facility] ICD-10-CM: E11.628, L08.9 ICD-9-CM: 250.80, 686.9  12/28/2020 Unknown * (Principal) Acute on chronic diastolic (congestive) heart failure (HCC) ICD-10-CM: I50.33 ICD-9-CM: 428.33, 428.0  12/27/2020 Yes Review of Systems: A comprehensive review of systems was negative except for that written in the HPI. Physical Examination:  
I had a face to face encounter with this patient and independently examined them on January 6, 2021 as outlined below: 
     
Constitutional:  No acute distress, cooperative, pleasant ENT:  Oral mucous moist, oropharynx benign. Neck supple, Resp:  CTA bilaterally. No wheezing/rhonchi/rales. No accessory muscle use CV:  Pacemaker device on the left chest wall. Regular rhythm, normal rate, no murmurs, gallops, rubs GI:  Soft, non distended, non tender. normoactive bowel sounds, no hepatosplenomegaly Musculoskeletal:  No edema, warm, 2+ pulses throughout. Dressing right foot. Neurologic:  Moves all extremities. AAOx3, CN II-XII reviewed. Hard of hearing. Psych:  Good insight, Not anxious nor agitated. . 
  
 
Data Review:  
 Review and/or order of clinical lab test 
Review and/or order of tests in the radiology section of CPT Review and/or order of tests in the medicine section of [facility] Labs:  
 
Recent Labs 01/06/21 
3732 01/05/21 
9960 WBC 7.6 7.3 HGB 11.2* 10.0* HCT 34.7* 30.7* * 111* Recent Labs 01/06/21 
5441 01/05/21 
2566 NA  --  141  
K 3.5 3.7 CL  --  105 CO2  --  34* BUN  --  38* CREA  --  0.80 GLU  --  239* CA  --  8.7 MG 1.7 1.8 No results for input(s): ALT, AP, TBIL, TBILI, TP, ALB, GLOB, GGT, AML, LPSE in the last 72 hours. No lab exists for component: SGOT, GPT, AMYP, HLPSE Recent Labs 01/06/21 
6844 01/05/21 
7211 01/04/21 
0221 APTT 39.1* 39.7* 35.0* No results for input(s): FE, TIBC, PSAT, FERR in the last 72 hours. Lab Results Component Value Date/Time Folate 20.5 12/28/2020 04:59 AM  
  
No results for input(s): PH, PCO2, PO2 in the last 72 hours. No results for input(s): CPK, CKNDX, TROIQ in the last 72 hours. No lab exists for component: CPKMB Lab Results Component Value Date/Time Cholesterol, total 114 02/26/2020 03:19 PM  
 HDL Cholesterol 40 02/26/2020 03:19 PM  
 LDL, calculated 54 02/26/2020 03:19 PM  
 Triglyceride 101 02/26/2020 03:19 PM  
 
Lab Results Component Value Date/Time Glucose (POC) 203 (H) 01/06/2021 11:30 AM  
 Glucose (POC) 106 (H) 01/06/2021 06:59 AM  
 Glucose (POC) 171 (H) 01/06/2021 12:07 AM  
 Glucose (POC) 236 (H) 01/05/2021 10:37 PM  
 Glucose (POC) 370 (H) 01/05/2021 06:40 PM  
 
Lab Results Component Value Date/Time  Color YELLOW/STRAW 12/28/2020 06:02 AM  
 Appearance CLOUDY (A) 12/28/2020 06:02 AM  
 Specific gravity 1.022 12/28/2020 06:02 AM  
 pH (UA) 5.0 12/28/2020 06:02 AM  
 Protein 300 (A) 12/28/2020 06:02 AM  
 Glucose Negative 12/28/2020 06:02 AM  
 Ketone Negative 12/28/2020 06:02 AM  
 Bilirubin Negative 12/28/2020 06:02 AM  
 Urobilinogen 0.2 12/28/2020 06:02 AM  
 Nitrites Negative 12/28/2020 06:02 AM  
 Leukocyte Esterase TRACE (A) 12/28/2020 06:02 AM  
 Epithelial cells MODERATE (A) 12/28/2020 06:02 AM  
 Bacteria 1+ (A) 12/28/2020 06:02 AM  
 WBC 20-50 12/28/2020 06:02 AM  
 RBC >100 (H) 12/28/2020 06:02 AM  
 
 
 
Medications Reviewed:  
 
Current Facility-Administered Medications Medication Dose Route Frequency  albuterol (PROVENTIL HFA, VENTOLIN HFA, PROAIR HFA) inhaler 2 Puff  2 Puff Inhalation Q6H PRN  
 furosemide (LASIX) tablet 40 mg  40 mg Oral DAILY  insulin NPH (NOVOLIN N, HUMULIN N) injection 12 Units  12 Units SubCUTAneous ACB&D  
 vancomycin (VANCOCIN) 1,000 mg in 0.9% sodium chloride 250 mL (VIAL-MATE)  1,000 mg IntraVENous Q18H  
 enoxaparin (LOVENOX) injection 40 mg  40 mg SubCUTAneous Q12H  
 amLODIPine (NORVASC) tablet 10 mg  10 mg Oral DAILY  zinc sulfate (ZINCATE) 220 (50) mg capsule 1 Cap  1 Cap Oral DAILY  ascorbic acid (vitamin C) (VITAMIN C) tablet 500 mg  500 mg Oral BID  aspirin chewable tablet 81 mg  81 mg Oral DAILY  atorvastatin (LIPITOR) tablet 40 mg  40 mg Oral QHS  doxazosin (CARDURA) tablet 4 mg  4 mg Oral QHS  finasteride (PROSCAR) tablet 5 mg  5 mg Oral DAILY  fluticasone propionate (FLONASE) 50 mcg/actuation nasal spray 1 Spray  1 Spray Both Nostrils DAILY  losartan (COZAAR) tablet 100 mg  100 mg Oral DAILY  metoprolol tartrate (LOPRESSOR) tablet 50 mg  50 mg Oral BID  sertraline (ZOLOFT) tablet 100 mg  100 mg Oral QPM  
 temazepam (RESTORIL) capsule 15 mg  15 mg Oral QHS PRN  
 sodium chloride (OCEAN) 0.65 % nasal squeeze bottle 1 Spray  1 Spray Both Nostrils PRN  
  sodium chloride (NS) flush 5-40 mL  5-40 mL IntraVENous Q8H  
 sodium chloride (NS) flush 5-40 mL  5-40 mL IntraVENous PRN  polyethylene glycol (MIRALAX) packet 17 g  17 g Oral DAILY PRN  prochlorperazine (COMPAZINE) with saline injection 5 mg  5 mg IntraVENous Q6H PRN  
 insulin lispro (HUMALOG) injection   SubCUTAneous AC&HS  
 glucose chewable tablet 16 g  4 Tab Oral PRN  
 dextrose (D50W) injection syrg 12.5-25 g  12.5-25 g IntraVENous PRN  
 glucagon (GLUCAGEN) injection 1 mg  1 mg IntraMUSCular PRN  
 vancomycin - pharmacy to dose   Other Rx Dosing/Monitoring  zinc oxide-cod liver oil (DESITIN) 40 % paste   Topical Q12H  
 acetaminophen (TYLENOL) tablet 650 mg  650 mg Oral Q6H PRN Or  
 acetaminophen (TYLENOL) suppository 650 mg  650 mg Rectal Q6H PRN  
 guaiFENesin-dextromethorphan (ROBITUSSIN DM) 100-10 mg/5 mL syrup 5 mL  5 mL Oral Q4H PRN  cholecalciferol (VITAMIN D3) (1000 Units /25 mcg) tablet 2 Tab  2,000 Units Oral DAILY  
 
______________________________________________________________________ EXPECTED LENGTH OF STAY: 4d 2h 
ACTUAL LENGTH OF STAY:          10 Fanny Marcial MD

## 2021-01-06 NOTE — PROGRESS NOTES
Alerted by [facility] of patient having 3 SVT episodes throughout the night. Paged NP Lorenzo Harmon and was advised to monitor magnesium and potassium level. K and Mg+ labs pending. No more abnormal cardiac rhythms currently reported. Will continue to monitor.

## 2021-01-06 NOTE — PROGRESS NOTES
ID follow up Chart checked MRI still pending On vancomycin for MRSA R foot infection  
covid Status post  remdesivir and steroids  and now on RA 
Cr stable IV vancomycin per renal dosing Final antibiotics duration to be determined Cely Desai DO 
12:50 PM

## 2021-01-06 NOTE — PROGRESS NOTES
Physical Therapy 1/6/2021 Chart reviewed up to date. Pt CHARY at MRI. Will follow-up as appropriate. Recommend with nursing patient to complete as able in order to maintain strength, endurance and independence: OOB to chair 3x/day and ambulating with 1 person A. Thank you.  
Brandon Irwin, PT, DPT

## 2021-01-06 NOTE — PROGRESS NOTES
Comprehensive Nutrition Assessment Type and Reason for Visit: Nazia Valles Nutrition Recommendations/Plan: 1. Consistent CHO 2400 kcal diet, low Na+ diet Nutrition Assessment:    
82 y.o. male who has a past medical history of Acute on chronic diastolic CHF, CAD s/p CABG 2006, DM type 2, Hearing loss, Hypercholesterolemia, HTN, and third degree AV block s/p PM insertion, aortic stenosis, s/p TAVR 10/2020. Admitted with Acute on chronic diastolic CHF, cards following. Noted COVID+, started on remdesivir by ID, on 3 L O2. Noted BG uncontrolled, suspect d/t steroids. HbA1C 7.1% in Oct 2020 indicating fairly good control prior to then. Several recent admissions, 10/18-11/4 had TAVR 10/30/20. Again admitted mid-Nov with c/o diarrhea, C. Diff negative. Appears ongoing wt loss since Oct admission, although difficult to assess as may be fluid related. However, currently with 2+ bilat LE pitting edema, so wt likely up from actual BW. Overall, down ~11 lb (5.7% BW) in past 1.5-2 months which may or may be significant. Still with loose BM, ?ongoing diarrhea. Minimal PO intake documentation. Pt did not answer phone upon attempts to call. RN reports no issues, pt eating well. RD will add Ensure High Protein TID with increased protein needs for wound healing and COVID. Follow-up with acceptance, wt hx, GI status, etc.   
 
 
1/6- pt screened for follow-up. Off at MRI. Discussed in rounds, pt eating well and taking ONS. Wt stable since admission. Insulin being adjusted for better BG control. Nothing new to add at this time. RD will continue to follow. Malnutrition Assessment: 
Malnutrition Status:  Insufficient data Context:  Acute illness Nutritionally Significant Medications:  
Vit C, Lipitor, Vit D3, Lasix, correctional scale, NPH 12 units BID, Cozaar, Vancomycin, Zincate Estimated Daily Nutrient Needs: 
Energy (kcal): 0985-0431(30-33 kcal/kg) Protein (g): 120-135(1.5-1.6 gm/kg or ~20%) Fluid (ml/day): 2000(CHF) Nutrition Related Findings:  
Edema: trace bilat LE (improving) Last BM: 1/4 - formed Wounds:   
Multiple, Diabetic ulcer Per WOCN 12/28: 
1. POA right 5th metatarsal head, plantar aspect with diabetic foot ulcer  
2. POA bilateral upper buttocks with moisture associated skin damage. 3.  POA bilateral lower legs and right dorsal foot with linear lines that appear to be excoriation, crusted over, no drainage 
  
 
Current Nutrition Therapies: 
Diet: Consistent CHO 2400 kcal, 2 gm Na Supplements: Ensure High Protein TID with meals Meal intake:  
Patient Vitals for the past 168 hrs: 
 % Diet Eaten 01/06/21 0918 75 % 01/01/21 1458 50 % 01/01/21 1200 75 % 12/31/20 1816 50 % 12/31/20 1552 50 % 12/31/20 0841 100 % Anthropometric Measures: 
· Height:  6' 2\" (188 cm) · Current Body Wt:  81 kg (178 lb 9.2 oz) · Admission Body Wt:  179 lb 7.3 oz(81.4 kg - standing scale) · Usual Body Wt:  88.5 kg (195 lb)(195-200 lb) · Ideal Body Wt:  190 lbs:  94.8 % · BMI Category:  Normal weight (BMI 22.0-24.9) age over 72 Wt Readings from Last 20 Encounters:  
01/05/21 81 kg (178 lb 8 oz) 12/28/20 81.7 kg (180 lb 3.2 oz) 12/28/20 81.4 kg (179 lb 8 oz) - standing scale, admission 12/23/20 81.6 kg (180 lb) 12/02/20 86 kg (189 lb 9.5 oz)  
11/24/20 86.2 kg (190 lb)  
11/12/20 90.7 kg (200 lb) 11/05/20 86.6 kg (191 lb) 11/04/20 84.7 kg (186 lb 11.7 oz) 11/02/20 86.4 kg (190 lb 7.6 oz) - standing scale 10/21/20 87.7 kg (193 lb 5.5 oz) - bed 10/18/20 93.2 kg (205 lb 7.5 oz) - bed, admission 10/15/20 89.4 kg (197 lb)  
09/10/20 88.9 kg (196 lb) 08/13/20 88 kg (194 lb) 06/05/20 88 kg (194 lb) 06/04/20 88.1 kg (194 lb 4.8 oz) 02/06/20 88.2 kg (194 lb 6.4 oz) 01/23/20 88.3 kg (194 lb 9.6 oz) 01/16/20 87.5 kg (193 lb) 01/09/20 89.4 kg (197 lb 3.2 oz) 01/02/20 90.2 kg (198 lb 12.8 oz) 12/30/19 91.1 kg (200 lb 12.8 oz) 12/23/19 89.7 kg (197 lb 12.8 oz) 12/04/19 88.9 kg (196 lb) Nutrition Diagnosis:  
· Increased nutrient needs related to acute injury/trauma, impaired respiratory function, increased demand for energy/nutrients as evidenced by (COVID, wound healing) Nutrition Interventions:  
Food and/or Nutrient Delivery: Modify current diet, Start oral nutrition supplement Nutrition Education and Counseling: No recommendations at this time Coordination of Nutrition Care: Continue to monitor while inpatient, Interdisciplinary rounds Goals: 
PO >50% of meals with 1-2 ONS daily over next 5-7 days; stabilize wt loss Nutrition Monitoring and Evaluation:  
Behavioral-Environmental Outcomes: None identified Food/Nutrient Intake Outcomes: Food and nutrient intake, Supplement intake Physical Signs/Symptoms Outcomes: Biochemical data, GI status, Fluid status or edema, Skin, Weight, Hemodynamic status Discharge Planning: Too soon to determine Recent Labs 01/06/21 
0068 01/05/21 
4304 GLU  --  239* BUN  --  38* CREA  --  0.80 NA  --  141  
K 3.5 3.7 CL  --  105 CO2  --  34* CA  --  8.7 MG 1.7 1.8 No results for input(s): ALT, AP, TBIL, TBILI, TP, ALB, GLOB, GGT, AML, LPSE in the last 72 hours. No lab exists for component: SGOT, GPT, AMYP, HLPSE No results for input(s): LAC in the last 72 hours. Recent Labs 01/06/21 
9187 01/05/21 
7050 WBC 7.6 7.3 HGB 11.2* 10.0* HCT 34.7* 30.7* * 111* No results for input(s): PREALB in the last 72 hours. No results for input(s): TRIGL in the last 72 hours. Recent Labs 01/06/21 
1130 01/06/21 
8810 01/06/21 
0007 01/05/21 
2237 01/05/21 
1840 01/05/21 
1634 01/05/21 
1103 01/05/21 
0645 01/04/21 
2200 01/04/21 
1649 01/04/21 
1121 01/04/21 
0718 01/03/21 2135 01/03/21 
1623 GLUCPOC 203* 106* 171* 236* 370* 312* 130* 176* 154* 196* 318* 200* 346* 324* Lab Results Component Value Date/Time Hemoglobin A1c 7.1 (H) 10/18/2020 03:27 PM  
 Hemoglobin A1c 7.0 (H) 02/26/2020 03:19 PM  
 Hemoglobin A1c 6.6 (H) 04/30/2019 01:46 PM  
 Hemoglobin A1c (POC) 8.1 11/02/2017 03:27 PM  
 
 
Iron Profile Iron Date Value Ref Range Status 12/28/2020 29 (L) 35 - 150 ug/dL Final  
09/12/2017 130 38 - 169 ug/dL Final  
 
TIBC Date Value Ref Range Status 09/12/2017 286 250 - 450 ug/dL Final  
 
Iron % saturation Date Value Ref Range Status 09/12/2017 45 15 - 55 % Final  
 
 
Ferritin Date Value Ref Range Status 12/29/2020 153 26 - 388 NG/ML Final  
12/28/2020 128 26 - 388 NG/ML Final  
09/12/2017 74 30 - 400 ng/mL Final  
 
 
B Vitamins Folate Date Value Ref Range Status 12/28/2020 20.5 5.0 - 21.0 ng/mL Final  
10/18/2020 20.1 5.0 - 21.0 ng/mL Final  
 
Vitamin B12 Date Value Ref Range Status 12/28/2020 390 193 - 986 pg/mL Final  
10/18/2020 443 193 - 986 pg/mL Final  
 
 
Vitamin D 25-Hydroxy Date Value Ref Range Status 12/29/2020 43.8 30 - 100 ng/mL Final  
 
VITAMIN D, 25-HYDROXY Date Value Ref Range Status 09/12/2017 57.9 30.0 - 100.0 ng/mL Final  
 
 
 
Candis Ochoa RD Available via "[employer] Or Pager# [phone]

## 2021-01-07 PROBLEM — E11.621 DIABETIC ULCER OF RIGHT FOOT ASSOCIATED WITH TYPE 2 DIABETES MELLITUS, WITH FAT LAYER EXPOSED (HCC): Status: ACTIVE | Noted: 2021-01-01

## 2021-01-07 PROBLEM — L97.512 DIABETIC ULCER OF RIGHT FOOT ASSOCIATED WITH TYPE 2 DIABETES MELLITUS, WITH FAT LAYER EXPOSED (HCC): Status: ACTIVE | Noted: 2021-01-01

## 2021-01-07 NOTE — PROGRESS NOTES
Progress Note Patient: Bossman Velasquez MRN: [id number]  SSN: [id number] YOB: 1935  Age: 82 y.o. Sex: male Admit Date: 12/27/2020 Procedure:    
 
Subjective:  
 
Patient seen resting quiet and comfortably and no apparent distress. Still notes occasional pain to foot only when direct pressure applied to area. Objective:  
 
Visit Vitals BP (!) 150/64 (BP 1 Location: Left arm, BP Patient Position: At rest) Pulse 62 Temp 97.5 °F (36.4 °C) Resp 18 Ht 6' 2\" (1.88 m) Wt 80.5 kg (177 lb 7.5 oz) SpO2 90% BMI 22.79 kg/m² Physical Exam: 
Decreased edema to RLE. Erythema only noted to the immediate periwound are of the ulceration. Ulceration of the right plantar 5th MPJ measures 2.5 x 2.3 x 0.2 cm with granular base today and no probe to bone today. Some slough noted to the wound area, but no purulence noted. Slight tenderness with palpation. Labs/Radiology Review: images and reports reviewed Assessment:  
 
Hospital Problems  Date Reviewed: 12/28/2020 Codes Class Noted POA Type 2 diabetes mellitus with right diabetic foot infection [facility] ICD-10-CM: E11.628, L08.9 ICD-9-CM: 250.80, 686.9  12/28/2020 Unknown * (Principal) Acute on chronic diastolic (congestive) heart failure (HCC) ICD-10-CM: I50.33 ICD-9-CM: 428.33, 428.0  12/27/2020 Yes Plan/Recommendations/Medical Decision Making:  
-Reviewed MRI \"1. Fifth metatarsal head findings represent reactive changes versus minimal osteomyelitis, likely nonacute. No significant change given difference in technique. 2. Soft tissue ulcer adjacent to the fifth MTP joint. Underlying cellulitis. No drainable abscess or sinus tract. \"  
-Discussed results of the MRI with Dr. Placido Freeman.  Based on the clinical appearance of the wound with improvement over the past week and no current probe to bone and MRI, that will treat as reactive changes of the bone at this time. -Patient to finish a course of doxycycline for a course of 2 weeks of antibiotics. 
-Will continue local wound care. -Will have patient follow-up with me at the [facility] as an outpatient for close monitoring. If wound worsens, he will need additional antibiotics and surgical debridement. 
-Prevalon boot to right foot. 
-Will sign off at this time as no surgical intervention needed at this time and patient does have current dressing instructions. Discharge Instructions: 
1. Follow-up within 1 week of discharge at [facility] at [employer] Call [phone]. 
2. Optifoam Ag to the right foot ulcer every other day. 3. Weight bearing as tolerated to right foot in surgical shoe 4. Patient to wear prevalon boot to right foot while resting.

## 2021-01-07 NOTE — PROGRESS NOTES
Problem: Self Care Deficits Care Plan (Adult) Goal: *Acute Goals and Plan of Care (Insert Text) Description:  
FUNCTIONAL STATUS PRIOR TO ADMISSION: Patient was modified independent using a single point cane for functional mobility. Patient reports steady decline and multiple hospital/rehab admissions since beginning of November. Patient has all needed DME. HOME SUPPORT: The patient lived with wife but did not require assist. Reports wife cannot physically assist.  
 
Occupational Therapy Goals Initiated 12/30/2020 1. Patient will perform grooming standing for 5 minutes with VSS with minimal assistance/contact guard assist within 7 day(s). - DOWNGRADE 2. Patient will perform bathing sitting unsupported with VSS with minimal assistance/contact guard assist within 7 day(s). 3.  Patient will perform lower body dressing with minimal assistance/contact guard assist within 7 day(s). 4.  Patient will perform toilet transfers with minimal assistance/contact guard assist within 7 day(s). - DOWNGRADE 5. Patient will perform all aspects of toileting with minimal assistance/contact guard assist within 7 day(s). - DOWNGRADE 6. Patient will participate in upper extremity therapeutic exercise/activities with supervision/set-up for 5 minutes within 7 day(s). 7.  Patient will utilize energy conservation techniques during functional activities with verbal cues within 7 day(s). Weekly Re-Assessment 1/7/2020 1. Patient will perform grooming standing for 3 minutes with VSS with minimal assistance/contact guard assist within 7 day(s). 2.  Patient will perform bathing sitting unsupported with VSS with minimal assistance/contact guard assist within 7 day(s). 3.  Patient will perform lower body dressing with minimal assistance/contact guard assist within 7 day(s). 4.  Patient will perform toilet transfers with moderate assistance within 7 day(s). 5.  Patient will perform all aspects of toileting with moderate assistance within 7 day(s). 6.  Patient will participate in upper extremity therapeutic exercise/activities with supervision/set-up for 5 minutes within 7 day(s). 7.  Patient will utilize energy conservation techniques during functional activities with verbal cues within 7 day(s). Outcome: Progressing Towards Goal 
 OCCUPATIONAL THERAPY TREATMENT/WEEKLY RE-ASSESSMENT Patient: Natasha York (73 y.o. male) Date: 1/7/2021 Diagnosis: Acute on chronic diastolic (congestive) heart failure ([facility]) [I50.33] Acute on chronic diastolic (congestive) heart failure ([facility] [address] Precautions: Fall Chart, occupational therapy assessment, plan of care, and goals were reviewed. ASSESSMENT Patient continues with skilled OT services and is progressing towards goals. Patient received sitting in bedside chair, drowsy and requesting return to bed. Patient unable to stand from recliner chair despite 3 trials with max assist 2/2 weakness and significant fatigue. Patient ultimately completing lateral transfer from chair>bed with mod assist. Note patient with slight BM (total assist to adjust/fasten brief), however patient repeatedly declining assistance with hygiene despite OT explanation of concern for skin integrity. Patient with increased deconditioning during hospitalization - several goals for downgraded as appropriate to current functional performance. Continue to recommend discharge to rehab setting. Given tolerance today, anticipate most appropriate for SNF-level at this time. Current Level of Function Impacting Discharge (ADLs): mod A lateral scooting; set-up for upper body ADLs Other factors to consider for discharge: PLAN : 
Goals have been updated based on progression since last assessment. Patient continues to benefit from skilled intervention to address the above impairments. Continue to follow patient 5 times a week to address goals. Recommend with staff: Chair position in bed for meals; lateral scooting transfer to chair 3x/day or assist x2 for standpivot; Use of bedpan or drop-arm BSC for toileting Recommend next OT session: standing ADLs within tolerance Recommendation for discharge: (in order for the patient to meet his/her long term goals) Therapy up to 5 days/week in SNF setting This discharge recommendation: 
Has not yet been discussed the attending provider and/or case management IF patient discharges home will need the following DME: TBD SUBJECTIVE:  
Patient stated Jabier Overlie you help me back to bed?  OBJECTIVE DATA SUMMARY:  
Cognitive/Behavioral Status: 
Neurologic State: Alert;Drowsy Orientation Level: Oriented to person;Oriented to place;Oriented to situation Cognition: Follows commands;Decreased attention/concentration Perception: Appears intact Perseveration: No perseveration noted Safety/Judgement: Awareness of environment; Insight into deficits; Fall prevention Functional Mobility and Transfers for ADLs: 
Bed Mobility: 
Scooting: Moderate assistance(lateral transfer from chair > bed) Transfers: 
Sit to Stand: Maximum assistance(unable to clear buttocks from recliner chair) Balance: 
Sitting: Intact Standing: Impaired Standing - Static: Fair;Constant support Standing - Dynamic : Poor;Constant support ADL Intervention: Lower Body Dressing Assistance Dressing Assistance: Total assistance(dependent) Protective Undergarmet: Total assistance (dependent)(to fasten, patient with poor attention/initiation 2/2 fatigu) Cognitive Retraining Safety/Judgement: Awareness of environment; Insight into deficits; Fall prevention Pain: 
Declined pain. Activity Tolerance:  
Poor, requires frequent rest breaks, and extremely fatigued After treatment patient left in no apparent distress:  
Supine in bed, Call bell within reach, Bed / chair alarm activated, and Side rails x 3 
 
 COMMUNICATION/COLLABORATION:  
The patients plan of care was discussed with: Physical therapist and Registered nurse. Rustam De La Rosa OT Time Calculation: 27 mins

## 2021-01-07 NOTE — PROGRESS NOTES
ID Follow up Chart reviewed Had a long discussion with patient's son as well as Dr. Mega Fall MRI right foot reported as \"1. Fifth metatarsal head findings represent reactive changes versus minimal 
osteomyelitis, likely nonacute. No significant change given difference in 
technique. 2. Soft tissue ulcer adjacent to the fifth MTP joint. Underlying cellulitis. No 
drainable abscess or sinus tract\" Discussed with patient's son regarding side effects and concerns for nephrotoxicity as well as C. difficile infection with long-term antibiotics Discussed the probability that chronic osteomyelitis not curable with antibiotics alone Discussed the most important aspects of his wound care to help prevent worsening of the wound Patient son agrees that he would want his father to get antibiotics on and off as necessary for acute worsening such as cellulitis or wound discharge and would not like long-term antibiotics at this time Recommend stopping IV antibiotics ( Vancomycin). Patient has received close 11 days of antibiotics since admission We will complete with doxycycline 100 mg twice a day for 3 more days Above would be a 2-week course of treatment Wound needs to be assessed closely and he will need aggressive wound care to help prevent worsening of this wound Additional modalities that would help his optimizing nutrition and offloading pressure Patient son expressed understanding that if the wound worsens his father will need to be reassessed for additional antibiotics and or debridement I will sign off at this time Please reconsult if need be Melissa Thompson DO 
12:28 PM

## 2021-01-07 NOTE — PROGRESS NOTES
Problem: Mobility Impaired (Adult and Pediatric) Goal: *Acute Goals and Plan of Care (Insert Text) Description: FUNCTIONAL STATUS PRIOR TO ADMISSION: Patient was modified independent using a single point cane for functional mobility up until recently. Per pt - after month stay in hosp for cardiac issues - pt went to rehab - had been at home x 1 wk with wife - using RW. HOME SUPPORT PRIOR TO ADMISSION: The patient lived with wife in [address] [place] [address] - was receiving home health after discharge from rehab center. Physical Therapy Goals Re-Assessed 1/7/2021 1. Patient will move from supine to sit and sit to supine , scoot up and down, and roll side to side in bed with supervision/set-up within 7 day(s). 2.  Patient will transfer from bed to chair and chair to bed with supervision/set-up using the least restrictive device within 7 day(s). 3.  Patient will perform sit to stand with minimal assistance/contact guard assist within 7 day(s). 4.  Patient will ambulate with minimal assistance/contact guard assist for 75 feet with the least restrictive device within 7 day(s). Initiated 12/30/2020 1. Patient will move from supine to sit and sit to supine , scoot up and down, and roll side to side in bed with independence within 7 day(s). 2.  Patient will transfer from bed to chair and chair to bed with modified independence using the least restrictive device within 7 day(s). 3.  Patient will perform sit to stand with modified independence within 7 day(s). 4.  Patient will ambulate with modified independence for > 100 feet with the least restrictive device within 7 day(s). Outcome: Not Progressing Towards Goal 
 PHYSICAL THERAPY TREATMENT: WEEKLY REASSESSMENT Patient: Viv Alejandre (57 y.o. male) Date: 1/7/2021 Primary Diagnosis: Acute on chronic diastolic (congestive) heart failure ([facility] [address] [I50.33] Precautions:   Fall ASSESSMENT Patient continues with skilled PT services and is not progressing towards goals. Pt requires heavy encouragement to participate with therapy and remain up in the chair at end of session. Pt requires Min-Mod A for bed mobility, Mod-Max A to stand from the chair and Min A to ambulate short distance around the foot of the bed. Pt most limited by generalized weakness and impaired balance. Fatigued with gait distance of 18ft and requires heavier assistance each stand from the chair for brief change and chair alarm pad placement. Pt up in chair with lunch set up, heels elevated and all needs in reach. Awaiting podiatry recommendation following MRI this morning. Patient's progression toward goals since last assessment: minimal progress, pt deferring therapy treatment sessions Current Level of Function Impacting Discharge (mobility/balance): Max A to stand from chair and EOB Functional Outcome Measure: The patient scored 4/28 on the Tinetti outcome measure which is indicative of high fall risk. Other factors to consider for discharge: likely will need to return to rehab PLAN : 
Goals have been updated based on progression since last assessment. Patient continues to benefit from skilled intervention to address the above impairments. Recommendations and Planned Interventions: bed mobility training, transfer training, gait training, therapeutic exercises, neuromuscular re-education, patient and family training/education, and therapeutic activities Frequency/Duration: Patient will be followed by physical therapy:  5 times a week to address goals. Recommendation for discharge: (in order for the patient to meet his/her long term goals) Therapy up to 5 days/week in SNF setting This discharge recommendation: 
Has been made in collaboration with the attending provider and/or case management IF patient discharges home will need the following DME: to be determined (TBD) SUBJECTIVE:  
 Patient stated I just want to get back to the bed.  OBJECTIVE DATA SUMMARY:  
HISTORY:   
Past Medical History:  
Diagnosis Date Acute on chronic diastolic (congestive) heart failure ([facility] [address] 12/27/2020 BPH (benign prostatic hyperplasia) CAD (coronary artery disease) Diabetes ([facility] [address] Hearing loss Hypercholesterolemia Hypertension Murmur Recurrent depression ([facility] [address] 8/22/2019 Third degree AV block ([facility] [address] 10/30/2020 Past Surgical History:  
Procedure Laterality Date HX CHOLECYSTECTOMY    
 AR CARDIAC SURG PROCEDURE UNLIST  2006 by-pass AR INS NEW/RPLCMT PRM PM W/TRANSV ELTRD ATRIAL&VENT  10/30/2020 AR INS NEW/RPLCMT PRM PM W/TRANSV ELTRD ATRIAL&VENT N/A 10/30/2020 INSERT PPM DUAL performed by Eileen Vincent MD at [address], [facility] [address] CATH LAB Personal factors and/or comorbidities impacting plan of care: CHF, CAD, HTN, [place], foot wound awaiting podiatry Home Situation Home Environment: Independent living One/Two Story Residence: One story Living Alone: No 
Support Systems: Spouse/Significant Other/Partner Patient Expects to be Discharged to[de-identified] Rehabilitation facility Current DME Used/Available at Home: Cane, straight, Shower chair, Grab bars, Vitaliy Opal, rolling, Walker, rollator Tub or Shower Type: Other (comment)(cutout tub) EXAMINATION/PRESENTATION/DECISION MAKING:  
Critical Behavior: 
Neurologic State: Alert, Appropriate for age Orientation Level: Oriented X4 Cognition: Appropriate decision making, Appropriate for age attention/concentration, Appropriate safety awareness, Follows commands Safety/Judgement: Awareness of environment, Fall prevention, Good awareness of safety precautions, Insight into deficits Hearing: 
 
Range Of Motion: 
AROM: Generally decreased, functional 
  
  
  
  
  
  
  
Strength:   
Strength: Generally decreased, functional 
  
  
  
  
  
  
Tone & Sensation:  
Tone: Normal 
  
  
  
  
Sensation: Intact Coordination: 
Coordination: Generally decreased, functional 
Vision:  
  
Functional Mobility: 
Bed Mobility: 
Rolling: Minimum assistance Supine to Sit: Moderate assistance Scooting: Minimum assistance Transfers: 
Sit to Stand: Moderate assistance;Maximum assistance;Assist x1(bed elevated and pillows in recliner) Stand to Sit: Minimum assistance Balance:  
Sitting: Intact Standing: Impaired Standing - Static: Fair;Constant support Standing - Dynamic : Poor;Constant support Ambulation/Gait Training: 
Distance (ft): 18 Feet (ft) Assistive Device: Gait belt;Walker, rolling Ambulation - Level of Assistance: Minimal assistance Gait Abnormalities: Decreased step clearance; Antalgic; Step to gait Base of Support: Widened Speed/Zaida: Pace decreased (<100 feet/min); Slow;Shuffled Step Length: Right shortened;Left shortened Stairs: 
  
 
 
Functional Measure: 
Tinetti test: 
 
Sitting Balance: 1 Arises: 0 Attempts to Rise: 0 Immediate Standing Balance: 0 Standing Balance: 0 Nudged: 0 Eyes Closed: 0 Turn 360 Degrees - Continuous/Discontinuous: 0 Turn 360 Degrees - Steady/Unsteady: 0 Sitting Down: 1 Balance Score: 2 Balance total score Indication of Gait: 0 
R Step Length/Height: 0 
L Step Length/Height: 0 
R Foot Clearance: 1 L Foot Clearance: 1 Step Symmetry: 0 Step Continuity: 0 Path: 0 Trunk: 0 Walking Time: 0 Gait Score: 2 Gait total score Total Score: 4/28 Overall total score Tinetti Tool Score Risk of Falls 
<19 = High Fall Risk 19-24 = Moderate Fall Risk 25-28 = Low Fall Risk Tinetti ME. Performance-Oriented Assessment of Mobility Problems in Elderly Patients. [place] 66; [id number]. (Scoring Description: PT Bulletin Feb. 10, 1993) Older adults: Heber Zapata et al, 2009; n = 1601 [facility] StepUp elderly evaluated with ABC, JUAN, ADL, and IADL) · Mean JUAN score for males aged 69-68 years = 26.21(3.40) · Mean JUAN score for females age 69-68 years = 25.16(4.30) · Mean JUAN score for males over 80 years = 23.29(6.02) · Mean JUAN score for females over 80 years = 17.20(8.32) Pain Rating: 
 
 
Activity Tolerance:  
Fair, SpO2 stable on RA, and requires rest breaks After treatment patient left in no apparent distress:  
Sitting in chair, Heels elevated for pressure relief, Call bell within reach, and Bed / chair alarm activated COMMUNICATION/EDUCATION:  
The patients plan of care was discussed with: Registered nurse. Fall prevention education was provided and the patient/caregiver indicated understanding., Patient/family have participated as able in goal setting and plan of care. , and Patient/family agree to work toward stated goals and plan of care. Thank you for this referral. 
Maged Bowling, PT Time Calculation: 28 mins

## 2021-01-08 PROBLEM — E11.621 DIABETIC ULCER OF RIGHT FOOT ASSOCIATED WITH TYPE 2 DIABETES MELLITUS, WITH FAT LAYER EXPOSED (HCC): Status: RESOLVED | Noted: 2021-01-01 | Resolved: 2021-01-01

## 2021-01-08 PROBLEM — L97.512 DIABETIC ULCER OF RIGHT FOOT ASSOCIATED WITH TYPE 2 DIABETES MELLITUS, WITH FAT LAYER EXPOSED (HCC): Status: RESOLVED | Noted: 2021-01-01 | Resolved: 2021-01-01

## 2021-01-08 PROBLEM — I50.33 ACUTE ON CHRONIC DIASTOLIC (CONGESTIVE) HEART FAILURE (HCC): Status: RESOLVED | Noted: 2020-01-01 | Resolved: 2021-01-01

## 2021-01-08 NOTE — PROGRESS NOTES
Problem: Pressure Injury - Risk of 
Goal: *Prevention of pressure injury Description: Document Justin Scale and appropriate interventions in the flowsheet. Outcome: Resolved/Met Note: Pressure Injury Interventions: 
Sensory Interventions: Assess changes in LOC Moisture Interventions: Absorbent underpads Activity Interventions: Pressure redistribution bed/mattress(bed type) Mobility Interventions: HOB 30 degrees or less Nutrition Interventions: Document food/fluid/supplement intake Friction and Shear Interventions: Apply protective barrier, creams and emollients Problem: Patient Education: Go to Patient Education Activity Goal: Patient/Family Education Outcome: Resolved/Met Problem: Falls - Risk of 
Goal: *Absence of Falls Description: Document Leafy Rollins Fall Risk and appropriate interventions in the flowsheet. Outcome: Resolved/Met Note: Fall Risk Interventions: 
Mobility Interventions: Patient to call before getting OOB Medication Interventions: Evaluate medications/consider consulting pharmacy Elimination Interventions: Call light in reach Problem: Patient Education: Go to Patient Education Activity Goal: Patient/Family Education Outcome: Resolved/Met Problem: Discharge Planning Goal: *Discharge to safe environment Outcome: Resolved/Met Problem: Nutrition Deficit Goal: *Optimize nutritional status Outcome: Resolved/Met Problem: Patient Education: Go to Patient Education Activity Goal: Patient/Family Education Outcome: Resolved/Met Problem: Patient Education: Go to Patient Education Activity Goal: Patient/Family Education Outcome: Resolved/Met Problem: Risk for Spread of Infection Goal: Prevent transmission of infectious organism to others Description: Prevent the transmission of infectious organisms to other patients, staff members, and visitors. Outcome: Resolved/Met Problem: Patient Education:  Go to Education Activity Goal: Patient/Family Education Outcome: Resolved/Met Problem: Breathing Pattern - Ineffective Goal: *Absence of hypoxia Outcome: Resolved/Met Goal: *Use of effective breathing techniques Outcome: Resolved/Met Problem: Patient Education: Go to Patient Education Activity Goal: Patient/Family Education Outcome: Resolved/Met

## 2021-01-08 NOTE — PROGRESS NOTES
GLORY: d/c to [facility] today 1/8 at 2pm; BLS Transport via [facility] with 2pm  time; IMM letter provided 1/8 RUR: 33% 
 
0940-CM reviewed pt chart and noted that d/c plan for [facility], d/c order in place as Attending has medically cleared pt for d/c. CM spoke with Rena(kenjuanon) [phone] and confirmed that [facility] can accept to day at 2pm or after. CM spoke with pt's son, Liz Turner [phone] regarding d/c plan to [facility] today. CM requested  time for 2pm with [facility] via stretcher in [employer]; awaiting confirmation. CM informed nurse of plan. CM to follow for transitions of care. Medicare pt has received, reviewed, and signed 2nd IM letter informing them of their right to appeal the discharge. Signed copied has been placed on pt bedside chart. 1100-CM confirmed transport with [facility] for 2pm today. Inpatient Rehab/ Hospital to Hospital Transition of Care Note /Discharge Note EMTALA filed and ready for MD to sign at bedside chart. Accepting Physician: Dr. Antonette Mcgovern Discharging Physician: Dr. Ruta Cowden Accepting Representative: Duran Clement [phone] Accepting Facility: [facility]/ 
RN call to report: [phone] Transport: [facility] phone [phone] or Family  time: 2pm 
 
Ambulance packet at bedside chart. Family notified: CM met with the family member son, Liz Turner [phone] and/or called and spoke with and they are in agreement with the discharge plan. The attending physician and the primary nurse were notified of the plan.   
Rosalee Hayward RN BSN CCM 
CRM

## 2021-01-08 NOTE — PROGRESS NOTES
Call received from [facility] hernán w/ Jennifer Daljit - confirming patient is covid-19+. Crew will need to obtain additional PPE new  time 1515.  Updates provided to Sushila Borges

## 2021-01-08 NOTE — PROGRESS NOTES
Hospitalist Progress Note Romina Stanley MD 
Answering service: [phone] OR [phone] from in house phone Date of Service:  2020 NAME:  Shubham Seaman :  1935 MRN:  [id number] Admission Summary: As per initial admission summary Patient presented to the emergency room with progressive shortness of breath and productive cough. Medical history is significant for hypertension; chronic diastolic congestive heart failure; dyslipidemia; third degree heart block, status post pacemaker insertion; coronary artery disease, status post CABG; benign prostatic hyperplasia; type 2 diabetes; aortic stenosis, status post transcatheter aortic valve replacement TAVR. Patient was recently admitted to this hospital from 2020 to 2020. Interval history / Subjective:  
   
Working on disposition, likely to ipr  Pt voices no new issues MRI noted see ID notes and podiatry notes Assessment & Plan:  
 
Acute on chronic diastolic congestive heart failure. CT chest showed patchy groundglass opacity in the right upper lobe may represent pulmonary edema. Cardiology consulted Chest x-ray on  showed increased pulmonary edema On oral lasix. COVID-19 pneumonia suspected superimposed bacterial pneumonia. On broad-spectrum antibiotics Completed dexamethasone and remdesivir. Maintain droplet plus isolation. Resolved Right diabetic foot infection,suspicion of osteomyelitis --on cefepime and vancomycin. ID and podiatry following. 
- CT of the foot negative for osteomyelitis but it is not the best study to diagnose osteomyelitis 
-Foot mri today. --ultrasound of the lower extremity for DVT. Negative See id and podiarty notes  This writer agrees Hypertension. --Continue amlodipine 10 mg, losartan 100 mg, Lopressor 50 mg twice daily Dyslipidemia. On atorvastatin. Third degree heart block. The patient is status post pacemaker insertion. Coronary artery disease, status post coronary artery bypass grafting:continue with cardiac medications. Sacral decubitus ulcer present on admission. Wound Care consult Benign prostatic hyperplasia 
-Continue Cardura. Type 2 diabetes with hyperglycemia worsened by steroids and acute illness 
-Accu-Cheks, Humalog sliding scale. 
-Switch Lantus to NPH, 12 units twice daily, continue to adjust to achieve target blood sugar of 140180. 
-He is on Metformin and Glucotrol at home, on hold. Anemia, anemia of chronic disease. Thrombocytopenia. The patient is asymptomatic. Monitor Elevated lactic acid level due to infection. resolved Aortic stenosis, status post TAVR. Code status: full code DVT prophylaxis: heparin Care Plan discussed with: Patient/Family Updated pts wife and his son on 1/5. Disposition: IPR,CM send referral.Pt may be discharge as early as Thursday. Hospital Problems  Date Reviewed: 1/8/2021 Codes Class Noted POA Type 2 diabetes mellitus with right diabetic foot infection ([facility] [address] ICD-10-CM: E11.628, L08.9 ICD-9-CM: 250.80, 686.9  12/28/2020 Unknown Review of Systems: A comprehensive review of systems was negative except for that written in the HPI. Physical Examination:  
I had a face to face encounter with this patient and independently examined them on January 7, 2021 as outlined below: 
     
Constitutional:  No acute distress, cooperative, pleasant ENT:  Oral mucous moist, oropharynx benign. Neck supple, Resp:  CTA bilaterally. No wheezing/rhonchi/rales. No accessory muscle use CV:  Pacemaker device on the left chest wall. Regular rhythm, normal rate, no murmurs, gallops, rubs GI:  Soft, non distended, non tender. normoactive bowel sounds, no hepatosplenomegaly Musculoskeletal:  No edema, warm, 2+ pulses throughout. Dressing right foot. Neurologic:  Moves all extremities. AAOx3, CN II-XII reviewed. Hard of hearing. Psych:  Good insight, Not anxious nor agitated. . 
  
 
Data Review:  
 Review and/or order of clinical lab test 
Review and/or order of tests in the radiology section of [facility] Review and/or order of tests in the medicine section of [facility] Labs:  
 
Recent Labs 01/08/21 
0405 01/07/21 
1357 WBC 5.1 7.0 HGB 9.7* 10.0* HCT 30.2* 31.1*  
PLT 96* 112* Recent Labs 01/07/21 
0468 01/06/21 
7762   --   
K 3.1* 3.5   --   
CO2 35*  --   
BUN 31*  --   
CREA 0.76  --   
GLU 92  --   
CA 8.2*  --   
MG  --  1.7 No results for input(s): ALT, AP, TBIL, TBILI, TP, ALB, GLOB, GGT, AML, LPSE in the last 72 hours. No lab exists for component: SGOT, GPT, AMYP, HLPSE Recent Labs 01/07/21 
2378 01/06/21 
7035 APTT 44.1* 39.1* No results for input(s): FE, TIBC, PSAT, FERR in the last 72 hours. Lab Results Component Value Date/Time Folate 20.5 12/28/2020 04:59 AM  
  
No results for input(s): PH, PCO2, PO2 in the last 72 hours. No results for input(s): CPK, CKNDX, TROIQ in the last 72 hours. No lab exists for component: CPKMB Lab Results Component Value Date/Time Cholesterol, total 114 02/26/2020 03:19 PM  
 HDL Cholesterol 40 02/26/2020 03:19 PM  
 LDL, calculated 54 02/26/2020 03:19 PM  
 Triglyceride 101 02/26/2020 03:19 PM  
 
Lab Results Component Value Date/Time Glucose (POC) 140 (H) 01/08/2021 07:59 AM  
 Glucose (POC) 181 (H) 01/07/2021 09:03 PM  
 Glucose (POC) 88 01/07/2021 04:27 PM  
 Glucose (POC) 138 (H) 01/07/2021 12:02 PM  
 Glucose (POC) 94 01/07/2021 06:57 AM  
 
Lab Results Component Value Date/Time  Color YELLOW/STRAW 12/28/2020 06:02 AM  
 Appearance CLOUDY (A) 12/28/2020 06:02 AM  
 Specific gravity 1.022 12/28/2020 06:02 AM  
 pH (UA) 5.0 12/28/2020 06:02 AM  
 Protein 300 (A) 12/28/2020 06:02 AM  
 Glucose Negative 12/28/2020 06:02 AM  
 Ketone Negative 12/28/2020 06:02 AM  
 Bilirubin Negative 12/28/2020 06:02 AM  
 Urobilinogen 0.2 12/28/2020 06:02 AM  
 Nitrites Negative 12/28/2020 06:02 AM  
 Leukocyte Esterase TRACE (A) 12/28/2020 06:02 AM  
 Epithelial cells MODERATE (A) 12/28/2020 06:02 AM  
 Bacteria 1+ (A) 12/28/2020 06:02 AM  
 WBC 20-50 12/28/2020 06:02 AM  
 RBC >100 (H) 12/28/2020 06:02 AM  
 
 
 
Medications Reviewed:  
 
Current Facility-Administered Medications Medication Dose Route Frequency  enoxaparin (LOVENOX) injection 30 mg  30 mg SubCUTAneous Q12H  
 doxycycline (VIBRA-TABS) tablet 100 mg  100 mg Oral Q12H  
 albuterol (PROVENTIL HFA, VENTOLIN HFA, PROAIR HFA) inhaler 2 Puff  2 Puff Inhalation Q6H PRN  
 furosemide (LASIX) tablet 40 mg  40 mg Oral DAILY  insulin NPH (NOVOLIN N, HUMULIN N) injection 12 Units  12 Units SubCUTAneous ACB&D  
 amLODIPine (NORVASC) tablet 10 mg  10 mg Oral DAILY  ascorbic acid (vitamin C) (VITAMIN C) tablet 500 mg  500 mg Oral BID  aspirin chewable tablet 81 mg  81 mg Oral DAILY  atorvastatin (LIPITOR) tablet 40 mg  40 mg Oral QHS  doxazosin (CARDURA) tablet 4 mg  4 mg Oral QHS  finasteride (PROSCAR) tablet 5 mg  5 mg Oral DAILY  fluticasone propionate (FLONASE) 50 mcg/actuation nasal spray 1 Spray  1 Spray Both Nostrils DAILY  losartan (COZAAR) tablet 100 mg  100 mg Oral DAILY  metoprolol tartrate (LOPRESSOR) tablet 50 mg  50 mg Oral BID  sertraline (ZOLOFT) tablet 100 mg  100 mg Oral QPM  
 temazepam (RESTORIL) capsule 15 mg  15 mg Oral QHS PRN  
 sodium chloride (OCEAN) 0.65 % nasal squeeze bottle 1 Spray  1 Spray Both Nostrils PRN  
 sodium chloride (NS) flush 5-40 mL  5-40 mL IntraVENous Q8H  
 sodium chloride (NS) flush 5-40 mL  5-40 mL IntraVENous PRN  polyethylene glycol (MIRALAX) packet 17 g  17 g Oral DAILY PRN  
  prochlorperazine (COMPAZINE) with saline injection 5 mg  5 mg IntraVENous Q6H PRN  
 insulin lispro (HUMALOG) injection   SubCUTAneous AC&HS  
 glucose chewable tablet 16 g  4 Tab Oral PRN  
 dextrose (D50W) injection syrg 12.5-25 g  12.5-25 g IntraVENous PRN  
 glucagon (GLUCAGEN) injection 1 mg  1 mg IntraMUSCular PRN  zinc oxide-cod liver oil (DESITIN) 40 % paste   Topical Q12H  
 acetaminophen (TYLENOL) tablet 650 mg  650 mg Oral Q6H PRN Or  
 acetaminophen (TYLENOL) suppository 650 mg  650 mg Rectal Q6H PRN  
 guaiFENesin-dextromethorphan (ROBITUSSIN DM) 100-10 mg/5 mL syrup 5 mL  5 mL Oral Q4H PRN  cholecalciferol (VITAMIN D3) (1000 Units /25 mcg) tablet 2 Tab  2,000 Units Oral DAILY  
 
______________________________________________________________________ EXPECTED LENGTH OF STAY: 4d 2h 
ACTUAL LENGTH OF STAY:          Maria E Dwyer MD

## 2021-01-08 NOTE — DISCHARGE SUMMARY
Discharge Summary PATIENT ID: Derick Houston MRN: [id number] YOB: 1935 DATE OF ADMISSION: 12/27/2020  9:44 PM   
DATE OF DISCHARGE: 1/8/2021 PRIMARY CARE PROVIDER: Jerald Guzman DO  
 
ATTENDING PHYSICIAN: Issa Campuzano MD  
DISCHARGING PROVIDER: Issa Campuzano MD   
To contact this individual call [phone] and ask the  to page. If unavailable ask to be transferred the Adult Hospitalist Department. CONSULTATIONS: IP CONSULT TO INFECTIOUS DISEASES 
IP CONSULT TO PODIATRY IP CONSULT TO CARDIOLOGY PROCEDURES/SURGERIES: * No surgery found * [id number] [facility] COURSE:  
 
Follow up on lab:  
 
Jb CBC and BMP w/i one week Per podiatry:  
 
Plan/Recommendations/Medical Decision Making:  
-Reviewed MRI \"1. Fifth metatarsal head findings represent reactive changes versus minimal osteomyelitis, likely nonacute. No significant change given difference in technique. 2. Soft tissue ulcer adjacent to the fifth MTP joint. Underlying cellulitis. No drainable abscess or sinus tract. \"  
-Discussed results of the MRI with Dr. Gisella Willis.  Based on the clinical appearance of the wound with improvement over the past week and no current probe to bone and MRI, that will treat as reactive changes of the bone at this time. 
-Patient to finish a course of doxycycline for a course of 2 weeks of antibiotics. 
-Will continue local wound care. -Will have patient follow-up with me at the [facility] as an outpatient for close monitoring. If wound worsens, he will need additional antibiotics and surgical debridement. 
-Prevalon boot to right foot. 
-Will sign off at this time as no surgical intervention needed at this time and patient does have current dressing instructions. 
  
Discharge Instructions: 
1. Follow-up within 1 week of discharge at [facility] at [facility]. Call [phone]. 2. Optifoam Ag to the right foot ulcer every other day. 3. Weight bearing as tolerated to right foot in surgical shoe 4. Patient to wear prevalon boot to right foot while resting. Else:  
 
Per ID , Dr Alexa Huang:  
 
Recommend stopping IV antibiotics ( Vancomycin). Patient has received close 11 days of antibiotics since admission We will complete with doxycycline 100 mg twice a day for 3 more days Above would be a 2-week course of treatment 
  
Wound needs to be assessed closely and he will need aggressive wound care to help prevent worsening of this wound Additional modalities that would help his optimizing nutrition and offloading pressure 
  
Patient son expressed understanding that if the wound worsens his father will need to be reassessed for additional antibiotics and or debridement 
  
I will sign off at this time 
  
Please reconsult if need be 
  
Alan Campuzano, DO Americo Ortiz per PN:   
 
  
Assessment & Plan:  
  
Acute on chronic diastolic congestive heart failure.   
  
CT chest showed patchy groundglass opacity in the right upper lobe may represent pulmonary edema. Cardiology consulted Chest x-ray on 12/31 showed increased pulmonary edema On oral lasix. - stable  
  
COVID-19 pneumonia suspected superimposed bacterial pneumonia.   
On broad-spectrum antibiotics Completed dexamethasone and remdesivir. Maintain droplet plus isolation. resolvedd 
  
  
Right diabetic foot infection,suspicion of osteomyelitis --on cefepime and vancomycin. ID and podiatry following. 
- CT of the foot negative for osteomyelitis but it is not the best study to diagnose osteomyelitis 
-Foot mri  -  see above ID and podiatry notes --ultrasound of the lower extremity for DVT. Negative See above  
  
  
Hypertension.   
--Continue amlodipine 10 mg, losartan 100 mg, Lopressor 50 mg twice daily 
  
Dyslipidemia. On atorvastatin. 
  
Third degree heart block.  The patient is status post pacemaker insertion. 
  
 Coronary artery disease, status post coronary artery bypass grafting:continue with cardiac medications.    
  
  
Sacral decubitus ulcer present on admission.   
Wound Care consult  
  
Benign prostatic hyperplasia 
-Continue Cardura. 
  
Type 2 diabetes with hyperglycemia worsened by steroids and acute illness 
-Accu-Cheks, Humalog sliding scale. 
-Switch Lantus to NPH, 12 units twice daily, continue to adjust to achieve target blood sugar of 140180. 
-He is on Metformin and Glucotrol at home, on hold. 
  
Anemia, anemia of chronic disease. Lab Results Component Value Date/Time HGB 9.7 (L) 01/08/2021 04:05 AM  
  
  
Thrombocytopenia.  The patient is asymptomatic. Monitor Lab Results Component Value Date/Time PLATELET 96 (L) 99/33/5214 04:05 AM  
 
 
  
Elevated lactic acid level due to infection.  resolved 
  
Aortic stenosis, status post TAVR.   
  
  
Code status: full code DVT prophylaxis: heparin 
  
Care Plan discussed with: Patient/Family Updated pts wife and his son on 1/5. 
  
Disposition: IPR,CM send referral.Pt may be discharge as early as Thursday.   
[address] / PLAN:   
 
1.  as above ADDITIONAL CARE RECOMMENDATIONS:  
 
Follow up on lab:  
 
Jb CBC and BMP w/i one week PENDING TEST RESULTS:  
At the time of discharge the following test results are still pending: na 
 
FOLLOW UP APPOINTMENTS:   
 
 Follow-up within 1 week of discharge at [facility] at [facility]. Call [phone]. Else:  
 
Follow-up Information Follow up With Specialties Details Why Contact Adrian Fernandez DO Internal Medicine   [address] [place] [address] [address] [place] [address] 
[phone] DIET: soft mechanical  
Oral Nutritional Supplements: Ensure Complete or Ensure PlusThree times daily ACTIVITY: Activity as tolerated WOUND CARE: As per wound care consultants:  
 
Assessment: Patient is A&O x 4, communicative, incontinent with some assistance needed in repositioning. Bed: [facility] Patient wearing briefs for incontinence. Diet: Diabetic with options consistent carb 2400 kcal; regular; 2 gm NA Patient reports no pain.   
  
Bilateral heels skin intact and without erythema. 
  
1. POA bilateral buttocks look much better, no bleeding noted, some blanchable erythema. Continue with Desitin. 
  
2. POA right foot wound not assessed; dressing changed already today and podiatry following. 
  
Patient repositioned supine. Heels offloaded on pillow. Recommendations:   
Continue with current wound care. 
  
Bilateral buttocks- Every 12 hours and as needed cleanse soiled cream with soap and water, blot dry, apply thin coat of Desitin. 
  
Bilateral lower legs- Every 12 hours apply Nourishing Skin Cream (purple tube). 
  
Right 5th metatarsal- Every other day cleanse with normal saline, wipe wound bed clean and dry, apply a piece of Optifoam AG and cover. 
  
Skin Care & Pressure Prevention: 
Minimize layers of linen/pads under patient to optimize support surface. Turn/reposition approximately every 2 hours and offload heels. Manage incontinence / promote continence Nourishing Skin Cream to dry skin, minimize use of briefs when able 
  
Discussed above plan with patient & MISAEL Golden 
  
Transition of Care: Plan to follow as needed while admitted to hospital. 
  
 
EQUIPMENT needed: na 
 
 
DISCHARGE MEDICATIONS: 
Current Discharge Medication List  
  
START taking these medications Details  
albuterol (PROVENTIL HFA, VENTOLIN HFA, PROAIR HFA) 90 mcg/actuation inhaler Take 2 Puffs by inhalation every six (6) hours as needed for Wheezing or Shortness of Breath. Qty: 1 Inhaler, Refills: 0  
  
potassium chloride SR (K-TAB) 20 mEq tablet Take 1 Tab by mouth daily. Qty: 30 Tab, Refills: 0 CONTINUE these medications which have CHANGED Details atorvastatin (LIPITOR) 40 mg tablet Take 1 Tab by mouth nightly for 60 days. Qty: 30 Tab, Refills: 1  
  
amLODIPine (NORVASC) 10 mg tablet Take 1 Tab by mouth daily. Qty: 30 Tab, Refills: 0  
  
furosemide (LASIX) 40 mg tablet One daily 
Qty: 30 Tab, Refills: 0  
  
metoprolol tartrate (LOPRESSOR) 50 mg tablet Take 1 Tab by mouth two (2) times a day for 60 days. Qty: 60 Tab, Refills: 1 CONTINUE these medications which have NOT CHANGED Details  
losartan (COZAAR) 50 mg tablet Take 50 mg by mouth daily. aspirin 81 mg chewable tablet Take 81 mg by mouth daily. temazepam (RESTORIL) 15 mg capsule TAKE 1 CAPSULE BY MOUTH AT BEDTIME AS NEEDED FOR SLEEP. Qty: 30 Cap, Refills: 0 Comments: Not to exceed 6 additional fills before 09/26/2018 Associated Diagnoses: Insomnia, unspecified type  
  
acetaminophen (TYLENOL) 325 mg tablet Take 2 Tabs by mouth every four (4) hours as needed for Pain. Qty:    
  
senna-docusate (PERICOLACE) 8.6-50 mg per tablet Take 1 Tab by mouth daily as needed for Constipation. doxazosin (CARDURA) 4 mg tablet TAKE 1 TABLET BY MOUTH  DAILY Qty: 90 Tab, Refills: 3 Comments: Requesting 1 year supply  
  
finasteride (PROSCAR) 5 mg tablet TAKE 1 TABLET BY MOUTH  DAILY Qty: 90 Tab, Refills: 3 Comments: Requesting 1 year supply  
  
metFORMIN ER (GLUCOPHAGE XR) 500 mg tablet TAKE 1 TABLET BY MOUTH  TWICE DAILY WITH MEALS Qty: 180 Tab, Refills: 3 Comments: Requesting 1 year supply  
  
glipiZIDE SR (GLUCOTROL XL) 5 mg CR tablet TAKE 1 TABLET BY MOUTH  DAILY Qty: 90 Tab, Refills: 3 Comments: Requesting 1 year supply  
  
sertraline (ZOLOFT) 100 mg tablet TAKE 1 TABLET BY MOUTH  DAILY Qty: 90 Tab, Refills: 1 Associated Diagnoses: Recurrent depression (HCC)  
  
fluticasone propionate (FLONASE) 50 mcg/actuation nasal spray ADMINISTER 1 Spray IN Both Nostrils two (2) times a day. Qty: 16 g, Refills: 0 cholecalciferol (VITAMIN D3) 1,000 unit cap Take 1 Cap by mouth daily. Qty: 30 Cap, Refills: 2  
  
sodium chloride (OCEAN) 0.65 % nasal squeeze bottle 0.05 mL by Both Nostrils route as needed for Congestion. Qty: 30 mL, Refills: 2 FERROUS FUMARATE/VIT BCOMP,C (SUPER B COMPLEX PO) Take 1 Tab by mouth daily. STOP taking these medications  
  
 menthol-zinc oxide (CALMOSEPTINE) 0.44-20.6 % oint Comments:  
Reason for Stopping:   
   
 magnesium 250 mg tab Comments:  
Reason for Stopping: OTHER Comments:  
Reason for Stopping:   
   
  
 
 
 
NOTIFY YOUR PHYSICIAN FOR ANY OF THE FOLLOWING:  
Fever over 101 degrees for 24 hours. Chest pain, shortness of breath, fever, chills, nausea, vomiting, diarrhea, change in mentation, falling, weakness, bleeding. Severe pain or pain not relieved by medications. Or, any other signs or symptoms that you may have questions about. DISPOSITION: 
  Home With: 
 OT  PT  HH  RN  
  
x Long term SNF/Inpatient Rehab Independent/assisted living Hospice Other:  
 
 
PATIENT CONDITION AT DISCHARGE:  
 
Functional status Poor   
x Deconditioned Independent Cognition  
 x Lucid Forgetful Dementia Catheters/lines (plus indication) Rosen PICC   
 PEG   
x None Code status  
 x Full code DNR   
 
PHYSICAL EXAMINATION AT DISCHARGE: 
GEN:   No apparent distress; pleasant cooperative HEENT:  Atraumatic;no nasal discharge; MM moist;non icteric sclarea PUL:   Un labored breathing; no externally audible wheeze; no retractions CV:    Rhythm regular; rate controled ABD:   Non distended; Soft; non tender EXT:    no open wounds ; else No gross edema; muscle mass poor ;  
NEURO:  Non focal; oriented x 3 
PSYCH:  Non agitated; not anxious SKIN:   Non icteric; dry, CHRONIC MEDICAL DIAGNOSES: 
Problem List as of 1/8/2021 Date Reviewed: 1/8/2021 Codes Class Noted - Resolved Type 2 diabetes mellitus with right diabetic foot infection [facility] ICD-10-CM: E11.628, L08.9 ICD-9-CM: 250.80, 686.9  12/28/2020 - Present Generalized weakness ICD-10-CM: R53.1 ICD-9-CM: 780.79  11/8/2020 - Present S/P TAVR (transcatheter aortic valve replacement) ICD-10-CM: I02.5 ICD-9-CM: V43.3  11/3/2020 - Present Pacemaker ICD-10-CM: Z95.0 ICD-9-CM: V45.01  10/30/2020 - Present Overview Signed 10/30/2020 11:57 AM by Kathleen Cao MD  
  10/30/2020 dual chamber medtronic pacemaker Third degree AV block (HCC) ICD-10-CM: I44.2 ICD-9-CM: 426.0  10/30/2020 - Present (HFpEF) heart failure with preserved ejection fraction (HCC) ICD-10-CM: I50.30 ICD-9-CM: 428.9  10/20/2020 - Present Syncope and collapse ICD-10-CM: R55 
ICD-9-CM: 780.2  10/18/2020 - Present Syncope ICD-10-CM: R55 
ICD-9-CM: 780.2  10/18/2020 - Present Aortic stenosis ICD-10-CM: I35.0 ICD-9-CM: 424.1  10/18/2020 - Present Overview Signed 10/21/2020  6:58 AM by Yunior Peralta MD  
  Added automatically from request for surgery [id number] Decubitus ulcer of left buttock, stage 2 ([facility] ICD-10-CM: G65.423 ICD-9-CM: 707.05, 707.22  9/10/2020 - Present Type 2 diabetes with nephropathy ([facility] ICD-10-CM: E11.21 
ICD-9-CM: 250.40, 583.81  8/24/2020 - Present Pressure injury of buttock, stage 1 ICD-10-CM: L89.301 ICD-9-CM: 707.05, 707.21  6/4/2020 - Present Incontinence of feces ICD-10-CM: R15.9 ICD-9-CM: 787.60  6/4/2020 - Present On angiotensin receptor blockers (ARB) ICD-10-CM: I39.062 ICD-9-CM: V58.69  1/23/2020 - Present Gastroesophageal reflux disease without esophagitis ICD-10-CM: K21.9 ICD-9-CM: 530.81  1/23/2020 - Present Cough ICD-10-CM: R05 ICD-9-CM: 786.2  1/23/2020 - Present Recurrent depression ([facility] ICD-10-CM: F33.9 ICD-9-CM: 296.30  8/22/2019 - Present Age-related cataract of both eyes ICD-10-CM: H25.9 ICD-9-CM: 366.10  4/25/2019 - Present Gait instability ICD-10-CM: R26.81 
ICD-9-CM: 781.2  6/21/2018 - Present Type 2 diabetes mellitus without complication, without long-term current use of insulin (HCC) ICD-10-CM: E11.9 ICD-9-CM: 250.00  9/21/2017 - Present Essential hypertension ICD-10-CM: I10 
ICD-9-CM: 401.9  9/21/2017 - Present Hypercholesterolemia ICD-10-CM: E78.00 ICD-9-CM: 272.0  9/21/2017 - Present Coronary artery disease involving native coronary artery of native heart without angina pectoris ICD-10-CM: I25.10 ICD-9-CM: 414.01  9/21/2017 - Present S/P CABG (coronary artery bypass graft) ICD-10-CM: Z95.1 ICD-9-CM: V45.81  9/21/2017 - Present Severe aortic stenosis ICD-10-CM: I35.0 ICD-9-CM: 424.1  9/21/2017 - Present Aortic systolic murmur on examination ICD-10-CM: I35.8 ICD-9-CM: 424.1  9/21/2017 - Present Benign prostatic hyperplasia with lower urinary tract symptoms ICD-10-CM: N40.1 ICD-9-CM: 600.01  9/21/2017 - Present Insomnia ICD-10-CM: G47.00 ICD-9-CM: 780.52  9/21/2017 - Present Former smoker ICD-10-CM: L33.662 ICD-9-CM: V15.82  9/21/2017 - Present ACP (advance care planning) ICD-10-CM: Z71.89 ICD-9-CM: V65.49  9/21/2017 - Present RESOLVED: Diabetic ulcer of right foot associated with type 2 diabetes mellitus, with fat layer exposed ([facility] ICD-10-CM: E11.621, A02.461 ICD-9-CM: 250.80, 707.15  1/7/2021 - 1/8/2021 * (Principal) RESOLVED: Acute on chronic diastolic (congestive) heart failure (HCC) ICD-10-CM: I50.33 ICD-9-CM: 428.33, 428.0  12/27/2020 - 1/8/2021 RESOLVED: Type 2 diabetes with nephropathy ([facility] ICD-10-CM: E11.21 
ICD-9-CM: 250.40, 583.81  4/10/2018 - 4/25/2019 RESOLVED: Type 2 diabetes mellitus with nephropathy ([facility] ICD-10-CM: E11.21 
ICD-9-CM: 250.40, 583.81  12/14/2017 - 4/5/2018 RESOLVED: Chronic ulcer of buttock ([facility] ICD-10-CM: V79.943 ICD-9-CM: 707.8  11/16/2017 - 4/25/2019 Greater than 30  minutes were spent with the patient on counseling and coordination of care Signed:  
Letty Jo MD 
1/8/2021 
9:24 AM

## 2021-01-11 NOTE — PROGRESS NOTES
Transition of care outreach postponed for 14 days due to patient's discharge to SNF. [facility] 12/27-1/8/21 acute on chronic CHF d/c [facility] f/u 14d

## 2021-01-19 NOTE — PROGRESS NOTES
[facility]  [address] [place]  [address]    SPEECH PATHOLOGY MODIFIED BARIUM SWALLOW STUDY  Patient: Akash Venegas (85 y.o. male)  Date: 1/19/2021  Referring Provider:  [facility] rehab    SUBJECTIVE:   The patient was at [facility] with Covid pneumonia in Dec and then went to rehab at [facility]. He was just cleared for procedures as he was off covid precautions.   While awaiting the procedure today he had a constant cough with upper airway congestion noted. His cough was not productive. He is on a regular diet with nectar thick liquids currently..     OBJECTIVE:   Past Medical History:   Past Medical History:   Diagnosis Date   • Acute on chronic diastolic (congestive) heart failure ([facility] [place]) 12/27/2020   • BPH (benign prostatic hyperplasia)    • CAD (coronary artery disease)    • Diabetes (HCC)    • Hearing loss    • Hypercholesterolemia    • Hypertension    • Murmur    • Recurrent depression ([facility]) 8/22/2019   • Third degree AV block ([facility]) 10/30/2020     Past Surgical History:   Procedure Laterality Date   • HX CHOLECYSTECTOMY     • MS CARDIAC SURG PROCEDURE UNLIST  2006    by-pass   • MS INS NEW/RPLCMT PRM PM W/TRANSV ELTRD ATRIAL&VENT  10/30/2020        • MS INS NEW/RPLCMT PRM PM W/TRANSV ELTRD ATRIAL&VENT N/A 10/30/2020    INSERT PPM DUAL performed by Sanjeev Falcon MD at [facility] CARDIAC CATH LAB     Current Dietary Status:  Reg/nectar thick liquids  Radiologist: Dr. John  Film Views: Fluoro;Lateral  Patient Position: sitting upright in transmotion chair    Trial 1:   Consistency Presented: Thin liquid;Nectar thick liquid;Honey thick liquid;Puree   How Presented: Self-fed/presented;Cup/sip       Bolus Acceptance: No impairment   Bolus Formation/Control: No impairment:     Propulsion: No impairment   Oral Residue: None   Initiation of Swallow: Triggered at vallecula   Timing: Pooling 1-5 sec   Penetration: After swallow   Aspiration/Timing: After;From  RIGHT LOWER EXT NONJOINT COMBO



HISTORY:79 yearsMaleWITH CONTRAST, NON-HEALING WOUND of the right foot without

known injury. History of diabetes.



COMPARISON: Right foot radiographs 12/14/2011



TECHNIQUE: Multiplanar multisequence MRI of the right foot was obtained both

with and without the use of 11 mL Gadavist IV contrast.



FINDINGS: 

There is a soft tissue ulcer along the plantar aspect of the foot centered at

the first metatarsal head measuring up to 3.1 x 2.6 cm in AP and transverse

dimension and extends approximately 5 mm in depth. No drainable fluid collection

is seen at this time.



There is severe joint space narrowing with marginal spurring of the first

metatarsal phalangeal joint. There is mild soft tissue edema along the plantar

aspect of the first metatarsal head adjacent to the ulcer site. There are subtle

areas of focally decreased T1 signal along the plantar aspect of the first

metatarsal head are nicely seen on image 7 of the sagittal T1 series which

demonstrates minimal associated enhancement and bone marrow edema.



Additionally, there is a 7 mm area of decreased T1 signal and increased T2

signal with enhancement involving the base of the first proximal phalanx as seen

on image 6 of the sagittal T1 series suggesting degenerative related subcortical

cyst.



No additional focal bone marrow edema is identified. Degenerative changes are

seen throughout the interphalangeal and metatarsophalangeal joints. Midfoot

alignment is anatomic. There is diffuse atrophy of the intrinsic musculature of

the foot. Imaged flexor and extensor tendons appear intact.



IMPRESSION: 

1. Large soft tissue ulcer along the plantar aspect of the foot centered at the

first metatarsal head measures up to 3.1 cm. No drainable fluid collection is

seen at this time.

2. Only minimal focally decreased T1 signal along the plantar aspect of the

first metatarsal head with corresponding increased STIR signal and enhancement

suggests degenerative changes with reactive sequela, however very early

developing acute osteomyelitis may have a similar appearance.

3. Degenerative related subcortical cystic changes of the first metatarsal

phalangeal joint are also noted with severe joint space narrowing and marginal

spurring.

4. Extensive intrinsic muscular atrophy throughout the foot is likely sequela of

denervation changes from long-standing diabetes mellitus.





The above report was generated using voice recognition software. It may contain

grammatical, syntax or spelling errors.







Electronically signed by:  Kareem Stratton M.D.

7/17/2017 2:45 PM



Dictated Date/Time:  7/17/2017 2:34 PM residual   Pharyngeal Clearance: Vallecular residue;Greater than 50%; Pyriform residue ;10-50%   Attempted Modifications: Small sips and bites(needed verbal cues to dry swallow)   Effective Modifications: None   Cues for Modifications: None           Decreased Tongue Base Retraction?: Yes  Laryngeal Elevation: Reduced excursion with laryngeal vestibule gap  Aspiration/Penetration Score: 7 (Aspiration-Contrast passes below the cords/, but is not ejected despite attempt)(delayed cough)        Pharyngeal Dysfunction: Decreased tongue base retraction;Decreased elevation/closure;Decreased strength;Decreased pharyngeal wall constriction    Oral Phase Severity: No impairment  Pharyngeal Phase Severity: Moderately severe    ASSESSMENT : the study did not record! Based on the objective data described above, the patient presents with mild oral dysphagia and mod to severe pharyngeal dysphagia. Premature spillage noted with thins. Pharyngeal dysphagia was characterized by mild swallow with purees and mod delay with liquids/thin, nectar and honey thick, reduced tongue base retraction, reduced pharyngeal constriction, very reduced strength and reduced elevation. There was aspiration of thins and nectar thick liquids due to significant pyriform sinus residue that overflowed after the swallow into the trachea; the amount of aspiration was significant with thins. Unsure if any aspiration of honey thick liquids and the study could not be reviewed. The cough was delayed when he aspirated and ineffective. With purees, there was severe vallecular residue that a dry swallow did not reduce much placing him at grave risk to aspirate the pureed residue. The patient did not spontaneously dry swallow with any residue with any texture due to reduced pharyngeal sensation. Delayed cough with aspiration events. Verbally cued coughs very weak and ineffective.       PLAN/RECOMMENDATIONS :  Recommend alternative feeding due to aspiration of thins and nectar thick liquids. Potential to aspirate due to severe vallecular residue with purees that a dry swallow did not reduce much. He could take ice chips to prevent disuse atrophy while he participates in swallowing therapy. Repeat MBS after at least 2-3 weeks if he is significantly stronger. COMMUNICATION/EDUCATION:   The above findings and recommendations were discussed with: the patient who shook his head in disbelief. Did not verbalize at all.   Thank you for this referral.  Alida Melgar, SLP  Time Calculation: 30 mins

## 2021-01-27 NOTE — TELEPHONE ENCOUNTER
Spoke with Job Foots with HH. He states he saw pt and he is not doing well. He thinks pt will end back up in the hospital. Pt is very weak and laying in his recliner all day. The son is coming there a few times a day to help pt out. Pt is on 3L of 02. I advised him I will talk with provider to get pt an appt on Thursday.

## 2021-01-28 PROBLEM — Z66 DNR (DO NOT RESUSCITATE): Status: ACTIVE | Noted: 2021-01-01

## 2021-01-28 PROBLEM — R53.81 DEBILITY: Status: ACTIVE | Noted: 2020-01-01

## 2021-01-28 PROBLEM — E46 PROTEIN MALNUTRITION (HCC): Status: ACTIVE | Noted: 2021-01-01

## 2021-01-28 NOTE — TELEPHONE ENCOUNTER
Spoke with Valeria Blakely from [facility] speech  And gave orders to continue with ST therapy. Valeria Blakely states patient is very high risk for aspiration pneumonia and needs a peg tube , Pulmonary thinks patient can not handle the surgery to get the peg tube. They are concerned with patient going back into the hospital.   Valeria Blakely spoke with patients daughter in law today about how patient is doing. Daughter in law would like for a discussion of hospice to be had. I did advise Valeria Blakely that patient was seen in office by provider today. I will forward concern to provider. I advise I am sure concerns were discussed at appt today. I will call her back after hearing back from provider.

## 2021-01-28 NOTE — PROGRESS NOTES
ADVISED PATIENT OF THE FOLLOWING HEALTH MAINTAINCE DUE Health Maintenance Due Topic Date Due  Eye Exam Retinal or Dilated  06/18/1945  COVID-19 Vaccine (1 of 2) 06/18/1953  Shingrix Vaccine Age 50> (1 of 2) 06/18/1987  GLAUCOMA SCREENING Q2Y  01/09/2021  Medicare Yearly Exam  02/06/2021 Chief Complaint Patient presents with  
[facility] Follow Up  
  hospital, and rehab discharge  Hypertension  Coronary Artery Disease  Fatigue  Diabetes 1. Have you been to the ER, urgent care clinic since your last visit? Hospitalized since your last visit? [facility]- rehab 2. Have you seen or consulted any other health care providers outside of the [address] [place] [address] since your last visit? Include any DEXA scan, mammography  or colon screening. No 
 
3. Do you have an Advance Directive on file? no 
 
4. Do you have a DNR on file? no 
 
Patient is accompanied by self, son and wife I have received verbal consent from Aleta Díaz to discuss any/all medical information while they are present in the room. Advance Care Planning 11/9/2020 Patient's Healthcare Decision Maker is: Named in scanned ACP document Confirm Advance Directive Yes, on file [address] 
[address] [facility] [address] Phone: [phone] Fax: [phone] [facility], [address] 
[address] 
[address] [address] Phone: [phone] Fax: [phone] Patient reminded during visit to bring all medication bottles, OTC medications to all appointments.

## 2021-01-28 NOTE — TELEPHONE ENCOUNTER
Saul Pozo, a speech therapist with [facility] , would like a nurse to call her back at [phone]. She wants to discuss the patient and to get orders to see the patient for three weeks for dysphagia.

## 2021-01-29 NOTE — TELEPHONE ENCOUNTER
Bridget from [facility] called asking if provider would approve order for pt to be on hospice. I advised her that I had spoke with Paty Buck yesterday and sent a message . spoke with son and daughter in law this am, both would like to know if provider thinks pt should be on hospice care. And if provider approves this for patient. They are concerns about patients quality of life and recovery with his currant health status.

## 2021-01-31 NOTE — PROGRESS NOTES
HISTORY OF PRESENT ILLNESS Roland Pierce is a 82 y.o. male. Pt. comes with his wife and son for f/u. Has multiple medical issues including DM, HTN, CAD/CABG/TAVR/pacemaker/CHF. Vital signs are stable today. He has had recent prolonged hospitalizations and rehab stay. Most recent hospitalization for aspiration pneumonia. Positive for COVID-19. Returned home recently to his wife at [facility]. Has chronic dyspnea and LARA. Appetite has been poor. He has lost a lot of weight. Is very weak and mostly chair and bedbound. Wife is unable to care for him. Getting home health. MBS showed evidence of aspiration. Gait is unsteady but no falls. Has had issues with pressure ulcers on buttocks. PMH/PSH/Allergies/Social History/medication list and most recent studies reviewed with patient. Albumin is 2.2. Hemoglobin is under 10. Tobacco use: No 
Alcohol use: no  
Reports compliance with medications and diet. Trying to be active physically as tolerated. Reports no other new c/o. HPI Review of Systems Constitutional: Positive for malaise/fatigue and weight loss. HENT: Positive for hearing loss. Eyes: Positive for blurred vision. Negative for double vision and pain. Respiratory: Positive for cough and shortness of breath. Cardiovascular: Negative for chest pain and leg swelling. Gastrointestinal: Negative for abdominal pain. Genitourinary: Positive for urgency. Negative for flank pain and hematuria. Musculoskeletal: Positive for joint pain. Negative for falls. Skin: Negative. Buttock ulcer Neurological: Positive for sensory change. Negative for dizziness, focal weakness and headaches. Endo/Heme/Allergies: Negative. Negative for polydipsia. Psychiatric/Behavioral: Negative for depression. The patient has insomnia. The patient is not nervous/anxious. All other systems reviewed and are negative. Physical Exam 
Vitals signs and nursing note reviewed. Constitutional:   
   General: He is not in acute distress. Appearance: He is well-developed. He is ill-appearing. Comments: Frail lethargic cachectic elderly man In wheelchair HENT:  
   Head: Normocephalic and atraumatic. Mouth/Throat:  
   Mouth: Mucous membranes are dry. Comments: Oral mucosa are very dry Eyes:  
   Conjunctiva/sclera: Conjunctivae normal.  
Neck: Musculoskeletal: Normal range of motion and neck supple. Thyroid: No thyromegaly. Vascular: No JVD. Cardiovascular:  
   Rate and Rhythm: Normal rate and regular rhythm. Heart sounds: Murmur present. Pulmonary:  
   Effort: Pulmonary effort is normal. No respiratory distress. Breath sounds: Normal breath sounds. No wheezing or rales. Comments: Diminished breath sounds Abdominal:  
   General: Bowel sounds are normal. There is no distension. Palpations: Abdomen is soft. Tenderness: There is no abdominal tenderness. Musculoskeletal:     
   General: No tenderness. Skin: 
   General: Skin is warm and dry. Findings: No rash. Neurological:  
   Mental Status: He is alert and oriented to person, place, and time. Coordination: Coordination normal.  
Psychiatric:     
   Behavior: Behavior normal.  
 
 
 
ASSESSMENT and PLAN Diagnoses and all orders for this visit: 1. Aspiration pneumonia of lower lobe, unspecified aspiration pneumonia type, unspecified laterality ([address] 2. Essential hypertension 3. Chronic heart failure with preserved ejection fraction (HCC) 4. Type 2 diabetes mellitus without complication, without long-term current use of insulin ([address] 5. S/P CABG (coronary artery bypass graft) 6. Pacemaker 7. S/P TAVR (transcatheter aortic valve replacement) 8. Gait instability 9. Debility 10. Protein malnutrition ([address] 11. DNR (do not resuscitate) Follow-up and Dispositions · Return in about 4 weeks (around 2/25/2021). lab results and schedule of future lab studies reviewed with patient 
reviewed diet, exercise and weight control 
reviewed medications and side effects in detail Monitor BS at home with goal of 100-150 Fall precautions discussed Aspiration precautions discussed I had a long talk with patient and his family Unfortunately his overall prognosis is poor Advised them to consider appropriate assisted living or long-term care facility for patient Wife is unable to take care of him at home without assistance Also discussed hospice DNR sheet signed by myself and patient COVID-19 precautions discussed with pt

## 2021-02-01 NOTE — PROGRESS NOTES
Per provider ok to placed hospice order, Spoke with pt and family and they would like referral to [facility].

## 2021-02-02 NOTE — TELEPHONE ENCOUNTER
Luís Leong a speech therapist from [facility] called to report that patient's blood pressure was low. It was 98/41.  All other vitals were normal.Riana's number is [phone]

## 2021-02-03 NOTE — TELEPHONE ENCOUNTER
Called and spoke with home health nurse and drkae to hold BP, med. She will call us with pts bp at his next visit. I also called pts daughter in law and spoke with her and advised of providers recommendations. She advised me that pt refused hospice at this time. She will f/u back up with us.

## 2021-02-05 PROBLEM — J96.00 ACUTE RESPIRATORY FAILURE (HCC): Status: ACTIVE | Noted: 2021-01-01

## 2021-02-05 PROBLEM — I48.91 ATRIAL FIBRILLATION WITH RVR (HCC): Status: ACTIVE | Noted: 2021-01-01

## 2021-02-05 PROBLEM — J69.0 ASPIRATION PNEUMONIA (HCC): Status: ACTIVE | Noted: 2021-01-01

## 2021-02-05 NOTE — TELEPHONE ENCOUNTER
His resp are 32    His pulse if fluctuating a lot it goes as high as 123    Just wanted to let you know

## 2021-02-05 NOTE — TELEPHONE ENCOUNTER
Spoke with home health pt is stable but VS fluctuating , Pt is alert. He is on 3L of 02. Advise for them to continue to monitor. BP today 104/66 they held his Norvasc . I have tried to reach the pt or his wife. Neither are answering the phone. I called pts daughter in law she told me to call her . I was unable to get him on the phone either.  I have left VM

## 2021-02-06 NOTE — PROGRESS NOTES
I was called earlier about a cardiology consult, noted during chart review that the patient has seen Dr. Rudi Evans with [facility]. Nursing instructed to try [facility] on-call (Dr. Rogelio Young) first for this consult.

## 2021-02-06 NOTE — PROGRESS NOTES
Hospitalist Progress Note NAME: Hansa Guzman :  1935 MRN:  [id number] Assessment / Plan: 
Acute Hypoxic resp. failure 2/2 Likely Aspiration Pneumonia currently on bipap40% sat. 96% Failed swallow recently CT Chest Pending Troponinemia   0.10 --> .09  Likely type2 cont. Tele  and trend Afib with rvr  132 cont. cardizem ggt  Cont tele  Serial enzymes On review of EMR per his cardiologist's report in the past. \" HE appears to be in AF currently--previously mostly in MAT. However due to fall risk, anemia, thrombocytopenia, not the best candidate for [facility] [employer]. \" 
WBC 15K CXR :There is nearly complete opacification of the left hemithorax which shift of the mediastinum to the left indicating loss of volume. The right lung shows no acute findings. Cardiac pacemaker in place, prior sternotomy, heart size is likely 
unchanged. 
-cont tele monitoring 
-iv zosyn/flagyl/vanco 
-ivf gentle  As pt. Hs h/o chf on lasix -npo  
-Will  Need SLP Consult once stable  
-pulmn  and card. Consulted 
  
  
H/O MRSA Cont. Contact isolation H/O Bilateral buttocks- Bilateral lower legs /Right 5th metatarsal- cont. Local wound care . Wound nurse consulted Third degree heart block.  The patient is status post pacemaker insertion. Coronary artery disease, status post coronary artery bypass grafting:continue with cardiac medications.    
Anemia.most likely due to chronic disease. H/H Stable Monitor Hx of Thrombocytopenia.  Platelets 535 which is around baseline. will monitor the patient's platelet count while starting on [facility] for afib 
  
Will Hold all PO  Until clear by SLP  2/2 ?aspiration Cont. Asa per rectum 
  
Code Status: DNR (Pt. Has paper at bedside/also confirmed with son orion) Surrogate Decision MakerCarmela Amin [phone]      
Orion carty Son [phone]      
  
  
DVT Prophylaxis: SQ LOVENOX 
GI Prophylaxis: not indicated 
  
 Based on review of chart, seems patient mostly bedbound reently Prognosis: poor Subjective: Chief Complaint / Reason for Physician Visit Sleeping in initially, arouses easily. Cannot provide meaningful history Discussed with RN events overnight. Review of Systems: 
Symptom Y/N Comments  Symptom Y/N Comments Fever/Chills    Chest Pain Poor Appetite    Edema Cough    Abdominal Pain Sputum    Joint Pain SOB/LARA    Pruritis/Rash Nausea/vomit    Tolerating PT/OT Diarrhea    Tolerating Diet Constipation    Other Could NOT obtain due to: AMS Objective: VITALS:  
Last 24hrs VS reviewed since prior progress note. Most recent are: 
Patient Vitals for the past 24 hrs: 
 Temp Pulse Resp BP SpO2  
02/06/21 0843 97.9 °F (36.6 °C) 79 21 (!) 120/58 100 % 02/06/21 0841     100 % 02/06/21 0730  86 20 (!) 104/58 99 % 02/06/21 0623  78 23  99 % 02/06/21 0600  82 24 (!) 106/55 98 % 02/06/21 0530  80 25 (!) 109/48 98 % 02/06/21 0500  (!) 110 25 97/61 98 % 02/06/21 0430  (!) 108 30 117/67 99 % 02/06/21 0401  (!) 116 (!) 31  99 % 02/06/21 0400  99 27 109/88 98 % 02/06/21 0330  (!) 113 29 110/65 98 % 02/06/21 0230  (!) 103 29 104/89 99 % 02/06/21 0216  98 29 (!) 104/57 99 % 02/06/21 0200  (!) 106 26 (!) 97/58 100 % 02/06/21 0131  (!) 110 26 105/63 99 % 02/06/21 0100  (!) 111 26 98/65 99 % 02/06/21 0032  (!) 105 29 113/75 99 % 02/06/21 0007  (!) 105 29 123/69 100 % 02/06/21 0001  (!) 110 25 123/69 100 % 02/05/21 2357     100 % 02/05/21 2330  (!) 104 28 (!) 110/59 100 % 02/05/21 2300  (!) 109 26 (!) 119/58 99 % 02/05/21 2230  (!) 111 25 104/74 99 % 02/05/21 2200  (!) 119 30 (!) 78/63 99 % 02/05/21 2155  (!) 108  (!) 103/51 99 % 02/05/21 2100  (!) 124 29 102/64 100 % 02/05/21 2030  (!) 120 26 111/60 99 % 02/05/21 2000  (!) 129 (!) 31 101/66 (!) 87 % 02/05/21 1956     97 % 02/05/21 1930 (!) 100.8 °F (38.2 °C) (!) 137 (!) 37 (!) 145/86 (!) 83 % 02/05/21 1830  (!) 115 (!) 33 (!) 147/69 94 % 02/05/21 1800 98 °F (36.7 °C) (!) 124 30 (!) 144/67 92 % 02/05/21 1740  (!) 121 (!) 35    
02/05/21 1645  (!) 119 (!) 36 (!) 148/70 94 % 02/05/21 1616  (!) 135 26 (!) 144/64 90 % 02/05/21 1531 97.6 °F (36.4 °C) (!) 128 20 (!) 147/76 (!) 88 % Intake/Output Summary (Last 24 hours) at 2/6/2021 9158 Last data filed at 2/5/2021 1845 Gross per 24 hour Intake 100 ml Output  Net 100 ml I had a face to face encounter and independently examined this patient on 2/6/2021, as outlined below: PHYSICAL EXAM: 
General: WD, WN. Alert, cooperative, no acute distress EENT:  EOMI. Anicteric sclerae. MMM Resp:  Coarse BS. No accessory muscle use CV:  Regular  rhythm,  No edema GI:  Soft, Non distended, Non tender. +Bowel sounds Neurologic:  Alert, oriented to person, normal speech, Psych:   Not anxious nor agitated Skin:  No rashes. No jaundice Reviewed most current lab test results and cultures  YES Reviewed most current radiology test results   YES Review and summation of old records today    NO Reviewed patient's current orders and MAR    YES 
PMH/SH reviewed - no change compared to H&P 
________________________________________________________________________ Care Plan discussed with: 
  Comments Patient Family RN Care Manager Consultant Multidiciplinary team rounds were held today with , nursing, pharmacist and clinical coordinator. Patient's plan of care was discussed; medications were reviewed and discharge planning was addressed. ________________________________________________________________________ Total NON critical care TIME:  35   Minutes Total CRITICAL CARE TIME Spent:   Minutes non procedure based Comments >50% of visit spent in counseling and coordination of care ________________________________________________________________________ Juanis Dumont DO  
 
Procedures: see electronic medical records for all procedures/Xrays and details which were not copied into this note but were reviewed prior to creation of Plan. LABS: 
I reviewed today's most current labs and imaging studies. Pertinent labs include: 
Recent Labs 02/06/21 0319 02/05/21 
1550 WBC 16.6* 15.7* HGB 8.9* 9.8* HCT 28.3* 31.2*  
* 140* Recent Labs 02/06/21 0319 02/05/21 
1612 02/05/21 
1550   --  137  
K 3.4*  --  3.6   --  100 CO2 32  --  32 *  --  253* BUN 20  --  18  
CREA 0.86  --  0.96  
CA 8.0*  --  8.3*  
MG 1.7 1.7  --   
ALB 2.0*  --  2.2* TBILI 0.3  --  0.6 ALT 19  --  24 Signed: Juanis Dumont DO

## 2021-02-06 NOTE — PROGRESS NOTES
Problem: Falls - Risk of 
Goal: *Absence of Falls Description: Document Ammon Raviyumi Fall Risk and appropriate interventions in the flowsheet. Outcome: Progressing Towards Goal 
Note: Fall Risk Interventions: 
  
 
Mentation Interventions: Adequate sleep, hydration, pain control, Bed/chair exit alarm, Door open when patient unattended, More frequent rounding, Reorient patient, Room close to nurse's station Medication Interventions: Bed/chair exit alarm, Patient to call before getting OOB, Teach patient to arise slowly Elimination Interventions: Call light in reach, Patient to call for help with toileting needs, Toileting schedule/hourly rounds History of Falls Interventions: Bed/chair exit alarm, Door open when patient unattended

## 2021-02-06 NOTE — PROGRESS NOTES
Pharmacy Automatic Renal Dosing Protocol - Antimicrobials Indication for Antimicrobials: Aspiration PNA Current Regimen of Each Antimicrobial: 
Vancomycin 1500 mg x 1 then 750 mg Q16H (Start Date ; Day # 1) Piperacillin tazobactam 3.375 grams Q8H (Start Date , Day 1) Metronidazole 500 mg Q12H (Start Date , Day 1) Previous Antimicrobial Therapy: 
 
Vancomycin Goal Level:10-15 mcg/ml Vancomycin Levels Date Dose & Interval Measured (mcg/mL) Steady State (mcg/mL) Date & time of next level:  
 
Significant Cultures:  
 
Radiology / Imaging results: (X-ray, CT scan or MRI):  
 
 
Labs: 
Recent Labs 21 
1550 CREA 0.96 BUN 18 WBC 15.7* Temp (24hrs), Av.8 °F (37.1 °C), Min:97.6 °F (36.4 °C), Max:100.8 °F (38.2 °C) Paralysis, amputations, malnutrition: Functional Paralysis Creatinine Clearance (mL/min) or Dialysis: 40 Impression/Plan: Antibiotics as above. Pharmacy will follow daily and adjust medications as appropriate for renal function and/or serum levels. Thank you, 
Jennifer Giles, [facility] Recommended duration of therapy 
http://[facility]/[place]/[facility]/Pharmacy/Clinical%20Companion/Duration%20of%20ABX%20therapy. docx Renal Dosing 
http://[facility]/[place]/[facility]/Pharmacy/Clinical%20Companion/Renal%[email or web address]. pdf

## 2021-02-06 NOTE — ED NOTES
Contacted respiratory for transport of pt to PCU. Tech at bedside to place pt on monitor for transport.

## 2021-02-06 NOTE — ACP (ADVANCE CARE PLANNING)
[facility] Adult  Hospitalist Group Advance Care Planning Note Name: Ruben Odom YOB: 1935 MRN: [id number] Admission Date: 2/5/2021  4:01 PM 
 
Date of discussion: 2/5/2021 Active Diagnoses: 
 
Hospital Problems  Date Reviewed: 1/28/2021 Codes Class Noted POA Acute respiratory failure ([facility] ICD-10-CM: J96.00 
ICD-9-CM: 518.81  2/5/2021 Unknown Aspiration pneumonia ([facility] ICD-10-CM: J69.0 ICD-9-CM: 507.0  2/5/2021 Unknown Atrial fibrillation with RVR (HCC) ICD-10-CM: I48.91 
ICD-9-CM: 427.31  2/5/2021 Unknown These active diagnoses are of sufficient risk that focused discussion on advance care planning is indicated in order to allow the patient to thoughtfully consider personal goals of care, and if situations arise that prevent the ability to personally give input, to ensure appropriate representation of their personal desires for different levels and aggressiveness of care. Discussion:  
 
Persons present and participating in discussion: Brown Abbott MD, Topics Discussed: 
Patient's medical condition and diagnosis: [ x ] yes [  ] no  
Surrogate decision maker: [x ] yes [  ] no Patient's current physical function/cognitive function/frailty: [ x ] yes [  ] no Code Status: [  x] yes [  ] no Non-Invasive Ventilation / Blood Transfusion: [ x ] yes [  ] no Potential Resources for home (durable medical equipment, home nursing, home O2): [ x ] yes [  ] no Overview of Discussion: I discussed with patient what CODE STATUS means, and how her current admission and comorbidities affect outcomes. CODE STATUS addressed and want to be  FULL Patient would like to assign 
hemant carty Spouse [phone]  
 
 
 
is a surrogate decision maker. Time Spent:  
 
Total time spent face-to-face in education and discussion: 16 minutes.   
 
Davida Panchal MD 
 Date of Service:  2/5/2021 
9:13 PM

## 2021-02-06 NOTE — PROGRESS NOTES
200: TRANSFER - IN REPORT: 
 
Verbal report received from Misael RN(name) on Inna Patel  being received from ED(unit) for routine progression of care Report consisted of patients Situation, Background, Assessment and  
Recommendations(SBAR). Information from the following report(s) SBAR, Kardex, Intake/Output, MAR, Accordion and Recent Results was reviewed with the receiving nurse. Opportunity for questions and clarification was provided. Assessment completed upon patients arrival to unit and care assumed. 4845: patient arrived on unit. Patient on BiPAP and diltiazem drip. VSS. HR in the 80s, dilt drip titrated to 10 ml/hr. 0366: Primary Nurse Kamryn Swanson and Azra Campbell RN performed a dual skin assessment on this patient Impairment noted- see wound doc flow sheet Justin score is 15. Patient has excoriation and skin tear to sacrum. Red, but blanchable. Wound care consult placed. 7180: Cardiology and pulmonology consult called for patient. 8491: Patient's heart rate in the 80s, dilt drip decreased to 7.5 mg/hr. 1128: Patient's heart rate in the 70s. Dilt drip decreased to 7.5 mg/hr 1200: Dr. Suzanne Terrazas at bedside assessing patient. Will continue on BiPAP for now. Will keep patient NPO until he can come off the BiPAP and then place speech consult. 1214: Patient's heart rate still in the 70s. Dilt drip decreased to 2.5 mg/hr 1435: Spoke with Dr. Sean Robles regarding patient's dilt drip. Will keep patient on drip since he is unable to swallow. 1500: Patient's family insisting on having a [employer] parish to the hospital to baptize patient at [facility]. I made wife aware that patient is COVID positive and on a strict isolation unit with no visitors. She was not aware he was positive. She states she will be back in contact with the [place] and let him know we will re-schedule. Patient's wife wants him baptized before he passes, so if we think he may pass this hospital stay she wants our  to proceed with it. I made patient's wife know that the patient is stable at the moment, but if they are interested in hospice care, I can get them some information. Wife is interested in palliative consult as well. I paged  and he is aware patient is COVID positive and cannot have visitors at this time. 1900: End of Shift Note Bedside shift change report given to Stephen Whiting RN (oncoming nurse) by Mitzie Canavan (offgoing nurse). Report included the following information SBAR, Kardex, Intake/Output, MAR and Recent Results Shift worked:  [id number] Shift summary and any significant changes:  
  Patient on BiPAP, lethargic, but alert and oriented. Patient NPO d/t aspiration PNA. Will place speech consult when he can come off the BiPAP. Concerns for physician to address:  Monitor breathing, stable on BiPAP at 40%. Zone phone for oncoming shift:   XXX Activity: 
Activity Level: Bed Rest 
Number times ambulated in hallways past shift: 0 Number of times OOB to chair past shift: 0 Cardiac:  
Cardiac Monitoring: Yes     
Cardiac Rhythm: Normal sinus rhythm Access:  
Current line(s): PIV Genitourinary:  
Urinary status: external catheter Respiratory:  
O2 Device: BIPAP Chronic home O2 use?: NO Incentive spirometer at bedside: YES 
  
GI: 
  
Current diet:  DIET NPO Passing flatus: YES Tolerating current diet: YES 
% Diet Eaten: 0 % Pain Management:  
Patient states pain is manageable on current regimen: YES 
 
 Skin: 
Justin Score: 15 Interventions: speciality bed, float heels, increase time out of bed, foam dressing, PT/OT consult and limit briefs Patient Safety: 
Fall Score: Total Score: 4 Interventions: bed/chair alarm, gripper socks, pt to call before getting OOB and stay with me (per policy) High Fall Risk: Yes Length of Stay: 
Expected LOS: - - - Actual LOS: 1 Naima Townsend

## 2021-02-06 NOTE — PROGRESS NOTES
Spiritual Care Assessment/Progress Note [address] 
 
 
NAME: Roberto Blanco      MRN: [id number] AGE: 82 y.o. SEX: male Quaker Affiliation: No preference Language: [place] 2/6/2021     Total Time (in minutes): 13 Spiritual Assessment begun in [facility] 2 PROGRESSIVE CARE through conversation with: 
  
    []Patient        [x] Family    [] Friend(s) Reason for Consult: Request by family/friend(s),  request  
 
Spiritual beliefs: (Please include comment if needed) [x] Identifies with a kelby tradition:     
   [] Supported by a kelby community:        
   [] Claims no spiritual orientation:       
   [] Seeking spiritual identity:            
   [] Adheres to an individual form of spirituality:       
   [] Not able to assess:                   
 
    
Identified resources for coping:  
   [x] Prayer                           
   [] Music                  [] Guided Imagery [x] Family/friends                 [] Pet visits [] Devotional reading                         [] Unknown 
   [] Other:                                          
 
 
Interventions offered during this visit: (See comments for more details) Family/Friend(s): Other (comment), Spiritism/blessing(Discussion of how to receive [address]) Plan of Care: 
 
 [] Support spiritual and/or cultural needs  
 [] Support AMD and/or advance care planning process    
 [] Support grieving process 
 [] Coordinate Rites and/or Rituals  
 [] Coordination with community clergy [] No spiritual needs identified at this time 
 [] Detailed Plan of Care below (See Comments)  [] Make referral to Music Therapy 
[] Make referral to Pet Therapy    
[] Make referral to Addiction services 
[] Make referral to [facility] 
[] Make referral to Spiritual Care Partner 
[] No future visits requested       
[x] Follow up upon further referrals Comments: Responded to page to contact family of pt Venegas currently on [facility]. Family discussed desire to have pt receive [employer]. Family articulated pt desire and hope of wife being present. Unable to assess pt at time. Discussed hospital protocols involving visitation and contact precautions based on patient needs. Pt and wife live in assisted living facility and have visited local parishes occasionally, but not able to consistently go due to medical and transportation needs. Provided information on to local [address] and priests in order to discuss desire for sacr[employer] of [employer]. Relayed one visitor for non-covid patients, not including clergy. Affirmed desire to care for pt and other members of family. Assured of prayers and advised of  services. [address] [facility] Spiritual Care Provider  Paging Service [phone])

## 2021-02-06 NOTE — ED NOTES
TRANSFER - OUT REPORT: 
 
Verbal report given to Jeffrey Falcon (name) on Tejeda Solid  being transferred to PCU(unit) for routine progression of care Report consisted of patients Situation, Background, Assessment and  
Recommendations(SBAR). Information from the following report(s) SBAR, Kardex and ED Summary was reviewed with the receiving nurse. Lines:  
Peripheral IV 02/05/21 Left Forearm (Active) Peripheral IV 02/05/21 Right Antecubital (Active) Opportunity for questions and clarification was provided. Patient transported with: 
 Monitor O2 @ 4 liters Registered Nurse Tech

## 2021-02-06 NOTE — CONSULTS
Pulmonary, Critical Care, and Sleep Medicine Initial Patient Consult Name: Hansa Guzman MRN: [id number] : 1935 Hospital: [address] Date: 2021 IMPRESSION:  
· Acute hypoxic resp failure- Probable basilar left > right aspiration PNA with possible left enlarging parapneumonic effusion. · COVID 19% 21 and again 21 - ? Last one is a false positive as this was an antigen test 
· TAVR ECHO EF 65%  nml TAVR fxn · CAD · PAF/RVR- dilt · Aspiration · HTN 
· LEukocytosis RECOMMENDATIONS:  
· Zosyn/vanco 
· Would ask rads to tap left effusion and send for cell count/LDH/TP/albumin/GS/CX, cytology/pH · Continue bipap · Keep NPO · Pt is critically ill due to Acute hypoxic resp failure due to PNA and at risk for decline due to resp failure requiring IMV. CCT EOP 30 min. Subjective: This patient has been seen and evaluated at the request of Dr. hospitalist for sob. Patient is a 82 y.o. male with h/o TAVR admitted for AHRF and leukocytosis. Enlarging left effusion on CXR. HAs gone on to needing bipap. Pt is somnolent but arousable on bipap. No distress. Past Medical History:  
Diagnosis Date  Acute on chronic diastolic (congestive) heart failure ([address] 2020  BPH (benign prostatic hyperplasia)  CAD (coronary artery disease)  Diabetes ([address]  Hearing loss  Hypercholesterolemia  Hypertension  Murmur  Recurrent depression ([address] 2019  Third degree AV block ([address] 10/30/2020 Past Surgical History:  
Procedure Laterality Date  HX CHOLECYSTECTOMY  IA CARDIAC SURG PROCEDURE UNLIST  2006 by-pass  IA INS NEW/RPLCMT PRM PM W/TRANSV ELTRD ATRIAL&VENT  10/30/2020  IA INS NEW/RPLCMT PRM PM W/TRANSV ELTRD ATRIAL&VENT N/A 10/30/2020 INSERT PPM DUAL performed by Bentley Sutherland MD at [address] [facility] include [facility] beds at the [facility] CATH LAB Prior to Admission medications Medication Sig Start Date End Date Taking? Authorizing Provider  
amoxicillin-clavulanate (AUGMENTIN) 875-125 mg per tablet Take 1 Tab by mouth every twelve (12) hours. 1/25/21 2/7/21  Provider, Historical  
hydrALAZINE (APRESOLINE) 25 mg tablet Take 75 mg by mouth three (3) times daily. Provider, Historical  
atorvastatin (LIPITOR) 40 mg tablet Take 1 Tab by mouth nightly for 60 days. 1/8/21 3/9/21  Neto Shrestha MD  
amLODIPine (NORVASC) 10 mg tablet Take 1 Tab by mouth daily. 1/9/21   Neto Shrestha MD  
furosemide (LASIX) 40 mg tablet One daily Patient taking differently: 20 mg. One daily 1/9/21   Neto Shrestha MD  
metoprolol tartrate (LOPRESSOR) 50 mg tablet Take 1 Tab by mouth two (2) times a day for 60 days. 1/8/21 3/9/21  Neto Shrestha MD  
albuterol (PROVENTIL HFA, VENTOLIN HFA, PROAIR HFA) 90 mcg/actuation inhaler Take 2 Puffs by inhalation every six (6) hours as needed for Wheezing or Shortness of Breath. 1/8/21   Neto Shrestha MD  
potassium chloride SR (K-TAB) 20 mEq tablet Take 1 Tab by mouth daily. 1/8/21   Neto Shrestha MD  
losartan (COZAAR) 50 mg tablet Take 100 mg by mouth daily. Provider, Historical  
aspirin 81 mg chewable tablet Take 81 mg by mouth daily. Provider, Historical  
temazepam (RESTORIL) 15 mg capsule TAKE 1 CAPSULE BY MOUTH AT BEDTIME AS NEEDED FOR SLEEP. 12/23/20   Minnie Humphreys MD  
acetaminophen (TYLENOL) 325 mg tablet Take 2 Tabs by mouth every four (4) hours as needed for Pain. 11/4/20   Fay Riojas NP  
senna-docusate (PERICOLACE) 8.6-50 mg per tablet Take 1 Tab by mouth daily as needed for Constipation.  11/4/20   Fay Riojas NP  
doxazosin (CARDURA) 4 mg tablet TAKE 1 TABLET BY MOUTH  DAILY 10/20/20   Davie MENDOZA NP  
finasteride (PROSCAR) 5 mg tablet TAKE 1 TABLET BY MOUTH  DAILY 10/20/20   Landon Humphreys, NP  
 metFORMIN ER (GLUCOPHAGE XR) 500 mg tablet TAKE 1 TABLET BY MOUTH  TWICE DAILY WITH MEALS 10/20/20   Liberty Dick NP  
glipiZIDE SR (GLUCOTROL XL) 5 mg CR tablet TAKE 1 TABLET BY MOUTH  DAILY 10/20/20   Liberty Dick NP  
sertraline (ZOLOFT) 100 mg tablet TAKE 1 TABLET BY MOUTH  DAILY 20   Antoinetteshanelle Lao NP  
fluticasone propionate (FLONASE) 50 mcg/actuation nasal spray ADMINISTER 1 Spray IN Both Nostrils two (2) times a day. 20   Kal Dick NP  
cholecalciferol (VITAMIN D3) 1,000 unit cap Take 1 Cap by mouth daily. 3/29/18   Jerald Guzman,   
sodium chloride (OCEAN) 0.65 % nasal squeeze bottle 0.05 mL by Both Nostrils route as needed for Congestion. 3/29/18   Henry Hunt, DO  
FERROUS FUMARATE/VIT BCOMP,C (SUPER B COMPLEX PO) Take 1 Tab by mouth daily. Provider, Historical  
 
Allergies Allergen Reactions  Procaine Other (comments) Pt stated he passed out from procaine and was told not to let anyone use it on him again Social History Tobacco Use  Smoking status: Former Smoker Types: Cigarettes Quit date: 1990 Years since quittin.4  Smokeless tobacco: Never Used Substance Use Topics  Alcohol use: No  
  
Family History Problem Relation Age of Onset  Cancer Mother  Cancer Father Current Facility-Administered Medications Medication Dose Route Frequency  albuterol-ipratropium (DUO-NEB) 2.5 MG-0.5 MG/3 ML  3 mL Nebulization Q4H RT  
 arformoterol 15 mcg/budesonide 0.5 mg neb solution   Nebulization BID  dilTIAZem (CARDIZEM) 100 mg in dextrose 5% (MBP/ADV) 100 mL infusion  0-15 mg/hr IntraVENous TITRATE  sodium chloride (NS) flush 5-40 mL  5-40 mL IntraVENous Q8H  
 enoxaparin (LOVENOX) injection 40 mg  40 mg SubCUTAneous DAILY  piperacillin-tazobactam (ZOSYN) 3.375 g in 0.9% sodium chloride (MBP/ADV) 100 mL MBP  3.375 g IntraVENous Q8H  
  vancomycin (VANCOCIN) 750 mg in 0.9% sodium chloride 250 mL (VIAL-MATE)  750 mg IntraVENous Q16H  
 aspirin (ASA) suppository 300 mg  300 mg Rectal DAILY  insulin lispro (HUMALOG) injection   SubCUTAneous AC&HS  
 0.9% sodium chloride infusion  50 mL/hr IntraVENous CONTINUOUS Review of Systems: 
Review of systems not obtained due to patient factors. Objective:  
Vital Signs:   
Visit Vitals BP (!) 107/55 (BP 1 Location: Left upper arm, BP Patient Position: At rest) Pulse 88 Temp 97.9 °F (36.6 °C) Resp 22 Ht 6' 2\" (1.88 m) Wt 77.2 kg (170 lb 1.6 oz) SpO2 96% BMI 21.84 kg/m² O2 Device: BIPAP  
O2 Flow Rate (L/min): 4 l/min Temp (24hrs), Av.5 °F (36.9 °C), Min:97.9 °F (36.6 °C), Max:100.8 °F (38.2 °C) Intake/Output:  
Last shift:       07 -  190 In: 1234.6 [I.V.:1234.6] Out: - Last 3 shifts:  190 -  0700 In: 100 [I.V.:100] Out: - Intake/Output Summary (Last 24 hours) at 2021 1704 Last data filed at 2021 1540 Gross per 24 hour Intake 1334.55 ml Output  Net 1334.55 ml Physical Exam: NCAT on bipap no wob no accessory muscle use no abd paradox , trachea midline no central cyanosis , symmetric chest rise. Data review:  
 
Recent Results (from the past 24 hour(s)) CULTURE, BLOOD, PAIRED Collection Time: 21  5:52 PM  
 Specimen: Blood Result Value Ref Range Special Requests: NO SPECIAL REQUESTS Culture result: NO GROWTH AFTER 15 HOURS    
LACTIC ACID Collection Time: 21  5:52 PM  
Result Value Ref Range Lactic acid 2.1 (HH) 0.4 - 2.0 MMOL/L  
POC LACTIC ACID Collection Time: 21  8:11 PM  
Result Value Ref Range Lactic Acid (POC) 1.29 0.40 - 2.00 mmol/L  
TROPONIN I Collection Time: 21 10:40 PM  
Result Value Ref Range Troponin-I, Qt. 0.18 (H) <0.05 ng/mL SARS-COV-2  
 Collection Time: 02/05/21 10:40 PM  
Result Value Ref Range SARS-CoV-2 Symptomatic Testing COVID-19 RAPID TEST Collection Time: 02/05/21 10:40 PM  
Result Value Ref Range Specimen source Nasopharyngeal    
 COVID-19 rapid test Detected (AA) NOTD    
GLUCOSE, POC Collection Time: 02/05/21 11:43 PM  
Result Value Ref Range Glucose (POC) 262 (H) 65 - 100 mg/dL Performed by Wlimer Matthew RN   
METABOLIC PANEL, COMPREHENSIVE Collection Time: 02/06/21  3:19 AM  
Result Value Ref Range Sodium 139 136 - 145 mmol/L Potassium 3.4 (L) 3.5 - 5.1 mmol/L Chloride 103 97 - 108 mmol/L  
 CO2 32 21 - 32 mmol/L Anion gap 4 (L) 5 - 15 mmol/L Glucose 177 (H) 65 - 100 mg/dL BUN 20 6 - 20 MG/DL Creatinine 0.86 0.70 - 1.30 MG/DL  
 BUN/Creatinine ratio 23 (H) 12 - 20 GFR est AA >60 >60 ml/min/1.73m2 GFR est non-AA >60 >60 ml/min/1.73m2 Calcium 8.0 (L) 8.5 - 10.1 MG/DL Bilirubin, total 0.3 0.2 - 1.0 MG/DL  
 ALT (SGPT) 19 12 - 78 U/L  
 AST (SGOT) 20 15 - 37 U/L Alk. phosphatase 74 45 - 117 U/L Protein, total 5.7 (L) 6.4 - 8.2 g/dL Albumin 2.0 (L) 3.5 - 5.0 g/dL Globulin 3.7 2.0 - 4.0 g/dL A-G Ratio 0.5 (L) 1.1 - 2.2 MAGNESIUM Collection Time: 02/06/21  3:19 AM  
Result Value Ref Range Magnesium 1.7 1.6 - 2.4 mg/dL CBC WITH AUTOMATED DIFF Collection Time: 02/06/21  3:19 AM  
Result Value Ref Range WBC 16.6 (H) 4.1 - 11.1 K/uL  
 RBC 3.32 (L) 4.10 - 5.70 M/uL HGB 8.9 (L) 12.1 - 17.0 g/dL HCT 28.3 (L) 36.6 - 50.3 % MCV 85.2 80.0 - 99.0 FL  
 MCH 26.8 26.0 - 34.0 PG  
 MCHC 31.4 30.0 - 36.5 g/dL  
 RDW 15.9 (H) 11.5 - 14.5 % PLATELET 719 (L) 535 - 400 K/uL MPV 12.7 8.9 - 12.9 FL  
 NRBC 0.0 0  WBC ABSOLUTE NRBC 0.00 0.00 - 0.01 K/uL NEUTROPHILS 83 (H) 32 - 75 % LYMPHOCYTES 9 (L) 12 - 49 % MONOCYTES 7 5 - 13 % EOSINOPHILS 0 0 - 7 % BASOPHILS 0 0 - 1 % IMMATURE GRANULOCYTES 1 (H) 0.0 - 0.5 % ABS. NEUTROPHILS 13.8 (H) 1.8 - 8.0 K/UL  
 ABS. LYMPHOCYTES 1.5 0.8 - 3.5 K/UL  
 ABS. MONOCYTES 1.1 (H) 0.0 - 1.0 K/UL  
 ABS. EOSINOPHILS 0.0 0.0 - 0.4 K/UL  
 ABS. BASOPHILS 0.0 0.0 - 0.1 K/UL  
 ABS. IMM. GRANS. 0.2 (H) 0.00 - 0.04 K/UL  
 DF AUTOMATED    
TROPONIN I Collection Time: 02/06/21  3:19 AM  
Result Value Ref Range Troponin-I, Qt. 0.18 (H) <0.05 ng/mL GLUCOSE, POC Collection Time: 02/06/21  8:48 AM  
Result Value Ref Range Glucose (POC) 204 (H) 65 - 100 mg/dL Performed by Christie Silverio RN   
TROPONIN I Collection Time: 02/06/21 10:20 AM  
Result Value Ref Range Troponin-I, Qt. 0.14 (H) <0.05 ng/mL GLUCOSE, POC Collection Time: 02/06/21 11:25 AM  
Result Value Ref Range Glucose (POC) 171 (H) 65 - 100 mg/dL Performed by Christie Silverio RN   
GLUCOSE, POC Collection Time: 02/06/21  4:19 PM  
Result Value Ref Range Glucose (POC) 79 65 - 100 mg/dL Performed by [facility] PCT Imaging: 
I have personally reviewed the patients radiographs and have reviewed the reports: CTA chest no PE Large left effusion and smaller right effusion with LLL ATX/ASD no masses Gomez Valverde MD

## 2021-02-06 NOTE — H&P
Hospitalist Admission Note NAME: Natasha York :  1935 MRN:  [id number] Date/Time:  2021 7:30 PM 
 
Patient PCP: Mary Carmen Olmedo, DO 
_____________________________________________________________________ Given the patient's current clinical presentation, I have a high level of concern for decompensation if discharged from the emergency department. Complex decision making was performed, which includes reviewing the patient's available past medical records, laboratory results, and x-ray films. My assessment of this patient's clinical condition and my plan of care is as follows. Assessment / Plan: 
 
Acute Hypoxic resp. failure 2/2 Likely Aspiration Pneumonia currently on bipap40% sat. 96% Failed swallow recently CT Chest Pending Troponinemia   0.10 --> .09  Likely type2 cont. Tele  and trend Afib with rvr  132 cont. cardizem ggt  Cont tele  Serial enzymes On review of EMR per his cardiologist's report in the past. \" HE appears to be in AF currently--previously mostly in MAT. However due to fall risk, anemia, thrombocytopenia, not the best candidate for [address] [place] [address] Road. \" 
WBC 15K CXR :There is nearly complete opacification of the left hemithorax which shift of the mediastinum to the left indicating loss of volume. The right lung shows no acute findings. Cardiac pacemaker in place, prior sternotomy, heart size is likely 
unchanged. - Admit to stepdown 
-Tele 
-iv zosyn/flagyl/vanco 
-ivf gentle  As pt. Hs h/o chf on lasix -npo  
-Will  Need SLP Consult once stable  
-pulmn  and card. Consulted H/O MRSA Cont. Contact isolation H/O Bilateral buttocks- Bilateral lower legs /Right 5th metatarsal- cont. Local wound care . Wound nurse consulted Third degree heart block. The patient is status post pacemaker insertion. Coronary artery disease, status post coronary artery bypass grafting:continue with cardiac medications. Anemia.most likely due to chronic disease. H/H Stable Monitor Hx of Thrombocytopenia. Platelets 645 which is around baseline. will monitor the patient's platelet count while starting on [facility] for afib Will Hold all PO  Until clear by SLP  2/2 ?aspiration Cont. Asa per rectum Code Status: DNR (Pt. Has paper at bedside/also confirmed with son william) Surrogate Decision Maker: 
Carlos Juan [phone]    
William carty Son [phone] DVT Prophylaxis: SQ LOVENOX 
GI Prophylaxis: not indicated Baseline: INDEPENDENT Subjective: CHIEF COMPLAINT:  SOB/Tachycardia HISTORY OF PRESENT ILLNESS:    
Bhargavi Paredes is a 82 y.o.   male with a past medical history significant for hypertension; chronic diastolic congestive heart failure; dyslipidemia; third degree heart block, status post pacemaker insertion; coronary artery disease, status post CABG; benign prostatic hyperplasia; type 2 diabetes; aortic stenosis, status post transcatheter aortic valve replacement TAVR  who recently failed swallow eval  x3 weeks ago  ,currently on soft diet  presents with  Acute resp. Distress  2/2 aspiration . Currently requiring 6L NC. Also found to have Afib with  on cardizem ggt currently. We were asked to admit for work up and evaluation of the above problems. Past Medical History:  
Diagnosis Date  Acute on chronic diastolic (congestive) heart failure ([address] 12/27/2020  BPH (benign prostatic hyperplasia)  CAD (coronary artery disease)  Diabetes ([address]  Hearing loss  Hypercholesterolemia  Hypertension  Murmur  Recurrent depression ([address] 8/22/2019  Third degree AV block ([address] 10/30/2020 Past Surgical History:  
Procedure Laterality Date  HX CHOLECYSTECTOMY  TN CARDIAC SURG PROCEDURE UNLIST  2006 by-pass  TN INS NEW/RPLCMT PRM PM W/TRANSV ELTRD ATRIAL&VENT  10/30/2020  CT INS NEW/RPLCMT PRM PM W/TRANSV ELTRD ATRIAL&VENT N/A 10/30/2020 INSERT PPM DUAL performed by Rosales Johnston MD at [address], [facility] Social History Tobacco Use  Smoking status: Former Smoker Types: Cigarettes Quit date: 1990 Years since quittin.4  Smokeless tobacco: Never Used Substance Use Topics  Alcohol use: No  
  
 
Family History Problem Relation Age of Onset  Cancer Mother  Cancer Father Allergies Allergen Reactions  Procaine Other (comments) Pt stated he passed out from procaine and was told not to let anyone use it on him again Prior to Admission medications Medication Sig Start Date End Date Taking? Authorizing Provider  
amoxicillin-clavulanate (AUGMENTIN) 875-125 mg per tablet Take 1 Tab by mouth every twelve (12) hours. 21  Provider, Historical  
hydrALAZINE (APRESOLINE) 25 mg tablet Take 75 mg by mouth three (3) times daily. Provider, Historical  
atorvastatin (LIPITOR) 40 mg tablet Take 1 Tab by mouth nightly for 60 days. 1/8/21 3/9/21  Dick Rizvi MD  
amLODIPine (NORVASC) 10 mg tablet Take 1 Tab by mouth daily. 21   Dick Rizvi MD  
furosemide (LASIX) 40 mg tablet One daily Patient taking differently: 20 mg. One daily 21   Dick Rizvi MD  
metoprolol tartrate (LOPRESSOR) 50 mg tablet Take 1 Tab by mouth two (2) times a day for 60 days. 1/8/21 3/9/21  Dick Rizvi MD  
albuterol (PROVENTIL HFA, VENTOLIN HFA, PROAIR HFA) 90 mcg/actuation inhaler Take 2 Puffs by inhalation every six (6) hours as needed for Wheezing or Shortness of Breath. 21   Dick Rizvi MD  
potassium chloride SR (K-TAB) 20 mEq tablet Take 1 Tab by mouth daily. 21   Dick Rizvi MD  
losartan (COZAAR) 50 mg tablet Take 100 mg by mouth daily. Provider, Historical  
aspirin 81 mg chewable tablet Take 81 mg by mouth daily.     Provider, Historical  
 temazepam (RESTORIL) 15 mg capsule TAKE 1 CAPSULE BY MOUTH AT BEDTIME AS NEEDED FOR SLEEP. 12/23/20   Macario Alvarez MD  
acetaminophen (TYLENOL) 325 mg tablet Take 2 Tabs by mouth every four (4) hours as needed for Pain. 11/4/20   Tracy Sibley, HUMBERTO  
senna-docusate (PERICOLACE) 8.6-50 mg per tablet Take 1 Tab by mouth daily as needed for Constipation. 11/4/20   Tracy Sibley, HUMBERTO  
doxazosin (CARDURA) 4 mg tablet TAKE 1 TABLET BY MOUTH  DAILY 10/20/20   Cora Dick NP  
finasteride (PROSCAR) 5 mg tablet TAKE 1 TABLET BY MOUTH  DAILY 10/20/20   Pastora Patino NP  
metFORMIN ER (GLUCOPHAGE XR) 500 mg tablet TAKE 1 TABLET BY MOUTH  TWICE DAILY WITH MEALS 10/20/20   Joanna Dick NP  
glipiZIDE SR (GLUCOTROL XL) 5 mg CR tablet TAKE 1 TABLET BY MOUTH  DAILY 10/20/20   Cora Dick NP  
sertraline (ZOLOFT) 100 mg tablet TAKE 1 TABLET BY MOUTH  DAILY 9/2/20   Betyt Sims NP  
fluticasone propionate (FLONASE) 50 mcg/actuation nasal spray ADMINISTER 1 Spray IN Both Nostrils two (2) times a day. 6/8/20   Lebron Dick NP  
cholecalciferol (VITAMIN D3) 1,000 unit cap Take 1 Cap by mouth daily. 3/29/18   Zurdo Ax, DO  
sodium chloride (OCEAN) 0.65 % nasal squeeze bottle 0.05 mL by Both Nostrils route as needed for Congestion. 3/29/18   Paty Hunt All, DO  
FERROUS FUMARATE/VIT BCOMP,C (SUPER B COMPLEX PO) Take 1 Tab by mouth daily. Provider, Historical  
 
 
REVIEW OF SYSTEMS:    
I am not able to complete the review of systems because: The patient is intubated and sedated The patient has altered mental status due to his acute medical problems The patient has baseline aphasia from prior stroke(s) The patient has baseline dementia and is not reliable historian The patient is in acute medical distress and unable to provide information Total of 12 systems reviewed as follows:   
   POSITIVE= underlined text  Negative = text not underlined General:  fever, chills, sweats, generalized weakness, weight loss/gain,  
   loss of appetite Eyes:    blurred vision, eye pain, loss of vision, double vision ENT:    rhinorrhea, pharyngitis Respiratory:   cough, sputum production, SOB, LARA, wheezing, pleuritic pain  
Cardiology:   chest pain, palpitations, orthopnea, PND, edema, syncope Gastrointestinal:  abdominal pain , N/V, diarrhea, dysphagia, constipation, bleeding Genitourinary:  frequency, urgency, dysuria, hematuria, incontinence Muskuloskeletal :  arthralgia, myalgia, back pain Hematology:  easy bruising, nose or gum bleeding, lymphadenopathy Dermatological: rash, ulceration, pruritis, color change / jaundice Endocrine:   hot flashes or polydipsia Neurological:  headache, dizziness, confusion, focal weakness, paresthesia, Speech difficulties, memory loss, gait difficulty Psychological: Feelings of anxiety, depression, agitation Objective: VITALS:   
Visit Vitals BP (!) 147/69 Pulse (!) 115 Temp 98 °F (36.7 °C) Resp (!) 33 Ht 6' 2\" (1.88 m) Wt 68 kg (150 lb) SpO2 94% BMI 19.26 kg/m² PHYSICAL EXAM: 
 
General:    Alert, cooperative, acute resp. distress, appears stated age. HEENT: Atraumatic, anicteric sclerae, pink conjunctivae No oral ulcers, mucosa moist, throat clear, dentition fair Neck:  Supple, symmetrical,  thyroid: non tender Lungs:   B/l  Crackles. Chest wall:  No tenderness  No Accessory muscle use. Heart:   Tachy IR IRegular  rhythm,    No edema Abdomen:   Soft, non-tender. Not distended. Bowel sounds normal 
Extremities: No cyanosis. No clubbing,   
  Skin turgor normal, Capillary refill normal, Radial dial pulse 2+ Skin:     Not pale. Not Jaundiced  No rashes Psych:  Good insight. Not depressed. Not anxious or agitated. Neurologic: EOMs intact. No facial asymmetry. No aphasia or slurred speech. Symmetrical strength, Sensation grossly intact. Alert and oriented X 4. _______________________________________________________________________ Care Plan discussed with: 
  Comments Patient y Family  y Son-- william/wife RN y   
Care Manager Consultant:  y Ed md  
_______________________________________________________________________ Expected  Disposition:  
Home with Family y HH/PT/OT/RN   
SNF/LTC   
VERA   
________________________________________________________________________ TOTAL TIME:   65  Minutes Critical Care Provided     Minutes non procedure based Comments  
 y Reviewed previous records  
>50% of visit spent in counseling and coordination of care y Discussion with patient and/or family and questions answered Given the patient's current clinical presentation, I have a high level of concern for decompensation if discharged from the ED. Complex decision making was performed which includes reviewing the patient's available past medical records, laboratory results, and Xray films. I have also directly communicated my plan and discussed this case with the involved ED physician.  
 
____________________________________________________________________ Will MD Jasmyne 
 
Procedures: see electronic medical records for all procedures/Xrays and details which were not copied into this note but were reviewed prior to creation of Plan. LAB DATA REVIEWED:   
Recent Results (from the past 24 hour(s)) EKG, 12 LEAD, INITIAL Collection Time: 02/05/21  3:37 PM  
Result Value Ref Range Ventricular Rate 132 BPM  
 Atrial Rate 131 BPM  
 QRS Duration 98 ms Q-T Interval 336 ms  
 QTC Calculation (Bezet) 497 ms Calculated R Axis -35 degrees Calculated T Axis 155 degrees Diagnosis Atrial fibrillation with rapid ventricular response Left axis deviation Voltage criteria for left ventricular hypertrophy Marked ST abnormality, possible lateral subendocardial injury When compared with ECG of 27-DEC-2020 21:53, 
 Atrial fibrillation has replaced Electronic ventricular pacemaker Confirmed by ANTONIA Farah ([id number]) on 2/5/2021 4:15:23 PM 
  
CBC WITH AUTOMATED DIFF Collection Time: 02/05/21  3:50 PM  
Result Value Ref Range WBC 15.7 (H) 4.1 - 11.1 K/uL  
 RBC 3.63 (L) 4.10 - 5.70 M/uL HGB 9.8 (L) 12.1 - 17.0 g/dL HCT 31.2 (L) 36.6 - 50.3 % MCV 86.0 80.0 - 99.0 FL  
 MCH 27.0 26.0 - 34.0 PG  
 MCHC 31.4 30.0 - 36.5 g/dL  
 RDW 15.7 (H) 11.5 - 14.5 % PLATELET 666 (L) 404 - 400 K/uL MPV 12.6 8.9 - 12.9 FL  
 NRBC 0.0 0  WBC ABSOLUTE NRBC 0.00 0.00 - 0.01 K/uL NEUTROPHILS 86 (H) 32 - 75 % LYMPHOCYTES 6 (L) 12 - 49 % MONOCYTES 7 5 - 13 % EOSINOPHILS 0 0 - 7 % BASOPHILS 0 0 - 1 % IMMATURE GRANULOCYTES 1 (H) 0.0 - 0.5 % ABS. NEUTROPHILS 13.3 (H) 1.8 - 8.0 K/UL  
 ABS. LYMPHOCYTES 1.0 0.8 - 3.5 K/UL  
 ABS. MONOCYTES 1.2 (H) 0.0 - 1.0 K/UL  
 ABS. EOSINOPHILS 0.1 0.0 - 0.4 K/UL  
 ABS. BASOPHILS 0.0 0.0 - 0.1 K/UL  
 ABS. IMM. GRANS. 0.1 (H) 0.00 - 0.04 K/UL  
 DF AUTOMATED METABOLIC PANEL, COMPREHENSIVE Collection Time: 02/05/21  3:50 PM  
Result Value Ref Range Sodium 137 136 - 145 mmol/L Potassium 3.6 3.5 - 5.1 mmol/L Chloride 100 97 - 108 mmol/L  
 CO2 32 21 - 32 mmol/L Anion gap 5 5 - 15 mmol/L Glucose 253 (H) 65 - 100 mg/dL BUN 18 6 - 20 MG/DL Creatinine 0.96 0.70 - 1.30 MG/DL  
 BUN/Creatinine ratio 19 12 - 20 GFR est AA >60 >60 ml/min/1.73m2 GFR est non-AA >60 >60 ml/min/1.73m2 Calcium 8.3 (L) 8.5 - 10.1 MG/DL Bilirubin, total 0.6 0.2 - 1.0 MG/DL  
 ALT (SGPT) 24 12 - 78 U/L  
 AST (SGOT) 22 15 - 37 U/L Alk. phosphatase 89 45 - 117 U/L Protein, total 6.5 6.4 - 8.2 g/dL Albumin 2.2 (L) 3.5 - 5.0 g/dL Globulin 4.3 (H) 2.0 - 4.0 g/dL A-G Ratio 0.5 (L) 1.1 - 2.2    
TROPONIN I Collection Time: 02/05/21  3:50 PM  
Result Value Ref Range Troponin-I, Qt. 0.09 (H) <0.05 ng/mL CK W/ REFLX CKMB Collection Time: 02/05/21  3:50 PM  
Result Value Ref Range CK 40 39 - 308 U/L  
POC TROPONIN-I Collection Time: 02/05/21  3:56 PM  
Result Value Ref Range Troponin-I (POC) 0.10 (H) 0.00 - 0.08 ng/mL MAGNESIUM Collection Time: 02/05/21  4:12 PM  
Result Value Ref Range Magnesium 1.7 1.6 - 2.4 mg/dL TSH 3RD GENERATION Collection Time: 02/05/21  4:12 PM  
Result Value Ref Range TSH 2.33 0.36 - 3.74 uIU/mL LACTIC ACID Collection Time: 02/05/21  5:52 PM  
Result Value Ref Range  Lactic acid 2.1 (HH) 0.4 - 2.0 MMOL/L

## 2021-02-07 NOTE — PROGRESS NOTES
Pharmacy Automatic Renal Dosing Protocol - Antimicrobials Indication for Antimicrobials: aspiration PNA Recent MRSA foot wound infection Current Regimen of Each Antimicrobial:  
Vancomycin 1500 mg x 1 then 750 mg Q16H (Start Date ; Day # 3) Piperacillin tazobactam 3.375 grams Q8H (Start Date , Day 3) Previous Antimicrobial Therapy:  
Metronidazole 500 mg Q12H (Start Date -6) Goal Level: VANCOMYCIN TROUGH GOAL RANGE Vancomycin Trough: 15 - 20 mcg/mL  (AUC: 400 - 600 mg/hr/Liter/day) Date Dose & Interval Measured (mcg/mL) Extrapolated (mcg/mL) Significant Cultures:  
 paired blood cx: NGTD, pending Paralysis, amputations, malnutrition: none noted Labs: 
Recent Labs 21 
0349 21 
0319 21 
1550 CREA 0.95 0.86 0.96  
BUN 25* 20 18 WBC 14.7* 16.6* 15.7* Temp (24hrs), Av.7 °F (36.5 °C), Min:97.4 °F (36.3 °C), Max:97.9 °F (36.6 °C) Creatinine Clearance (mL/min) or Dialysis: 68.5 mL/min (IBW) Impression/Plan:  
Scr stable Continue current dose of abx; appropriate for indication/renal function Vancomycin  trough prior to dose due  at 2100 Antimicrobial stop date pending Pharmacy will follow daily and adjust medications as appropriate for renal function and/or serum levels. Thank you, Veverly DENNYS Aquino

## 2021-02-07 NOTE — PROGRESS NOTES
Problem: Falls - Risk of 
Goal: *Absence of Falls Description: Document Will Llamas Fall Risk and appropriate interventions in the flowsheet. Outcome: Progressing Towards Goal 
Note: Fall Risk Interventions: 
  
 
Mentation Interventions: Adequate sleep, hydration, pain control, Bed/chair exit alarm, Door open when patient unattended, Increase mobility, More frequent rounding, Reorient patient, Room close to nurse's station Medication Interventions: Bed/chair exit alarm, Patient to call before getting OOB, Teach patient to arise slowly Elimination Interventions: Call light in reach, Patient to call for help with toileting needs, Toileting schedule/hourly rounds History of Falls Interventions: Consult care management for discharge planning, Door open when patient unattended, Room close to nurse's station

## 2021-02-07 NOTE — PROGRESS NOTES
Hospitalist Progress Note NAME: Adele Grande :  1935 MRN:  [id number] Assessment / Plan: 
Acute Hypoxic resp. failure 2/2 Likely Aspiration Pneumonia currently on bipap40% sat. 96% Failed swallow recently CT Chest Pending Troponinemia   0.10 --> .09  Likely type2 cont. Tele  and trend Afib with rvr  132 cont. cardizem ggt  Cont tele  Serial enzymes On review of EMR per his cardiologist's report in the past. \" HE appears to be in AF currently--previously mostly in MAT. However due to fall risk, anemia, thrombocytopenia, not the best candidate for [facility]. \" 
WBC 15K CXR :There is nearly complete opacification of the left hemithorax which shift of the mediastinum to the left indicating loss of volume. The right lung shows no acute findings. Cardiac pacemaker in place, prior sternotomy, heart size is likely 
unchanged. 
-cont tele monitoring 
-iv zosyn/flagyl/vanco 
-ivf gentle  As pt. Hs h/o chf on lasix -npo  
-Will  Need SLP Consult once stable  
-pulmn  and card. Consulted 
  
  
H/O MRSA Cont. Contact isolation H/O Bilateral buttocks- Bilateral lower legs /Right 5th metatarsal- cont. Local wound care . Wound nurse consulted Third degree heart block.  The patient is status post pacemaker insertion. Coronary artery disease, status post coronary artery bypass grafting:continue with cardiac medications.    
Anemia.most likely due to chronic disease. H/H Stable Monitor Hx of Thrombocytopenia.  Platelets 354 which is around baseline. will monitor the patient's platelet count while starting on [facility] for afib 
  
Will Hold all PO  Until clear by SLP  2/2 ?aspiration Cont. Asa per rectum 
  
Code Status: DNR (Pt. Has paper at bedside/also confirmed with son william) Surrogate Decision MakerGopal Goodwin [phone]      
William carty Son [phone]      
  
  
DVT Prophylaxis: SQ LOVENOX 
GI Prophylaxis: not indicated 
  
 Based on review of chart, seems patient mostly bedbound reently Prognosis: poor Subjective: Chief Complaint / Reason for Physician Visit On bipap, tolerating fairly well. No acute distress Discussed with RN events overnight. Review of Systems: 
Symptom Y/N Comments  Symptom Y/N Comments Fever/Chills    Chest Pain Poor Appetite    Edema Cough    Abdominal Pain Sputum    Joint Pain SOB/LARA    Pruritis/Rash Nausea/vomit    Tolerating PT/OT Diarrhea    Tolerating Diet Constipation    Other Could NOT obtain due to:   
 
Objective: VITALS:  
Last 24hrs VS reviewed since prior progress note. Most recent are: 
Patient Vitals for the past 24 hrs: 
 Temp Pulse Resp BP SpO2  
02/07/21 1030 97.8 °F (36.6 °C) (!) 109 18 134/60 100 % 02/07/21 0945    (!) 82/52   
02/07/21 0911  (!) 151 22  99 % 02/07/21 0910  (!) 128 22 (!) 89/58 99 % 02/07/21 0905  (!) 117 21 116/81 99 % 02/07/21 0900  (!) 166 26 90/60 100 % 02/07/21 0858  (!) 156 24 (!) 87/58 100 % 02/07/21 0853  (!) 153 25 (!) 104/47 99 % 02/07/21 0852  (!) 163 28    
02/07/21 0851  (!) 178     
02/07/21 0849  (!) 172 23    
02/07/21 0848  (!) 174 27  98 % 02/07/21 0847  (!) 177 27 (!) 135/114 98 % 02/07/21 0846  (!) 178 27    
02/07/21 0845  (!) 180 25 (!) 74/50 97 % 02/07/21 0844  (!) 162 26    
02/07/21 0843  (!) 185 27  (!) 88 % 02/07/21 0842  (!) 181 (!) 31  99 % 02/07/21 0733     98 % 02/07/21 0730 97.9 °F (36.6 °C) 90 28 (!) 128/56 98 % 02/07/21 0355     99 % 02/07/21 0347     99 % 02/07/21 0300 97.7 °F (36.5 °C) 95 27 (!) 126/54 99 % 02/06/21 2350     99 % 02/06/21 2345     98 % 02/06/21 2310  94 28  100 % 02/06/21 2300 97.7 °F (36.5 °C) (!) 102 29 (!) 123/58 100 % 02/06/21 2044     99 % 02/06/21 2020     98 % 02/06/21 2000 97.4 °F (36.3 °C) 76 20 (!) 133/50 99 % 02/06/21 1530 97.9 °F (36.6 °C) 88 22 (!) 107/55 96 % 02/06/21 1526     96 % 02/06/21 1130 97.9 °F (36.6 °C) 74 21 (!) 123/47 95 % 02/06/21 1124     98 % Intake/Output Summary (Last 24 hours) at 2/7/2021 1044 Last data filed at 2/7/2021 1553 Gross per 24 hour Intake 1234.55 ml Output 400 ml Net 834.55 ml I had a face to face encounter and independently examined this patient on 2/7/2021, as outlined below: PHYSICAL EXAM: 
General: WD, WN. Alert, cooperative, no acute distress EENT:  EOMI. Anicteric sclerae. MMM Resp:  Coarse BS. No accessory muscle use CV:  Regular  rhythm,  No edema GI:  Soft, Non distended, Non tender. +Bowel sounds Neurologic:  Alert, oriented to person, normal speech, Psych:   Not anxious nor agitated Skin:  No rashes. No jaundice Reviewed most current lab test results and cultures  YES Reviewed most current radiology test results   YES Review and summation of old records today    NO Reviewed patient's current orders and MAR    YES 
PMH/ reviewed - no change compared to H&P 
________________________________________________________________________ Care Plan discussed with: 
  Comments Patient Family RN Care Manager Consultant Multidiciplinary team rounds were held today with , nursing, pharmacist and clinical coordinator. Patient's plan of care was discussed; medications were reviewed and discharge planning was addressed. ________________________________________________________________________ Total NON critical care TIME:  35   Minutes Total CRITICAL CARE TIME Spent:   Minutes non procedure based Comments >50% of visit spent in counseling and coordination of care    
________________________________________________________________________ Genaro Cardoza, DO  
 
 Procedures: see electronic medical records for all procedures/Xrays and details which were not copied into this note but were reviewed prior to creation of Plan. LABS: 
I reviewed today's most current labs and imaging studies. Pertinent labs include: 
Recent Labs 02/07/21 
0349 02/06/21 0319 02/05/21 
1550 WBC 14.7* 16.6* 15.7* HGB 9.8* 8.9* 9.8* HCT 31.8* 28.3* 31.2*  
* 131* 140* Recent Labs 02/07/21 
9475 02/07/21 
6547 02/06/21 0319 02/05/21 
1612 02/05/21 
1550 NA  --  142 139  --  137 K  --  3.2* 3.4*  --  3.6 CL  --  107 103  --  100 CO2  --  24 32  --  32  
GLU  --  139* 177*  --  253* BUN  --  25* 20  --  18  
CREA  --  0.95 0.86  --  0.96  
CA  --  8.4* 8.0*  --  8.3*  
MG 1.4*  --  1.7 1.7  --   
ALB  --   --  2.0*  --  2.2* TBILI  --   --  0.3  --  0.6 ALT  --   --  19  --  24 Signed: Georgiana Malik DO

## 2021-02-07 NOTE — CONSULTS
Cardiology Consult Note Date of  Admission: 2/5/2021  4:01 PM  
 
Admission type:Emergency Subjective:  
 
Mr. Santosh Moraes is admitted with respiratory failure. Noted to be in afib with RVR. Initially well controlled with cardizem drip. Now on IV amiodarone due to rapid rate. He has h/o severe AS s/p TAVR and pacemaker placement 10/20 at [facility]. Echo 12/20 showed normal EF, stable TAVR. He has h/o afib, not felt to be a good candidate for anti coagulation. Followed by Dr. Tony Kelly and Dr. Bharati Holland at [facility]. He is currently on Bipap. COVID pending. ? Thoracentesis today. Patient Active Problem List  
 Diagnosis Date Noted  Acute respiratory failure ([address] 02/05/2021  Aspiration pneumonia ([address] 02/05/2021  Atrial fibrillation with RVR ([address] 02/05/2021  Protein malnutrition ([address] 01/28/2021  DNR (do not resuscitate) 01/28/2021  Type 2 diabetes mellitus with right diabetic foot infection ([address] 12/28/2020  Debility 11/08/2020  S/P TAVR (transcatheter aortic valve replacement) 11/03/2020  Pacemaker 10/30/2020  Third degree AV block ([address] 10/30/2020  (HFpEF) heart failure with preserved ejection fraction ([address] 10/20/2020  Syncope and collapse 10/18/2020  Syncope 10/18/2020  Aortic stenosis 10/18/2020  Decubitus ulcer of left buttock, stage 2 ([address] 09/10/2020  Type 2 diabetes with nephropathy ([address] 08/24/2020  Pressure injury of buttock, stage 1 06/04/2020  Incontinence of feces 06/04/2020  On angiotensin receptor blockers (ARB) 01/23/2020  Gastroesophageal reflux disease without esophagitis 01/23/2020  Cough 01/23/2020  Recurrent depression ([address] 08/22/2019  Age-related cataract of both eyes 04/25/2019  Gait instability 06/21/2018  Type 2 diabetes mellitus without complication, without long-term current use of insulin ([address] 09/21/2017  Essential hypertension 09/21/2017  Hypercholesterolemia 09/21/2017  Coronary artery disease involving native coronary artery of native heart without angina pectoris 09/21/2017  S/P CABG (coronary artery bypass graft) 09/21/2017  Severe aortic stenosis 09/21/2017  Aortic systolic murmur on examination 09/21/2017  Benign prostatic hyperplasia with lower urinary tract symptoms 09/21/2017  Insomnia 09/21/2017  Former smoker 09/21/2017  ACP (advance care planning) 09/21/2017 Johnna Beal DO Past Medical History:  
Diagnosis Date  Acute on chronic diastolic (congestive) heart failure ([address] 12/27/2020  BPH (benign prostatic hyperplasia)  CAD (coronary artery disease)  Diabetes ([facility]  Hearing loss  Hypercholesterolemia  Hypertension  Murmur  Recurrent depression ([facility] 8/22/2019  Third degree AV block ([facility] 10/30/2020 Past Surgical History:  
Procedure Laterality Date  HX CHOLECYSTECTOMY  MT CARDIAC SURG PROCEDURE UNLIST  2006 by-pass  MT INS NEW/RPLCMT PRM PM W/TRANSV ELTRD ATRIAL&VENT  10/30/2020  MT INS NEW/RPLCMT PRM PM W/TRANSV ELTRD ATRIAL&VENT N/A 10/30/2020 INSERT PPM DUAL performed by Elza Ca MD at [facility] CATH LAB Allergies Allergen Reactions  Procaine Other (comments) Pt stated he passed out from procaine and was told not to let anyone use it on him again Family History Problem Relation Age of Onset  Cancer Mother  Cancer Father Current Facility-Administered Medications Medication Dose Route Frequency  amiodarone (CORDARONE) 375 mg/250 mL D5W infusion  0.5-1 mg/min IntraVENous TITRATE  potassium chloride 10 mEq in 100 ml IVPB  10 mEq IntraVENous Q1H  
 albuterol-ipratropium (DUO-NEB) 2.5 MG-0.5 MG/3 ML  3 mL Nebulization Q4H RT  
 arformoterol 15 mcg/budesonide 0.5 mg neb solution   Nebulization BID  dilTIAZem (CARDIZEM) 100 mg in dextrose 5% (MBP/ADV) 100 mL infusion  0-15 mg/hr IntraVENous TITRATE  sodium chloride (NS) flush 5-40 mL  5-40 mL IntraVENous Q8H  
 sodium chloride (NS) flush 5-40 mL  5-40 mL IntraVENous PRN  
 acetaminophen (TYLENOL) tablet 650 mg  650 mg Oral Q6H PRN Or  
 acetaminophen (TYLENOL) suppository 650 mg  650 mg Rectal Q6H PRN  polyethylene glycol (MIRALAX) packet 17 g  17 g Oral DAILY PRN  promethazine (PHENERGAN) tablet 12.5 mg  12.5 mg Oral Q6H PRN Or  
 ondansetron (ZOFRAN) injection 4 mg  4 mg IntraVENous Q6H PRN  
 enoxaparin (LOVENOX) injection 40 mg  40 mg SubCUTAneous DAILY  piperacillin-tazobactam (ZOSYN) 3.375 g in 0.9% sodium chloride (MBP/ADV) 100 mL MBP  3.375 g IntraVENous Q8H  
 vancomycin (VANCOCIN) 750 mg in 0.9% sodium chloride 250 mL (VIAL-MATE)  750 mg IntraVENous Q16H  
 aspirin (ASA) suppository 300 mg  300 mg Rectal DAILY  insulin lispro (HUMALOG) injection   SubCUTAneous AC&HS  
 glucose chewable tablet 16 g  4 Tab Oral PRN  
 dextrose (D50W) injection syrg 12.5-25 g  12.5-25 g IntraVENous PRN  
 glucagon (GLUCAGEN) injection 1 mg  1 mg IntraMUSCular PRN  
 0.9% sodium chloride infusion  50 mL/hr IntraVENous CONTINUOUS Review of Symptoms: 
Review of systems not obtained due to patient factors. Physical Exam 
 
Deferred due to COVID isolation. Cardiographics Telemetry: AFIB 
ECG:  atrial fibrillation, rate 130 Echocardiogram: Not done Labs:  
Recent Results (from the past 24 hour(s)) GLUCOSE, POC Collection Time: 02/06/21 11:25 AM  
Result Value Ref Range Glucose (POC) 171 (H) 65 - 100 mg/dL Performed by Lang Pruitt RN   
GLUCOSE, POC Collection Time: 02/06/21  4:19 PM  
Result Value Ref Range Glucose (POC) 79 65 - 100 mg/dL Performed by Angelika MONTERO GLUCOSE, POC Collection Time: 02/06/21  8:57 PM  
Result Value Ref Range Glucose (POC) 121 (H) 65 - 100 mg/dL Performed by Yobani Doherty CBC WITH AUTOMATED DIFF  Collection Time: 02/07/21  3:49 AM  
 Result Value Ref Range WBC 14.7 (H) 4.1 - 11.1 K/uL  
 RBC 3.59 (L) 4.10 - 5.70 M/uL HGB 9.8 (L) 12.1 - 17.0 g/dL HCT 31.8 (L) 36.6 - 50.3 % MCV 88.6 80.0 - 99.0 FL  
 MCH 27.3 26.0 - 34.0 PG  
 MCHC 30.8 30.0 - 36.5 g/dL  
 RDW 15.9 (H) 11.5 - 14.5 % PLATELET 089 (L) 431 - 400 K/uL MPV 13.0 (H) 8.9 - 12.9 FL  
 NRBC 0.0 0  WBC ABSOLUTE NRBC 0.00 0.00 - 0.01 K/uL NEUTROPHILS 89 (H) 32 - 75 % LYMPHOCYTES 5 (L) 12 - 49 % MONOCYTES 5 5 - 13 % EOSINOPHILS 0 0 - 7 % BASOPHILS 0 0 - 1 % IMMATURE GRANULOCYTES 1 (H) 0.0 - 0.5 % ABS. NEUTROPHILS 13.2 (H) 1.8 - 8.0 K/UL  
 ABS. LYMPHOCYTES 0.7 (L) 0.8 - 3.5 K/UL  
 ABS. MONOCYTES 0.7 0.0 - 1.0 K/UL  
 ABS. EOSINOPHILS 0.0 0.0 - 0.4 K/UL  
 ABS. BASOPHILS 0.0 0.0 - 0.1 K/UL  
 ABS. IMM. GRANS. 0.1 (H) 0.00 - 0.04 K/UL  
 DF SMEAR SCANNED    
 RBC COMMENTS NORMOCYTIC, NORMOCHROMIC METABOLIC PANEL, BASIC Collection Time: 02/07/21  3:49 AM  
Result Value Ref Range Sodium 142 136 - 145 mmol/L Potassium 3.2 (L) 3.5 - 5.1 mmol/L Chloride 107 97 - 108 mmol/L  
 CO2 24 21 - 32 mmol/L Anion gap 11 5 - 15 mmol/L Glucose 139 (H) 65 - 100 mg/dL BUN 25 (H) 6 - 20 MG/DL Creatinine 0.95 0.70 - 1.30 MG/DL  
 BUN/Creatinine ratio 26 (H) 12 - 20 GFR est AA >60 >60 ml/min/1.73m2 GFR est non-AA >60 >60 ml/min/1.73m2 Calcium 8.4 (L) 8.5 - 10.1 MG/DL  
GLUCOSE, POC Collection Time: 02/07/21  7:52 AM  
Result Value Ref Range Glucose (POC) 181 (H) 65 - 100 mg/dL Performed by Karen MONTERO   
EKG, 12 LEAD, INITIAL Collection Time: 02/07/21  8:47 AM  
Result Value Ref Range Ventricular Rate 157 BPM  
 Atrial Rate 182 BPM  
 P-R Interval 122 ms QRS Duration 102 ms Q-T Interval 242 ms QTC Calculation (Bezet) 391 ms Calculated P Axis 0 degrees Calculated R Axis -22 degrees Calculated T Axis 178 degrees Diagnosis    Critical Test Result: High HR 
 Sinus tachycardia with occasional premature ventricular complexes Left ventricular hypertrophy with repolarization abnormality ( [employer]  
product ) Cannot rule out Septal infarct , age undetermined Abnormal ECG When compared with ECG of 05-FEB-2021 15:37, Significant changes have occurred MAGNESIUM Collection Time: 02/07/21  9:38 AM  
Result Value Ref Range Magnesium 1.4 (L) 1.6 - 2.4 mg/dL Assessment: 
 
 Assessment:  
  
  
Hospital Problems  Date Reviewed: 1/28/2021 Codes Class Noted POA Acute respiratory failure ([facility] ICD-10-CM: J96.00 
ICD-9-CM: 518.81  2/5/2021 Unknown Aspiration pneumonia ([facility] ICD-10-CM: J69.0 ICD-9-CM: 507.0  2/5/2021 Unknown Atrial fibrillation with RVR (HCC) ICD-10-CM: I48.91 
ICD-9-CM: 427.31  2/5/2021 Unknown Plan: Afib with RVR- probably related to underlying respiratory failure. Continue amiodarone. Wean off cardizem if rate controlled and in sinus rhythm. Prosthetic valve stable per recent echo. Will follow, thank you for the consult. Derek Garcia MD  
 
Pursuant to the emergency declaration under the [facility], [facility] waiver authority and the [employer] and [employer] Act, this Virtual Visit was conducted, with patient's consent, to reduce the patient's risk of exposure to COVID-19 and provide continuity of care for an established patient.  Services were provided through a audio/visual  synchronous discussion virtually to substitute for in-person visit.

## 2021-02-07 NOTE — PROGRESS NOTES
Responded to Code Stroke in PCU. Unable to enter room due to patient's COVID+ status. No family present. Not able to assess for spiritual needs/concerns at this time. QUINTON Paz, [facility], Staff  [facility]  Paging Service  [phone]

## 2021-02-07 NOTE — PROGRESS NOTES
End of Shift Note Bedside shift change report given to Taj Edwards RN (oncoming nurse) by Chanell Macias (offgoing nurse). Report included the following information SBAR, Kardex, Intake/Output, MAR, Accordion, Recent Results and Cardiac Rhythm NSR Shift worked:  [id number] Shift summary and any significant changes:  
 Pt on BIPAP, NPO, Dilt gtt @ 2.5 Concerns for physician to address:  Speech consult when taken off BIPAP, D/C Dilt gtt Zone phone for oncoming shift:    
  
 
Activity: 
Activity Level: Bed Rest 
Number times ambulated in hallways past shift: 0 Number of times OOB to chair past shift: 0 Cardiac:  
Cardiac Monitoring: Yes     
Cardiac Rhythm: Normal sinus rhythm Access:  
Current line(s): PIV Genitourinary:  
Urinary status: external catheter Respiratory:  
O2 Device: BIPAP Chronic home O2 use?: NO Incentive spirometer at bedside: YES 
  
GI: 
  
Current diet:  DIET NPO Passing flatus: YES Tolerating current diet: NO 
% Diet Eaten: 0 % Pain Management:  
Patient states pain is manageable on current regimen: YES Skin: 
Justin Score: 14 Interventions: increase time out of bed and PT/OT consult Patient Safety: 
Fall Score: Total Score: 4 Interventions: bed/chair alarm, assistive device (walker, cane, etc), gripper socks, pt to call before getting OOB and stay with me (per policy) High Fall Risk: Yes Length of Stay: 
Expected LOS: - - - Actual LOS: 2 Chanell Macias

## 2021-02-07 NOTE — PROGRESS NOTES
RAPID RESPONSE TEAM 
 
Responded to overhead rapid response to 2251 Patient currently in AFIB w/RVR 's. Patient admitted for afib w/rvr, converted to NSR yesterday and titrated off cardizem gtt. Cardizem gtt restarted. Discussed with Dr. Golden Dent. Order received for cardizem bolus 10 mg. Dr. Suresh Marcelo at bedside. Patient's blood pressure labile. Orders received for: Amio bolus and gtt and albumin 25g. STAT Mg level sent Patient responding to amio bolus. , /56. Primary nurse to update Dr. Severa Blue. 0945 Patient's blood pressures low, 82/52 despite improvement in heart rate. Order received for 500cc NS bolus. Lab Results Component Value Date/Time Sodium 142 02/07/2021 03:49 AM  
 Potassium 3.2 (L) 02/07/2021 03:49 AM  
 Chloride 107 02/07/2021 03:49 AM  
 CO2 24 02/07/2021 03:49 AM  
 Anion gap 11 02/07/2021 03:49 AM  
 Glucose 139 (H) 02/07/2021 03:49 AM  
 BUN 25 (H) 02/07/2021 03:49 AM  
 Creatinine 0.95 02/07/2021 03:49 AM  
 BUN/Creatinine ratio 26 (H) 02/07/2021 03:49 AM  
 GFR est AA >60 02/07/2021 03:49 AM  
 GFR est non-AA >60 02/07/2021 03:49 AM  
 Calcium 8.4 (L) 02/07/2021 03:49 AM  
 
 
Patient Vitals for the past 4 hrs: 
 Pulse Resp BP SpO2  
02/07/21 0911 (!) 151 22  99 % 02/07/21 0910 (!) 128 22 (!) 89/58 99 % 02/07/21 0905 (!) 117 21 116/81 99 % 02/07/21 0900 (!) 166 26 90/60 100 % 02/07/21 0858 (!) 156 24 (!) 87/58 100 % 02/07/21 0853 (!) 153 25 (!) 104/47 99 % 02/07/21 0852 (!) 163 28    
02/07/21 0851 (!) 178     
02/07/21 0849 (!) 172 23    
02/07/21 0848 (!) 174 27  98 % 02/07/21 0847 (!) 177 27 (!) 135/114 98 % 02/07/21 0846 (!) 178 27    
02/07/21 0845 (!) 180 25 (!) 74/50 97 % 02/07/21 0844 (!) 162 26    
02/07/21 0843 (!) 185 27  (!) 88 % 02/07/21 0842 (!) 181 (!) 31  99 % 02/07/21 0733    98 % 02/07/21 0730 90 28 (!) 128/56 98 % Patient to remain in [id number]. Ananda Krishna RN 
RRT, [id number]

## 2021-02-08 NOTE — PROGRESS NOTES
0700: Bedside shift change report given to Effie Huizar (oncoming nurse) by Kevin Hall RN (offgoing nurse). Report included the following information SBAR, Kardex, Intake/Output, MAR and Recent Results. 0700: Patient in bed, resting quietly Call bell in reach, will monitor. 0845: US being done at bedside. Radiologist will review results and decide if thoracentesis is appropriate. 0920: Heel boots placed on patient and venelex applied to heels d/t heels looking boggy and red. Wound present on bottom of foot. New foam applied. 0930: Reviewed radiologists notes and they think a chest tube would be best for patient. 0940: Dr. Henrique Eldridge at bedside assessing patient. Will keep IV amio for now until patient can take PO. Keep dilt off.  
 
0945: Respiratory placed patient on room air. O2 saturation 92%. 1012: Patient not able to keep O2 saturation . 90%. Patient placed back on 1L NC. O2 saturation 93%. 1015: Dr. Dylan Cortez at bedside assessing patient. PLan for chets tube this afternoon. 1345: Dr. Ivonne Kirkland at bedside assessing patient. Discussed plan of care with patient in great detail. Patient wants to be treated at this time, palliative will re-evaluate. Dr. Ivonne Kirkland wants to hold off on PT/OT for now since plan is chest tube and then will follow up with speech eval.  
 
1417: Speech at bedside attempting to see patient. Patient needs to stay NPO for chest tube placement. She will see him again tomorrow. 1649: Patient has a burst of Sinus Tach (HR 160s). David Vizcaino with cardiology paged and she added an amio bolus and gave instructions to increase amio drip to 1 mg/hr for 6 hours and then back to 0.5 mg after six hours. 1705: Radiology at bedside placing chest tube. 1735: Chest tube placed to left abdomen, draining to gravity. VSS. 
 
1852: after reviewing patient's chart, patient's K was 3.2 and mag was 1.4. Dr. Ramesh Math to see if he wants to add any replacement. 1900: End of Shift Note Bedside shift change report given to Shayy Roche RN (oncoming nurse) by Tom Cooley (offgoing nurse). Report included the following information SBAR, Kardex, Intake/Output, MAR and Recent Results Shift worked:  [id number] Shift summary and any significant changes:  
  Patient got chest tube placed today, draining to gravity. Burst of sinus tach (HR in 160s) amio bolus given and amio drip increased to 1 mg for 6 hours. Patient weaned to 1L NC. Concerns for physician to address:  CHest tube, 1L NC, amio drip Zone phone for oncoming shift:   XXX Activity: 
Activity Level: Bed Rest 
Number times ambulated in hallways past shift: 0 Number of times OOB to chair past shift: 0 Cardiac:  
Cardiac Monitoring: Yes     
Cardiac Rhythm: Normal sinus rhythm Access:  
Current line(s): PIV Genitourinary:  
Urinary status: external catheter Respiratory:  
O2 Device: Nasal cannula Chronic home O2 use?: NO Incentive spirometer at bedside: YES 
  
GI: 
  
Current diet:  DIET NPO Passing flatus: YES Tolerating current diet: YES 
% Diet Eaten: 0 % Pain Management:  
Patient states pain is manageable on current regimen: YES Skin: 
Justin Score: 13 Interventions: turn team, speciality bed, PT/OT consult and limit briefs Patient Safety: 
Fall Score: Total Score: 3 Interventions: bed/chair alarm, assistive device (walker, cane, etc), gripper socks and sitter at bedside High Fall Risk: Yes Length of Stay: 
Expected LOS: 3d 14h Actual LOS: 3 Tom Cooley

## 2021-02-08 NOTE — PROGRESS NOTES
[address] [place] [address]  [phone] Cardiology Progress Note 2/8/2021 9:35 AM 
 
Admit Date: 2/5/2021 Admit Diagnosis:  
Acute respiratory failure ([address] [J96.00] Aspiration pneumonia ([address] [J69.0] Atrial fibrillation with RVR ([address] [I48.91] Interval History/Subjective:  
 
Itz Duran is a 82 y.o. male admitted with Acute respiratory failure ([address] [J96.00] Aspiration pneumonia ([address] [J69.0] Atrial fibrillation with RVR (HCC) [I48.91] 
 
-BP slightly elevated;  Weaning O2 
-no new labs; Covid + 
-Mr. Joanna Llanos is viewed through window. Waves. Phone not in reach to speak with patient. Patient discussed with bedside RN. Visit Vitals BP (!) 150/55 (BP 1 Location: Right upper arm, BP Patient Position: At rest) Pulse 77 Temp 97.7 °F (36.5 °C) Resp 22 Ht 6' 2\" (1.88 m) Wt 77.2 kg (170 lb 1.6 oz) SpO2 97% BMI 21.84 kg/m² Current Facility-Administered Medications Medication Dose Route Frequency  zinc oxide-cod liver oil (DESITIN) 40 % paste   Topical Q8H  
 amiodarone (CORDARONE) 375 mg/250 mL D5W infusion  0.5-1 mg/min IntraVENous TITRATE  balsam peru-castor oiL (VENELEX) ointment   Topical BID  ipratropium-albuterol (COMBIVENT RESPIMAT) 20 mcg-100 mcg inhalation spray  1 Puff Inhalation Q4H RT  
 budesonide-formoteroL (SYMBICORT) 160-4.5 mcg/actuation HFA inhaler 2 Puff  2 Puff Inhalation BID RT  
 dilTIAZem (CARDIZEM) 100 mg in dextrose 5% (MBP/ADV) 100 mL infusion  0-15 mg/hr IntraVENous TITRATE  sodium chloride (NS) flush 5-40 mL  5-40 mL IntraVENous Q8H  
 sodium chloride (NS) flush 5-40 mL  5-40 mL IntraVENous PRN  
 acetaminophen (TYLENOL) tablet 650 mg  650 mg Oral Q6H PRN Or  
 acetaminophen (TYLENOL) suppository 650 mg  650 mg Rectal Q6H PRN  polyethylene glycol (MIRALAX) packet 17 g  17 g Oral DAILY PRN  promethazine (PHENERGAN) tablet 12.5 mg  12.5 mg Oral Q6H PRN  Or  
  ondansetron (ZOFRAN) injection 4 mg  4 mg IntraVENous Q6H PRN  
 enoxaparin (LOVENOX) injection 40 mg  40 mg SubCUTAneous DAILY  piperacillin-tazobactam (ZOSYN) 3.375 g in 0.9% sodium chloride (MBP/ADV) 100 mL MBP  3.375 g IntraVENous Q8H  
 vancomycin (VANCOCIN) 750 mg in 0.9% sodium chloride 250 mL (VIAL-MATE)  750 mg IntraVENous Q16H  
 aspirin (ASA) suppository 300 mg  300 mg Rectal DAILY  insulin lispro (HUMALOG) injection   SubCUTAneous AC&HS  
 glucose chewable tablet 16 g  4 Tab Oral PRN  
 dextrose (D50W) injection syrg 12.5-25 g  12.5-25 g IntraVENous PRN  
 glucagon (GLUCAGEN) injection 1 mg  1 mg IntraMUSCular PRN  
 0.9% sodium chloride infusion  50 mL/hr IntraVENous CONTINUOUS Objective:  
  
Physical Exam: 
General Appearance:  awake, elderly  male resting in bed in NAD Chest:   unlabored at rest 
Cardiovascular: Abdomen:    
Extremities:  
Skin:  pale **bedside exam deferred due to Covid isolation to reduce exposure and conserve PPE** Data Review:  
Recent Labs 02/07/21 
0349 02/06/21 
0319 02/05/21 
1550 WBC 14.7* 16.6* 15.7* HGB 9.8* 8.9* 9.8* HCT 31.8* 28.3* 31.2*  
* 131* 140* Recent Labs 02/07/21 
6748 02/07/21 
9155 02/06/21 
0319 02/05/21 
1612 02/05/21 
1550 NA  --  142 139  --  137 K  --  3.2* 3.4*  --  3.6 CL  --  107 103  --  100 CO2  --  24 32  --  32  
GLU  --  139* 177*  --  253* BUN  --  25* 20  --  18  
CREA  --  0.95 0.86  --  0.96  
CA  --  8.4* 8.0*  --  8.3*  
MG 1.4*  --  1.7 1.7  --   
ALB  --   --  2.0*  --  2.2* TBILI  --   --  0.3  --  0.6 ALT  --   --  19  --  24 Recent Labs 02/06/21 
1020 02/06/21 
0319 02/05/21 
2240 02/05/21 
1550 TROIQ 0.14* 0.18* 0.18* 0.09* Intake/Output Summary (Last 24 hours) at 2/8/2021 1035 Last data filed at 2/8/2021 8188 Gross per 24 hour Intake 2052.04 ml Output 600 ml Net 1452.04 ml Telemetry: SR, PAF 
EKG: a fib with RVR 
 CTA chest: \"There is no pulmonary embolism. There is no aortic aneurysm or dissection. Mild bilateral pleural effusions are not significantly changed. Left greater 
than right lung atelectasis not significantly changed. Scattered groundglass and parenchymal opacities in the right and left lung consistent with multifocal infectious/inflammatory process. \" 
 
Assessment:  
 
Active Problems: 
  Acute respiratory failure ([address] (2/5/2021) Aspiration pneumonia ([address] (2/5/2021) Atrial fibrillation with RVR ([address] (2/5/2021) Plan:  
 
PAF: currently SR. In setting of covid PNA and possible aspiration · Continue with IV amio. RN states patient not appropriate for pills yet · Not on full AC due to fall risk and anemia. On ASA · Continue to treat underlying processes. AS s/p TAVR:  Valve stable on recent ECHO Rosalina Bullard NP 
DNP, RN, AGACNP-BC Pursuant to the emergency declaration under the [facility], [facility] waiver authority and the [employer] Act, this Virtual Visit was conducted, with patient's consent, to reduce the patient's risk of exposure to COVID-19 and provide continuity of care for an established patient. Services were provided through a video synchronous discussion virtually to substitute for in-person visit. [address] [facility] Cardiology 2/8/2021 Patient seen, examined by me personally. Plan discussed as detailed. Agree with note as outlined by  NP. I confirm findings in history and physical exam. No additional findings noted. Agree with plan as outlined above. Continue IV amiodarone until able to take oral medication.  
 
Feroz Amador MD

## 2021-02-08 NOTE — PROGRESS NOTES
RAPID RESPONSE TEAM - follow up Rounded on patient due to recent RRT. Discussed with primary RN. No acute concerns, VSS, MEWS 1. Visit Vitals BP (!) 140/58 (BP 1 Location: Left upper arm, BP Patient Position: Sitting) Pulse (!) 114 Temp 98.2 °F (36.8 °C) Resp 23 Ht 6' 2\" (1.88 m) Wt 77.2 kg (170 lb 1.6 oz) SpO2 92% BMI 21.84 kg/m² No RRT interventions indicated at this time. Please call with any questions or concerns. Jaclyn Franco Rapid Response RN [place]

## 2021-02-08 NOTE — PALLIATIVE CARE
Chart reviewed, case discussed with Dr Jose Gibbons, he is planning to start the goals of care discussion, get speech evaluation, wants us to hold off now.

## 2021-02-08 NOTE — PROGRESS NOTES
Spoke with MD Dennis Reyna (Radiologist), after reviewed the ultrasound image and CT, he feels chest tube would be best. Changing order from Thoracentesis to Chest tube.

## 2021-02-08 NOTE — INTERDISCIPLINARY ROUNDS
Interdisciplinary team rounds were held 2/8/2021 with the following team members:Care Management, Nursing, Pharmacy, Physician and Clinical Coordinator. Plan of care discussed. See clinical pathway and/or care plan for interventions and desired outcomes. Patient weaned to 1L NC to maintain SpO2 > 90%. Continue IV amio gtt. Patient NSR now. Speech consult.

## 2021-02-08 NOTE — PROGRESS NOTES
Pulmonary, Critical Care, and Sleep Medicine Patient Consult Name: Aleta Díaz MRN: [id number] : 1935 Hospital: [facility] Date: 2021 IMPRESSION:  
· Acute hypoxic resp failure- Probable basilar left > right aspiration PNA with possible left enlarging parapneumonic effusion. · COVID 19% 21 and again 21 - ? Last one is a false positive as this was an antigen test 
· TAVR ECHO EF 65%  nml TAVR fxn · CAD · PAF/RVR- dilt · Aspiration · HTN 
· LEukocytosis RECOMMENDATIONS:  
· Zosyn/vanco 
· For chest tube placement today. · Continue bipap prn alternating with mid flow. · Keep NPO for procedure today. · Will follow along with you. Subjective:  
Pt is feeling a little better. NO chest pain, no back pain, no headache. This patient has been seen and evaluated at the request of Dr. hospitalist for sob. Patient is a 82 y.o. male with h/o TAVR admitted for AHRF and leukocytosis. Enlarging left effusion on CXR. HAs gone on to needing bipap. Pt is somnolent but arousable on bipap. No distress. Past Medical History:  
Diagnosis Date  Acute on chronic diastolic (congestive) heart failure ([address] 2020  BPH (benign prostatic hyperplasia)  CAD (coronary artery disease)  Diabetes ([address]  Hearing loss  Hypercholesterolemia  Hypertension  Murmur  Recurrent depression ([address] 2019  Third degree AV block ([address] 10/30/2020 Past Surgical History:  
Procedure Laterality Date  HX CHOLECYSTECTOMY  RI CARDIAC SURG PROCEDURE UNLIST   by-pass  RI INS NEW/RPLCMT PRM PM W/TRANSV ELTRD ATRIAL&VENT  10/30/2020  RI INS NEW/RPLCMT PRM PM W/TRANSV ELTRD ATRIAL&VENT N/A 10/30/2020 INSERT PPM DUAL performed by Dar Jo MD at [place] [address], [facility]  CATH LAB Prior to Admission medications Medication Sig Start Date End Date Taking? Authorizing Provider amoxicillin-clavulanate (AUGMENTIN) 875-125 mg per tablet Take 1 Tab by mouth every twelve (12) hours. 1/25/21 2/7/21  Provider, Historical  
hydrALAZINE (APRESOLINE) 25 mg tablet Take 75 mg by mouth three (3) times daily. Provider, Historical  
atorvastatin (LIPITOR) 40 mg tablet Take 1 Tab by mouth nightly for 60 days. 1/8/21 3/9/21  Jose Eduardo Hendrix MD  
amLODIPine (NORVASC) 10 mg tablet Take 1 Tab by mouth daily. 1/9/21   Jose Eduardo Hendrix MD  
furosemide (LASIX) 40 mg tablet One daily Patient taking differently: 20 mg. One daily 1/9/21   Jose Eduardo Hendrix MD  
metoprolol tartrate (LOPRESSOR) 50 mg tablet Take 1 Tab by mouth two (2) times a day for 60 days. 1/8/21 3/9/21  Jose Eduardo Hendrix MD  
albuterol (PROVENTIL HFA, VENTOLIN HFA, PROAIR HFA) 90 mcg/actuation inhaler Take 2 Puffs by inhalation every six (6) hours as needed for Wheezing or Shortness of Breath. 1/8/21   Jose Eduardo Hendrix MD  
potassium chloride SR (K-TAB) 20 mEq tablet Take 1 Tab by mouth daily. 1/8/21   Jose Eduardo Hendrix MD  
losartan (COZAAR) 50 mg tablet Take 100 mg by mouth daily. Provider, Historical  
aspirin 81 mg chewable tablet Take 81 mg by mouth daily. Provider, Historical  
temazepam (RESTORIL) 15 mg capsule TAKE 1 CAPSULE BY MOUTH AT BEDTIME AS NEEDED FOR SLEEP. 12/23/20   Saira Menendez MD  
acetaminophen (TYLENOL) 325 mg tablet Take 2 Tabs by mouth every four (4) hours as needed for Pain. 11/4/20   Latisha Mccallum NP  
senna-docusate (PERICOLACE) 8.6-50 mg per tablet Take 1 Tab by mouth daily as needed for Constipation.  11/4/20   Latisha Mccallum NP  
doxazosin (CARDURA) 4 mg tablet TAKE 1 TABLET BY MOUTH  DAILY 10/20/20   Santa MENDOZA NP  
finasteride (PROSCAR) 5 mg tablet TAKE 1 TABLET BY MOUTH  DAILY 10/20/20   Burgess Bull NP  
metFORMIN ER (GLUCOPHAGE XR) 500 mg tablet TAKE 1 TABLET BY MOUTH  TWICE DAILY WITH MEALS 10/20/20   Zana Dick, NP  
 glipiZIDE SR (GLUCOTROL XL) 5 mg CR tablet TAKE 1 TABLET BY MOUTH  DAILY 10/20/20   Michelle Dick NP  
sertraline (ZOLOFT) 100 mg tablet TAKE 1 TABLET BY MOUTH  DAILY 20   Katie Stewart NP  
fluticasone propionate (FLONASE) 50 mcg/actuation nasal spray ADMINISTER 1 Spray IN Both Nostrils two (2) times a day. 20   Adrianna Dick, NP  
cholecalciferol (VITAMIN D3) 1,000 unit cap Take 1 Cap by mouth daily. 3/29/18   Joe Orosco, DO  
sodium chloride (OCEAN) 0.65 % nasal squeeze bottle 0.05 mL by Both Nostrils route as needed for Congestion. 3/29/18   Etienne Hunt, DO  
FERROUS FUMARATE/VIT BCOMP,C (SUPER B COMPLEX PO) Take 1 Tab by mouth daily. Provider, Historical  
 
Allergies Allergen Reactions  Procaine Other (comments) Pt stated he passed out from procaine and was told not to let anyone use it on him again Social History Tobacco Use  Smoking status: Former Smoker Types: Cigarettes Quit date: 1990 Years since quittin.4  Smokeless tobacco: Never Used Substance Use Topics  Alcohol use: No  
  
Family History Problem Relation Age of Onset  Cancer Mother  Cancer Father Current Facility-Administered Medications Medication Dose Route Frequency  zinc oxide-cod liver oil (DESITIN) 40 % paste   Topical Q8H  
 amiodarone (CORDARONE) 375 mg/250 mL D5W infusion  0.5-1 mg/min IntraVENous TITRATE  balsam peru-castor oiL (VENELEX) ointment   Topical BID  ipratropium-albuterol (COMBIVENT RESPIMAT) 20 mcg-100 mcg inhalation spray  1 Puff Inhalation Q4H RT  
 budesonide-formoteroL (SYMBICORT) 160-4.5 mcg/actuation HFA inhaler 2 Puff  2 Puff Inhalation BID RT  
 dilTIAZem (CARDIZEM) 100 mg in dextrose 5% (MBP/ADV) 100 mL infusion  0-15 mg/hr IntraVENous TITRATE  sodium chloride (NS) flush 5-40 mL  5-40 mL IntraVENous Q8H  
 enoxaparin (LOVENOX) injection 40 mg  40 mg SubCUTAneous DAILY  piperacillin-tazobactam (ZOSYN) 3.375 g in 0.9% sodium chloride (MBP/ADV) 100 mL MBP  3.375 g IntraVENous Q8H  
 vancomycin (VANCOCIN) 750 mg in 0.9% sodium chloride 250 mL (VIAL-MATE)  750 mg IntraVENous Q16H  
 aspirin (ASA) suppository 300 mg  300 mg Rectal DAILY  insulin lispro (HUMALOG) injection   SubCUTAneous AC&HS  
 0.9% sodium chloride infusion  50 mL/hr IntraVENous CONTINUOUS Review of Systems: 
Review of systems not obtained due to patient factors. Objective:  
Vital Signs:   
Visit Vitals BP (!) 150/55 (BP 1 Location: Right upper arm, BP Patient Position: At rest) Pulse 77 Temp 97.7 °F (36.5 °C) Resp 22 Ht 6' 2\" (1.88 m) Wt 77.2 kg (170 lb 1.6 oz) SpO2 97% BMI 21.84 kg/m² O2 Device: Nasal cannula O2 Flow Rate (L/min): 1 l/min Temp (24hrs), Av.7 °F (36.5 °C), Min:97.3 °F (36.3 °C), Max:97.8 °F (36.6 °C) Intake/Output:  
Last shift:      701 - 1900 In:  [I.V.:] Out: - Last 3 shifts: 1901 -  07 In: 0 Out: 1000 [Urine:1000] Intake/Output Summary (Last 24 hours) at 2021 1016 Last data filed at 2021 4181 Gross per 24 hour Intake 2052.04 ml Output 600 ml Net 1452.04 ml Physical Exam: NCAT on bipap no wob no accessory muscle use no abd paradox , trachea midline no central cyanosis , symmetric chest rise. Lungs: CTA bilaterally, decreased BS in bases. CV: Nl S12 Abd: +BS,. Soft, nt, nd 
Extrem: NO C, C, E 
MOtor intact. Data review:  
 
Recent Results (from the past 24 hour(s)) GLUCOSE, POC Collection Time: 21 11:03 AM  
Result Value Ref Range Glucose (POC) 197 (H) 65 - 100 mg/dL Performed by [facility] GLUCOSE, POC Collection Time: 21  4:15 PM  
Result Value Ref Range Glucose (POC) 199 (H) 65 - 100 mg/dL Performed by Anastasiia Dumas (PCT) Garcia Chang  
 Collection Time: 02/07/21  8:17 PM  
Result Value Ref Range Vancomycin,trough 15.4 (H) 5.0 - 10.0 ug/mL Reported dose date NOT PROVIDED Reported dose time: NOT PROVIDED Reported dose: NOT PROVIDED UNITS  
GLUCOSE, POC Collection Time: 02/07/21  9:26 PM  
Result Value Ref Range Glucose (POC) 133 (H) 65 - 100 mg/dL Performed by Lisa Gooden RN   
GLUCOSE, POC Collection Time: 02/08/21  7:32 AM  
Result Value Ref Range Glucose (POC) 141 (H) 65 - 100 mg/dL Performed by Kevan Acosta PCT Imaging: 
I have personally reviewed the patients radiographs and have reviewed the reports: CTA chest no PE Large left effusion and smaller right effusion with LLL ATX/ASD no masses Ladi Hansen MD

## 2021-02-08 NOTE — ROUTINE PROCESS
1710-To patients room with  and Latisha Foster RTR angio tech to place left chest tube. Consent obtained from patient. 1711-Procedure begun. 1730-Procedure completed and pt tolerated it well. Samples taken for labs as ordered. Chest tube hooked to pleural vac to drain by gravity. Nurse given verbal report in room regarding chest tube placement.  
1740-Samples taken to the lab for ordered test.

## 2021-02-08 NOTE — PROGRESS NOTES
RAPID RESPONSE TEAM- Follow UP Rounded on patient due to recent rapid response for A fib RVR. Discussed with primary RN, Linh Montero. No acute concerns, VSS, MEWS 2. Patient is alert, in bed, watching TV, NAD. Remains in NSR. Amio gtt at 0.5mg/min. Patient Vitals for the past 12 hrs: 
 Temp Pulse Resp BP SpO2  
02/07/21 2107     98 % 02/07/21 2030 97.3 °F (36.3 °C) 81 21 (!) 154/47 99 % 02/1935     100 % 02/07/21 1752     98 % 02/07/21 1504     100 % 02/07/21 1445 97.8 °F (36.6 °C) 82 21 (!) 138/48 100 % 02/07/21 1244     100 % No RRT interventions indicated at this time. Please call with any questions or concerns. Pranay Choi Rapid Response RN Naomi Perez

## 2021-02-08 NOTE — PROGRESS NOTES
RN paged for increased bursts of rapid a fib and sustained tachycardia. Have ordered another amio bolus and to increase the amio drip back up to 1mg/min for another 6 hours.    
 
 
 
Lc Morales, NP 
DNP, RN, AGACNP-BC

## 2021-02-08 NOTE — PROGRESS NOTES
1900 Bedside shift change report given to Phoebe (oncoming nurse) by Blanco Cole (offgoing nurse). Report included the following information SBAR, Kardex, Intake/Output, Recent Results and Cardiac Rhythm NSR. End of Shift Note Bedside shift change report given to Blanco Cole (oncoming nurse) by Zac Townsend (offgoing nurse). Report included the following information SBAR, Kardex, Intake/Output and Recent Results Shift worked:  7p-7a Shift summary and any significant changes:  
  remained on nasal cannula without BIPAP with O2 saturations >95%, on amio gtt at 0.5 mg, remained in NSR Concerns for physician to address:  none Zone phone for oncoming shift:    
  
 
Activity: 
Activity Level: Bed Rest 
Number times ambulated in hallways past shift: 0 Number of times OOB to chair past shift: 0 Cardiac:  
Cardiac Monitoring: Yes     
Cardiac Rhythm: Normal sinus rhythm Access:  
Current line(s): PIV Genitourinary:  
Urinary status: voiding and external catheter Respiratory:  
O2 Device: Nasal cannula Chronic home O2 use?: NO Incentive spirometer at bedside: YES 
  
GI: 
  
Current diet:  DIET NPO Passing flatus: YES Tolerating current diet: YES 
% Diet Eaten: 0 % Pain Management:  
Patient states pain is manageable on current regimen: YES Skin: 
Justin Score: 14 Interventions: float heels, limit briefs and internal/external urinary devices Patient Safety: 
Fall Score: Total Score: 4 Interventions: bed/chair alarm and pt to call before getting OOB High Fall Risk: Yes Length of Stay: 
Expected LOS: - - - Actual LOS: 3 Bud Kristian

## 2021-02-08 NOTE — ED PROVIDER NOTES
EMERGENCY DEPARTMENT HISTORY AND PHYSICAL EXAM 
 
 
Date: 2021 Patient Name: Roland Pierce History of Presenting Illness Chief Complaint Patient presents with  Irregular Heart Beat  
  pt called ems \"because my respirations were high\". pt was noted by ems to be in afib with rvr .  Positive For Covid-19  
  02 sats 88% on 4L History Provided By: Patient and EMS 
 
HPI: Roland Pierce, 82 y.o. male presents to the ED with cc of SOA. Pt brought to the ED for evaluation of shortness of breath. Per EMS when they arrived pt SOA, Pt on oxygen 4L O2 by NC with O2 sats of 88%. Pt not normally on oxygen. Pt had recently had an admission for wounds at [facility]. Pt arrives in resp distress with rapid heart rate. All other hx limited at the time of arrival. There has been no reported fever. There are no other complaints, changes, or physical findings at this time. PCP: Ritu Pantoja,  No current facility-administered medications on file prior to encounter. Current Outpatient Medications on File Prior to Encounter Medication Sig Dispense Refill  [] amoxicillin-clavulanate (AUGMENTIN) 875-125 mg per tablet Take 1 Tab by mouth every twelve (12) hours.  hydrALAZINE (APRESOLINE) 25 mg tablet Take 75 mg by mouth three (3) times daily.  atorvastatin (LIPITOR) 40 mg tablet Take 1 Tab by mouth nightly for 60 days. 30 Tab 1  
 amLODIPine (NORVASC) 10 mg tablet Take 1 Tab by mouth daily. 30 Tab 0  
 furosemide (LASIX) 40 mg tablet One daily (Patient taking differently: 20 mg. One daily) 30 Tab 0  
 metoprolol tartrate (LOPRESSOR) 50 mg tablet Take 1 Tab by mouth two (2) times a day for 60 days. 60 Tab 1  
 albuterol (PROVENTIL HFA, VENTOLIN HFA, PROAIR HFA) 90 mcg/actuation inhaler Take 2 Puffs by inhalation every six (6) hours as needed for Wheezing or Shortness of Breath. 1 Inhaler 0  
 potassium chloride SR (K-TAB) 20 mEq tablet Take 1 Tab by mouth daily.  30 Tab 0  
  losartan (COZAAR) 50 mg tablet Take 100 mg by mouth daily.  aspirin 81 mg chewable tablet Take 81 mg by mouth daily.  temazepam (RESTORIL) 15 mg capsule TAKE 1 CAPSULE BY MOUTH AT BEDTIME AS NEEDED FOR SLEEP. 30 Cap 0  
 acetaminophen (TYLENOL) 325 mg tablet Take 2 Tabs by mouth every four (4) hours as needed for Pain.  senna-docusate (PERICOLACE) 8.6-50 mg per tablet Take 1 Tab by mouth daily as needed for Constipation.  doxazosin (CARDURA) 4 mg tablet TAKE 1 TABLET BY MOUTH  DAILY 90 Tab 3  
 finasteride (PROSCAR) 5 mg tablet TAKE 1 TABLET BY MOUTH  DAILY 90 Tab 3  
 metFORMIN ER (GLUCOPHAGE XR) 500 mg tablet TAKE 1 TABLET BY MOUTH  TWICE DAILY WITH MEALS 180 Tab 3  
 glipiZIDE SR (GLUCOTROL XL) 5 mg CR tablet TAKE 1 TABLET BY MOUTH  DAILY 90 Tab 3  
 sertraline (ZOLOFT) 100 mg tablet TAKE 1 TABLET BY MOUTH  DAILY 90 Tab 1  
 fluticasone propionate (FLONASE) 50 mcg/actuation nasal spray ADMINISTER 1 Spray IN Both Nostrils two (2) times a day. 16 g 0  cholecalciferol (VITAMIN D3) 1,000 unit cap Take 1 Cap by mouth daily. 30 Cap 2  
 sodium chloride (OCEAN) 0.65 % nasal squeeze bottle 0.05 mL by Both Nostrils route as needed for Congestion. 30 mL 2  
 FERROUS FUMARATE/VIT BCOMP,C (SUPER B COMPLEX PO) Take 1 Tab by mouth daily. Past History Past Medical History: 
Past Medical History:  
Diagnosis Date  Acute on chronic diastolic (congestive) heart failure ([address] 12/27/2020  BPH (benign prostatic hyperplasia)  CAD (coronary artery disease)  Diabetes ([address]  Hearing loss  Hypercholesterolemia  Hypertension  Murmur  Recurrent depression ([address] 8/22/2019  Third degree AV block ([address] 10/30/2020 Past Surgical History: 
Past Surgical History:  
Procedure Laterality Date  HX CHOLECYSTECTOMY  NV CARDIAC SURG PROCEDURE UNLIST  2006 by-pass  NV INS NEW/RPLCMT PRM PM W/TRANSV ELTRD ATRIAL&VENT  10/30/2020  OH INS NEW/RPLCMT PRM PM W/TRANSV ELTRD ATRIAL&VENT N/A 10/30/2020 INSERT PPM DUAL performed by Rosey Gaston MD at [address], [facility] Family History: 
Family History Problem Relation Age of Onset  Cancer Mother  Cancer Father Social History: 
Social History Tobacco Use  Smoking status: Former Smoker Types: Cigarettes Quit date: 1990 Years since quittin.4  Smokeless tobacco: Never Used Substance Use Topics  Alcohol use: No  
 Drug use: No  
 
 
Allergies: Allergies Allergen Reactions  Procaine Other (comments) Pt stated he passed out from procaine and was told not to let anyone use it on him again Review of Systems Review of Systems Unable to perform ROS: Acuity of condition Physical Exam  
Physical Exam 
Vitals signs and nursing note reviewed. Constitutional:   
   General: He is in acute distress. Appearance: He is well-developed. He is ill-appearing. He is not diaphoretic. HENT:  
   Head: Normocephalic and atraumatic. Mouth/Throat:  
   Mouth: Mucous membranes are dry. Pharynx: No oropharyngeal exudate. Eyes:  
   Conjunctiva/sclera: Conjunctivae normal.  
   Pupils: Pupils are equal, round, and reactive to light. Neck: Musculoskeletal: Normal range of motion and neck supple. Vascular: No JVD. Trachea: No tracheal deviation. Cardiovascular:  
   Rate and Rhythm: Tachycardia present. Rhythm irregular. Heart sounds: Normal heart sounds. No murmur. Comments: Pacemaker Pulmonary:  
   Effort: Respiratory distress present. Breath sounds: No stridor. No wheezing or rales. Comments: Diminished breath sounds bilateral greater on the left, increase work of breathing with accessory muscle use Abdominal:  
   General: There is no distension. Palpations: Abdomen is soft. Tenderness: There is no abdominal tenderness. There is no guarding or rebound. Musculoskeletal: Normal range of motion.     
   General: No tenderness.  
Skin: 
   General: Skin is warm and dry.  
   Capillary Refill: Capillary refill takes less than 2 seconds.  
Neurological:  
   Mental Status: He is alert and oriented to person, place, and time.  
   Cranial Nerves: No cranial nerve deficit.  
   Comments: No gross motor or sensory deficits   
Psychiatric:  
   Comments: Unable to assess  
 
 
 
Diagnostic Study Results  
 
Labs - 
  
  
Contains abnormal data GLUCOSE, POC (Final result) 
 Component (Lab Inquiry) 
Collection Time Result Time GLPOC TECHID  
02/05/21 23:43:00 02/05/21 23:46:33 262  
(NOTE)  
The Accu-Chek ...High  Jeramie Strong RN  
   
Final result  
   
  
  
  
   
   
Contains abnormal data TROPONIN I (Final result) 
 Component (Lab Inquiry) 
Collection Time Result Time TROIQ  
02/05/21 22:40:00 02/05/21 23:19:16 0.18  
CALLED TO AND READ JORGE...High   
   
Final result  
   
  
  
  
   
   
SARS-COV-2 (Final result) 
 Component (Lab Inquiry) 
Collection Time Result Time SARS-CoV-2  
02/05/21 22:40:00 02/05/21 23:49:13 Symptomatic Testing  
 
   
Final result  
   
  
  
  
   
 
   
Contains critical data COVID-19 RAPID TEST (Final result) 
 Component (Lab Inquiry) 
Collection Time Result Time COVRS COVR  
02/05/21 22:40:00 02/06/21 00:13:38 Nasopharyngeal Detected  
CALLED TO AND READ JORGE...Panic    
   
Final result  
   
  
  
  
   
   
POC LACTIC ACID (Final result) 
 Component (Lab Inquiry) 
Collection Time Result Time POCLAC  
02/05/21 20:11:00 02/05/21 20:54:59 1.29  
 
   
Final result  
   
  
  
  
   
 
   
CULTURE, BLOOD, PAIRED (Preliminary result) 
 Component (Lab Inquiry) 
Collection Time Result Time SREQ CULT  
02/05/21 17:52:00 02/07/21 08:39:57 NO SPECIAL REQUESTS NO GROWTH 2 DAYS  
 
   
 
   
Contains critical data LACTIC ACID (Final result) 
 Component (Lab Inquiry) 
Collection Time Result Time LAC  
02/05/21 17:52:00 02/05/21 18:26:56 2.1  
 CALLED TO AND READ JORGE. Sujatha Graven Sujatha Graven High Panic    
   
Final result  
   
  
  
  
   
   
MAGNESIUM (Final result) 
 Component (Lab Inquiry) Collection Time Result Time MG  
02/05/21 16:12:00 02/05/21 16:59:52 1.7  
 
   
Final result  
   
  
  
  
   
 
   
TSH 3RD GENERATION (Final result) 
 Component (Lab Inquiry) Collection Time Result Time TSH  
02/05/21 16:12:00 02/05/21 17:11:44 2.33 Due to TSH hetero. ..    
Final result  
   
  
  
  
   
 
   
Contains abnormal data POC TROPONIN-I (Final result) 
 Component (Lab Inquiry) Collection Time Result Time ITNL  
02/05/21 15:56:00 02/05/21 16:27:53 0.10  
(NOTE) The presence o. Sujatha Graven Sujatha Graven High   
   
Final result  
   
  
  
  
   
   
Contains abnormal data CBC WITH AUTOMATED DIFF (Final result) 
 Component (Lab Inquiry) Collection Time Result Time WBC RBC HGB HCT MCV  
02/05/21 15:50:00 02/05/21 16:01:23 15. 7High  3.63Low  9.8Low  31.2Low  86.0  
   
Collection Time Result Time MCH MCHC RDW PLT MPLV  
02/05/21 15:50:00 02/05/21 16:01:23 27.0 31.4 15.7High  140Low  12.6  
   
Collection Time Result Time NRBC ANRBC GRANS LYMPH MONOS  
02/05/21 15:50:00 02/05/21 16:01:23 0.0 0.00 86High  6Low  7  
   
Collection Time Result Time EOS BASOS IG ABG ABL  
02/05/21 15:50:00 02/05/21 16:01:23 0 0 1High  13. 3High  1.0  
   
Collection Time Result Time ABM LA NENA ABB AIG DF  
02/05/21 15:50:00 02/05/21 16:01:23 1. 2High  0.1 0.0 0.1High  AUTOMATED  
   
Final result  
   
  
  
  
   
   
Contains abnormal data METABOLIC PANEL, COMPREHENSIVE (Final result) 
 Component (Lab Inquiry) Collection Time Result Time NA K CL CO2 AGAP  
02/05/21 15:50:00 02/05/21 16:29:54 137 3.6 100 32 5  
   
Collection Time Result Time GLU BUN CREA BUCR GFRAA  
02/05/21 15:50:00 02/05/21 16:29:54 253High  18 0.96 19 >60  
   
Collection Time Result Time GFRNA CA TBIL GPT SGOT  
02/05/21 15:50:00 02/05/21 16:29:54 >60 Estimated GFR is calcu. .. 8.3Low  0.6 24 22  
   
 Collection Time Result Time AP TP ALB GLOB AGRAT  
02/05/21 15:50:00 02/05/21 16:29:54 89 6.5 2.2Low  4.3High  0.5Low   
  Final result  
   
  
  
  
   
   
Contains abnormal data TROPONIN I (Final result) 
 Component (Lab Inquiry) Collection Time Result Time TROIQ  
02/05/21 15:50:00 02/05/21 16:29:54 0.09 The presence of detect. Hasmukh Poplar Hasmukh Poplar High   
   
Final result  
   
  
  
  
   
   
CK W/ REFLX CKMB (Final result) 
 Component (Lab Inquiry) Collection Time Result Time CKELE  
02/05/21 15:50:00 02/05/21 16:29:54 40 NOT ELEVATED,CKMB-QUANT NOT PERFORMED  
 
   
Final result  
   
  
  
  
   
 
 
 
Radiologic Studies -  
CTA CHEST W OR W WO CONT Final Result There is no pulmonary embolism. There is no aortic aneurysm or dissection. Mild bilateral pleural effusions are not significantly changed. Left greater  
than right lung atelectasis not significantly changed. Scattered groundglass and  
parenchymal opacities in the right and left lung consistent with multifocal  
infectious/inflammatory process. XR CHEST PORT Final Result  
 impression: Significant new loss of volume left lung as above. [facility]    (Results Pending) CT Results  (Last 48 hours) None CXR Results  (Last 48 hours) None Medical Decision Making I am the first provider for this patient. I reviewed the vital signs, available nursing notes, past medical history, past surgical history, family history and social history. Vital Signs-Reviewed the patient's vital signs. Patient Vitals for the past 12 hrs: 
 Temp Pulse Resp BP SpO2  
02/07/21 2301     99 % 02/07/21 2300 97.5 °F (36.4 °C) 84 21 (!) 154/51 99 % 02/07/21 2107     98 % 02/07/21 2030 97.3 °F (36.3 °C) 81 21 (!) 154/47 99 % 02/1937     100 % 02/07/21 1752     98 % 02/07/21 1504     100 % 02/07/21 1445 97.8 °F (36.6 °C) 82 21 (!) 138/48 100 % EKG interpretation: (Preliminary) Afib w/ RVR, rate 132, left axis deviation, LVH, sig ST changes lateral likely c/w demand ischemia related to rate, Ambika Hanson DO 
 
 
Records Reviewed: Nursing Notes, Old Medical Records, Previous electrocardiograms, Ambulance Run Sheet, Previous Radiology Studies and Previous Laboratory Studies Provider Notes (Medical Decision Making): DDx- Arrhythmia, Pulm edema, pneumonia, COVID, electrolyte abnormality, ACS  
 
ED Course:  
Initial assessment performed. The patients presenting problems have been discussed, and they are in agreement with the care plan formulated and outlined with them. I have encouraged them to ask questions as they arise throughout their visit. Pt arrived Afib w/ RVR and in resp distress. PT arrived on 4L this was increased over time to 6L. With Afib w/ RVR, pt given dose of Cardizem and started on Cardizem gtt for rate control. Given CXR and recent admission, IV Vanc and Zosyn started to cover both HAP as well as Aspiration PNA. COVID test ordered. Case discussed with Hospitalist, Pt will be evaluated and admitted. Critical Care Time: CRITICAL CARE NOTE : 
 
IMPENDING DETERIORATION -Respiratory and Cardiovascular ASSOCIATED RISK FACTORS - Hypoxia and Dysrhythmia MANAGEMENT- Bedside Assessment and Supervision of Care INTERPRETATION -  Xrays, CT Scan, ECG, Blood Pressure, Cardiac Output Measures  and Labs INTERVENTIONS - hemodynamic mngmt and oxygen, IV Ab 
CASE REVIEW - Hospitalist, Nursing and Family TREATMENT RESPONSE -Improved PERFORMED BY - Self NOTES   : 
I have spent 40 minutes of critical care time involved in lab review, consultations with specialist, family decision- making, bedside attention and documentation. This time excludes time spent in any separate billed procedures. During this entire length of time I was immediately available to the patient . Ambika Hanson DO Disposition: 
Admit Diagnosis Clinical Impression: 1. Acute respiratory failure with hypoxia ([address] 2. Aspiration into lower respiratory tract, initial encounter 3. Atrial fibrillation with RVR ([address] Attestations: 
 
Manas Villalobos, DO Please note that this dictation was completed with [employer], the computer voice recognition software. Quite often unanticipated grammatical, syntax, homophones, and other interpretive errors are inadvertently transcribed by the computer software. Please disregard these errors. Please excuse any errors that have escaped final proofreading. Thank you.

## 2021-02-08 NOTE — PROCEDURES
Interventional Radiology Procedure Note 2/8/2021 5:36 PM 
 
Patient: Gin English Informed consent obtained Diagnosis: Left pleural effusion Procedure(s): Ultrasound guided placement of 8.5Fr left pleural pigtail catheter Specimens removed:  100cc pleural fluid Complications: None Primary Physician: Enoch Hicks MD 
 
Recommendations: Catheter must be cut before removing in order to severe retention suture Discharge Disposition: remain in room Full dictated report to follow Enoch Hicks MD 
Interventional Radiology [address] [place] [address] [facility] 
5:36 PM, 2/8/2021

## 2021-02-08 NOTE — PROGRESS NOTES
Speech path Patient is NPO for a chest tube and cannot be evaluated at present. Patient was on thickened liquids pta. We will follow up tomorrow.  
Malika Colby, SLP

## 2021-02-08 NOTE — PROGRESS NOTES
Pharmacy Automatic Renal Dosing Protocol - Antimicrobials Goal Level: VANCOMYCIN TROUGH GOAL RANGE Vancomycin Trough: 15 - 20 mcg/mL  (AUC: 400 - 600 mg/hr/Liter/day) Date Dose & Interval Measured (mcg/mL) Extrapolated (mcg/mL)  
2/7 20:17 750 mg q16h 15.4 14.9 Impression/Plan:  
Vancomycin trough resulted at 14.9 mcg/ml (). Continue with the current regimen of 750 mg IV every 16 hours. Pharmacy will follow daily and adjust medications as appropriate for renal function and/or serum levels. Thank you, Caridad Leigh, PHARMD

## 2021-02-08 NOTE — PROGRESS NOTES
Problem: Falls - Risk of 
Goal: *Absence of Falls Description: Document [address] Fall Risk and appropriate interventions in the flowsheet. Outcome: Progressing Towards Goal 
Note: Fall Risk Interventions: 
  
 
Mentation Interventions: Bed/chair exit alarm, Reorient patient, Adequate sleep, hydration, pain control Medication Interventions: Bed/chair exit alarm, Patient to call before getting OOB Elimination Interventions: Bed/chair exit alarm, Call light in reach, Patient to call for help with toileting needs History of Falls Interventions: Bed/chair exit alarm, Investigate reason for fall

## 2021-02-09 NOTE — PROGRESS NOTES
[address] [place] [address]  [phone] Cardiology Progress Note 
 
 
2/9/2021 1120 AM 
 
Admit Date: 2/5/2021 Admit Diagnosis:  
Acute respiratory failure ([address] [J96.00] Aspiration pneumonia ([address] [J69.0] Atrial fibrillation with RVR ([facility] [I48.91] Interval History/Subjective:  
 
Saskia Watson is a 82 y.o. male admitted with Acute respiratory failure ([address] [J96.00] Aspiration pneumonia ([facility] [J69.0] Atrial fibrillation with RVR ([facility]) [I48.91] 
 
-tachy bursts yesterday evening - improved with additional IV amio bolus and increased rate 
-occ tachy; BP slightly elevated 
-no new labs 
-Mr. Venessa Dumont is viewed through window and spoken to on the phone. He denies SOB or any pain. Non productive cough. No palpitations. He isn't sure if his energy is any different because he hasn't been doing much. Visit Vitals BP (!) 148/55 (BP 1 Location: Right upper arm, BP Patient Position: At rest) Pulse 93 Temp 98.6 °F (37 °C) Resp 26 Ht 6' 2\" (1.88 m) Wt 77.2 kg (170 lb 1.6 oz) SpO2 91% BMI 21.84 kg/m² Current Facility-Administered Medications Medication Dose Route Frequency  enoxaparin (LOVENOX) injection 30 mg  30 mg SubCUTAneous Q12H  
 zinc oxide-cod liver oil (DESITIN) 40 % paste   Topical Q8H  
 ipratropium-albuterol (COMBIVENT RESPIMAT) 20 mcg-100 mcg inhalation spray  1 Puff Inhalation Q6H PRN  
 HYDROmorphone (PF) (DILAUDID) injection 0.5 mg  0.5 mg IntraVENous Q6H PRN  
 amiodarone (CORDARONE) 375 mg/250 mL D5W infusion  0.5-1 mg/min IntraVENous TITRATE  balsam peru-castor oiL (VENELEX) ointment   Topical BID  budesonide-formoteroL (SYMBICORT) 160-4.5 mcg/actuation HFA inhaler 2 Puff  2 Puff Inhalation BID RT  
 sodium chloride (NS) flush 5-40 mL  5-40 mL IntraVENous Q8H  
 sodium chloride (NS) flush 5-40 mL  5-40 mL IntraVENous PRN  
 acetaminophen (TYLENOL) tablet 650 mg  650 mg Oral Q6H PRN  Or  
  acetaminophen (TYLENOL) suppository 650 mg  650 mg Rectal Q6H PRN  polyethylene glycol (MIRALAX) packet 17 g  17 g Oral DAILY PRN  promethazine (PHENERGAN) tablet 12.5 mg  12.5 mg Oral Q6H PRN Or  
 ondansetron (ZOFRAN) injection 4 mg  4 mg IntraVENous Q6H PRN  piperacillin-tazobactam (ZOSYN) 3.375 g in 0.9% sodium chloride (MBP/ADV) 100 mL MBP  3.375 g IntraVENous Q8H  
 vancomycin (VANCOCIN) 750 mg in 0.9% sodium chloride 250 mL (VIAL-MATE)  750 mg IntraVENous Q16H  
 aspirin (ASA) suppository 300 mg  300 mg Rectal DAILY  insulin lispro (HUMALOG) injection   SubCUTAneous AC&HS  
 glucose chewable tablet 16 g  4 Tab Oral PRN  
 dextrose (D50W) injection syrg 12.5-25 g  12.5-25 g IntraVENous PRN  
 glucagon (GLUCAGEN) injection 1 mg  1 mg IntraMUSCular PRN  
 0.9% sodium chloride infusion  50 mL/hr IntraVENous CONTINUOUS Objective:  
  
Physical Exam: 
General Appearance:  awake, elderly  male resting in bed in NAD Chest:   unlabored at rest.  Speaks full sentences. Wet sounding cough. Cardiovascular: Abdomen:    
Extremities:  
Skin:  pale **bedside exam deferred due to Covid isolation to reduce exposure and conserve PPE** Data Review:  
Recent Labs 02/07/21 
2324 WBC 14.7* HGB 9.8* HCT 31.8*  
* Recent Labs 02/07/21 
1579 02/07/21 
5149 NA  --  142 K  --  3.2*  
CL  --  107 CO2  --  24 GLU  --  139* BUN  --  25* CREA  --  0.95 CA  --  8.4* MG 1.4*  -- No results for input(s): TROIQ, CPK, CKMB in the last 72 hours. Intake/Output Summary (Last 24 hours) at 2/9/2021 1232 Last data filed at 2/9/2021 1101 Gross per 24 hour Intake 1630.33 ml Output 2420 ml Net -789.67 ml Telemetry: AF around 100 EKG: a fib with RVR 
CTA chest: \"There is no pulmonary embolism. There is no aortic aneurysm or dissection. Mild bilateral pleural effusions are not significantly changed. Left greater than right lung atelectasis not significantly changed. Scattered groundglass and parenchymal opacities in the right and left lung consistent with multifocal infectious/inflammatory process. \" 
 
Assessment:  
 
Active Problems: 
  Acute respiratory failure ([address] (2/5/2021) Aspiration pneumonia ([address] (2/5/2021) Atrial fibrillation with RVR ([address] (2/5/2021) Plan:  
 
PAF: currently rate controlled. Was rapid yesterday evening. In setting of covid PNA and possible aspiration · Continue with IV amio until able to take PO. Had to have another bolus and increased rate yesterday evening. · Not on full AC due to fall risk and anemia. On ASA · Continue to treat underlying processes. AS s/p TAVR:  Valve stable on recent ECHO Dara Lacy NP 
DNP, RN, AGACNP-BC Pursuant to the emergency declaration under the [facility] waiver authority and the [employer] and [employer] Act, this Virtual Visit was conducted, with patient's consent, to reduce the patient's risk of exposure to COVID-19 and provide continuity of care for an established patient. Services were provided through a video synchronous discussion virtually to substitute for in-person visit. [facility] 2/9/2021 Patient seen, examined by me personally. Plan discussed as detailed. Agree with note as outlined by  NP. I confirm findings in history and physical exam. No additional findings noted. Agree with plan as outlined above.   
 
Avis Khalil MD

## 2021-02-09 NOTE — WOUND CARE
Wound Care consult for the buttocks wounds and the right foot wounds that were present on admission. Patient is admitted for Covid 19 respiratory failure. Assessment: Patient is alert and oriented x 3. Patient is currently on NC oxygen with sats of 95 % and he appears comfortable and can carry on a conversation. He has a chest tube in place Patient has redness and skin tears on the buttocks, gluteal cleft and stage 2 pressure injury on the left buttocks. Pt. Has a right foot diabetic ulcer that is deep to the exposed fascia with rolled wound edges and serosanguinous drainage. See photo below with the wound flow sheet. There is a right heel DTI that is light purple. There is no pain with palpation to the the area. See photo below:  
 
2-9-2021: right foot - 5th metatarsal head wound & 
DTI on the heel:  
Recommend a silver dressing (Opticel Ag). Purple heel DTI with skin intact. Need to apply Venelex to the heel and to off load The heels completely. Plan: Orders written for wound care. Discussed with nursing. Venelex ointment twice daily  for the bilateral heels after cleansing at least once per day. Wound care for the right diabetic ulcer:  Cleanse the wound with Caraklenz spray and wipe with gauze to remove the old drainage and wound debris. Pack the wound with Opticel Ag moistened with Silvasorb wound gel. Cover with a large foam dressing. For the buttocks and gluteal cleft:  Cleanse the skin at least three times per day and apply the desitin cream. Off load the buttocks with turning and repositioning patient on his side. Discussed plan with the RN, Jason Levi.  
Hermila Clement, MISAEL, BSN, [employer]

## 2021-02-09 NOTE — PALLIATIVE CARE
Brief summary of visit, full note will be placed. I spoke to patient who is alert, oriented x3, can make medical decisions. I started the conversation around goals of care in a setting of dysphagia/aspiration, he tells me to talk/defers  to his son Pio Comment and have his son Pio Comment talk to him . I spoke to his son Pious Ruby over the phone, who had spoken to patient after I spoke to patient, he shared patient is considering comfort feeding and support of hospice. We decided to have a in person family meeting tomorrow at 8 am, with patient son to clarify goals of care .

## 2021-02-09 NOTE — PROGRESS NOTES
0700:  Bedside shift change report given to Maco Hernandez RN (oncoming nurse) by Author Haroon RN (offgoing nurse). Report included the following information SBAR, Kardex, ED Summary, Intake/Output, MAR, Recent Results and Cardiac Rhythm A-fib.  
 
0820:  VSS, pt congestion notable. Pt denied pain, SOB, CP, indicated pain in right foot due to prior infection is not painful right now. RN will continue to monitor. Pt turned. 0900:  Morning meds given. 250mL from condom cath, pt indicated wound on right foot has been there since November \"and has not healed at all. \"  Chest tube output at 50mL for this shift thus far. O2 turned off from 1L nasal cannula, O2 sats 94%. RN will continue to monitor. 1000:  Pt turned 1210:  OT/PT at bedside. RN placed OT order per OT/PT request as it was missing from [facility] 
 
6468:  RN placed message with Wound Care re: right foot, sacrum. 1300:  Pt cleaned up from BM, urine occurrence, Foam dressing on sacrum changed, site cleaned, foam reapplied. Pt turned. Pt denies pain, SOB, CP. Pt O2 91% on 2L nasal cannula. 1315Stkaron Hamilton SLP at bedside. 1440:  RN perfect served MD Dillon Casey re: dialudid q4 PRN rather than q6.   
 
1447:  RN spoke with pt wife [place] re: COVID status. Wife indicated that pt is \"losing his mind\" about being on the COVID unit and is frustrated since See has already had COVID\". Wife indicated that if  could come and \"ease his mind\" about the COVID.     
 
2209: Wound care at bedside. 1500: RN placed call to  services and explained wife's concerns.  indicated he will talk with pt wife prior to visit. 1513:  RN spoke with pt daughter-in-law Daisha Shea re: pt dietary status. Ms. Elkin Capellan stated that \"when he got home from [facility], it was recommended by Kal Powell to have a feeding tube put in. My  had a virtual meeting with his pulmonary doctor who did not agree with placing a feeding a tube that; he did not feel that Bernardo Montalvo was strong enough to have a feeding tube. He advised thickening the liquids and that's when everyone was starting to talk about hospice. We had hospice come in, and he was still denied hospice services. His general doctor suggested hospice as well and against the feeding tube. He will either get the feeding tube and probably won't survive it, and then presented his with either Hospice or feeding tube. He refused the feeding tube, and then went on a feeding spree, and back to the hospital he went. Now we are in a no-win situation: if he goes home he will eat what he wants to eat and get pneumonia again. We spoke with the doctor yesterday that he can get strong enough to come home, and then he will come back to the hospital again. Lorean Snare Lorean Snare \"  Pt daughter says pt stated to her, \"I want to go home and do whatever I want to do and then go into Hospice. \"  The only thing we are trying to prevent is his returning to that apartment where he lives. Pt daughter-in-law indicated that doctor should be calling son or daughter-in-law re: palliative doctor call. Pt indicated that she would like to pt to be moved to facility where he can see people. [phone].   
 
4287:  RN perfect served Palliative MD Yung Loya re: contact with pt daughter-in-law, RN note referral. 
 
2428:  Evening meds given. 50mL total for shift in chest tube; chest tube marked. 175mL from condom cath. Pt turned. 1900:  End of Shift Note Bedside shift change report given to MISAEL Burch (oncoming nurse) by Manuel Bhat (offgoing nurse). Report included the following information SBAR, Kardex, ED Summary, Intake/Output, MAR, Recent Results and Cardiac Rhythm A-fib Shift worked:  7a-7p Shift summary and any significant changes:  
  Pt still NPO d/t dysphagia screen fail. Hospice meeting tomorrow with Palliative, family Concerns for physician to address:  N/A Zone phone for oncoming shift:   [phone] Activity: 
Activity Level: Bed Rest 
Number times ambulated in hallways past shift: 0 Number of times OOB to chair past shift: 1 Cardiac:  
Cardiac Monitoring: Yes     
Cardiac Rhythm: Normal sinus rhythm Access:  
Current line(s): PIV Genitourinary:  
Urinary status: voiding and external catheter Respiratory:  
O2 Device: Nasal cannula Chronic home O2 use?: NO Incentive spirometer at bedside: YES 
  
GI: 
  
Current diet:  DIET NPO Passing flatus: YES Tolerating current diet: NO 
% Diet Eaten: 0 % Pain Management:  
Patient states pain is manageable on current regimen: YES Skin: 
Justin Score: 13 Interventions: speciality bed, float heels, foam dressing and PT/OT consult Patient Safety: 
Fall Score: Total Score: 3 Interventions: gripper socks High Fall Risk: Yes Length of Stay: 
Expected LOS: 3d 14h Actual LOS: 4 Manuel Bhat

## 2021-02-09 NOTE — PROGRESS NOTES
End of Shift Note Bedside shift change report given to Hayden Perez (oncoming nurse) by Prince Royal RN (offgoing nurse). Report included the following information SBAR, Kardex, Intake/Output, MAR and Recent Results Shift worked:  7p-7a Shift summary and any significant changes:  
 Patient with pain at chest tube site. PRN Dilaudid ordered and administered. Concerns for physician to address:   
  
Zone phone for oncoming shift:   [phone] Activity: 
Activity Level: Bed Rest 
Number times ambulated in hallways past shift: 0 Number of times OOB to chair past shift: 0 Cardiac:  
Cardiac Monitoring: Yes     
Cardiac Rhythm: Atrial fibrillation Access:  
Current line(s): PIV Genitourinary:  
Urinary status: external catheter Respiratory:  
O2 Device: Nasal cannula Chronic home O2 use?: NO Incentive spirometer at bedside: NO 
  
GI: 
  
Current diet:  DIET NPO Passing flatus: Yes Tolerating current diet: NO 
% Diet Eaten: 0 % Pain Management:  
Patient states pain is manageable on current regimen: YES Skin: 
Justin Score: 13 Interventions: float heels, PT/OT consult, limit briefs and internal/external urinary devices Patient Safety: 
Fall Score: Total Score: 3 Interventions: bed/chair alarm, gripper socks, pt to call before getting OOB and stay with me (per policy) High Fall Risk: Yes Length of Stay: 
Expected LOS: 3d 14h Actual LOS: 3 Prince Royal RN

## 2021-02-09 NOTE — PROGRESS NOTES
Problem: Mobility Impaired (Adult and Pediatric) Goal: *Acute Goals and Plan of Care (Insert Text) Description: FUNCTIONAL STATUS PRIOR TO ADMISSION: Patient was modified independent using a rolling walker for functional mobility. HOME SUPPORT PRIOR TO ADMISSION: The patient lived with wife in [address] [place] [address] of [address] [place] [address]. Physical Therapy Goals Initiated 2/9/2021 1. Patient will move from supine to sit and sit to supine  in bed with minimal assistance/contact guard assist within 7 day(s). 2.  Patient will transfer from bed to chair and chair to bed with moderate assistance  using the least restrictive device within 7 day(s). 3.  Patient will perform sit to stand with moderate assistance  within 7 day(s). 4.  Patient will ambulate with moderate assistance  for 15 feet with the least restrictive device within 7 day(s). Outcome: Not Met PHYSICAL THERAPY EVALUATION Patient: Adele Grande (43 y.o. male) Date: 2/9/2021 Primary Diagnosis: Acute respiratory failure ([facility] [J96.00] Asp[facility] pneumonia [facility] [J69.0] Atrial fibrillation with RVR ([facility] [I48.91] Precautions: fall, droplet covid + ASSESSMENT Based on the objective data described below, the patient presents with generalized weakness, decreased activity tolerance, impaired balance, and overall decreased functional mobility. Pt was received in supine on 1L (increased to 2L per nursing) and cleared by nursing to mobilize. He was able to come to the EOB with additional help and assistance. Tolerated sitting EOB for ~7 minutes. Declined any attempt to stand today. VSS during session. [place] but able to follow commands. He was returned to supine at the end of the session.  
 
Current Level of Function Impacting Discharge (mobility/balance): min/mod A x 2 
 
 Functional Outcome Measure: The patient scored Total: 15/100 on the Barthel Index which is indicative of 85% impaired ability to care for basic self needs/dependency on others. Other factors to consider for discharge: wife is unable to assist 
  
Patient will benefit from skilled therapy intervention to address the above noted impairments. PLAN : 
Recommendations and Planned Interventions: bed mobility training, transfer training, gait training, therapeutic exercises, patient and family training/education, and therapeutic activities Frequency/Duration: Patient will be followed by physical therapy:  3 times a week to address goals. Recommendation for discharge: (in order for the patient to meet his/her long term goals) Therapy up to 5 days/week in SNF setting This discharge recommendation: 
Has not yet been discussed the attending provider and/or case management IF patient discharges home will need the following DME: to be determined (TBD) SUBJECTIVE:  
Patient stated Maru Alarcon are working on my heart rate and you want to do PT right now? Yovani Anne OBJECTIVE DATA SUMMARY:  
HISTORY:   
Past Medical History:  
Diagnosis Date Acute on chronic diastolic (congestive) heart failure ([address] 12/27/2020 BPH (benign prostatic hyperplasia) CAD (coronary artery disease) Diabetes ([address] Hearing loss Hypercholesterolemia Hypertension Murmur Recurrent depression ([address] 8/22/2019 Third degree AV block ([address] 10/30/2020 Past Surgical History:  
Procedure Laterality Date HX CHOLECYSTECTOMY    
 [place] CARDIAC SURG PROCEDURE UNLIST  2006 by-pass AK INS NEW/RPLCMT PRM PM W/TRANSV ELTRD ATRIAL&VENT  10/30/2020 AK INS NEW/RPLCMT PRM PM W/TRANSV ELTRD ATRIAL&VENT N/A 10/30/2020 INSERT PPM DUAL performed [place] [address], [facility] CATH LAB Personal factors and/or comorbidities impacting plan of care:  
 
Home Situation Home Environment: Independent living One/Two Story Residence: One story Living Alone: No 
Support Systems: Spouse/Significant Other/Partner Current DME Used/Available at Home: Walker, rolling EXAMINATION/PRESENTATION/DECISION MAKING:  
Critical Behavior: 
Neurologic State: Alert, Appropriate for age Orientation Level: Appropriate for age Cognition: Appropriate decision making, Appropriate for age attention/concentration, Appropriate safety awareness, Follows commands Hearing: 
  
Skin:  intact Edema: WDL Range Of Motion: 
AROM: Generally decreased, functional 
  
  
  
PROM: Generally decreased, functional 
  
  
  
Strength:   
Strength: Generally decreased, functional 
  
  
  
  
  
  
Tone & Sensation:  
Tone: Normal 
  
  
  
  
Sensation: Intact Coordination: 
Coordination: Within functional limits Vision:  
  
Functional Mobility: 
Bed Mobility: 
Rolling: Minimum assistance;Assist x2 Supine to Sit: Minimum assistance;Assist x2 Sit to Supine: Minimum assistance;Assist x2 Scooting: Moderate assistance Transfers: 
  
  
    Refused Balance:  
Sitting: Impaired Sitting - Static: Fair (occasional) Sitting - Dynamic: Fair (occasional) Standing: (refused) Therapeutic Exercises: LAQ 1 each LE Functional Measure: 
Barthel Index: 
 
Bathin Bladder: 0 Bowels: 5 Groomin Dressin Feedin Mobility: 0 Stairs: 0 Toilet Use: 0 Transfer (Bed to Chair and Back): 5 Total: 15/100 The Barthel ADL Index: Guidelines 1. The index should be used as a record of what a patient does, not as a record of what a patient could do. 2. The main aim is to establish degree of independence from any help, physical or verbal, however minor and for whatever reason. 3. The need for supervision renders the patient not independent. 4. A patient's performance should be established using the best available evidence. Asking the patient, friends/relatives and nurses are the usual sources, but direct observation and common sense are also important. However direct testing is not needed. 5. Usually the patient's performance over the preceding 24-48 hours is important, but occasionally longer periods will be relevant. 6. Middle categories imply that the patient supplies over 50 per cent of the effort. 7. Use of aids to be independent is allowed. Linzy Dom., Barthel, DChiloWChilo (8592). Functional evaluation: the Barthel Index. [address] [facility] (14)2. Khurram Garcia mac SAMANTA CagleF, Clare Martinez., Suyapa Jones., [place], [address] (1999). Measuring the change indisability after inpatient rehabilitation; comparison of the responsiveness of the Barthel Index and Functional [place] Measure. Journal of Neurology, Neurosurgery, and Psychiatry, 66(4), 785-675. Krissy Voss, N.J.A, VALENTINO Gutiérrez, & Roe Lees M.A. (2004.) Assessment of post-stroke quality of life in cost-effectiveness studies: The usefulness of the Barthel Index and the EuroQoL-5D. [facility], 13, 659-19 Physical Therapy Evaluation Charge Determination History Examination Presentation Decision-Making HIGH Complexity :3+ comorbidities / personal factors will impact the outcome/ POC  MEDIUM Complexity : 3 Standardized tests and measures addressing body structure, function, activity limitation and / or participation in recreation  MEDIUM Complexity : Evolving with changing characteristics  Other outcome measures barthel  HIGH Based on the above components, the patient evaluation is determined to be of the following complexity level: MEDIUM Pain Rating: 
No major complaints Activity Tolerance:  
Fair After treatment patient left in no apparent distress:  
Supine in bed and Call bell within reach COMMUNICATION/EDUCATION:  
 The patients plan of care was discussed with: Occupational therapist and Registered nurse. Fall prevention education was provided and the patient/caregiver indicated understanding. and Patient/family agree to work toward stated goals and plan of care. Thank you for this referral. 
Go Mazariegos, PT, DPT Time Calculation: 23 mins

## 2021-02-09 NOTE — PROGRESS NOTES
Responded to staff request for spiritual care visit with pt Venegas on [facility] after RN had spoken to pt wife. Processed LOS in relation to Covid-19 and isolation due to medical issues dating to November 2020. Affirmed ability for pt to express anger and weariness as he perceives healing will not result in regaining certain levels of independence. Processed perceptions of wife's kelby and hope for [employer]. Affirmed desire to express his kelby and honor his spouse in this endeavor. Pt articulated sadness as he was unable to see son who traveled from [facility] and perceptions of financial/emotional burden placed on children. Provided empathetic listening and compassionate presence. Affirmed sense of humor and desire to find enjoyment in small things such as eating junk food as a way to reconnect to past pleasures and assume some control over his decisions. Assured of prayers and advised of  services. Pt asked that  not contact spouse, but rather, answer family care needs as they arise from family contacting spiritual care services. Consult with bedside RN and wound care RN. [facility] Spiritual Care Provider  Paging Service [phone] ([phone])

## 2021-02-09 NOTE — INTERDISCIPLINARY ROUNDS
Interdisciplinary team rounds were held 2/9/2021 with the following team members:Care Management, Nursing, Nutrition, Pharmacy, Physician and Clinical Coordinator. Plan of care discussed. See clinical pathway and/or care plan for interventions and desired outcomes. Patient weaned to 1L NC. Nursing to continue to wean as tolerated. Patient continues on amio gtt. Speech consult today and possible advance diet. Chest tube placed on 2/8 with 50 cc output today per nursing.

## 2021-02-09 NOTE — PROGRESS NOTES
Reason for Readmission:   Acute respiratory failure RUR Score/Risk Level:    33 Low PCP: First and Last name:  Leslie Gilman 
 Name of Practice:  
 Are you a current patient: Yes/No:  Yes Approximate date of last visit: 1/28/2021 Can you participate in a virtual visit with your PCP: Yes Is a Care Conference indicated:  IDR will discuss Did you attend your follow up appointment (s): If not, why not:  Yes Resources/supports as identified by patient/family: Pt's spouse and his son they are very supportive. Top Challenges facing patient (as identified by patient/family and CM): Finances/Medication cost?    Not an issues Transportation      Pt's son will provide transportation for appointments. Support system or lack thereof? Has good family support. Living arrangements? [place] [facility]. Living with his spouse. Self-care/ADLs/Cognition? Pt was independent,son reported pt is weaker and he uses RW and rollator for his mobility. Current Advanced Directive/Advance Care Plan:  DNR, ACP is attatched to chart Plan for utilizing home health:   Pt is presently open with [facility] for SN/PT/OT and SLP Transition of Care Plan:    Based on readmission, the patient's previous Plan of Care IP rehab 
 has been evaluated and/or modified. The current Transition of Care Plan is:   Home with [facility] Care Management Interventions PCP Verified by CM: Yes Mode of Transport at Discharge: Self(Son will transport pt back to home) Transition of Care Consult (CM Consult): Home Health, Discharge Planning(Pt /dc from [facility] Ip rehab and [facility] is open , need a resumption order ) [address]: No 
Reason Outside [place]: Patient already serviced by other home care/hospice agency Discharge Durable Medical Equipment: No(Pt owns RW,Rollator and HO2 3.5 Roseann Kuo is his HO2 provider.) Speech Therapy Consult: Yes Current Support Network: Lives with Spouse Confirm Follow Up Transport: Family Discharge Location Discharge Placement: Home with home health Readmission Assessment Number of days since last admission?: 8-30 days Previous disposition: Home with Home Health Who is being interviewed?: Caregiver What was the patient's/caregiver's perception as to why they think they needed to return back to the hospital?: Did not realize care needs would be so extensive Did you visit your Primary Care Physician after you left the hospital, before you returned this time?: Yes Did you see a specialist, such as Cardiac, Pulmonary, Orthopedic Physician, etc. after you left the hospital?: No 
Who advised the patient to return to the hospital?: [address] Staff In our efforts to provide the best possible care to you and others like you, can you think of anything that we could have done to help you after you left the hospital the first time, so that you might not have needed to return so soon?: Arrange for more help when leaving the hospital, Additional Community resources available for illness support [address] [place] [address] Advance Care Planning General Advance Care Planning (ACP) Conversation Date of Conversation: 2/5/2021 Conducted with: Patient with Decision Making Capacity Healthcare Decision Maker:  
  Primary Decision Maker: Ping Adam - Spouse - [phone] Secondary Decision Maker: Sosa Ayon - [phone] Click here to complete [employer] including selection of the Healthcare Decision Maker Relationship (ie \"Primary\") Today we documented Decision Maker(s) consistent with Legal Next of Kin hierarchy. Content/Action Overview: Has ACP document(s) on file - reflects the patient's care preferences Reviewed DNR/DNI and patient confirms current DNR status - completed forms on file (place new order if needed) Length of Voluntary ACP Conversation in minutes:  16 minutes CM spoke to pt's son and completed CM readmission assessment. Care Management Interventions PCP Verified by CM: Yes Mode of Transport at Discharge: Self(Son will transport pt back to home) Transition of Care Consult (CM Consult): Home Health, Discharge Planning(Pt /dc from [facility] Ip rehab and [facility] is open , need a resumption order ) [facility] - INPATIENT: No 
Reason Outside [place]: Patient already serviced by other home care/hospice agency Discharge Durable Medical Equipment: No(Pt owns RW,Rollator and HO2 3.5 Denyssherry Bowdenchal is his HO2 provider.) Speech Therapy Consult: Yes Current Support Network: Lives with Spouse Confirm Follow Up Transport: Family Discharge Location Discharge Placement: Home with home health Ashleigh Masters MSW 
ED Case Manager Ext -[phone]

## 2021-02-09 NOTE — CONSULTS
Palliative Medicine Consult Fer: [phone]) Patient Name: Apoorva Espinosa YOB: 1935 Date of Initial Consult: 2/9/21 Reason for Consult: care decisions Requesting Provider: Mario Stringer MD 
Primary Care Physician: Myron Kamara DO 
 
 SUMMARY:  
Apoorva Espinosa is a 82 y.o. with a past history of htn, chronic diastolic failure, CADs/p Cabj, third degree AV Block, s/p PPM insertion, atrial fibrillation, not a candidate for anticoagulation due to fall risk and anemia, k aortic stenosis s/p transcatheter aortic valve replacement, dysphagia recently failed swallowing eval x3 weeks ago, hx of COVID Covid-19 pneumonia Dec 2020, he was admitted on 2/5 for acute hypoxic respiratory failure, due to aspiration pneumonia placed on bIpap, atrial fibrillation with RVR, pleural effusion, Covid rapid test is positive. He had left chest tube placement on 2/8/21, pulmonary following . He has been declining in function in and out of hospital and rehab since Oct 2020, recent prolonged hospitalization, d/c from rehab two weeks ago , confinesd to recliner, patient has declined peg tube and hospice recently had been eating and aspirating, Palliative care consulted to discussed care goals. Advance directives in place. Social : lives with wife who has health issues, they live in [place], he is on home health and has private duty aid support, he has declined to recliner bound in last few months. His wife [place] is primary MPOA, son Gilda Teague is secondary MPOA. PALLIATIVE DIAGNOSES:  
1. Debility due to chronic illness 2. Dysphagia 3. Recent COVID -19 infection 4. Aspiration pneumonia 5. S/p chest tube for left 6. Goals of care PLAN:  
1. I reviewed chart, prior to visit .  I spoke to patient who is alert, oriented x3, can make medical decisions. 
  
 I started the conversation around goals of care in a setting of dysphagia/aspiration, he tells me to talk/defers to his son Rachel Tavares and have his son Rachel Tavares talk to him . 
  
I spoke to his son Rachel Tavares over the phone, who had spoken to patient after I spoke to patient, he shared patient is considering comfort feeding and support of hospice. 
  
2. We decided to have a in person family meeting tomorrow at 8 am, with patient son to clarify goals of care . 3. Information sharing : Rachel Tavares shared  Since Oct 2020, after replacement of heart valve, he start declining and since then he is in between hospital and rehab and started swallowing problems/aspirating. 4. He came home [facility], two weeks ago from rehab supported by home health and private aids. 5. He is without hearing aids, lost during transition from home to hospital and gets frustrated because of not able to hear clearly. 6. Initial consult note routed to primary continuity provider and/or primary health care team members 7. Communicated plan of care with: Palliative ID[facility] Team 
 
 GOALS OF CARE / TREATMENT PREFERENCES:  
 
GOALS OF CARE: 
Patient/Health Care Proxy Stated Goals: (on going discussion) TREATMENT PREFERENCES:  
Code Status: DNR Advance Care Planning: 
[x] The [facility] Interdisciplinary Team has updated the ACP Navigator with Gordon and Patient Capacity Primary Decision Maker: Kishanoseipoornima Flores - Spouse - [phone] Secondary Decision Maker: Rigo Flores Son - [phone] Advance Care Planning 1/28/2021 Patient's Healthcare Decision Maker is: Named in scanned ACP document Confirm Advance Directive Yes, on file Does the patient have other document types Do Not Resuscitate Medical Interventions: Limited additional interventions Other Instructions: Other: As far as possible, the palliative care team has discussed with patient / health care proxy about goals of care / treatment preferences for patient. HISTORY:  
 
History obtained from: chart , son and bed side RN 
 
CHIEF COMPLAINT: Weak , unable to eat HPI/SUBJECTIVE: The patient is:  
[] Verbal and participatory He is frustrated because of hearing impairment and also not able to eat , I am \" NPO\" He denies any pain , sob , nausea or vomiting . Clinical Pain Assessment (nonverbal scale for severity on nonverbal patients):  
Clinical Pain Assessment Severity: 0 Duration: for how long has pt been experiencing pain (e.g., 2 days, 1 month, years) Frequency: how often pain is an issue (e.g., several times per day, once every few days, constant) FUNCTIONAL ASSESSMENT:  
 
Palliative Performance Scale (PPS): PSYCHOSOCIAL/SPIRITUAL SCREENING:  
 
Palliative IDT has assessed this patient for cultural preferences / practices and a referral made as appropriate to needs (Cultural Services, Patient Advocacy, Ethics, etc.) Any spiritual / Adventist concerns: 
[] Yes /  [x] No 
 
Caregiver Burnout: 
[] Yes /  [x] No /  [] No Caregiver Present Anticipatory grief assessment:  
[x] Normal  / [] Maladaptive ESAS Anxiety: Anxiety: 3 ESAS Depression: Depression: 0 REVIEW OF SYSTEMS:  
 
Positive and pertinent negative findings in ROS are noted above in HPI. The following systems were [x] reviewed / [] unable to be reviewed as noted in HPI Other findings are noted below. Systems: constitutional, ears/nose/mouth/throat, respiratory, gastrointestinal, genitourinary, musculoskeletal, integumentary, neurologic, psychiatric, endocrine. Positive findings noted below. Modified ESAS Completed by: provider Fatigue: 6 Drowsiness: 0 Depression: 0 Pain: 0 Anxiety: 3 Nausea: 0 Anorexia: 6 Dyspnea: 0 Stool Occurrence(s): 1  PHYSICAL EXAM:  
 
 From RN flowsheet: 
Wt Readings from Last 3 Encounters:  
02/06/21 170 lb 1.6 oz (77.2 kg) 01/08/21 179 lb 4.8 oz (81.3 kg) 01/04/21 176 lb 15.8 oz (80.3 kg) Blood pressure (!) 148/55, pulse 93, temperature 98.6 °F (37 °C), resp. rate 26, height 6' 2\" (1.88 m), weight 170 lb 1.6 oz (77.2 kg), SpO2 91 %. Pain Scale 1: Numeric (0 - 10) Pain Intensity 1: 0 Pain Onset 1: acute Pain Location 1: Back Pain Orientation 1: Left Pain Description 1: Aching Pain Intervention(s) 1: Medication (see MAR) Last bowel movement, if known: Viewed him through glass window, spoke to him over the phone from nursing station out side his room. He is frustrated because of hearing deficits, however able to hear clearly when I spoke loudly . He is not in any distress, alert , oriented x3. He is on room air, no labored breathing . HISTORY:  
 
Active Problems: 
  Acute respiratory failure ([address] (2/5/2021) Aspiration pneumonia ([address] (2/5/2021) Atrial fibrillation with RVR ([address] (2/5/2021) Past Medical History:  
Diagnosis Date  Acute on chronic diastolic (congestive) heart failure ([address] 12/27/2020  BPH (benign prostatic hyperplasia)  CAD (coronary artery disease)  Diabetes ([address]  Hearing loss  Hypercholesterolemia  Hypertension  Murmur  Recurrent depression ([address] 8/22/2019  Third degree AV block ([address] 10/30/2020 Past Surgical History:  
Procedure Laterality Date  HX CHOLECYSTECTOMY  TX CARDIAC SURG PROCEDURE UNLIST  2006 by-pass  TX INS NEW/RPLCMT PRM PM W/TRANSV ELTRD ATRIAL&VENT  10/30/2020  TX INS NEW/RPLCMT PRM PM W/TRANSV ELTRD ATRIAL&VENT N/A 10/30/2020 INSERT PPM DUAL performed by Radha Bass MD at [address]  CATH LAB Family History Problem Relation Age of Onset  Cancer Mother  Cancer Father History reviewed, no pertinent family history. Social History Tobacco Use  
  Smoking status: Former Smoker Types: Cigarettes Quit date: 1990 Years since quittin.4  Smokeless tobacco: Never Used Substance Use Topics  Alcohol use: No  
 
Allergies Allergen Reactions  Procaine Other (comments) Pt stated he passed out from procaine and was told not to let anyone use it on him again Current Facility-Administered Medications Medication Dose Route Frequency  enoxaparin (LOVENOX) injection 30 mg  30 mg SubCUTAneous Q12H  
 balsam peru-castor oiL (VENELEX) ointment   Topical TID  zinc oxide-cod liver oil (DESITIN) 40 % paste   Topical Q8H  
 ipratropium-albuterol (COMBIVENT RESPIMAT) 20 mcg-100 mcg inhalation spray  1 Puff Inhalation Q6H PRN  
 HYDROmorphone (PF) (DILAUDID) injection 0.5 mg  0.5 mg IntraVENous Q6H PRN  
 amiodarone (CORDARONE) 375 mg/250 mL D5W infusion  0.5-1 mg/min IntraVENous TITRATE  budesonide-formoteroL (SYMBICORT) 160-4.5 mcg/actuation HFA inhaler 2 Puff  2 Puff Inhalation BID RT  
 sodium chloride (NS) flush 5-40 mL  5-40 mL IntraVENous Q8H  
 sodium chloride (NS) flush 5-40 mL  5-40 mL IntraVENous PRN  
 acetaminophen (TYLENOL) tablet 650 mg  650 mg Oral Q6H PRN Or  
 acetaminophen (TYLENOL) suppository 650 mg  650 mg Rectal Q6H PRN  polyethylene glycol (MIRALAX) packet 17 g  17 g Oral DAILY PRN  promethazine (PHENERGAN) tablet 12.5 mg  12.5 mg Oral Q6H PRN Or  
 ondansetron (ZOFRAN) injection 4 mg  4 mg IntraVENous Q6H PRN  piperacillin-tazobactam (ZOSYN) 3.375 g in 0.9% sodium chloride (MBP/ADV) 100 mL MBP  3.375 g IntraVENous Q8H  
 vancomycin (VANCOCIN) 750 mg in 0.9% sodium chloride 250 mL (VIAL-MATE)  750 mg IntraVENous Q16H  
 aspirin (ASA) suppository 300 mg  300 mg Rectal DAILY  insulin lispro (HUMALOG) injection   SubCUTAneous AC&HS  
 glucose chewable tablet 16 g  4 Tab Oral PRN  
 dextrose (D50W) injection syrg 12.5-25 g  12.5-25 g IntraVENous PRN  
  glucagon (GLUCAGEN) injection 1 mg  1 mg IntraMUSCular PRN  
 0.9% sodium chloride infusion  50 mL/hr IntraVENous CONTINUOUS  
 
 
 
 LAB AND IMAGING FINDINGS:  
 
Lab Results Component Value Date/Time WBC 14.7 (H) 02/07/2021 03:49 AM  
 HGB 9.8 (L) 02/07/2021 03:49 AM  
 PLATELET 035 (L) 14/72/3865 03:49 AM  
 
Lab Results Component Value Date/Time Sodium 142 02/07/2021 03:49 AM  
 Potassium 3.2 (L) 02/07/2021 03:49 AM  
 Chloride 107 02/07/2021 03:49 AM  
 CO2 24 02/07/2021 03:49 AM  
 BUN 25 (H) 02/07/2021 03:49 AM  
 Creatinine 0.95 02/07/2021 03:49 AM  
 Calcium 8.4 (L) 02/07/2021 03:49 AM  
 Magnesium 1.4 (L) 02/07/2021 09:38 AM  
 Phosphorus 3.3 12/28/2020 04:59 AM  
  
Lab Results Component Value Date/Time Alk. phosphatase 74 02/06/2021 03:19 AM  
 Protein, total 5.7 (L) 02/06/2021 03:19 AM  
 Albumin 2.0 (L) 02/06/2021 03:19 AM  
 Globulin 3.7 02/06/2021 03:19 AM  
 
Lab Results Component Value Date/Time INR 1.2 (H) 11/03/2020 04:59 AM  
 Prothrombin time 12.1 (H) 11/03/2020 04:59 AM  
 aPTT 44.1 (H) 01/07/2021 03:32 AM  
  
Lab Results Component Value Date/Time Iron 29 (L) 12/28/2020 04:59 AM  
 TIBC 286 09/12/2017 11:38 AM  
 Iron % saturation 45 09/12/2017 11:38 AM  
 Ferritin 153 12/29/2020 12:40 AM  
  
Lab Results Component Value Date/Time pH 7.34 (L) 10/28/2020 12:30 PM  
 PCO2 43 10/28/2020 12:30 PM  
 PO2 281 (H) 10/28/2020 12:30 PM  
 
No components found for: [place] Lab Results Component Value Date/Time  10/19/2020 03:41 AM  
  
 
 
   
 
Total time:  
Counseling / coordination time, spent as noted above:  
> 50% counseling / coordination?:  
 
Prolonged service was provided for  []30 min   []75 min in face to face time in the presence of the patient, spent as noted above. Time Start:  
Time End:  
Note: this can only be billed with 76821 (initial) or 50797 (follow up). If multiple start / stop times, list each separately.

## 2021-02-09 NOTE — PROGRESS NOTES
Problem: Falls - Risk of 
Goal: *Absence of Falls Description: Document Seanne Lobe Fall Risk and appropriate interventions in the flowsheet. Outcome: Progressing Towards Goal 
Note: Fall Risk Interventions: 
  
 
Mentation Interventions: Adequate sleep, hydration, pain control, Bed/chair exit alarm Medication Interventions: Bed/chair exit alarm, Patient to call before getting OOB, Teach patient to arise slowly Elimination Interventions: Bed/chair exit alarm, Call light in reach, Toileting schedule/hourly rounds, Urinal in reach History of Falls Interventions: Bed/chair exit alarm, Room close to nurse's station

## 2021-02-09 NOTE — PROGRESS NOTES
Hospitalist Progress Note NAME: Aleta Díaz :  1935 MRN:  [id number] Assessment / Plan: 
Acute hypoxic respiratory failure POA B/L L>R aspiration pneumonia Left pleural effusion, transudative H/O Covid-19 Dec 2020 
 
-H/O  COVID-19 PNA finished treatment with remdesivir/steroids 2021. Presented  With afib with RVR, Pleural effusion and Hypoxia requiring bipap. 
-s/p left chest tube placement  
-o2 requirement has improved, currently RA 
-Continue vancomycin and Zosyn, follow cultures 
-Pulmonary on board -Monitor chest tube output 
-Currently on droplet precaution 
 
-Speech eval pending, likely aspiration 
-N.p.o. until speech eval   
 
 
 
PAF, episodes or RVR 
-Likely 2/2 pneumonia 
-Continue amnio gtt. required another bolus yesterday 
-Appreciate cardiology consult Aortic stenosis -S/p TAVR , ECHO EF 65%  nml TAVR fxn 
 
sacral wounds 
-Wound care managing 
  
  
Code Status: DNR (Pt. Has paper at bedside/also confirmed with shahla thomas) Surrogate Decision Maker: 
Petey oRa [phone]      
Orion carty Son [phone]      
  
  
 
Patient recently has been confined mostly to recliner. Prolonged recent hospitalizations. Very limited functional status. Palliative care consulted. Discussed care goals with son, Blaire Chaparro. Understands that his medical conditions can be treated but likelihood of restoration of his functional status is quite low. Patient has wanted continued medical treatment. Ongoing discussion regarding goals of care DVT Prophylaxis: SQ LOVENOX 
GI Prophylaxis: not indicated 
  
 
Prognosis: poor Currently in stepdown unit, will transfer to telemetry, once off amio drip Palliative to see pt to clarify further goals of care. Subjective: Chief Complaint / Reason for Physician Visit Has a chest tube, has around 420 output past 24 hours. Wean down to room air Review of Systems: Symptom Y/N Comments  Symptom Y/N Comments Fever/Chills n   Chest Pain n   
Poor Appetite n   Edema Cough y   Abdominal Pain n   
Sputum n   Joint Pain SOB/LARA y   Pruritis/Rash Nausea/vomit n   Tolerating PT/OT Diarrhea n   Tolerating Diet Constipation n   Other Could NOT obtain due to:   
 
Objective: VITALS:  
Last 24hrs VS reviewed since prior progress note. Most recent are: 
Patient Vitals for the past 24 hrs: 
 Temp Pulse Resp BP SpO2  
02/09/21 0753 98.8 °F (37.1 °C) 91 18 (!) 150/69 96 % 02/09/21 0746     96 % 02/09/21 0244 97.9 °F (36.6 °C) 98 20 (!) 140/60 97 % 02/08/21 2241 98.5 °F (36.9 °C) 97 20 132/66 97 % 02/08/21 1954     96 % 02/08/21 1823 98.6 °F (37 °C) (!) 102 21 (!) 147/47 98 % 02/08/21 1711  (!) 114  (!) 140/58 92 % 02/08/21 1424 98.2 °F (36.8 °C) 100 23 (!) 151/54 92 % Intake/Output Summary (Last 24 hours) at 2/9/2021 1119 Last data filed at 2/9/2021 0908 Gross per 24 hour Intake 547.33 ml Output 2120 ml Net -1572.67 ml I had a face to face encounter and independently examined this patient on 2/9/2021, as outlined below: PHYSICAL EXAM: 
General: WD, WN. Alert, cooperative, no acute distress EENT:  EOMI. Anicteric sclerae. MMM Resp:  Coarse BS. No accessory muscle use CV:  Regular  rhythm,  No edema GI:  Soft, Non distended, Non tender. +Bowel sounds Neurologic:  Alert, oriented to person, normal speech, Psych:   Not anxious nor agitated Skin:  No rashes. No jaundice Reviewed most current lab test results and cultures  YES Reviewed most current radiology test results   YES Review and summation of old records today    NO Reviewed patient's current orders and MAR    YES 
PMH/SH reviewed - no change compared to H&P 
________________________________________________________________________ Care Plan discussed with: 
  Comments Patient x Family RN x Care Manager Consultant  x Multidiciplinary team rounds were held today with , nursing, pharmacist and clinical coordinator. Patient's plan of care was discussed; medications were reviewed and discharge planning was addressed. ________________________________________________________________________ Total NON critical care TIME:  35   Minutes Total CRITICAL CARE TIME Spent:   Minutes non procedure based Comments >50% of visit spent in counseling and coordination of care x   
________________________________________________________________________ Tarah Bowden MD  
 
Procedures: see electronic medical records for all procedures/Xrays and details which were not copied into this note but were reviewed prior to creation of Plan. LABS: 
I reviewed today's most current labs and imaging studies. Pertinent labs include: 
Recent Labs 02/07/21 
2303 WBC 14.7* HGB 9.8* HCT 31.8*  
* Recent Labs 02/07/21 
0896 02/07/21 
8155 NA  --  142 K  --  3.2*  
CL  --  107 CO2  --  24 GLU  --  139* BUN  --  25* CREA  --  0.95 CA  --  8.4* MG 1.4*  --   
 
 
Signed: Tarah Bowden MD

## 2021-02-09 NOTE — PROGRESS NOTES
Problem: Self Care Deficits Care Plan (Adult) Goal: *Acute Goals and Plan of Care (Insert Text) Description:  
FUNCTIONAL STATUS PRIOR TO ADMISSION: Patient reported he has experienced a functional decline with several hospitalizations over the last few months. Prior to functional decline, patient was modified independent for self-care and functional mobility using RW. Patient lives in [address] [place] [address] with wife. HOME SUPPORT: The patient lived with wife in [address] [place] [address]. Occupational Therapy Goals Initiated 2/9/2021 1. Patient will perform grooming with modified independence within 7 day(s). 2.  Patient will perform seated sponge bath with supervision/set-up within 7 day(s). 3.  Patient will perform lower body dressing with supervision/set-up within 7 day(s). 4.  Patient will perform toilet transfers with contact guard assist within 7 day(s). 5.  Patient will perform all aspects of toileting with minimal assistance within 7 day(s). Outcome: Not Met OCCUPATIONAL THERAPY EVALUATION Patient: Colleen Mae (79 y.o. male) Date: 2/9/2021 Primary Diagnosis: Acute respiratory failure ([address] [J96.00] Aspiration pneumonia ([address] [J69.0] Atrial fibrillation with RVR ([facility] [I48.91] Precautions: Contact (droplet plus) ASSESSMENT Based on the objective data described below, the patient presents with decreased independence in self-care and functional mobility secondary to general weakness, impaired sitting balance (posterior lean noted), decreased activity tolerance, and change in respiratory status. Patient has experienced a functional decline over the last several months and is functioning below his modified independent baseline for self-care and functional mobility. Overall, patient is set-up/SBA to max assist for self-care and min assist x2 to mod assist for bed mobility. Patient declined attempting stand during session but tolerated sitting EOB for approx 7 mins with fair balance. Patient received on 1L O2 but increased to 2L per RN request. Patient's VSS throughout session. Patient would benefit from skilled OT during acute hospital stay. Current Level of Function Impacting Discharge (ADLs/self-care): set-up/SBA to max assist for seated ADLs Functional Outcome Measure: The patient scored 15 on the Barthel Index outcome measure which is indicative of 85% functional impairment. Other factors to consider for discharge: covid +, below baseline Patient will benefit from skilled therapy intervention to address the above noted impairments. PLAN : 
Recommendations and Planned Interventions: self care training, functional mobility training, therapeutic exercise, balance training, therapeutic activities, endurance activities, patient education, home safety training, and family training/education Frequency/Duration: Patient will be followed by occupational therapy 3 times a week to address goals. Recommendation for discharge: (in order for the patient to meet his/her long term goals) Therapy up to 5 days/week in SNF setting This discharge recommendation: 
Has not yet been discussed the attending provider and/or case management IF patient discharges home will need the following DME: TBD in SNF rehab SUBJECTIVE:  
Patient stated I don't think sitting up is going to help anything.  OBJECTIVE DATA SUMMARY:  
HISTORY:  
Past Medical History:  
Diagnosis Date Acute on chronic diastolic (congestive) heart failure ([facility] 12/27/2020 BPH (benign prostatic hyperplasia) CAD (coronary artery disease) Diabetes ([facility] [facility] Hearing loss Hypercholesterolemia Hypertension Murmur Recurrent depression ([facility] 8/22/2019 Third degree AV block ([facility] 10/30/2020 Past Surgical History:  
Procedure Laterality Date HX CHOLECYSTECTOMY    
 [place] CARDIAC SURG PROCEDURE UNLIST  2006 by-pass [place] INS NEW/RPLCMT PRM PM W/TRANSV ELTRD ATRIAL&VENT  10/30/2020 [place] NEW/RPLCMT PRM PM W/TRANSV ELTRD ATRIAL&VENT N/A 10/30/2020 INSERT PPM DUAL performed [place]iss[place] Bull[place] [address], [facility] CATH LAB Expanded or extensive additional review of patient history:  
 
Home Situation Home Environment: Independent living One/Two Story Residence: One story Living Alone: No 
Support Systems: Spouse/Significant Other/Partner Current DME Used/Available at Home: Walker, rolling EXAMINATION OF PERFORMANCE DEFICITS: 
Cognitive/Behavioral Status: 
Neurologic State: Alert; Appropriate for age Orientation Level: Oriented to time;Oriented to place;Oriented to person Cognition: Follows commands Perception: Appears intact Perseveration: No perseveration noted Safety/Judgement: Decreased insight into deficits; Decreased awareness of need for assistance Hearing: Auditory Auditory Impairment: Hard of hearing, bilateral, Hearing aid(s) Hearing Aids/Status: With patient, Not functioning Range of Motion: 
AROM: Generally decreased, functional 
PROM: Generally decreased, functional 
 
Strength: 
Strength: Generally decreased, functional 
 
Coordination: 
Coordination: Within functional limits Fine Motor Skills-Upper: Left Intact; Right Intact Gross Motor Skills-Upper: Left Intact; Right Intact Tone & Sensation: 
Tone: Normal 
Sensation: Intact Balance: 
Sitting: Impaired Sitting - Static: Fair (occasional) Sitting - Dynamic: Fair (occasional) Standing: (refused) Functional Mobility and Transfers for ADLs: 
Bed Mobility: 
Rolling: Minimum assistance;Assist x2 Supine to Sit: Minimum assistance;Assist x2 Sit to Supine: Minimum assistance;Assist x2 Scooting: Moderate assistance Transfers: 
Unable to attempt OOB transfers; patient declined standing ADL Assessment: 
Feeding: Setup;Stand-by assistance Oral Facial Hygiene/Grooming: Setup;Stand-by assistance(seated) Bathing: Maximum assistance Upper Body Dressing: Minimum assistance Lower Body Dressing: Maximum assistance Toileting: Maximum assistance(nguyễn catheter ) ADL Intervention and task modifications: 
 
Grooming Position Performed: Seated edge of bed Washing Face: Set-up; Stand-by assistance Washing Hands: Set-up; Stand-by assistance Toileting Bladder Hygiene: Total assistance (dependent)(nguyễn) Cognitive Retraining Safety/Judgement: Decreased insight into deficits; Decreased awareness of need for assistance Functional Measure: 
Barthel Index: 
 
Bathin Bladder: 0 Bowels: 5 Groomin Dressin Feedin Mobility: 0 Stairs: 0 Toilet Use: 0 Transfer (Bed to Chair and Back): 5 Total: 15/100 The Barthel ADL Index: Guidelines 1. The index should be used as a record of what a patient does, not as a record of what a patient could do. 2. The main aim is to establish degree of independence from any help, physical or verbal, however minor and for whatever reason. 3. The need for supervision renders the patient not independent. 4. A patient's performance should be established using the best available evidence. Asking the patient, friends/relatives and nurses are the usual sources, but direct observation and common sense are also important. However direct testing is not needed. 5. Usually the patient's performance over the preceding 24-48 hours is important, but occasionally longer periods will be relevant. 6. Middle categories imply that the patient supplies over 50 per cent of the effort. 7. Use of aids to be independent is allowed. Linda Guerrero., Barthel, D.W. (5897). Functional evaluation: the Barthel Index. [address] [facility] (14)2. Irma Grajeda mac SAMANTA CagleF, Adolfo Soares., Sirena Ravi., [facility], [facility] (1999). Measuring the change indisability after inpatient rehabilitation; comparison of the responsiveness of the Barthel Index and Functional [place] Measure. Journal of Neurology, Neurosurgery, and Psychiatry, 66(4), 649-396. JUNIOR Elizabeth, VALENTINO Gutiérrez, & Myles Vogt M.A. (2004.) Assessment of post-stroke quality of life in cost-effectiveness studies: The usefulness of the Barthel Index and the EuroQoL-5D. [facility], 13, 303-84 Based on the above components, the patient evaluation is determined to be of the following complexity level: MEDIUM Pain Rating: 
Patient did not c/o pain during session. Activity Tolerance:  
Poor, desaturates with exertion and requires oxygen, and requires frequent rest breaks After treatment patient left in no apparent distress:   
Supine in bed, Call bell within reach, and Side rails x 3 
 
COMMUNICATION/EDUCATION:  
The patients plan of care was discussed with: Physical therapist and Registered nurse. Home safety education was provided and the patient/caregiver indicated understanding., Patient/family have participated as able in goal setting and plan of care. , and Patient/family agree to work toward stated goals and plan of care. This patients plan of care is appropriate for delegation to [facility]. Thank you for this referral. 
Elizabeth Luna OTR/L Time Calculation: 24 mins

## 2021-02-09 NOTE — PROGRESS NOTES
RAPID RESPONSE TEAM- Follow Up Rounded on patient due to recent rapid response for A fib RVR. Discussed with primary RN, Carisa Lugo. No acute concerns, VSS, MEWS 1. Patient Vitals for the past 12 hrs: 
 Temp Pulse Resp BP SpO2  
02/08/21 2241 98.5 °F (36.9 °C) 97 20 132/66 97 % 02/08/21 1954     96 % 02/08/21 1823 98.6 °F (37 °C) (!) 102 21 (!) 147/47 98 % 02/08/21 1711  (!) 114  (!) 140/58 92 % 02/08/21 1424 98.2 °F (36.8 °C) 100 23 (!) 151/54 92 % No RRT interventions indicated at this time. Please call with any questions or concerns. Pleasant Carrel Rapid Response RN Ronel Wilson

## 2021-02-09 NOTE — PROGRESS NOTES
Pulmonary, Critical Care, and Sleep Medicine Patient Consult Name: Austin Varma MRN: [id number] : 1935 Hospital: [facility] Date: 2021 IMPRESSION:  
· Acute hypoxic resp failure- Probable basilar left > right aspiration PNA with · Left pleural  Effusion- based on tested appears to be transudative pleural effusion. · COVID 19% 21 and again 21 - ? Last one is a false positive as this was an antigen test 
· TAVR ECHO EF 65%  nml TAVR fxn · CAD · PAF/RVR- dilt · Aspiration · HTN 
· LEukocytosis RECOMMENDATIONS:  
· Zosyn/vanco 
· For chest tube placement today. · Continue oxygen to keep pox over 90%. · Monitor chest tube output. · Can repeat CXR in a couple days. · Will follow along with you. Subjective:  
Pt is feeling a little better. NO chest pain, no back pain, no headache. He is for speech path evaluation. This patient has been seen and evaluated at the request of Dr. hospitalist for sob. Patient is a 82 y.o. male with h/o TAVR admitted for AHRF and leukocytosis. Enlarging left effusion on CXR. HAs gone on to needing bipap. Pt is somnolent but arousable on bipap. No distress. Past Medical History:  
Diagnosis Date  Acute on chronic diastolic (congestive) heart failure ([address] 2020  BPH (benign prostatic hyperplasia)  CAD (coronary artery disease)  Diabetes ([address]  Hearing loss  Hypercholesterolemia  Hypertension  Murmur  Recurrent depression ([address] 2019  Third degree AV block ([address] 10/30/2020 Past Surgical History:  
Procedure Laterality Date  HX CHOLECYSTECTOMY  [place] CARDIAC SURG PROCEDURE UNLIST  2006 by-pass  [place] INS NEW/RPLCMT PRM PM W/TRANSV ELTRD ATRIAL&VENT  10/30/2020  [place] INS NEW/RPLCMT PRM PM W/TRANSV ELTRD ATRIAL&VENT N/A 10/30/2020  INSERT PPM DUAL performed by Rosales Johnston MD at [facility] CATH LAB  
  
 Prior to Admission medications Medication Sig Start Date End Date Taking? Authorizing Provider  
hydrALAZINE (APRESOLINE) 25 mg tablet Take 75 mg by mouth three (3) times daily. Provider, Historical  
atorvastatin (LIPITOR) 40 mg tablet Take 1 Tab by mouth nightly for 60 days. 1/8/21 3/9/21  Sathya Magaña MD  
amLODIPine (NORVASC) 10 mg tablet Take 1 Tab by mouth daily. 1/9/21   Sathya Magaña MD  
furosemide (LASIX) 40 mg tablet One daily Patient taking differently: 20 mg. One daily 1/9/21   Sathya Magaña MD  
metoprolol tartrate (LOPRESSOR) 50 mg tablet Take 1 Tab by mouth two (2) times a day for 60 days. 1/8/21 3/9/21  Sathya Magaña MD  
albuterol (PROVENTIL HFA, VENTOLIN HFA, PROAIR HFA) 90 mcg/actuation inhaler Take 2 Puffs by inhalation every six (6) hours as needed for Wheezing or Shortness of Breath. 1/8/21   Sathya Magaña MD  
potassium chloride SR (K-TAB) 20 mEq tablet Take 1 Tab by mouth daily. 1/8/21   Sathya Magaña MD  
losartan (COZAAR) 50 mg tablet Take 100 mg by mouth daily. Provider, Historical  
aspirin 81 mg chewable tablet Take 81 mg by mouth daily. Provider, Historical  
temazepam (RESTORIL) 15 mg capsule TAKE 1 CAPSULE BY MOUTH AT BEDTIME AS NEEDED FOR SLEEP. 12/23/20   Pan Gallo MD  
acetaminophen (TYLENOL) 325 mg tablet Take 2 Tabs by mouth every four (4) hours as needed for Pain. 11/4/20   Rhea Alejandre NP  
senna-docusate (PERICOLACE) 8.6-50 mg per tablet Take 1 Tab by mouth daily as needed for Constipation.  11/4/20   Rhea Alejandre NP  
doxazosin (CARDURA) 4 mg tablet TAKE 1 TABLET BY MOUTH  DAILY 10/20/20   Aga MENDOZA NP  
finasteride (PROSCAR) 5 mg tablet TAKE 1 TABLET BY MOUTH  DAILY 10/20/20   Macy Marquez NP  
metFORMIN ER (GLUCOPHAGE XR) 500 mg tablet TAKE 1 TABLET BY MOUTH  TWICE DAILY WITH MEALS 10/20/20   Adrianna Dick NP  
 glipiZIDE SR (GLUCOTROL XL) 5 mg CR tablet TAKE 1 TABLET BY MOUTH  DAILY 10/20/20   Malik Dick NP  
sertraline (ZOLOFT) 100 mg tablet TAKE 1 TABLET BY MOUTH  DAILY 20   Paco Darling NP  
fluticasone propionate (FLONASE) 50 mcg/actuation nasal spray ADMINISTER 1 Spray IN Both Nostrils two (2) times a day. 20   David Dick, NP  
cholecalciferol (VITAMIN D3) 1,000 unit cap Take 1 Cap by mouth daily. 3/29/18   Johnna Beal, DO  
sodium chloride (OCEAN) 0.65 % nasal squeeze bottle 0.05 mL by Both Nostrils route as needed for Congestion. 3/29/18   Kristyn Hunt, DO  
FERROUS FUMARATE/VIT BCOMP,C (SUPER B COMPLEX PO) Take 1 Tab by mouth daily. Provider, Historical  
 
Allergies Allergen Reactions  Procaine Other (comments) Pt stated he passed out from procaine and was told not to let anyone use it on him again Social History Tobacco Use  Smoking status: Former Smoker Types: Cigarettes Quit date: 1990 Years since quittin.4  Smokeless tobacco: Never Used Substance Use Topics  Alcohol use: No  
  
Family History Problem Relation Age of Onset  Cancer Mother  Cancer Father Current Facility-Administered Medications Medication Dose Route Frequency  zinc oxide-cod liver oil (DESITIN) 40 % paste   Topical Q8H  
 amiodarone (CORDARONE) 375 mg/250 mL D5W infusion  0.5-1 mg/min IntraVENous TITRATE  balsam peru-castor oiL (VENELEX) ointment   Topical BID  budesonide-formoteroL (SYMBICORT) 160-4.5 mcg/actuation HFA inhaler 2 Puff  2 Puff Inhalation BID RT  
 dilTIAZem (CARDIZEM) 100 mg in dextrose 5% (MBP/ADV) 100 mL infusion  0-15 mg/hr IntraVENous TITRATE  sodium chloride (NS) flush 5-40 mL  5-40 mL IntraVENous Q8H  
 enoxaparin (LOVENOX) injection 40 mg  40 mg SubCUTAneous DAILY  piperacillin-tazobactam (ZOSYN) 3.375 g in 0.9% sodium chloride (MBP/ADV) 100 mL MBP  3.375 g IntraVENous Q8H  
  vancomycin (VANCOCIN) 750 mg in 0.9% sodium chloride 250 mL (VIAL-MATE)  750 mg IntraVENous Q16H  
 aspirin (ASA) suppository 300 mg  300 mg Rectal DAILY  insulin lispro (HUMALOG) injection   SubCUTAneous AC&HS  
 0.9% sodium chloride infusion  50 mL/hr IntraVENous CONTINUOUS Review of Systems: 
Review of systems not obtained due to patient factors. Objective:  
Vital Signs:   
Visit Vitals BP (!) 150/69 (BP 1 Location: Right upper arm, BP Patient Position: At rest) Pulse 91 Temp 98.8 °F (37.1 °C) Resp 18 Ht 6' 2\" (1.88 m) Wt 77.2 kg (170 lb 1.6 oz) SpO2 96% BMI 21.84 kg/m² O2 Device: Nasal cannula O2 Flow Rate (L/min): 1 l/min Temp (24hrs), Av.4 °F (36.9 °C), Min:97.9 °F (36.6 °C), Max:98.8 °F (37.1 °C) Intake/Output:  
Last shift:      No intake/output data recorded. Last 3 shifts:  1901 -  0700 In: 2599.4 [I.V.:2599.4] Out: 2470 [Urine:] Intake/Output Summary (Last 24 hours) at 2021 5873 Last data filed at 2021 7667 Gross per 24 hour Intake 547.33 ml Output 2120 ml Net -1572.67 ml Physical Exam:  
Gen: alert, no distress. Normal speech. Lungs: CTA bilaterally, decreased BS in bases. Has a left sided chest tube in place. CV: Nl S12 Abd: +BS,. Soft, nt, nd 
Extrem: NO C, C, E 
MOtor intact. Data review:  
 
Recent Results (from the past 24 hour(s)) GLUCOSE, POC Collection Time: 21 11:42 AM  
Result Value Ref Range Glucose (POC) 82 65 - 100 mg/dL Performed by Mckenna Arielle PCT GLUCOSE, POC Collection Time: 21  4:16 PM  
Result Value Ref Range Glucose (POC) 116 (H) 65 - 100 mg/dL Performed by Mckenna Arielle PCT   
CELL COUNT, BODY FLUID Collection Time: 21  5:30 PM  
Result Value Ref Range BODY FLUID TYPE PLEURAL FLUID FLUID COLOR YELLOW    
 FLUID APPEARANCE CLOUDY FLUID RBC CT. >100 (H) 0 /cu mm FLUID NUCLEATED CELLS 2,269 /cu mm  
 FLD NEUTROPHILS 49 (A) NRRE % FLD LYMPHS 28 (A) NRRE % FLD MONO/MACROPHAGES 21 (A) NRRE % FLUID MESOTHELIAL 2 (A) NRRE %  
LDH, BODY FLUID Collection Time: 02/08/21  5:30 PM  
Result Value Ref Range Fluid Type: PLEURAL FLUID    
 LD, body fld. 130 U/L  
PH, FLUID Collection Time: 02/08/21  5:30 PM  
Result Value Ref Range FLUID TYPE(15) PLEURAL FLUID FLUID PH 7.2 PROTEIN TOTAL, FLUID Collection Time: 02/08/21  5:30 PM  
Result Value Ref Range Fluid Type: PLEURAL FLUID Protein total, body fld. 2.3 g/dL ALBUMIN, FLUID Collection Time: 02/08/21  5:30 PM  
Result Value Ref Range Fluid Type: PLEURAL FLUID Albumin, body fld. 1.1 g/dL SAMPLES BEING HELD Collection Time: 02/08/21  5:30 PM  
Result Value Ref Range SAMPLES BEING HELD ANAC   
 COMMENT Add-on orders for these samples will be processed based on acceptable specimen integrity and analyte stability, which may vary by analyte. GLUCOSE, POC Collection Time: 02/08/21  9:18 PM  
Result Value Ref Range Glucose (POC) 169 (H) 65 - 100 mg/dL Performed by Nona Ganser GLUCOSE, POC Collection Time: 02/09/21  7:46 AM  
Result Value Ref Range Glucose (POC) 168 (H) 65 - 100 mg/dL Performed by Zayda Frankel Imaging: 
I have personally reviewed the patients radiographs and have reviewed the reports: CTA chest no PE Large left effusion and smaller right effusion with LLL ATX/ASD no masses Parul Yip MD

## 2021-02-09 NOTE — PROGRESS NOTES
Received message from patient's nurse Jewell Watkins stating : 
 
Patient has a chest tube that was placed today. He is complaining of pain at the site. He has nothing ordered for pain. Patient is NPO for aspiration. Discussion / orders: 
 
Reviewed patient's medication list.  Currently he only has Tylenol for pain management which he can receive rectally. · Ordered Dilaudid 0.5 mg IV every 6 hours as needed for moderate to severe pain. Please note that this note was dictated using Dragon computer voice recognition software. Quite often unanticipated grammatical, syntax, homophones, and other interpretive errors are inadvertently transcribed by the computer software. Please disregard these errors. Please excuse any errors that have escaped final proofreading.

## 2021-02-09 NOTE — PROGRESS NOTES
Hospitalist Progress Note NAME: Roberto Blanco :  1935 MRN:  [id number] Assessment / Plan: 
Acute hypoxic respiratory failure Parapneumonic effusion Positive for Covid  
--Continue Vanco Zosyn 
-Plan for chest tube noted 
-Appreciate pulmonary consult 
-Agree with home, last test antigen test and a false positive 
-Speech eval pending, likely aspiration 
-N.p.o. until speech eval and for planned procedure 
-Continue NC O2 to maintain sats greater than 90%. Not on home oxygen CXR :There is nearly complete opacification of the left hemithorax which shift of the mediastinum to the left indicating loss of volume PAF 
-Likely 2/2 pneumonia 
-Continue amnio gtt. -Appreciate cardiology consult Aortic stenosis -S/p TAVR 
 
sacral wounds 
-Wound care managing 
  
  
Code Status: DNR (Pt. Has paper at bedside/also confirmed with son orion) Surrogate Decision Maker: 
Bello Ellsworth [phone]      
Orion carty [phone]      
  
  
Patient recently has been confined mostly to recliner. Prolonged recent hospitalizations. Very limited functional status. Palliative care consulted. Discussed care goals with son, Mark Martinez. Understands that his medical conditions can be treated but likelihood of restoration of his functional status is quite low. Patient has wanted continued medical treatment. Ongoing discussion regarding goals of care DVT Prophylaxis: SQ LOVENOX 
GI Prophylaxis: not indicated 
  
Based on review of chart, seems patient mostly bedbound reently Prognosis: poor Subjective: Chief Complaint / Reason for Physician Visit Off BiPAP, transition to nasal cannula and maintaining sats. Denies chest pain, SOB is at baseline Discussed with RN events overnight. Review of Systems: 
Symptom Y/N Comments  Symptom Y/N Comments Fever/Chills n   Chest Pain n   
Poor Appetite n   Edema Cough y   Abdominal Pain n   
Sputum n   Joint Pain SOB/LARA y   Pruritis/Rash Nausea/vomit n   Tolerating PT/OT Diarrhea n   Tolerating Diet Constipation n   Other Could NOT obtain due to:   
 
Objective: VITALS:  
Last 24hrs VS reviewed since prior progress note. Most recent are: 
Patient Vitals for the past 24 hrs: 
 Temp Pulse Resp BP SpO2  
02/08/21 2241 98.5 °F (36.9 °C) 97 20 132/66 97 % 02/08/21 1954     96 % 02/08/21 1823 98.6 °F (37 °C) (!) 102 21 (!) 147/47 98 % 02/08/21 1711  (!) 114  (!) 140/58 92 % 02/08/21 1424 98.2 °F (36.8 °C) 100 23 (!) 151/54 92 % 02/08/21 1036 98.2 °F (36.8 °C) 88 23 (!) 140/51 92 % 02/08/21 0942     97 % 02/08/21 0716 97.7 °F (36.5 °C) 77 22 (!) 150/55 99 % 02/08/21 0343     99 % 02/08/21 0300 97.8 °F (36.6 °C) 83 22 (!) 149/48 100 % Intake/Output Summary (Last 24 hours) at 2/8/2021 2334 Last data filed at 2/8/2021 2244 Gross per 24 hour Intake 2052.04 ml Output 890 ml Net 1162.04 ml I had a face to face encounter and independently examined this patient on 2/8/2021, as outlined below: PHYSICAL EXAM: 
General: WD, WN. Alert, cooperative, no acute distress EENT:  EOMI. Anicteric sclerae. MMM Resp:  Coarse BS. No accessory muscle use CV:  Regular  rhythm,  No edema GI:  Soft, Non distended, Non tender. +Bowel sounds Neurologic:  Alert, oriented to person, normal speech, Psych:   Not anxious nor agitated Skin:  No rashes. No jaundice Reviewed most current lab test results and cultures  YES Reviewed most current radiology test results   YES Review and summation of old records today    NO Reviewed patient's current orders and MAR    YES 
PMH/SH reviewed - no change compared to H&P 
________________________________________________________________________ Care Plan discussed with: 
  Comments Patient x Family RN x Care Manager Consultant  x Multidiciplinary team rounds were held today with , nursing, pharmacist and clinical coordinator. Patient's plan of care was discussed; medications were reviewed and discharge planning was addressed. ________________________________________________________________________ Total NON critical care TIME:  35   Minutes Total CRITICAL CARE TIME Spent:   Minutes non procedure based Comments >50% of visit spent in counseling and coordination of care x   
________________________________________________________________________ Tolu Watson DO  
 
Procedures: see electronic medical records for all procedures/Xrays and details which were not copied into this note but were reviewed prior to creation of Plan. LABS: 
I reviewed today's most current labs and imaging studies. Pertinent labs include: 
Recent Labs 02/07/21 
3114 02/06/21 
0485 WBC 14.7* 16.6* HGB 9.8* 8.9* HCT 31.8* 28.3*  
* 131* Recent Labs 02/07/21 
6288 02/07/21 
0648 02/06/21 
9117 NA  --  142 139 K  --  3.2* 3.4*  
CL  --  107 103 CO2  --  24 32 GLU  --  139* 177* BUN  --  25* 20  
CREA  --  0.95 0.86 CA  --  8.4* 8.0*  
MG 1.4*  --  1.7 ALB  --   --  2.0*  
TBILI  --   --  0.3 ALT  --   --  19 Signed: Tolu Watson DO

## 2021-02-09 NOTE — PROGRESS NOTES
Problem: Dysphagia (Adult) Goal: *Acute Goals and Plan of Care (Insert Text) Description: 2/9/2021 Speech path goals 1. Patient will participate with reeval of swallowing Outcome: Not Met SPEECH LANGUAGE PATHOLOGY BEDSIDE SWALLOW EVALUATION Patient: Jean-Claude Crawley (81 y.o. male) Date: 2/9/2021 Primary Diagnosis: Acute respiratory failure ([address] [J96.00] Aspiration pneumonia ([address] [J69.0] Atrial fibrillation with RVR ([address] [I48.91] Precautions:     
 
ASSESSMENT : 
Based on the objective data described below, the patient presents with cough at baseline and coughing after honey thick liquids and purees. It is a weak cough which he cannot use the Y[place]auer to extract. Oral phase is mildly slow with mastication because he has only a top denture. Pharyngeal phase is characterized by two swallows suggestive of pharyngeal residue. He is on thickened liquids at baseline but do not know if nectar or honey thick. Patient denies thickened liquids. ASSESSMENT : the study did not record! OP mbs 1/19/21 was at [facility] Based on the objective data described above, the patient presents with mild oral dysphagia and mod to severe pharyngeal dysphagia. Premature spillage noted with thins. Pharyngeal dysphagia was characterized by mild swallow with purees and mod delay with liquids/thin, nectar and honey thick, reduced tongue base retraction, reduced pharyngeal constriction, very reduced strength and reduced elevation. There was aspiration of thins and nectar thick liquids due to significant pyriform sinus residue that overflowed after the swallow into the trachea; the amount of aspiration was significant with thins. Unsure if any aspiration of honey thick liquids and the study could not be reviewed. The cough was delayed when he aspirated and ineffective. With purees, there was severe vallecular residue that a dry swallow did not reduce much placing him at grave risk to aspirate the pureed residue. The patient did not spontaneously dry swallow with any residue with any texture due to reduced pharyngeal sensation. Delayed cough with aspiration events. Verbally cued coughs very weak and ineffective. PLAN/RECOMMENDATIONS : 
Recommend alternative feeding due to aspiration of thins and nectar thick liquids. Potential to aspirate due to severe vallecular residue with purees that a dry swallow did not reduce much. He could take ice chips to prevent disuse atrophy while he participates in swallowing therapy. Repeat MBS after at least 2-3 weeks if he is significantly stronger. Patient will benefit from skilled intervention to address the above impairments. Patients rehabilitation potential is considered to be Fair PLAN : 
Recommendations and Planned Interventions: NPO unless family accepts the risk of aspiration. Need to know if the patient Tried to call the wife and the son and reached no one. Left a message with the son. The patient had an mbs here as an OP and aspirated thins and nectar and was at severe risk to aspirate everything. Frequency/Duration: Patient will be followed by speech-language pathology 3 times a week to address goals. Discharge Recommendations: To Be Determined SUBJECTIVE:  
Patient stated Oh that again. \" when told he had covid. OBJECTIVE:  
 
Past Medical History:  
Diagnosis Date  Acute on chronic diastolic (congestive) heart failure ([address] 12/27/2020  BPH (benign prostatic hyperplasia)  CAD (coronary artery disease)  Diabetes [facility]  Hearing loss  Hypercholesterolemia  Hypertension  Murmur  Recurrent depression ([facility] 8/22/2019  Third degree AV block ([facility] 10/30/2020 Past Surgical History:  
Procedure Laterality Date  HX CHOLECYSTECTOMY  MS CARDIAC SURG PROCEDURE UNLIST  2006 by-pass  MS INS NEW/RPLCMT PRM PM W/TRANSV ELTRD ATRIAL&VENT  10/30/2020  MS INS NEW/RPLCMT PRM PM W/TRANSV ELTRD ATRIAL&VENT N/A 10/30/2020 INSERT PPM DUAL performed by Abby Escamilla MD at [place] [id number], [address] CATH LAB Prior Level of Function/Home Situation:  
Home Situation Home Environment: Independent living One/Two Story Residence: One story Living Alone: No 
Support Systems: Spouse/Significant Other/Partner Current DME Used/Available at Home: Walker, rolling Diet prior to admission:  
Current Diet:  NPO Cognitive and Communication Status: 
Neurologic State: Alert, Appropriate for age Orientation Level: Oriented to time, Oriented to place, Oriented to person Cognition: Follows commands Perception: Appears intact Perseveration: No perseveration noted Oral Assessment: 
Oral Assessment Labial: No impairment Dentition: Upper dentures;Edentulous Lingual: No impairment Mandible: No impairment P.O. Trials: 
Patient Position: sitting up in bed with his mouth open Vocal quality prior to P.O.: No impairment Consistency Presented: Ice chips;Honey thick liquid;Puree How Presented: Self-fed/presented;Cup/sip;SLP-fed/presented;Spoon Bolus Acceptance: No impairment Bolus Formation/Control: Impaired Type of Impairment: Delayed;Mastication Propulsion: No impairment Oral Residue: None Initiation of Swallow: No impairment Aspiration Signs/Symptoms: Weak cough; Change vocal quality Pharyngeal Phase Characteristics: Suspected pharyngeal residue;Double swallow Effective Modifications: None Oral Phase Severity: Mild Pharyngeal Phase Severity : Moderate-severe NOMS:  
The NOMS functional outcome measure was used to quantify this patient's level of swallowing impairment. Based on the NOMS, the patient was determined to be at level 2 for swallow function NOMS Swallowing Levels: 
Level 1 (CN): NPO Level 2 (CM): NPO but takes consistency in therapy Level 3 (CL): Takes less than 50% of nutrition p.o. and continues with nonoral feedings; and/or safe with mod cues; and/or max diet restriction Level 4 (CK): Safe swallow but needs mod cues; and/or mod diet restriction; and/or still requires some nonoral feeding/supplements Level 5 (CJ): Safe swallow with min diet restriction; and/or needs min cues Level 6 (CI): Independent with p.o.; rare cues; usually self cues; may need to avoid some foods or needs extra time Level 7 (CH): Independent for all p.o. ROM. (2003). National Outcomes Measurement System (NOMS): Adult Speech-Language Pathology User's Guide. Pain: 
Pain Scale 1: Numeric (0 - 10) Pain Intensity 1: 0 Pain Location 1: Back After treatment:  
Patient left in no apparent distress in bed COMMUNICATION/EDUCATION:  
Patient was educated regarding his deficit(s) of dysphagia but he is Teller. The patient's plan of care including recommendations, planned interventions, and recommended diet changes were discussed with: Registered nurse. Patient/family have participated as able in goal setting and plan of care. Thank you for this referral. 
Sharon Mtz, SLP Time Calculation: 10 mins

## 2021-02-09 NOTE — PROGRESS NOTES
Problem: Falls - Risk of 
Goal: *Absence of Falls Description: Document Ammon Berumen Fall Risk and appropriate interventions in the flowsheet. Outcome: Progressing Towards Goal 
Note: Fall Risk Interventions: 
  
 
Mentation Interventions: Adequate sleep, hydration, pain control, Bed/chair exit alarm, Evaluate medications/consider consulting pharmacy, More frequent rounding, Toileting rounds, Room close to nurse's station Medication Interventions: Patient to call before getting OOB Elimination Interventions: Bed/chair exit alarm, Call light in reach, Patient to call for help with toileting needs, Toileting schedule/hourly rounds History of Falls Interventions: Bed/chair exit alarm, Consult care management for discharge planning, Room close to nurse's station Problem: Pressure Injury - Risk of 
Goal: *Prevention of pressure injury Description: Document Justin Scale and appropriate interventions in the flowsheet. Outcome: Progressing Towards Goal 
Note: Pressure Injury Interventions: 
Sensory Interventions: Assess changes in LOC, Assess need for specialty bed, Avoid rigorous massage over bony prominences, Check visual cues for pain, Float heels, Keep linens dry and wrinkle-free, Minimize linen layers, Pressure redistribution bed/mattress (bed type), Suspension boots, Turn and reposition approx. every two hours (pillows and wedges if needed) Moisture Interventions: Absorbent underpads, Apply protective barrier, creams and emollients, Internal/External urinary devices, Check for incontinence Q2 hours and as needed, Minimize layers Activity Interventions: Chair cushion, Increase time out of bed, PT/OT evaluation Mobility Interventions: Assess need for specialty bed, Chair cushion, Float heels, PT/OT evaluation Nutrition Interventions: Document food/fluid/supplement intake Friction and Shear Interventions: Apply protective barrier, creams and emollients, Lift sheet, Minimize layers, Feet elevated on foot rest 
 
  
 
 
 
  
Problem: Risk for Spread of Infection Goal: Prevent transmission of infectious organism to others Description: Prevent the transmission of infectious organisms to other patients, staff members, and visitors. Outcome: Progressing Towards Goal 
  
Problem: Breathing Pattern - Ineffective Goal: *Absence of hypoxia Outcome: Progressing Towards Goal

## 2021-02-10 NOTE — HOSPICE
[address] [facility] 
[phone] Patient Name: Zurdo Russ YOB: 1935 Age: 82 y.o. [address] RN Note:  Hospice consult noted. Chart reviewed. Plan of care discussed with patients nurse & care manager. In to meet with son, Krsi Perez and wife. Discussed Hospice philosophy, general plan of care, levels of care, services and on call procedures. Family information packet provided & reviewed with family. Reviewed hospice philosophy and services with them at length and what hospice support in the home would look like. They are on board and state they will talk with Kelvin Duenas today but that he has indicated to them that he is ready for hospice He was being followed by [facility] and had Cg's from [facility] 4hr/day. [facility] met with them at home a week ago and he wasn't ready at the time. We will follow here and if he declines and needs GIP could pursue that but if he remains stable to go home the family will decide if they would like [facility] or [facility] to follow Thank you for the opportunity to be of service to this patient. Elizabeth Lopez RN Clinical Nurse Liaison [address] [phone]

## 2021-02-10 NOTE — PROGRESS NOTES
0700: Bedside shift change report given to Temi Whitaker RN (oncoming nurse) by [employer], RN (offgoing nurse). Report included the following information SBAR, Kardex, Intake/Output, MAR and Recent Results. 0700: Patient in bed, resting quietly. Call bell in reach, will monitor. 0800: Dr. Sudhir Marino at bedside assessing patient. Will continue current treatment. Will get repeat CXR and decide plan of care for chest tube (minimal drainage over night). 0915: Dr. Severa Blue at bedside assessing patient. Since patient is unable to take PO will keep amio drip for now. In NSR, rate controlled. 1040: Dr. Sincere Castillo at bedside assessing patient. Patient has decided to choose comfort feedings and hospice consult placed. Cardiology contacted to d/c amio drip and start IV metoprolol to rate control patient until care decisions are made. 1510: Patient being turned and cleaned up and O2 saturation dropped to the low 80s. Oxygen increased to 4L NC and PRN Rubinol given. Also, suction was done with minimal output. 1900: End of Shift Note Bedside shift change report given to jenifer RN (oncoming nurse) by Vince Pavon (offgoing nurse). Report included the following information SBAR, Kardex, Intake/Output, MAR and Recent Results Shift worked:  [id number] Shift summary and any significant changes:  
  Patient on comfort feeds now, rubinol ordered and amio off- IV metoprolol for HR Concerns for physician to address:  Plan for home hospice tomorrow Zone phone for [facility] shift:   XXX Activity: 
Activity Level: Up with Assistance Number times ambulated in hallways past shift: 0 Number of times OOB to chair past shift: 0 Cardiac:  
Cardiac Monitoring: Yes     
Cardiac Rhythm: Normal sinus rhythm Access:  
Current line(s): PIV Genitourinary:  
Urinary status: external catheter Respiratory:  
O2 Device: Nasal cannula Chronic home O2 use?: NO Incentive spirometer at bedside: YES 
  
GI: 
 Last Bowel Movement Date: 02/10/21 Current diet:  DIET DYSPHAGIA PUREED (NDD1) Passing flatus: YES Tolerating current diet: YES 
% Diet Eaten: 100 % Pain Management:  
Patient states pain is manageable on current regimen: YES Skin: 
Justin Score: 13 Interventions: speciality bed, float heels, increase time out of bed, foam dressing and PT/OT consult Patient Safety: 
Fall Score: Total Score: 3 Interventions: bed/chair alarm, gripper socks, pt to call before getting OOB and stay with me (per policy) High Fall Risk: Yes Length of Stay: 
Expected LOS: 3d 14h Actual LOS: 5 Nerissa Skip

## 2021-02-10 NOTE — PROGRESS NOTES
Noted that pt is pending palliative care meeting today for decision on possible comfort care/feedings and hospice. Will defer and await decision. Will continue to follow.

## 2021-02-10 NOTE — PROGRESS NOTES
Comprehensive Nutrition Assessment Type and Reason for Visit: Initial, Wound, NPO/clear liquid Nutrition Recommendations/Plan:  
Comfort feedings Nutrition Assessment:    
Patient medically noted for COVID-19, acute respiratory failure, aspiration pneumonia, and AFIB. PMH DM, HTN, and CABG. Patient NPO ~5 days this morning. SLP and palliative care following. Patient does not want a feeding tube. Agreeable to comfort feedings. Hospice consulted. Pureed diet per SLP; would allow PO per patient preference. Estimated Daily Nutrient Needs: 
Energy (kcal): 2034 kcal (BMR 1565 x 1. 3AF); Weight Used for Energy Requirements: Current Protein (g): 96-112g (1.2-1.4 g/kg bw); Weight Used for Protein Requirements: Current Fluid (ml/day): 2000 mL; Method Used for Fluid Requirements: 1 ml/kcal 
 
Nutrition Related Findings:      
+ edema BM 2/8 Humalog, KCl Wounds:   
Stage II, Diabetic ulcer Current Nutrition Therapies: DIET DYSPHAGIA PUREED (NDD1) Anthropometric Measures: 
· Height:  6' 2\" (188 cm) · Current Body Wt:  79.4 kg (175 lb 0.7 oz) · BMI Category:  Normal weight (BMI 18.5-24. 9) Nutrition Diagnosis: · Swallowing difficulty related to (dysphagia) as evidenced by (comfort feeds, hospice consult) Nutrition Interventions:  
Food and/or Nutrient Delivery: Start oral diet Nutrition Education and Counseling: No recommendations at this time Coordination of Nutrition Care: No recommendation at this time Goals: 
PO intake as desired next 5-7 days Nutrition Monitoring and Evaluation:  
Behavioral-Environmental Outcomes: None identified Food/Nutrient Intake Outcomes: Food and nutrient intake Physical Signs/Symptoms Outcomes: None identified Discharge Planning:   
No discharge needs at this time Electronically signed by Sebas Malone RD on 2/10/2021 at 12:31 PM 
 
Contact: ext [phone]

## 2021-02-10 NOTE — PROGRESS NOTES
Chart reviewed. Noted palliative meeting is pending and possible comfort/hospice. Will defer and await decision. Will continue to follow as appropriate.

## 2021-02-10 NOTE — PROGRESS NOTES
Pulmonary, Critical Care, and Sleep Medicine Patient Consult Name: Toni Lara MRN: [id number] : 1935 Hospital: [facility] Date: 2/10/2021 IMPRESSION:  
· Acute hypoxic resp failure- Probable basilar left > right aspiration PNA with · Left pleural  Effusion- based on tested appears to be transudative pleural effusion. 100cc of output since yesterday. · COVID 19% 21 and again 21 - ? Last one is a false positive as this was an antigen test 
· TAVR ECHO EF 65%  nml TAVR fxn · CAD · PAF/RVR- dilt · Aspiration, dysphagia, NPO for now per speech therapy. · HTN 
· Leukocytosis RECOMMENDATIONS:  
· Zosyn/vanco 
· Continue oxygen to keep pox over 90%. · Monitor chest tube output, if remains low will get the Chest tube removed tomorrow. · Repeat CXR · Will follow along with you. Subjective:  
Pt is feeling a little better. NO chest pain, no back pain, no headache. Feels his breathing is stable. No coughing. NPO due to aspiration. This patient has been seen and evaluated at the request of Dr. hospitalist for sob. Patient is a 82 y.o. male with h/o TAVR admitted for AHRF and leukocytosis. Enlarging left effusion on CXR. HAs gone on to needing bipap. Pt is somnolent but arousable on bipap. No distress. Past Medical History:  
Diagnosis Date  Acute on chronic diastolic (congestive) heart failure ([address] 2020  BPH (benign prostatic hyperplasia)  CAD (coronary artery disease)  Diabetes ([address]  Hearing loss  Hypercholesterolemia  Hypertension  Murmur  Recurrent depression ([address] 2019  Third degree AV block ([address] 10/30/2020 Past Surgical History:  
Procedure Laterality Date  HX CHOLECYSTECTOMY  IR THORACENTESIS/INSERT CHEST TUBE  2021  [place] CARDIAC SURG PROCEDURE UNLIST   by-pass  [place] INS NEW/RPLCMT PRM PM W/TRANSV ELTRD ATRIAL&VENT  10/30/2020  [place] INS NEW/RPLCMT PRM PM W/TRANSV ELTRD ATRIAL&VENT N/A 10/30/2020 INSERT PPM DUAL performed by Krissy eGe MD at [facility]/s  CATH LAB Prior to Admission medications Medication Sig Start Date End Date Taking? Authorizing Provider  
hydrALAZINE (APRESOLINE) 25 mg tablet Take 75 mg by mouth three (3) times daily. Provider, Historical  
atorvastatin (LIPITOR) 40 mg tablet Take 1 Tab by mouth nightly for 60 days. 1/8/21 3/9/21  Patricia Lawson MD  
amLODIPine (NORVASC) 10 mg tablet Take 1 Tab by mouth daily. 1/9/21   Patricia Lawson MD  
furosemide (LASIX) 40 mg tablet One daily Patient taking differently: 20 mg. One daily 1/9/21   Patricia Lawson MD  
metoprolol tartrate (LOPRESSOR) 50 mg tablet Take 1 Tab by mouth two (2) times a day for 60 days. 1/8/21 3/9/21  Patricia Lawson MD  
albuterol (PROVENTIL HFA, VENTOLIN HFA, PROAIR HFA) 90 mcg/actuation inhaler Take 2 Puffs by inhalation every six (6) hours as needed for Wheezing or Shortness of Breath. 1/8/21   Patricia Lawson MD  
potassium chloride SR (K-TAB) 20 mEq tablet Take 1 Tab by mouth daily. 1/8/21   Patricia Lawson MD  
losartan (COZAAR) 50 mg tablet Take 100 mg by mouth daily. Provider, Historical  
aspirin 81 mg chewable tablet Take 81 mg by mouth daily. Provider, Historical  
temazepam (RESTORIL) 15 mg capsule TAKE 1 CAPSULE BY MOUTH AT BEDTIME AS NEEDED FOR SLEEP. 12/23/20   Jessica Crespo MD  
acetaminophen (TYLENOL) 325 mg tablet Take 2 Tabs by mouth every four (4) hours as needed for Pain. 11/4/20   Mauricio La NP  
senna-docusate (PERICOLACE) 8.6-50 mg per tablet Take 1 Tab by mouth daily as needed for Constipation.  11/4/20   Mauricio La NP  
doxazosin (CARDURA) 4 mg tablet TAKE 1 TABLET BY MOUTH  DAILY 10/20/20   Bard Hank MENDOZA NP  
finasteride (PROSCAR) 5 mg tablet TAKE 1 TABLET BY MOUTH  DAILY 10/20/20   Ximena Fitzpatrick NP  
 metFORMIN ER (GLUCOPHAGE XR) 500 mg tablet TAKE 1 TABLET BY MOUTH  TWICE DAILY WITH MEALS 10/20/20   Liberty Dick NP  
glipiZIDE SR (GLUCOTROL XL) 5 mg CR tablet TAKE 1 TABLET BY MOUTH  DAILY 10/20/20   Liberty Dick NP  
sertraline (ZOLOFT) 100 mg tablet TAKE 1 TABLET BY MOUTH  DAILY 20   Teresa Stover NP  
fluticasone propionate (FLONASE) 50 mcg/actuation nasal spray ADMINISTER 1 Spray IN Both Nostrils two (2) times a day. 20   Bruce Dick NP  
cholecalciferol (VITAMIN D3) 1,000 unit cap Take 1 Cap by mouth daily. 3/29/18   Ezra Clonts, DO  
sodium chloride (OCEAN) 0.65 % nasal squeeze bottle 0.05 mL by Both Nostrils route as needed for Congestion. 3/29/18   MirsPriscilla medel Sheppard, DO  
FERROUS FUMARATE/VIT BCOMP,C (SUPER B COMPLEX PO) Take 1 Tab by mouth daily. Provider, Historical  
 
Allergies Allergen Reactions  Procaine Other (comments) Pt stated he passed out from procaine and was told not to let anyone use it on him again Social History Tobacco Use  Smoking status: Former Smoker Types: Cigarettes Quit date: 1990 Years since quittin.4  Smokeless tobacco: Never Used Substance Use Topics  Alcohol use: No  
  
Family History Problem Relation Age of Onset  Cancer Mother  Cancer Father Current Facility-Administered Medications Medication Dose Route Frequency  potassium chloride 10 mEq in 100 ml IVPB  10 mEq IntraVENous Q1H  
 enoxaparin (LOVENOX) injection 30 mg  30 mg SubCUTAneous Q12H  
 balsam peru-castor oiL (VENELEX) ointment   Topical TID  zinc oxide-cod liver oil (DESITIN) 40 % paste   Topical Q8H  
 amiodarone (CORDARONE) 375 mg/250 mL D5W infusion  0.5-1 mg/min IntraVENous TITRATE  budesonide-formoteroL (SYMBICORT) 160-4.5 mcg/actuation HFA inhaler 2 Puff  2 Puff Inhalation BID RT  
 sodium chloride (NS) flush 5-40 mL  5-40 mL IntraVENous Q8H  
  piperacillin-tazobactam (ZOSYN) 3.375 g in 0.9% sodium chloride (MBP/ADV) 100 mL MBP  3.375 g IntraVENous Q8H  
 vancomycin (VANCOCIN) 750 mg in 0.9% sodium chloride 250 mL (VIAL-MATE)  750 mg IntraVENous Q16H  
 aspirin (ASA) suppository 300 mg  300 mg Rectal DAILY  insulin lispro (HUMALOG) injection   SubCUTAneous AC&HS  
 0.9% sodium chloride infusion  50 mL/hr IntraVENous CONTINUOUS Review of Systems: 
Review of systems not obtained due to patient factors. Objective:  
Vital Signs:   
Visit Vitals BP (!) 147/54 (BP 1 Location: Right upper arm, BP Patient Position: At rest) Pulse 88 Temp 97.9 °F (36.6 °C) Resp 24 Ht 6' 2\" (1.88 m) Wt 79.4 kg (175 lb) SpO2 92% BMI 22.47 kg/m² O2 Device: Nasal cannula O2 Flow Rate (L/min): 3 l/min Temp (24hrs), Av.5 °F (36.9 °C), Min:97.9 °F (36.6 °C), Max:98.8 °F (37.1 °C) Intake/Output:  
Last shift:      No intake/output data recorded. Last 3 shifts:  1901 - 02/10 0700 In: 2666.7 [I.V.:2666.7] Out: 2620 [Urine:] Intake/Output Summary (Last 24 hours) at 2/10/2021 1040 Last data filed at 2/10/2021 3311 Gross per 24 hour Intake 1569.34 ml Output 850 ml Net 719.34 ml Physical Exam:  
Gen: alert, no distress. Normal speech. Lungs: CTA bilaterally, decreased BS in bases. Has a left sided chest tube in place. Only had 100 cc of outpt since yesterday. CV: Nl S12 Abd: +BS,. Soft, nt, nd 
Extrem: NO C, C, E Motor intact. Data review:  
 
Recent Results (from the past 24 hour(s)) GLUCOSE, POC Collection Time: 21 11:56 AM  
Result Value Ref Range Glucose (POC) 146 (H) 65 - 100 mg/dL Performed by Rodrigo Mosley RN   
GLUCOSE, POC Collection Time: 21  4:20 PM  
Result Value Ref Range Glucose (POC) 90 65 - 100 mg/dL Performed by Jocelin Lauren GLUCOSE, POC  Collection Time: 21  9:10 PM  
 Result Value Ref Range Glucose (POC) 115 (H) 65 - 100 mg/dL Performed by Ashu Shepherd RN   
METABOLIC PANEL, BASIC Collection Time: 02/10/21  2:49 AM  
Result Value Ref Range Sodium 149 (H) 136 - 145 mmol/L Potassium 2.8 (L) 3.5 - 5.1 mmol/L Chloride 114 (H) 97 - 108 mmol/L  
 CO2 23 21 - 32 mmol/L Anion gap 12 5 - 15 mmol/L Glucose 109 (H) 65 - 100 mg/dL BUN 13 6 - 20 MG/DL Creatinine 0.69 (L) 0.70 - 1.30 MG/DL  
 BUN/Creatinine ratio 19 12 - 20 GFR est AA >60 >60 ml/min/1.73m2 GFR est non-AA >60 >60 ml/min/1.73m2 Calcium 8.6 8.5 - 10.1 MG/DL  
CBC WITH AUTOMATED DIFF Collection Time: 02/10/21  2:49 AM  
Result Value Ref Range WBC 8.8 4.1 - 11.1 K/uL  
 RBC 3.17 (L) 4.10 - 5.70 M/uL HGB 8.5 (L) 12.1 - 17.0 g/dL HCT 27.4 (L) 36.6 - 50.3 % MCV 86.4 80.0 - 99.0 FL  
 MCH 26.8 26.0 - 34.0 PG  
 MCHC 31.0 30.0 - 36.5 g/dL  
 RDW 16.5 (H) 11.5 - 14.5 % PLATELET 868 956 - 825 K/uL MPV 12.4 8.9 - 12.9 FL  
 NRBC 0.0 0  WBC ABSOLUTE NRBC 0.00 0.00 - 0.01 K/uL NEUTROPHILS 80 (H) 32 - 75 % LYMPHOCYTES 12 12 - 49 % MONOCYTES 6 5 - 13 % EOSINOPHILS 1 0 - 7 % BASOPHILS 0 0 - 1 % IMMATURE GRANULOCYTES 1 (H) 0.0 - 0.5 % ABS. NEUTROPHILS 7.1 1.8 - 8.0 K/UL  
 ABS. LYMPHOCYTES 1.0 0.8 - 3.5 K/UL  
 ABS. MONOCYTES 0.5 0.0 - 1.0 K/UL  
 ABS. EOSINOPHILS 0.1 0.0 - 0.4 K/UL  
 ABS. BASOPHILS 0.0 0.0 - 0.1 K/UL  
 ABS. IMM. GRANS. 0.1 (H) 0.00 - 0.04 K/UL  
 DF AUTOMATED MAGNESIUM Collection Time: 02/10/21  2:49 AM  
Result Value Ref Range Magnesium 2.4 1.6 - 2.4 mg/dL GLUCOSE, POC Collection Time: 02/10/21  7:31 AM  
Result Value Ref Range Glucose (POC) 142 (H) 65 - 100 mg/dL Performed by Arnaud Vazquez Imaging: 
I have personally reviewed the patients radiographs and have reviewed the reports: CTA chest no PE Large left effusion and smaller right effusion with LLL ATX/ASD no masses Dallas Unger MD

## 2021-02-10 NOTE — CONSULTS
Palliative Medicine Consult Fer: [phone] ([id number]) Patient Name: Jean-Claude Crawley YOB: 1935 Date of Initial Consult: 2/9/21 Reason for Consult: care decisions Requesting Provider: Nemesio Mott MD 
Primary Care Physician: Sepideh Manzanares DO 
 
 SUMMARY:  
Jean-Claude Crawley is a 82 y.o. with a past history of htn, chronic diastolic failure, CADs/p Cabj, third degree AV Block, s/p PPM insertion, atrial fibrillation, not a candidate for anticoagulation due to fall risk and anemia, k aortic stenosis s/p transcatheter aortic valve replacement, dysphagia recently failed swallowing eval x3 weeks ago, hx of COVID Covid-19 pneumonia Dec 2020, he was admitted on 2/5 for acute hypoxic respiratory failure, due to aspiration pneumonia placed on bIpap, atrial fibrillation with RVR, pleural effusion, Covid rapid test is positive. He had left chest tube placement on 2/8/21, pulmonary following . He has been declining in function in and out of hospital and rehab since Oct 2020, recent prolonged hospitalization, d/c from rehab two weeks ago , confinesd to recliner, patient has declined peg tube and hospice recently had been eating and aspirating, Palliative care consulted to discussed care goals. Advance directives in place. Social : lives with wife who has health issues, they live in [place], he is on home health and has private duty aid support, he has declined functionllay to recliner bound in last few months. His wife [place] is primary MPOA, son Adelina Hernández is secondary MPOA. PALLIATIVE DIAGNOSES:  
1. Debility due to chronic illness 2. Dysphagia/feeding difficulty 3. Recent COVID -19 infection 4. Aspiration pneumonia 5. Respiratory congestion 6. S/p chest tube for left Pleural effusion 7. Goals of care PLAN:  
1. I reviewed chart, prior to visit . 2. Patient is alert, oriented x3, has capacity to make medical decisions 3. Patient is deferring discussion and decisions to his son Ad Arreola. 4. Goals of care : 
· Met in person with Uziel Moet patient son Marily NORWOOD, and patient wife Philomena Banda had spoke to patient yesterday, patient does not want feeding tube, wants to eat /comfort feed and agrees for hospice support. · Orion notes patient has recurrent aspiration with eating and he has been admitted recurrently for aspiration pneumonia, he would like us to let him eat/comfort feeding( he understands he may choke and pass away), hospice is consulted , hospice Rn Lalitha to meet with family after my encounter. · Afterwards I met with patient with his Luís Cueva, patient is alert, oriented x 3, not in any distress, he is very congested . · He agrees for comfort feeding with risk of aspiration/sob and choking and agrees for hospice. · I have coordinated with dr Miriam Fernandez regarding the plan,coordinated with speech therapist Juana Flores who suggested to place on Puree diet with thin liquids. · I have asked bed side Rn to contact cardiology for transition to I/V metoprolol. · I will suggest to continue antibiotics for now . · Resp congestion due to aspiration: will place on Robinul prn. · Hospice consult is in place · We will follow peripherally . 5. Initial consult note routed to primary continuity provider and/or primary health care team members 6. Communicated plan of care with: Palliative [facility] Team 
 
 GOALS OF CARE / TREATMENT PREFERENCES:  
 
GOALS OF CARE: 
Patient/Health Care Proxy Stated Goals: Comfort TREATMENT PREFERENCES:  
Code Status: DNR Advance Care Planning: 
[x] [facility] Interdisciplinary Team has updated the ACP Navigator with [address] and Patient Capacity Primary Decision Maker (Active): Lainey Carroll Spouse - [phone] Secondary Decision Maker: Celio Mata Son - [phone] Advance Care Planning 1/28/2021 Patient's Healthcare Decision Maker is: Named in scanned ACP document Confirm Advance Directive Yes, on file Does the patient have other document types Do Not Resuscitate Medical Interventions: Comfort measures Other Instructions:  
Artificially Administered Nutrition: No feeding tube Other: As far as possible, the palliative care team has discussed with patient / health care proxy about goals of care / treatment preferences for patient. HISTORY:  
 
History obtained from: chart , son and bed side RN 
 
CHIEF COMPLAINT: Weak, wants to eat. HPI/SUBJECTIVE: The patient is:  
[] Verbal and participatory He is frustrated because of hearing impairment and also not able to eat , I am \" NPO\" He denies any pain , sob , nausea or vomiting . 
 
2/10/21: he wants to eat, denies any pain, nausea, vomiting or sob Clinical Pain Assessment (nonverbal scale for severity on nonverbal patients):  
Clinical Pain Assessment Severity: 0 Duration: for how long has pt been experiencing pain (e.g., 2 days, 1 month, years) Frequency: how often pain is an issue (e.g., several times per day, once every few days, constant) FUNCTIONAL ASSESSMENT:  
 
Palliative Performance Scale (PPS): PPS: 30 
 
 
 PSYCHOSOCIAL/SPIRITUAL SCREENING:  
 
Palliative IDT has assessed this patient for cultural preferences / practices and a referral made as appropriate to needs (Cultural Services, Patient Advocacy, Ethics, etc.) Any spiritual / Gnosticist concerns: 
[] Yes /  [x] No 
 
Caregiver Burnout: 
[] Yes /  [x] No /  [] No Caregiver Present Anticipatory grief assessment:  
[x] Normal  / [] Maladaptive ESAS Anxiety: Anxiety: 0 
 
ESAS Depression: Depression: 0 REVIEW OF SYSTEMS:  
 
Positive and pertinent negative findings in ROS are noted above in HPI. The following systems were [x] reviewed / [] unable to be reviewed as noted in HPI Other findings are noted below. Systems: constitutional, ears/nose/mouth/throat, respiratory, gastrointestinal, genitourinary, musculoskeletal, integumentary, neurologic, psychiatric, endocrine. Positive findings noted below. Modified ESAS Completed by: provider Fatigue: 7 Drowsiness: 0 Depression: 0 Pain: 0 Anxiety: 0 Nausea: 0 Anorexia: 1 Dyspnea: 0 Constipation: No  
  Stool Occurrence(s): 1 PHYSICAL EXAM:  
 
From RN flowsheet: 
Wt Readings from Last 3 Encounters:  
02/10/21 175 lb (79.4 kg) 01/08/21 179 lb 4.8 oz (81.3 kg) 01/04/21 176 lb 15.8 oz (80.3 kg) Blood pressure (!) 159/57, pulse 87, temperature 97.9 °F (36.6 °C), resp. rate 28, height 6' 2\" (1.88 m), weight 175 lb (79.4 kg), SpO2 93 %. Pain Scale 1: Numeric (0 - 10) Pain Intensity 1: 0 Pain Onset 1: acute Pain Location 1: Back Pain Orientation 1: Left Pain Description 1: Aching Pain Intervention(s) 1: Medication (see MAR) Last bowel movement, if known: He is alert, oriented x3, not on Supplemental oxygen Lungs are congested Heart regular rhythm not tachycardic. HISTORY:  
 
Active Problems: 
  Acute respiratory failure ([address] (2/5/2021) Aspiration pneumonia ([address] (2/5/2021) Atrial fibrillation with RVR ([address] (2/5/2021) Past Medical History:  
Diagnosis Date  Acute on chronic diastolic (congestive) heart failure ([address] 12/27/2020  BPH (benign prostatic hyperplasia)  CAD (coronary artery disease)  Diabetes ([address]  Hearing loss  Hypercholesterolemia  Hypertension  Murmur  Recurrent depression ([address] 8/22/2019  Third degree AV block ([address] 10/30/2020 Past Surgical History:  
Procedure Laterality Date  HX CHOLECYSTECTOMY  IR THORACENTESIS/INSERT CHEST TUBE  2/8/2021  [place] CARDIAC SURG PROCEDURE UNLIST  2006 by-pass  [place] INS NEW/RPLCMT PRM PM W/TRANSV ELTRD ATRIAL&VENT  10/30/2020  [place] INS NEW/RPLCMT PRM PM W/TRANSV ELTRD ATRIAL&VENT N/A 10/30/2020 INSERT PPM DUAL [facility] Family History Problem Relation Age of Onset  Cancer Mother  Cancer Father History reviewed, no pertinent family history. Social History Tobacco Use  Smoking status: Former Smoker Types: Cigarettes Quit date: 1990 Years since quittin.4  Smokeless tobacco: Never Used Substance Use Topics  Alcohol use: No  
 
Allergies Allergen Reactions  Procaine Other (comments) Pt stated he passed out from procaine and was told not to let anyone use it on him again Current Facility-Administered Medications Medication Dose Route Frequency  potassium chloride 10 mEq in 100 ml IVPB  10 mEq IntraVENous Q1H  
 glycopyrrolate (ROBINUL) injection 0.2 mg  0.2 mg IntraVENous Q6H PRN  
 metoprolol (LOPRESSOR) injection 5 mg  5 mg IntraVENous Q6H  
 enoxaparin (LOVENOX) injection 30 mg  30 mg SubCUTAneous Q12H  
 balsam peru-castor oiL (VENELEX) ointment   Topical TID  zinc oxide-cod liver oil (DESITIN) 40 % paste   Topical Q8H  
 ipratropium-albuterol (COMBIVENT RESPIMAT) 20 mcg-100 mcg inhalation spray  1 Puff Inhalation Q6H PRN  
 HYDROmorphone (PF) (DILAUDID) injection 0.5 mg  0.5 mg IntraVENous Q6H PRN  
 budesonide-formoteroL (SYMBICORT) 160-4.5 mcg/actuation HFA inhaler 2 Puff  2 Puff Inhalation BID RT  
 sodium chloride (NS) flush 5-40 mL  5-40 mL IntraVENous Q8H  
 sodium chloride (NS) flush 5-40 mL  5-40 mL IntraVENous PRN  
 acetaminophen (TYLENOL) tablet 650 mg  650 mg Oral Q6H PRN Or  
 acetaminophen (TYLENOL) suppository 650 mg  650 mg Rectal Q6H PRN  polyethylene glycol (MIRALAX) packet 17 g  17 g Oral DAILY PRN  promethazine (PHENERGAN) tablet 12.5 mg  12.5 mg Oral Q6H PRN Or  
 ondansetron (ZOFRAN) injection 4 mg  4 mg IntraVENous Q6H PRN  piperacillin-tazobactam (ZOSYN) 3.375 g in 0.9% sodium chloride (MBP/ADV) 100 mL MBP  3.375 g IntraVENous Q8H  
  vancomycin (VANCOCIN) 750 mg in 0.9% sodium chloride 250 mL (VIAL-MATE)  750 mg IntraVENous Q16H  
 aspirin (ASA) suppository 300 mg  300 mg Rectal DAILY  insulin lispro (HUMALOG) injection   SubCUTAneous AC&HS  
 glucose chewable tablet 16 g  4 Tab Oral PRN  
 dextrose (D50W) injection syrg 12.5-25 g  12.5-25 g IntraVENous PRN  
 glucagon (GLUCAGEN) injection 1 mg  1 mg IntraMUSCular PRN  
 0.9% sodium chloride infusion  50 mL/hr IntraVENous CONTINUOUS  
 
 
 
 LAB AND IMAGING FINDINGS:  
 
Lab Results Component Value Date/Time WBC 8.8 02/10/2021 02:49 AM  
 HGB 8.5 (L) 02/10/2021 02:49 AM  
 PLATELET 067 89/22/5253 02:49 AM  
 
Lab Results Component Value Date/Time Sodium 149 (H) 02/10/2021 02:49 AM  
 Potassium 2.8 (L) 02/10/2021 02:49 AM  
 Chloride 114 (H) 02/10/2021 02:49 AM  
 CO2 23 02/10/2021 02:49 AM  
 BUN 13 02/10/2021 02:49 AM  
 Creatinine 0.69 (L) 02/10/2021 02:49 AM  
 Calcium 8.6 02/10/2021 02:49 AM  
 Magnesium 2.4 02/10/2021 02:49 AM  
 Phosphorus 3.3 12/28/2020 04:59 AM  
  
Lab Results Component Value Date/Time Alk. phosphatase 74 02/06/2021 03:19 AM  
 Protein, total 5.7 (L) 02/06/2021 03:19 AM  
 Albumin 2.0 (L) 02/06/2021 03:19 AM  
 Globulin 3.7 02/06/2021 03:19 AM  
 
Lab Results Component Value Date/Time INR 1.2 (H) 11/03/2020 04:59 AM  
 Prothrombin time 12.1 (H) 11/03/2020 04:59 AM  
 aPTT 44.1 (H) 01/07/2021 03:32 AM  
  
Lab Results Component Value Date/Time Iron 29 (L) 12/28/2020 04:59 AM  
 TIBC 286 09/12/2017 11:38 AM  
 Iron % saturation 45 09/12/2017 11:38 AM  
 Ferritin 153 12/29/2020 12:40 AM  
  
Lab Results Component Value Date/Time pH 7.34 (L) 10/28/2020 12:30 PM  
 PCO2 43 10/28/2020 12:30 PM  
 PO2 281 (H) 10/28/2020 12:30 PM  
 
No components found for: [place] Lab Results Component Value Date/Time  10/19/2020 03:41 AM  
  
 
 
   
 
Total time: 70 mins Counseling / coordination time, spent as noted above: > 50% counseling / coordination?: yes Prolonged service was provided for  []30 min   []75 min in face to face time in the presence of the patient, spent as noted above. Time Start: 9:50 am  
Time End: 11 am 
Note: this can only be billed with  (initial) or 21 [phone] (follow up). If multiple start / stop times, list each separately.

## 2021-02-10 NOTE — PROGRESS NOTES
Hospitalist Progress Note NAME: Yusuf Guzman :  1935 MRN:  [id number] Assessment / Plan: 
Acute hypoxic respiratory failure POA Bilateral  L>R aspiration pneumonia POA Left pleural effusion, transudative POA 
H/O Covid-19 Dec 2020 POA 
- H/O COVID-19 PNA finished treatment with remdesivir/steroids 2021. Presented with afib with RVR, Pleural effusion and Hypoxia requiring BIPAP. - S/p left chest tube placement  
- O2 requirement has improved, currently RA 
- Continue vancomycin and Zosyn, follow cultures - Pulmonary on board - Monitor chest tube output, hopefully remove in AM 
- Currently on droplet precaution - Speech eval pending, likely aspiration 
- family deciding upon hospice, eating for comfort 
- transfer to medical floor 
- start setting up home hospice, [facility] set up Hypokalemia K 2.8 Hypernatremia Na 149 - S/p KCL runs - Po KCL 
- IVF with KCL overnight PAF, episodes or RVR 
-Likely 2/2 pneumonia 
-Continue amnio gtt. required another bolus yesterday 
-Appreciate cardiology consult Aortic stenosis -S/p TAVR , ECHO EF 65%  nml TAVR fxn 
 
sacral wounds 
-Wound care managing 
  
  
Code Status: DNR (Pt. Has paper at bedside/also confirmed with son orion) Surrogate Decision Maker: 
Beba Ramirez [phone]      
Orion carty [phone]      
  
  
 
Patient recently has been confined mostly to recliner. Prolonged recent hospitalizations. Very limited functional status. Palliative care consulted. Discussed care goals with sonPio Comment. Understands that his medical conditions can be treated but likelihood of restoration of his functional status is quite low. Transitioning to comfort care. DVT Prophylaxis: SQ LOVENOX 
GI Prophylaxis: not indicated 
  
 
Prognosis: poor Subjective: Chief Complaint / Reason for Physician Visit \"My breathing is okay\" No SOB, currently on room air CT in place Family decided upon hospice, comfort feedings Review of Systems: 
Symptom Y/N Comments  Symptom Y/N Comments Fever/Chills n   Chest Pain n   
Poor Appetite n   Edema Cough y   Abdominal Pain n   
Sputum n   Joint Pain SOB/LARA y   Pruritis/Rash Nausea/vomit n   Tolerating PT/OT Diarrhea n   Tolerating Diet Constipation n   Other Could NOT obtain due to:   
 
Objective: VITALS:  
Last 24hrs VS reviewed since prior progress note. Most recent are: 
Patient Vitals for the past 24 hrs: 
 Temp Pulse Resp BP SpO2  
02/10/21 1100 97.9 °F (36.6 °C) 87 28 (!) 159/57 93 % 02/10/21 0751     92 % 02/10/21 0728 97.9 °F (36.6 °C) 88 24 (!) 147/54 96 % 02/10/21 0241 98.6 °F (37 °C) 92 18 (!) 153/55 92 % 02/09/21 2237 98.8 °F (37.1 °C) (!) 101 20 (!) 131/48 92 % 02/09/21 2027     95 % 02/09/21 1939 98.4 °F (36.9 °C) 100 24 (!) 161/70 92 % 02/09/21 1500 98.7 °F (37.1 °C) (!) 103 20 (!) 141/67 94 % Intake/Output Summary (Last 24 hours) at 2/10/2021 1358 Last data filed at 2/10/2021 1130 Gross per 24 hour Intake 1086.34 ml Output 600 ml Net 486.34 ml I had a face to face encounter and independently examined this patient on 2/10/2021, as outlined below: PHYSICAL EXAM: 
General: WD, WN. Alert, cooperative, no acute distress EENT:  EOMI. Anicteric sclerae. MMM Resp:  Coarse BS. No accessory muscle use CV:  Regular  rhythm,  No edema GI:  Soft, Non distended, Non tender. +Bowel sounds Neurologic:  Alert, oriented to person, normal speech, Psych:   Not anxious nor agitated Skin:  No rashes. No jaundice Reviewed most current lab test results and cultures  YES Reviewed most current radiology test results   YES Review and summation of old records today    NO Reviewed patient's current orders and MAR    YES 
PMH/SH reviewed - no change compared to H&P 
________________________________________________________________________ Care Plan discussed with: 
  Comments Patient x Family RN x Care Manager Consultant  x Dr Terry Hay Multidiciplinary team rounds were held today with , nursing, pharmacist and clinical coordinator. Patient's plan of care was discussed; medications were reviewed and discharge planning was addressed. ________________________________________________________________________ Comments >50% of visit spent in counseling and coordination of care x   
________________________________________________________________________ Suzanne Hsu MD  
 
Procedures: see electronic medical records for all procedures/Xrays and details which were not copied into this note but were reviewed prior to creation of Plan. LABS: 
I reviewed today's most current labs and imaging studies. Pertinent labs include: 
Recent Labs  
  02/10/21 
0249 WBC 8.8 HGB 8.5* HCT 27.4*  
 Recent Labs  
  02/10/21 
0249 *  
K 2.8*  
* CO2 23 * BUN 13  
CREA 0.69* CA 8.6 MG 2.4 Signed: Suzanne Hsu MD

## 2021-02-10 NOTE — PROGRESS NOTES
[address], [address] [place] [address], [address] [facility]  [phone] Cardiology Progress Note 2/10/2021 935 AM 
 
Admit Date: 2/5/2021 Admit Diagnosis:  
Acute respiratory failure ([address] [J96.00] Aspiration pneumonia ([address] [J69.0] Atrial fibrillation with RVR ([address] [I48.91] Interval History/Subjective:  
 
Toni Lara is a 82 y.o. male admitted with Acute respiratory failure ([address] [J96.00] Aspiration pneumonia ([address] [J69.0] Atrial fibrillation with RVR ([facility]) [I48.91] 
 
-rate improved 
-BP slightly elevated 
-decrease in H/H; K 2.8 
-Mr. Katherina Kocher is viewed through window and spoken to on the phone. He denies SOB or any pain. Non productive cough. No palpitations. He is feeling good. Visit Vitals BP (!) 159/57 (BP 1 Location: Right upper arm, BP Patient Position: At rest) Pulse 87 Temp 97.9 °F (36.6 °C) Resp 28 Ht 6' 2\" (1.88 m) Wt 79.4 kg (175 lb) SpO2 93% BMI 22.47 kg/m² Current Facility-Administered Medications Medication Dose Route Frequency  enoxaparin (LOVENOX) injection 30 mg  30 mg SubCUTAneous Q12H  
 balsam peru-castor oiL (VENELEX) ointment   Topical TID  zinc oxide-cod liver oil (DESITIN) 40 % paste   Topical Q8H  
 ipratropium-albuterol (COMBIVENT RESPIMAT) 20 mcg-100 mcg inhalation spray  1 Puff Inhalation Q6H PRN  
 HYDROmorphone (PF) (DILAUDID) injection 0.5 mg  0.5 mg IntraVENous Q6H PRN  
 amiodarone (CORDARONE) 375 mg/250 mL D5W infusion  0.5-1 mg/min IntraVENous TITRATE  budesonide-formoteroL (SYMBICORT) 160-4.5 mcg/actuation HFA inhaler 2 Puff  2 Puff Inhalation BID RT  
 sodium chloride (NS) flush 5-40 mL  5-40 mL IntraVENous Q8H  
 sodium chloride (NS) flush 5-40 mL  5-40 mL IntraVENous PRN  
 acetaminophen (TYLENOL) tablet 650 mg  650 mg Oral Q6H PRN Or  
 acetaminophen (TYLENOL) suppository 650 mg  650 mg Rectal Q6H PRN  polyethylene glycol (MIRALAX) packet 17 g  17 g Oral DAILY PRN  
  promethazine (PHENERGAN) tablet 12.5 mg  12.5 mg Oral Q6H PRN Or  
 ondansetron (ZOFRAN) injection 4 mg  4 mg IntraVENous Q6H PRN  piperacillin-tazobactam (ZOSYN) 3.375 g in 0.9% sodium chloride (MBP/ADV) 100 mL MBP  3.375 g IntraVENous Q8H  
 vancomycin (VANCOCIN) 750 mg in 0.9% sodium chloride 250 mL (VIAL-MATE)  750 mg IntraVENous Q16H  
 aspirin (ASA) suppository 300 mg  300 mg Rectal DAILY  insulin lispro (HUMALOG) injection   SubCUTAneous AC&HS  
 glucose chewable tablet 16 g  4 Tab Oral PRN  
 dextrose (D50W) injection syrg 12.5-25 g  12.5-25 g IntraVENous PRN  
 glucagon (GLUCAGEN) injection 1 mg  1 mg IntraMUSCular PRN  
 0.9% sodium chloride infusion  50 mL/hr IntraVENous CONTINUOUS Objective:  
  
Physical Exam: 
General Appearance:  awake, elderly  male resting in bed in NAD Chest:   unlabored at rest.  Speaks full sentences. Wet sounding cough. Cardiovascular: Abdomen:    
Extremities:  
Skin:  pale **bedside exam deferred due to Covid isolation to reduce exposure and conserve PPE** Data Review:  
Recent Labs  
  02/10/21 
0249 WBC 8.8 HGB 8.5* HCT 27.4*  
 Recent Labs  
  02/10/21 
0249 *  
K 2.8*  
* CO2 23 * BUN 13  
CREA 0.69* CA 8.6 MG 2.4 No results for input(s): TROIQ, CPK, CKMB in the last 72 hours. Intake/Output Summary (Last 24 hours) at 2/10/2021 1114 Last data filed at 2/10/2021 8563 Gross per 24 hour Intake 1036.34 ml Output 600 ml Net 436.34 ml Telemetry: SR 1st degree AVB; PAF 
EKG: a fib with RVR 
CTA chest: \"There is no pulmonary embolism. There is no aortic aneurysm or dissection. Mild bilateral pleural effusions are not significantly changed. Left greater 
than right lung atelectasis not significantly changed. Scattered groundglass and parenchymal opacities in the right and left lung consistent with multifocal infectious/inflammatory process. \" 
 
Assessment: Active Problems: 
  Acute respiratory failure ([address] (2/5/2021) Aspiration pneumonia ([address] (2/5/2021) Atrial fibrillation with RVR ([address] (2/5/2021) Plan:  
 
PAF: currently SR with bursts of a fib. In setting of covid PNA and possible aspiration · Continue with IV amio until able to take PO.   
· Not on full AC due to fall risk and anemia. On ASA · Continue to treat underlying processes. AS s/p TAVR:  Valve stable on recent ECHO Addendum 1115:  RN messaged to say patient/family/palliative will be starting comfort feedings and starting towards hospice. Palliative requesting to switch IV amio to IV BB. Will make changes. Liza Lerma NP 
DNP, RN, AGACNP-BC Pursuant to the emergency declaration under the [facility], [facility] waiver authority and the [employer] Act, this Virtual Visit was conducted, with patient's consent, to reduce the patient's risk of exposure to COVID-19 and provide continuity of care for an established patient. Services were provided through a video synchronous discussion virtually to substitute for in-person visit. [facility] 2/10/2021 Patient seen, examined by me personally. Plan discussed as detailed. Agree with note as outlined by  NP. I confirm findings in history and physical exam. No additional findings noted. Agree with plan as outlined above. Clinically looks better.  
 
Kait Ryan MD

## 2021-02-10 NOTE — PROGRESS NOTES
Problem: Falls - Risk of 
Goal: *Absence of Falls Description: Document Porfirio Brewer Fall Risk and appropriate interventions in the flowsheet. Outcome: Progressing Towards Goal 
Note: Fall Risk Interventions: 
  
 
Mentation Interventions: Adequate sleep, hydration, pain control, Bed/chair exit alarm, Door open when patient unattended, More frequent rounding, Reorient patient, Increase mobility Medication Interventions: Bed/chair exit alarm, Patient to call before getting OOB, Teach patient to arise slowly Elimination Interventions: Call light in reach, Stay With Me (per policy), Patient to call for help with toileting needs, Toileting schedule/hourly rounds, Urinal in reach History of Falls Interventions: Bed/chair exit alarm, Consult care management for discharge planning, Door open when patient unattended

## 2021-02-10 NOTE — PROGRESS NOTES
Received message from patient's nurse Octavio Hawkins stating : 
 
Patient's potassium 2.8 with morning labs. Unable to take po meds at this time due to failed speech consult. Discussion / orders: 
 
Ordered potassium chloride 10 mEq IV x5 Please note that this note was dictated using Dragon computer voice recognition software. Quite often unanticipated grammatical, syntax, homophones, and other interpretive errors are inadvertently transcribed by the computer software. Please disregard these errors. Please excuse any errors that have escaped final proofreading.

## 2021-02-10 NOTE — PROGRESS NOTES
RAPID RESPONSE TEAM - follow up Rounded on patient due to recent RRT. Discussed with primary RN. No acute concerns, VSS, MEWS 1. Visit Vitals BP (!) 141/67 (BP 1 Location: Right upper arm, BP Patient Position: At rest) Pulse (!) 103 Temp 98.7 °F (37.1 °C) Resp 20 Ht 6' 2\" (1.88 m) Wt 77.2 kg (170 lb 1.6 oz) SpO2 94% BMI 21.84 kg/m² No RRT interventions indicated at this time. Please call with any questions or concerns. Diane Browne Rapid Response RN Chey Barry

## 2021-02-10 NOTE — PALLIATIVE CARE
Brief summary of visit, full note will be placed. Patient is deferring discussion and decisions to his son Shawn Linn. Met with patient son Pavel Strong and patient wife Annette Llamas had spoke to patient yesterday, patient does not want feeding tube, wants to eat /comfort feed and agrees for hospice support. Pavel Strong notes patient has recurrent aspiration with eating and he has been admitted recurrently for aspiration pneumonia, he would like us to let him eat/comfort feeding( he understands he may choke and pass away), hospice is consulted , hospice Rn Lalitha to meet with family after my encounter. I met with patient with his [place] Phalen, patient is alert , oriented x 3, not in any distress, he is very congested . He agrees for comfort feeding with risk of aspiration/sob and choking and agrees for hospice. I have coordinated with dr Мария Bonilla regarding the plan , coordinated with speech therapist David Pino who suggested to place on Puree diet with thin liquids. I have asked bed side Rn to contact cardiology for transition to I/V metoprolol. I will suggest to continue antibiotics for now . I will add prn Robinul for congestion, he is on prn dilaudid.

## 2021-02-10 NOTE — PROGRESS NOTES
Speech path Patient and family is meeting with Palliative this morning to discuss patient's future. Hospice is being considered. Will hold for now. We are continuing to recommend NPO Patient is going to hospice service and will be put on a pureed diet with thins and accept risk of aspiration.  Hannah Lee, SLP

## 2021-02-10 NOTE — PROGRESS NOTES
End of Shift Note Bedside shift change report given to Nancy Sotelo RN (oncoming nurse) by Zarina Rowan RN (offgoing nurse). Report included the following information SBAR and Kardex Shift worked:  [id number] Shift summary and any significant changes:  
  Minimal output from patient's chest tube overnight. Continues to be in Afib rate controlled. Patient potassium 2.8 with morning labs. Message sent to NP and orders received. Concerns for physician to address:  none.   
  
Zone phone for oncoming shift:    
  
 
 
 
Zarina Rowan RN

## 2021-02-10 NOTE — INTERDISCIPLINARY ROUNDS
Interdisciplinary team rounds were held 2/10/2021 with the following team members:Care Management, Nursing, Nutrition, Pharmacy, Physician and Clinical Coordinator. Plan of care discussed. See clinical pathway and/or care plan for interventions and desired outcomes. Patient weaned to 1L NC to maintain SpO2 > 90%. Replete K+ of 2.8 this AM.

## 2021-02-11 NOTE — PROGRESS NOTES
0730 .Bedside and Verbal shift change report given to iLnda Johnstno (oncoming nurse) by Betsy Jovel  (offgoing nurse). Report included the following information SBAR, Kardex and MAR. Per Case management and Dr. Abby Orozco is to discharge 2/12/21 with home hospice. Antibodies discontinued by MD. Chest tube removed by IR. .End of Shift Note Bedside shift change report given to Adam Seaman  (oncoming nurse) by Duong Varma (offgoing nurse). Report included the following information SBAR, Kardex and [facility] Shift worked:  0700 - 1900 Shift summary and any significant changes: SEE ABOVE Concerns for physician to address:  . .. Zone phone for oncoming shift:   [phone] Activity: 
Activity Level: Bed Rest 
Number times ambulated in hallways past shift: 0 Number of times OOB to chair past shift: 0 Cardiac:  
Cardiac Monitoring: Yes     
Cardiac Rhythm: Normal sinus rhythm Access:  
Current line(s): PIV Genitourinary:  
Urinary status: incontinent Respiratory:  
O2 Device: Nasal cannula Chronic home O2 use?: NO Incentive spirometer at bedside: NO 
  
GI: 
Last Bowel Movement Date: 02/11/21 Current diet:  DIET DYSPHAGIA PUREED (NDD1) Passing flatus: YES Tolerating current diet: NO 
% Diet Eaten: 100 % Pain Management:  
Patient states pain is manageable on current regimen: YES Skin: 
Justin Score: 14 Interventions: speciality bed Patient Safety: 
Fall Score: Total Score: 3 Interventions: bed alarm High Fall Risk: Yes Length of Stay: 
Expected LOS: 3d 14h Actual LOS: 6 Duong Varma

## 2021-02-11 NOTE — PROGRESS NOTES
Pt will be transitioning to comfort feeds and hospice services. Will sign off at this time. Thank you for the consult.

## 2021-02-11 NOTE — PROGRESS NOTES
Pulmonary, Critical Care, and Sleep Medicine Patient Consult Name: Aleta Díaz MRN: [id number] : 1935 Hospital: [facility] Date: 2021 IMPRESSION:  
· Acute hypoxic resp failure- Probable basilar left > right aspiration PNA with · Left pleural  Effusion- based on tested appears to be transudative pleural effusion. Not much output since yesterday. · COVID 19% 21 and again 21 - ? Last one is a false positive as this was an antigen test 
· TAVR ECHO EF 65%  nml TAVR fxn · CAD · PAF/RVR- dilt · Aspiration, dysphagia, NPO for now per speech therapy. · HTN 
· Leukocytosis RECOMMENDATIONS:  
·  
· Suspected recurrent aspiration but being allowed to eat for comfortable. · Zosyn/vanco 
· Continue oxygen to keep pox over 90%. · Monitor chest tube output, will get the Chest tube. Placed consult to IR for chest tube removal.  
· Repeat CXR in am.   
 
Subjective:  
 
2-11:  
PT feels about the same today. Still has some chest congestion. Desires ice chips. NO chest pain, no back pain. Pt is feeling a little better. NO chest pain, no back pain, no headache. Feels his breathing is stable. No coughing. NPO due to aspiration. This patient has been seen and evaluated at the request of Dr. hospitalist for sob. Patient is a 82 y.o. male with h/o TAVR admitted for AHRF and leukocytosis. Enlarging left effusion on CXR. HAs gone on to needing bipap. Pt is somnolent but arousable on bipap. No distress. Past Medical History:  
Diagnosis Date  Acute on chronic diastolic (congestive) heart failure ([address] 2020  BPH (benign prostatic hyperplasia)  CAD (coronary artery disease)  Diabetes ([address]  Hearing loss  Hypercholesterolemia  Hypertension  Murmur  Recurrent depression ([address] 2019  Third degree AV block ([address] 10/30/2020 Past Surgical History:  
Procedure Laterality Date  HX CHOLECYSTECTOMY  IR THORACENTESIS/INSERT CHEST TUBE  2/8/2021  [place] CARDIAC SURG PROCEDURE UNLIST  2006 by-pass  AZ INS NEW/RPLCMT PRM PM W/TRANSV ELTRD ATRIAL&VENT  10/30/2020  [place] INS NEW/RPLCMT PRM PM W/TRANSV ELTRD ATRIAL&VENT N/A 10/30/2020 INSERT PPM DUAL performed [place] [address], [facility] CATH LAB Prior to Admission medications Medication Sig Start Date End Date Taking? Authorizing Provider  
hydrALAZINE (APRESOLINE) 25 mg tablet Take 75 mg by mouth three (3) times daily. Provider, Historical  
atorvastatin (LIPITOR) 40 mg tablet Take 1 Tab by mouth nightly for 60 days. 1/8/21 3/9/21  Chika Rawls MD  
amLODIPine (NORVASC) 10 mg tablet Take 1 Tab by mouth daily. 1/9/21   Chika Rawls MD  
furosemide (LASIX) 40 mg tablet One daily Patient taking differently: 20 mg. One daily 1/9/21   Chika Rawls MD  
metoprolol tartrate (LOPRESSOR) 50 mg tablet Take 1 Tab by mouth two (2) times a day for 60 days. 1/8/21 3/9/21  Chika Rawls MD  
albuterol (PROVENTIL HFA, VENTOLIN HFA, PROAIR HFA) 90 mcg/actuation inhaler Take 2 Puffs by inhalation every six (6) hours as needed for Wheezing or Shortness of Breath. 1/8/21   Chika Rawls MD  
potassium chloride SR (K-TAB) 20 mEq tablet Take 1 Tab by mouth daily. 1/8/21   Chika Rawls MD  
losartan (COZAAR) 50 mg tablet Take 100 mg by mouth daily. Provider, Historical  
aspirin 81 mg chewable tablet Take 81 mg by mouth daily. Provider, Historical  
temazepam (RESTORIL) 15 mg capsule TAKE 1 CAPSULE BY MOUTH AT BEDTIME AS NEEDED FOR SLEEP. 12/23/20   Elias Santoro MD  
acetaminophen (TYLENOL) 325 mg tablet Take 2 Tabs by mouth every four (4) hours as needed for Pain. 11/4/20   Jamil Sheikh NP  
senna-docusate (PERICOLACE) 8.6-50 mg per tablet Take 1 Tab by mouth daily as needed for Constipation.  11/4/20   Jamil Sheikh, HUMBERTO  
 doxazosin (CARDURA) 4 mg tablet TAKE 1 TABLET BY MOUTH  DAILY 10/20/20   Liat Dick NP  
finasteride (PROSCAR) 5 mg tablet TAKE 1 TABLET BY MOUTH  DAILY 10/20/20   Glenn Burton, NP  
metFORMIN ER (GLUCOPHAGE XR) 500 mg tablet TAKE 1 TABLET BY MOUTH  TWICE DAILY WITH MEALS 10/20/20   Joanna Dick, NP  
glipiZIDE SR (GLUCOTROL XL) 5 mg CR tablet TAKE 1 TABLET BY MOUTH  DAILY 10/20/20   Liat Dick NP  
sertraline (ZOLOFT) 100 mg tablet TAKE 1 TABLET BY MOUTH  DAILY 20   Tyrone Betancur NP  
fluticasone propionate (FLONASE) 50 mcg/actuation nasal spray ADMINISTER 1 Spray IN Both Nostrils two (2) times a day. 20   Lauren Dick, HUMBERTO  
cholecalciferol (VITAMIN D3) 1,000 unit cap Take 1 Cap by mouth daily. 3/29/18   Fede Hazard, DO  
sodium chloride (OCEAN) 0.65 % nasal squeeze bottle 0.05 mL by Both Nostrils route as needed for Congestion. 3/29/18   Hailee Hunt Cipro, DO  
FERROUS FUMARATE/VIT BCOMP,C (SUPER B COMPLEX PO) Take 1 Tab by mouth daily. Provider, Historical  
 
Allergies Allergen Reactions  Procaine Other (comments) Pt stated he passed out from procaine and was told not to let anyone use it on him again Social History Tobacco Use  Smoking status: Former Smoker Types: Cigarettes Quit date: 1990 Years since quittin.4  Smokeless tobacco: Never Used Substance Use Topics  Alcohol use: No  
  
Family History Problem Relation Age of Onset  Cancer Mother  Cancer Father Current Facility-Administered Medications Medication Dose Route Frequency  metoprolol (LOPRESSOR) injection 5 mg  5 mg IntraVENous Q6H  
 dextrose 5% - 0.45% NaCl with KCl 40 mEq/L infusion   IntraVENous CONTINUOUS  
 potassium bicarb-citric acid (EFFER-K) tablet 20 mEq  20 mEq Oral BID  enoxaparin (LOVENOX) injection 30 mg  30 mg SubCUTAneous Q12H  
 balsam peru-castor oiL (VENELEX) ointment   Topical TID  zinc oxide-cod liver oil (DESITIN) 40 % paste   Topical Q8H  
 budesonide-formoteroL (SYMBICORT) 160-4.5 mcg/actuation HFA inhaler 2 Puff  2 Puff Inhalation BID RT  
 sodium chloride (NS) flush 5-40 mL  5-40 mL IntraVENous Q8H  piperacillin-tazobactam (ZOSYN) 3.375 g in 0.9% sodium chloride (MBP/ADV) 100 mL MBP  3.375 g IntraVENous Q8H  
 vancomycin (VANCOCIN) 750 mg in 0.9% sodium chloride 250 mL (VIAL-MATE)  750 mg IntraVENous Q16H  
 aspirin (ASA) suppository 300 mg  300 mg Rectal DAILY  insulin lispro (HUMALOG) injection   SubCUTAneous AC&HS Review of Systems: 
Review of systems not obtained due to patient factors. Objective:  
Vital Signs:   
Visit Vitals BP (!) 149/62 (BP 1 Location: Left upper arm, BP Patient Position: At rest) Pulse 79 Temp 98.7 °F (37.1 °C) Resp 22 Ht 6' 2\" (1.88 m) Wt 82.2 kg (181 lb 3.2 oz) SpO2 91% BMI 23.26 kg/m² O2 Device: Nasal cannula O2 Flow Rate (L/min): 4 l/min Temp (24hrs), Av.2 °F (36.8 °C), Min:97.8 °F (36.6 °C), Max:98.7 °F (37.1 °C) Intake/Output:  
Last shift:      No intake/output data recorded. Last 3 shifts:  1901 -  0700 In: 2743.4 [P.O.:290; I.V.:2453.4] Out: 1295 [Urine:1000] Intake/Output Summary (Last 24 hours) at 2021 1003 Last data filed at 2021 8636 Gross per 24 hour Intake 1985.92 ml Output 870 ml Net 1115.92 ml Physical Exam:  
Gen: alert, no distress. Normal speech. Lying in bed. ON NC oxygen. Lungs:Has bilateral chest congestion, rhonchi. decreased BS in bases. Has a left sided chest tube in place. Minimal chest tube output. CV: Nl S12 Abd: +BS,. Soft, nt, nd 
Extrem: NO C, C, E Motor intact. Data review:  
 
Recent Results (from the past 24 hour(s)) GLUCOSE, POC Collection Time: 02/10/21 11:19 AM  
Result Value Ref Range Glucose (POC) 95 65 - 100 mg/dL Performed by Kevin Peña RN   
GLUCOSE, POC Collection Time: 02/10/21  4:02 PM  
Result Value Ref Range Glucose (POC) 241 (H) 65 - 100 mg/dL Performed by Jocelin Lauren GLUCOSE, POC Collection Time: 02/10/21  9:17 PM  
Result Value Ref Range Glucose (POC) 203 (H) 65 - 100 mg/dL Performed by [facility] METABOLIC PANEL, BASIC Collection Time: 02/11/21  7:59 AM  
Result Value Ref Range Sodium 147 (H) 136 - 145 mmol/L Potassium 3.8 3.5 - 5.1 mmol/L Chloride 117 (H) 97 - 108 mmol/L  
 CO2 28 21 - 32 mmol/L Anion gap 2 (L) 5 - 15 mmol/L Glucose 233 (H) 65 - 100 mg/dL BUN 11 6 - 20 MG/DL Creatinine 0.70 0.70 - 1.30 MG/DL  
 BUN/Creatinine ratio 16 12 - 20 GFR est AA >60 >60 ml/min/1.73m2 GFR est non-AA >60 >60 ml/min/1.73m2 Calcium 8.3 (L) 8.5 - 10.1 MG/DL  
GLUCOSE, POC Collection Time: 02/11/21  8:08 AM  
Result Value Ref Range Glucose (POC) 272 (H) 65 - 100 mg/dL Performed by Jocelin Lauren Imaging: 
I have personally reviewed the patients radiographs and have reviewed the reports: CTA chest no PE Large left effusion and smaller right effusion with LLL ATX/ASD no masses Dane Evans MD

## 2021-02-11 NOTE — HOSPICE
[employer] [facility] to Those in Need 
[phone] Patient Name: Austin Varma YOB: 1935 Age: 82 y.o. Eric Akhtar RN Note: I spoke with patient's son Rachel Chaitanya this am and they have decided to try to get patient home with [facility] as they have a relationship with [facility] and they work closely with [facility] who will provide the 24 hr Cg's. He expressed gratitude for the hospice info session yesterday. [employer] will sign off at this time Thank you for the opportunity to be of service to this patient. Geovani Ortiz RN Clinical Nurse Liaison Eric Akhtar [phone]

## 2021-02-11 NOTE — PROGRESS NOTES
No success getting pt labs 2 nurses x 2 sticks. Notified NP and got an order for an art stick. Notified RT @ [phone].

## 2021-02-11 NOTE — PROGRESS NOTES
Hospitalist Progress Note NAME: Austin Varma :  1935 MRN:  [id number] Assessment / Plan: 
Acute hypoxic respiratory failure POA Bilateral  L>R aspiration pneumonia POA Left pleural effusion, transudative POA Covid-19 Dec 28 2020 POA 
- H/O COVID-19 PNA finished treatment with remdesivir/steroids 2021. Presented with afib with RVR, Pleural effusion and Hypoxia requiring BIPAP. - S/p left chest tube placement , removed on 2021 
- O2 requirement has improved, currently on 2 liters 
-  vancomycin and Zosyn, stop after 5 days - Pulmonary on board - Currently on droplet precaution 
- family deciding upon hospice, eating for comfort 
- transfer to medical floor 
- start setting up home hospice, plan discharge to home in Am Hypokalemia K 2.8 Hypernatremia Na 149 
- improved S/p KCL runs, Po KCL 
- Na improving with hypotonic fluids PAF, episodes or RVR 
-Likely 2/2 pneumonia 
-Continue amnio gtt. required another bolus yesterday 
-Appreciate cardiology consult Aortic stenosis -S/p TAVR , ECHO EF 65%  nml TAVR fxn 
 
sacral wounds 
-Wound care managing 
  
  
Code Status: DNR (Pt. Has paper at bedside/also confirmed with shahla thomas) Surrogate Decision Maker: 
Sandra Zelaya [phone]      
Orion carty Son [phone]      
  
  
 
Patient recently has been confined mostly to recliner. Prolonged recent hospitalizations. Very limited functional status. Palliative care consulted. Discussed care goals with son, Rachel Tavares. Understands that his medical conditions can be treated but likelihood of restoration of his functional status is quite low. Transitioning to comfort care. DVT Prophylaxis: SQ LOVENOX 
GI Prophylaxis: not indicated 
  
 
Prognosis: poor Subjective: Chief Complaint / Reason for Physician Visit \"My breathing is okay\" No SOB, currently on2 liters CT removed today Planning discharge to home with hospice Family decided upon hospice, comfort feedings Review of Systems: 
Symptom Y/N Comments  Symptom Y/N Comments Fever/Chills n   Chest Pain n   
Poor Appetite    Edema Cough y   Abdominal Pain n   
Sputum n   Joint Pain SOB/LARA y   Pruritis/Rash Nausea/vomit n   Tolerating PT/OT Diarrhea n   Tolerating Diet y Constipation    Other Could NOT obtain due to:   
 
Objective: VITALS:  
Last 24hrs VS reviewed since prior progress note. Most recent are: 
Patient Vitals for the past 24 hrs: 
 Temp Pulse Resp BP SpO2  
02/11/21 1500 97.6 °F (36.4 °C) 65 23 (!) 153/49 100 % 02/11/21 1024 97.5 °F (36.4 °C) 85 26 (!) 146/62 98 % 02/11/21 0805 98.7 °F (37.1 °C) 79 22 (!) 149/62 91 % 02/11/21 0751     95 % 02/11/21 0300 98.5 °F (36.9 °C) 71 20 (!) 111/51 97 % 02/10/21 2315 98.4 °F (36.9 °C)      
02/10/21 2300 98.4 °F (36.9 °C) 80 22 (!) 130/56 97 % 02/10/21 1943     93 % 02/10/21 1900 97.8 °F (36.6 °C) 68 27 (!) 159/52 98 % Intake/Output Summary (Last 24 hours) at 2/11/2021 1829 Last data filed at 2/11/2021 5580 Gross per 24 hour Intake 0 ml Output 600 ml Net -600 ml I had a face to face encounter and independently examined this patient on 2/11/2021, as outlined below: PHYSICAL EXAM: 
General: WD, WN. Alert, cooperative, no acute distress EENT:  Anicteric sclerae. MMM Resp:  Coarse BS. No accessory muscle use CV:  Regular  rhythm,  No edema GI:  Soft, Non distended, Non tender. +Bowel sounds Neurologic:  Alert, oriented to person, normal speech, Psych:   Not anxious nor agitated Skin:  No rashes. No jaundice Reviewed most current lab test results and cultures  YES Reviewed most current radiology test results   YES Review and summation of old records today    NO Reviewed patient's current orders and MAR    YES 
PMH/SH reviewed - no change compared to H&P 
________________________________________________________________________ Care Plan discussed with: 
  Comments Patient x Family RN x Care Manager Consultant Multidiciplinary team rounds were held today with , nursing, pharmacist and clinical coordinator. Patient's plan of care was discussed; medications were reviewed and discharge planning was addressed. ________________________________________________________________________ Comments >50% of visit spent in counseling and coordination of care x   
________________________________________________________________________ Felipe Strong MD  
 
Procedures: see electronic medical records for all procedures/Xrays and details which were not copied into this note but were reviewed prior to creation of Plan. LABS: 
I reviewed today's most current labs and imaging studies. Pertinent labs include: 
Recent Labs  
  02/10/21 
0249 WBC 8.8 HGB 8.5* HCT 27.4*  
 Recent Labs  
  02/11/21 
0759 02/10/21 
0249 * 149*  
K 3.8 2.8*  
* 114* CO2 28 23 * 109* BUN 11 13 CREA 0.70 0.69* CA 8.3* 8.6 MG  --  2.4 Signed: Felipe Strong MD

## 2021-02-11 NOTE — PROGRESS NOTES
Chart reviewed. Palliative and hospice service met with pt and family. Decision has been made to do comfort feeds and home with hospice services. Noted chart states DC home once set up. Will complete order.

## 2021-02-11 NOTE — PROGRESS NOTES
Transition of Care Plan: 
 
Disposition: Home with [facility] hospice Follow up appointments: n/a Transportation at Discharge: [facility] will transport at d/c 
DME needed: hospital bed to be provided by [facility] IM Medicare letter:upon d/c Caregiver Contact: [address] [phone] 
 
2:17 p.m. CM heard back from [facility] and they accepted referral.  SAMIRA sent a message to Dr. Yolanda Claros via [address] to let him know. CM received a call from [facility] of [facility] requesting an order to start hospice services upon d/c.  CM called son, Kj Stanley, to verify [facility] as his choice. Son in agreement. CM sent order to [facility]. [address] letter done via phone. Oriana Dickey,  
Care Management, ED [facility]

## 2021-02-11 NOTE — INTERDISCIPLINARY ROUNDS
Interdisciplinary team rounds were held 2/11/2021 with the following team members:Care Management, Nursing, Nutrition, Pharmacy, Physician and Clinical Coordinator. Plan of care discussed. See clinical pathway and/or care plan for interventions and desired outcomes. DC chest tube today. Patient started comfort feedings yesterday (2/10). Patient now requiring 4L NC. Palliative and hospice following. [facility].

## 2021-02-11 NOTE — PROGRESS NOTES
[address]  [phone] Cardiology Progress Note 2/11/2021 945 AM 
 
Admit Date: 2/5/2021 Admit Diagnosis:  
Acute respiratory failure ([address] [J96.00] Aspiration pneumonia ([address] [J69.0] Atrial fibrillation with RVR ([address] [I48.91] Interval History/Subjective:  
 
Colleen Mae is a 82 y.o. male admitted with Acute respiratory failure ([address] [J96.00] Aspiration pneumonia ([address] [J69.0] Atrial fibrillation with RVR ([facility]) [I48.91] 
 
-VSS 
-K 3.8 
-Mr. Belinda Mann is viewed through window. He did not answer the phone today. Visit Vitals BP (!) 146/62 (BP 1 Location: Left upper arm, BP Patient Position: At rest) Pulse 85 Temp 97.5 °F (36.4 °C) Resp 26 Ht 6' 2\" (1.88 m) Wt 82.2 kg (181 lb 3.2 oz) SpO2 98% BMI 23.26 kg/m² Current Facility-Administered Medications Medication Dose Route Frequency  glycopyrrolate (ROBINUL) injection 0.2 mg  0.2 mg IntraVENous Q6H PRN  
 metoprolol (LOPRESSOR) injection 5 mg  5 mg IntraVENous Q6H  
 dextrose 5% - 0.45% NaCl with KCl 40 mEq/L infusion   IntraVENous CONTINUOUS  
 potassium bicarb-citric acid (EFFER-K) tablet 20 mEq  20 mEq Oral BID  melatonin tablet 6 mg  6 mg Oral QHS PRN  
 enoxaparin (LOVENOX) injection 30 mg  30 mg SubCUTAneous Q12H  
 balsam peru-castor oiL (VENELEX) ointment   Topical TID  zinc oxide-cod liver oil (DESITIN) 40 % paste   Topical Q8H  
 ipratropium-albuterol (COMBIVENT RESPIMAT) 20 mcg-100 mcg inhalation spray  1 Puff Inhalation Q6H PRN  
 HYDROmorphone (PF) (DILAUDID) injection 0.5 mg  0.5 mg IntraVENous Q6H PRN  
 budesonide-formoteroL (SYMBICORT) 160-4.5 mcg/actuation HFA inhaler 2 Puff  2 Puff Inhalation BID RT  
 sodium chloride (NS) flush 5-40 mL  5-40 mL IntraVENous Q8H  
 sodium chloride (NS) flush 5-40 mL  5-40 mL IntraVENous PRN  
 acetaminophen (TYLENOL) tablet 650 mg  650 mg Oral Q6H PRN  Or  
  acetaminophen (TYLENOL) suppository 650 mg  650 mg Rectal Q6H PRN  polyethylene glycol (MIRALAX) packet 17 g  17 g Oral DAILY PRN  promethazine (PHENERGAN) tablet 12.5 mg  12.5 mg Oral Q6H PRN Or  
 ondansetron (ZOFRAN) injection 4 mg  4 mg IntraVENous Q6H PRN  piperacillin-tazobactam (ZOSYN) 3.375 g in 0.9% sodium chloride (MBP/ADV) 100 mL MBP  3.375 g IntraVENous Q8H  
 vancomycin (VANCOCIN) 750 mg in 0.9% sodium chloride 250 mL (VIAL-MATE)  750 mg IntraVENous Q16H  
 aspirin (ASA) suppository 300 mg  300 mg Rectal DAILY  insulin lispro (HUMALOG) injection   SubCUTAneous AC&HS  
 glucose chewable tablet 16 g  4 Tab Oral PRN  
 dextrose (D50W) injection syrg 12.5-25 g  12.5-25 g IntraVENous PRN  
 glucagon (GLUCAGEN) injection 1 mg  1 mg IntraMUSCular PRN Objective:  
  
Physical Exam: 
General Appearance:  awake, elderly  male resting in bed in NAD Chest:   unlabored at rest.   
Cardiovascular: Abdomen:    
Extremities:  
Skin:  pale **bedside exam deferred due to Covid isolation to reduce exposure and conserve PPE. Viewed through window** Data Review:  
Recent Labs  
  02/10/21 
0249 WBC 8.8 HGB 8.5* HCT 27.4*  
 Recent Labs  
  02/11/21 
0759 02/10/21 
0249 * 149*  
K 3.8 2.8*  
* 114* CO2 28 23 * 109* BUN 11 13 CREA 0.70 0.69* CA 8.3* 8.6 MG  --  2.4 No results for input(s): TROIQ, CPK, CKMB in the last 72 hours. Intake/Output Summary (Last 24 hours) at 2/11/2021 1057 Last data filed at 2/11/2021 0033 Gross per 24 hour Intake 1985.92 ml Output 870 ml Net 1115.92 ml Telemetry: SR 1st degree AVB; some short possible PAF bursts EKG: a fib with RVR 
CTA chest: \"There is no pulmonary embolism. There is no aortic aneurysm or dissection. Mild bilateral pleural effusions are not significantly changed. Left greater than right lung atelectasis not significantly changed. Scattered groundglass and parenchymal opacities in the right and left lung consistent with multifocal infectious/inflammatory process. \" 
 
Assessment:  
 
Active Problems: 
  Acute respiratory failure ([address] (2/5/2021) Aspiration pneumonia ([address] (2/5/2021) Atrial fibrillation with RVR ([address] (2/5/2021) Plan:  
 
PAF: remains in sinus. In setting of covid PNA and possible aspiration · Switched from IV amio to IV BB yesterday due to palliative request    
· Not on full AC due to fall risk and anemia. On ASA · Continue to treat underlying processes. AS s/p TAVR:  Valve stable on recent ECHO Plans for home hospice noted. Tolerating IV BB well so far. Would transition to PO if/when decision for PO meds made. Coretta Garza NP DNP, RN, AGACNP-BC Pursuant to the emergency declaration under the [facility], [facility] waiver authority and the [employer] and [employer] Act, this Virtual Visit was conducted, with patient's consent, to reduce the patient's risk of exposure to COVID-19 and provide continuity of care for an established patient. Services were provided through a video synchronous discussion virtually to substitute for in-person visit. [facility] 2/11/2021 Patient seen, examined by me personally. Plan discussed as detailed. Agree with note as outlined by  NP. I confirm findings in history and physical exam. No additional findings noted. Agree with plan as outlined above.   
 
Israel Beltrán MD

## 2021-02-12 NOTE — PROGRESS NOTES
Transition of Care Plan: 
  
Disposition: Home with [facility] hospice Follow up appointments: n/a Transportation at Discharge: [facility] will transport at d/c 
DME needed: hospital bed to be provided by [facility] IM Medicare letter: 2nd IM Letter given. Caregiver Contact: Jethrosee Ambriz [phone] CM spoke with Rikki Cisneros with [facility]- [phone]. Equipment will be delivered on Sat. Pt can d/c home Sat at 1pm. Will call has been set up via [employer] with [facility]. [facility] papers have been placed on chart. Medicare pt has received, reviewed, and signed 2nd IM letter informing them of their right to appeal the discharge. Signed copied has been placed on pt bedside chart. [facility]  [facility] 
Phone: [phone]

## 2021-02-12 NOTE — PROGRESS NOTES
Pt  Had chest tube removed. Has been accepted for Hospice. Will see pt again as needed. Please call if any questions or issues. Thank you.

## 2021-02-12 NOTE — PROGRESS NOTES
1900: Bedside shift change report given to Adam Seaman (oncoming nurse) by José Miguel Adrian RN (offgoing nurse). Report included the following information SBAR, Kardex, ED Summary, Intake/Output, MAR, Recent Results and Cardiac Rhythm NSR. End of Shift Note Bedside shift change report given to [address] [place] [address] Avenue (oncoming nurse) by Chris Osorio RN (offgoing nurse). Report included the following information SBAR, Kardex, ED Summary, Intake/Output, MAR, Recent Results and Cardiac Rhythm NSR/Paced Shift worked:  [id number] Shift summary and any significant changes:  
  none at this time Concerns for physician to address:  none at this time [facility]    
  
 
Activity: 
Activity Level: Bed Rest 
Number times ambulated in hallways past shift: 0 Number of times OOB to chair past shift: 0 Cardiac:  
Cardiac Monitoring: Yes     
Cardiac Rhythm: Paced Access:  
Current line(s): PIV Genitourinary:  
Urinary status: incontinent and external catheter Respiratory:  
O2 Device: Nasal cannula Chronic home O2 use?: NO Incentive spirometer at bedside: NO 
  
GI: 
Last Bowel Movement Date: 02/11/21 Current diet:  DIET DYSPHAGIA PUREED (NDD1) Passing flatus: YES Tolerating current diet: YES 
% Diet Eaten: 100 % Pain Management:  
Patient states pain is manageable on current regimen: YES Skin: 
Justin Score: 14 Interventions: float heels, foam dressing, internal/external urinary devices and nutritional support Patient Safety: 
Fall Score: Total Score: 3 Interventions: bed/chair alarm, assistive device (walker, cane, etc), gripper socks, pt to call before getting OOB and stay with me (per policy) High Fall Risk: Yes Length of Stay: 
Expected LOS: 3d 14h Actual LOS: 7 Chris Osorio, RN

## 2021-02-13 NOTE — PROGRESS NOTES
Transition of Care Plan: 
  
Disposition: Home with [facility] Follow up appointments: n/a Transportation at Discharge: [facility] will transport at d/c 
DME needed: hospital bed to be provided by [facility] IM Medicare letter:upon d/c Caregiver Contact: Geeta Bryant [phone] 
 
9:26am-No further CM needs identified. CM notified pt's nurse of d/c. Care Management Interventions PCP Verified by CM: Yes Mode of Transport at Discharge: BLS(Pt will be transported by medical transport) Transition of Care Consult (CM Consult): Home Hospice([facility] [address]: No 
Reason Outside [place]: Patient already serviced by other home care/hospice agency Discharge Durable Medical Equipment: No 
Physical Therapy Consult: No 
Occupational Therapy Consult: No 
Speech Therapy Consult: No 
Current Support Network: Lives with Spouse, Own Home Confirm Follow Up Transport: Family The Plan for Transition of Care is Related to the Following Treatment Goals : Hospice The Patient and/or Patient Representative was Provided with a Choice of Provider and Agrees with the Discharge Plan?: Yes Name of the Patient Representative Who was Provided with a Choice of Provider and Agrees with the Discharge Plan: Geeta Bryant Freedom of Choice List was Provided with Basic Dialogue that Supports the Patient's Individualized Plan of Care/Goals, Treatment Preferences and Shares the Quality Data Associated with the Providers?: Yes [employer] Information Provided?: No 
Discharge Location Discharge Placement: Home with hospice([facility] Jason Gaiens [address] [place] [address] 
[phone]

## 2021-02-13 NOTE — PROGRESS NOTES
0730.Bedside and Verbal shift change report given to Miriam (oncoming nurse) by Kale Perdomo (offgoing nurse). Report included the following information SBAR, Kardex and [facility] Zayda Tracy Primary Nurse Tamiko Aponte and MISAEL Raymond performed a dual skin assessment on this patient Impairment noted- see wound doc flow sheet Justin score is 13. .End of Shift Note Bedside shift change report given to [address] [place] [address]  (oncoming nurse) by Tamiko Aponte (offgoing nurse). Report included the following information SBAR, Kardex and [facility] Shift worked:  0700 -1900 Shift summary and any significant changes:  
  would care completed Discharge tomorrow at 1pm home hospice Concerns for physician to address:  none Zone phone for oncoming shift:   [phone] Activity: 
Activity Level: Bed Rest 
Number times ambulated in hallways past shift: 0 Number of times OOB to chair past shift: 0 Cardiac:  
Cardiac Monitoring: Yes     
Cardiac Rhythm: Paced Access:  
Current line(s): PIV Genitourinary:  
Urinary status: incontinent Respiratory:  
O2 Device: Nasal cannula Chronic home O2 use?: YES Incentive spirometer at bedside: NO 
  
GI: 
Last Bowel Movement Date: 02/12/21 Current diet:  DIET DYSPHAGIA PUREED (NDD1) Passing flatus: YES Tolerating current diet: YES  Comfort Diet  
% Diet Eaten: 100 % Pain Management:  
Patient states pain is manageable on current regimen: YES Skin: 
Justin Score: 13 Interventions: foam dressing  Bedrest 
 
Patient Safety: 
Fall Score: Total Score: 3 Interventions: assistive device (walker, cane, etc) High Fall Risk: Yes Length of Stay: 
Expected LOS: 5d 12h Actual LOS: 7 Tamiko Aponte

## 2021-02-13 NOTE — DISCHARGE SUMMARY
Hospitalist Discharge Note NAME: Gin English :  1935 MRN:  [id number] Admit date: 2021 Discharge date: 21 PCP: Carmelita Babinski, DO Discharge Diagnoses: 
 
Acute hypoxic respiratory failure POA Bilateral  L>R aspiration pneumonia POA Left pleural effusion, transudative POA s/p chest tube drainage Covid-19 Dec 28 2020 POA Hypokalemia K 2.8 Hypernatremia Na 149 PAF, episodes of RVR POA Aortic stenosis Sacral wounds POA 
  
Code Status: DNR/DNI Discharge Medications: 
Current Discharge Medication List  
  
CONTINUE these medications which have CHANGED Details  
furosemide (LASIX) 40 mg tablet Take 1 Tab by mouth daily. One daily 
Qty: 30 Tab, Refills: 1 CONTINUE these medications which have NOT CHANGED Details  
atorvastatin (LIPITOR) 40 mg tablet Take 1 Tab by mouth nightly for 60 days. Qty: 30 Tab, Refills: 1  
  
metoprolol tartrate (LOPRESSOR) 50 mg tablet Take 1 Tab by mouth two (2) times a day for 60 days. Qty: 60 Tab, Refills: 1  
  
albuterol (PROVENTIL HFA, VENTOLIN HFA, PROAIR HFA) 90 mcg/actuation inhaler Take 2 Puffs by inhalation every six (6) hours as needed for Wheezing or Shortness of Breath. Qty: 1 Inhaler, Refills: 0  
  
potassium chloride SR (K-TAB) 20 mEq tablet Take 1 Tab by mouth daily. Qty: 30 Tab, Refills: 0  
  
aspirin 81 mg chewable tablet Take 81 mg by mouth daily. temazepam (RESTORIL) 15 mg capsule TAKE 1 CAPSULE BY MOUTH AT BEDTIME AS NEEDED FOR SLEEP. Qty: 30 Cap, Refills: 0 Comments: Not to exceed 6 additional fills before 2018 Associated Diagnoses: Insomnia, unspecified type  
  
acetaminophen (TYLENOL) 325 mg tablet Take 2 Tabs by mouth every four (4) hours as needed for Pain. Qty:    
  
senna-docusate (PERICOLACE) 8.6-50 mg per tablet Take 1 Tab by mouth daily as needed for Constipation. finasteride (PROSCAR) 5 mg tablet TAKE 1 TABLET BY MOUTH  DAILY Qty: 90 Tab, Refills: 3 Comments: Requesting 1 year supply  
  
sertraline (ZOLOFT) 100 mg tablet TAKE 1 TABLET BY MOUTH  DAILY Qty: 90 Tab, Refills: 1 Associated Diagnoses: Recurrent depression (HCC)  
  
fluticasone propionate (FLONASE) 50 mcg/actuation nasal spray ADMINISTER 1 Spray IN Both Nostrils two (2) times a day. Qty: 16 g, Refills: 0  
  
cholecalciferol (VITAMIN D3) 1,000 unit cap Take 1 Cap by mouth daily. Qty: 30 Cap, Refills: 2  
  
sodium chloride (OCEAN) 0.65 % nasal squeeze bottle 0.05 mL by Both Nostrils route as needed for Congestion. Qty: 30 mL, Refills: 2 STOP taking these medications  
  
 amoxicillin-clavulanate (AUGMENTIN) 875-125 mg per tablet Comments:  
Reason for Stopping:   
   
 hydrALAZINE (APRESOLINE) 25 mg tablet Comments:  
Reason for Stopping:   
   
 amLODIPine (NORVASC) 10 mg tablet Comments:  
Reason for Stopping:   
   
 losartan (COZAAR) 50 mg tablet Comments:  
Reason for Stopping:   
   
 doxazosin (CARDURA) 4 mg tablet Comments:  
Reason for Stopping:   
   
 metFORMIN ER (GLUCOPHAGE XR) 500 mg tablet Comments:  
Reason for Stopping:   
   
 glipiZIDE SR (GLUCOTROL XL) 5 mg CR tablet Comments:  
Reason for Stopping: FERROUS FUMARATE/VIT BCOMP,C (SUPER B COMPLEX PO) Comments:  
Reason for Stopping: Follow-up Information Follow up With Specialties Details Why Contact Info [id number]- [address] [facility]  This is your hospice agency. [address] [facility] [address] 
[phone] Valorie Arriola DO Internal Medicine Schedule an appointment as soon as possible for a visit in 2 weeks  [address] [facility] [address] 
[phone] Time spent on discharge:   I spent greater than 30 minutes on discharge, seeing and examining the patient, reconciling home meds and new meds, coordinating care with case management, doing the discharge papers and the D/C summary Discharge disposition: home with hospice Discharge Condition: Stable Summary of admission H+P(copied from Dr Crockett's Note): CHIEF COMPLAINT:  SOB/Tachycardia 
  
HISTORY OF PRESENT ILLNESS:    
Aicha Toure is a 82 y.o.   male with a past medical history significant for hypertension; chronic diastolic congestive heart failure; dyslipidemia; third degree heart block, status post pacemaker insertion; coronary artery disease, status post CABG; benign prostatic hyperplasia; type 2 diabetes; aortic stenosis, status post transcatheter aortic valve replacement TAVR  who recently failed swallow eval  x3 weeks ago  ,currently on soft diet  presents with  Acute resp. Distress  2/2 aspiration . Currently requiring 6L NC. Also found to have Afib with  on cardizem ggt currently. 
  
We were asked to admit for work up and evaluation of the above problems.  
  
  
  
    
Past Medical History:  
Diagnosis Date  Acute on chronic diastolic (congestive) heart failure ([address] 12/27/2020  BPH (benign prostatic hyperplasia)    
 CAD (coronary artery disease)    
 Diabetes (HCC)    
 Hearing loss    
 Hypercholesterolemia    
 Hypertension    
 Murmur    
 Recurrent depression ([address] 8/22/2019  Third degree AV block ([address] 10/30/2020  
  
  
     
Past Surgical History:  
Procedure Laterality Date  HX CHOLECYSTECTOMY      
 CT CARDIAC SURG PROCEDURE UNLIST   2006  
  by-pass  CT INS NEW/RPLCMT PRM PM W/TRANSV ELTRD ATRIAL&VENT   10/30/2020  
     
 CT INS NEW/RPLCMT PRM PM W/TRANSV ELTRD ATRIAL&VENT N/A 10/30/2020  
  INSERT PPM DUAL [place] [address], [facility] CATH LAB  
  
pCXR read by radiology Portable AP upright view of the chest obtained compared to December 31, 2020. There is nearly complete opacification of the left hemithorax which shift of the 
mediastinum to the left indicating loss of volume. The right lung shows no acute findings. Cardiac pacemaker in place, prior sternotomy, heart size is likely 
unchanged. IMPRESSION 
 impression: Significant new loss of volume left lung as above. Hospital course:  
 
Acute hypoxic respiratory failure POA Bilateral  L>R aspiration pneumonia POA Left pleural effusion, transudative POA s/p chest tube drainage Covid-19 Dec 28 2020 POA 
- H/O COVID-19 PNA finished treatment with remdesivir/steroids Jan 2021. Presented with afib with RVR, Pleural effusion and Hypoxia requiring BIPAP. - S/p left chest tube placement 2/8, removed on 2/11/2021 
- O2 requirement has improved, currently on 2 liters 
-  vancomycin and Zosyn, stop after 5 days - Pulmonary on board - Currently on droplet precaution - palliative worked with patient and family 
- family and patient decided upon home with hospice, eating for comfort 
- transfer to medical floor - D/C to home with hospice, spoke with son on day of discharge Hypokalemia K 2.8 Hypernatremia Na 149 
- improved S/p KCL runs, Po KCL 
- Na improving with hypotonic fluids PAF, episodes or RVR 
-Likely 2/2 pneumonia 
-Continue amnio gtt. required another bolus yesterday 
-Appreciate cardiology consult Aortic stenosis -S/p TAVR , ECHO EF 65% 11/20 nml TAVR fxn 
 
sacral wounds 
-Wound care managing 
  
  
Code Status: DNR/DNI Surrogate Decision Maker: 
Larry Leach [phone]      
Orion carty Son [phone]      
  
  
 
Patient recently has been confined mostly to recliner. Prolonged recent hospitalizations. Very limited functional status. Palliative care consulted. Discussed care goals with son, Uziel Brar. Understands that his medical conditions can be treated but likelihood of restoration of his functional status is quite low. Transitioning to comfort care. DVT Prophylaxis: SQ LOVENOX 
GI Prophylaxis: not indicated 
  
 
Prognosis: poor Subjective: Chief Complaint / Reason for Physician Visit f/u aspiration pneumonia \"My breathing is okay\" No SOB, currently on 3 liters Discharging to home on hospice Review of Systems: 
Symptom Y/N Comments  Symptom Y/N Comments Fever/Chills n   Chest Pain n   
Poor Appetite    Edema Cough y   Abdominal Pain n   
Sputum n   Joint Pain SOB/LARA y   Pruritis/Rash Nausea/vomit n   Tolerating PT/OT Diarrhea n   Tolerating Diet y Constipation    Other Could NOT obtain due to:   
 
Objective: VITALS:  
Last 24hrs VS reviewed since prior progress note. Most recent are: 
Patient Vitals for the past 24 hrs: 
 Temp Pulse Resp BP SpO2  
02/13/21 1157 98.7 °F (37.1 °C) 67 18 (!) 144/56 97 % 02/13/21 0819    (!) 156/50   
02/13/21 0746     99 % 02/13/21 0400 98.5 °F (36.9 °C) 68 20 (!) 134/50 95 % 02/13/21 0000 98.1 °F (36.7 °C) 66 19 (!) 152/47 98 % 02/12/21 2007     97 % 02/12/21 1934 98.3 °F (36.8 °C) 82 24 (!) 153/57 90 % 02/12/21 1600 96.9 °F (36.1 °C) 65 19 (!) 147/53 95 % Intake/Output Summary (Last 24 hours) at 2/13/2021 1348 Last data filed at 2/12/2021 1221 Gross per 24 hour Intake  Output 100 ml Net -100 ml I had a face to face encounter and independently examined this patient on 2/13/2021, as outlined below: PHYSICAL EXAM: 
General: WD, WN. Alert, cooperative, no acute distress EENT:  Anicteric sclerae. MMM Resp:  Coarse BS. No accessory muscle use CV:  Regular  rhythm,  No edema GI:  Soft, Non distended, Non tender. +Bowel sounds Neurologic:  Alert, oriented to person, normal speech, Psych:   Not anxious nor agitated Skin:  No rashes. No jaundice Reviewed most current lab test results and cultures  YES Reviewed most current radiology test results   YES Review and summation of old records today    NO Reviewed patient's current orders and MAR    YES 
PMH/SH reviewed - no change compared to H&P 
 ________________________________________________________________________ Care Plan discussed with: 
  Comments Patient x Family RN x Care Manager Consultant Multidiciplinary team rounds were held today with , nursing, pharmacist and clinical coordinator. Patient's plan of care was discussed; medications were reviewed and discharge planning was addressed. ________________________________________________________________________ Comments >50% of visit spent in counseling and coordination of care x   
________________________________________________________________________ Peter Murray MD  
 
Procedures: see electronic medical records for all procedures/Xrays and details which were not copied into this note but were reviewed prior to creation of Plan. LABS: 
I reviewed today's most current labs and imaging studies. Pertinent labs include: No results for input(s): WBC, HGB, HCT, PLT, HGBEXT, HCTEXT, PLTEXT, HGBEXT, HCTEXT, PLTEXT in the last 72 hours. Recent Labs  
  02/11/21 
0759 * K 3.8 * CO2 28  
* BUN 11  
CREA 0.70 CA 8.3* Signed: Peter Murray MD

## 2021-02-13 NOTE — PROGRESS NOTES
1640 received a call from patient's wife and son, stating their concerns about patient coming home tonight in bad whether and possible power outtage. They requested patient should be discharged tomorrow. Dr. Ammon Hurtado was made aware of family request. Per MD patient can go home tomorrow at 10am. [facility] called and pickup setup for 10AM (transfer # [id number]). Patient is currently resting in bed with no acute distress observed or reported. Nursing care continues.

## 2021-02-13 NOTE — PROGRESS NOTES
1900: Bedside shift change report given to Adam Seaman (oncoming nurse) by Marston Barthel RN (offgoing nurse). Report included the following information SBAR, Kardex, ED Summary, Intake/Output, MAR, Recent Results and Cardiac Rhythm Paced. End of Shift Note Bedside shift change report given to [address] (oncoming nurse) by Nolberto Coello RN (offgoing nurse). Report included the following information SBAR, Kardex, ED Summary, Intake/Output, MAR, Recent Results and Cardiac Rhythm Paced Shift worked:  [id number] Shift summary and any significant changes:  
  Pt requested dilaudid twice. No acute changes at this time. Concerns for physician to address:  none at this time [facility]    
  
 
Activity: 
Activity Level: Bed Rest 
Number times ambulated in hallways past shift: 0 Number of times OOB to chair past shift: 0 Cardiac:  
Cardiac Monitoring: Yes     
Cardiac Rhythm: Paced Access:  
Current line(s): PIV Genitourinary:  
Urinary status: voiding Respiratory:  
O2 Device: Nasal cannula Chronic home O2 use?: NO Incentive spirometer at bedside: NO 
  
GI: 
Last Bowel Movement Date: 02/12/21 Current diet:  DIET DYSPHAGIA PUREED (NDD1) Passing flatus: YES Tolerating current diet: YES 
% Diet Eaten: 100 % Pain Management:  
Patient states pain is manageable on current regimen: YES Skin: 
Justin Score: 13 Interventions: increase time out of bed, PT/OT consult and internal/external urinary devices Patient Safety: 
Fall Score: Total Score: 3 Interventions: bed/chair alarm, assistive device (walker, cane, etc), gripper socks, pt to call before getting OOB and stay with me (per policy) High Fall Risk: Yes Length of Stay: 
Expected LOS: 5d 12h Actual LOS: 8 Nolberto Coello, RN

## 2021-02-13 NOTE — PROGRESS NOTES
Hospitalist Progress Note NAME: Adele Grande :  1935 MRN:  [id number] Assessment / Plan: 
Acute hypoxic respiratory failure POA Bilateral  L>R aspiration pneumonia POA Left pleural effusion, transudative POA Covid-19 Dec 28 2020 POA 
- H/O COVID-19 PNA finished treatment with remdesivir/steroids 2021. Presented with afib with RVR, Pleural effusion and Hypoxia requiring BIPAP. - S/p left chest tube placement , removed on 2021 
- O2 requirement has improved, currently on 2 liters 
-  vancomycin and Zosyn, stop after 5 days - Pulmonary on board - Currently on droplet precaution 
- family deciding upon hospice, eating for comfort 
- transfer to medical floor 
- start setting up home hospice, plan discharge to home in [place] Hypokalemia K 2.8 Hypernatremia Na 149 
- improved S/p KCL runs, Po KCL 
- Na improving with hypotonic fluids PAF, episodes or RVR 
-Likely 2/2 pneumonia 
-Continue amnio gtt. required another bolus yesterday 
-Appreciate cardiology consult Aortic stenosis -S/p TAVR , ECHO EF 65%  nml TAVR fxn 
 
sacral wounds 
-Wound care managing 
  
  
Code Status: DNR (Pt. Has paper at bedside/also confirmed with shahla thomas) Surrogate Decision Maker: 
Dimas Hernandez [phone]      
Orion carty Son [phone]      
  
  
 
Patient recently has been confined mostly to recliner. Prolonged recent hospitalizations. Very limited functional status. Palliative care consulted. Discussed care goals with son, Larisa Sebastian. Understands that his medical conditions can be treated but likelihood of restoration of his functional status is quite low. Transitioning to comfort care. DVT Prophylaxis: SQ LOVENOX 
GI Prophylaxis: not indicated 
  
 
Prognosis: poor Subjective: Chief Complaint / Reason for Physician Visit \"My breathing is okay\" No SOB, currently on2 liters CT removed today Planning discharge to home with hospice Family decided upon hospice, comfort feedings Review of Systems: 
Symptom Y/N Comments  Symptom Y/N Comments Fever/Chills n   Chest Pain n   
Poor Appetite    Edema Cough y   Abdominal Pain n   
Sputum n   Joint Pain SOB/LARA y   Pruritis/Rash Nausea/vomit n   Tolerating PT/OT Diarrhea n   Tolerating Diet y Constipation    Other Could NOT obtain due to:   
 
Objective: VITALS:  
Last 24hrs VS reviewed since prior progress note. Most recent are: 
Patient Vitals for the past 24 hrs: 
 Temp Pulse Resp BP SpO2  
02/12/21 2007     97 % 02/12/21 1934 98.3 °F (36.8 °C) 82 24 (!) 153/57 90 % 02/12/21 1600 96.9 °F (36.1 °C) 65 19 (!) 147/53 95 % 02/12/21 1053 97.8 °F (36.6 °C) 66 22 (!) 122/94 99 % 02/12/21 0806     100 % 02/12/21 0800 97.6 °F (36.4 °C) 65 20 (!) 141/46 100 % 02/12/21 0400 97.7 °F (36.5 °C) 68 23 (!) 166/58 92 % 02/12/21 0000 97.6 °F (36.4 °C) 70 25 (!) 148/55 98 % Intake/Output Summary (Last 24 hours) at 2/12/2021 2225 Last data filed at 2/12/2021 3653 Gross per 24 hour Intake  Output 300 ml Net -300 ml I had a face to face encounter and independently examined this patient on 2/12/2021, as outlined below: PHYSICAL EXAM: 
General: WD, WN. Alert, cooperative, no acute distress EENT:  Anicteric sclerae. MMM Resp:  Coarse BS. No accessory muscle use CV:  Regular  rhythm,  No edema GI:  Soft, Non distended, Non tender. +Bowel sounds Neurologic:  Alert, oriented to person, normal speech, Psych:   Not anxious nor agitated Skin:  No rashes. No jaundice Reviewed most current lab test results and cultures  YES Reviewed most current radiology test results   YES Review and summation of old records today    NO Reviewed patient's current orders and MAR    YES 
PMH/SH reviewed - no change compared to H&P 
________________________________________________________________________ Care Plan discussed with: 
  Comments Patient x Family RN x Care Manager Consultant Multidiciplinary team rounds were held today with , nursing, pharmacist and clinical coordinator. Patient's plan of care was discussed; medications were reviewed and discharge planning was addressed. ________________________________________________________________________ Comments >50% of visit spent in counseling and coordination of care x   
________________________________________________________________________ Wong Patel MD  
 
Procedures: see electronic medical records for all procedures/Xrays and details which were not copied into this note but were reviewed prior to creation of Plan. LABS: 
I reviewed today's most current labs and imaging studies. Pertinent labs include: 
Recent Labs  
  02/10/21 
0249 WBC 8.8 HGB 8.5* HCT 27.4*  
 Recent Labs  
  02/11/21 
0759 02/10/21 
0249 * 149*  
K 3.8 2.8*  
* 114* CO2 28 23 * 109* BUN 11 13 CREA 0.70 0.69* CA 8.3* 8.6 MG  --  2.4 Signed: Wong Patel MD

## 2021-02-14 NOTE — PROGRESS NOTES
1915 Bedside and Verbal shift change report given to [address] (oncoming nurse) by Revonda Dancer (offgoing nurse). Report included the following information SBAR, Kardex, Intake/Output, MAR, Recent Results and Cardiac Rhythm NSR.  
2000 full assessment as charted. No complani. Pleasant. 2200 dilaudid 0.5 mg given for light back ache and sleep. 2300 resting on rounds. 5 incontinence care rendered.     Right diabetic foot ulcer cleaned as  charted Keep heel boot on 
7 am bedside shift report given to incoming  RN
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
## 2021-02-14 NOTE — PROGRESS NOTES
End of Shift Note 
 
Bedside shift change report given to Jeramy (oncoming nurse) by Marichuy Maxwell RN (offgoing nurse).  Report included the following information SBAR, Kardex, Procedure Summary, Intake/Output, MAR and Cardiac Rhythm NSR 
 
Shift worked:  7A-7P 
  
Shift summary and any significant changes:  
   
  
Concerns for physician to address:   
  
Zone phone for oncoming shift:    
  
 
Activity: 
Activity Level: Bed Rest 
Number times ambulated in hallways past shift: 0 
Number of times OOB to chair past shift: 0 
 
Cardiac:  
Cardiac Monitoring: Yes     
Cardiac Rhythm: Normal sinus rhythm 
 
Access:  
Current line(s): PIV  
 
Genitourinary:  
Urinary status: voiding, incontinent and external catheter 
 
Respiratory:  
O2 Device: Nasal cannula 
Chronic home O2 use?: YES 
Incentive spirometer at bedside: YES 
  
GI: 
Last Bowel Movement Date: 02/12/21 
Current diet:  DIET DYSPHAGIA PUREED (NDD1) 
Passing flatus: YES 
Tolerating current diet: YES 
% Diet Eaten: 100 % 
 
Pain Management:  
Patient states pain is manageable on current regimen: YES 
 
Skin: 
Justin Score: 16 
Interventions: turn team, speciality bed, float heels and internal/external urinary devices 
 
Patient Safety: 
Fall Score: Total Score: 3 
Interventions: bed/chair alarm and pt to call before getting OOB 
High Fall Risk: Yes 
 
Length of Stay: 
Expected LOS: 5d 12h 
Actual LOS: 8 
 
 
Marichuy Maxwell RN
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
## 2021-02-15 NOTE — PROGRESS NOTES
Transition of Care Plan: 
  
Disposition: Home with [facility] hosp[facility] Follow up appointments: n/a Transportation at [address] will transport at d/c - p/u time 9:30am 
DME needed: hospital bed to be provided by [facility] IM Medicare letter:  Given Caregiver Contact: Orion Venegas [phone] Pt cleared for d/c from CM standpoint. 9:00am 
CM spoke with Irma at [facility] to update on 9:30am p/u for Hospice admission at 10:00am. 
 
0845am 
[facility] confirmed transportation for 9:30am. 
CM notified Nursing and Physician of p/u time. Medicare pt has received, reviewed, and signed 2nd IM letter informing them of their right to appeal the discharge. Signed copied has been placed on pt bedside chart. 0832am 
SAMIRA spoke with [facility] to confirm that it was safe to d/c pt this am and that power was restored. Son confirmed that the plan for d/c this am. 
CM sent message for [facility] for 1st available for transportation . [facility]  [facility] 
Phone: [phone]

## 2021-02-15 NOTE — DISCHARGE INSTRUCTIONS
Patient Discharge Instructions    Roberto Bella / [id number] : 1935    Admitted 2021 Discharged: 2021         DISCHARGE DIAGNOSIS:       Acute respiratory failure ([facility] (2021)      Aspiration pneumonia ([facility] [address] (2021)      Atrial fibrillation with RVR ([facility] [address] (2021)     Dysphagia     COVID-19 positive in 2021      What to do at Home    1. Recommended diet: Dysphagia diet-pureed    2. Recommended activity: Activity as tolerated    3. If you experience any of the following symptoms then please call your primary care physician or return to the emergency room if you cannot get hold of your doctor:   Fevers > 100.5, chills   Nausea or vomiting, persistent diarrhea > 24 hours   Blood in stool or black stools   Chest pain or SOB      Follow-up Information     Follow up With Specialties Details Why Contact Info    [facility] [address] [facility]  This is your hospice agency. [address] [facility] [place] [address]  [phone]    Efren Fernandez DO Internal Medicine Schedule an appointment as soon as possible for a visit in 2 weeks  [address]  [phone]          Wound care for the right diabetic ulcer:  Cleanse the wound with Caraklenz spray and wipe with gauze to remove the old drainage and wound debris. Pack the wound with Opticel Ag moistened with Silvasorb wound gel. Cover with a large foam dressing. Three times daily for the Buttocks wounds / skin: Cleanse the skin with soap and water and wipe down to clean cream.  Dry the skin area as much as possible and apply a new layer of the Desitin cream. Turn / reposition patient on his sides as much as possible. Float the heels. Information obtained by :  I understand that if any problems occur once I am at home I am to contact my physician. I understand and acknowledge receipt of the instructions indicated above. Physician's or R.N.'s Signature                                                                  Date/Time                                                                                                                                              Patient or Representative Signature                                                          Date/Time

## 2021-02-15 NOTE — PROGRESS NOTES
1900: Bedside shift change report given to Sonam Singh RN (oncoming nurse) by Joel Byers RN (offgoing nurse). Report included the following information SBAR, Kardex, Intake/Output and MAR.  
 
1900: patient in bed, resting quietly. Bed in lowest position, bed alarm on, call bell and phone in reach. Will monitor. 0700: End of Shift Note Bedside shift change report given to Kelton Griggs (oncoming nurse) by Aurelio August (offgoing nurse). Report included the following information SBAR, Kardex, Intake/Output and MAR Shift worked:  night Shift summary and any significant changes:  
 none Concerns for physician to address:  none Zone phone for oncoming shift:   xxx Activity: 
Activity Level: Bed Rest 
Number times ambulated in hallways past shift: 0 Number of times OOB to chair past shift: 0 Cardiac:  
Cardiac Monitoring: Yes     
Cardiac Rhythm: Normal sinus rhythm Access:  
Current line(s): PIV Genitourinary:  
Urinary status: external catheter Respiratory:  
O2 Device: Nasal cannula Chronic home O2 use?: NO Incentive spirometer at bedside: YES 
  
GI: 
Last Bowel Movement Date: 02/14/21 Current diet:  DIET DYSPHAGIA PUREED (NDD1) Passing flatus: YES Tolerating current diet: YES 
% Diet Eaten: 100 % Pain Management:  
Patient states pain is manageable on current regimen: YES Skin: 
Justin Score: 17 Interventions: turn team, speciality bed, float heels, increase time out of bed, PT/OT consult, limit briefs, internal/external urinary devices and nutritional support Patient Safety: 
Fall Score: Total Score: 3 Interventions: bed/chair alarm, assistive device (walker, cane, etc), gripper socks and pt to call before getting OOB High Fall Risk: Yes Length of Stay: 
Expected LOS: 5d 12h Actual LOS: 10 Aurelio August

## 2021-02-15 NOTE — PROGRESS NOTES
0720 .Bedside and Verbal shift change report given to Kaylan Monte (oncoming nurse) by Jay Mayberry (offgoing nurse). Report included the following information SBAR, Kardex and MAR. Primary Nurse Ivory Cordoba and Jay Mayberry, RN performed a dual skin assessment on this patient Impairment noted- see wound doc flow sheet Justin score is 13 
 
1020 Discharge patient via [facility] to home hospice. Patient discharge form completed and signed. PIV's removed of discharge forms, vitals, [facility] form and DNR for provided for [facility] transportation.

## 2021-02-15 NOTE — PROGRESS NOTES
Problem: Falls - Risk of 
Goal: *Absence of Falls Description: Document   Fall Risk and appropriate interventions in the flowsheet. Outcome: Progressing Towards Goal 
Note: Fall Risk Interventions: 
Mobility Interventions: Bed/chair exit alarm, Communicate number of staff needed for ambulation/transfer, Patient to call before getting OOB, Strengthening exercises (ROM-active/passive), Utilize walker, cane, or other assistive device Mentation Interventions: Adequate sleep, hydration, pain control, Bed/chair exit alarm, Increase mobility, More frequent rounding, Reorient patient, Room close to nurse's station, Toileting rounds, Update white board Medication Interventions: Assess postural VS orthostatic hypotension, Bed/chair exit alarm, Patient to call before getting OOB, Teach patient to arise slowly Elimination Interventions: Bed/chair exit alarm, Call light in reach, Patient to call for help with toileting needs, Toilet paper/wipes in reach, Toileting schedule/hourly rounds, Urinal in reach History of Falls Interventions: Bed/chair exit alarm, Consult care management for discharge planning, Investigate reason for fall, Room close to nurse's station, Vital signs minimum Q4HRs X 24 hrs (comment for end date)

## 2021-03-01 ENCOUNTER — DOCUMENTATION ONLY (OUTPATIENT)
Dept: CARDIOLOGY CLINIC | Age: 86
End: 2021-03-01

## 2021-03-01 NOTE — PROGRESS NOTES
3/1/2021 at 3:11 spoke with patients wife, [place] she advised that her , patient passed away on 2/19/2021

## 2021-03-01 NOTE — PROGRESS NOTES
Patient did not show for appointment today  Please call to see if he'd like to reschedule  Wife missed appointment too

## 2021-03-08 LAB
BACTERIA SPEC CULT: NORMAL
SERVICE CMNT-IMP: NORMAL

## 2021-03-19 NOTE — CARDIO/PULMONARY
Cardiac Rehab: Met with Darci Monteiro to attempt to try to make his intake appointment however Darci Monteiro preferred to wait to see how he does with home PT. So, we will call to discuss enrollment.  Erik Crisostomo RN 
 
 Patient would like communication of their results via:        Cell Phone:   Telephone Information:   Mobile [phone]     Okay to leave a message containing results? Yes      Pt coming to urgent care with complaints of dizziness, nausea in the AM, and fatigue x 1 month and states that it is progressively happening more often this month.  Denies any shortness of breath at this time.

## 2021-04-13 NOTE — PROGRESS NOTES
0700: Bedside shift change report given to Karen Avila (oncoming nurse) by Shubham Horne RN (offgoing nurse). Report included the following information SBAR, Kardex, Procedure Summary, Intake/Output, MAR and Cardiac Rhythm NSR.  
 
0700: Patient in bed, resting quietly. Currently on BiPAP, will monitor closely. 7420: RN attempting to give patient's meds and his heart rate went into the 180s. Patient back in a-fib with RVR. Patient HR sustaining in the 180s. Rapid Response called at this time. 1751Lynnea Spring with rapid response at bedside at this time. Verbal orders to turn diltiazem drip up to 15 mg/hr. Also, diltiazem bolus ordered and given (see MAR). Patient's SBP in the 180s at this time d/t dilt drip. Albumin ordered and given at this time. Also, amiodarone bolus and drip ordered at this time. See MAR for administration. Patient's heart rate still elevated, but fluctuating from the 100s-120s. Blood pressure more stable, but will also give a 500mL bolus. 1000: Will replenish potassium and mag- see MAR.  
 
1045: Dr. En Lozano paged to be made aware of the situation. He is at bedside seeing patient. He wants me to titrate diltiazem drip and just keep the amiodarone. Will give digoxin if patient's heart rate does not correct. 1115: Dr. Star Chakraborty at bedside assessing patient. Made aware of rapid response. Will continue current treatment and also look into hospice. Palliative consult placed. 1251: Patient converted back to NSR. HR in the 70s. Will continue to titrate dilt and turn off per Dr. April Maxwell. 1400: Diltiazem stopped at this time per Dr. April Maxwell. 1633: Patient O2 saturation has been 100% on BiPAP all day. Patient placed on 6L NC at this time and O2 saturation currently at 100%. Will monitor closely. Also, mouth care performed at this time. 1900: End of Shift Note Please stop the lisinopril - do not throw it out - if potassium comes back to normal, we might restart it at a lower dose.    Kayexalate 15 gram (60 ml) as soon as possible today  To lower the potassium    Cut back on tomatoes - none today or tomorrow and then please cut back.    If you could check blood pressure for the next 2 days  And let me know the results, that would be great.              Bedside shift change report given to oncoming RN (oncoming nurse) by Claudia Randall (offgoing nurse). Report included the following information SBAR, Kardex, Intake/Output, MAR and Recent Results Shift worked:  7699-3601 Shift summary and any significant changes:  
  Patient was a rapid response this AM for A-fib RVR HR in the 180s. See note note for details. Patient currently on amio drip and weaned from BiPAP to 6L NC. Concerns for physician to address:  Patient back in NSR on amio drip, BP stable, on 6L NC. Zone phone for oncoming shift:   XXX Activity: 
Activity Level: Bed Rest 
Number times ambulated in hallways past shift: 0 Number of times OOB to chair past shift: 0 Cardiac:  
Cardiac Monitoring: Yes     
Cardiac Rhythm: Normal sinus rhythm Access:  
Current line(s): PIV Genitourinary:  
Urinary status: external catheter Respiratory:  
O2 Device: Nasal cannula Chronic home O2 use?: NO Incentive spirometer at bedside: YES 
  
GI: 
  
Current diet:  DIET NPO Passing flatus: YES Tolerating current diet: YES 
% Diet Eaten: 0 % Pain Management:  
Patient states pain is manageable on current regimen: YES Skin: 
Justin Score: 14 Interventions: speciality bed, float heels, increase time out of bed, PT/OT consult, limit briefs and internal/external urinary devices Patient Safety: 
Fall Score: Total Score: 4 Interventions: bed/chair alarm, assistive device (walker, cane, etc), gripper socks, pt to call before getting OOB and stay with me (per policy) High Fall Risk: Yes Length of Stay: 
Expected LOS: - - - Actual LOS: 2 Claudia Randall

## 2021-09-28 NOTE — PROGRESS NOTES
Patient resolved from Transition of Care episode on 5/1/20. Patient/family has been provided the following resources and education related to COVID-19:  
           
          Signs, symptoms and red flags related to COVID-19 CDC exposure and quarantine guidelines Conduit exposure contact - [phone] Contact for their local Department of Health Patient currently reports that the following symptoms have improved:  no new symptoms and no worsening symptoms. No further outreach scheduled with this CTN/ACM. Episode of Care resolved. Patient has this CTN/ACM contact information if future needs arise.
unknown

## (undated) DEVICE — INTENDED FOR TISSUE SEPARATION, AND OTHER PROCEDURES THAT REQUIRE A SHARP SURGICAL BLADE TO PUNCTURE OR CUT.: Brand: BARD-PARKER ® CARBON RIB-BACK BLADES

## (undated) DEVICE — GOWN,SIRUS,FABRNF,XL,20/CS: Brand: MEDLINE

## (undated) DEVICE — Device: Brand: PADPRO

## (undated) DEVICE — ANGIOGRAPHIC CATHETER: Brand: IMPULSE™

## (undated) DEVICE — CATH DIAG IMPLS PIG 6FRX100CM -- IMPULSE 16599-40

## (undated) DEVICE — SUTURE V-LOC 180 SZ 2-0 L9IN ABSRB VLT GS-21 L37MM 1/2 CIR VLOCM0345

## (undated) DEVICE — SUTURE DEV SZ 3-0 V-LOC 90 L12IN TO L18IN CV-23 VLT VLOCM0844

## (undated) DEVICE — COVER,TABLE,60X90,STERILE: Brand: MEDLINE

## (undated) DEVICE — Device

## (undated) DEVICE — CATH DIAG FRC4 IMPLS 6FRX100CM -- IMPULSE 16599-02

## (undated) DEVICE — CATH LITHOPLSTY 7X60MM -- SHOCKWAVE IVL

## (undated) DEVICE — SUT SLK 0 30IN CT1 BLK --

## (undated) DEVICE — CATH DIAGIMPLS MPA1 5FRX100CM -- IMPULSE 16391-117

## (undated) DEVICE — 3M™ IOBAN™ 2 ANTIMICROBIAL INCISE DRAPE 6650EZ: Brand: IOBAN™ 2

## (undated) DEVICE — PINNACLE INTRODUCER SHEATH: Brand: PINNACLE

## (undated) DEVICE — AGENT HEMSTAT 3GM PURIFIED PLNT STARCH PWD ABSRB ARISTA AH

## (undated) DEVICE — GUIDEWIRE VASC L260CM DIA0.035IN TIP L3MM STD EXCHG PTFE J

## (undated) DEVICE — COPILOT BLEEDBACK CONTROL VALVE: Brand: COPILOT

## (undated) DEVICE — PAD,ABDOMINAL,5"X9",ST,LF,25/BX: Brand: MEDLINE INDUSTRIES, INC.

## (undated) DEVICE — PERCLOSE PROGLIDE™ SUTURE-MEDIATED CLOSURE SYSTEM: Brand: PERCLOSE PROGLIDE™

## (undated) DEVICE — 40418 TRENDELENBURG ONE-STEP ARM PROTECTORS LARGE (1 PAIR): Brand: 40418 TRENDELENBURG ONE-STEP ARM PROTECTORS LARGE (1 PAIR)

## (undated) DEVICE — STERILE POLYISOPRENE POWDER-FREE SURGICAL GLOVES: Brand: PROTEXIS

## (undated) DEVICE — PAD NON-ADHERENT 3X4 STRL LF --

## (undated) DEVICE — PACK PROCEDURE SURG HRT CATH

## (undated) DEVICE — GDWIRE ANGIO SUP STF 0.035IN --

## (undated) DEVICE — SYR LR LCK 1ML GRAD NSAF 30ML --

## (undated) DEVICE — SPLINT WR POS F/ARTERIAL ACC -- BX/10

## (undated) DEVICE — TRUE™ DILATATION BALLOON VALVULOPLASTY CATHETER, 28 MM X 4.5 CM,110 CM CATHETER: Brand: TRUE DILATATION

## (undated) DEVICE — DRAPE PRB US TRNSDCR 6X96IN --

## (undated) DEVICE — GUIDEWIRE VASC L260CM DIA0.035IN RAD 3MM J TIP L7CM PTFE

## (undated) DEVICE — 3M™ TEGADERM™ TRANSPARENT FILM DRESSING FRAME STYLE, 1626W, 4 IN X 4-3/4 IN (10 CM X 12 CM), 50/CT 4CT/CASE: Brand: 3M™ TEGADERM™

## (undated) DEVICE — TIDISHIELD TRANSPORT, CONTAINMENT COVER FOR BACK TABLE 4'6" (1.37M) TO 8' (2.43M) IN LENGTH: Brand: TIDISHIELD

## (undated) DEVICE — ENDOVASCULAR DILATOR SET COONS DILATOR: Brand: COOK

## (undated) DEVICE — PREP SKN CHLRAPRP APL 26ML STR --

## (undated) DEVICE — INTENDED TO STANDARDIZE OR CAMERAS TO ALLOW FOR THE USE OF THE OR CAMERA COVER: Brand: ASPEN® O.R. CAMERA COVER

## (undated) DEVICE — GLIDESHEATH SLENDER STAINLESS STEEL KIT: Brand: GLIDESHEATH SLENDER

## (undated) DEVICE — SYRINGE IRRIG 60ML SFT PLIABLE BLB EZ TO GRP 1 HND USE W/

## (undated) DEVICE — SUTURE ETHBND EXCEL SZ 2 L30IN NONABSORBABLE GRN L40MM V-37 MX69G

## (undated) DEVICE — CATHETER ANGIO 4FR L100CM S STL NYL AL1 3 SEG BRAID SFT

## (undated) DEVICE — GUIDEWIRE VASC L260CM DIA0.035IN COIL L15CM FLX TIP L4CM

## (undated) DEVICE — DRESSING FOAM 4X6 DISP POSTOP MEPILEX BORD AG

## (undated) DEVICE — CATH GUID COR JR4.0S 6FR 100CM -- LAUNCHER

## (undated) DEVICE — GUIDEWIRE VASC L180CM DIA0.035IN TIP L7CM PTFE S STL STR

## (undated) DEVICE — DESTINATION PERIPHERAL GUIDING SHEATH: Brand: DESTINATION

## (undated) DEVICE — CUSTOM KT PTCA INFL DEV K05 00052M

## (undated) DEVICE — HI-TORQUE VERSACORE MODIFIED J GUIDE WIRE SYSTEM 145 CM: Brand: HI-TORQUE VERSACORE

## (undated) DEVICE — CO-SET DELIVERY SYSTEM FOR 123 ROOM TEMPATURE INJECTATE: Brand: CO-SET+

## (undated) DEVICE — CATH BLLN PTA .035 8X40X135 -- EVERCROSS OTW

## (undated) DEVICE — APPLICATOR BNDG 1MM ADH PREMIERPRO EXOFIN

## (undated) DEVICE — 6FR THERMODILUTION BALLOON CATHETER SWAN (ARROW)

## (undated) DEVICE — KIT MFLD ISOLATN NACL CNTRST PRT TBNG SPIK W/ PRSS TRNSDUC

## (undated) DEVICE — LUER-LOK 360°: Brand: CONNECTA, LUER-LOK

## (undated) DEVICE — DRAPE XR C ARM 41X74IN LF --

## (undated) DEVICE — WASTE KIT - ST MARY: Brand: MEDLINE INDUSTRIES, INC.

## (undated) DEVICE — CHECK-FLO PERFORMER INTRODUCER: Brand: PERFORMER

## (undated) DEVICE — REM POLYHESIVE ADULT PATIENT RETURN ELECTRODE: Brand: VALLEYLAB

## (undated) DEVICE — PACEMAKER PACK: Brand: MEDLINE INDUSTRIES, INC.

## (undated) DEVICE — SPECIAL PROCEDURE DRAPE 32" X 34": Brand: SPECIAL PROCEDURE DRAPE

## (undated) DEVICE — CATH BLLN DIL 3.0 X15MM RX -- EUPHORA

## (undated) DEVICE — STERILE POLYISOPRENE POWDER-FREE SURGICAL GLOVES WITH EMOLLIENT COATING: Brand: PROTEXIS

## (undated) DEVICE — GOWN,SIRUS,NONRNF,SETINSLV,2XL,18/CS: Brand: MEDLINE

## (undated) DEVICE — MICROPUNCTURE INTRODUCER SET SILHOUETTE TRANSITIONLESS WITH STAINLESS STEEL WIRE GUIDE: Brand: MICROPUNCTURE

## (undated) DEVICE — INTRO SHTH 9FR 13X20CM -- USE ITEM# 341577

## (undated) DEVICE — PRESSURE MONITORING SET: Brand: TRUWAVE

## (undated) DEVICE — KIT HND CTRL 3 W STPCOCK ROT END 54IN PREM HI PRSS TBNG AT

## (undated) DEVICE — TUBING, SUCTION, 1/4" X 12', STRAIGHT: Brand: MEDLINE

## (undated) DEVICE — GUIDEWIRE VASC L150CM DIA0.035IN FLX TIP L7CM PTFE STR FIX

## (undated) DEVICE — TR BAND RADIAL ARTERY COMPRESSION DEVICE: Brand: TR BAND

## (undated) DEVICE — ANGIOGRAPHY KIT

## (undated) DEVICE — TRUE™ DILATATION BALLOON VALVULOPLASTY CATHETER, 22 MM X 4.5 CM,110 CM CATHETER: Brand: TRUE DILATATION

## (undated) DEVICE — KIT MED IMAG CNTRST AGNT W/ IOPAMIDOL REUSE

## (undated) DEVICE — INFECTION CONTROL KIT SYS

## (undated) DEVICE — STIFFEN MICRO-INTRODUCER KIT: Brand: STIFFEN MICRO-INTRODUCER KIT

## (undated) DEVICE — RADIFOCUS GLIDECATH: Brand: GLIDECATH

## (undated) DEVICE — SLEEVE CATH 9FR L60CM REPOS LUERLOCK CATH-LOCK

## (undated) DEVICE — Device: Brand: GRAND SLAM

## (undated) DEVICE — WRAP SURG W1.31XL1.34M CARD FOR PT 165-172CM THERMOWRP

## (undated) DEVICE — TUBING PRSS MON L6IN PVC M FEM CONN